# Patient Record
Sex: MALE | Race: BLACK OR AFRICAN AMERICAN | NOT HISPANIC OR LATINO | URBAN - METROPOLITAN AREA
[De-identification: names, ages, dates, MRNs, and addresses within clinical notes are randomized per-mention and may not be internally consistent; named-entity substitution may affect disease eponyms.]

---

## 2017-03-06 ENCOUNTER — OUTPATIENT (OUTPATIENT)
Dept: OUTPATIENT SERVICES | Facility: HOSPITAL | Age: 72
LOS: 1 days | End: 2017-03-06
Payer: MEDICARE

## 2017-03-06 ENCOUNTER — APPOINTMENT (OUTPATIENT)
Dept: PLASTIC SURGERY | Facility: CLINIC | Age: 72
End: 2017-03-06

## 2017-03-06 DIAGNOSIS — Z98.89 OTHER SPECIFIED POSTPROCEDURAL STATES: Chronic | ICD-10-CM

## 2017-03-06 PROCEDURE — 71275 CT ANGIOGRAPHY CHEST: CPT

## 2017-03-06 PROCEDURE — 71275 CT ANGIOGRAPHY CHEST: CPT | Mod: 26

## 2017-03-08 ENCOUNTER — APPOINTMENT (OUTPATIENT)
Dept: CARDIOTHORACIC SURGERY | Facility: CLINIC | Age: 72
End: 2017-03-08

## 2017-03-08 VITALS
BODY MASS INDEX: 25.68 KG/M2 | RESPIRATION RATE: 17 BRPM | WEIGHT: 179 LBS | SYSTOLIC BLOOD PRESSURE: 128 MMHG | OXYGEN SATURATION: 97 % | TEMPERATURE: 97.6 F | HEART RATE: 68 BPM | DIASTOLIC BLOOD PRESSURE: 92 MMHG

## 2017-04-19 ENCOUNTER — APPOINTMENT (OUTPATIENT)
Dept: CARDIOTHORACIC SURGERY | Facility: CLINIC | Age: 72
End: 2017-04-19

## 2017-06-28 ENCOUNTER — APPOINTMENT (OUTPATIENT)
Dept: GASTROENTEROLOGY | Facility: CLINIC | Age: 72
End: 2017-06-28

## 2017-07-24 ENCOUNTER — APPOINTMENT (OUTPATIENT)
Dept: ORTHOPEDIC SURGERY | Facility: CLINIC | Age: 72
End: 2017-07-24

## 2017-07-24 VITALS — BODY MASS INDEX: 26.98 KG/M2 | HEIGHT: 68 IN | WEIGHT: 178 LBS

## 2017-08-07 ENCOUNTER — APPOINTMENT (OUTPATIENT)
Dept: INTERNAL MEDICINE | Facility: CLINIC | Age: 72
End: 2017-08-07
Payer: MEDICARE

## 2017-08-07 VITALS
SYSTOLIC BLOOD PRESSURE: 150 MMHG | OXYGEN SATURATION: 98 % | TEMPERATURE: 97.3 F | WEIGHT: 178 LBS | BODY MASS INDEX: 27.07 KG/M2 | DIASTOLIC BLOOD PRESSURE: 90 MMHG | HEART RATE: 64 BPM

## 2017-08-07 VITALS — SYSTOLIC BLOOD PRESSURE: 136 MMHG | DIASTOLIC BLOOD PRESSURE: 80 MMHG

## 2017-08-07 PROCEDURE — 36415 COLL VENOUS BLD VENIPUNCTURE: CPT

## 2017-08-07 PROCEDURE — 99214 OFFICE O/P EST MOD 30 MIN: CPT | Mod: 25

## 2017-08-08 LAB
ALBUMIN SERPL ELPH-MCNC: 4.7 G/DL
ALP BLD-CCNC: 59 U/L
ALT SERPL-CCNC: 26 U/L
ANION GAP SERPL CALC-SCNC: 19 MMOL/L
AST SERPL-CCNC: 20 U/L
BILIRUB SERPL-MCNC: 0.4 MG/DL
BUN SERPL-MCNC: 25 MG/DL
CALCIUM SERPL-MCNC: 10.5 MG/DL
CHLORIDE SERPL-SCNC: 101 MMOL/L
CHOLEST SERPL-MCNC: 267 MG/DL
CHOLEST/HDLC SERPL: 3.1 RATIO
CK SERPL-CCNC: 126 U/L
CO2 SERPL-SCNC: 24 MMOL/L
CREAT SERPL-MCNC: 1.33 MG/DL
GLUCOSE SERPL-MCNC: 97 MG/DL
HBA1C MFR BLD HPLC: 6.2 %
HDLC SERPL-MCNC: 86 MG/DL
LDLC SERPL CALC-MCNC: 165 MG/DL
POTASSIUM SERPL-SCNC: 5.6 MMOL/L
PROT SERPL-MCNC: 8.2 G/DL
SODIUM SERPL-SCNC: 144 MMOL/L
TRIGL SERPL-MCNC: 79 MG/DL

## 2017-09-05 ENCOUNTER — RX RENEWAL (OUTPATIENT)
Age: 72
End: 2017-09-05

## 2017-09-13 ENCOUNTER — APPOINTMENT (OUTPATIENT)
Dept: HEART AND VASCULAR | Facility: CLINIC | Age: 72
End: 2017-09-13
Payer: MEDICARE

## 2017-09-13 VITALS
TEMPERATURE: 98.2 F | OXYGEN SATURATION: 96 % | BODY MASS INDEX: 26.35 KG/M2 | DIASTOLIC BLOOD PRESSURE: 90 MMHG | SYSTOLIC BLOOD PRESSURE: 130 MMHG | WEIGHT: 182 LBS | HEIGHT: 69.69 IN | HEART RATE: 50 BPM

## 2017-09-13 PROCEDURE — 36415 COLL VENOUS BLD VENIPUNCTURE: CPT

## 2017-09-13 PROCEDURE — 99214 OFFICE O/P EST MOD 30 MIN: CPT

## 2017-09-13 PROCEDURE — 93000 ELECTROCARDIOGRAM COMPLETE: CPT

## 2017-09-13 RX ORDER — TADALAFIL 5 MG/1
5 TABLET, FILM COATED ORAL
Qty: 5 | Refills: 0 | Status: DISCONTINUED | COMMUNITY
Start: 2017-05-01

## 2017-09-14 LAB
ANION GAP SERPL CALC-SCNC: 13 MMOL/L
BUN SERPL-MCNC: 22 MG/DL
CALCIUM SERPL-MCNC: 9.9 MG/DL
CHLORIDE SERPL-SCNC: 104 MMOL/L
CO2 SERPL-SCNC: 25 MMOL/L
CREAT SERPL-MCNC: 1.05 MG/DL
GLUCOSE SERPL-MCNC: 97 MG/DL
POTASSIUM SERPL-SCNC: 4.7 MMOL/L
SODIUM SERPL-SCNC: 142 MMOL/L

## 2017-10-17 ENCOUNTER — APPOINTMENT (OUTPATIENT)
Dept: GASTROENTEROLOGY | Facility: HOSPITAL | Age: 72
End: 2017-10-17

## 2017-10-17 ENCOUNTER — OUTPATIENT (OUTPATIENT)
Dept: OUTPATIENT SERVICES | Facility: HOSPITAL | Age: 72
LOS: 1 days | Discharge: HOME | End: 2017-10-17
Payer: MEDICARE

## 2017-10-17 DIAGNOSIS — Z98.89 OTHER SPECIFIED POSTPROCEDURAL STATES: Chronic | ICD-10-CM

## 2017-10-17 PROCEDURE — G0121: CPT

## 2017-10-17 PROCEDURE — 45378 DIAGNOSTIC COLONOSCOPY: CPT

## 2017-10-20 DIAGNOSIS — K64.8 OTHER HEMORRHOIDS: ICD-10-CM

## 2017-10-20 DIAGNOSIS — Z79.82 LONG TERM (CURRENT) USE OF ASPIRIN: ICD-10-CM

## 2017-10-20 DIAGNOSIS — Z12.11 ENCOUNTER FOR SCREENING FOR MALIGNANT NEOPLASM OF COLON: ICD-10-CM

## 2017-10-20 DIAGNOSIS — I10 ESSENTIAL (PRIMARY) HYPERTENSION: ICD-10-CM

## 2017-10-20 DIAGNOSIS — K57.30 DIVERTICULOSIS OF LARGE INTESTINE WITHOUT PERFORATION OR ABSCESS WITHOUT BLEEDING: ICD-10-CM

## 2017-10-20 DIAGNOSIS — Z95.810 PRESENCE OF AUTOMATIC (IMPLANTABLE) CARDIAC DEFIBRILLATOR: ICD-10-CM

## 2017-11-06 ENCOUNTER — APPOINTMENT (OUTPATIENT)
Dept: HEART AND VASCULAR | Facility: CLINIC | Age: 72
End: 2017-11-06
Payer: MEDICARE

## 2017-11-06 VITALS
SYSTOLIC BLOOD PRESSURE: 124 MMHG | DIASTOLIC BLOOD PRESSURE: 82 MMHG | HEART RATE: 66 BPM | WEIGHT: 174 LBS | BODY MASS INDEX: 24.91 KG/M2 | OXYGEN SATURATION: 97 % | TEMPERATURE: 98.3 F | HEIGHT: 70 IN

## 2017-11-06 PROCEDURE — 99214 OFFICE O/P EST MOD 30 MIN: CPT | Mod: 25

## 2017-12-01 ENCOUNTER — RX RENEWAL (OUTPATIENT)
Age: 72
End: 2017-12-01

## 2017-12-06 ENCOUNTER — OTHER (OUTPATIENT)
Age: 72
End: 2017-12-06

## 2017-12-18 ENCOUNTER — APPOINTMENT (OUTPATIENT)
Dept: HEART AND VASCULAR | Facility: CLINIC | Age: 72
End: 2017-12-18
Payer: MEDICARE

## 2017-12-18 VITALS
HEART RATE: 63 BPM | WEIGHT: 173.99 LBS | TEMPERATURE: 97.8 F | SYSTOLIC BLOOD PRESSURE: 120 MMHG | BODY MASS INDEX: 24.91 KG/M2 | OXYGEN SATURATION: 100 % | HEIGHT: 70.08 IN | DIASTOLIC BLOOD PRESSURE: 80 MMHG

## 2017-12-18 PROCEDURE — 36415 COLL VENOUS BLD VENIPUNCTURE: CPT

## 2017-12-18 PROCEDURE — 99214 OFFICE O/P EST MOD 30 MIN: CPT | Mod: 25

## 2017-12-18 PROCEDURE — 93000 ELECTROCARDIOGRAM COMPLETE: CPT

## 2017-12-19 LAB
ALBUMIN SERPL ELPH-MCNC: 4.2 G/DL
ALP BLD-CCNC: 56 U/L
ALT SERPL-CCNC: 21 U/L
ANION GAP SERPL CALC-SCNC: 16 MMOL/L
AST SERPL-CCNC: 25 U/L
BILIRUB SERPL-MCNC: 0.4 MG/DL
BUN SERPL-MCNC: 28 MG/DL
CALCIUM SERPL-MCNC: 9.8 MG/DL
CHLORIDE SERPL-SCNC: 104 MMOL/L
CHOLEST SERPL-MCNC: 202 MG/DL
CHOLEST/HDLC SERPL: 2.3 RATIO
CO2 SERPL-SCNC: 25 MMOL/L
CREAT SERPL-MCNC: 1.39 MG/DL
GLUCOSE SERPL-MCNC: 93 MG/DL
HDLC SERPL-MCNC: 86 MG/DL
LDLC SERPL CALC-MCNC: 105 MG/DL
MAGNESIUM SERPL-MCNC: 2 MG/DL
POTASSIUM SERPL-SCNC: 4.8 MMOL/L
PROT SERPL-MCNC: 7.6 G/DL
SODIUM SERPL-SCNC: 145 MMOL/L
TRIGL SERPL-MCNC: 55 MG/DL

## 2018-01-23 ENCOUNTER — APPOINTMENT (OUTPATIENT)
Dept: ORTHOPEDIC SURGERY | Facility: CLINIC | Age: 73
End: 2018-01-23
Payer: MEDICARE

## 2018-01-23 VITALS — HEIGHT: 70 IN | BODY MASS INDEX: 24.77 KG/M2 | WEIGHT: 173 LBS | RESPIRATION RATE: 17 BRPM

## 2018-01-23 PROCEDURE — 73140 X-RAY EXAM OF FINGER(S): CPT | Mod: F6

## 2018-01-23 PROCEDURE — 99214 OFFICE O/P EST MOD 30 MIN: CPT

## 2018-01-25 ENCOUNTER — RX RENEWAL (OUTPATIENT)
Age: 73
End: 2018-01-25

## 2018-02-05 ENCOUNTER — APPOINTMENT (OUTPATIENT)
Dept: INTERNAL MEDICINE | Facility: CLINIC | Age: 73
End: 2018-02-05
Payer: MEDICARE

## 2018-02-05 ENCOUNTER — LABORATORY RESULT (OUTPATIENT)
Age: 73
End: 2018-02-05

## 2018-02-05 VITALS
BODY MASS INDEX: 24.77 KG/M2 | TEMPERATURE: 98.2 F | HEIGHT: 70 IN | DIASTOLIC BLOOD PRESSURE: 100 MMHG | OXYGEN SATURATION: 99 % | SYSTOLIC BLOOD PRESSURE: 150 MMHG | WEIGHT: 173 LBS

## 2018-02-05 VITALS — DIASTOLIC BLOOD PRESSURE: 80 MMHG | SYSTOLIC BLOOD PRESSURE: 120 MMHG

## 2018-02-05 DIAGNOSIS — Z12.11 ENCOUNTER FOR SCREENING FOR MALIGNANT NEOPLASM OF COLON: ICD-10-CM

## 2018-02-05 PROCEDURE — 36415 COLL VENOUS BLD VENIPUNCTURE: CPT

## 2018-02-05 PROCEDURE — 99213 OFFICE O/P EST LOW 20 MIN: CPT | Mod: 25

## 2018-02-05 PROCEDURE — 90732 PPSV23 VACC 2 YRS+ SUBQ/IM: CPT

## 2018-02-05 PROCEDURE — G0009: CPT

## 2018-02-05 PROCEDURE — G0439: CPT

## 2018-02-05 RX ORDER — TADALAFIL 10 MG/1
10 TABLET, FILM COATED ORAL
Qty: 5 | Refills: 3 | Status: COMPLETED | COMMUNITY
Start: 2017-09-13 | End: 2018-02-05

## 2018-02-06 LAB
25(OH)D3 SERPL-MCNC: 24.9 NG/ML
CHOLEST SERPL-MCNC: 222 MG/DL
CHOLEST/HDLC SERPL: 2.3 RATIO
CK SERPL-CCNC: 170 U/L
HDLC SERPL-MCNC: 96 MG/DL
LDLC SERPL CALC-MCNC: 117 MG/DL
PSA SERPL-MCNC: 0.81 NG/ML
TRIGL SERPL-MCNC: 43 MG/DL
TSH SERPL-ACNC: 1.58 UIU/ML

## 2018-02-07 LAB
BASOPHILS # BLD AUTO: 0.01 K/UL
BASOPHILS NFR BLD AUTO: 0.2 %
EOSINOPHIL # BLD AUTO: 0.03 K/UL
EOSINOPHIL NFR BLD AUTO: 0.6 %
HBA1C MFR BLD HPLC: 6 %
HCT VFR BLD CALC: 46.1 %
HGB BLD-MCNC: 13.7 G/DL
IMM GRANULOCYTES NFR BLD AUTO: 0 %
LYMPHOCYTES # BLD AUTO: 3.07 K/UL
LYMPHOCYTES NFR BLD AUTO: 56.7 %
MAN DIFF?: NORMAL
MCHC RBC-ENTMCNC: 29.7 GM/DL
MCHC RBC-ENTMCNC: 31.8 PG
MCV RBC AUTO: 107 FL
MONOCYTES # BLD AUTO: 0.38 K/UL
MONOCYTES NFR BLD AUTO: 7 %
NEUTROPHILS # BLD AUTO: 1.92 K/UL
NEUTROPHILS NFR BLD AUTO: 35.5 %
PLATELET # BLD AUTO: 226 K/UL
RBC # BLD: 4.31 M/UL
RBC # FLD: 14.7 %
WBC # FLD AUTO: 5.41 K/UL

## 2018-02-23 ENCOUNTER — APPOINTMENT (OUTPATIENT)
Dept: HEART AND VASCULAR | Facility: CLINIC | Age: 73
End: 2018-02-23
Payer: MEDICARE

## 2018-02-23 VITALS
DIASTOLIC BLOOD PRESSURE: 78 MMHG | OXYGEN SATURATION: 100 % | SYSTOLIC BLOOD PRESSURE: 116 MMHG | WEIGHT: 175 LBS | HEIGHT: 70 IN | TEMPERATURE: 97.3 F | HEART RATE: 56 BPM | BODY MASS INDEX: 25.05 KG/M2

## 2018-02-23 PROCEDURE — 99214 OFFICE O/P EST MOD 30 MIN: CPT | Mod: 25

## 2018-02-23 PROCEDURE — 36415 COLL VENOUS BLD VENIPUNCTURE: CPT

## 2018-02-23 PROCEDURE — 93000 ELECTROCARDIOGRAM COMPLETE: CPT

## 2018-02-26 LAB
ANION GAP SERPL CALC-SCNC: 10 MMOL/L
BASOPHILS # BLD AUTO: 0.02 K/UL
BASOPHILS NFR BLD AUTO: 0.4 %
BUN SERPL-MCNC: 26 MG/DL
CALCIUM SERPL-MCNC: 9.6 MG/DL
CHLORIDE SERPL-SCNC: 104 MMOL/L
CO2 SERPL-SCNC: 29 MMOL/L
CREAT SERPL-MCNC: 1.12 MG/DL
EOSINOPHIL # BLD AUTO: 0.04 K/UL
EOSINOPHIL NFR BLD AUTO: 0.9 %
GLUCOSE SERPL-MCNC: 84 MG/DL
HCT VFR BLD CALC: 45.3 %
HGB BLD-MCNC: 14.6 G/DL
IMM GRANULOCYTES NFR BLD AUTO: 0.2 %
LYMPHOCYTES # BLD AUTO: 2.53 K/UL
LYMPHOCYTES NFR BLD AUTO: 53.8 %
MAN DIFF?: NORMAL
MCHC RBC-ENTMCNC: 32.2 GM/DL
MCHC RBC-ENTMCNC: 32.2 PG
MCV RBC AUTO: 100 FL
MONOCYTES # BLD AUTO: 0.4 K/UL
MONOCYTES NFR BLD AUTO: 8.5 %
NEUTROPHILS # BLD AUTO: 1.7 K/UL
NEUTROPHILS NFR BLD AUTO: 36.2 %
PLATELET # BLD AUTO: 235 K/UL
POTASSIUM SERPL-SCNC: 5.5 MMOL/L
RBC # BLD: 4.53 M/UL
RBC # FLD: 14 %
SODIUM SERPL-SCNC: 143 MMOL/L
WBC # FLD AUTO: 4.7 K/UL

## 2018-03-01 ENCOUNTER — APPOINTMENT (OUTPATIENT)
Dept: HEART AND VASCULAR | Facility: CLINIC | Age: 73
End: 2018-03-01

## 2018-03-02 ENCOUNTER — APPOINTMENT (OUTPATIENT)
Dept: HEART AND VASCULAR | Facility: CLINIC | Age: 73
End: 2018-03-02
Payer: MEDICARE

## 2018-03-02 VITALS
WEIGHT: 171.98 LBS | BODY MASS INDEX: 24.62 KG/M2 | DIASTOLIC BLOOD PRESSURE: 76 MMHG | SYSTOLIC BLOOD PRESSURE: 126 MMHG | OXYGEN SATURATION: 100 % | HEART RATE: 53 BPM | HEIGHT: 70 IN | TEMPERATURE: 97.4 F

## 2018-03-02 LAB
ANION GAP SERPL CALC-SCNC: 10 MMOL/L
BUN SERPL-MCNC: 26 MG/DL
CALCIUM SERPL-MCNC: 9.2 MG/DL
CHLORIDE SERPL-SCNC: 101 MMOL/L
CO2 SERPL-SCNC: 28 MMOL/L
CREAT SERPL-MCNC: 1.08 MG/DL
GLUCOSE SERPL-MCNC: 96 MG/DL
POTASSIUM SERPL-SCNC: 5 MMOL/L
SODIUM SERPL-SCNC: 139 MMOL/L

## 2018-03-02 PROCEDURE — 99214 OFFICE O/P EST MOD 30 MIN: CPT | Mod: 25

## 2018-03-02 PROCEDURE — 36415 COLL VENOUS BLD VENIPUNCTURE: CPT

## 2018-03-13 ENCOUNTER — APPOINTMENT (OUTPATIENT)
Dept: ORTHOPEDIC SURGERY | Facility: AMBULATORY SURGERY CENTER | Age: 73
End: 2018-03-13

## 2018-03-13 ENCOUNTER — OUTPATIENT (OUTPATIENT)
Dept: OUTPATIENT SERVICES | Facility: HOSPITAL | Age: 73
LOS: 1 days | Discharge: HOME | End: 2018-03-13
Payer: MEDICARE

## 2018-03-13 ENCOUNTER — RESULT REVIEW (OUTPATIENT)
Age: 73
End: 2018-03-13

## 2018-03-13 DIAGNOSIS — Z98.89 OTHER SPECIFIED POSTPROCEDURAL STATES: Chronic | ICD-10-CM

## 2018-03-13 PROCEDURE — 10120 INC&RMVL FB SUBQ TISS SMPL: CPT | Mod: F7

## 2018-03-13 PROCEDURE — 26115 EXC HAND LES SC < 1.5 CM: CPT | Mod: F8

## 2018-03-13 PROCEDURE — 26055 INCISE FINGER TENDON SHEATH: CPT | Mod: 59,F8

## 2018-03-21 LAB — SURGICAL PATHOLOGY STUDY: SIGNIFICANT CHANGE UP

## 2018-03-23 ENCOUNTER — APPOINTMENT (OUTPATIENT)
Dept: OTOLARYNGOLOGY | Facility: CLINIC | Age: 73
End: 2018-03-23
Payer: MEDICARE

## 2018-03-23 ENCOUNTER — APPOINTMENT (OUTPATIENT)
Dept: ORTHOPEDIC SURGERY | Facility: CLINIC | Age: 73
End: 2018-03-23
Payer: MEDICARE

## 2018-03-23 VITALS
DIASTOLIC BLOOD PRESSURE: 83 MMHG | HEART RATE: 57 BPM | OXYGEN SATURATION: 100 % | SYSTOLIC BLOOD PRESSURE: 142 MMHG | TEMPERATURE: 97.7 F

## 2018-03-23 DIAGNOSIS — M19.041 PRIMARY OSTEOARTHRITIS, RIGHT HAND: ICD-10-CM

## 2018-03-23 PROCEDURE — 99203 OFFICE O/P NEW LOW 30 MIN: CPT

## 2018-03-23 PROCEDURE — 99024 POSTOP FOLLOW-UP VISIT: CPT

## 2018-03-23 RX ORDER — CEPHALEXIN 500 MG/1
500 CAPSULE ORAL 3 TIMES DAILY
Qty: 21 | Refills: 0 | Status: DISCONTINUED | COMMUNITY
Start: 2018-03-12 | End: 2018-03-23

## 2018-04-10 ENCOUNTER — APPOINTMENT (OUTPATIENT)
Dept: HEART AND VASCULAR | Facility: CLINIC | Age: 73
End: 2018-04-10
Payer: MEDICARE

## 2018-04-10 VITALS
OXYGEN SATURATION: 95 % | SYSTOLIC BLOOD PRESSURE: 150 MMHG | DIASTOLIC BLOOD PRESSURE: 90 MMHG | BODY MASS INDEX: 24.77 KG/M2 | WEIGHT: 173 LBS | HEART RATE: 52 BPM | HEIGHT: 70 IN | TEMPERATURE: 98.1 F

## 2018-04-10 PROCEDURE — 99214 OFFICE O/P EST MOD 30 MIN: CPT | Mod: 25

## 2018-04-10 PROCEDURE — 93000 ELECTROCARDIOGRAM COMPLETE: CPT

## 2018-04-12 ENCOUNTER — APPOINTMENT (OUTPATIENT)
Dept: HEART AND VASCULAR | Facility: CLINIC | Age: 73
End: 2018-04-12

## 2018-04-15 ENCOUNTER — FORM ENCOUNTER (OUTPATIENT)
Age: 73
End: 2018-04-15

## 2018-04-16 ENCOUNTER — APPOINTMENT (OUTPATIENT)
Dept: CT IMAGING | Facility: HOSPITAL | Age: 73
End: 2018-04-16
Payer: MEDICARE

## 2018-04-16 ENCOUNTER — OUTPATIENT (OUTPATIENT)
Dept: OUTPATIENT SERVICES | Facility: HOSPITAL | Age: 73
LOS: 1 days | End: 2018-04-16
Payer: MEDICARE

## 2018-04-16 DIAGNOSIS — Z98.89 OTHER SPECIFIED POSTPROCEDURAL STATES: Chronic | ICD-10-CM

## 2018-04-16 PROCEDURE — 71275 CT ANGIOGRAPHY CHEST: CPT | Mod: 26

## 2018-04-16 PROCEDURE — 71275 CT ANGIOGRAPHY CHEST: CPT

## 2018-05-07 ENCOUNTER — APPOINTMENT (OUTPATIENT)
Dept: HEART AND VASCULAR | Facility: CLINIC | Age: 73
End: 2018-05-07

## 2018-06-11 ENCOUNTER — APPOINTMENT (OUTPATIENT)
Dept: INTERNAL MEDICINE | Facility: CLINIC | Age: 73
End: 2018-06-11
Payer: MEDICARE

## 2018-06-11 VITALS
DIASTOLIC BLOOD PRESSURE: 80 MMHG | WEIGHT: 173 LBS | HEART RATE: 34 BPM | HEIGHT: 70 IN | SYSTOLIC BLOOD PRESSURE: 119 MMHG | BODY MASS INDEX: 24.77 KG/M2 | TEMPERATURE: 97.7 F | OXYGEN SATURATION: 93 %

## 2018-06-11 DIAGNOSIS — S60.450A SUPERFICIAL FOREIGN BODY OF RIGHT INDEX FINGER, INITIAL ENCOUNTER: ICD-10-CM

## 2018-06-11 PROCEDURE — 99214 OFFICE O/P EST MOD 30 MIN: CPT

## 2018-06-12 NOTE — ASSESSMENT
[FreeTextEntry1] : 73 y/o man is here for several issues.\par 1. Right shoulder injury-I am concerned about his exam-?brachial plexus injury? He is clearly having muscle spasms and may need Neurology consult as well\par 2. LIpoma-NTD\par 3, Insomnia-given age and comorbidities, I am hesitant to prescribe Ambien. Pt to try Melatonin.

## 2018-06-12 NOTE — PHYSICAL EXAM
[No Acute Distress] : no acute distress [Well Nourished] : well nourished [Well Developed] : well developed [Well-Appearing] : well-appearing [Normal Sclera/Conjunctiva] : normal sclera/conjunctiva [PERRL] : pupils equal round and reactive to light [Normal Outer Ear/Nose] : the outer ears and nose were normal in appearance [No JVD] : no jugular venous distention [No Respiratory Distress] : no respiratory distress  [Clear to Auscultation] : lungs were clear to auscultation bilaterally [Grossly Normal Strength/Tone] : grossly normal strength/tone [Normal Gait] : normal gait [Normal Affect] : the affect was normal [Alert and Oriented x3] : oriented to person, place, and time [de-identified] : right shoulder girdle contracts spontaneously without patient being aware; needs help to raise arm more than 120 degrees [de-identified] : small lipoma on left UE

## 2018-06-12 NOTE — REVIEW OF SYSTEMS
[Joint Pain] : joint pain [Joint Stiffness] : joint stiffness [Muscle Weakness] : muscle weakness [Insomnia] : insomnia [Anxiety] : no anxiety [Depression] : no depression [Negative] : Heme/Lymph [de-identified] : "lump" on arm

## 2018-06-12 NOTE — HISTORY OF PRESENT ILLNESS
[de-identified] : 71 y/o man is here for several issues.\par Since February (his flu shot), he c/o right shoulder pain. He feels discomfort at rest in the medial joint area and can't raise arm >90 degrees. He thinks his  is weaker. \par He has a "lump" on the inside of his left upper arm.\par He can't sleep. He doesn't fall asleep until 2-3 am and then wakes up several hours later. It has "always been like this." He denies anxiety. He practices good sleep hygiene.

## 2018-07-10 ENCOUNTER — APPOINTMENT (OUTPATIENT)
Dept: HEART AND VASCULAR | Facility: CLINIC | Age: 73
End: 2018-07-10
Payer: MEDICARE

## 2018-07-10 VITALS
OXYGEN SATURATION: 98 % | WEIGHT: 173 LBS | TEMPERATURE: 97.6 F | BODY MASS INDEX: 24.77 KG/M2 | SYSTOLIC BLOOD PRESSURE: 123 MMHG | HEIGHT: 70 IN | DIASTOLIC BLOOD PRESSURE: 85 MMHG | HEART RATE: 57 BPM

## 2018-07-10 VITALS — SYSTOLIC BLOOD PRESSURE: 114 MMHG | DIASTOLIC BLOOD PRESSURE: 78 MMHG

## 2018-07-10 LAB
BASOPHILS # BLD AUTO: 0.02 K/UL
BASOPHILS NFR BLD AUTO: 0.4 %
EOSINOPHIL # BLD AUTO: 0.05 K/UL
EOSINOPHIL NFR BLD AUTO: 1 %
HCT VFR BLD CALC: 42.3 %
HGB BLD-MCNC: 14.1 G/DL
IMM GRANULOCYTES NFR BLD AUTO: 0 %
LYMPHOCYTES # BLD AUTO: 3.19 K/UL
LYMPHOCYTES NFR BLD AUTO: 65.6 %
MAN DIFF?: NORMAL
MCHC RBC-ENTMCNC: 33.3 GM/DL
MCHC RBC-ENTMCNC: 33.3 PG
MCV RBC AUTO: 99.8 FL
MONOCYTES # BLD AUTO: 0.37 K/UL
MONOCYTES NFR BLD AUTO: 7.6 %
NEUTROPHILS # BLD AUTO: 1.23 K/UL
NEUTROPHILS NFR BLD AUTO: 25.4 %
PLATELET # BLD AUTO: 214 K/UL
RBC # BLD: 4.24 M/UL
RBC # FLD: 14.3 %
WBC # FLD AUTO: 4.86 K/UL

## 2018-07-10 PROCEDURE — 36415 COLL VENOUS BLD VENIPUNCTURE: CPT

## 2018-07-10 PROCEDURE — 99214 OFFICE O/P EST MOD 30 MIN: CPT | Mod: 25

## 2018-07-10 RX ORDER — FUROSEMIDE 20 MG/1
20 TABLET ORAL
Qty: 90 | Refills: 0 | Status: DISCONTINUED | COMMUNITY
Start: 2018-03-02 | End: 2018-07-10

## 2018-07-11 LAB
ALBUMIN SERPL ELPH-MCNC: 4.3 G/DL
ALP BLD-CCNC: 64 U/L
ALT SERPL-CCNC: 20 U/L
ANION GAP SERPL CALC-SCNC: 12 MMOL/L
AST SERPL-CCNC: 19 U/L
BILIRUB SERPL-MCNC: 0.4 MG/DL
BUN SERPL-MCNC: 30 MG/DL
CALCIUM SERPL-MCNC: 9.9 MG/DL
CHLORIDE SERPL-SCNC: 102 MMOL/L
CHOLEST SERPL-MCNC: 232 MG/DL
CHOLEST/HDLC SERPL: 2.7 RATIO
CO2 SERPL-SCNC: 27 MMOL/L
CREAT SERPL-MCNC: 1.32 MG/DL
GLUCOSE SERPL-MCNC: 99 MG/DL
HBA1C MFR BLD HPLC: 6.1 %
HDLC SERPL-MCNC: 86 MG/DL
LDLC SERPL CALC-MCNC: 134 MG/DL
POTASSIUM SERPL-SCNC: 5 MMOL/L
PROT SERPL-MCNC: 7.6 G/DL
SODIUM SERPL-SCNC: 141 MMOL/L
TRIGL SERPL-MCNC: 58 MG/DL

## 2018-07-19 ENCOUNTER — FORM ENCOUNTER (OUTPATIENT)
Age: 73
End: 2018-07-19

## 2018-07-20 ENCOUNTER — APPOINTMENT (OUTPATIENT)
Dept: CT IMAGING | Facility: HOSPITAL | Age: 73
End: 2018-07-20
Payer: MEDICARE

## 2018-07-20 ENCOUNTER — OUTPATIENT (OUTPATIENT)
Dept: OUTPATIENT SERVICES | Facility: HOSPITAL | Age: 73
LOS: 1 days | End: 2018-07-20
Payer: MEDICARE

## 2018-07-20 DIAGNOSIS — Z98.89 OTHER SPECIFIED POSTPROCEDURAL STATES: Chronic | ICD-10-CM

## 2018-07-20 PROCEDURE — 71275 CT ANGIOGRAPHY CHEST: CPT

## 2018-07-20 PROCEDURE — 71275 CT ANGIOGRAPHY CHEST: CPT | Mod: 26

## 2018-07-23 ENCOUNTER — FORM ENCOUNTER (OUTPATIENT)
Age: 73
End: 2018-07-23

## 2018-07-24 ENCOUNTER — OUTPATIENT (OUTPATIENT)
Dept: OUTPATIENT SERVICES | Facility: HOSPITAL | Age: 73
LOS: 1 days | End: 2018-07-24
Payer: MEDICARE

## 2018-07-24 DIAGNOSIS — I71.2 THORACIC AORTIC ANEURYSM, WITHOUT RUPTURE: ICD-10-CM

## 2018-07-24 DIAGNOSIS — Z98.89 OTHER SPECIFIED POSTPROCEDURAL STATES: Chronic | ICD-10-CM

## 2018-07-24 PROCEDURE — 93306 TTE W/DOPPLER COMPLETE: CPT | Mod: 26

## 2018-07-24 PROCEDURE — 93306 TTE W/DOPPLER COMPLETE: CPT

## 2018-09-07 ENCOUNTER — APPOINTMENT (OUTPATIENT)
Dept: ORTHOPEDIC SURGERY | Facility: CLINIC | Age: 73
End: 2018-09-07
Payer: MEDICARE

## 2018-09-07 ENCOUNTER — RX CHANGE (OUTPATIENT)
Age: 73
End: 2018-09-07

## 2018-09-07 VITALS — RESPIRATION RATE: 17 BRPM | WEIGHT: 172 LBS | BODY MASS INDEX: 24.62 KG/M2 | HEIGHT: 70 IN

## 2018-09-07 PROCEDURE — 99213 OFFICE O/P EST LOW 20 MIN: CPT

## 2018-09-11 ENCOUNTER — OUTPATIENT (OUTPATIENT)
Dept: OUTPATIENT SERVICES | Facility: HOSPITAL | Age: 73
LOS: 1 days | End: 2018-09-11
Payer: MEDICARE

## 2018-09-11 DIAGNOSIS — Z98.89 OTHER SPECIFIED POSTPROCEDURAL STATES: Chronic | ICD-10-CM

## 2018-09-11 PROCEDURE — 73560 X-RAY EXAM OF KNEE 1 OR 2: CPT | Mod: 26,RT

## 2018-09-11 PROCEDURE — 73560 X-RAY EXAM OF KNEE 1 OR 2: CPT

## 2018-10-18 ENCOUNTER — OUTPATIENT (OUTPATIENT)
Dept: OUTPATIENT SERVICES | Facility: HOSPITAL | Age: 73
LOS: 1 days | End: 2018-10-18
Payer: MEDICARE

## 2018-10-18 ENCOUNTER — APPOINTMENT (OUTPATIENT)
Dept: CT IMAGING | Facility: CLINIC | Age: 73
End: 2018-10-18
Payer: MEDICARE

## 2018-10-18 DIAGNOSIS — Z98.89 OTHER SPECIFIED POSTPROCEDURAL STATES: Chronic | ICD-10-CM

## 2018-10-18 PROCEDURE — 82565 ASSAY OF CREATININE: CPT

## 2018-10-18 PROCEDURE — 73701 CT LOWER EXTREMITY W/DYE: CPT

## 2018-10-18 PROCEDURE — 73701 CT LOWER EXTREMITY W/DYE: CPT | Mod: 26,RT

## 2018-10-23 ENCOUNTER — APPOINTMENT (OUTPATIENT)
Dept: HEART AND VASCULAR | Facility: CLINIC | Age: 73
End: 2018-10-23
Payer: MEDICARE

## 2018-10-23 VITALS
HEART RATE: 66 BPM | OXYGEN SATURATION: 97 % | HEIGHT: 70 IN | DIASTOLIC BLOOD PRESSURE: 80 MMHG | SYSTOLIC BLOOD PRESSURE: 110 MMHG | TEMPERATURE: 97.6 F | BODY MASS INDEX: 24.91 KG/M2 | WEIGHT: 173.99 LBS

## 2018-10-23 PROCEDURE — 36415 COLL VENOUS BLD VENIPUNCTURE: CPT

## 2018-10-23 PROCEDURE — 99214 OFFICE O/P EST MOD 30 MIN: CPT

## 2018-10-23 RX ORDER — TELMISARTAN 80 MG/1
80 TABLET ORAL DAILY
Qty: 90 | Refills: 2 | Status: DISCONTINUED | COMMUNITY
Start: 2018-09-10 | End: 2018-10-23

## 2018-10-24 LAB
ALBUMIN SERPL ELPH-MCNC: 4.2 G/DL
ALP BLD-CCNC: 56 U/L
ALT SERPL-CCNC: 17 U/L
ANION GAP SERPL CALC-SCNC: 11 MMOL/L
AST SERPL-CCNC: 20 U/L
BASOPHILS # BLD AUTO: 0.02 K/UL
BASOPHILS NFR BLD AUTO: 0.4 %
BILIRUB SERPL-MCNC: 0.6 MG/DL
BUN SERPL-MCNC: 23 MG/DL
CALCIUM SERPL-MCNC: 10 MG/DL
CHLORIDE SERPL-SCNC: 103 MMOL/L
CHOLEST SERPL-MCNC: 218 MG/DL
CHOLEST/HDLC SERPL: 2.6 RATIO
CO2 SERPL-SCNC: 29 MMOL/L
CREAT SERPL-MCNC: 1.14 MG/DL
EOSINOPHIL # BLD AUTO: 0.07 K/UL
EOSINOPHIL NFR BLD AUTO: 1.4 %
GLUCOSE SERPL-MCNC: 91 MG/DL
HBA1C MFR BLD HPLC: 6.2 %
HCT VFR BLD CALC: 45.3 %
HDLC SERPL-MCNC: 84 MG/DL
HGB BLD-MCNC: 14.7 G/DL
IMM GRANULOCYTES NFR BLD AUTO: 0 %
LDLC SERPL CALC-MCNC: 122 MG/DL
LYMPHOCYTES # BLD AUTO: 2.66 K/UL
LYMPHOCYTES NFR BLD AUTO: 54.4 %
MAN DIFF?: NORMAL
MCHC RBC-ENTMCNC: 32.5 GM/DL
MCHC RBC-ENTMCNC: 32.5 PG
MCV RBC AUTO: 100 FL
MONOCYTES # BLD AUTO: 0.51 K/UL
MONOCYTES NFR BLD AUTO: 10.4 %
NEUTROPHILS # BLD AUTO: 1.63 K/UL
NEUTROPHILS NFR BLD AUTO: 33.4 %
PLATELET # BLD AUTO: 233 K/UL
POTASSIUM SERPL-SCNC: 5.6 MMOL/L
PROT SERPL-MCNC: 7.8 G/DL
RBC # BLD: 4.53 M/UL
RBC # FLD: 13.7 %
SODIUM SERPL-SCNC: 143 MMOL/L
TRIGL SERPL-MCNC: 58 MG/DL
WBC # FLD AUTO: 4.89 K/UL

## 2018-12-06 ENCOUNTER — RX RENEWAL (OUTPATIENT)
Age: 73
End: 2018-12-06

## 2018-12-07 ENCOUNTER — RX RENEWAL (OUTPATIENT)
Age: 73
End: 2018-12-07

## 2018-12-20 ENCOUNTER — OUTPATIENT (OUTPATIENT)
Dept: OUTPATIENT SERVICES | Facility: HOSPITAL | Age: 73
LOS: 1 days | End: 2018-12-20
Payer: MEDICARE

## 2018-12-20 DIAGNOSIS — Z98.89 OTHER SPECIFIED POSTPROCEDURAL STATES: Chronic | ICD-10-CM

## 2018-12-20 PROCEDURE — 73552 X-RAY EXAM OF FEMUR 2/>: CPT

## 2018-12-20 PROCEDURE — 73030 X-RAY EXAM OF SHOULDER: CPT

## 2018-12-20 PROCEDURE — 73552 X-RAY EXAM OF FEMUR 2/>: CPT | Mod: 26,50

## 2018-12-20 PROCEDURE — 73030 X-RAY EXAM OF SHOULDER: CPT | Mod: 26,50

## 2019-01-08 ENCOUNTER — RX RENEWAL (OUTPATIENT)
Age: 74
End: 2019-01-08

## 2019-01-22 ENCOUNTER — APPOINTMENT (OUTPATIENT)
Dept: HEART AND VASCULAR | Facility: CLINIC | Age: 74
End: 2019-01-22
Payer: MEDICARE

## 2019-01-22 VITALS
OXYGEN SATURATION: 98 % | DIASTOLIC BLOOD PRESSURE: 90 MMHG | WEIGHT: 176 LBS | HEIGHT: 70 IN | BODY MASS INDEX: 25.2 KG/M2 | TEMPERATURE: 97.8 F | HEART RATE: 52 BPM | SYSTOLIC BLOOD PRESSURE: 130 MMHG

## 2019-01-22 VITALS — SYSTOLIC BLOOD PRESSURE: 130 MMHG | DIASTOLIC BLOOD PRESSURE: 98 MMHG

## 2019-01-22 PROCEDURE — 36415 COLL VENOUS BLD VENIPUNCTURE: CPT

## 2019-01-22 PROCEDURE — 93000 ELECTROCARDIOGRAM COMPLETE: CPT

## 2019-01-22 PROCEDURE — 99214 OFFICE O/P EST MOD 30 MIN: CPT

## 2019-01-22 NOTE — HISTORY OF PRESENT ILLNESS
[FreeTextEntry1] : \par PCP- Dr Sandhya Bloom\par EP- Dr Palencia\par CTS- Dr Garcia\par Plastics- Dr Ko\par Hand- Dr Xiong\par GI- Dr Cortez, colonoscopy Sept 2017\par Dentist: Dr. Jorge Oh

## 2019-01-22 NOTE — ASSESSMENT
[FreeTextEntry1] : Valve disease: clean coronaries on cardiac cath in 2014. status post AVR, MV repair Dr. Caio Louise 9/9/2014) SBE prophylaxis. okay to stop ASA week before dental extraction and implant. EKG with 1 PVC.\par \par VT- has non-sustained, i spoke to Dr Palencia who does not want him to run the Count includes the [facility]. There is  a VT and syncope/sudden death risk, also has a thoracic aneurysm.  I discussed this with him and his wife, walking the Marathon is OK but no jogging.  We walked it and did well.\par \par Aortic aneurysm: Enlarged. 42 (ascending) 47 (descending) in 2014\par Followup in 2015 reveals a max Ao of 46 mm\par CT angiogram 3/6/2017 aneurysm unchanged\par annual CT, due 3/2018.  Aorta 46mm April 2018, referred back to Dr Garcia, pt did not go as of 1/2019\par \par HLD: on pravastatin 40mg, had muscle aches on 80mg,  8/2017, pt reported he was only taking it once in a while, now taking it daily will repeat lipid panel. Diet and exercise discussed in detail.   Feb 2018\par \par HTN: Have DC Lasix and changed to HCTZ weekly.  BP very good, Reduce Micardis  40 for persistent elevated K.  Increase HCTZ 25 to M & F. \par \par CKD: 1.33 8/8/2017, repeat BMP Sept 2017, Cr 1.05, previously normal, on ARB .  K better

## 2019-01-22 NOTE — REASON FOR VISIT
[FreeTextEntry1] : 72-year-old male with history of high cholesterol, aortic aneurysm thoracic, hypertension, lumbago, pre-diabetes, Valve disease (status post AVR, MVrepair Dr. Caio Louise 9/9/2014). S/P  colonoscopy with Dr. Maximilian Cortez 9/14/17. Doing well overall, Denies CP, SOB, palpitations, orthopnea, LE swelling, dizziness, syncope. Walking and running daily, sometimes >10 miles, training [place] in 2018 after skipping 2017. \par \par s/p dental extraction and implant, no official procedure date. Reports during dental cleaning he was told he bled a lot and dentist would prefer he stop aspirin for future dental extraction and implant. \par Training for [facility], race walked it without complications Nov 2018.\par \par EKG: NSR @ 46 with 1st degree AVB. LAHB, ST-Tw abnormalities. Blocked APC, V paced x 2 beats Pacer set at 40 1/22/19

## 2019-01-22 NOTE — PHYSICAL EXAM
[General Appearance - Well Developed] : well developed [Normal Appearance] : normal appearance [Well Groomed] : well groomed [General Appearance - Well Nourished] : well nourished [No Deformities] : no deformities [General Appearance - In No Acute Distress] : no acute distress [Normal Conjunctiva] : the conjunctiva exhibited no abnormalities [Eyelids - No Xanthelasma] : the eyelids demonstrated no xanthelasmas [Normal Oral Mucosa] : normal oral mucosa [No Oral Pallor] : no oral pallor [No Oral Cyanosis] : no oral cyanosis [Normal Jugular Venous A Waves Present] : normal jugular venous A waves present [Normal Jugular Venous V Waves Present] : normal jugular venous V waves present [No Jugular Venous Loco A Waves] : no jugular venous loco A waves [Respiration, Rhythm And Depth] : normal respiratory rhythm and effort [Exaggerated Use Of Accessory Muscles For Inspiration] : no accessory muscle use [Auscultation Breath Sounds / Voice Sounds] : lungs were clear to auscultation bilaterally [Heart Rate And Rhythm] : heart rate and rhythm were normal [Heart Sounds] : normal S1 and S2 [Arterial Pulses Normal] : the arterial pulses were normal [Edema] : no peripheral edema present [Abdomen Soft] : soft [Abdomen Tenderness] : non-tender [Abdomen Mass (___ Cm)] : no abdominal mass palpated [Abnormal Walk] : normal gait [Gait - Sufficient For Exercise Testing] : the gait was sufficient for exercise testing [Nail Clubbing] : no clubbing of the fingernails [Cyanosis, Localized] : no localized cyanosis [Petechial Hemorrhages (___cm)] : no petechial hemorrhages [Skin Color & Pigmentation] : normal skin color and pigmentation [] : no rash [No Venous Stasis] : no venous stasis [Skin Lesions] : no skin lesions [No Skin Ulcers] : no skin ulcer [No Xanthoma] : no  xanthoma was observed [Oriented To Time, Place, And Person] : oriented to person, place, and time [Affect] : the affect was normal [Mood] : the mood was normal [No Anxiety] : not feeling anxious [FreeTextEntry1] : +3/6 systolic murmur

## 2019-01-23 LAB
ANION GAP SERPL CALC-SCNC: 10 MMOL/L
BUN SERPL-MCNC: 19 MG/DL
CALCIUM SERPL-MCNC: 9.8 MG/DL
CHLORIDE SERPL-SCNC: 105 MMOL/L
CO2 SERPL-SCNC: 28 MMOL/L
CREAT SERPL-MCNC: 1.1 MG/DL
GLUCOSE SERPL-MCNC: 89 MG/DL
POTASSIUM SERPL-SCNC: 5.4 MMOL/L
SODIUM SERPL-SCNC: 143 MMOL/L

## 2019-02-13 ENCOUNTER — NON-APPOINTMENT (OUTPATIENT)
Age: 74
End: 2019-02-13

## 2019-02-13 ENCOUNTER — APPOINTMENT (OUTPATIENT)
Dept: HEART AND VASCULAR | Facility: CLINIC | Age: 74
End: 2019-02-13
Payer: MEDICARE

## 2019-02-13 VITALS
DIASTOLIC BLOOD PRESSURE: 99 MMHG | HEIGHT: 70 IN | BODY MASS INDEX: 25.05 KG/M2 | WEIGHT: 175 LBS | SYSTOLIC BLOOD PRESSURE: 130 MMHG | HEART RATE: 67 BPM

## 2019-02-13 PROCEDURE — 93283 PRGRMG EVAL IMPLANTABLE DFB: CPT

## 2019-02-13 PROCEDURE — 99202 OFFICE O/P NEW SF 15 MIN: CPT | Mod: 25

## 2019-02-13 PROCEDURE — 93000 ELECTROCARDIOGRAM COMPLETE: CPT | Mod: 59

## 2019-02-19 NOTE — PHYSICAL EXAM
[General Appearance - Well Developed] : well developed [Normal Appearance] : normal appearance [Well Groomed] : well groomed [General Appearance - Well Nourished] : well nourished [No Deformities] : no deformities [General Appearance - In No Acute Distress] : no acute distress [Normal Conjunctiva] : the conjunctiva exhibited no abnormalities [Normal Oral Mucosa] : normal oral mucosa [Normal Jugular Venous V Waves Present] : normal jugular venous V waves present [Heart Rate And Rhythm] : heart rate and rhythm were normal [Heart Sounds] : normal S1 and S2 [Edema] : no peripheral edema present [Respiration, Rhythm And Depth] : normal respiratory rhythm and effort [Exaggerated Use Of Accessory Muscles For Inspiration] : no accessory muscle use [Abnormal Walk] : normal gait [Gait - Sufficient For Exercise Testing] : the gait was sufficient for exercise testing [] : no ischemic changes [Skin Color & Pigmentation] : normal skin color and pigmentation [Oriented To Time, Place, And Person] : oriented to person, place, and time [Impaired Insight] : insight and judgment were intact [Affect] : the affect was normal [Mood] : the mood was normal [No Anxiety] : not feeling anxious

## 2019-02-27 ENCOUNTER — APPOINTMENT (OUTPATIENT)
Dept: INTERNAL MEDICINE | Facility: CLINIC | Age: 74
End: 2019-02-27
Payer: MEDICARE

## 2019-02-27 VITALS
WEIGHT: 174 LBS | BODY MASS INDEX: 24.91 KG/M2 | HEIGHT: 70 IN | DIASTOLIC BLOOD PRESSURE: 96 MMHG | OXYGEN SATURATION: 96 % | TEMPERATURE: 97.6 F | HEART RATE: 59 BPM | SYSTOLIC BLOOD PRESSURE: 164 MMHG

## 2019-02-27 VITALS — DIASTOLIC BLOOD PRESSURE: 100 MMHG | SYSTOLIC BLOOD PRESSURE: 140 MMHG

## 2019-02-27 DIAGNOSIS — Z01.810 ENCOUNTER FOR PREPROCEDURAL CARDIOVASCULAR EXAMINATION: ICD-10-CM

## 2019-02-27 DIAGNOSIS — Z09 ENCOUNTER FOR FOLLOW-UP EXAMINATION AFTER COMPLETED TREATMENT FOR CONDITIONS OTHER THAN MALIGNANT NEOPLASM: ICD-10-CM

## 2019-02-27 DIAGNOSIS — H92.21 OTORRHAGIA, RIGHT EAR: ICD-10-CM

## 2019-02-27 DIAGNOSIS — Z01.818 ENCOUNTER FOR OTHER PREPROCEDURAL EXAMINATION: ICD-10-CM

## 2019-02-27 PROCEDURE — 36415 COLL VENOUS BLD VENIPUNCTURE: CPT

## 2019-02-27 PROCEDURE — G0439: CPT

## 2019-02-27 RX ORDER — AMOXICILLIN 500 MG/1
500 CAPSULE ORAL
Qty: 16 | Refills: 5 | Status: COMPLETED | COMMUNITY
Start: 2019-01-08 | End: 2019-02-27

## 2019-02-27 RX ORDER — AMLODIPINE BESYLATE 5 MG/1
5 TABLET ORAL
Refills: 0 | Status: COMPLETED | COMMUNITY
Start: 2019-02-13 | End: 2019-02-27

## 2019-02-27 NOTE — HISTORY OF PRESENT ILLNESS
[de-identified] : 74 y/o man is here for Annual Wellness Visit. He recently saw cardiology. EKG done. Dr Chandler stopped Lasix and made HCTZ dose twice weekly. He also decreased Micardis due to elevated K. 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 He has no complaints.\par He recently returned from his first vacation in 50 years.\par He wants to get a job as a . He needs vision and hearing testing.\par

## 2019-02-27 NOTE — ASSESSMENT
[FreeTextEntry1] : 73 year is here for Medicare annual wellness exam. his cognitive function was normal. Please refer to an appropriate section above for a list of providers that are regularly involved in the patient's care. See above for full details of history and physical. The patient's care team was reviewed and documented above. Listed above are the preventative services for which the patient may be due. I informed the patient with a list and offered assistance in scheduling the appropriate exams.\par His daistolic BP is higher than I would like. I am not sure why the HCTZ is only twice weekly (preserve preload perhaps?). We can increase that dosing schedule or increase Norvasc to 7.5 mg. Task sent to Dr Chandler for consult.\par Labs sent. \par Referred to ophtho and audiology.\par

## 2019-02-27 NOTE — HEALTH RISK ASSESSMENT
[Very Good] : ~his/her~  mood as very good [0] : 2) Feeling down, depressed, or hopeless: Not at all (0) [[id number]] : 0 [HIV test declined] : HIV test declined [Hepatitis C test declined] : Hepatitis C test declined [Change in mental status noted] : No change in mental status noted [Language] : denies difficulty with language [Behavior] : denies difficulty with behavior [Learning/Retaining New Information] : denies difficulty learning/retaining new information [Handling Complex Tasks] : denies difficulty handling complex tasks [Reasoning] : denies difficulty with reasoning [Spatial Ability and Orientation] : denies difficulty with spatial ability and orientation [None] : None [Fully functional (bathing, dressing, toileting, transferring, walking, feeding)] : Fully functional (bathing, dressing, toileting, transferring, walking, feeding) [Fully functional (using the telephone, shopping, preparing meals, housekeeping, doing laundry, using] : Fully functional and needs no help or supervision to perform IADLs (using the telephone, shopping, preparing meals, housekeeping, doing laundry, using transportation, managing medications and managing finances) [Reports changes in hearing] : Reports no changes in hearing [Reports changes in vision] : Reports no changes in vision [Reports changes in dental health] : Reports no changes in dental health [Smoke Detector] : smoke detector [Safety elements used in home] : safety elements used in home [Seat Belt] :  uses seat belt [Sunscreen] : uses sunscreen

## 2019-02-28 ENCOUNTER — APPOINTMENT (OUTPATIENT)
Dept: OTOLARYNGOLOGY | Facility: CLINIC | Age: 74
End: 2019-02-28
Payer: MEDICARE

## 2019-02-28 VITALS
SYSTOLIC BLOOD PRESSURE: 159 MMHG | HEART RATE: 59 BPM | OXYGEN SATURATION: 99 % | RESPIRATION RATE: 14 BRPM | TEMPERATURE: 97.7 F | DIASTOLIC BLOOD PRESSURE: 87 MMHG

## 2019-02-28 LAB
25(OH)D3 SERPL-MCNC: 32.7 NG/ML
ALBUMIN SERPL ELPH-MCNC: 4.3 G/DL
ALP BLD-CCNC: 43 U/L
ALT SERPL-CCNC: 16 U/L
ANION GAP SERPL CALC-SCNC: 12 MMOL/L
AST SERPL-CCNC: 18 U/L
BASOPHILS # BLD AUTO: 0.03 K/UL
BASOPHILS NFR BLD AUTO: 0.5 %
BILIRUB SERPL-MCNC: 0.5 MG/DL
BUN SERPL-MCNC: 23 MG/DL
CALCIUM SERPL-MCNC: 10.1 MG/DL
CHLORIDE SERPL-SCNC: 101 MMOL/L
CHOLEST SERPL-MCNC: 237 MG/DL
CHOLEST/HDLC SERPL: 2.8 RATIO
CK SERPL-CCNC: 148 U/L
CO2 SERPL-SCNC: 28 MMOL/L
CREAT SERPL-MCNC: 1.2 MG/DL
EOSINOPHIL # BLD AUTO: 0.05 K/UL
EOSINOPHIL NFR BLD AUTO: 0.9 %
GLUCOSE SERPL-MCNC: 89 MG/DL
HBA1C MFR BLD HPLC: 6.4 %
HCT VFR BLD CALC: 46.4 %
HDLC SERPL-MCNC: 86 MG/DL
HGB BLD-MCNC: 14.2 G/DL
IMM GRANULOCYTES NFR BLD AUTO: 0 %
LDLC SERPL CALC-MCNC: 140 MG/DL
LYMPHOCYTES # BLD AUTO: 2.66 K/UL
LYMPHOCYTES NFR BLD AUTO: 48.4 %
MAN DIFF?: NORMAL
MCHC RBC-ENTMCNC: 30.6 GM/DL
MCHC RBC-ENTMCNC: 31.4 PG
MCV RBC AUTO: 102.7 FL
MONOCYTES # BLD AUTO: 0.5 K/UL
MONOCYTES NFR BLD AUTO: 9.1 %
NEUTROPHILS # BLD AUTO: 2.26 K/UL
NEUTROPHILS NFR BLD AUTO: 41.1 %
PLATELET # BLD AUTO: 191 K/UL
POTASSIUM SERPL-SCNC: 5.9 MMOL/L
PROT SERPL-MCNC: 7.6 G/DL
RBC # BLD: 4.52 M/UL
RBC # FLD: 13.7 %
SODIUM SERPL-SCNC: 141 MMOL/L
TRIGL SERPL-MCNC: 57 MG/DL
TSH SERPL-ACNC: 1.19 UIU/ML
VIT B12 SERPL-MCNC: 331 PG/ML
WBC # FLD AUTO: 5.5 K/UL

## 2019-02-28 PROCEDURE — 99213 OFFICE O/P EST LOW 20 MIN: CPT

## 2019-02-28 PROCEDURE — 92557 COMPREHENSIVE HEARING TEST: CPT

## 2019-02-28 PROCEDURE — 92550 TYMPANOMETRY & REFLEX THRESH: CPT

## 2019-02-28 NOTE — HISTORY OF PRESENT ILLNESS
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 he is unsure whether he has hearing loss. deneis tinnitus or vertigo. denies fh. he used to be a , so he has h/o noise exposure. no fh relevant to cc

## 2019-03-04 ENCOUNTER — RX RENEWAL (OUTPATIENT)
Age: 74
End: 2019-03-04

## 2019-03-04 ENCOUNTER — MEDICATION RENEWAL (OUTPATIENT)
Age: 74
End: 2019-03-04

## 2019-03-04 RX ORDER — HYDROCHLOROTHIAZIDE 25 MG/1
25 TABLET ORAL DAILY
Qty: 26 | Refills: 3 | Status: DISCONTINUED | COMMUNITY
Start: 2019-03-04 | End: 2019-03-04

## 2019-03-07 NOTE — ASSESSMENT
[FreeTextEntry1] : [facility] [address] DR ICD [id number]\par presenting rhythm NSR\par Battery 3/4-4/3 years \par VVI 60\par RA sense 5mV, impedance 290, threshold 1.5V@0.5ms\par RV sense 11.8mV, impedance 640, threshold 0.75V@0.5ms\par shock impedance 47\par  4.7%\par events NSVT lasting seconds\par changed to DDD 60.\par

## 2019-03-07 NOTE — REVIEW OF SYSTEMS
[see HPI] : see HPI [Negative] : Heme/Lymph [Fever] : no fever [Recent Weight Gain (___ Lbs)] : no recent weight gain [Chills] : no chills [Feeling Fatigued] : not feeling fatigued [Recent Weight Loss (___ Lbs)] : no recent weight loss

## 2019-03-07 NOTE — HISTORY OF PRESENT ILLNESS
[FreeTextEntry1] : 73 year old male with HTN, HLD, aortic aneurysm thoracic, AVR and mitral valve repair in 2014, PVCs and pacemaker, who presents for follow up.\par \par He staets that he had a pacemaker placed after his AVR.  He normally follows up with Dr. Palencia; whose office recently moved and he would like to transfer care here.   He is on Sotalol for history of PVCs and  NSVT.  Overall he feels well.  No chest pain, syncope, SOB, palpitations, near syncope.

## 2019-03-07 NOTE — DISCUSSION/SUMMARY
[FreeTextEntry1] : 73 year old male with HTN, HLD, aortic aneurysm thoracic, AVR and mitral valve repair in 2014, PVCs and pacemaker, who presents for follow up.  Device interrogation reveals normal function.  All measured data is within normal limits.  No significant events for review.  He is maintained on sotalol and continues to have PVCs which he is asymptomatic from.  QTc within normal limits.  He would like to transfer care to our office and I have asked him to contact Dr. Palencia's office to release him from remote monitoring..  He will follow up for routine devicve care in 6 months and knows to call with any questions or concerns.  \par \par

## 2019-03-13 ENCOUNTER — APPOINTMENT (OUTPATIENT)
Dept: HEART AND VASCULAR | Facility: CLINIC | Age: 74
End: 2019-03-13
Payer: MEDICARE

## 2019-03-13 ENCOUNTER — NON-APPOINTMENT (OUTPATIENT)
Age: 74
End: 2019-03-13

## 2019-03-13 VITALS
HEART RATE: 59 BPM | HEIGHT: 70 IN | WEIGHT: 173 LBS | BODY MASS INDEX: 24.77 KG/M2 | SYSTOLIC BLOOD PRESSURE: 149 MMHG | DIASTOLIC BLOOD PRESSURE: 92 MMHG

## 2019-03-13 PROCEDURE — 99214 OFFICE O/P EST MOD 30 MIN: CPT | Mod: 25

## 2019-03-13 PROCEDURE — 93283 PRGRMG EVAL IMPLANTABLE DFB: CPT

## 2019-03-18 ENCOUNTER — APPOINTMENT (OUTPATIENT)
Dept: HEART AND VASCULAR | Facility: CLINIC | Age: 74
End: 2019-03-18
Payer: MEDICARE

## 2019-03-18 VITALS
DIASTOLIC BLOOD PRESSURE: 90 MMHG | TEMPERATURE: 97.6 F | SYSTOLIC BLOOD PRESSURE: 120 MMHG | HEIGHT: 70 IN | WEIGHT: 171.98 LBS | HEART RATE: 56 BPM | OXYGEN SATURATION: 98 % | BODY MASS INDEX: 24.62 KG/M2

## 2019-03-18 PROCEDURE — 99214 OFFICE O/P EST MOD 30 MIN: CPT

## 2019-03-18 PROCEDURE — 93000 ELECTROCARDIOGRAM COMPLETE: CPT

## 2019-03-18 NOTE — PHYSICAL EXAM
[Normal Appearance] : normal appearance [Well Groomed] : well groomed [General Appearance - Well Developed] : well developed [General Appearance - Well Nourished] : well nourished [No Deformities] : no deformities [General Appearance - In No Acute Distress] : no acute distress [Normal Conjunctiva] : the conjunctiva exhibited no abnormalities [Eyelids - No Xanthelasma] : the eyelids demonstrated no xanthelasmas [Normal Oral Mucosa] : normal oral mucosa [No Oral Pallor] : no oral pallor [No Oral Cyanosis] : no oral cyanosis [Normal Jugular Venous A Waves Present] : normal jugular venous A waves present [No Jugular Venous Loco A Waves] : no jugular venous loco A waves [Normal Jugular Venous V Waves Present] : normal jugular venous V waves present [Respiration, Rhythm And Depth] : normal respiratory rhythm and effort [Auscultation Breath Sounds / Voice Sounds] : lungs were clear to auscultation bilaterally [Exaggerated Use Of Accessory Muscles For Inspiration] : no accessory muscle use [Heart Rate And Rhythm] : heart rate and rhythm were normal [Heart Sounds] : normal S1 and S2 [Arterial Pulses Normal] : the arterial pulses were normal [Edema] : no peripheral edema present [Abdomen Soft] : soft [Abdomen Tenderness] : non-tender [Abnormal Walk] : normal gait [Abdomen Mass (___ Cm)] : no abdominal mass palpated [Gait - Sufficient For Exercise Testing] : the gait was sufficient for exercise testing [Nail Clubbing] : no clubbing of the fingernails [Petechial Hemorrhages (___cm)] : no petechial hemorrhages [Cyanosis, Localized] : no localized cyanosis [Skin Color & Pigmentation] : normal skin color and pigmentation [] : no ischemic changes [No Venous Stasis] : no venous stasis [Skin Lesions] : no skin lesions [No Xanthoma] : no  xanthoma was observed [No Skin Ulcers] : no skin ulcer [Mood] : the mood was normal [Oriented To Time, Place, And Person] : oriented to person, place, and time [Affect] : the affect was normal [No Anxiety] : not feeling anxious [FreeTextEntry1] : +3/6 systolic murmur

## 2019-03-18 NOTE — HISTORY OF PRESENT ILLNESS
[FreeTextEntry1] : \par PCP- Dr Sandhya Bloom\par EP- Dr Palencia/ [facility]\par CTS- Dr Garcia\par Plastics- Dr Ko\par Hand- Dr Xiong\par GI- Dr Cortez, colonoscopy Sept 2017\par Dentist: Dr. Jorge Oh

## 2019-03-18 NOTE — REASON FOR VISIT
[FreeTextEntry1] : 72-year-old male with history of high cholesterol, aortic aneurysm thoracic, hypertension, lumbago, pre-diabetes, Valve disease (status post AVR, MVrepair Dr. Caio Louise 9/9/2014). S/P  colonoscopy with Dr. Maximilian Cortez 9/14/17. Doing well overall, Denies CP, SOB, palpitations, orthopnea, LE swelling, dizziness, syncope. Walking and running daily, sometimes >10 miles, training for [place] marathon in 2018 after skipping 2017. \par \par s/p dental extraction and implant, no official procedure date. Reports during dental cleaning he was told he bled a lot and dentist would prefer he stop aspirin for future dental extraction and implant. \par Training for [facility], race walked it without complications Nov 2018.\par \par EKG: NSR @ 46 with 1st degree AVB. LAHB, ST-Tw abnormalities. Blocked APC, V paced x 2 beats Pacer set at 40 1/22/19\par EKG: A Pacing, 1st degree AVB, with a LAHB, possible IWMI, QTc 468 ST-Twave abnormalities.  3/18/19\par \par 3/18/19 A Fib discovered by Dr Jeff on 3/13/19, ASA changed to Eliquis

## 2019-03-20 NOTE — HISTORY OF PRESENT ILLNESS
[Palpitations] : no palpitations [SOB] : no dyspnea [Syncope] : no syncope [Dizziness] : no dizziness [Chest Pain] : no chest pain or discomfort [Shoulder Pain] : no shoulder pain [Pain at Site] : no pain at device site [Erythema at Site] : no erythema at device site [Swelling at Site] : no swelling at device site [FreeTextEntry1] : 73 year old male with HTN, HLD, aortic aneurysm thoracic, AVR and mitral valve repair in 2014, PVCs and pacemaker, who presents for follow up.\par \par He states that he had a pacemaker placed after his AVR.  He normally follows up with Dr. Palencia; whose office recently moved and he would like to transfer care here.   He is on Sotalol for history of PVCs and  NSVT.  Overall he feels well.  No chest pain, syncope, SOB, palpitations, near syncope.  \par \par

## 2019-03-20 NOTE — REVIEW OF SYSTEMS
[see HPI] : see HPI [Negative] : Heme/Lymph [Fever] : no fever [Recent Weight Gain (___ Lbs)] : no recent weight gain [Chills] : no chills [Feeling Fatigued] : not feeling fatigued [Recent Weight Loss (___ Lbs)] : no recent weight loss [Palpitations] : no palpitations

## 2019-03-20 NOTE — PROCEDURE
[No] : not [NSR] : normal sinus rhythm [ICD] : Implantable cardioverter-defibrillator [DDD] : DDD [Threshold Testing Performed] : Threshold testing was performed [Lead Imp:  ___ohms] : lead impedance was [unfilled] ohms [Sensing Amplitude ___mv] : sensing amplitude was [unfilled] mv [___V @] : [unfilled] V [___ ms] : [unfilled] ms [None] : none [de-identified] : [facility] [de-identified] : Ellipse [de-identified] : [id number] [de-identified] : 4/2014 [de-identified] : 60/110 [de-identified] : 3.5-3.5 years [de-identified] : shock impdeance 46 [de-identified] : AP 39%\par  28%\par 6% afib all one one day

## 2019-03-20 NOTE — ASSESSMENT
[FreeTextEntry1] : [facility] Ell[address] DR ICD [id number]\par presenting rhythm NSR\par Battery 3/4-4/3 years \par VVI 60\par RA sense 5mV, impedance 290, threshold 1.5V@0.5ms\par RV sense 11.8mV, impedance 640, threshold 0.75V@0.5ms\par shock impedance 47\par  4.7%\par events NSVT lasting seconds\par changed to DDD 60.\par

## 2019-03-20 NOTE — DISCUSSION/SUMMARY
[Pacemaker Function Normal] : normal pacemaker function [FreeTextEntry1] : 73 year old male with HTN, HLD, aortic aneurysm thoracic, AVR and mitral valve repair in 2014, PVCs and pacemaker, who presents for follow up.  Device interrogation reveals normal function.  All measured data is within normal limits. He had atrial fibrillation on one day (unclear how long) which he was asymptomatic from.  We discussed what atrial fibrillation is, the natural progression of this arrhythmia and the association with stroke. His CHADS score is at least 3 and he is agreeable to starting ELiquis.  He is on Sotalol to suppress PVCs which will also hopefully suppress his atrial fibrillation.     He will follow up   in 2 months and knows to call with any questions or concerns.  \par \par

## 2019-03-20 NOTE — PHYSICAL EXAM
[General Appearance - Well Developed] : well developed [Normal Appearance] : normal appearance [Well Groomed] : well groomed [General Appearance - Well Nourished] : well nourished [No Deformities] : no deformities [General Appearance - In No Acute Distress] : no acute distress [Heart Rate And Rhythm] : heart rate and rhythm were normal [Heart Sounds] : normal S1 and S2 [Edema] : no peripheral edema present [Respiration, Rhythm And Depth] : normal respiratory rhythm and effort [Exaggerated Use Of Accessory Muscles For Inspiration] : no accessory muscle use [] : no ischemic changes [Normal Conjunctiva] : the conjunctiva exhibited no abnormalities [Normal Oral Mucosa] : normal oral mucosa [Normal Jugular Venous V Waves Present] : normal jugular venous V waves present [Abnormal Walk] : normal gait [Gait - Sufficient For Exercise Testing] : the gait was sufficient for exercise testing [Skin Color & Pigmentation] : normal skin color and pigmentation [Oriented To Time, Place, And Person] : oriented to person, place, and time [Impaired Insight] : insight and judgment were intact [Affect] : the affect was normal [Mood] : the mood was normal [No Anxiety] : not feeling anxious [Left Infraclavicular] : left infraclavicular area [Clean] : clean [Dry] : dry [Well-Healed] : well-healed [Palpable Crepitus] : no palpable crepitus [Bleeding] : no active bleeding [Foul Odor] : no foul smell [Purulent Drainage] : no purulent drainage [Serosanguineous Drainage] : no serosanquineous drainage [Serous Drainage] : no serous drainage [Erythema] : not erythematous [Warm] : not warm [Tender] : not tender [Indurated] : not indurated [Fluctuant] : not fluctuant

## 2019-05-06 ENCOUNTER — APPOINTMENT (OUTPATIENT)
Dept: HEART AND VASCULAR | Facility: CLINIC | Age: 74
End: 2019-05-06
Payer: MEDICARE

## 2019-05-06 VITALS
HEIGHT: 70 IN | OXYGEN SATURATION: 95 % | TEMPERATURE: 97.5 F | BODY MASS INDEX: 25.2 KG/M2 | WEIGHT: 176 LBS | HEART RATE: 70 BPM | SYSTOLIC BLOOD PRESSURE: 140 MMHG | DIASTOLIC BLOOD PRESSURE: 90 MMHG

## 2019-05-06 DIAGNOSIS — Z86.79 PERSONAL HISTORY OF OTHER DISEASES OF THE CIRCULATORY SYSTEM: ICD-10-CM

## 2019-05-06 PROCEDURE — 93000 ELECTROCARDIOGRAM COMPLETE: CPT

## 2019-05-06 PROCEDURE — 99214 OFFICE O/P EST MOD 30 MIN: CPT

## 2019-05-06 NOTE — ASSESSMENT
[FreeTextEntry1] : Valve disease: clean coronaries on cardiac cath in 2014. status post AVR, MV repair Dr. Caio Louise 9/9/2014) SBE prophylaxis. okay to stop ASA week before dental extraction and implant. EKG with 1 PVC.\par \par PAF- changed from ASA to Eliquis by Dr ARIZMENDI.  No signs of trauma or bleeding on exam. Pt reassured that the Eliquis is not causing the pain\par \par VT- has non-sustained, i spoke to Dr Palencia who does not want him to run the [facility]. There is  a VT and syncope/sudden death risk, also has a thoracic aneurysm.  I discussed this with him and his wife, walking the Marathon is OK but no jogging.  He walked it and did well. Now seeing Dr Jeff\par \par Aortic aneurysm: Enlarged. 42 (ascending) 47 (descending) in 2014\par Followup in 2015 reveals a max Ao of 46 mm\par CT angiogram 3/6/2017 aneurysm unchanged\par annual CT, due 3/2018.  Aorta 46mm April 2018, referred back to Dr Garcia, pt did not go as of  5/6/19\par \par HLD: on pravastatin 40mg, had muscle aches on 80mg,  8/2017, pt reported he was only taking it once in a while, now taking it daily will repeat lipid panel. Diet and exercise discussed in detail.   Feb 2018\par \par HTN: Have DC Lasix and changed to HCTZ weekly.  BP very good, Reduce Micardis  40 for persistent elevated K.  Increase HCTZ 25 to M & F. BP borderline up now.\par \par CKD: 1.33 8/8/2017, repeat BMP Sept 2017, Cr 1.05, previously normal, on ARB .  K better

## 2019-05-06 NOTE — PHYSICAL EXAM
[General Appearance - Well Developed] : well developed [Normal Appearance] : normal appearance [Well Groomed] : well groomed [General Appearance - Well Nourished] : well nourished [General Appearance - In No Acute Distress] : no acute distress [No Deformities] : no deformities [Normal Conjunctiva] : the conjunctiva exhibited no abnormalities [Eyelids - No Xanthelasma] : the eyelids demonstrated no xanthelasmas [Normal Oral Mucosa] : normal oral mucosa [No Oral Pallor] : no oral pallor [No Oral Cyanosis] : no oral cyanosis [Normal Jugular Venous A Waves Present] : normal jugular venous A waves present [Normal Jugular Venous V Waves Present] : normal jugular venous V waves present [No Jugular Venous Loco A Waves] : no jugular venous loco A waves [Respiration, Rhythm And Depth] : normal respiratory rhythm and effort [Exaggerated Use Of Accessory Muscles For Inspiration] : no accessory muscle use [Auscultation Breath Sounds / Voice Sounds] : lungs were clear to auscultation bilaterally [Heart Rate And Rhythm] : heart rate and rhythm were normal [Arterial Pulses Normal] : the arterial pulses were normal [Heart Sounds] : normal S1 and S2 [Edema] : no peripheral edema present [Abdomen Soft] : soft [Abdomen Tenderness] : non-tender [Abdomen Mass (___ Cm)] : no abdominal mass palpated [Gait - Sufficient For Exercise Testing] : the gait was sufficient for exercise testing [Abnormal Walk] : normal gait [Nail Clubbing] : no clubbing of the fingernails [Cyanosis, Localized] : no localized cyanosis [Petechial Hemorrhages (___cm)] : no petechial hemorrhages [] : no rash [Skin Color & Pigmentation] : normal skin color and pigmentation [No Venous Stasis] : no venous stasis [Skin Lesions] : no skin lesions [No Skin Ulcers] : no skin ulcer [No Xanthoma] : no  xanthoma was observed [Oriented To Time, Place, And Person] : oriented to person, place, and time [Affect] : the affect was normal [Mood] : the mood was normal [No Anxiety] : not feeling anxious [FreeTextEntry1] : +3/6 systolic murmur

## 2019-05-06 NOTE — REASON FOR VISIT
[FreeTextEntry1] : 72-year-old male with history of high cholesterol, aortic aneurysm thoracic, hypertension, lumbago, pre-diabetes, Valve disease (status post AVR, MVrepair Dr. Caio Louise 9/9/2014). S/P  colonoscopy with Dr. Maximilian Cortez 9/14/17. Doing well overall, Denies CP, SOB, palpitations, orthopnea, LE swelling, dizziness, syncope. Walking and running daily, sometimes >10 miles, training for [place] marathon in 2018 after skipping 2017. \par \par s/p dental extraction and implant, no official procedure date. Reports during dental cleaning he was told he bled a lot and dentist would prefer he stop aspirin for future dental extraction and implant. \par [facility], race walked it without complications Nov 2018.\par \par EKG: NSR @ 46 with 1st degree AVB. LAHB, ST-Tw abnormalities. Blocked APC, V paced x 2 beats Pacer set at 40 1/22/19\par EKG: A Pacing, PVCs, 1st degree AVB, with a LAHB, possible IWMI, QTc 477 ST-Twave abnormalities.  5/6/19\par \par 3/18/19 A Fib discovered by Dr Jeff on 3/13/19, ASA changed to Eliquis\par 5/6/19 Pt with atypical pain in the axilla on the left, he thinks its the Eliquis.  No swelling or ecchymosis reported

## 2019-05-07 LAB
ALBUMIN SERPL ELPH-MCNC: 4.1 G/DL
ALP BLD-CCNC: 54 U/L
ALT SERPL-CCNC: 24 U/L
ANION GAP SERPL CALC-SCNC: 14 MMOL/L
AST SERPL-CCNC: 26 U/L
BASOPHILS # BLD AUTO: 0.02 K/UL
BASOPHILS NFR BLD AUTO: 0.3 %
BILIRUB SERPL-MCNC: 0.5 MG/DL
BUN SERPL-MCNC: 22 MG/DL
CALCIUM SERPL-MCNC: 9.8 MG/DL
CHLORIDE SERPL-SCNC: 104 MMOL/L
CHOLEST SERPL-MCNC: 239 MG/DL
CHOLEST/HDLC SERPL: 2.9 RATIO
CO2 SERPL-SCNC: 24 MMOL/L
CREAT SERPL-MCNC: 1.18 MG/DL
EOSINOPHIL # BLD AUTO: 0.04 K/UL
EOSINOPHIL NFR BLD AUTO: 0.7 %
ESTIMATED AVERAGE GLUCOSE: 131 MG/DL
GLUCOSE SERPL-MCNC: 76 MG/DL
HBA1C MFR BLD HPLC: 6.2 %
HCT VFR BLD CALC: 43.5 %
HDLC SERPL-MCNC: 82 MG/DL
HGB BLD-MCNC: 13.6 G/DL
IMM GRANULOCYTES NFR BLD AUTO: 0.2 %
LDLC SERPL CALC-MCNC: 144 MG/DL
LYMPHOCYTES # BLD AUTO: 2.94 K/UL
LYMPHOCYTES NFR BLD AUTO: 50.6 %
MAN DIFF?: NORMAL
MCHC RBC-ENTMCNC: 31.3 GM/DL
MCHC RBC-ENTMCNC: 31.9 PG
MCV RBC AUTO: 102.1 FL
MONOCYTES # BLD AUTO: 0.59 K/UL
MONOCYTES NFR BLD AUTO: 10.2 %
NEUTROPHILS # BLD AUTO: 2.21 K/UL
NEUTROPHILS NFR BLD AUTO: 38 %
PLATELET # BLD AUTO: 186 K/UL
POTASSIUM SERPL-SCNC: 4.9 MMOL/L
PROT SERPL-MCNC: 7.4 G/DL
PSA SERPL-MCNC: 1.02 NG/ML
RBC # BLD: 4.26 M/UL
RBC # FLD: 13.4 %
SODIUM SERPL-SCNC: 142 MMOL/L
TRIGL SERPL-MCNC: 65 MG/DL
WBC # FLD AUTO: 5.81 K/UL

## 2019-05-18 ENCOUNTER — EMERGENCY (EMERGENCY)
Facility: HOSPITAL | Age: 74
LOS: 1 days | Discharge: HOME | End: 2019-05-18
Admitting: EMERGENCY MEDICINE
Payer: MEDICARE

## 2019-05-18 VITALS
SYSTOLIC BLOOD PRESSURE: 131 MMHG | HEIGHT: 66 IN | DIASTOLIC BLOOD PRESSURE: 81 MMHG | OXYGEN SATURATION: 100 % | WEIGHT: 173.06 LBS | RESPIRATION RATE: 18 BRPM | HEART RATE: 57 BPM | TEMPERATURE: 98 F

## 2019-05-18 DIAGNOSIS — Z98.89 OTHER SPECIFIED POSTPROCEDURAL STATES: Chronic | ICD-10-CM

## 2019-05-18 PROCEDURE — 99283 EMERGENCY DEPT VISIT LOW MDM: CPT

## 2019-05-18 PROCEDURE — 73562 X-RAY EXAM OF KNEE 3: CPT | Mod: 26,LT

## 2019-05-18 PROCEDURE — 73562 X-RAY EXAM OF KNEE 3: CPT

## 2019-05-18 NOTE — ED ADULT NURSE NOTE - PSH
History of open heart surgery    Other postprocedural status  Right Shoulder  Other postprocedural status  S/P right knee arthroscopy

## 2019-05-18 NOTE — ED PROVIDER NOTE - CLINICAL SUMMARY MEDICAL DECISION MAKING FREE TEXT BOX
right knee pain . ? overuse injury, vs contusion vs fx. neurovascular intact. no evidence of infection on exam. xray no acute pathology. tylenol prn, RICE and f/u with ortho

## 2019-05-18 NOTE — ED PROVIDER NOTE - PHYSICAL EXAMINATION
mild tenderness to anterior medial aspect of right knee: FROM. no bruising or redness. no deformity. no laxity to ligaments. distal NVI. remainder of RLE non tender.

## 2019-05-18 NOTE — ED ADULT NURSE NOTE - NSIMPLEMENTINTERV_GEN_ALL_ED
Implemented All Universal Safety Interventions:  [place] to call system. Call bell, personal items and telephone within reach. Instruct patient to call for assistance. Room bathroom lighting operational. Non-slip footwear when patient is off stretcher. Physically safe environment: no spills, clutter or unnecessary equipment. Stretcher in lowest position, wheels locked, appropriate side rails in place.

## 2019-05-18 NOTE — ED ADULT NURSE NOTE - OBJECTIVE STATEMENT
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

## 2019-05-18 NOTE — ED PROVIDER NOTE - OBJECTIVE STATEMENT
74 y/o male with hx of HTN, defibrillator present c/o left knee pain x 2 days. pt states sharp pain to medial aspect of left knee. Pt states unsure if he struck knee on gate. 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 no numbness or tingling. pt able to bear wt. no fever or chills. no further complaints.

## 2019-05-18 NOTE — ED PROVIDER NOTE - NSFOLLOWUPINSTRUCTIONS_ED_ALL_ED_FT
Follow up with ortho in one week.       Knee Pain    WHAT YOU NEED TO KNOW:    Knee pain may start suddenly, or it may be a long-term problem. You may have pain on the side, front, or back of your knee. You may have knee stiffness and swelling. You may hear popping sounds or feel like your knee is giving way or locking up as you walk. You may feel pain when you sit, stand, walk, or climb up and down stairs. Knee pain can be caused by conditions such as obesity, inflammation, or strains or tears in ligaments or tendons.     DISCHARGE INSTRUCTIONS:    Return to the emergency department if:     Your pain is worse, even after treatment.       You cannot bend or straighten your leg completely.       The swelling around your knee does not go down even with treatment.      Your knee is painful and hot to the touch.     Contact your healthcare provider if:     You have questions or concerns about your condition or care.         Medicines: You may need any of the following:     NSAIDs help decrease swelling and pain or fever. This medicine is available with or without a doctor's order. NSAIDs can cause stomach bleeding or kidney problems in certain people. If you take blood thinner medicine, always ask your healthcare provider if NSAIDs are safe for you. Always read the medicine label and follow directions.      Acetaminophen decreases pain and fever. It is available without a doctor's order. Ask how much to take and how often to take it. Follow directions. Read the labels of all other medicines you are using to see if they also contain acetaminophen, or ask your doctor or pharmacist. Acetaminophen can cause liver damage if not taken correctly. Do not use more than 4 grams (4,000 milligrams) total of acetaminophen in one day.       Prescription pain medicine may be given. Ask your healthcare provider how to take this medicine safely. Some prescription pain medicines contain acetaminophen. Do not take other medicines that contain acetaminophen without talking to your healthcare provider. Too much acetaminophen may cause liver damage. Prescription pain medicine may cause constipation. Ask your healthcare provider how to prevent or treat constipation.       Take your medicine as directed. Contact your healthcare provider if you think your medicine is not helping or if you have side effects. Tell him or her if you are allergic to any medicine. Keep a list of the medicines, vitamins, and herbs you take. Include the amounts, and when and why you take them. Bring the list or the pill bottles to follow-up visits. Carry your medicine list with you in case of an emergency.    What you can do to manage your symptoms:     Rest your knee so it can heal. Limit activities that increase your pain. Do low-impact exercises, such as walking or swimming.       Apply ice to help reduce swelling and pain. Use an ice pack, or put crushed ice in a plastic bag. Cover it with a towel before you apply it to your knee. Apply ice for 15 to 20 minutes every hour, or as directed.      Apply compression to help reduce swelling. Use a brace or bandage only as directed.      Elevate your knee to help decrease pain and swelling. Elevate your knee while you are sitting or lying down. Prop your leg on pillows to keep your knee above the level of your heart.      Prevent your knee from moving as directed. Your healthcare provider may put on a cast or splint. You may need to wear a leg brace to stabilize your knee. A leg brace can be adjusted to increase your range of motion as your knee heals.Hinged Knee Braces          What you can do to prevent knee pain:     Maintain a healthy weight. Extra weight increases your risk for knee pain. Ask your healthcare provider how much you should weigh. He or she can help you create a safe weight loss plan if you need to lose weight.      Exercise or train properly. Use the correct equipment for sports. Wear shoes that provide good support. Check your posture often as you exercise, play sports, or train for an event. This can help prevent stress and strain on your knees. Rest between sessions so you do not overwork your knees.    Follow up with your healthcare provider within 24 hours or as directed: You may need follow-up treatments, such as steroid injections to decrease pain. Write down your questions so you remember to ask them during your visits.        © Copyright [employer] 2019 All illustrations and images included in CareNotes are the copyrighted property of [employer] or [employer]      back to top                      © Copyright [employer] 2019

## 2019-05-18 NOTE — ED PROVIDER NOTE - CARE PROVIDER_API CALL
Hugo Peterson)  Orthopaedic Surgery  [address] [facility] [address]  [place] [address]  Phone: [phone]  Fax: [phone]  Follow Up Time:     Rip Ponce)  Orthopaedic Surgery  [address] [place] [address]  Phone: [phone]  Fax: [phone]  Follow Up Time:

## 2019-05-18 NOTE — ED ADULT NURSE NOTE - CHPI ED NUR SYMPTOMS NEG
no back pain/no stiffness/no weakness/no deformity/no fever/no abrasion/no tingling/no bruising/no numbness

## 2019-05-22 ENCOUNTER — NON-APPOINTMENT (OUTPATIENT)
Age: 74
End: 2019-05-22

## 2019-05-22 ENCOUNTER — APPOINTMENT (OUTPATIENT)
Dept: HEART AND VASCULAR | Facility: CLINIC | Age: 74
End: 2019-05-22
Payer: MEDICARE

## 2019-05-22 VITALS
BODY MASS INDEX: 25.05 KG/M2 | WEIGHT: 175 LBS | SYSTOLIC BLOOD PRESSURE: 146 MMHG | DIASTOLIC BLOOD PRESSURE: 78 MMHG | HEIGHT: 70 IN | HEART RATE: 60 BPM

## 2019-05-22 DIAGNOSIS — I10 ESSENTIAL (PRIMARY) HYPERTENSION: ICD-10-CM

## 2019-05-22 DIAGNOSIS — M25.561 PAIN IN RIGHT KNEE: ICD-10-CM

## 2019-05-22 DIAGNOSIS — Z79.899 OTHER LONG TERM (CURRENT) DRUG THERAPY: ICD-10-CM

## 2019-05-22 PROCEDURE — 99215 OFFICE O/P EST HI 40 MIN: CPT | Mod: 25

## 2019-05-22 PROCEDURE — 93283 PRGRMG EVAL IMPLANTABLE DFB: CPT

## 2019-05-22 RX ORDER — SOTALOL HYDROCHLORIDE 80 MG/1
80 TABLET ORAL TWICE DAILY
Qty: 90 | Refills: 3 | Status: DISCONTINUED | COMMUNITY
End: 2019-05-22

## 2019-06-05 ENCOUNTER — APPOINTMENT (OUTPATIENT)
Dept: HEART AND VASCULAR | Facility: CLINIC | Age: 74
End: 2019-06-05
Payer: MEDICARE

## 2019-06-05 PROCEDURE — 93224 XTRNL ECG REC UP TO 48 HRS: CPT

## 2019-06-05 NOTE — PHYSICAL EXAM
[General Appearance - Well Developed] : well developed [Normal Appearance] : normal appearance [Well Groomed] : well groomed [General Appearance - Well Nourished] : well nourished [No Deformities] : no deformities [General Appearance - In No Acute Distress] : no acute distress [Heart Rate And Rhythm] : heart rate and rhythm were normal [Heart Sounds] : normal S1 and S2 [Edema] : no peripheral edema present [Respiration, Rhythm And Depth] : normal respiratory rhythm and effort [Exaggerated Use Of Accessory Muscles For Inspiration] : no accessory muscle use [Left Infraclavicular] : left infraclavicular area [Clean] : clean [Dry] : dry [Well-Healed] : well-healed [] : no ischemic changes [Normal Conjunctiva] : the conjunctiva exhibited no abnormalities [Normal Oral Mucosa] : normal oral mucosa [Normal Jugular Venous V Waves Present] : normal jugular venous V waves present [Abnormal Walk] : normal gait [Gait - Sufficient For Exercise Testing] : the gait was sufficient for exercise testing [Skin Color & Pigmentation] : normal skin color and pigmentation [Oriented To Time, Place, And Person] : oriented to person, place, and time [Impaired Insight] : insight and judgment were intact [Affect] : the affect was normal [Mood] : the mood was normal [No Anxiety] : not feeling anxious [Auscultation Breath Sounds / Voice Sounds] : lungs were clear to auscultation bilaterally [Palpable Crepitus] : no palpable crepitus [Bleeding] : no active bleeding [Foul Odor] : no foul smell [Purulent Drainage] : no purulent drainage [Serosanguineous Drainage] : no serosanquineous drainage [Serous Drainage] : no serous drainage [Warm] : not warm [Erythema] : not erythematous [Tender] : not tender [Indurated] : not indurated [Fluctuant] : not fluctuant

## 2019-06-05 NOTE — PROCEDURE
[No] : not [NSR] : normal sinus rhythm [ICD] : Implantable cardioverter-defibrillator [DDD] : DDD [Threshold Testing Performed] : Threshold testing was performed [Lead Imp:  ___ohms] : lead impedance was [unfilled] ohms [Sensing Amplitude ___mv] : sensing amplitude was [unfilled] mv [___V @] : [unfilled] V [___ ms] : [unfilled] ms [None] : none [de-identified] : [id number] [de-identified] : [place] [de-identified] : [place] [de-identified] : 3.5-5 years [de-identified] : 60/110 [de-identified] : 4/2014 [de-identified] : AP 42%\par  26%\par 6% afib all one one day\par ventricular episodes - two short looks like PVC called short bout of vfib (lasting seconds) x2  [de-identified] : shock impdeance 47

## 2019-06-05 NOTE — HISTORY OF PRESENT ILLNESS
[Palpitations] : no palpitations [SOB] : no dyspnea [Dizziness] : no dizziness [Syncope] : no syncope [Chest Pain] : no chest pain or discomfort [Shoulder Pain] : no shoulder pain [Pain at Site] : no pain at device site [FreeTextEntry1] : 73 year old male with HTN, HLD, aortic aneurysm thoracic, AVR and mitral valve repair in 2014, PVCs and ICD, who presents for follow up.\par \par He states that he had a ICD placed after his AVR.  He normally follows up with Dr. Palencia; whose office recently moved and he transferred care here.   He is on Sotalol for history of PVCs and  NSVT.  Overall he feels well.  No chest pain, syncope, SOB, palpitations, near syncope.  \par \par  [Swelling at Site] : no swelling at device site [Erythema at Site] : no erythema at device site

## 2019-06-05 NOTE — REVIEW OF SYSTEMS
[see HPI] : see HPI [Negative] : Genitourinary [Recent Weight Gain (___ Lbs)] : no recent weight gain [Fever] : no fever [Chills] : no chills [Feeling Fatigued] : not feeling fatigued [Palpitations] : no palpitations [Recent Weight Loss (___ Lbs)] : no recent weight loss

## 2019-06-05 NOTE — REASON FOR VISIT
[Follow-Up - Clinic] : a clinic follow-up of [Follow-up Device Check] : follow-up device check visit [FreeTextEntry1] : pacemaker

## 2019-06-05 NOTE — DISCUSSION/SUMMARY
[Pacemaker Function Normal] : normal pacemaker function [FreeTextEntry1] : 73 year old male with HTN, HLD, aortic aneurysm thoracic, AVR and mitral valve repair in 2014, PVCs and ICD, who presents for follow up.  Device interrogation reveals normal function.  All measured data is within normal limits. No recurrent atrial fibrillation since his last check and he is on Eliquis.  He is on Sotalol presumably for PVC suppression as per prior EP; however I have asked him to stop this given events on today's check.  We discussed that sotalol could be proarrhythmic causing ventricular ectopy and he has evidence of this.  I have asked him to start Metoprolol and come back in 2 weeks to see if he is having more ectopy.  If he is we will need to discuss an alternative antiarrhythmic.  This was discussed with the patient and his daughter via phone in detail.  No other changes made today.  He knows to call with any questions or concerns.

## 2019-06-26 ENCOUNTER — NON-APPOINTMENT (OUTPATIENT)
Age: 74
End: 2019-06-26

## 2019-06-26 ENCOUNTER — APPOINTMENT (OUTPATIENT)
Dept: HEART AND VASCULAR | Facility: CLINIC | Age: 74
End: 2019-06-26
Payer: MEDICARE

## 2019-06-26 VITALS — HEART RATE: 63 BPM | SYSTOLIC BLOOD PRESSURE: 140 MMHG | DIASTOLIC BLOOD PRESSURE: 91 MMHG

## 2019-06-26 PROCEDURE — 99215 OFFICE O/P EST HI 40 MIN: CPT | Mod: 25

## 2019-06-26 PROCEDURE — 93283 PRGRMG EVAL IMPLANTABLE DFB: CPT

## 2019-06-27 LAB
ANION GAP SERPL CALC-SCNC: 10 MMOL/L
BUN SERPL-MCNC: 28 MG/DL
CALCIUM SERPL-MCNC: 9.9 MG/DL
CHLORIDE SERPL-SCNC: 111 MMOL/L
CO2 SERPL-SCNC: 26 MMOL/L
CREAT SERPL-MCNC: 1.25 MG/DL
GLUCOSE SERPL-MCNC: 88 MG/DL
MAGNESIUM SERPL-MCNC: 2.1 MG/DL
POTASSIUM SERPL-SCNC: 5.4 MMOL/L
SODIUM SERPL-SCNC: 147 MMOL/L

## 2019-06-28 ENCOUNTER — RESULT CHARGE (OUTPATIENT)
Age: 74
End: 2019-06-28

## 2019-07-06 LAB
ANION GAP SERPL CALC-SCNC: 11 MMOL/L
BUN SERPL-MCNC: 19 MG/DL
CALCIUM SERPL-MCNC: 9.8 MG/DL
CHLORIDE SERPL-SCNC: 103 MMOL/L
CO2 SERPL-SCNC: 28 MMOL/L
CREAT SERPL-MCNC: 1.22 MG/DL
GLUCOSE SERPL-MCNC: 80 MG/DL
POTASSIUM SERPL-SCNC: 5.1 MMOL/L
SODIUM SERPL-SCNC: 142 MMOL/L

## 2019-07-10 ENCOUNTER — RX CHANGE (OUTPATIENT)
Age: 74
End: 2019-07-10

## 2019-07-11 ENCOUNTER — APPOINTMENT (OUTPATIENT)
Age: 74
End: 2019-07-11
Payer: MEDICARE

## 2019-07-11 ENCOUNTER — LABORATORY RESULT (OUTPATIENT)
Age: 74
End: 2019-07-11

## 2019-07-11 VITALS
TEMPERATURE: 97.5 F | SYSTOLIC BLOOD PRESSURE: 121 MMHG | DIASTOLIC BLOOD PRESSURE: 79 MMHG | HEART RATE: 48 BPM | OXYGEN SATURATION: 100 % | HEIGHT: 70 IN

## 2019-07-11 DIAGNOSIS — H90.3 SENSORINEURAL HEARING LOSS, BILATERAL: ICD-10-CM

## 2019-07-11 PROCEDURE — G0439: CPT

## 2019-07-11 PROCEDURE — 36415 COLL VENOUS BLD VENIPUNCTURE: CPT

## 2019-07-11 RX ORDER — ACETAMINOPHEN AND CODEINE 300; 30 MG/1; MG/1
300-30 TABLET ORAL
Qty: 30 | Refills: 0 | Status: COMPLETED | COMMUNITY
Start: 2018-03-12 | End: 2019-07-11

## 2019-07-11 NOTE — PHYSICAL EXAM
[General Appearance - Well Developed] : well developed [Normal Appearance] : normal appearance [Well Groomed] : well groomed [General Appearance - Well Nourished] : well nourished [No Deformities] : no deformities [General Appearance - In No Acute Distress] : no acute distress [Heart Rate And Rhythm] : heart rate and rhythm were normal [Heart Sounds] : normal S1 and S2 [Edema] : no peripheral edema present [Respiration, Rhythm And Depth] : normal respiratory rhythm and effort [Exaggerated Use Of Accessory Muscles For Inspiration] : no accessory muscle use [Auscultation Breath Sounds / Voice Sounds] : lungs were clear to auscultation bilaterally [Left Infraclavicular] : left infraclavicular area [Clean] : clean [Dry] : dry [Well-Healed] : well-healed [] : no ischemic changes [Normal Conjunctiva] : the conjunctiva exhibited no abnormalities [Normal Oral Mucosa] : normal oral mucosa [Normal Jugular Venous V Waves Present] : normal jugular venous V waves present [Abnormal Walk] : normal gait [Gait - Sufficient For Exercise Testing] : the gait was sufficient for exercise testing [Skin Color & Pigmentation] : normal skin color and pigmentation [Oriented To Time, Place, And Person] : oriented to person, place, and time [Impaired Insight] : insight and judgment were intact [Affect] : the affect was normal [Mood] : the mood was normal [No Anxiety] : not feeling anxious [Palpable Crepitus] : no palpable crepitus [Bleeding] : no active bleeding [Foul Odor] : no foul smell [Purulent Drainage] : no purulent drainage [Serosanguineous Drainage] : no serosanquineous drainage [Erythema] : not erythematous [Serous Drainage] : no serous drainage [Tender] : not tender [Warm] : not warm [Indurated] : not indurated [Fluctuant] : not fluctuant

## 2019-07-11 NOTE — PHYSICAL EXAM
[No Acute Distress] : no acute distress [Well Nourished] : well nourished [Well Developed] : well developed [Normal Sclera/Conjunctiva] : normal sclera/conjunctiva [Well-Appearing] : well-appearing [EOMI] : extraocular movements intact [PERRL] : pupils equal round and reactive to light [Normal Outer Ear/Nose] : the outer ears and nose were normal in appearance [Normal Oropharynx] : the oropharynx was normal [No JVD] : no jugular venous distention [Supple] : supple [No Lymphadenopathy] : no lymphadenopathy [Thyroid Normal, No Nodules] : the thyroid was normal and there were no nodules present [No Respiratory Distress] : no respiratory distress  [Clear to Auscultation] : lungs were clear to auscultation bilaterally [No Accessory Muscle Use] : no accessory muscle use [Normal Rate] : normal rate  [Regular Rhythm] : with a regular rhythm [Normal S1, S2] : normal S1 and S2 [No Murmur] : no murmur heard [No Abdominal Bruit] : a ~M bruit was not heard ~T in the abdomen [No Carotid Bruits] : no carotid bruits [Pedal Pulses Present] : the pedal pulses are present [No Varicosities] : no varicosities [No Palpable Aorta] : no palpable aorta [No Edema] : there was no peripheral edema [Soft] : abdomen soft [No Extremity Clubbing/Cyanosis] : no extremity clubbing/cyanosis [Non-distended] : non-distended [Non Tender] : non-tender [No HSM] : no HSM [No Masses] : no abdominal mass palpated [Normal Posterior Cervical Nodes] : no posterior cervical lymphadenopathy [Normal Bowel Sounds] : normal bowel sounds [Normal Anterior Cervical Nodes] : no anterior cervical lymphadenopathy [No CVA Tenderness] : no CVA  tenderness [No Joint Swelling] : no joint swelling [No Spinal Tenderness] : no spinal tenderness [No Rash] : no rash [Grossly Normal Strength/Tone] : grossly normal strength/tone [No Focal Deficits] : no focal deficits [Coordination Grossly Intact] : coordination grossly intact [Deep Tendon Reflexes (DTR)] : deep tendon reflexes were 2+ and symmetric [Normal Gait] : normal gait [Normal Affect] : the affect was normal [Normal Insight/Judgement] : insight and judgment were intact [Alert and Oriented x3] : oriented to person, place, and time [de-identified] : PM site c/d/NI

## 2019-07-11 NOTE — HEALTH RISK ASSESSMENT
[Patient reported colonoscopy was normal] : Patient reported colonoscopy was normal [HIV test declined] : HIV test declined [Hepatitis C test declined] : Hepatitis C test declined [None] : None [Employed] : employed [With Family] : lives with family [Fully functional (bathing, dressing, toileting, transferring, walking, feeding)] : Fully functional (bathing, dressing, toileting, transferring, walking, feeding) [Feels Safe at Home] : Feels safe at home [Sexually Active] : sexually active [Fully functional (using the telephone, shopping, preparing meals, housekeeping, doing laundry, using] : Fully functional and needs no help or supervision to perform IADLs (using the telephone, shopping, preparing meals, housekeeping, doing laundry, using transportation, managing medications and managing finances) [Sunscreen] : uses sunscreen [Smoke Detector] : smoke detector [Very Good] : ~his/her~ current health as very good [Change in mental status noted] : No change in mental status noted [0] : 2) Feeling down, depressed, or hopeless: Not at all (0) [[id number]] : 0 [Behavior] : denies difficulty with behavior [Language] : denies difficulty with language [Handling Complex Tasks] : denies difficulty handling complex tasks [Learning/Retaining New Information] : denies difficulty learning/retaining new information [Reasoning] : denies difficulty with reasoning [Reports changes in hearing] : Reports no changes in hearing [Spatial Ability and Orientation] : denies difficulty with spatial ability and orientation [Reports changes in vision] : Reports no changes in vision [Reports changes in dental health] : Reports no changes in dental health [ColonoscopyDate] : 12/17

## 2019-07-11 NOTE — HISTORY OF PRESENT ILLNESS
[de-identified] : 72 y/o man is here for Medicare Annual Wellness Exam.\par ARB waas held for 1 week-then K rechecked and it was ok so ARB restarted at lower dose.\par He is a runner. \par Had hearing and vision tests this spring-all WNL. \par Has right shoulder pain-seeing ortho next week.\par

## 2019-07-11 NOTE — HISTORY OF PRESENT ILLNESS
[Palpitations] : no palpitations [Dizziness] : no dizziness [SOB] : no dyspnea [Syncope] : no syncope [Chest Pain] : no chest pain or discomfort [Shoulder Pain] : no shoulder pain [Pain at Site] : no pain at device site [Erythema at Site] : no erythema at device site [Swelling at Site] : no swelling at device site [FreeTextEntry1] : 73 year old male with HTN, HLD, aortic aneurysm thoracic, AVR and mitral valve repair in 2014, newly diagnosed paroxysmal atrial fibrillation, PVCs and ICD, who presents for follow up.\par \par He states that he had a ICD placed after his AVR.  He normally follows up with Dr. Palencia; whose office recently moved and he transferred care here.   He is on Sotalol for history of PVCs and  NSVT; however he had short bursts of VT/torsades and it was felt the sotalol was proarrhythmic and stopped.  He was placed on Metoprolol.  He had 7101 PVCs on recent holter monitor with short bursts of NSVT.  Overall he feels well.  No chest pain, syncope, SOB, palpitations, near syncope.  \par  \par

## 2019-07-11 NOTE — REASON FOR VISIT
[Follow-up Device Check] : follow-up device check visit [Follow-Up - Clinic] : a clinic follow-up of [FreeTextEntry1] : pacemaker

## 2019-07-11 NOTE — REVIEW OF SYSTEMS
[see HPI] : see HPI [Negative] : Musculoskeletal [Fever] : no fever [Recent Weight Gain (___ Lbs)] : no recent weight gain [Chills] : no chills [Feeling Fatigued] : not feeling fatigued [Recent Weight Loss (___ Lbs)] : no recent weight loss [Shortness Of Breath] : no shortness of breath [Chest Pain] : no chest pain [Palpitations] : no palpitations

## 2019-07-11 NOTE — ASSESSMENT
[FreeTextEntry1] : 73 year is here for Medicare annual wellness exam. his cognitive function was normal. Please refer to an appropriate section above for a list of providers that are regularly involved in the patient's care. See above for full details of history and physical. The patient's care team was reviewed and documented above. Listed above are the preventative services for which the patient may be due. I informed the patient with a list and offered assistance in scheduling the appropriate exams.\par Labs sent.\par On lower ARB dose. Seeing cardiology next week.\par

## 2019-07-11 NOTE — PROCEDURE
[No] : not [NSR] : normal sinus rhythm [ICD] : Implantable cardioverter-defibrillator [DDD] : DDD [Threshold Testing Performed] : Threshold testing was performed [Lead Imp:  ___ohms] : lead impedance was [unfilled] ohms [Sensing Amplitude ___mv] : sensing amplitude was [unfilled] mv [___V @] : [unfilled] V [___ ms] : [unfilled] ms [de-identified] : [place] [de-identified] : [id number] [de-identified] : Ellipse [de-identified] : 60/110 [de-identified] : 4/2014 [de-identified] : 3 years [de-identified] : shock impdeance 47  DDD 50\par AV delay 250   DDD 50\par changed to DDD 70 AV delay 250 [de-identified] : AP 42%\par  39%\par 4.7% afib all one one day -->none since last visit \par episodes all PVC induced (long short) VT/

## 2019-07-11 NOTE — DISCUSSION/SUMMARY
[Pacemaker Function Normal] : normal pacemaker function [FreeTextEntry1] : 73 year old male with HTN, HLD, aortic aneurysm thoracic, AVR and mitral valve repair in 2014, newly diagnosed paroxysmal atrial fibrillation, PVCs and ICD, who presents for follow up.   Device interrogation reveals normal function.  All measured data is within normal limits. No recurrent atrial fibrillation since his last check and he is on Eliquis.  He is now off Sotalol as this can cause QTc prolongation and be proarrhythmic and given his findings on ICD checks on his first visit here we stopped this.  He continues to have short bursts of VT/torsades in the setting of long short from PVCs.  His overall PVC count is not that high.  QTc on today's EKG of Sotalol is ok.  I have increased his pacing rate and decreased his AV delay.  This will unfortunately increase his RV pacing, but I am hopeful will suppress these episodes.  If this does suppress these episodes we may consider a BiV upgrade.  I do not want to place him on amiodarone as this can also increase his QTc.  I have sent him for a BMP and electrolytes to assure this is not contributing to any abnormalities.  He will follow up in 2-3 weeks or sooner if needed.   He knows to call with any questions or concerns.

## 2019-07-15 ENCOUNTER — APPOINTMENT (OUTPATIENT)
Dept: HEART AND VASCULAR | Facility: CLINIC | Age: 74
End: 2019-07-15
Payer: MEDICARE

## 2019-07-15 VITALS
HEIGHT: 70 IN | SYSTOLIC BLOOD PRESSURE: 118 MMHG | HEART RATE: 87 BPM | OXYGEN SATURATION: 100 % | BODY MASS INDEX: 25.05 KG/M2 | TEMPERATURE: 97.8 F | DIASTOLIC BLOOD PRESSURE: 92 MMHG | WEIGHT: 175 LBS

## 2019-07-15 LAB
25(OH)D3 SERPL-MCNC: 38.3 NG/ML
ALBUMIN SERPL ELPH-MCNC: 4.2 G/DL
ALP BLD-CCNC: 48 U/L
ALT SERPL-CCNC: 14 U/L
ANION GAP SERPL CALC-SCNC: 11 MMOL/L
AST SERPL-CCNC: 20 U/L
BASOPHILS # BLD AUTO: 0.02 K/UL
BASOPHILS NFR BLD AUTO: 0.4 %
BILIRUB SERPL-MCNC: 0.3 MG/DL
BUN SERPL-MCNC: 25 MG/DL
CALCIUM SERPL-MCNC: 10 MG/DL
CHLORIDE SERPL-SCNC: 103 MMOL/L
CHOLEST SERPL-MCNC: 221 MG/DL
CHOLEST/HDLC SERPL: 2.9 RATIO
CK SERPL-CCNC: 205 U/L
CO2 SERPL-SCNC: 27 MMOL/L
CREAT SERPL-MCNC: 1.25 MG/DL
EOSINOPHIL # BLD AUTO: 0.02 K/UL
EOSINOPHIL NFR BLD AUTO: 0.4 %
ESTIMATED AVERAGE GLUCOSE: 126 MG/DL
GLUCOSE SERPL-MCNC: 87 MG/DL
HBA1C MFR BLD HPLC: 6 %
HCT VFR BLD CALC: 44.7 %
HDLC SERPL-MCNC: 77 MG/DL
HGB BLD-MCNC: 13.7 G/DL
IMM GRANULOCYTES NFR BLD AUTO: 0.2 %
LDLC SERPL CALC-MCNC: 127 MG/DL
LYMPHOCYTES # BLD AUTO: 2.8 K/UL
LYMPHOCYTES NFR BLD AUTO: 56.6 %
MAGNESIUM SERPL-MCNC: 2 MG/DL
MAN DIFF?: NORMAL
MCHC RBC-ENTMCNC: 30.6 GM/DL
MCHC RBC-ENTMCNC: 32.5 PG
MCV RBC AUTO: 106.2 FL
MONOCYTES # BLD AUTO: 0.47 K/UL
MONOCYTES NFR BLD AUTO: 9.5 %
NEUTROPHILS # BLD AUTO: 1.63 K/UL
NEUTROPHILS NFR BLD AUTO: 32.9 %
PLATELET # BLD AUTO: 218 K/UL
POTASSIUM SERPL-SCNC: 5.5 MMOL/L
PROT SERPL-MCNC: 7.9 G/DL
PSA SERPL-MCNC: 1.21 NG/ML
RBC # BLD: 4.21 M/UL
RBC # FLD: 13.5 %
SODIUM SERPL-SCNC: 141 MMOL/L
TRIGL SERPL-MCNC: 83 MG/DL
TSH SERPL-ACNC: 1.58 UIU/ML
VIT B12 SERPL-MCNC: 294 PG/ML
WBC # FLD AUTO: 4.95 K/UL

## 2019-07-15 PROCEDURE — 99214 OFFICE O/P EST MOD 30 MIN: CPT

## 2019-07-15 NOTE — REASON FOR VISIT
[FreeTextEntry1] : 72-year-old male with history of high cholesterol, aortic aneurysm thoracic, hypertension, lumbago, pre-diabetes, Valve disease (status post AVR, MVrepair Dr. Caio Louise 9/9/2014). S/P  colonoscopy with Dr. Maximilian Cortez 9/14/17. Doing well overall, Denies CP, SOB, palpitations, orthopnea, LE swelling, dizziness, syncope. Walking and running daily, sometimes >10 miles, training for [place] marathon in 2018 after skipping 2017. \par \par s/p dental extraction and implant, no official procedure date. Reports during dental cleaning he was told he bled a lot and dentist would prefer he stop aspirin for future dental extraction and implant. \par Training for [place], race walked it without complications Nov 2018.\par \par EKG: NSR @ 46 with 1st degree AVB. LAHB, ST-Tw abnormalities. Blocked APC, V paced x 2 beats Pacer set at 40 1/22/19\par EKG: A Pacing, PVCs, 1st degree AVB, with a LAHB, possible IWMI, QTc 477 ST-Twave abnormalities.  5/6/19\par \par 3/18/19 A Fib discovered by Dr Jeff on 3/13/19, ASA changed to Eliquis\par 5/6/19 Pt with atypical pain in the axilla on the left, he thinks its the Eliquis.  No swelling or ecchymosis reported\par 7/15/19 K 5.5, Telmisartin reduced to 20mg. BP excellent.  c/o severe right shoulder pain.  Previously injected with relief.

## 2019-07-15 NOTE — ASSESSMENT
[FreeTextEntry1] : Valve disease: clean coronaries on cardiac cath in 2014. status post AVR, MV repair Dr. Caio Louise 9/9/2014) SBE prophylaxis. okay to stop ASA week before dental extraction and implant. EKG with 1 PVC. Echo done 7/2018.  Valves OK.\par \par PAF- changed from ASA to Eliquis by Dr ARIZMENDI.  No signs of trauma or bleeding on exam. Pt reassured that the Eliquis is not causing the pain\par \par VT- has non-sustained, i spoke to Dr Palencia who does not want him to [facility]. There is  a VT and syncope/sudden death risk, also has a thoracic aneurysm.  I discussed this with him and his wife, walking the Marathon is OK but no jogging.  He walked it and did well. Now seeing Dr Jeff\par \par Aortic aneurysm: Enlarged. 42 (ascending) 47 (descending) in 2014\par Followup in 2015 reveals a max Ao of 46 mm\par CT angiogram 3/6/2017 aneurysm unchanged\par annual CT, due 3/2018.  Aorta 46mm April 2018, referred back to Dr Garcia, pt did not go as of  5/6/19\par \par HLD: on pravastatin 40mg, had muscle aches on 80mg,  8/2017, pt reported he was only taking it once in a while, now taking it daily will repeat lipid panel. Diet and exercise discussed in detail.   Feb 2018\par \par HTN: Have DC Lasix and changed to HCTZ weekly.  BP very good, Reduce Micardis  40 for persistent elevated K.  Increase HCTZ 25 to M & F. BP OK.  Micardis now at 20mg\par \par CKD: 1.33 8/8/2017, repeat BMP Sept 2017, Cr 1.05, previously normal, on ARB .  K better

## 2019-07-24 ENCOUNTER — NON-APPOINTMENT (OUTPATIENT)
Age: 74
End: 2019-07-24

## 2019-07-24 ENCOUNTER — APPOINTMENT (OUTPATIENT)
Dept: HEART AND VASCULAR | Facility: CLINIC | Age: 74
End: 2019-07-24
Payer: MEDICARE

## 2019-07-24 VITALS
HEIGHT: 70 IN | WEIGHT: 175 LBS | BODY MASS INDEX: 25.05 KG/M2 | DIASTOLIC BLOOD PRESSURE: 90 MMHG | HEART RATE: 70 BPM | SYSTOLIC BLOOD PRESSURE: 140 MMHG

## 2019-07-24 PROCEDURE — 93283 PRGRMG EVAL IMPLANTABLE DFB: CPT | Mod: 59

## 2019-07-24 PROCEDURE — 99214 OFFICE O/P EST MOD 30 MIN: CPT | Mod: 25

## 2019-07-24 PROCEDURE — 93000 ELECTROCARDIOGRAM COMPLETE: CPT | Mod: 59

## 2019-07-24 NOTE — PHYSICAL EXAM
[Normal Appearance] : normal appearance [General Appearance - Well Developed] : well developed [General Appearance - Well Nourished] : well nourished [Well Groomed] : well groomed [General Appearance - In No Acute Distress] : no acute distress [No Deformities] : no deformities [Heart Rate And Rhythm] : heart rate and rhythm were normal [Heart Sounds] : normal S1 and S2 [Edema] : no peripheral edema present [Respiration, Rhythm And Depth] : normal respiratory rhythm and effort [Exaggerated Use Of Accessory Muscles For Inspiration] : no accessory muscle use [Auscultation Breath Sounds / Voice Sounds] : lungs were clear to auscultation bilaterally [Left Infraclavicular] : left infraclavicular area [Dry] : dry [Clean] : clean [Bleeding] : no active bleeding [Palpable Crepitus] : no palpable crepitus [Well-Healed] : well-healed [Foul Odor] : no foul smell [Purulent Drainage] : no purulent drainage [Serosanguineous Drainage] : no serosanquineous drainage [Erythema] : not erythematous [Serous Drainage] : no serous drainage [Tender] : not tender [Warm] : not warm [Indurated] : not indurated [Fluctuant] : not fluctuant [] : no ischemic changes [Normal Oral Mucosa] : normal oral mucosa [Normal Jugular Venous V Waves Present] : normal jugular venous V waves present [Normal Conjunctiva] : the conjunctiva exhibited no abnormalities [Gait - Sufficient For Exercise Testing] : the gait was sufficient for exercise testing [Abnormal Walk] : normal gait [Skin Color & Pigmentation] : normal skin color and pigmentation [Oriented To Time, Place, And Person] : oriented to person, place, and time [Affect] : the affect was normal [Impaired Insight] : insight and judgment were intact [No Anxiety] : not feeling anxious [Mood] : the mood was normal

## 2019-07-24 NOTE — HISTORY OF PRESENT ILLNESS
[Palpitations] : no palpitations [Syncope] : no syncope [SOB] : no dyspnea [Dizziness] : no dizziness [Chest Pain] : no chest pain or discomfort [Pain at Site] : no pain at device site [Shoulder Pain] : no shoulder pain [Erythema at Site] : no erythema at device site [Swelling at Site] : no swelling at device site [FreeTextEntry1] : 73 year old male with HTN, HLD, aortic aneurysm thoracic, AVR and mitral valve repair in 2014, newly diagnosed paroxysmal atrial fibrillation, PVCs and ICD, who presents for follow up.\par \par He states that he had a ICD placed after his AVR.  He normally follows up with Dr. Palencia; whose office recently moved and he transferred care here.   He is on Sotalol for history of PVCs and  NSVT; however he had short bursts of VT/torsades and it was felt the sotalol was proarrhythmic and stopped.  He was placed on Metoprolol.  He had 7101 PVCs on recent holter monitor with short bursts of NSVT.  Overall he feels well.  No chest pain, syncope, SOB, palpitations, near syncope.  \par  \par double metoprolol\par VIP\par increase AV delay

## 2019-07-24 NOTE — REVIEW OF SYSTEMS
[Fever] : no fever [Recent Weight Gain (___ Lbs)] : no recent weight gain [Chills] : no chills [Feeling Fatigued] : not feeling fatigued [see HPI] : see HPI [Shortness Of Breath] : no shortness of breath [Recent Weight Loss (___ Lbs)] : no recent weight loss [Palpitations] : no palpitations [Chest Pain] : no chest pain [Negative] : Psychiatric

## 2019-07-24 NOTE — PROCEDURE
[No] : not [NSR] : normal sinus rhythm [ICD] : Implantable cardioverter-defibrillator [DDD] : DDD [Threshold Testing Performed] : Threshold testing was performed [Lead Imp:  ___ohms] : lead impedance was [unfilled] ohms [Sensing Amplitude ___mv] : sensing amplitude was [unfilled] mv [___ ms] : [unfilled] ms [___V @] : [unfilled] V [de-identified] : [place] [de-identified] : Ellipse [de-identified] : 4/2014 [de-identified] : 60/110 [de-identified] : [id number] [de-identified] : 3 years [de-identified] : AP 42%\par  39%\par 4.7% afib all one one day -->none since last visit \par episodes all PVC induced (long short) VT/ [de-identified] : shock impdeance 47  DDD 50\par AV delay 250   DDD 50\par changed to DDD 70 AV delay 250

## 2019-07-24 NOTE — DISCUSSION/SUMMARY
[FreeTextEntry1] : 73 year old male with HTN, HLD, aortic aneurysm thoracic, AVR and mitral valve repair in 2014, newly diagnosed paroxysmal atrial fibrillation, PVCs and ICD, who presents for follow up.   Device interrogation reveals normal function.  All measured data is within normal limits. No recurrent atrial fibrillation since his last check and he is on Eliquis.  He is now off Sotalol as this can cause QTc prolongation and be proarrhythmic and given his findings on ICD checks on his first visit here we stopped this.  He continues to have short bursts of VT/torsades in the setting of long short from PVCs.  His overall PVC count is not that high.  QTc on today's EKG of Sotalol is ok.  I have increased his pacing rate and decreased his AV delay.  This will unfortunately increase his RV pacing, but I am hopeful will suppress these episodes.  If this does suppress these episodes we may consider a BiV upgrade.  I do not want to place him on amiodarone as this can also increase his QTc.  I have sent him for a BMP and electrolytes to assure this is not contributing to any abnormalities.  He will follow up in 2-3 weeks or sooner if needed.   He knows to call with any questions or concerns.    [Pacemaker Function Normal] : normal pacemaker function

## 2019-07-25 LAB
ANION GAP SERPL CALC-SCNC: 11 MMOL/L
BUN SERPL-MCNC: 24 MG/DL
CALCIUM SERPL-MCNC: 10 MG/DL
CHLORIDE SERPL-SCNC: 106 MMOL/L
CO2 SERPL-SCNC: 28 MMOL/L
CREAT SERPL-MCNC: 1.32 MG/DL
GLUCOSE SERPL-MCNC: 96 MG/DL
MAGNESIUM SERPL-MCNC: 2.3 MG/DL
POTASSIUM SERPL-SCNC: 5.3 MMOL/L
SODIUM SERPL-SCNC: 145 MMOL/L

## 2019-07-31 ENCOUNTER — NON-APPOINTMENT (OUTPATIENT)
Age: 74
End: 2019-07-31

## 2019-07-31 ENCOUNTER — APPOINTMENT (OUTPATIENT)
Dept: HEART AND VASCULAR | Facility: CLINIC | Age: 74
End: 2019-07-31
Payer: MEDICARE

## 2019-07-31 VITALS
BODY MASS INDEX: 25.05 KG/M2 | HEART RATE: 63 BPM | DIASTOLIC BLOOD PRESSURE: 85 MMHG | WEIGHT: 175 LBS | HEIGHT: 70 IN | SYSTOLIC BLOOD PRESSURE: 143 MMHG

## 2019-07-31 PROCEDURE — 93283 PRGRMG EVAL IMPLANTABLE DFB: CPT

## 2019-07-31 PROCEDURE — 99214 OFFICE O/P EST MOD 30 MIN: CPT | Mod: 25

## 2019-08-02 NOTE — REVIEW OF SYSTEMS
[see HPI] : see HPI [Negative] : Psychiatric [Fever] : no fever [Recent Weight Gain (___ Lbs)] : no recent weight gain [Chills] : no chills [Feeling Fatigued] : not feeling fatigued [Recent Weight Loss (___ Lbs)] : no recent weight loss [Shortness Of Breath] : no shortness of breath [Chest Pain] : no chest pain [Palpitations] : no palpitations

## 2019-08-02 NOTE — HISTORY OF PRESENT ILLNESS
[Palpitations] : no palpitations [SOB] : no dyspnea [Syncope] : no syncope [Dizziness] : no dizziness [Chest Pain] : no chest pain or discomfort [Shoulder Pain] : no shoulder pain [Pain at Site] : no pain at device site [Erythema at Site] : no erythema at device site [Swelling at Site] : no swelling at device site [FreeTextEntry1] : 73 year old male with HTN, HLD, aortic aneurysm thoracic, AVR and mitral valve repair in 2014, newly diagnosed paroxysmal atrial fibrillation, PVCs and ICD, who presents for follow up.\par \par He states that he had a ICD placed after his AVR.  He normally follows up with Dr. Palencia; whose office recently moved and he transferred care here.   He is on Sotalol for history of PVCs and  NSVT; however he had short bursts of VT/torsades and it was felt the sotalol was proarrhythmic and stopped.  He was placed on Metoprolol.  He had 7101 PVCs on recent holter monitor with short bursts of NSVT.  He has been following up closely secondary to recurrent episodes.  Overall he feels well.  No chest pain, syncope, SOB, palpitations, near syncope.  \par  \par

## 2019-08-02 NOTE — PROCEDURE
[No] : not [NSR] : normal sinus rhythm [ICD] : Implantable cardioverter-defibrillator [Threshold Testing Performed] : Threshold testing was performed [DDD] : DDD [Lead Imp:  ___ohms] : lead impedance was [unfilled] ohms [Sensing Amplitude ___mv] : sensing amplitude was [unfilled] mv [___V @] : [unfilled] V [___ ms] : [unfilled] ms [de-identified] : [place] [de-identified] : Ellipse [de-identified] : [id number] [de-identified] : 4/2014 [de-identified] : 60/110 [de-identified] : 3 years [de-identified] :    DDD 50\par AV delay 250   DDD 50\par changed to DDD 70 AV delay 250 [de-identified] : AP 15%\par  85%\par 11% afib all one one day  \par NSVT\par shock impedance 45

## 2019-08-02 NOTE — DISCUSSION/SUMMARY
[Pacemaker Function Normal] : normal pacemaker function [FreeTextEntry1] : 73 year old male with HTN, HLD, aortic aneurysm thoracic, AVR and mitral valve repair in 2014, newly diagnosed paroxysmal atrial fibrillation, PVCs and ICD, who presents for follow up.   Device interrogation reveals normal function.  All measured data is within normal limits. No recurrent atrial fibrillation since his last check and he is on Eliquis.  He is now off Sotalol as this can cause QTc prolongation and be proarrhythmic and given his findings on ICD checks on his first visit here we stopped this.  He continues to have short bursts of VT the setting of long short from PVCs.  His overall PVC count is low seen on holter and EKG.  QTc on today's EKG off Sotalol is ok.  He is RV pacing more and I have increased his AV delay as this did not effect the episodes.   I do not want to place him on amiodarone as this can also increase his QTc. I have increased his Metoprolol.  He will follow up in 2weeks or sooner if needed.   He knows to call with any questions or concerns.

## 2019-09-14 ENCOUNTER — INPATIENT (INPATIENT)
Facility: HOSPITAL | Age: 74
LOS: 3 days | Discharge: HOME | DRG: 274 | End: 2019-09-18
Attending: INTERNAL MEDICINE | Admitting: INTERNAL MEDICINE
Payer: MEDICARE

## 2019-09-14 VITALS
HEIGHT: 71 IN | OXYGEN SATURATION: 98 % | WEIGHT: 174.17 LBS | RESPIRATION RATE: 22 BRPM | HEART RATE: 64 BPM | DIASTOLIC BLOOD PRESSURE: 81 MMHG | SYSTOLIC BLOOD PRESSURE: 154 MMHG

## 2019-09-14 DIAGNOSIS — Z98.89 OTHER SPECIFIED POSTPROCEDURAL STATES: Chronic | ICD-10-CM

## 2019-09-14 LAB
BLD GP AB SCN SERPL QL: NEGATIVE — SIGNIFICANT CHANGE UP
RH IG SCN BLD-IMP: POSITIVE — SIGNIFICANT CHANGE UP

## 2019-09-14 PROCEDURE — 99291 CRITICAL CARE FIRST HOUR: CPT

## 2019-09-14 PROCEDURE — 93010 ELECTROCARDIOGRAM REPORT: CPT

## 2019-09-14 RX ORDER — AMLODIPINE BESYLATE 2.5 MG/1
10 TABLET ORAL DAILY
Refills: 0 | Status: DISCONTINUED | OUTPATIENT
Start: 2019-09-15 | End: 2019-09-15

## 2019-09-14 RX ORDER — CLOPIDOGREL BISULFATE 75 MG/1
600 TABLET, FILM COATED ORAL ONCE
Refills: 0 | Status: COMPLETED | OUTPATIENT
Start: 2019-09-15 | End: 2019-09-15

## 2019-09-14 RX ORDER — CHLORHEXIDINE GLUCONATE 213 G/1000ML
1 SOLUTION TOPICAL
Refills: 0 | Status: DISCONTINUED | OUTPATIENT
Start: 2019-09-14 | End: 2019-09-16

## 2019-09-14 RX ORDER — ATORVASTATIN CALCIUM 80 MG/1
10 TABLET, FILM COATED ORAL AT BEDTIME
Refills: 0 | Status: DISCONTINUED | OUTPATIENT
Start: 2019-09-14 | End: 2019-09-15

## 2019-09-14 RX ORDER — HEPARIN SODIUM 5000 [USP'U]/ML
1400 INJECTION INTRAVENOUS; SUBCUTANEOUS
Qty: 25000 | Refills: 0 | Status: DISCONTINUED | OUTPATIENT
Start: 2019-09-14 | End: 2019-09-15

## 2019-09-14 RX ORDER — TELMISARTAN 20 MG/1
0 TABLET ORAL
Qty: 0 | Refills: 0 | DISCHARGE

## 2019-09-14 RX ORDER — METOPROLOL TARTRATE 50 MG
50 TABLET ORAL EVERY 12 HOURS
Refills: 0 | Status: DISCONTINUED | OUTPATIENT
Start: 2019-09-14 | End: 2019-09-18

## 2019-09-14 RX ORDER — INFLUENZA VIRUS VACCINE 15; 15; 15; 15 UG/.5ML; UG/.5ML; UG/.5ML; UG/.5ML
0.5 SUSPENSION INTRAMUSCULAR ONCE
Refills: 0 | Status: COMPLETED | OUTPATIENT
Start: 2019-09-14 | End: 2019-09-18

## 2019-09-14 RX ORDER — ASPIRIN/CALCIUM CARB/MAGNESIUM 324 MG
325 TABLET ORAL ONCE
Refills: 0 | Status: COMPLETED | OUTPATIENT
Start: 2019-09-15 | End: 2019-09-15

## 2019-09-14 RX ORDER — METOPROLOL TARTRATE 50 MG
1 TABLET ORAL
Qty: 0 | Refills: 0 | DISCHARGE

## 2019-09-14 RX ADMIN — HEPARIN SODIUM 14 UNIT(S)/HR: 5000 INJECTION INTRAVENOUS; SUBCUTANEOUS at 20:53

## 2019-09-14 RX ADMIN — ATORVASTATIN CALCIUM 10 MILLIGRAM(S): 80 TABLET, FILM COATED ORAL at 22:03

## 2019-09-14 RX ADMIN — Medication 50 MILLIGRAM(S): at 19:24

## 2019-09-14 NOTE — H&P ADULT - HISTORY OF PRESENT ILLNESS
74M with PMH of HTN, HLD, AVR and MV repair, s/p PPM/AICD (2014), pAfib (on Eliquis), thoracic aortic aneurysm, who presented to [facility] after an episode of CP leading to fall/syncope.     ED [place] Course (09/12 12AM):   EKG on admission: ventricular pacing, diffuse T-wave inversions  Echo: EF 30% with prosthetic AV and MV   CT head: Negative for acute pathology/intracranial bleed  Labs: BUN/Cr 25/1.1 and troponin 0.030     ICD Interrogation at 09/13 1:30 AM:   [employer] ICD dual chamber interrogation    Pacing at DDD, determined to be appropriately pacing    During interrogation patient is noted to have experienced   - 10 episodes of V Tach   - 1 episode of V Fib noted on 09/12/19 at 20:08   (Documentation unclear but patient may have received 1 shock from the AICD.)      Repeat EKG showed: sinus sanford, prolonged VT, deep S waves II and II and TWI in V3-V6. Patient was started on 400mg Amiodarone PO BID, Metoprolol 50 mg, and Eliquis 5mg.   Given concern for possible ischemia, patient was transferred to [facility] for diagnostic cath on Monday and started on 400 mg Amiodarone PO BID. Patient was not started on any drips.    [facility] Course:   Patient currently 74M with PMH of HTN, HLD, AVR and MV repair, s/p PPM/AICD (2014), pAfib (on Eliquis), thoracic aortic aneurysm, who presented to [facility] after an episode of CP leading to fall/syncope.     ED [place] Course (09/12 12AM):   EKG on admission: ventricular pacing, diffuse T-wave inversions  Echo: EF 30% with prosthetic AV and MV   CT head: Negative for acute pathology/intracranial bleed  Labs: BUN/Cr 25/1.1 and troponin 0.030     ICD Interrogation at 09/13 1:30 AM:   [employer] ICD dual chamber interrogation    Pacing at DDD, determined to be appropriately pacing    During interrogation patient is noted to have experienced   - 10 episodes of V Tach   - 1 episode of V Fib noted on 09/12/19 at 20:08   (Documentation unclear but patient may have received 1 shock from the AICD.)      Repeat EKG showed: sinus sanford, prolonged W[place], deep S waves II and II and TWI in V3-V6. Patient was started on 400mg Amiodarone PO BID, Metoprolol 50 mg, and Eliquis 5mg.   Given concern for possible ischemia, patient was transferred to [facility] for diagnostic cath on Monday and started on 400 mg Amiodarone PO BID. Patient was not started on any drips.    [facility] Course:   Patient currently 74M with PMH of HTN, HLD, AVR and MV repair, s/p PPM/AICD (2014), pAfib (on Eliquis), thoracic aortic aneurysm, who presented to [facility] after an episode of CP leading to fall/syncope.     ED [facility] Course (09/12 12AM):   EKG on admission: ventricular pacing, diffuse T-wave inversions  Echo: EF 30% with prosthetic AV and MV   CT head: Negative for acute pathology/intracranial bleed  Labs: WBC 6.7 H/H 14.2/44.7 plt 238 Na 143 K 4.9 Cl 206 CO2 26 BUN/Cr 25/1.1 troponin 0.030     ICD Interrogation at (09/13 1:30 AM):   [employer] ICD dual chamber interrogation    Pacing at DDD, determined to be appropriately pacing    During interrogation patient is noted to have experienced   - 10 episodes of V Tach   - 1 episode of V Fib noted on 09/12/19 at 20:08   (Documentation unclear but patient may have received 1 shock from the AICD.)      Repeat EKG showed: sinus sanford, prolonged DC, deep S waves II and II and TWI in V3-V6. Patient was started on 400mg Amiodarone PO BID, Metoprolol 50 mg, and Eliquis 5mg.   Given concern for possible ischemia, patient was transferred to [facility] for diagnostic cath on Monday and started on 400 mg Amiodarone PO BID. Patient was not started on any drips.    [facility] Course:   EKG showed: 74M with PMH of HTN, HLD, AVR and MV repair, s/p PPM/AICD (2014), pAfib (on Eliquis), thoracic aortic aneurysm, who presented to [facility] after an episode of CP leading to fall/syncope. Patient describes that he was walking down the street in the rain when he felt midsternal chest tightness with associated non-radiating pain. He suddenly felt weak and fell but denied LOC or head trauma. In ED, patient denied CP, orthopnea, SOB and stated that he has an active lifestyle, even ran a marathon last year.     ED [facility] Course (09/12 12AM):   EKG on admission: ventricular pacing, diffuse T-wave inversions  Echo: EF 30% with prosthetic AV and MV   CT head: Negative for acute pathology/intracranial bleed  Labs: WBC 6.7 H/H 14.2/44.7 plt 238 Na 143 K 4.9 Cl 206 CO2 26 BUN/Cr 25/1.1 troponin 0.030     ICD Interrogation at (09/13 1:30 AM):   [employer] ICD dual chamber interrogation    Pacing at DDD, determined to be appropriately pacing    During interrogation patient is noted to have experienced   - 10 episodes of V Tach   - 1 episode of V Fib noted on 09/12/19 at 20:08   (Documentation unclear but patient may have received 1 shock from the AICD.)      Repeat EKG showed: sinus sanford, prolonged CT, deep S waves II and II and TWI in V3-V6. Patient was started on 400mg Amiodarone PO BID, Metoprolol 50 mg, and Eliquis 5mg.   Given concern for possible ischemia, patient was transferred to [facility] for diagnostic cath on Monday and started on 400 mg Amiodarone PO BID. Patient was not started on any drips.    [facility] Course:   EKG showed: 74M with PMH of HTN, HLD, AVR and MV repair, s/p PPM/AICD (2014), pAfib (on Eliquis), thoracic aortic aneurysm, who presented to [facility] after an episode of CP leading to fall/syncope. Patient describes that he was walking down the street in the rain when he felt midsternal chest tightness with associated non-radiating pain. He suddenly felt weak and fell but denied LOC or head trauma. In ED, patient denied CP, orthopnea, SOB and stated that he has an active lifestyle, even ran a marathon last year.     ED [facility] Course (09/12 12AM):   EKG on admission: ventricular pacing, diffuse T-wave inversions  Echo: EF 30% with prosthetic AV and MV   CT head: Negative for acute pathology/intracranial bleed  Labs: WBC 6.7 H/H 14.2/44.7 plt 238 Na 143 K 4.9 Cl 206 CO2 26 BUN/Cr 25/1.1 troponin 0.030     ICD Interrogation at (09/13 1:30 AM):   [employer] ICD dual chamber interrogation    Pacing at DDD, determined to be appropriately pacing    During interrogation patient is noted to have experienced   - 10 episodes of V Tach   - 1 episode of V Fib noted on 09/12/19 at 20:08   (Documentation unclear but patient may have received 1 shock from the AICD.)      Repeat EKG showed: sinus sanford, prolonged [place], deep S waves II and II and TWI in V3-V6. Patient was started on 400mg Amiodarone PO BID, Metoprolol 50 mg, and Eliquis 5mg.   Given concern for possible ischemia, patient was transferred to [facility] for diagnostic cath on Monday and started on 400 mg Amiodarone PO BID. Patient was not started on any drips.    [facility] Course:   EKG showed:   Device Interrogation: Only produced PVCs and episodes of afib, no instances of Vfib noted 74M with PMH of HTN, HLD, AVR and MV repair, s/p PPM/AICD (2014), pAfib (on Eliquis), thoracic aortic aneurysm, who presented to [facility] after an episode of CP leading to fall/syncope. Patient describes that he was walking down the street in the rain when he felt midsternal chest tightness with associated non-radiating pain. He suddenly felt weak and fell but denied LOC or head trauma. In ED, patient denied CP, orthopnea, SOB and stated that he has an active lifestyle, even ran a marathon last year.     ED [facility] Course (09/12 12AM):   EKG on admission: ventricular pacing, diffuse T-wave inversions  Echo: EF 30% with prosthetic AV and MV   CT head: Negative for acute pathology/intracranial bleed  Labs: WBC 6.7 H/H 14.2/44.7 plt 238 Na 143 K 4.9 Cl 206 CO2 26 BUN/Cr 25/1.1 troponin 0.030     ICD Interrogation at (09/13 1:30 AM):   [employer] ICD dual chamber interrogation    Pacing at DDD, determined to be appropriately pacing    During interrogation patient is noted to have experienced   - 10 episodes of V Tach   - 1 episode of V Fib noted on 09/12/19 at 20:08   (Documentation unclear but patient did not receive any shocks from the AICD.)      Repeat EKG showed: sinus sanford, prolonged [place], deep S waves II and II and TWI in V3-V6. Patient was started on 400mg Amiodarone PO BID, Metoprolol 50 mg, and Eliquis 5mg.   Given concern for possible ischemia, patient was transferred to [facility] for diagnostic cath on Monday. Patient was not started on any drips.    [facility] Course:   EKG showed:   Device Interrogation: Only produced PVCs and episodes of afib, no instances of Vfib noted 74M with PMH of HTN, HLD, AVR and MV repair, s/p PPM/AICD (2014), pAfib (on Eliquis), thoracic aortic aneurysm, who presented to [facility] after an episode of CP leading to fall/syncope. Patient describes that he was walking down the street in the rain when he felt midsternal chest tightness with associated non-radiating pain. He describes this feeling as an empty stomach/tightness feeling that resolves when he pinches his stomach. He suddenly felt weak and states that he then fell with his head hitting his back-pack on the ground, so did not sustain any head trauma. At the time, patient had no CP with exertion, SOB with exertion, palpitations, orthopnea, or lower extremity edema. He states that he walks/runs 5-6 miles every day and ran the [place] marathon last year. Patient denies any significant contributory past medical history aside from his extensive cardiac history.     ED [facility] Course (09/12 12:00 AM):   EKG on admission: ventricular pacing, diffuse T-wave inversions  Echo: EF 30% with prosthetic AV and MV   CT head: Negative for acute pathology/intracranial bleed  Labs: WBC 6.7 H/H 14.2/44.7 plt 238 Na 143 K 4.9 Cl 206 CO2 26 BUN/Cr 25/1.1 troponin 0.030     ICD Interrogation at (09/13 1:30 AM):   [employer] ICD dual chamber interrogation    Pacing at DDD, determined to be appropriately pacing    During interrogation patient is noted to have experienced   - 10 episodes of V Tach   - 1 episode of V Fib noted on 09/12/19 at 20:08   (Documentation unclear but patient did not receive any shocks from the AICD.)      Repeat EKG showed: sinus sanford, prolonged ME, deep S waves II and II and TWI in V3-V6. Patient was started on 400mg Amiodarone PO BID, Metoprolol 50 mg, and Eliquis 5mg.   Given concern for possible ischemia, patient was transferred to [facility] for diagnostic cath on Monday. Patient was not started on any drips.    [facility] Course:   Device Interrogation: Only produced PVCs and episodes of afib, no instances of Vfib noted

## 2019-09-14 NOTE — H&P ADULT - NSICDXPASTMEDICALHX_GEN_ALL_CORE_FT
PAST MEDICAL HISTORY:  Hypertension PAST MEDICAL HISTORY:  Afib     History of thoracic aortic aneurysm repair     HLD (hyperlipidemia)     Hypertension     S/P AVR (aortic valve replacement)     S/P ICD (internal cardiac defibrillator) procedure

## 2019-09-14 NOTE — H&P ADULT - NSHPLABSRESULTS_GEN_ALL_CORE
LABS:    RADIOLOGY, EKG & ADDITIONAL TESTS: Reviewed. LABS:                    14.9   6.57  )-----------( 234      ( 14 Sep 2019 19:08 )             45.9     09-14    139  |  102  |  18  ----------------------------<  106<H>  5.3   |  27  |  1.26    Ca    10.1      14 Sep 2019 19:06  Phos  3.2     09-14  Mg     2.0     09-14    TPro  8.5<H>  /  Alb  4.0  /  TBili  0.5  /  DBili  x   /  AST  20  /  ALT  13  /  AlkPhos  50  09-14    PT/INR - ( 14 Sep 2019 19:08 )   PT: 13.6 sec;   INR: 1.20          PTT - ( 14 Sep 2019 19:08 )  PTT:37.0 sec    CARDIAC MARKERS ( 14 Sep 2019 19:06 )  x     / <0.01 ng/mL / 144 U/L / x     / 2.5 ng/mL      RADIOLOGY, EKG & ADDITIONAL TESTS:   Reviewed.

## 2019-09-14 NOTE — H&P ADULT - NSHPSOCIALHISTORY_GEN_ALL_CORE
Patient denies drinking, ETOH, or prior drug use.   Has an active lifestyle, runs marathons.   Lives with ____. Patient denies smoking, ETOH, or prior drug use. Has an active lifestyle, runs marathons. Lives with his wife in [place]. Works as a . Denies any significant medical history aside from cardiac issues.

## 2019-09-14 NOTE — H&P ADULT - NSHPPHYSICALEXAM_GEN_ALL_CORE
VITAL SIGNS:  T(C): 36.5 (09-14-19 @ 17:49), Max: 36.5 (09-14-19 @ 17:49)  T(F): 97.7 (09-14-19 @ 17:49), Max: 97.7 (09-14-19 @ 17:49)  HR: 66 (09-14-19 @ 20:00) (62 - 78)  BP: 139/85 (09-14-19 @ 19:10) (131/97 - 182/89)  BP(mean): 105 (09-14-19 @ 19:10) (105 - 116)  RR: 35 (09-14-19 @ 20:00) (20 - 35)  SpO2: 100% (09-14-19 @ 20:00) (87% - 100%)  Wt(kg): --    PHYSICAL EXAM:  Constitutional: WDWN, lying comfortably in bed, NAD  Head: NC/AT  Eyes: PERRL, EOMI, clear conjunctiva  ENT: no nasal discharge; uvula midline, no oropharyngeal erythema or exudates; MMM  Neck: supple; +JVD or thyromegaly  Respiratory: CTA b/l, no wheezes, rales, or rhonchi  Cardiac: +S1/S2, +RRR, +systolic murmur 3/6 best heard in 2nd ICS, +midline sternal scar   Gastrointestinal: soft, non-tender, non-distended, no rebound/guarding, no palpable masses, normoactive bowel sounds x4  Back: spine midline, no bony tenderness or step-offs; no CVAT B/L  Extremities: WWP, no clubbing or cyanosis, no lower extremity edema  Musculoskeletal: NROM x4; no joint swelling, tenderness or erythema  Vascular: 2+ radial and DP pulses b/l  Dermatologic: skin warm, dry and intact, no rashes, wounds, or scars  Lymphatic: no submandibular or cervical LAD  Neurologic: AAOx3, CNII-XII grossly intact, no focal deficits  Psychiatric: affect and characteristics of appearance, verbalizations, behaviors are appropriate

## 2019-09-14 NOTE — H&P ADULT - NSICDXPASTSURGICALHX_GEN_ALL_CORE_FT
PAST SURGICAL HISTORY:  History of open heart surgery     Other postprocedural status Right Shoulder    Other postprocedural status S/P right knee arthroscopy

## 2019-09-14 NOTE — H&P ADULT - ASSESSMENT
PLAN:     #Syncope 2/2 to episode of Vfib:   Patient has a history of HTN, HLD, AVR and MV repair, s/p PPM/AICD (2014), pAfib (on Eliquis) and thoracic aortic aneurysm. Presented to [facility] after an episode of CP leading to fall/syncope. ICD device was interrogated and during interrogation records vary but there is one mention of pt experiencing episode of Vfib.   - EP consulted, recommendations appreciated   - repeat interrogation of ICD   - s/p 400 mg amio  - will avoid giving amiodarone for now           Afib:   Patient has a history of afib (on Eliquis at home).   - will start on heparin drip   - f/u PTT   - full anticoagulation (goal: 58-99 per heparin normogram) 74M with PMH of HTN, HLD, AVR and MV repair, s/p PPM/AICD (2014), pAfib (on Eliquis), thoracic aortic aneurysm, who presented to [facility] after an episode of CP leading to fall/syncope, transferred to [facility] for possible diagnostic cath tomorrow and continuity of care (follows with Dr. Chandler).     PLAN:     CARDIO:   #Syncope 2/2 to episode of Vfib:   Patient has a history of HTN, HLD, AVR and MV repair, s/p PPM/AICD (2014), pAfib (on Eliquis) and thoracic aortic aneurysm. Presented to [facility] after an episode of CP leading to fall/syncope. ICD device was interrogated and during interrogation records vary, but there is one mention of pt experiencing episode of Vfib. Device again interrogated at [facility] and patient was only noted to be in afib with occasional PVCs. No evidence of Vfib upon interrogation here.   - EP consulted, recommendations appreciated   - s/p 400 mg amiodarone   - will avoid giving amiodarone for now   - c/w Metoprolol succinate ER 50 mg BID  - f/u EKG and echocardiogram   - f/u lipids, TSH, HbA1c     #Afib:   Patient has a history of afib (on Eliquis at home).   - will start on heparin drip   - f/u PTT   - full anticoagulation (goal: 58-99 per heparin normogram)   - f/u EKG and echocardiogram     #CAD:   Patient has a history of CAD, HTN, and HLD. Concern for ischemic changes on EKG at [facility]. Troponin elevation at [facility] 0.03.  - patient transferred to [facility] for possible ischemic work-up  - NPO after midnight in case of cath tomorrow   - c/w atorvastatin 10 mg daily   - f/u EKG and echocardiogram     #HTN:   Patient has a history of HTN. BP here at one point noted to be 182/89.   - c/w Metoprolol succinate ER 50 mg BID  - c/w amlodipine 10 mg PO daily   - f/u EKG and echocardiogram     RENAL:   Patient noted to have an EYAL at [facility]. BUN/Cr 25/1.1 in ED. Baseline creatinine unknown.  - f/u with patient's PCP (Dr. Chandler) to obtain more information   - monitor BMP, trend Cr  - if worsening send urine lytes and osmols   - avoid nephrotoxic medications      NUTRITION & METABOLISM:   F: none  E: keep K > 4, Mg > 2  N: NPO for possible cath     DVT Prophylaxis: heparin drip   GI Prophylaxis: none   Code status: full code 74M with PMH of HTN, HLD, AVR and MV repair, s/p PPM/AICD (2014), pAfib (on Eliquis), thoracic aortic aneurysm, who presented to [facility] after an episode of CP leading to fall/syncope, transferred to [facility] for possible diagnostic cath tomorrow and continuity of care (follows with Dr. Chandler).     PLAN:     CARDIO:   #Syncope 2/2 to episode of Vfib:   Patient has a history of HTN, HLD, AVR and MV repair, s/p PPM/AICD (2014), pAfib (on Eliquis) and thoracic aortic aneurysm. Presented to [facility] after an episode of CP leading to fall/syncope. ICD device was interrogated and during interrogation records vary, but there is one mention of pt experiencing episode of Vfib. Device again interrogated at [facility] and patient was only noted to be in afib with occasional PVCs. No evidence of Vfib upon interrogation here.   - EP consulted, recommendations appreciated   - s/p 400 mg amiodarone   - will avoid giving amiodarone for now   - c/w Metoprolol succinate ER 50 mg BID  - f/u EKG and echocardiogram   - f/u lipids, TSH, HbA1c     #Afib:   Patient has a history of afib (on Eliquis at home).   - will start on heparin drip   - f/u PTT   - full anticoagulation (goal: 58-99 per heparin normogram)   - f/u EKG and echocardiogram     #CAD:   Patient has a history of CAD, HTN, and HLD. Concern for ischemic changes on EKG at [facility]. Troponin elevation at [facility] 0.03.  - patient transferred to [facility] for possible ischemic work-up  - NPO after midnight in case of cath tomorrow   - c/w atorvastatin 10 mg daily   - f/u EKG and echocardiogram     #HTN:   Patient has a history of HTN. BP here at one point noted to be 182/89.   - c/w Metoprolol succinate ER 50 mg BID  - c/w amlodipine 10 mg PO daily   - f/u EKG and echocardiogram '    #HFrEF:   Patient has a history of HFrEF. Echo at [facility] showed EF of 30%.   - f/u echocardiogram   - caution with fluids given low EF     RENAL:   Patient noted to have an EYAL at [facility]. BUN/Cr 25/1.1 in ED. Baseline creatinine unknown.  - f/u with patient's PCP (Dr. Chandler) to obtain more information   - monitor BMP, trend Cr  - if worsening send urine lytes and osmols   - avoid nephrotoxic medications      NUTRITION & METABOLISM:   F: caution with fluids (EF 30%)  E: keep K > 4, Mg > 2  N: NPO for possible cath     DVT Prophylaxis: heparin drip   GI Prophylaxis: none   Code status: full code 74M with PMH of HTN, HLD, AVR and MV repair, s/p PPM/AICD (2014), pAfib (on Eliquis), thoracic aortic aneurysm, who presented to [facility] after an episode of CP leading to fall/syncope, transferred to [facility] for possible diagnostic cath tomorrow and continuity of care (follows with Dr. Chandler and Dr. Pinon).     PLAN:     CARDIO:   #Syncope 2/2 to episode of Vfib:   Patient has a history of HTN, HLD, AVR and MV repair, s/p PPM/AICD (2014), pAfib (on Eliquis) and thoracic aortic aneurysm. Presented to [facility] after an episode of CP leading to fall/syncope. ICD device was interrogated and during interrogation records vary, but there is one mention of pt experiencing episode of Vfib. Device again interrogated at [facility] and patient was only noted to be in afib with occasional PVCs. No evidence of Vfib upon interrogation here.   - EP consulted, recommendations appreciated   - s/p 400 mg amiodarone   - will avoid giving amiodarone for now   - c/w Metoprolol succinate ER 50 mg BID  - f/u EKG and echocardiogram   - f/u lipids, TSH, HbA1c     #Afib:   Patient has a history of afib (on Eliquis at home).   - will start on heparin drip   - f/u PTT   - full anticoagulation (goal: 58-99 per heparin normogram)   - f/u EKG and echocardiogram     #CAD:   Patient has a history of CAD, HTN, and HLD. Concern for ischemic changes on EKG at [facility]. In 2017, patient had a cardiac cath that showed severe diffuse hypokinesis, systolic dysfunction with EF 30%, mild CT and TR. Troponin elevation at [facility] 0.03. Patient transferred to [facility] for possible ischemic work-up. Patient routinely follows with Dr. Jeff.   - NPO after midnight in case of cath tomorrow   - c/w atorvastatin 10 mg daily   - f/u EKG and echocardiogram     #HTN:   Patient has a history of HTN. BP here at one point noted to be 182/89.   - c/w Metoprolol succinate ER 50 mg BID  - c/w amlodipine 10 mg PO daily   - f/u EKG and echocardiogram '    #HFrEF:   Patient has a history of HFrEF. Echo at [facility] showed EF of 30%.   - f/u echocardiogram   - caution with fluids given low EF     RENAL:   Patient noted to have an EYAL at [facility]. BUN/Cr 25/1.1 in ED. Baseline creatinine unknown.  - f/u with patient's PCP (Dr. Chandler) to obtain more information   - monitor BMP, trend Cr  - if worsening send urine lytes and osmols   - avoid nephrotoxic medications      NUTRITION & METABOLISM:   F: caution with fluids (EF 30%)  E: keep K > 4, Mg > 2  N: NPO for possible cath     DVT Prophylaxis: heparin drip   GI Prophylaxis: none   Code status: full code 74M with PMH of HTN, HLD, AVR and MV repair, s/p PPM/AICD (2014), pAfib (on Eliquis), thoracic aortic aneurysm, who presented to [facility] Vill[facility] after an episode of CP leading to fall/syncope, transferred to [facility] for possible diagnostic cath tomorrow and continuity of care (follows with Dr. Chandler and Dr. Pinon).     PLAN:     CARDIO:   #Syncope 2/2 to episode of Vfib:   Patient has a history of HTN, HLD, AVR and MV repair, s/p PPM/AICD (2014), pAfib (on Eliquis) and thoracic aortic aneurysm. Presented to [facility] after an episode of CP leading to fall/syncope. ICD device was interrogated and during interrogation records vary, but there is one mention of pt experiencing episode of Vfib. Device again interrogated at [facility] and patient was only noted to be in afib with occasional PVCs. No evidence of Vfib upon interrogation here. Patient routinely follows with Dr. Jeff.   - EP consulted, recommendations appreciated   - s/p 400 mg amiodarone   - will avoid giving amiodarone for now   - c/w Metoprolol succinate ER 50 mg BID  - f/u EKG and echocardiogram   - f/u lipids, TSH, HbA1c     #Afib:   Patient has a history of afib (on Eliquis at home).   - will start on heparin drip   - f/u PTT   - full anticoagulation (goal: 58-99 per heparin normogram)   - f/u EKG and echocardiogram     #CAD:   Patient has a history of CAD, HTN, and HLD. Concern for ischemic changes on EKG at [facility]. In 2017, patient had a cardiac cath that showed severe diffuse hypokinesis, systolic dysfunction with EF 30%, mild FL and TR. Troponin elevation at [facility] 0.03. Patient transferred to [facility] for possible ischemic work-up. Patient routinely follows with Dr. Jeff.   - NPO after midnight in case of cath tomorrow   - c/w atorvastatin 10 mg daily   - f/u EKG and echocardiogram   - f/u cardiac enzymes     #HTN:   Patient has a history of HTN. BP here at one point noted to be 182/89.   - c/w Metoprolol succinate ER 50 mg BID  - c/w amlodipine 10 mg PO daily   - f/u EKG and echocardiogram '    #HFrEF:   Patient has a history of HFrEF. Echo at [facility] showed EF of 30%.   - f/u echocardiogram   - caution with fluids given low EF     RENAL:   Patient noted to have an EYAL at [facility]. BUN/Cr 25/1.1 in ED. Baseline creatinine unknown.  - f/u with patient's PCP (Dr. Chandler) to obtain more information   - monitor BMP, trend Cr  - if worsening send urine lytes and osmols   - avoid nephrotoxic medications      NUTRITION & METABOLISM:   F: caution with fluids (EF 30%)  E: keep K > 4, Mg > 2  N: NPO for possible cath     DVT Prophylaxis: heparin drip   GI Prophylaxis: none   Code status: full code 74M with PMH of HTN, HLD, AVR and MV repair, s/p PPM/AICD (2014), pAfib (on Eliquis), thoracic aortic aneurysm, who presented to [facility] Villator[facility] after an episode of chest tightness leading to fall/syncope, transferred to [facility] for possible diagnostic cath tomorrow and continuity of care (follows with Dr. Chandler and Dr. Pinon).     PLAN:     CARDIO:   #Syncope 2/2 to episode of Vfib:   Patient has a history of HTN, HLD, AVR and MV repair, s/p PPM/AICD (2014), pAfib (on Eliquis) and thoracic aortic aneurysm. Presented to [facility] after an episode of CP leading to fall/syncope. ICD device was interrogated and during interrogation records vary, but there is one mention of pt experiencing episode of Vfib. Device again interrogated at [facility] and patient was only noted to be in afib with occasional PVCs. No evidence of Vfib upon interrogation here. Patient routinely follows with Dr. Jeff.   - EP consulted, recommendations appreciated   - s/p 400 mg amiodarone   - will avoid giving amiodarone for now   - c/w Metoprolol succinate ER 50 mg BID  - f/u EKG and echocardiogram   - f/u lipids, TSH, HbA1c     #Afib:   Patient has a history of afib (on Eliquis at home).   - will start on heparin drip   - f/u PTT   - full anticoagulation (goal: 58-99 per heparin normogram)   - f/u EKG and echocardiogram     #CAD:   Patient has a history of CAD, HTN, and HLD. Concern for ischemic changes on EKG at [facility]. In 2017, patient had a cardiac cath that showed severe diffuse hypokinesis, systolic dysfunction with EF 30%, mild MT and TR. Troponin elevation at [facility] 0.03. Patient transferred to [facility] for possible ischemic work-up. Patient routinely follows with Dr. Jeff.   - NPO after midnight in case of cath tomorrow   - c/w atorvastatin 10 mg daily   - f/u EKG and echocardiogram   - f/u cardiac enzymes     #HTN:   Patient has a history of HTN. BP here at one point noted to be 182/89.   - c/w Metoprolol succinate ER 50 mg BID  - c/w amlodipine 10 mg PO daily   - f/u EKG and echocardiogram '    #HFrEF:   Patient has a history of HFrEF. Echo at [facility] showed EF of 30%.   - f/u echocardiogram   - caution with fluids given low EF     RENAL:   Patient noted to have an EYAL at [facility]. BUN/Cr 25/1.1 in ED. Baseline creatinine unknown.  - f/u with patient's PCP (Dr. Chandler) to obtain more information   - monitor BMP, trend Cr  - if worsening send urine lytes and osmols   - avoid nephrotoxic medications      NUTRITION & METABOLISM:   F: caution with fluids (EF 30%)  E: keep K > 4, Mg > 2  N: NPO for possible cath     DVT Prophylaxis: heparin drip   GI Prophylaxis: none   Code status: full code 74M with PMH of HTN, HLD, AVR and MV repair, s/p PPM/AICD (2014), pAfib (on Eliquis), thoracic aortic aneurysm, who presented to [facility] after an episode of chest tightness leading to fall/syncope, transferred to [facility] for possible diagnostic cath tomorrow and continuity of care (follows with Dr. Chandler and Dr. Pinon).     PLAN:     CARDIO:   #Syncope 2/2 to episode of Vfib:   Patient has a history of HTN, HLD, AVR and MV repair, s/p PPM/AICD (2014), pAfib (on Eliquis) and thoracic aortic aneurysm. Presented to [facility] after an episode of CP leading to fall/syncope. ICD device was interrogated and during interrogation records vary, but there is one mention of pt experiencing episode of Vfib. Device again interrogated at [facility] and patient was only noted to be in afib with occasional PVCs. No evidence of Vfib upon interrogation here. Patient routinely follows with Dr. Jeff.   - EP consulted, recommendations appreciated   - s/p 400 mg amiodarone   - will avoid giving amiodarone for now   - c/w Metoprolol succinate ER 50 mg BID  - f/u EKG and echocardiogram   - f/u lipids, TSH, HbA1c   -planned for EP study and possible ablation     #Afib:   Patient has a history of afib (on Eliquis at home).   - will start on heparin drip   - f/u PTT   - full anticoagulation (goal: 58-99 per heparin normogram)   - f/u EKG and echocardiogram     #CAD:   Patient has a history of CAD, HTN, and HLD. Concern for ischemic changes on EKG at [facility]. In 2017, patient had a cardiac cath that showed severe diffuse hypokinesis, systolic dysfunction with EF 30%, mild AL and TR. Troponin elevation at [facility] 0.03. Patient transferred to [facility] for possible ischemic work-up. Patient routinely follows with Dr. Jeff.   - NPO after midnight in case of cath tomorrow   - c/w atorvastatin 10 mg daily   - f/u EKG and echocardiogram   - f/u cardiac enzymes     #HTN:   Patient has a history of HTN. BP here at one point noted to be 182/89.   - c/w Metoprolol succinate ER 50 mg BID  - c/w amlodipine 10 mg PO daily   - f/u EKG and echocardiogram '    #HFrEF:   Patient has a history of HFrEF. Echo at [facility] showed EF of 30%.   - f/u echocardiogram   - caution with fluids given low EF     RENAL:   Patient noted to have an EYAL at [facility]. BUN/Cr 25/1.1 in ED. Baseline creatinine unknown.  - f/u with patient's PCP (Dr. Chandler) to obtain more information   - monitor BMP, trend Cr  - if worsening send urine lytes and osmols   - avoid nephrotoxic medications      NUTRITION & METABOLISM:   F: caution with fluids (EF 30%)  E: keep K > 4, Mg > 2  N: NPO for possible cath     DVT Prophylaxis: heparin drip   GI Prophylaxis: none   Code status: full code

## 2019-09-15 LAB
ALBUMIN SERPL ELPH-MCNC: 3.7 G/DL — SIGNIFICANT CHANGE UP (ref 3.3–5)
ALP SERPL-CCNC: 41 U/L — SIGNIFICANT CHANGE UP (ref 40–120)
ALT FLD-CCNC: 9 U/L — LOW (ref 10–45)
ANION GAP SERPL CALC-SCNC: 10 MMOL/L — SIGNIFICANT CHANGE UP (ref 5–17)
ANION GAP SERPL CALC-SCNC: 8 MMOL/L — SIGNIFICANT CHANGE UP (ref 5–17)
APTT BLD: 104.9 SEC — HIGH (ref 27.5–36.3)
APTT BLD: 151.3 SEC — CRITICAL HIGH (ref 27.5–36.3)
APTT BLD: 48.8 SEC — HIGH (ref 27.5–36.3)
APTT BLD: 70.3 SEC — HIGH (ref 27.5–36.3)
AST SERPL-CCNC: 15 U/L — SIGNIFICANT CHANGE UP (ref 10–40)
BILIRUB SERPL-MCNC: 0.6 MG/DL — SIGNIFICANT CHANGE UP (ref 0.2–1.2)
BLD GP AB SCN SERPL QL: NEGATIVE — SIGNIFICANT CHANGE UP
BUN SERPL-MCNC: 18 MG/DL — SIGNIFICANT CHANGE UP (ref 7–23)
BUN SERPL-MCNC: 19 MG/DL — SIGNIFICANT CHANGE UP (ref 7–23)
CALCIUM SERPL-MCNC: 8.8 MG/DL — SIGNIFICANT CHANGE UP (ref 8.4–10.5)
CALCIUM SERPL-MCNC: 9.4 MG/DL — SIGNIFICANT CHANGE UP (ref 8.4–10.5)
CHLORIDE SERPL-SCNC: 104 MMOL/L — SIGNIFICANT CHANGE UP (ref 96–108)
CHLORIDE SERPL-SCNC: 105 MMOL/L — SIGNIFICANT CHANGE UP (ref 96–108)
CK MB CFR SERPL CALC: 1.9 NG/ML — SIGNIFICANT CHANGE UP (ref 0–6.7)
CK MB CFR SERPL CALC: 2.1 NG/ML — SIGNIFICANT CHANGE UP (ref 0–6.7)
CK SERPL-CCNC: 112 U/L — SIGNIFICANT CHANGE UP (ref 30–200)
CK SERPL-CCNC: 119 U/L — SIGNIFICANT CHANGE UP (ref 30–200)
CO2 SERPL-SCNC: 24 MMOL/L — SIGNIFICANT CHANGE UP (ref 22–31)
CO2 SERPL-SCNC: 26 MMOL/L — SIGNIFICANT CHANGE UP (ref 22–31)
CREAT SERPL-MCNC: 1.2 MG/DL — SIGNIFICANT CHANGE UP (ref 0.5–1.3)
CREAT SERPL-MCNC: 1.51 MG/DL — HIGH (ref 0.5–1.3)
GLUCOSE SERPL-MCNC: 92 MG/DL — SIGNIFICANT CHANGE UP (ref 70–99)
GLUCOSE SERPL-MCNC: 96 MG/DL — SIGNIFICANT CHANGE UP (ref 70–99)
HCT VFR BLD CALC: 42.5 % — SIGNIFICANT CHANGE UP (ref 39–50)
HCT VFR BLD CALC: 45 % — SIGNIFICANT CHANGE UP (ref 39–50)
HGB BLD-MCNC: 13.9 G/DL — SIGNIFICANT CHANGE UP (ref 13–17)
HGB BLD-MCNC: 14.3 G/DL — SIGNIFICANT CHANGE UP (ref 13–17)
MAGNESIUM SERPL-MCNC: 1.9 MG/DL — SIGNIFICANT CHANGE UP (ref 1.6–2.6)
MAGNESIUM SERPL-MCNC: 2.2 MG/DL — SIGNIFICANT CHANGE UP (ref 1.6–2.6)
MCHC RBC-ENTMCNC: 31.8 GM/DL — LOW (ref 32–36)
MCHC RBC-ENTMCNC: 32 PG — SIGNIFICANT CHANGE UP (ref 27–34)
MCHC RBC-ENTMCNC: 32 PG — SIGNIFICANT CHANGE UP (ref 27–34)
MCHC RBC-ENTMCNC: 32.7 GM/DL — SIGNIFICANT CHANGE UP (ref 32–36)
MCV RBC AUTO: 100.7 FL — HIGH (ref 80–100)
MCV RBC AUTO: 97.9 FL — SIGNIFICANT CHANGE UP (ref 80–100)
NRBC # BLD: 0 /100 WBCS — SIGNIFICANT CHANGE UP (ref 0–0)
NRBC # BLD: 0 /100 WBCS — SIGNIFICANT CHANGE UP (ref 0–0)
PHOSPHATE SERPL-MCNC: 3.6 MG/DL — SIGNIFICANT CHANGE UP (ref 2.5–4.5)
PLATELET # BLD AUTO: 214 K/UL — SIGNIFICANT CHANGE UP (ref 150–400)
PLATELET # BLD AUTO: 226 K/UL — SIGNIFICANT CHANGE UP (ref 150–400)
POTASSIUM SERPL-MCNC: 4.1 MMOL/L — SIGNIFICANT CHANGE UP (ref 3.5–5.3)
POTASSIUM SERPL-MCNC: 4.4 MMOL/L — SIGNIFICANT CHANGE UP (ref 3.5–5.3)
POTASSIUM SERPL-SCNC: 4.1 MMOL/L — SIGNIFICANT CHANGE UP (ref 3.5–5.3)
POTASSIUM SERPL-SCNC: 4.4 MMOL/L — SIGNIFICANT CHANGE UP (ref 3.5–5.3)
PROT SERPL-MCNC: 7.3 G/DL — SIGNIFICANT CHANGE UP (ref 6–8.3)
RBC # BLD: 4.34 M/UL — SIGNIFICANT CHANGE UP (ref 4.2–5.8)
RBC # BLD: 4.47 M/UL — SIGNIFICANT CHANGE UP (ref 4.2–5.8)
RBC # FLD: 12.9 % — SIGNIFICANT CHANGE UP (ref 10.3–14.5)
RBC # FLD: 13 % — SIGNIFICANT CHANGE UP (ref 10.3–14.5)
RH IG SCN BLD-IMP: POSITIVE — SIGNIFICANT CHANGE UP
SODIUM SERPL-SCNC: 138 MMOL/L — SIGNIFICANT CHANGE UP (ref 135–145)
SODIUM SERPL-SCNC: 139 MMOL/L — SIGNIFICANT CHANGE UP (ref 135–145)
TROPONIN T SERPL-MCNC: <0.01 NG/ML — SIGNIFICANT CHANGE UP (ref 0–0.01)
TROPONIN T SERPL-MCNC: <0.01 NG/ML — SIGNIFICANT CHANGE UP (ref 0–0.01)
WBC # BLD: 4.97 K/UL — SIGNIFICANT CHANGE UP (ref 3.8–10.5)
WBC # BLD: 5.4 K/UL — SIGNIFICANT CHANGE UP (ref 3.8–10.5)
WBC # FLD AUTO: 4.97 K/UL — SIGNIFICANT CHANGE UP (ref 3.8–10.5)
WBC # FLD AUTO: 5.4 K/UL — SIGNIFICANT CHANGE UP (ref 3.8–10.5)

## 2019-09-15 PROCEDURE — 99291 CRITICAL CARE FIRST HOUR: CPT

## 2019-09-15 PROCEDURE — 93454 CORONARY ARTERY ANGIO S&I: CPT | Mod: 26

## 2019-09-15 PROCEDURE — 71045 X-RAY EXAM CHEST 1 VIEW: CPT | Mod: 26

## 2019-09-15 RX ORDER — AMLODIPINE BESYLATE 2.5 MG/1
10 TABLET ORAL DAILY
Refills: 0 | Status: DISCONTINUED | OUTPATIENT
Start: 2019-09-16 | End: 2019-09-18

## 2019-09-15 RX ORDER — MAGNESIUM SULFATE 500 MG/ML
1 VIAL (ML) INJECTION ONCE
Refills: 0 | Status: COMPLETED | OUTPATIENT
Start: 2019-09-15 | End: 2019-09-15

## 2019-09-15 RX ORDER — HEPARIN SODIUM 5000 [USP'U]/ML
1200 INJECTION INTRAVENOUS; SUBCUTANEOUS
Qty: 25000 | Refills: 0 | Status: DISCONTINUED | OUTPATIENT
Start: 2019-09-15 | End: 2019-09-15

## 2019-09-15 RX ORDER — ATORVASTATIN CALCIUM 80 MG/1
20 TABLET, FILM COATED ORAL AT BEDTIME
Refills: 0 | Status: DISCONTINUED | OUTPATIENT
Start: 2019-09-15 | End: 2019-09-18

## 2019-09-15 RX ORDER — HEPARIN SODIUM 5000 [USP'U]/ML
900 INJECTION INTRAVENOUS; SUBCUTANEOUS
Qty: 25000 | Refills: 0 | Status: DISCONTINUED | OUTPATIENT
Start: 2019-09-15 | End: 2019-09-17

## 2019-09-15 RX ADMIN — Medication 325 MILLIGRAM(S): at 05:49

## 2019-09-15 RX ADMIN — Medication 50 MILLIGRAM(S): at 05:49

## 2019-09-15 RX ADMIN — HEPARIN SODIUM 9 UNIT(S)/HR: 5000 INJECTION INTRAVENOUS; SUBCUTANEOUS at 07:32

## 2019-09-15 RX ADMIN — AMLODIPINE BESYLATE 10 MILLIGRAM(S): 2.5 TABLET ORAL at 05:52

## 2019-09-15 RX ADMIN — Medication 100 GRAM(S): at 06:24

## 2019-09-15 RX ADMIN — ATORVASTATIN CALCIUM 20 MILLIGRAM(S): 80 TABLET, FILM COATED ORAL at 22:22

## 2019-09-15 RX ADMIN — CLOPIDOGREL BISULFATE 600 MILLIGRAM(S): 75 TABLET, FILM COATED ORAL at 05:50

## 2019-09-15 RX ADMIN — CHLORHEXIDINE GLUCONATE 1 APPLICATION(S): 213 SOLUTION TOPICAL at 05:55

## 2019-09-15 NOTE — PROGRESS NOTE ADULT - SUBJECTIVE AND OBJECTIVE BOX
OVERNIGHT EVENTS: NPO after midnight. ASA and Plavix loaded.    SUBJECTIVE: Patient seen and examined at the bedside. Patient says he is feeling well and has complaints, just asking if cath is today or tomorrow so he can eat.    Vital Signs Last 12 Hrs  T(F): 97.9 (09-15-19 @ 10:00), Max: 98.6 (09-15-19 @ 01:20)  HR: 62 (09-15-19 @ 10:00) (60 - 66)  BP: 117/87 (09-15-19 @ 10:00) (101/77 - 137/92)  BP(mean): 103 (09-15-19 @ 10:00) (86 - 108)  RR: 16 (09-15-19 @ 10:00) (14 - 24)  SpO2: 99% (09-15-19 @ 10:00) (98% - 100%)  I&O's Summary    14 Sep 2019 07:01  -  15 Sep 2019 07:00  --------------------------------------------------------  IN: 418 mL / OUT: 1150 mL / NET: -732 mL        PHYSICAL EXAM:  General: In no acute distress, resting comfortably in bed  HEENT: NCAT, PERRL, EOMI, no conjunctival pallor or scleral icterus, MMM  Neck: Supple, + JVD  Respiratory: Clear to auscultation bilaterally with no wheezes, rales, or rhonchi appreciated  Cardiovascular: RRR, normal S1 and S2, 3/6 systolic murmur in the 2nd intercostal space  Vascular: 2+ radial and DP pulses  Abdomen: Soft, NT/ND. Bowel sounds present in all four quadrants with no guarding, rebound tenderness, or palpable masses  Extremities: Warm and well perfused. No clubbing, cyanosis, or edema noted  Skin: No gross skin abnormalities or rashes noted  Neuro: AAOx3. Strength and sensation intact throughout.    LABS:                        13.9   5.40  )-----------( 214      ( 15 Sep 2019 05:22 )             42.5     09-15    138  |  104  |  18  ----------------------------<  96  4.4   |  24  |  1.20    Ca    9.4      15 Sep 2019 05:22  Phos  3.6     09-15  Mg     1.9     09-15    TPro  8.5<H>  /  Alb  4.0  /  TBili  0.5  /  DBili  x   /  AST  20  /  ALT  13  /  AlkPhos  50  09-14    PT/INR - ( 14 Sep 2019 19:08 )   PT: 13.6 sec;   INR: 1.20          PTT - ( 15 Sep 2019 05:22 )  PTT:151.3 sec      RADIOLOGY & ADDITIONAL TESTS: Reviewed.    MEDICATIONS  (STANDING):  amLODIPine   Tablet 10 milliGRAM(s) Oral daily  atorvastatin 20 milliGRAM(s) Oral at bedtime  chlorhexidine 4% Liquid 1 Application(s) Topical <User Schedule>  heparin  Infusion 900 Unit(s)/Hr (9 mL/Hr) IV Continuous <Continuous>  influenza   Vaccine 0.5 milliLiter(s) IntraMuscular once  metoprolol succinate ER 50 milliGRAM(s) Oral every 12 hours    MEDICATIONS  (PRN):      Allergies    No Known Allergies    Intolerances

## 2019-09-15 NOTE — PROGRESS NOTE ADULT - ASSESSMENT
74M with PMH of HTN, HLD, AVR and MV repair, s/p PPM/AICD (2014), pAfib (on Eliquis), thoracic aortic aneurysm, who presented to [facility] after an episode of chest tightness leading to fall/syncope, transferred to [facility] for possible diagnostic cath tomorrow and continuity of care (follows with Dr. Chandler and Dr. Pinon).     PLAN:     CARDIO:   #Syncope 2/2 to episode of Vfib:   Patient has a history of HTN, HLD, AVR and MV repair, s/p PPM/AICD (2014), pAfib (on Eliquis) and thoracic aortic aneurysm. Presented to [facility] after an episode of CP leading to fall/syncope. ICD device was interrogated and during interrogation records vary, but there is one mention of pt experiencing episode of Vfib. Device again interrogated at [facility] and patient was only noted to be in afib with occasional PVCs. No evidence of Vfib upon interrogation here. Patient routinely follows with Dr. Jeff.   - Cath today  - EP study tomorrow with possible ablation  - Avoid Amiodarone for now   - Continue Toprol XL 50mg BID  - Echo tomorrow  - Chol 273 Tri 77 HDL 88 DLDL 170  - TSH 1.946  - HbA1C 6.3    #Afib:   Patient has a history of afib (on Eliquis at home).   - Continue Heparin drip  - Follow PTT x3    #CAD:   Patient has a history of CAD, HTN, and HLD. Concern for ischemic changes on EKG at [facility]. In 2017, patient had a cardiac cath that showed severe diffuse hypokinesis, systolic dysfunction with EF 30%, mild AR and TR. Troponin elevation at [facility] 0.03. Patient transferred to [facility] for possible ischemic work-up. Patient routinely follows with Dr. Jeff.   - Atorvastatin increased to 20mg QD  - Troponin negative x3    #HTN:   Patient has a history of HTN. BP here at one point noted to be 182/89.   - Continue Toprol XL 50mg BID  - Continue Amlodipine 10mg QD    #HFrEF:   Patient has a history of HFrEF. Echo at [facility] showed EF of 30%.   - Echo tomorrow  - Caution with fluids given low EF     RENAL:   Patient noted to have an EYAL at [facility]. BUN/Cr 25/1.1 in ED. Baseline creatinine unknown.  - Follow up with patient's PCP (Dr. Chandler) to obtain more information  - Monitor BMP, trend Cr  - avoid nephrotoxic medications      NUTRITION & METABOLISM:   F: caution with fluids (EF 30%)  E: keep K > 4, Mg > 2  N: NPO past midnight for EP study tomorrow    DVT Prophylaxis: Heparin drip   GI Prophylaxis: none   Code status: full code

## 2019-09-15 NOTE — PROGRESS NOTE ADULT - SUBJECTIVE AND OBJECTIVE BOX
Procedure: Coronary angiography w/o LHC, Radial band  Indication: UA, CAD  Complication: none    Result:  1) Mild, non-obstructive CAD      Plan: Medical management.

## 2019-09-16 DIAGNOSIS — Z29.9 ENCOUNTER FOR PROPHYLACTIC MEASURES, UNSPECIFIED: ICD-10-CM

## 2019-09-16 DIAGNOSIS — R55 SYNCOPE AND COLLAPSE: ICD-10-CM

## 2019-09-16 DIAGNOSIS — R63.8 OTHER SYMPTOMS AND SIGNS CONCERNING FOOD AND FLUID INTAKE: ICD-10-CM

## 2019-09-16 DIAGNOSIS — I10 ESSENTIAL (PRIMARY) HYPERTENSION: ICD-10-CM

## 2019-09-16 DIAGNOSIS — N17.9 ACUTE KIDNEY FAILURE, UNSPECIFIED: ICD-10-CM

## 2019-09-16 DIAGNOSIS — I50.30 UNSPECIFIED DIASTOLIC (CONGESTIVE) HEART FAILURE: ICD-10-CM

## 2019-09-16 DIAGNOSIS — I25.10 ATHEROSCLEROTIC HEART DISEASE OF NATIVE CORONARY ARTERY WITHOUT ANGINA PECTORIS: ICD-10-CM

## 2019-09-16 DIAGNOSIS — I48.91 UNSPECIFIED ATRIAL FIBRILLATION: ICD-10-CM

## 2019-09-16 DIAGNOSIS — Z91.89 OTHER SPECIFIED PERSONAL RISK FACTORS, NOT ELSEWHERE CLASSIFIED: ICD-10-CM

## 2019-09-16 DIAGNOSIS — I50.20 UNSPECIFIED SYSTOLIC (CONGESTIVE) HEART FAILURE: ICD-10-CM

## 2019-09-16 LAB
ALBUMIN SERPL ELPH-MCNC: 3.5 G/DL — SIGNIFICANT CHANGE UP (ref 3.3–5)
ALP SERPL-CCNC: 41 U/L — SIGNIFICANT CHANGE UP (ref 40–120)
ALT FLD-CCNC: 9 U/L — LOW (ref 10–45)
ANION GAP SERPL CALC-SCNC: 6 MMOL/L — SIGNIFICANT CHANGE UP (ref 5–17)
APTT BLD: 63.8 SEC — HIGH (ref 27.5–36.3)
APTT BLD: 70.1 SEC — HIGH (ref 27.5–36.3)
APTT BLD: 72.7 SEC — HIGH (ref 27.5–36.3)
AST SERPL-CCNC: 15 U/L — SIGNIFICANT CHANGE UP (ref 10–40)
BASOPHILS # BLD AUTO: 0.03 K/UL — SIGNIFICANT CHANGE UP (ref 0–0.2)
BASOPHILS NFR BLD AUTO: 0.6 % — SIGNIFICANT CHANGE UP (ref 0–2)
BILIRUB SERPL-MCNC: 0.6 MG/DL — SIGNIFICANT CHANGE UP (ref 0.2–1.2)
BUN SERPL-MCNC: 20 MG/DL — SIGNIFICANT CHANGE UP (ref 7–23)
CALCIUM SERPL-MCNC: 8.8 MG/DL — SIGNIFICANT CHANGE UP (ref 8.4–10.5)
CHLORIDE SERPL-SCNC: 106 MMOL/L — SIGNIFICANT CHANGE UP (ref 96–108)
CO2 SERPL-SCNC: 26 MMOL/L — SIGNIFICANT CHANGE UP (ref 22–31)
CREAT ?TM UR-MCNC: 32 MG/DL — SIGNIFICANT CHANGE UP
CREAT SERPL-MCNC: 1.22 MG/DL — SIGNIFICANT CHANGE UP (ref 0.5–1.3)
EOSINOPHIL # BLD AUTO: 0.08 K/UL — SIGNIFICANT CHANGE UP (ref 0–0.5)
EOSINOPHIL NFR BLD AUTO: 1.6 % — SIGNIFICANT CHANGE UP (ref 0–6)
GLUCOSE SERPL-MCNC: 104 MG/DL — HIGH (ref 70–99)
HCT VFR BLD CALC: 41.1 % — SIGNIFICANT CHANGE UP (ref 39–50)
HGB BLD-MCNC: 13.5 G/DL — SIGNIFICANT CHANGE UP (ref 13–17)
IMM GRANULOCYTES NFR BLD AUTO: 0.2 % — SIGNIFICANT CHANGE UP (ref 0–1.5)
INR BLD: 1.11 — SIGNIFICANT CHANGE UP (ref 0.88–1.16)
LYMPHOCYTES # BLD AUTO: 2.96 K/UL — SIGNIFICANT CHANGE UP (ref 1–3.3)
LYMPHOCYTES # BLD AUTO: 57.5 % — HIGH (ref 13–44)
MAGNESIUM SERPL-MCNC: 2 MG/DL — SIGNIFICANT CHANGE UP (ref 1.6–2.6)
MCHC RBC-ENTMCNC: 32 PG — SIGNIFICANT CHANGE UP (ref 27–34)
MCHC RBC-ENTMCNC: 32.8 GM/DL — SIGNIFICANT CHANGE UP (ref 32–36)
MCV RBC AUTO: 97.4 FL — SIGNIFICANT CHANGE UP (ref 80–100)
MONOCYTES # BLD AUTO: 0.54 K/UL — SIGNIFICANT CHANGE UP (ref 0–0.9)
MONOCYTES NFR BLD AUTO: 10.5 % — SIGNIFICANT CHANGE UP (ref 2–14)
NEUTROPHILS # BLD AUTO: 1.53 K/UL — LOW (ref 1.8–7.4)
NEUTROPHILS NFR BLD AUTO: 29.6 % — LOW (ref 43–77)
NRBC # BLD: 0 /100 WBCS — SIGNIFICANT CHANGE UP (ref 0–0)
PLATELET # BLD AUTO: 205 K/UL — SIGNIFICANT CHANGE UP (ref 150–400)
POTASSIUM SERPL-MCNC: 4.7 MMOL/L — SIGNIFICANT CHANGE UP (ref 3.5–5.3)
POTASSIUM SERPL-SCNC: 4.7 MMOL/L — SIGNIFICANT CHANGE UP (ref 3.5–5.3)
PROT SERPL-MCNC: 7.1 G/DL — SIGNIFICANT CHANGE UP (ref 6–8.3)
PROTHROM AB SERPL-ACNC: 12.6 SEC — SIGNIFICANT CHANGE UP (ref 10–12.9)
RBC # BLD: 4.22 M/UL — SIGNIFICANT CHANGE UP (ref 4.2–5.8)
RBC # FLD: 12.9 % — SIGNIFICANT CHANGE UP (ref 10.3–14.5)
SODIUM SERPL-SCNC: 138 MMOL/L — SIGNIFICANT CHANGE UP (ref 135–145)
SODIUM UR-SCNC: <20 MMOL/L — SIGNIFICANT CHANGE UP
WBC # BLD: 5.15 K/UL — SIGNIFICANT CHANGE UP (ref 3.8–10.5)
WBC # FLD AUTO: 5.15 K/UL — SIGNIFICANT CHANGE UP (ref 3.8–10.5)

## 2019-09-16 PROCEDURE — 93306 TTE W/DOPPLER COMPLETE: CPT | Mod: 26

## 2019-09-16 PROCEDURE — 99232 SBSQ HOSP IP/OBS MODERATE 35: CPT

## 2019-09-16 PROCEDURE — 93010 ELECTROCARDIOGRAM REPORT: CPT

## 2019-09-16 PROCEDURE — 99291 CRITICAL CARE FIRST HOUR: CPT

## 2019-09-16 PROCEDURE — 71045 X-RAY EXAM CHEST 1 VIEW: CPT | Mod: 26

## 2019-09-16 RX ORDER — METOPROLOL TARTRATE 50 MG
1 TABLET ORAL
Qty: 0 | Refills: 0 | DISCHARGE

## 2019-09-16 RX ORDER — CHLORHEXIDINE GLUCONATE 213 G/1000ML
1 SOLUTION TOPICAL DAILY
Refills: 0 | Status: DISCONTINUED | OUTPATIENT
Start: 2019-09-16 | End: 2019-09-18

## 2019-09-16 RX ADMIN — Medication 50 MILLIGRAM(S): at 06:39

## 2019-09-16 RX ADMIN — ATORVASTATIN CALCIUM 20 MILLIGRAM(S): 80 TABLET, FILM COATED ORAL at 21:20

## 2019-09-16 RX ADMIN — Medication 50 MILLIGRAM(S): at 17:54

## 2019-09-16 RX ADMIN — HEPARIN SODIUM 9 UNIT(S)/HR: 5000 INJECTION INTRAVENOUS; SUBCUTANEOUS at 06:41

## 2019-09-16 RX ADMIN — CHLORHEXIDINE GLUCONATE 1 APPLICATION(S): 213 SOLUTION TOPICAL at 06:45

## 2019-09-16 RX ADMIN — AMLODIPINE BESYLATE 10 MILLIGRAM(S): 2.5 TABLET ORAL at 06:39

## 2019-09-16 NOTE — PROGRESS NOTE ADULT - SUBJECTIVE AND OBJECTIVE BOX
Transfer Acceptance Note: CCU to [address] [place] [address] course:          Subjective: patient was seen and examined at bedside, sitting up comfortably in chair, in NAD. Patient reports feeling better and feels ready to go home. Denies chest pain, palpitations, lightheadedness, dizziness, headache, nausea/vomiting, abdominal pain, diarrhea/constipation    VITAL SIGNS:  Vital Signs Last 24 Hrs  T(C): 36.7 (16 Sep 2019 13:22), Max: 36.8 (16 Sep 2019 00:37)  T(F): 98.1 (16 Sep 2019 13:22), Max: 98.3 (16 Sep 2019 09:00)  HR: 62 (16 Sep 2019 14:00) (62 - 70)  BP: 110/78 (16 Sep 2019 14:00) (89/56 - 151/72)  BP(mean): 88 (16 Sep 2019 14:00) (70 - 107)  RR: 17 (16 Sep 2019 14:00) (16 - 20)  SpO2: 98% (16 Sep 2019 14:00) (91% - 100%)    PHYSICAL EXAM:  General: in NAD, sitting up comfortably in chair   HEENT: normocephalic, atraumatic; PERRL, anicteric sclera; MMM  Neck: supple, no JVD, no thyromegaly, no lymphadenopathy  Cardiovascular: +S1/S2, RRR, no M/G/R  Respiratory: clear to auscultation B/L; no wheezing, no rales, no rhonchi  Gastrointestinal: soft, NT/ND; +BSx4, no organomegaly  Extremities: WWP; no edema, clubbing or cyanosis  Vascular: 2+ radial, DP/PT pulses B/L  Neurological: AAOx3; no focal deficits    MEDICATIONS:  MEDICATIONS  (STANDING):  amLODIPine   Tablet 10 milliGRAM(s) Oral daily  atorvastatin 20 milliGRAM(s) Oral at bedtime  chlorhexidine 2% Cloths 1 Application(s) Topical daily  heparin  Infusion 900 Unit(s)/Hr (9 mL/Hr) IV Continuous <Continuous>  influenza   Vaccine 0.5 milliLiter(s) IntraMuscular once  metoprolol succinate ER 50 milliGRAM(s) Oral every 12 hours    MEDICATIONS  (PRN):      ALLERGIES:  Allergies    No Known Allergies    Intolerances        LABS:                        13.5   5.15  )-----------( 205      ( 16 Sep 2019 05:38 )             41.1     09-16    138  |  106  |  20  ----------------------------<  104<H>  4.7   |  26  |  1.22    Ca    8.8      16 Sep 2019 05:38  Phos  3.6     09-15  Mg     2.0     09-16    TPro  7.1  /  Alb  3.5  /  TBili  0.6  /  DBili  x   /  AST  15  /  ALT  9<L>  /  AlkPhos  41  09-16    PT/INR - ( 16 Sep 2019 05:38 )   PT: 12.6 sec;   INR: 1.11          PTT - ( 16 Sep 2019 10:54 )  PTT:72.7 sec    CAPILLARY BLOOD GLUCOSE      RADIOLOGY & ADDITIONAL TESTS:  Xray Chest 1 View- PORTABLE-Routine (09.16.19 @ 07:20)  Findings/  impression: Stable positioning left ICD. Stable heart size, status post   median sternotomy, mitral and aortic valve surgery. Lungs, mediastinum   and thorax are unremarkable.    Echocardiogram (09.16.19 @ 10:10)  FINDINGS:     Left Ventricle:  Mild global hypokinesis of the left ventricle with an estimated left   ventricular ejection fraction of 40-45%.     Right Ventricle:  The right ventricle is normal in size and systolic function. A device   lead is noted in the right heart.     Left Atrium:  The left atrium is normal in size.     Right Atrium:  The right atrium is normal in size.     Aortic Valve:  The peak transvalvularvelovity is 2.53 m/s, the mean transvalvular   gradient is 15.3 mmHg, and the LVOT/AV velocity ratio is 0.25 m/s. There   is trace valvular aortic regurgitation. A bioprosthetic valve is noted in   the aortic position. The peak gradient across the aortic valve is 26 mm   Hg, and the mean gradient 15 mm Hg.     Pulmonic Valve:  Structurally normal pulmonic valve with normal leaflet excursion. There   is mild-to-moderate pulmonic regurgitation.     Mitral Valve:  Structurally normal mitral valve with normal leaflet excursion. There is   trace mitral regurgitation.     Tricuspid Valve:  Structurally normal tricuspid valve with normal leaflet excursion. There   is mild tricuspid regurgitation. Pulmonary artery systolic pressure   (estimated usingthe tricuspid regurgitant gradient and an estimate of   right atrial pressure) is 24.5 mmHg. No pulmonary hypertension.     Aorta:  The aortic root is mildly dilated. The aortic root is dilated measuring   4.5 cm. The proximal ascending aorta is dilated. The proximal ascending   aorta is dilated measuring 4 cm.     Venous:  The inferior vena cava is normal in size (<2.1cm) with normal inspiratory   collapse (>50%) consistent with normal right atrial pressure (  3, range 0-5mmHg).     Pericardium:  No pericardial effusion is seen. Transfer Acceptance Note: CCU to [address] [place] [address] course:  75 yo M with a PMH of HTN, HLD, AVR and MV repair, s/p PPM/AICD (2014), pAfib (on Eliquis), thoracic aortic aneurysm, who presented to [facility] after an episode of chest tightness leading to fall/syncope. At [facility], patient's ICD was interrogated, and he was noted to have 10 episodes of Vtach, 1 episode of Vfib, with no shocks delivered. Patient was transferred to [facility] for continuity of care (follows with Dr. Chandler and Dr. Pinon), and for a cath to rule-out ischemic cardiomyopathy. Patient's device was interrogated again in the [facility] ED and PVCs/afib were observed no instances of Vfib. Patient was started on Metoprolol 50 mg BID, amlodipine 10 mg daily, atorvastatin 20 mg daily, and heparin drip. Patient was loaded with aspirin and Plavix for cath on 9/15 (radial access) which showed mild non-obstructive CAD, and troponins were negative x2. Patient has occasionally had lower BPs, in which case he has still been given his Metoprolol. 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  He is now medically optimized for transfer to a cardiac telemetry floor pending EP study with possible PVC ablation on 9/17.     Subjective: patient was seen and examined at bedside, sitting up comfortably in chair, in NAD. Patient reports feeling better and feels ready to go home. Denies chest pain, palpitations, lightheadedness, dizziness, headache, nausea/vomiting, abdominal pain, diarrhea/constipation    VITAL SIGNS:  Vital Signs Last 24 Hrs  T(C): 36.7 (16 Sep 2019 13:22), Max: 36.8 (16 Sep 2019 00:37)  T(F): 98.1 (16 Sep 2019 13:22), Max: 98.3 (16 Sep 2019 09:00)  HR: 62 (16 Sep 2019 14:00) (62 - 70)  BP: 110/78 (16 Sep 2019 14:00) (89/56 - 151/72)  BP(mean): 88 (16 Sep 2019 14:00) (70 - 107)  RR: 17 (16 Sep 2019 14:00) (16 - 20)  SpO2: 98% (16 Sep 2019 14:00) (91% - 100%)    PHYSICAL EXAM:  General: in NAD, sitting up comfortably in chair   HEENT: normocephalic, atraumatic; PERRL, anicteric sclera; MMM  Neck: supple, no JVD, no thyromegaly, no lymphadenopathy  Cardiovascular: +S1/S2, RRR, no M/G/R  Respiratory: clear to auscultation B/L; no wheezing, no rales, no rhonchi  Gastrointestinal: soft, NT/ND; +BSx4, no organomegaly  Extremities: WWP; no edema, clubbing or cyanosis  Vascular: 2+ radial, DP/PT pulses B/L  Neurological: AAOx3; no focal deficits    MEDICATIONS:  MEDICATIONS  (STANDING):  amLODIPine   Tablet 10 milliGRAM(s) Oral daily  atorvastatin 20 milliGRAM(s) Oral at bedtime  chlorhexidine 2% Cloths 1 Application(s) Topical daily  heparin  Infusion 900 Unit(s)/Hr (9 mL/Hr) IV Continuous <Continuous>  influenza   Vaccine 0.5 milliLiter(s) IntraMuscular once  metoprolol succinate ER 50 milliGRAM(s) Oral every 12 hours    MEDICATIONS  (PRN):      ALLERGIES:  Allergies    No Known Allergies    Intolerances        LABS:                        13.5   5.15  )-----------( 205      ( 16 Sep 2019 05:38 )             41.1     09-16    138  |  106  |  20  ----------------------------<  104<H>  4.7   |  26  |  1.22    Ca    8.8      16 Sep 2019 05:38  Phos  3.6     09-15  Mg     2.0     09-16    TPro  7.1  /  Alb  3.5  /  TBili  0.6  /  DBili  x   /  AST  15  /  ALT  9<L>  /  AlkPhos  41  09-16    PT/INR - ( 16 Sep 2019 05:38 )   PT: 12.6 sec;   INR: 1.11          PTT - ( 16 Sep 2019 10:54 )  PTT:72.7 sec    CAPILLARY BLOOD GLUCOSE      RADIOLOGY & ADDITIONAL TESTS:  Xray Chest 1 View- PORTABLE-Routine (09.16.19 @ 07:20)  Findings/  impression: Stable positioning left ICD. Stable heart size, status post   median sternotomy, mitral and aortic valve surgery. Lungs, mediastinum   and thorax are unremarkable.    Echocardiogram (09.16.19 @ 10:10)  FINDINGS:     Left Ventricle:  Mild global hypokinesis of the left ventricle with an estimated left   ventricular ejection fraction of 40-45%.     Right Ventricle:  The right ventricle is normal in size and systolic function. A device   lead is noted in the right heart.     Left Atrium:  The left atrium is normal in size.     Right Atrium:  The right atrium is normal in size.     Aortic Valve:  The peak transvalvularvelovity is 2.53 m/s, the mean transvalvular   gradient is 15.3 mmHg, and the LVOT/AV velocity ratio is 0.25 m/s. There   is trace valvular aortic regurgitation. A bioprosthetic valve is noted in   the aortic position. The peak gradient across the aortic valve is 26 mm   Hg, and the mean gradient 15 mm Hg.     Pulmonic Valve:  Structurally normal pulmonic valve with normal leaflet excursion. There   is mild-to-moderate pulmonic regurgitation.     Mitral Valve:  Structurally normal mitral valve with normal leaflet excursion. There is   trace mitral regurgitation.     Tricuspid Valve:  Structurally normal tricuspid valve with normal leaflet excursion. There   is mild tricuspid regurgitation. Pulmonary artery systolic pressure   (estimated usingthe tricuspid regurgitant gradient and an estimate of   right atrial pressure) is 24.5 mmHg. No pulmonary hypertension.     Aorta:  The aortic root is mildly dilated. The aortic root is dilated measuring   4.5 cm. The proximal ascending aorta is dilated. The proximal ascending   aorta is dilated measuring 4 cm.     Venous:  The inferior vena cava is normal in size (<2.1cm) with normal inspiratory   collapse (>50%) consistent with normal right atrial pressure (  3, range 0-5mmHg).     Pericardium:  No pericardial effusion is seen.

## 2019-09-16 NOTE — PROGRESS NOTE ADULT - SUBJECTIVE AND OBJECTIVE BOX
OVERNIGHT EVENTS:  Overnight, PTT therapeutic x2, and patient's EYAL was noted to have resolved.     SUBJECTIVE / INTERVAL HPI:   Patient seen and examined at bedside. States he has no complaints at this time. Was aware of the fact that his cath found no evidence of obstructive CAD. Denies chest pain, palpitations, orthopnea, paroxysmal nocturnal dyspnea, SOB, light-headedness, dizziness, nausea, vomiting, diarrhea, night sweats, cough, wheezing, dysuria, or increased urinary frequency. Is aware of the plan for EP study today and was NPO overnight in preparation for the study.       VITAL SIGNS:  Vital Signs Last 24 Hrs  T(C): 36.3 (16 Sep 2019 05:01), Max: 36.9 (15 Sep 2019 12:00)  T(F): 97.3 (16 Sep 2019 05:01), Max: 98.4 (15 Sep 2019 12:00)  HR: 62 (16 Sep 2019 08:00) (62 - 70)  BP: 127/80 (16 Sep 2019 08:00) (87/65 - 151/72)  BP(mean): 95 (16 Sep 2019 08:00) (70 - 105)  RR: 18 (16 Sep 2019 07:00) (16 - 18)  SpO2: 100% (16 Sep 2019 08:00) (91% - 100%)    09-15-19 @ 07:01  -  09-16-19 @ 07:00  --------------------------------------------------------  IN: 1040 mL / OUT: 3275 mL / NET: -2235 mL    09-16-19 @ 07:01  -  09-16-19 @ 08:19  --------------------------------------------------------  IN: 9 mL / OUT: 0 mL / NET: 9 mL        PHYSICAL EXAM:    General: WDWN, in NAD, resting comfortably in bed   HEENT: NC/AT; PERRL, anicteric sclera; MMM  Neck: supple, +R-sided JVD  Cardiovascular: +S1/S2; RRR, +3/6 systolic murmur best heard at L 2nd ICS   Respiratory: CTA B/L; no W/R/R  Gastrointestinal: soft, NT/ND; +BSx4  Extremities: WWP; no lower edema, clubbing or cyanosis  Vascular: 2+ radial, DP/PT pulses B/L, no signs of hematoma or bleeding at L radial cath access   Neurological: AAOx3; no focal deficits, strength and sensation 5/5 b/l of upper and lower extremities     MEDICATIONS:  MEDICATIONS  (STANDING):  amLODIPine   Tablet 10 milliGRAM(s) Oral daily  atorvastatin 20 milliGRAM(s) Oral at bedtime  chlorhexidine 2% Cloths 1 Application(s) Topical daily  heparin  Infusion 900 Unit(s)/Hr (9 mL/Hr) IV Continuous <Continuous>  influenza   Vaccine 0.5 milliLiter(s) IntraMuscular once  metoprolol succinate ER 50 milliGRAM(s) Oral every 12 hours    MEDICATIONS  (PRN):      ALLERGIES:  Allergies    No Known Allergies    Intolerances        LABS:                     13.5   5.15  )-----------( 205      ( 16 Sep 2019 05:38 )             41.1     09-16    138  |  106  |  20  ----------------------------<  104<H>  4.7   |  26  |  1.22    Ca    8.8      16 Sep 2019 05:38  Phos  3.6     09-15  Mg     2.0     09-16    TPro  7.1  /  Alb  3.5  /  TBili  0.6  /  DBili  x   /  AST  15  /  ALT  9<L>  /  AlkPhos  41  09-16    PT/INR - ( 16 Sep 2019 05:38 )   PT: 12.6 sec;   INR: 1.11          PTT - ( 16 Sep 2019 05:38 )  PTT:70.1 sec    CAPILLARY BLOOD GLUCOSE  Xray Chest 1 View- PORTABLE-Routine (09.15.19 @ 08:29):    INTERPRETATION:  Clinical History: Chest pain    Portable examination of the chest demonstrates mild cardiomegaly. Patient   status post sternotomy and valvular replacement. No interval change this   remaining support devices in comparison to prior examination of the chest   10/15/2015.    Impression: No acute infiltrates              RADIOLOGY & ADDITIONAL TESTS:   Reviewed. Step-down from CCU to Cardiac Telemetry Floor:   HOSPITAL COURSE:    74M with PMH of HTN, HLD, AVR and MV repair, s/p PPM/AICD (2014), pAfib (on Eliquis), thoracic aortic aneurysm, who presented to [facility] after an episode of chest tightness leading to fall/syncope. At [facility], patient's ICD was interrogated, and he was noted to have 10 episodes of Vtach, 1 episode of Vfib, with no shocks delivered. Patient was transferred to [facility] for continuity of care (follows with Dr. Chandler and Dr. Pinon) and for a cath to rule-out ischemic cardiomyopathy. Patient's device was interrogated again in the [facility] ED and PVCs/afib were observed no instances of Vfib. Patient was started on Metoprolol 50 mg BID, amlodipine 10 mg daily, atorvastatin 20 mg daily, and heparin drip. Patient was loaded with aspirin and Plavix. Cath yesterday (radial access) showed only mild non-obstructive CAD, and troponins were negative x2. Patient has occasionally had lower BPs, in which case he has still been given his Metoprolol. He came in with an EYAL and elevated creatinine that has since resolved. Patient is now medically optimized for transfer to a cardiac telemetry floor and is just awaiting EP study with possible ablation scheduled for tomorrow.     OVERNIGHT EVENTS:  Overnight, PTT therapeutic x2, and patient's EYAL was noted to have resolved.     SUBJECTIVE / INTERVAL HPI:   Patient seen and examined at bedside. States he has no complaints at this time. Was aware of the fact that his cath found no evidence of obstructive CAD. Denies chest pain, palpitations, orthopnea, paroxysmal nocturnal dyspnea, SOB, light-headedness, dizziness, nausea, vomiting, diarrhea, night sweats, cough, wheezing, dysuria, or increased urinary frequency. Is aware of the plan for EP study today and was NPO overnight in preparation for the study.       VITAL SIGNS:  Vital Signs Last 24 Hrs  T(C): 36.3 (16 Sep 2019 05:01), Max: 36.9 (15 Sep 2019 12:00)  T(F): 97.3 (16 Sep 2019 05:01), Max: 98.4 (15 Sep 2019 12:00)  HR: 62 (16 Sep 2019 08:00) (62 - 70)  BP: 127/80 (16 Sep 2019 08:00) (87/65 - 151/72)  BP(mean): 95 (16 Sep 2019 08:00) (70 - 105)  RR: 18 (16 Sep 2019 07:00) (16 - 18)  SpO2: 100% (16 Sep 2019 08:00) (91% - 100%)    09-15-19 @ 07:01  -  09-16-19 @ 07:00  --------------------------------------------------------  IN: 1040 mL / OUT: 3275 mL / NET: -2235 mL    09-16-19 @ 07:01  -  09-16-19 @ 08:19  --------------------------------------------------------  IN: 9 mL / OUT: 0 mL / NET: 9 mL        PHYSICAL EXAM:    General: WDWN, in NAD, resting comfortably in bed   HEENT: NC/AT; PERRL, anicteric sclera; MMM  Neck: supple, +R-sided JVD  Cardiovascular: +S1/S2; RRR, +3/6 systolic murmur best heard at L 2nd ICS   Respiratory: CTA B/L; no W/R/R  Gastrointestinal: soft, NT/ND; +BSx4  Extremities: WWP; no lower edema, clubbing or cyanosis  Vascular: 2+ radial, DP/PT pulses B/L, no signs of hematoma or bleeding at L radial cath access   Neurological: AAOx3; no focal deficits, strength and sensation 5/5 b/l of upper and lower extremities     MEDICATIONS:  MEDICATIONS  (STANDING):  amLODIPine   Tablet 10 milliGRAM(s) Oral daily  atorvastatin 20 milliGRAM(s) Oral at bedtime  chlorhexidine 2% Cloths 1 Application(s) Topical daily  heparin  Infusion 900 Unit(s)/Hr (9 mL/Hr) IV Continuous <Continuous>  influenza   Vaccine 0.5 milliLiter(s) IntraMuscular once  metoprolol succinate ER 50 milliGRAM(s) Oral every 12 hours    MEDICATIONS  (PRN):      ALLERGIES:  Allergies    No Known Allergies    Intolerances        LABS:                     13.5   5.15  )-----------( 205      ( 16 Sep 2019 05:38 )             41.1     09-16    138  |  106  |  20  ----------------------------<  104<H>  4.7   |  26  |  1.22    Ca    8.8      16 Sep 2019 05:38  Phos  3.6     09-15  Mg     2.0     09-16    TPro  7.1  /  Alb  3.5  /  TBili  0.6  /  DBili  x   /  AST  15  /  ALT  9<L>  /  AlkPhos  41  09-16    PT/INR - ( 16 Sep 2019 05:38 )   PT: 12.6 sec;   INR: 1.11          PTT - ( 16 Sep 2019 05:38 )  PTT:70.1 sec    CAPILLARY BLOOD GLUCOSE  Xray Chest 1 View- PORTABLE-Routine (09.15.19 @ 08:29):    INTERPRETATION:  Clinical History: Chest pain    Portable examination of the chest demonstrates mild cardiomegaly. Patient   status post sternotomy and valvular replacement. No interval change this   remaining support devices in comparison to prior examination of the chest   10/15/2015.    Impression: No acute infiltrates              RADIOLOGY & ADDITIONAL TESTS:   Reviewed.

## 2019-09-16 NOTE — CONSULT NOTE ADULT - SUBJECTIVE AND OBJECTIVE BOX
HPI:  73 year old male with HTN, HLD, aortic aneurysm thoracic, AVR and mitral valve repair in 2014, paroxysmal atrial fibrillation, PVCs and ICD, who presented to the ER with a ?syncopal episode found to have PVC induced Vfib.  EP called for further recommendations.      He states that he had a ICD placed after his AVR. He was regularly following up with Dr. Palencia; whose office moved and he transferred care here. He was on Sotalol for history of PVCs and NSVT; however he had short bursts of VT/torsades and it was felt the sotalol was proarrhythmic and stopped. He was placed on Metoprolol. He had 7101 PVCs on a holter monitor this past spring, with short bursts of NSVT. He has been following up closely secondary to recurrent episodes.    He was doing well until last Thursday when he had an episode of chest tightness when ambulating.  He then suddenly felt weak and fell to the ground.  He is unclear if he lost consciousness.  No palpitations, syncope, MONTEJO, orthopnea, PND or peripheral edema.  CT head negative.  ICD interrogation showed long-short initiated Vfib that self terminated.   s/p cardiac catheterization with no need for intervention.     PAST MEDICAL & SURGICAL HISTORY:  History of thoracic aortic aneurysm repair  Afib  S/P ICD (internal cardiac defibrillator) procedure  HLD (hyperlipidemia)  S/P AVR (aortic valve replacement)  Hypertension  History of open heart surgery  Other postprocedural status: Right Shoulder  Other postprocedural status: S/P right knee arthroscopy          Social History:no smoking, no drugs, no algohol    pertinent home medications:    Inpatient Medications:   amLODIPine   Tablet 10 milliGRAM(s) Oral daily  atorvastatin 20 milliGRAM(s) Oral at bedtime  chlorhexidine 2% Cloths 1 Application(s) Topical daily  heparin  Infusion 900 Unit(s)/Hr IV Continuous <Continuous>  influenza   Vaccine 0.5 milliLiter(s) IntraMuscular once  metoprolol succinate ER 50 milliGRAM(s) Oral every 12 hours      Allergies: No Known Allergies      ROS:   CONSTITUTIONAL: No fever, weight loss + fatigue  EYES: Pt denies  RESPIRATORY: No cough, wheezing, chills or hemoptysis; No Shortness of Breath  CARDIOVASCULAR: see HPI  GASTROINTESTINAL: Pt denies  NEUROLOGICAL: Pt denies  SKIN: Pt denies   PSYCHIATRIC: Pt denies  HEME/LYMPH: Pt denies    PHYSICAL:  T(C): 36.8 (09-16-19 @ 09:00), Max: 36.9 (09-15-19 @ 12:00)  HR: 66 (09-16-19 @ 11:00) (62 - 70)  BP: 139/90 (09-16-19 @ 11:00) (87/65 - 151/72)  RR: 18 (09-16-19 @ 11:00) (16 - 20)  SpO2: 99% (09-16-19 @ 11:00) (91% - 100%)  Wt(kg): --  Appearance: No acute distress, well developed  Eyes: normal appearing conjunctiva, pupils and eyelids  Cardiovascular: Normal S1 S2, No JVD, No murmurs, No edema  Respiratory: Lungs clear to auscultation	bilaterally.  No wheeze, rhonchi, rales noted  Gastrointestinal:  Soft, NT/ND 	  Neurologic:  No deficit noted  Psych: A&Ox3, normal mood/affect  Musculoskeletal: normal gait  Skin: no rash noted, normal color and pigmentation.        LABS:                        13.5   5.15  )-----------( 205      ( 16 Sep 2019 05:38 )             41.1     09-16    138  |  106  |  20  ----------------------------<  104<H>  4.7   |  26  |  1.22    Ca    8.8      16 Sep 2019 05:38  Phos  3.6     09-15  Mg     2.0     09-16    TPro  7.1  /  Alb  3.5  /  TBili  0.6  /  DBili  x   /  AST  15  /  ALT  9<L>  /  AlkPhos  41  09-16    PT/INR - ( 16 Sep 2019 05:38 )   PT: 12.6 sec;   INR: 1.11          PTT - ( 16 Sep 2019 10:54 )  PTT:72.7 sec  TSH  Troponin    EKG:    Telemetry:    ECHO:    Prior EP procedures:    Cath / stress / Cardiac CTa:    Assessment Plan: HPI:  73 year old male with HTN, HLD, aortic aneurysm thoracic, AVR and mitral valve repair in 2014, paroxysmal atrial fibrillation, PVCs and ICD, who presented to the ER with a ?syncopal episode found to have PVC induced Vfib.  EP called for further recommendations.      He states that he had a ICD placed after his AVR. He was regularly following up with Dr. Palencia; whose office moved and he transferred care here. He was on Sotalol for history of PVCs and NSVT; however he had short bursts of VT/torsades and it was felt the sotalol was proarrhythmic and stopped. He was placed on Metoprolol. He had 7101 PVCs on a holter monitor this past spring, with short bursts of NSVT. He has been following up closely secondary to recurrent episodes.    He was doing well until last Thursday when he had an episode of chest tightness when ambulating.  He then suddenly felt weak and fell to the ground.  He is unclear if he lost consciousness.  No palpitations, syncope, MONTEJO, orthopnea, PND or peripheral edema.  CT head negative.  ICD interrogation showed long-short initiated Vfib that self terminated.   s/p cardiac catheterization with no need for intervention.     PAST MEDICAL & SURGICAL HISTORY:  History of thoracic aortic aneurysm repair  Afib  S/P ICD (internal cardiac defibrillator) procedure  HLD (hyperlipidemia)  S/P AVR (aortic valve replacement)  Hypertension  History of open heart surgery  Other postprocedural status: Right Shoulder  Other postprocedural status: S/P right knee arthroscopy          Social History:no smoking, no drugs    pertinent home medications:  Eliquis 5 MG Oral Tablet; Take 1 tablet twice daily  Metoprolol Succinate ER 50 MG Oral Tablet Extended Release 24 Hour; TAKE 1 TABLET Every twelve hours      Inpatient Medications:   amLODIPine   Tablet 10 milliGRAM(s) Oral daily  atorvastatin 20 milliGRAM(s) Oral at bedtime  chlorhexidine 2% Cloths 1 Application(s) Topical daily  heparin  Infusion 900 Unit(s)/Hr IV Continuous <Continuous>  influenza   Vaccine 0.5 milliLiter(s) IntraMuscular once  metoprolol succinate ER 50 milliGRAM(s) Oral every 12 hours      Allergies: No Known Allergies      ROS:   CONSTITUTIONAL: No fever, weight loss,fatigue  EYES: Pt denies  RESPIRATORY: No cough, wheezing, chills or hemoptysis; No Shortness of Breath  CARDIOVASCULAR: see HPI  GASTROINTESTINAL: Pt denies  NEUROLOGICAL: Pt denies  SKIN: Pt denies   PSYCHIATRIC: Pt denies  HEME/LYMPH: Pt denies    PHYSICAL:  T(C): 36.8 (09-16-19 @ 09:00), Max: 36.9 (09-15-19 @ 12:00)  HR: 66 (09-16-19 @ 11:00) (62 - 70)  BP: 139/90 (09-16-19 @ 11:00) (87/65 - 151/72)  RR: 18 (09-16-19 @ 11:00) (16 - 20)  SpO2: 99% (09-16-19 @ 11:00) (91% - 100%)     Appearance: No acute distress, well developed  Eyes: normal appearing conjunctiva, pupils and eyelids  Cardiovascular: Normal S1 S2   Respiratory: Lungs clear to auscultation	   Gastrointestinal:  Soft, NT/ND 	  Neurologic:  No deficit noted  Psych: A&Ox3, normal mood/affect  Musculoskeletal: no deformities  Skin: no rash noted, normal color and pigmentation.        LABS:                        13.5   5.15  )-----------( 205      ( 16 Sep 2019 05:38 )             41.1     138  |  106  |  20  ----------------------------<  104<H>  4.7   |  26  |  1.22    Ca    8.8        Phos  3.6        Mg     2.0     TPro  7.1  /  Alb  3.5  /  TBili  0.6  /  DBili  x   /  AST  15  /  ALT  9<L>  /  AlkPhos  41     PT: 12.6 sec;   INR: 1.11    PTT:72.7 sec  TSH 1.9     EKG: sinus at 66bpm, PVC    Telemetry: NSR 60-70 with PVC, couplet    ECHO: pending     [facility]   presenting rhythm NSR  Batter 2.9 years  DDD 60/100  RA sense 5, impedance 300, threshold 1.25V@0.5ms  RV sense 11.8, impedance 630, threshold 0.5V@0.5ms  shock impedance 47  noise on RA lead infrequent/known and does not effect pacing  short bursts of afib  event on thursday with afib and then PVC with torsades that self terminated    Assessment Plan:  73 year old male with HTN, HLD, aortic aneurysm thoracic, AVR and mitral valve repair in 2014, paroxysmal atrial fibrillation, PVCs and ICD, who presented to the ER with a ?syncopal episode found to have PVC induced Vfib.  EP called for further recommendations.  Plan for PVC ablation tomorrow.  We discussed also addressing his atrial fibrillation.  Cath with no significant CAD.  Please keep NPO after midnight.  We discussed the procedure in detail including risks, benefits and alternatives.  Antiarrhythmic medications have caused prolonged QT in the past and been proarrhythmic.

## 2019-09-16 NOTE — PROGRESS NOTE ADULT - ASSESSMENT
75 yo M with a PMH of HTN, HLD, AVR and MV repair, s/p PPM/AICD (2014), pAfib (on Eliquis), thoracic aortic aneurysm, who presented to [facility] after an episode of chest tightness leading to fall/syncope, transferred to [facility] for cath (found mild, non-obstructive CAD) and continuity of care (follows with Dr. Chandler and Dr. Pinon). Now awaiting EP study and possible ablation scheduled for tomorrow. 73 yo M with a PMH of HTN, HLD, AVR and MV repair, s/p PPM/AICD (2014), pAfib (on Eliquis), thoracic aortic aneurysm, who presented to [facility] after an episode of chest tightness leading to fall/syncope, transferred to [facility] for cath (found mild, non-obstructive CAD) and continuity of care (follows with Dr. Chandler and Dr. Pinon). Now awaiting EP study and possible ablation scheduled for 9/17

## 2019-09-16 NOTE — PROGRESS NOTE ADULT - ATTENDING COMMENTS
74M w/ h/o HFrEF 2/2 NICM s/p ICD, pAF on Eliquis, h/o Biopros AVR and Mitral annuloplasty p/w syncope 2/2 VF/VT    -VF/VT - Likely 2/2 underlying cardiomyopathy; pt. has had no more episodes since admission; EP consulted and following -> Amiodarone dc'd in preparation for EPS tmrw w/ possible ablation; s/p Cath w/ clean coronaries; c/w Toprol 50 BID; Monitor and replete electrolytes  -HFrEF - currently well compensated and euvolemic - c/w Toprol and would initiate Entresto post EPS; No need for diuresis at this time  -AFib - currently rate controlled/paced; c/w Heparin gtt for A/C -> trnasition to Eliquis post EPS  -DASH diet  -DVT PPx: Hep gtt  -Dispo: SD to cardiac tele    Daphney Xavier MD  CCU Attending 74M w/ h/o HFrEF 2/2 NICM s/p ICD, pAF on Eliquis, h/o Biopros AVR and Mitral annuloplasty p/w syncope 2/2 VF/VT    -VF/VT - Likely 2/2 underlying cardiomyopathy; pt. has had no more episodes since admission; EP consulted and following -> Amiodarone dc'd in preparation for EPS tmrw w/ possible ablation; s/p Cath w/ clean coronaries; c/w Toprol 50 BID; Monitor and replete electrolytes  -HFrEF - currently well compensated and euvolemic - c/w Toprol and would initiate Entresto post EPS; No need for diuresis at this time  -AFib - currently rate controlled/paced; c/w Heparin gtt for A/C -> transition to Eliquis post EPS  -DASH diet  -DVT PPx: Hep gtt  -Dispo: SD to cardiac tele    Daphney Xavier MD  CCU Attending

## 2019-09-16 NOTE — PROGRESS NOTE ADULT - PROBLEM SELECTOR PLAN 2
Pt with a hx of CAD, s/p cardiac cath in 2017 which showed severe diffuse hypokinesis, systolic dysfunction with EF 30%, mild SC and TR. EKG done at [facility] concerning for ischemic changes, with Troponin 0.03. Patient transferred to [facility] for possible ischemic work-up  - troponin negative x3  - c/w atorvastatin 20mg QD  - s/p cardiac cath on 9/15 which showed mild, non-obstructive CAD  - ECHO (9/16/19): Mild global hypokinesis of the left ventricle with an estimated left   ventricular ejection fraction of 40-45%.  - c/w Toprol XL 50mg BID Pt with a hx of CAD, s/p cardiac cath in 2017 which showed severe diffuse hypokinesis, systolic dysfunction with EF 30%, mild MN and TR. EKG done at [facility] concerning for ischemic changes, with Troponin 0.03. Patient transferred to [facility] for possible ischemic work-up  - troponin negative x3  - c/w atorvastatin 20mg QD  - s/p cardiac cath on 9/15 which showed mild, non-obstructive CAD  - ECHO (9/16/19): Mild global hypokinesis of the left ventricle with an estimated left   ventricular ejection fraction of 40-45%.  - c/w Toprol XL 50mg BID  - elevated total cholesterol and LDL  - TSH and HbA1c WNL

## 2019-09-16 NOTE — PROGRESS NOTE ADULT - PROBLEM SELECTOR PLAN 1
likely in the setting of Vfib episode.  Patient presented to [facility] after an episode of CP leading to fall/syncope. ICD device was interrogated and during interrogation records varied, but there is one mention of pt experiencing episode of Vfib. Device again interrogated at [facility] and patient was only noted to be in Afib with occasional PVCs. No evidence of Vfib upon interrogation at [facility]. Patient routinely follows with Dr. Jeff.   - cath on 9/15 showed mild, non-obstructive CAD  - pending EP study on 9/17 with possible ablation   - f/u official report from EP study    - holding all anti-arrhythmics pending study  - c/w Toprol XL 50mg BID  - f/u ECHO    - elevated total cholesterol and LDL  - TSH and HbA1c WNL likely in the setting of Vfib episode.  Patient presented to [facility] after an episode of CP leading to fall/syncope. ICD device was interrogated and during interrogation records varied, but there is one mention of pt experiencing episode of Vfib. Device again interrogated at [facility] and patient noted to have PVC induced Vfib. Patient routinely follows with Dr. Jeff.   - cath on 9/15 showed mild, non-obstructive CAD  - pending EP study on 9/17 with possible PVC ablation   - f/u official report from EP study    - holding all anti-arrhythmics pending study  - c/w Toprol XL 50mg BID  - f/u ECHO    - elevated total cholesterol and LDL  - TSH and HbA1c WNL likely in the setting of Vfib episode.  Patient presented to [facility] after an episode of CP leading to fall/syncope. ICD device was interrogated and during interrogation records varied, but there is one mention of pt experiencing episode of Vfib. Device again interrogated at [facility] and patient noted to have PVC induced Vfib. Patient routinely follows with Dr. Jeff.   - cath on 9/15 showed mild, non-obstructive CAD  - ECHO (9/16/19): EF 40-45% with global hypokinesis of the LV  - pending EP study on 9/17 with possible PVC ablation   - f/u official report from EP study    - holding all anti-arrhythmics pending study  - c/w Toprol XL 50mg BID

## 2019-09-16 NOTE — PROGRESS NOTE ADULT - ASSESSMENT
74M with PMH of HTN, HLD, AVR and MV repair, s/p PPM/AICD (2014), pAfib (on Eliquis), thoracic aortic aneurysm, who presented to [facility] after an episode of chest tightness leading to fall/syncope, transferred to [facility] for cath (found mild, non-obstructive CAD) and continuity of care (follows with Dr. Chandler and Dr. Pinon). Now awaiting EP study and possible ablation scheduled for today.     PLAN:     CARDIO:   #Syncope 2/2 to episode of Vfib:   Patient has a history of HTN, HLD, AVR and MV repair, s/p PPM/AICD (2014), pAfib (on Eliquis) and thoracic aortic aneurysm. Presented to [facility] after an episode of CP leading to fall/syncope. ICD device was interrogated and during interrogation records vary, but there is one mention of pt experiencing episode of Vfib. Device again interrogated at [facility] and patient was only noted to be in afib with occasional PVCs. No evidence of Vfib upon interrogation here. Patient routinely follows with Dr. Jeff.   - cath yesterday showed mild, non-obstructive CAD  - EP study today with possible ablation   - f/u official report from EP study   - holding all anti-arrhythmics in setting of pending EP study   - c/w Toprol XL 50mg BID  - f/u results of echo performed this morning    - elevated total cholesterol and LDL   - TSH and HbA1c WNL     #Afib:   Patient has a history of afib (on Eliquis at home).   - c/w heparin drip 9mL/hour   - 2 therapeutic PTTs     #CAD:   Patient has a history of CAD, HTN, and HLD. Concern for ischemic changes on EKG at [facility]. In 2017, patient had a cardiac cath that showed severe diffuse hypokinesis, systolic dysfunction with EF 30%, mild WI and TR. Troponin elevation at [facility] 0.03. Patient transferred to [facility] for possible ischemic work-up. Patient routinely follows with Dr. Jeff.   - c/w atorvastatin 20mg QD  - troponin negative x3    #HTN:   Patient has a history of HTN. BP here at one point noted to be 182/89.   - c/w Toprol XL 50mg BID  - c/w amlodipine 10mg QD    #HFrEF:   Patient has a history of HFrEF. Echo at [facility] showed EF of 30%.   - f/u results of echo today   - caution with fluids given low EF     RENAL:   Patient noted to have an EYAL at [facility]. BUN/Cr 25/1.1 in ED. Baseline creatinine unknown.   - now resolving, patient's creatinine down-trending from 1.51 to 1.22   - monitor BMP, trend creatinine   - avoid nephrotoxic medications      NUTRITION & METABOLISM:   F: caution with fluids (EF 30%)  E: keep K > 4, Mg > 2  N: re-start DASH / TLC after EP study     DVT Prophylaxis: heparin drip   GI Prophylaxis: none   Code status: full code 74M with PMH of HTN, HLD, AVR and MV repair, s/p PPM/AICD (2014), pAfib (on Eliquis), thoracic aortic aneurysm, who presented to [facility] after an episode of chest tightness leading to fall/syncope, transferred to [facility] for cath (found mild, non-obstructive CAD) and continuity of care (follows with Dr. Chandler and Dr. Pinon). Now awaiting EP study and possible ablation scheduled for tomorrow.    PLAN:     CARDIO:   #Syncope 2/2 to episode of Vfib:   Patient has a history of HTN, HLD, AVR and MV repair, s/p PPM/AICD (2014), pAfib (on Eliquis) and thoracic aortic aneurysm. Presented to [facility] after an episode of CP leading to fall/syncope. ICD device was interrogated and during interrogation records vary, but there is one mention of pt experiencing episode of Vfib. Device again interrogated at [facility] and patient was only noted to be in afib with occasional PVCs. No evidence of Vfib upon interrogation here. Patient routinely follows with Dr. Jeff.   - cath yesterday showed mild, non-obstructive CAD  - EP study tomorrow with possible ablation   - f/u official report from EP study   - holding all anti-arrhythmics in setting of pending EP study   - c/w Toprol XL 50mg BID  - f/u results of echo performed this morning    - elevated total cholesterol and LDL   - TSH and HbA1c WNL     #Afib:   Patient has a history of afib (on Eliquis at home).   - c/w heparin drip 9mL/hour    - 2 therapeutic PTTs     #CAD:   Patient has a history of CAD, HTN, and HLD. Concern for ischemic changes on EKG at [facility]. In 2017, patient had a cardiac cath that showed severe diffuse hypokinesis, systolic dysfunction with EF 30%, mild MT and TR. Troponin elevation at [facility] 0.03. Patient transferred to [facility] for possible ischemic work-up. Patient routinely follows with Dr. Jeff.   - c/w atorvastatin 20mg QD  - troponin negative x3    #HTN:   Patient has a history of HTN. BP here at one point noted to be 182/89.   - c/w Toprol XL 50mg BID  - c/w amlodipine 10mg QD  - consider switching amlodipine to Entresto     #HFrEF:   Patient has a history of HFrEF. Echo at [facility] showed EF of 30%.   - f/u results of echo today   - caution with fluids given low EF     RENAL:   Patient noted to have an EYAL at [facility]. BUN/Cr 25/1.1 in ED. Baseline creatinine unknown.   - now resolving, patient's creatinine down-trending from 1.51 to 1.22   - monitor BMP, trend creatinine   - avoid nephrotoxic medications      NUTRITION & METABOLISM:   F: caution with fluids (EF 30%)  E: keep K > 4, Mg > 2  N: DASH / TLC, then NPO for EP study     DVT Prophylaxis: heparin drip   GI Prophylaxis: none   Code status: full code

## 2019-09-17 LAB
ALBUMIN SERPL ELPH-MCNC: 3.6 G/DL — SIGNIFICANT CHANGE UP (ref 3.3–5)
ALP SERPL-CCNC: 41 U/L — SIGNIFICANT CHANGE UP (ref 40–120)
ALT FLD-CCNC: 9 U/L — LOW (ref 10–45)
ANION GAP SERPL CALC-SCNC: 6 MMOL/L — SIGNIFICANT CHANGE UP (ref 5–17)
APTT BLD: 94.2 SEC — HIGH (ref 27.5–36.3)
AST SERPL-CCNC: 14 U/L — SIGNIFICANT CHANGE UP (ref 10–40)
BASOPHILS # BLD AUTO: 0.03 K/UL — SIGNIFICANT CHANGE UP (ref 0–0.2)
BASOPHILS NFR BLD AUTO: 0.6 % — SIGNIFICANT CHANGE UP (ref 0–2)
BILIRUB SERPL-MCNC: 0.6 MG/DL — SIGNIFICANT CHANGE UP (ref 0.2–1.2)
BUN SERPL-MCNC: 17 MG/DL — SIGNIFICANT CHANGE UP (ref 7–23)
CALCIUM SERPL-MCNC: 9.2 MG/DL — SIGNIFICANT CHANGE UP (ref 8.4–10.5)
CHLORIDE SERPL-SCNC: 104 MMOL/L — SIGNIFICANT CHANGE UP (ref 96–108)
CO2 SERPL-SCNC: 29 MMOL/L — SIGNIFICANT CHANGE UP (ref 22–31)
CREAT SERPL-MCNC: 1.23 MG/DL — SIGNIFICANT CHANGE UP (ref 0.5–1.3)
EOSINOPHIL # BLD AUTO: 0.05 K/UL — SIGNIFICANT CHANGE UP (ref 0–0.5)
EOSINOPHIL NFR BLD AUTO: 1 % — SIGNIFICANT CHANGE UP (ref 0–6)
GLUCOSE SERPL-MCNC: 106 MG/DL — HIGH (ref 70–99)
HCT VFR BLD CALC: 42.8 % — SIGNIFICANT CHANGE UP (ref 39–50)
HGB BLD-MCNC: 14 G/DL — SIGNIFICANT CHANGE UP (ref 13–17)
IMM GRANULOCYTES NFR BLD AUTO: 0.2 % — SIGNIFICANT CHANGE UP (ref 0–1.5)
LYMPHOCYTES # BLD AUTO: 2.9 K/UL — SIGNIFICANT CHANGE UP (ref 1–3.3)
LYMPHOCYTES # BLD AUTO: 58.4 % — HIGH (ref 13–44)
MAGNESIUM SERPL-MCNC: 1.9 MG/DL — SIGNIFICANT CHANGE UP (ref 1.6–2.6)
MCHC RBC-ENTMCNC: 32.1 PG — SIGNIFICANT CHANGE UP (ref 27–34)
MCHC RBC-ENTMCNC: 32.7 GM/DL — SIGNIFICANT CHANGE UP (ref 32–36)
MCV RBC AUTO: 98.2 FL — SIGNIFICANT CHANGE UP (ref 80–100)
MONOCYTES # BLD AUTO: 0.62 K/UL — SIGNIFICANT CHANGE UP (ref 0–0.9)
MONOCYTES NFR BLD AUTO: 12.5 % — SIGNIFICANT CHANGE UP (ref 2–14)
NEUTROPHILS # BLD AUTO: 1.36 K/UL — LOW (ref 1.8–7.4)
NEUTROPHILS NFR BLD AUTO: 27.3 % — LOW (ref 43–77)
NRBC # BLD: 0 /100 WBCS — SIGNIFICANT CHANGE UP (ref 0–0)
PHOSPHATE SERPL-MCNC: 3.3 MG/DL — SIGNIFICANT CHANGE UP (ref 2.5–4.5)
PLATELET # BLD AUTO: 214 K/UL — SIGNIFICANT CHANGE UP (ref 150–400)
POTASSIUM SERPL-MCNC: 5.2 MMOL/L — SIGNIFICANT CHANGE UP (ref 3.5–5.3)
POTASSIUM SERPL-SCNC: 5.2 MMOL/L — SIGNIFICANT CHANGE UP (ref 3.5–5.3)
PROT SERPL-MCNC: 7.4 G/DL — SIGNIFICANT CHANGE UP (ref 6–8.3)
RBC # BLD: 4.36 M/UL — SIGNIFICANT CHANGE UP (ref 4.2–5.8)
RBC # FLD: 12.7 % — SIGNIFICANT CHANGE UP (ref 10.3–14.5)
SODIUM SERPL-SCNC: 139 MMOL/L — SIGNIFICANT CHANGE UP (ref 135–145)
WBC # BLD: 4.97 K/UL — SIGNIFICANT CHANGE UP (ref 3.8–10.5)
WBC # FLD AUTO: 4.97 K/UL — SIGNIFICANT CHANGE UP (ref 3.8–10.5)

## 2019-09-17 PROCEDURE — 71045 X-RAY EXAM CHEST 1 VIEW: CPT | Mod: 26

## 2019-09-17 PROCEDURE — 74150 CT ABDOMEN W/O CONTRAST: CPT | Mod: 26

## 2019-09-17 PROCEDURE — 93621 COMP EP EVL L PAC&REC C SINS: CPT | Mod: 26

## 2019-09-17 PROCEDURE — 93654 COMPRE EP EVAL TX VT: CPT

## 2019-09-17 PROCEDURE — 93010 ELECTROCARDIOGRAM REPORT: CPT

## 2019-09-17 PROCEDURE — 99232 SBSQ HOSP IP/OBS MODERATE 35: CPT

## 2019-09-17 PROCEDURE — 71250 CT THORAX DX C-: CPT | Mod: 26

## 2019-09-17 PROCEDURE — 93623 PRGRMD STIMJ&PACG IV RX NFS: CPT | Mod: 26

## 2019-09-17 RX ORDER — APIXABAN 2.5 MG/1
5 TABLET, FILM COATED ORAL EVERY 12 HOURS
Refills: 0 | Status: DISCONTINUED | OUTPATIENT
Start: 2019-09-17 | End: 2019-09-18

## 2019-09-17 RX ADMIN — AMLODIPINE BESYLATE 10 MILLIGRAM(S): 2.5 TABLET ORAL at 05:24

## 2019-09-17 RX ADMIN — Medication 50 MILLIGRAM(S): at 05:24

## 2019-09-17 RX ADMIN — APIXABAN 5 MILLIGRAM(S): 2.5 TABLET, FILM COATED ORAL at 22:15

## 2019-09-17 RX ADMIN — ATORVASTATIN CALCIUM 20 MILLIGRAM(S): 80 TABLET, FILM COATED ORAL at 21:27

## 2019-09-17 RX ADMIN — Medication 50 MILLIGRAM(S): at 18:10

## 2019-09-17 NOTE — PROGRESS NOTE ADULT - PROBLEM SELECTOR PLAN 6
Patient has a history of HTN. BP here at one point noted to be 182/89.   - c/w Toprol XL 50mg BID  - c/w amlodipine 10mg QD  - consider switching amlodipine to Entresto after EP study
Patient has a history of HTN. BP here at one point noted to be 182/89.   - c/w Toprol XL 50mg BID  - c/w amlodipine 10mg QD  - consider switching amlodipine to Entresto after EP study

## 2019-09-17 NOTE — PROGRESS NOTE ADULT - PROBLEM SELECTOR PLAN 8
DVT ppx: on heparin gtt  GI ppx: none     Dispo: 5uris  Code: FULL
DVT ppx: on heparin gtt  GI ppx: none     Dispo: 5uris  Code: FULL

## 2019-09-17 NOTE — PROGRESS NOTE ADULT - PROBLEM SELECTOR PLAN 2
Pt with a hx of CAD, s/p cardiac cath in 2017 which showed severe diffuse hypokinesis, systolic dysfunction with EF 30%, mild IL and TR. EKG done at [facility] concerning for ischemic changes, with Troponin 0.03. Patient transferred to [facility] for possible ischemic work-up  - troponin negative x3  - c/w atorvastatin 20mg QD  - s/p cardiac cath on 9/15 which showed mild, non-obstructive CAD  - ECHO (9/16/19): Mild global hypokinesis of the left ventricle with an estimated left   ventricular ejection fraction of 40-45%.  - c/w Toprol XL 50mg BID  - elevated total cholesterol and LDL  - TSH and HbA1c WNL

## 2019-09-17 NOTE — PROGRESS NOTE ADULT - PROBLEM SELECTOR PLAN 7
F: none  E: replete PRN  N: DASH/TLC; NPO after mn for EP study
F: none  E: replete PRN  N: DASH/TLC; NPO after mn for EP study

## 2019-09-17 NOTE — PROGRESS NOTE ADULT - PROBLEM SELECTOR PLAN 1
likely in the setting of Vfib episode.  Patient presented to [facility] after an episode of CP leading to fall/syncope. ICD device was interrogated and during interrogation records varied, but there is one mention of pt experiencing episode of Vfib. Device again interrogated at [facility] and patient noted to have PVC induced Vfib. Patient routinely follows with Dr. Jeff.   - cath on 9/15 showed mild, non-obstructive CAD  - ECHO (9/16/19): EF 40-45% with global hypokinesis of the LV  - pending EP study on 9/17 with possible PVC ablation   - f/u official report from EP study    - holding all anti-arrhythmics pending study  - c/w Toprol XL 50mg BID

## 2019-09-17 NOTE — PROGRESS NOTE ADULT - ASSESSMENT
74M PMH of HTN, HLD, AVR and MV repair, s/p PPM/AICD (2014), pAfib (on Eliquis), thoracic aortic aneurysm, who presented to [facility] after an episode of chest tightness leading to fall/syncope, transferred to [facility] for cath (found mild, non-obstructive CAD) and continuity of care (follows with Dr. Chandler and Dr. Pinon). Now awaiting EP study and possible ablation scheduled for 9/17

## 2019-09-17 NOTE — PROGRESS NOTE ADULT - PROBLEM SELECTOR PLAN 9
1) PCP Contacted on Admission: (Y/N) --> Name & Phone #: Dr. Chandler, aware and following patient since admission  2) Date of Contact with PCP:  3) PCP Contacted at Discharge: (Y/N)  4) Summary of Handoff Given to PCP:   5) Post-Discharge Appointment Date and Location:
1) PCP Contacted on Admission: (Y/N) --> Name & Phone #: Dr. Chandler, aware and following patient since admission  2) Date of Contact with PCP:  3) PCP Contacted at Discharge: (Y/N)  4) Summary of Handoff Given to PCP:   5) Post-Discharge Appointment Date and Location:

## 2019-09-17 NOTE — PROGRESS NOTE ADULT - SUBJECTIVE AND OBJECTIVE BOX
INTERVAL HPI/OVERNIGHT EVENTS: MAHAD    SUBJECTIVE: Patient seen and examined at bedside. No complaints overnight, NPO for ablation today. No chest pain, SOB, abdominal pain, changes in mental status    OBJECTIVE:    VITAL SIGNS:  ICU Vital Signs Last 24 Hrs  T(C): 36.6 (17 Sep 2019 09:00), Max: 36.7 (16 Sep 2019 13:22)  T(F): 97.9 (17 Sep 2019 09:00), Max: 98.1 (16 Sep 2019 13:22)  HR: 68 (17 Sep 2019 05:23) (62 - 68)  BP: 119/85 (17 Sep 2019 05:23) (110/71 - 139/90)  BP(mean): 99 (16 Sep 2019 15:00) (88 - 106)  ABP: --  ABP(mean): --  RR: 16 (17 Sep 2019 05:23) (16 - 21)  SpO2: 99% (17 Sep 2019 05:23) (96% - 100%)        09-16 @ 07:01  -  09-17 @ 07:00  --------------------------------------------------------  IN: 746 mL / OUT: 2115 mL / NET: -1369 mL    09-17 @ 07:01  -  09-17 @ 10:24  --------------------------------------------------------  IN: 0 mL / OUT: 0 mL / NET: 0 mL      CAPILLARY BLOOD GLUCOSE          PHYSICAL EXAM:    General: in NAD, sitting up comfortably in chair   HEENT: normocephalic, atraumatic; PERRL, anicteric sclera; MMM  Neck: supple, no JVD, no thyromegaly, no lymphadenopathy  Cardiovascular: +S1/S2, RRR, no M/G/R  Respiratory: clear to auscultation B/L; no wheezing, no rales, no rhonchi  Gastrointestinal: soft, NT/ND; +BSx4, no organomegaly  Extremities: WWP; no edema, clubbing or cyanosis  Vascular: 2+ radial, DP/PT pulses B/L  Neurological: AAOx3; no focal deficits     MEDICATIONS:  MEDICATIONS  (STANDING):  amLODIPine   Tablet 10 milliGRAM(s) Oral daily  atorvastatin 20 milliGRAM(s) Oral at bedtime  chlorhexidine 2% Cloths 1 Application(s) Topical daily  heparin  Infusion 900 Unit(s)/Hr (9 mL/Hr) IV Continuous <Continuous>  influenza   Vaccine 0.5 milliLiter(s) IntraMuscular once  metoprolol succinate ER 50 milliGRAM(s) Oral every 12 hours    MEDICATIONS  (PRN):      ALLERGIES:  Allergies    No Known Allergies    Intolerances        LABS:                        14.0   4.97  )-----------( 214      ( 17 Sep 2019 05:55 )             42.8     09-17    139  |  104  |  17  ----------------------------<  106<H>  5.2   |  29  |  1.23    Ca    9.2      17 Sep 2019 05:55  Phos  3.3     09-17  Mg     1.9     09-17    TPro  7.4  /  Alb  3.6  /  TBili  0.6  /  DBili  x   /  AST  14  /  ALT  9<L>  /  AlkPhos  41  09-17    LIVER FUNCTIONS - ( 17 Sep 2019 05:55 )  Alb: 3.6 g/dL / Pro: 7.4 g/dL / ALK PHOS: 41 U/L / ALT: 9 U/L / AST: 14 U/L / GGT: x           PT/INR - ( 16 Sep 2019 05:38 )   PT: 12.6 sec;   INR: 1.11          PTT - ( 17 Sep 2019 05:55 )  PTT:94.2 sec          RADIOLOGY & ADDITIONAL TESTS: Reviewed.

## 2019-09-18 ENCOUNTER — TRANSCRIPTION ENCOUNTER (OUTPATIENT)
Age: 74
End: 2019-09-18

## 2019-09-18 VITALS — TEMPERATURE: 98 F

## 2019-09-18 DIAGNOSIS — I50.22 CHRONIC SYSTOLIC (CONGESTIVE) HEART FAILURE: ICD-10-CM

## 2019-09-18 DIAGNOSIS — Z95.2 PRESENCE OF PROSTHETIC HEART VALVE: ICD-10-CM

## 2019-09-18 DIAGNOSIS — I49.9 CARDIAC ARRHYTHMIA, UNSPECIFIED: ICD-10-CM

## 2019-09-18 DIAGNOSIS — I10 ESSENTIAL (PRIMARY) HYPERTENSION: ICD-10-CM

## 2019-09-18 DIAGNOSIS — I48.91 UNSPECIFIED ATRIAL FIBRILLATION: ICD-10-CM

## 2019-09-18 PROBLEM — Z95.810 PRESENCE OF AUTOMATIC (IMPLANTABLE) CARDIAC DEFIBRILLATOR: Chronic | Status: ACTIVE | Noted: 2019-09-14

## 2019-09-18 PROBLEM — Z98.890 OTHER SPECIFIED POSTPROCEDURAL STATES: Chronic | Status: ACTIVE | Noted: 2019-09-14

## 2019-09-18 PROBLEM — E78.5 HYPERLIPIDEMIA, UNSPECIFIED: Chronic | Status: ACTIVE | Noted: 2019-09-14

## 2019-09-18 LAB
ANION GAP SERPL CALC-SCNC: 8 MMOL/L — SIGNIFICANT CHANGE UP (ref 5–17)
BASOPHILS # BLD AUTO: 0.03 K/UL — SIGNIFICANT CHANGE UP (ref 0–0.2)
BASOPHILS NFR BLD AUTO: 0.5 % — SIGNIFICANT CHANGE UP (ref 0–2)
BUN SERPL-MCNC: 17 MG/DL — SIGNIFICANT CHANGE UP (ref 7–23)
CALCIUM SERPL-MCNC: 9.1 MG/DL — SIGNIFICANT CHANGE UP (ref 8.4–10.5)
CHLORIDE SERPL-SCNC: 104 MMOL/L — SIGNIFICANT CHANGE UP (ref 96–108)
CO2 SERPL-SCNC: 27 MMOL/L — SIGNIFICANT CHANGE UP (ref 22–31)
CREAT SERPL-MCNC: 1.29 MG/DL — SIGNIFICANT CHANGE UP (ref 0.5–1.3)
EOSINOPHIL # BLD AUTO: 0.06 K/UL — SIGNIFICANT CHANGE UP (ref 0–0.5)
EOSINOPHIL NFR BLD AUTO: 1 % — SIGNIFICANT CHANGE UP (ref 0–6)
GLUCOSE SERPL-MCNC: 110 MG/DL — HIGH (ref 70–99)
HCT VFR BLD CALC: 42.8 % — SIGNIFICANT CHANGE UP (ref 39–50)
HGB BLD-MCNC: 13.7 G/DL — SIGNIFICANT CHANGE UP (ref 13–17)
IMM GRANULOCYTES NFR BLD AUTO: 0.2 % — SIGNIFICANT CHANGE UP (ref 0–1.5)
LYMPHOCYTES # BLD AUTO: 2.35 K/UL — SIGNIFICANT CHANGE UP (ref 1–3.3)
LYMPHOCYTES # BLD AUTO: 40.7 % — SIGNIFICANT CHANGE UP (ref 13–44)
MAGNESIUM SERPL-MCNC: 2 MG/DL — SIGNIFICANT CHANGE UP (ref 1.6–2.6)
MCHC RBC-ENTMCNC: 31.9 PG — SIGNIFICANT CHANGE UP (ref 27–34)
MCHC RBC-ENTMCNC: 32 GM/DL — SIGNIFICANT CHANGE UP (ref 32–36)
MCV RBC AUTO: 99.5 FL — SIGNIFICANT CHANGE UP (ref 80–100)
MONOCYTES # BLD AUTO: 0.73 K/UL — SIGNIFICANT CHANGE UP (ref 0–0.9)
MONOCYTES NFR BLD AUTO: 12.6 % — SIGNIFICANT CHANGE UP (ref 2–14)
NEUTROPHILS # BLD AUTO: 2.6 K/UL — SIGNIFICANT CHANGE UP (ref 1.8–7.4)
NEUTROPHILS NFR BLD AUTO: 45 % — SIGNIFICANT CHANGE UP (ref 43–77)
NRBC # BLD: 0 /100 WBCS — SIGNIFICANT CHANGE UP (ref 0–0)
PHOSPHATE SERPL-MCNC: 3.7 MG/DL — SIGNIFICANT CHANGE UP (ref 2.5–4.5)
PLATELET # BLD AUTO: 197 K/UL — SIGNIFICANT CHANGE UP (ref 150–400)
POTASSIUM SERPL-MCNC: 5.6 MMOL/L — HIGH (ref 3.5–5.3)
POTASSIUM SERPL-SCNC: 5.6 MMOL/L — HIGH (ref 3.5–5.3)
RBC # BLD: 4.3 M/UL — SIGNIFICANT CHANGE UP (ref 4.2–5.8)
RBC # FLD: 12.9 % — SIGNIFICANT CHANGE UP (ref 10.3–14.5)
SODIUM SERPL-SCNC: 139 MMOL/L — SIGNIFICANT CHANGE UP (ref 135–145)
WBC # BLD: 5.78 K/UL — SIGNIFICANT CHANGE UP (ref 3.8–10.5)
WBC # FLD AUTO: 5.78 K/UL — SIGNIFICANT CHANGE UP (ref 3.8–10.5)

## 2019-09-18 PROCEDURE — 99239 HOSP IP/OBS DSCHRG MGMT >30: CPT

## 2019-09-18 RX ORDER — APIXABAN 2.5 MG/1
5 TABLET, FILM COATED ORAL
Qty: 0 | Refills: 0 | DISCHARGE

## 2019-09-18 RX ORDER — MEXILETINE HYDROCHLORIDE 150 MG/1
200 CAPSULE ORAL EVERY 12 HOURS
Refills: 0 | Status: DISCONTINUED | OUTPATIENT
Start: 2019-09-18 | End: 2019-09-18

## 2019-09-18 RX ORDER — METOPROLOL TARTRATE 50 MG
1 TABLET ORAL
Qty: 60 | Refills: 0
Start: 2019-09-18 | End: 2019-10-17

## 2019-09-18 RX ORDER — MEXILETINE HYDROCHLORIDE 150 MG/1
1 CAPSULE ORAL
Qty: 60 | Refills: 1
Start: 2019-09-18 | End: 2019-11-16

## 2019-09-18 RX ORDER — TADALAFIL 10 MG/1
1 TABLET, FILM COATED ORAL
Qty: 0 | Refills: 0 | DISCHARGE

## 2019-09-18 RX ORDER — METOPROLOL TARTRATE 50 MG
1 TABLET ORAL
Qty: 0 | Refills: 0 | DISCHARGE

## 2019-09-18 RX ORDER — APIXABAN 2.5 MG/1
1 TABLET, FILM COATED ORAL
Qty: 0 | Refills: 0 | DISCHARGE
Start: 2019-09-18

## 2019-09-18 RX ADMIN — APIXABAN 5 MILLIGRAM(S): 2.5 TABLET, FILM COATED ORAL at 10:10

## 2019-09-18 RX ADMIN — INFLUENZA VIRUS VACCINE 0.5 MILLILITER(S): 15; 15; 15; 15 SUSPENSION INTRAMUSCULAR at 10:10

## 2019-09-18 RX ADMIN — AMLODIPINE BESYLATE 10 MILLIGRAM(S): 2.5 TABLET ORAL at 05:23

## 2019-09-18 RX ADMIN — Medication 50 MILLIGRAM(S): at 07:55

## 2019-09-18 NOTE — DISCHARGE NOTE PROVIDER - NSDCCPCAREPLAN_GEN_ALL_CORE_FT
PRINCIPAL DISCHARGE DIAGNOSIS  Diagnosis: Syncope  Assessment and Plan of Treatment: You presented with after an episode of CP leading to fall/syncope. ICD device was interrogated with evidence of PVC which may have lead to episode of Vfib/Vtach. Cardiac cath completed PRINCIPAL DISCHARGE DIAGNOSIS  Diagnosis: Syncope  Assessment and Plan of Treatment: You presented with after an episode of CP leading to fall/syncope. ICD device was interrogated with evidence of PVC which may have lead to episode of Vfib/Vtach. Cardiac cath completed showing non obstructive CAD. You were evaluated by EP and completed PVC of ablation on 9/17. Please continue toprol 50mg twice daily and follow up with EP Dr. Maciel.      SECONDARY DISCHARGE DIAGNOSES  Diagnosis: Systolic CHF, chronic  Assessment and Plan of Treatment: PRINCIPAL DISCHARGE DIAGNOSIS  Diagnosis: Syncope  Assessment and Plan of Treatment: You presented with after an episode of CP leading to fall/syncope. ICD device was interrogated with evidence of PVC which may have lead to episode of Vfib/Vtach. Cardiac cath completed showing non obstructive CAD. You were evaluated by EP and completed PVC of ablation on 9/17. Please continue toprol 50mg twice daily and follow up with EP Dr. Maciel.      SECONDARY DISCHARGE DIAGNOSES  Diagnosis: Systolic CHF, chronic  Assessment and Plan of Treatment: You have a history of CHF. Echo completed here shows global hypokinesis and EF 40-45%. Cardiac cath completed showing non obstructive CAD. Please follow up with your Cardiologist Dr. Chandler.  You underwent a coronary/peripheral angiogram and should wait 3 days before returning to ordinary activities. Do not drive for 2 days. Do not lift more than 5 pounds with affected arm for 3 days or more than 10 pounds if groin access for 5 days.  The catheter from your groin/wrist was removed and bleeding was stopped with manual pressure or an arterial closure device (Angioseal/Perclose/Vascade) in the groin. After 24hours you may take off the dressing and shower. Wash the site with soap and water.  There is no need to put on another bandage.  Avoid tub baths, hot tubs or swimming for 5 days to prevent infection.  If you notice Bleeding or hematoma formation (collection of blood under the skin), drainage or redness at the puncture site, acute pain, numbness, decrease in strength, coolness or pale coloration of skin of the leg or hand, please call [phone]. Consult your doctor before returning to vigorous activity. PRINCIPAL DISCHARGE DIAGNOSIS  Diagnosis: Syncope  Assessment and Plan of Treatment: You presented with after an episode of CP leading to fall/syncope. ICD device was interrogated with evidence of PVC which may have lead to episode of Vfib/Vtach. Cardiac cath completed showing non obstructive CAD. You were evaluated by EP and completed PVC of ablation on 9/17. Please continue toprol 50mg twice daily and follow up with EP Dr. Maciel Oct 23rd 1:45pm.      SECONDARY DISCHARGE DIAGNOSES  Diagnosis: Systolic CHF, chronic  Assessment and Plan of Treatment: You have a history of CHF. Echo completed here shows global hypokinesis and EF 40-45%. Cardiac cath completed showing non obstructive CAD. Please follow up with your Cardiologist Dr. Chandler.  You underwent a coronary/peripheral angiogram and should wait 3 days before returning to ordinary activities. Do not drive for 2 days. Do not lift more than 5 pounds with affected arm for 3 days or more than 10 pounds if groin access for 5 days.  The catheter from your groin/wrist was removed and bleeding was stopped with manual pressure or an arterial closure device (Angioseal/Perclose/Vascade) in the groin. After 24hours you may take off the dressing and shower. Wash the site with soap and water.  There is no need to put on another bandage.  Avoid tub baths, hot tubs or swimming for 5 days to prevent infection.  If you notice Bleeding or hematoma formation (collection of blood under the skin), drainage or redness at the puncture site, acute pain, numbness, decrease in strength, coolness or pale coloration of skin of the leg or hand, please call [phone]. Consult your doctor before returning to vigorous activity.

## 2019-09-18 NOTE — PROGRESS NOTE ADULT - PROBLEM SELECTOR PLAN 4
Patient has a history of HFrEF. Echo at [facility] showed EF of 30%.   - ECHO: EF 40-45% with global hypokinesis of the LV  - c/w Toprol XL 50mg BID  - consider switching amlodipine to Entresto 24/26 BID after EP study
stable
Patient has a history of HFrEF. Echo at [facility] showed EF of 30%.   - ECHO: EF 40-45% with global hypokinesis of the LV  - c/w Toprol XL 50mg BID  - consider switching amlodipine to Entresto 24/26 BID after EP study

## 2019-09-18 NOTE — DISCHARGE NOTE PROVIDER - HOSPITAL COURSE
74M with PMH of HTN, HLD, AVR and MV repair, s/p PPM/AICD (2014), pAfib (on Eliquis), thoracic aortic aneurysm, who presented to [facility] after an episode of chest tightness leading to fall/syncope. At [facility], patient's ICD was interrogated, and he was noted to have 10 episodes of Vtach, 1 episode of Vfib, with no shocks delivered. Patient was transferred to [facility] for continuity of care (follows with Dr. Chandler and Dr. Pinon) and for a cath to rule-out ischemic cardiomyopathy. Patient's device was interrogated again in the [facility] ED and PVCs/afib were observed no instances of Vfib. Patient was started on Metoprolol 50 mg BID, amlodipine 10 mg daily, atorvastatin 20 mg daily, and heparin drip. Patient was loaded with aspirin and Plavix. Cath yesterday (radial access) showed only mild non-obstructive CAD, and troponins were negative x2. Patient has occasionally had lower BPs, in which case he has still been given his Metoprolol. He came in with an EYAL and elevated creatinine that has since resolved. Patient is now medically optimized for transfer to a cardiac telemetry floor. EP study completed 9/17 now s/p PVC ablation.         You underwent a coronary/peripheral angiogram and should wait 3 days before returning to ordinary activities. Do not drive for 2 days. Do not lift more than 5 pounds with affected arm for 3 days or more than 10 pounds if groin access for 5 days.  The catheter from your groin/wrist was removed and bleeding was stopped with manual pressure or an arterial closure device (Angioseal/Perclose/Vascade) in the groin. After 24hours you may take off the dressing and shower. Wash the site with soap and water.  There is no need to put on another bandage.  Avoid tub baths, hot tubs or swimming for 5 days to prevent infection.    If you notice Bleeding or hematoma formation (collection of blood under the skin), drainage or redness at the puncture site, acute pain, numbness, decrease in strength, coolness or pale coloration of skin of the leg or hand, please call [phone]. Consult your doctor before returning to vigorous activity. 74M with PMH of HTN, HLD, AVR and MV repair, s/p PPM/AICD (2014), pAfib (on Eliquis), thoracic aortic aneurysm, who presented to [facility] after an episode of chest tightness leading to fall/syncope. At [facility], patient's ICD was interrogated, and he was noted to have 10 episodes of Vtach, 1 episode of Vfib, with no shocks delivered. Patient was transferred to [facility] for continuity of care (follows with Dr. Chandler and Dr. Pinon) and for a cath to rule-out ischemic cardiomyopathy. Patient's device was interrogated again in the [facility] ED and PVCs/afib were observed no instances of Vfib. Patient was started on Metoprolol 50 mg BID, amlodipine 10 mg daily, atorvastatin 20 mg daily, and heparin drip. Patient was loaded with aspirin and Plavix. Cath yesterday (radial access) showed only mild non-obstructive CAD, and troponins were negative x2. Patient has occasionally had lower BPs, in which case he has still been given his Metoprolol. He came in with an EYAL and elevated creatinine that has since resolved. Patient is now medically optimized for transfer to a cardiac telemetry floor. EP study completed 9/17 now s/p PVC ablation. telemetry review showed occasional PVCs, but patient is asymptomatic . Groin check without bleeding, hematoma, swelling. Pt started on  Mexiletine 200 mg BID for PVC suppression.         You underwent a coronary/peripheral angiogram and should wait 3 days before returning to ordinary activities. Do not drive for 2 days. Do not lift more than 5 pounds with affected arm for 3 days or more than 10 pounds if groin access for 5 days.  The catheter from your groin/wrist was removed and bleeding was stopped with manual pressure or an arterial closure device (Angioseal/Perclose/Vascade) in the groin. After 24hours you may take off the dressing and shower. Wash the site with soap and water.  There is no need to put on another bandage.  Avoid tub baths, hot tubs or swimming for 5 days to prevent infection.    If you notice Bleeding or hematoma formation (collection of blood under the skin), drainage or redness at the puncture site, acute pain, numbness, decrease in strength, coolness or pale coloration of skin of the leg or hand, please call [phone]. Consult your doctor before returning to vigorous activity.

## 2019-09-18 NOTE — DISCHARGE NOTE NURSING/CASE MANAGEMENT/SOCIAL WORK - PATIENT PORTAL LINK FT
You can access the FollowMyHealth Patient Portal offered by [facility] by registering at the following website: [email or web address]. By joining [employer]’s FollowMyHealth portal, you will also be able to view your health information using other applications (apps) compatible with our system.

## 2019-09-18 NOTE — PROGRESS NOTE ADULT - SUBJECTIVE AND OBJECTIVE BOX
INTERVAL HISTORY:  s/p EP procedure but no note documenting it  	  MEDICATIONS:  amLODIPine   Tablet 10 milliGRAM(s) Oral daily  metoprolol succinate ER 50 milliGRAM(s) Oral every 12 hours  atorvastatin 20 milliGRAM(s) Oral at bedtime  apixaban 5 milliGRAM(s) Oral every 12 hours  chlorhexidine 2% Cloths 1 Application(s) Topical daily  influenza   Vaccine 0.5 milliLiter(s) IntraMuscular once        PHYSICAL EXAM:  T(C): 36.8 (09-17-19 @ 21:47), Max: 36.8 (09-17-19 @ 21:47)  HR: 65 (09-18-19 @ 07:54) (55 - 69)  BP: 114/78 (09-18-19 @ 07:54) (100/63 - 119/74)  RR: 16 (09-18-19 @ 05:22) (16 - 20)  SpO2: 100% (09-18-19 @ 05:22) (96% - 100%)  Wt(kg): --  I&O's Summary    17 Sep 2019 07:01  -  18 Sep 2019 07:00  --------------------------------------------------------  IN: 500 mL / OUT: 2925 mL / NET: -2425 mL          Appearance: Normal	  HEENT:   Normal oral mucosa, PERRL, EOMI	  Lymphatic: No lymphadenopathy  Cardiovascular: Normal S1 S2, No JVD, BENNY, No edema  Respiratory: Lungs clear to auscultation	  Psychiatry: A & O x 3, Mood & affect appropriate  Gastrointestinal:  Soft, Non-tender, + BS	  Skin: No rashes, No ecchymoses, No cyanosis  Neurologic: Non-focal  Extremities: Normal range of motion, No clubbing, cyanosis or edema  Vascular: Peripheral pulses palpable 2+ bilaterally    TELEMETRY: 	    ECG:  	  RADIOLOGY:   DIAGNOSTIC TESTING:  [ ] Echocardiogram:  [ ]  Catheterization:  [ ] Stress Test:    OTHER: 	    LABS:	 	    CARDIAC MARKERS:                                  13.7   5.78  )-----------( 197      ( 18 Sep 2019 07:10 )             42.8     09-18    139  |  104  |  17  ----------------------------<  110<H>  5.6<H>   |  27  |  1.29    Ca    9.1      18 Sep 2019 07:10  Phos  3.7     09-18  Mg     2.0     09-18    TPro  7.4  /  Alb  3.6  /  TBili  0.6  /  DBili  x   /  AST  14  /  ALT  9<L>  /  AlkPhos  41  09-17    proBNP:   Lipid Profile:   HgA1c:   TSH:     ASSESSMENT/PLAN:

## 2019-09-18 NOTE — DISCHARGE NOTE PROVIDER - CARE PROVIDER_API CALL
Andrey Jeff)  Cardiac Electrophysiology; Cardiology; Cardiovascular Disease  [address], [address] [place] [address]  Phone: [phone]  Fax: [phone]  Follow Up Time:     Luciano Chandler)  Cardiovascular Disease; Internal Medicine  Cardiology [facility], [address] [place] [address]  Phone: [phone]  Fax: [phone]  Follow Up Time:

## 2019-09-18 NOTE — PROGRESS NOTE ADULT - PROBLEM SELECTOR PLAN 3
Patient has a history of Afib (on Eliquis at home), now on Heparin gtt pending EP study w/ possible ablation  - c/w Heparin gtt   - c/w Toprol XL 50mg BID  - f/u PTTs
main meticulous IV care
Patient has a history of Afib (on Eliquis at home), now on Heparin gtt pending EP study w/ possible ablation  - c/w Heparin gtt   - c/w Toprol XL 50mg BID  - f/u PTTs

## 2019-09-18 NOTE — PROGRESS NOTE ADULT - SUBJECTIVE AND OBJECTIVE BOX
EPS Progress Note    S: OOB , NAD, no complains, no chest pain, no groin pain     MEDICATIONS  (STANDING):  amLODIPine   Tablet 10 milliGRAM(s) Oral daily  apixaban 5 milliGRAM(s) Oral every 12 hours  atorvastatin 20 milliGRAM(s) Oral at bedtime  chlorhexidine 2% Cloths 1 Application(s) Topical daily  metoprolol succinate ER 50 milliGRAM(s) Oral every 12 hours      Telemetry: sinus rhyhm, PVCs,            General:  NAD        HEENT:   PERRL, EOMI	  Neck: Supple, - JVD  Cardiovascular: S1 S2, No JVD  Respiratory: CTA B/L        Gastrointestinal:  Soft, Non-tender, + BS	  Skin: No rashes, No ecchymoses, No cyanosis  Extremities: No edema, R groin soft, non tender, no bleeding, no swelling, no hematoma  Vascular: Peripheral pulses palpable 2+ bilaterally  Psychiatry: A & O x 3	         Labs:                                                               13.7   5.78  )-----------( 197      ( 18 Sep 2019 07:10 )             42.8     09-18    139  |  104  |  17  ----------------------------<  110<H>  5.6<H>   |  27  |  1.29    Ca    9.1      18 Sep 2019 07:10  Phos  3.7     09-18  Mg     2.0     09-18    TPro  7.4  /  Alb  3.6  /  TBili  0.6  /  DBili  x   /  AST  14  /  ALT  9<L>  /  AlkPhos  41  09-17    PTT - ( 17 Sep 2019 05:55 )  PTT:94.2 sec    Assessment/Plan:  73 year old male with HTN, HLD, aortic aneurysm thoracic, AVR and mitral valve repair in 2014, paroxysmal atrial fibrillation, PVCs and ICD, who presented to the ER with a ?syncopal episode found to have PVC induced Vfib.    We discussed also addressing his atrial fibrillation.  Cath with no significant CAD.  Patient had EPS and PVC ablation on 9/17/19, telemetry review showed occasional PVCs, but patient is asymptomatic . Groin check without bleeding, hematoma, swelling. Please start Mexiletine 200 mg BID for PVC suppression.   Patient to follow up with Dr. Jeff 10/23/19 @ 1:45 pm

## 2019-09-18 NOTE — DISCHARGE NOTE PROVIDER - NSDCFUSCHEDAPPT_GEN_ALL_CORE_FT
ANNAMARIA LUKE ; 10/23/2019 ; [facility] [address]  ANNAMARIA LUKE ; 11/06/2019 ; [facility] [address] ANNAMARIA LKUE ; 10/23/2019 ; [facility] [address]  ANNAMARIA LUKE ; 11/06/2019 ; [facility] [address]

## 2019-09-18 NOTE — PROGRESS NOTE ADULT - PROBLEM SELECTOR PLAN 5
Resolving. Patient noted to have an BUN/Cr 25/1.1 at [facility]. . Baseline creatinine unknown   - Cr at [facility]: 1.26 -->1.20 -->1.51-->1.22  - monitor BMP  - avoid nephrotoxic medications
stable
Resolving. Patient noted to have an BUN/Cr 25/1.1 at [facility]. . Baseline creatinine unknown   - Cr at [facility]: 1.26 -->1.20 -->1.51-->1.22  - monitor BMP  - avoid nephrotoxic medications

## 2019-09-23 ENCOUNTER — APPOINTMENT (OUTPATIENT)
Dept: HEART AND VASCULAR | Facility: CLINIC | Age: 74
End: 2019-09-23
Payer: MEDICARE

## 2019-09-23 VITALS
TEMPERATURE: 97.7 F | HEIGHT: 70 IN | OXYGEN SATURATION: 98 % | HEART RATE: 53 BPM | WEIGHT: 173.99 LBS | SYSTOLIC BLOOD PRESSURE: 130 MMHG | BODY MASS INDEX: 24.91 KG/M2 | DIASTOLIC BLOOD PRESSURE: 90 MMHG

## 2019-09-23 PROCEDURE — 99214 OFFICE O/P EST MOD 30 MIN: CPT

## 2019-09-23 PROCEDURE — 93000 ELECTROCARDIOGRAM COMPLETE: CPT

## 2019-09-23 RX ORDER — CYCLOSPORINE 0.5 MG/ML
0.05 EMULSION OPHTHALMIC
Qty: 60 | Refills: 0 | Status: DISCONTINUED | COMMUNITY
Start: 2017-08-03 | End: 2019-09-23

## 2019-09-23 RX ORDER — GABAPENTIN 100 MG/1
100 CAPSULE ORAL
Qty: 30 | Refills: 5 | Status: DISCONTINUED | COMMUNITY
End: 2019-09-23

## 2019-09-23 NOTE — ASSESSMENT
[FreeTextEntry1] : Valve disease: clean coronaries on cardiac cath in 2014. status post AVR, MV repair Dr. Caio Louise 9/9/2014) SBE prophylaxis. okay to stop ASA week before dental extraction and implant. EKG with 1 PVC. Echo done 7/2018.  Valves OK.\par \par PAF- changed from ASA to Eliquis by Dr ARIZMENDI.  No signs of trauma or bleeding on exam. Pt reassured that the Eliquis is not causing the pain\par \par VT- has non-sustained, i spoke to Dr Palencia who does not want him to run the [facility] [place]. There is  a VT and syncope/sudden death risk, also has a thoracic aneurysm.  I discussed this with him and his wife, walking the Marathon is OK but no jogging.  He walked it and did well. Now seeing Dr Jeff.  Had syncope and adm to [facility], trans to [facility].  VT RFA, Sotolol changed to Mexitil Sept 2019.  CAN RETURN TO WORK NEXT MONDAY\par \par Aortic aneurysm: Enlarged. 42 (ascending) 47 (descending) in 2014\par Followup in 2015 reveals a max Ao of 46 mm\par CT angiogram 3/6/2017 aneurysm unchanged\par annual CT, due 3/2018.  Aorta 46mm April 2018, referred back to Dr Garcia, pt did not go as of  5/6/19\par \par HLD: on pravastatin 40mg, had muscle aches on 80mg,  8/2017, pt reported he was only taking it once in a while, now taking it daily will repeat lipid panel. Diet and exercise discussed in detail.   Feb 2018\par \par HTN: Have DC Lasix and changed to HCTZ weekly.  BP very good, Reduce Micardis  40 for persistent elevated K.  Increase HCTZ 25 to M & F. BP OK.  Micardis now at 20mg\par \par CKD: 1.33 8/8/2017, repeat BMP Sept 2017, Cr 1.05, previously normal, on ARB .  K better

## 2019-09-23 NOTE — HISTORY OF PRESENT ILLNESS
[FreeTextEntry1] : \par PCP- Dr Sandhya Bloom\par EP- Dr Jeff\par CTS- Dr Garcia\par Plastics- Dr Ko\par Hand- Dr Xiong\par GI- Dr Cortez, colonoscopy Sept 2017\par Dentist: Dr. Jorge Oh

## 2019-09-23 NOTE — REASON FOR VISIT
[FreeTextEntry1] : 72-year-old male with history of high cholesterol, aortic aneurysm thoracic, hypertension, lumbago, pre-diabetes, Valve disease (status post AVR, MVrepair Dr. Caio Louise 9/9/2014). S/P  colonoscopy with Dr. Maximilian Cortez 9/14/17. Doing well overall, Denies CP, SOB, palpitations, orthopnea, LE swelling, dizziness, syncope. Walking and running daily, sometimes >10 miles, training for [place] marathon in 2018 after skipping 2017. \par \par s/p dental extraction and implant, no official procedure date. Reports during dental cleaning he was told he bled a lot and dentist would prefer he stop aspirin for future dental extraction and implant. \par [place] for [place], race walked it without complications Nov 2018.\par \par EKG: NSR @ 46 with 1st degree AVB. LAHB, ST-Tw abnormalities. Blocked APC, V paced x 2 beats Pacer set at 40 1/22/19\par EKG: A Pacing, PVCs, 1st degree AVB, with a LAHB, possible IWMI, QTc 477 ST-Twave abnormalities.  5/6/19\par \par 3/18/19 A Fib discovered by Dr Jeff on 3/13/19, ASA changed to Eliquis\par 5/6/19 Pt with atypical pain in the axilla on the left, he thinks its the Eliquis.  No swelling or ecchymosis reported\par 7/15/19 K 5.5, Telmisartin reduced to 20mg. BP excellent.  c/o severe right shoulder pain.  Previously injected with relief.\par 9/23/19 Adm to  [facility]) with syncope and NSVT.  Trans to [facility].  VT ablation and Mexitil started.

## 2019-09-23 NOTE — PHYSICAL EXAM
[General Appearance - Well Developed] : well developed [Normal Appearance] : normal appearance [Well Groomed] : well groomed [General Appearance - Well Nourished] : well nourished [No Deformities] : no deformities [General Appearance - In No Acute Distress] : no acute distress [Normal Conjunctiva] : the conjunctiva exhibited no abnormalities [Eyelids - No Xanthelasma] : the eyelids demonstrated no xanthelasmas [Normal Oral Mucosa] : normal oral mucosa [No Oral Pallor] : no oral pallor [No Oral Cyanosis] : no oral cyanosis [Normal Jugular Venous A Waves Present] : normal jugular venous A waves present [No Jugular Venous Loco A Waves] : no jugular venous loco A waves [Normal Jugular Venous V Waves Present] : normal jugular venous V waves present [Respiration, Rhythm And Depth] : normal respiratory rhythm and effort [Auscultation Breath Sounds / Voice Sounds] : lungs were clear to auscultation bilaterally [Exaggerated Use Of Accessory Muscles For Inspiration] : no accessory muscle use [Heart Rate And Rhythm] : heart rate and rhythm were normal [Heart Sounds] : normal S1 and S2 [Arterial Pulses Normal] : the arterial pulses were normal [Edema] : no peripheral edema present [Abdomen Tenderness] : non-tender [Abdomen Soft] : soft [Abdomen Mass (___ Cm)] : no abdominal mass palpated [Abnormal Walk] : normal gait [Gait - Sufficient For Exercise Testing] : the gait was sufficient for exercise testing [Nail Clubbing] : no clubbing of the fingernails [Cyanosis, Localized] : no localized cyanosis [Petechial Hemorrhages (___cm)] : no petechial hemorrhages [] : no rash [Skin Color & Pigmentation] : normal skin color and pigmentation [Skin Lesions] : no skin lesions [No Venous Stasis] : no venous stasis [No Skin Ulcers] : no skin ulcer [No Xanthoma] : no  xanthoma was observed [Oriented To Time, Place, And Person] : oriented to person, place, and time [Affect] : the affect was normal [Mood] : the mood was normal [No Anxiety] : not feeling anxious [FreeTextEntry1] : +3/6 systolic murmur

## 2019-09-24 DIAGNOSIS — Z95.2 PRESENCE OF PROSTHETIC HEART VALVE: ICD-10-CM

## 2019-09-24 DIAGNOSIS — Z86.79 PERSONAL HISTORY OF OTHER DISEASES OF THE CIRCULATORY SYSTEM: ICD-10-CM

## 2019-09-24 DIAGNOSIS — N17.9 ACUTE KIDNEY FAILURE, UNSPECIFIED: ICD-10-CM

## 2019-09-24 DIAGNOSIS — Z95.810 PRESENCE OF AUTOMATIC (IMPLANTABLE) CARDIAC DEFIBRILLATOR: ICD-10-CM

## 2019-09-24 DIAGNOSIS — I50.22 CHRONIC SYSTOLIC (CONGESTIVE) HEART FAILURE: ICD-10-CM

## 2019-09-24 DIAGNOSIS — I11.0 HYPERTENSIVE HEART DISEASE WITH HEART FAILURE: ICD-10-CM

## 2019-09-24 DIAGNOSIS — I49.01 VENTRICULAR FIBRILLATION: ICD-10-CM

## 2019-09-24 DIAGNOSIS — I42.9 CARDIOMYOPATHY, UNSPECIFIED: ICD-10-CM

## 2019-09-24 DIAGNOSIS — I47.2 VENTRICULAR TACHYCARDIA: ICD-10-CM

## 2019-09-24 DIAGNOSIS — E78.5 HYPERLIPIDEMIA, UNSPECIFIED: ICD-10-CM

## 2019-09-24 DIAGNOSIS — I25.110 ATHEROSCLEROTIC HEART DISEASE OF NATIVE CORONARY ARTERY WITH UNSTABLE ANGINA PECTORIS: ICD-10-CM

## 2019-09-24 DIAGNOSIS — I48.0 PAROXYSMAL ATRIAL FIBRILLATION: ICD-10-CM

## 2019-10-01 ENCOUNTER — APPOINTMENT (OUTPATIENT)
Dept: INTERNAL MEDICINE | Facility: CLINIC | Age: 74
End: 2019-10-01
Payer: MEDICARE

## 2019-10-01 VITALS
OXYGEN SATURATION: 98 % | TEMPERATURE: 97.3 F | HEART RATE: 51 BPM | HEIGHT: 70 IN | WEIGHT: 174 LBS | BODY MASS INDEX: 24.91 KG/M2 | SYSTOLIC BLOOD PRESSURE: 125 MMHG | DIASTOLIC BLOOD PRESSURE: 82 MMHG

## 2019-10-01 PROCEDURE — 99495 TRANSJ CARE MGMT MOD F2F 14D: CPT

## 2019-10-01 NOTE — ASSESSMENT
[FreeTextEntry1] : 75 y/o man is here for post-discahrge f/u.\par Cardiac status stable.\par Has mild new tremor. Refer to Neurologist. Likely medication induced.\par

## 2019-10-01 NOTE — HISTORY OF PRESENT ILLNESS
[Post-hospitalization from ___ Hospital] : Post-hospitalization from [unfilled] Hospital [Admitted on: ___] : The patient was admitted on [unfilled] [Discharged on ___] : discharged on [unfilled] [Discharge Summary] : discharge summary [Pertinent Labs] : pertinent labs [Radiology Findings] : radiology findings [Discharge Med List] : discharge medication list [Med Reconciliation] : medication reconciliation has been completed [Patient Contacted By: ____] : and contacted by [unfilled] [FreeTextEntry2] : 73 y/o man went to ER at [facility] and PPM showed VT/Vfib. Transfered to [facility] because of his cardilogist (Dr Chandler). Had cath which showed non-obstructive disease and EP study with PVC ablation. Discharge 13 days ago.ago. Called this AM for a sick visit.\par Yesterday, patient felt "Cold and shaky". Only wore a tshirt to work. Came home and felt ok. Then, this AM felt shaky. He was trying to make tea and cup was shaking. Otherwise he feels fine. Denies any other deficits. \par Had EYAL while at [facility].\par

## 2019-10-01 NOTE — REVIEW OF SYSTEMS
[Fatigue] : fatigue [Negative] : Heme/Lymph [Headache] : no headache [Dizziness] : no dizziness [Fainting] : no fainting [Confusion] : no confusion [Unsteady Walk] : no ataxia [Memory Loss] : no memory loss [de-identified] : "shaky"

## 2019-10-01 NOTE — PHYSICAL EXAM
[No Respiratory Distress] : no respiratory distress  [Normal] : no jugular venous distention, supple, no lymphadenopathy and the thyroid was normal and there were no nodules present [Regular Rhythm] : with a regular rhythm [No Edema] : there was no peripheral edema [Normal Gait] : normal gait [Deep Tendon Reflexes (DTR)] : deep tendon reflexes were 2+ and symmetric [Normal Affect] : the affect was normal [Alert and Oriented x3] : oriented to person, place, and time [Normal Mood] : the mood was normal [de-identified] : bradycardic [de-identified] : very fine intentional tremor, left > right, no rigidity, FNF intact

## 2019-10-10 ENCOUNTER — APPOINTMENT (OUTPATIENT)
Dept: HEART AND VASCULAR | Facility: CLINIC | Age: 74
End: 2019-10-10
Payer: MEDICARE

## 2019-10-10 VITALS
BODY MASS INDEX: 25.34 KG/M2 | WEIGHT: 176.99 LBS | OXYGEN SATURATION: 98 % | HEART RATE: 59 BPM | HEIGHT: 70 IN | TEMPERATURE: 98.5 F | SYSTOLIC BLOOD PRESSURE: 130 MMHG | DIASTOLIC BLOOD PRESSURE: 80 MMHG

## 2019-10-10 PROCEDURE — 99214 OFFICE O/P EST MOD 30 MIN: CPT

## 2019-10-10 PROCEDURE — 93000 ELECTROCARDIOGRAM COMPLETE: CPT

## 2019-10-10 NOTE — PHYSICAL EXAM
[General Appearance - Well Developed] : well developed [Normal Appearance] : normal appearance [Well Groomed] : well groomed [General Appearance - Well Nourished] : well nourished [No Deformities] : no deformities [General Appearance - In No Acute Distress] : no acute distress [Normal Conjunctiva] : the conjunctiva exhibited no abnormalities [Eyelids - No Xanthelasma] : the eyelids demonstrated no xanthelasmas [Normal Oral Mucosa] : normal oral mucosa [Normal Jugular Venous A Waves Present] : normal jugular venous A waves present [No Oral Pallor] : no oral pallor [No Oral Cyanosis] : no oral cyanosis [No Jugular Venous Loco A Waves] : no jugular venous loco A waves [Normal Jugular Venous V Waves Present] : normal jugular venous V waves present [Respiration, Rhythm And Depth] : normal respiratory rhythm and effort [Exaggerated Use Of Accessory Muscles For Inspiration] : no accessory muscle use [Auscultation Breath Sounds / Voice Sounds] : lungs were clear to auscultation bilaterally [Heart Rate And Rhythm] : heart rate and rhythm were normal [Heart Sounds] : normal S1 and S2 [Arterial Pulses Normal] : the arterial pulses were normal [Edema] : no peripheral edema present [Abdomen Soft] : soft [Abdomen Tenderness] : non-tender [Abdomen Mass (___ Cm)] : no abdominal mass palpated [Abnormal Walk] : normal gait [Gait - Sufficient For Exercise Testing] : the gait was sufficient for exercise testing [Nail Clubbing] : no clubbing of the fingernails [Cyanosis, Localized] : no localized cyanosis [Petechial Hemorrhages (___cm)] : no petechial hemorrhages [Skin Color & Pigmentation] : normal skin color and pigmentation [No Venous Stasis] : no venous stasis [] : no rash [Skin Lesions] : no skin lesions [No Skin Ulcers] : no skin ulcer [No Xanthoma] : no  xanthoma was observed [Oriented To Time, Place, And Person] : oriented to person, place, and time [Affect] : the affect was normal [Mood] : the mood was normal [No Anxiety] : not feeling anxious [FreeTextEntry1] : +3/6 systolic murmur

## 2019-10-10 NOTE — ASSESSMENT
[FreeTextEntry1] : Valve disease: clean coronaries on cardiac cath in 2014. status post AVR, MV repair Dr. Caio Louise 9/9/2014) SBE prophylaxis. okay to stop ASA week before dental extraction and implant. EKG with 1 PVC. Echo done 7/2018.  Valves OK.\par \par Chest/abd pain- will treat with Protonix x 30 days\par \par PAF- changed from ASA to Eliquis by Dr ARIZMENDI.  No signs of trauma or bleeding on exam. Pt reassured that the Eliquis is not causing the pain\par \par VT- has non-sustained, i spoke to Dr Palencia who does not want him to run the [facility]. There is  a VT and syncope/sudden death risk, also has a thoracic aneurysm.  I discussed this with him and his wife, walking the Marathon is OK but no jogging.  He walked it and did well. Now seeing Dr Jeff.  Had syncope and adm to [facility], trans to [facility].  VT RFA, Sotolol changed to Mexitil Sept 2019.  CAN RETURN TO WORK NEXT MONDAY\par \par Aortic aneurysm: Enlarged. 42 (ascending) 47 (descending) in 2014\par Followup in 2015 reveals a max Ao of 46 mm\par CT angiogram 3/6/2017 aneurysm unchanged\par annual CT, due 3/2018.  Aorta 46mm April 2018, referred back to Dr Garcia, pt did not go as of  5/6/19\par \par HLD: on pravastatin 40mg, had muscle aches on 80mg,  8/2017, pt reported he was only taking it once in a while, now taking it daily will repeat lipid panel. Diet and exercise discussed in detail.   Feb 2018\par \par HTN: Have DC Lasix and changed to HCTZ weekly.  BP very good, Reduce Micardis  40 for persistent elevated K.  Increase HCTZ 25 to M & F. BP OK.  Micardis now at 20mg\par \par CKD: 1.33 8/8/2017, repeat BMP Sept 2017, Cr 1.05, previously normal, on ARB .  K better

## 2019-10-10 NOTE — REASON FOR VISIT
[FreeTextEntry1] : 72-year-old male with history of high cholesterol, aortic aneurysm thoracic, hypertension, lumbago, pre-diabetes, Valve disease (status post AVR, MVrepair Dr. Caio Louise 9/9/2014). S/P  colonoscopy with Dr. Maximilian Cortez 9/14/17. Doing well overall, Denies CP, SOB, palpitations, orthopnea, LE swelling, dizziness, syncope. Walking and running daily, sometimes >10 miles, training for [place] in 2018 after skipping 2017. \par \par s/p dental extraction and implant, no official procedure date. Reports during dental cleaning he was told he bled a lot and dentist would prefer he stop aspirin for future dental extraction and implant. \par Training for [place], race walked it without complications Nov 2018.\par \par EKG: NSR @ 46 with 1st degree AVB. LAHB, ST-Tw abnormalities. Blocked APC, V paced x 2 beats Pacer set at 40 1/22/19\par EKG: A Pacing, PVCs, 1st degree AVB, with a LAHB, possible IWMI, QTc 429 ST-Twave abnormalities.  10/10/19\par \par 3/18/19 A Fib discovered by Dr Jeff on 3/13/19, ASA changed to Eliquis\par 5/6/19 Pt with atypical pain in the axilla on the left, he thinks its the Eliquis.  No swelling or ecchymosis reported\par 7/15/19 K 5.5, Telmisartin reduced to 20mg. BP excellent.  c/o severe right shoulder pain.  Previously injected with relief.\par 9/23/19 Adm to  [facility]) with syncope and NSVT.  Trans to [facility].  VT ablation and Mexitil started.\par 10/10/19  Pt c/o chest  pressure, when questioned its sub Xiphoid.  Eating makes it better

## 2019-10-23 ENCOUNTER — APPOINTMENT (OUTPATIENT)
Dept: HEART AND VASCULAR | Facility: CLINIC | Age: 74
End: 2019-10-23
Payer: MEDICARE

## 2019-10-23 ENCOUNTER — NON-APPOINTMENT (OUTPATIENT)
Age: 74
End: 2019-10-23

## 2019-10-23 VITALS
HEART RATE: 62 BPM | HEIGHT: 70 IN | WEIGHT: 176 LBS | BODY MASS INDEX: 25.2 KG/M2 | DIASTOLIC BLOOD PRESSURE: 78 MMHG | SYSTOLIC BLOOD PRESSURE: 124 MMHG

## 2019-10-23 PROCEDURE — 93283 PRGRMG EVAL IMPLANTABLE DFB: CPT

## 2019-10-23 PROCEDURE — 99215 OFFICE O/P EST HI 40 MIN: CPT | Mod: 25

## 2019-10-24 NOTE — DISCUSSION/SUMMARY
[Pacemaker Function Normal] : normal pacemaker function [FreeTextEntry1] : 74 year old male with HTN, HLD, aortic aneurysm thoracic, AVR and mitral valve repair in 2014, newly diagnosed paroxysmal atrial fibrillation, PVCs s/p ablation of great cardiac vein 9/2019 and ICD, who presents for follow up.   Device interrogation reveals normal function.  All measured data is within normal limits. No recurrent atrial fibrillation since his last check and he is on Eliquis.  One very short episode of NSVT that is much less then prior to his ablation.  RV pacing down.  He has SOB which sounds like possible pericardial pain, but this also occurs with exertion.  I would like to get a CTa cardiac to assure that there are no issues post ablation.  We will review this and then decide if any changes need to be made.  He will follow up in 1-2 weeks or sooner if needed.    He knows to call with any questions or concerns.

## 2019-10-24 NOTE — HISTORY OF PRESENT ILLNESS
[SOB] : no dyspnea [Palpitations] : no palpitations [Dizziness] : no dizziness [Chest Pain] : no chest pain or discomfort [Syncope] : no syncope [Pain at Site] : no pain at device site [Shoulder Pain] : no shoulder pain [Swelling at Site] : no swelling at device site [Erythema at Site] : no erythema at device site [FreeTextEntry1] : 74 year old male with HTN, HLD, aortic aneurysm thoracic, AVR and mitral valve repair in 2014, newly diagnosed paroxysmal atrial fibrillation, PVCs and ICD s/p recent PVC ablation,, who presents for follow up.\par \par He states that he had a ICD placed after his AVR.  He normally follows up with Dr. Palencia; whose office recently moved and he transferred care here.   He is on Sotalol for history of PVCs and  NSVT; however he had short bursts of VT/torsades and it was felt the sotalol was proarrhythmic and stopped.  He was placed on Metoprolol.  He had 7101 PVCs on recent holter monitor with short bursts of NSVT.  \par \par He was admitted to [facility] 9/2019 with syncope correlated with VT and Vfib.  No therapy needed as he spontaneously converted.  One episode of afib, but overall burden extremely low.  Cath with mildly obstructive CAD.  He also underwent an EP study with PVC ablation from great cardiac vein.  HE was discharged on Mexiletine.  \par  \par He presents for follow up today and overall feels well.  He notes that when he tries to take a deep breath or walk fast he gets SOB which is new.  No ICD shocks.  NO SOB at rest.  No chest pain, palpitations, syncope, near syncope.  No device related complaints.  Tolerating ELiquis.  \par

## 2019-10-24 NOTE — PHYSICAL EXAM
[General Appearance - Well Developed] : well developed [Normal Appearance] : normal appearance [Well Groomed] : well groomed [General Appearance - Well Nourished] : well nourished [No Deformities] : no deformities [General Appearance - In No Acute Distress] : no acute distress [Heart Rate And Rhythm] : heart rate and rhythm were normal [Heart Sounds] : normal S1 and S2 [Edema] : no peripheral edema present [Respiration, Rhythm And Depth] : normal respiratory rhythm and effort [Exaggerated Use Of Accessory Muscles For Inspiration] : no accessory muscle use [Auscultation Breath Sounds / Voice Sounds] : lungs were clear to auscultation bilaterally [Left Infraclavicular] : left infraclavicular area [Clean] : clean [Dry] : dry [Well-Healed] : well-healed [] : no ischemic changes [Normal Conjunctiva] : the conjunctiva exhibited no abnormalities [Normal Oral Mucosa] : normal oral mucosa [Normal Oropharynx] : normal oropharynx [Normal Jugular Venous V Waves Present] : normal jugular venous V waves present [Abnormal Walk] : normal gait [Gait - Sufficient For Exercise Testing] : the gait was sufficient for exercise testing [Skin Color & Pigmentation] : normal skin color and pigmentation [Oriented To Time, Place, And Person] : oriented to person, place, and time [Affect] : the affect was normal [Mood] : the mood was normal [No Anxiety] : not feeling anxious [Palpable Crepitus] : no palpable crepitus [Foul Odor] : no foul smell [Bleeding] : no active bleeding [Purulent Drainage] : no purulent drainage [Serosanguineous Drainage] : no serosanquineous drainage [Serous Drainage] : no serous drainage [Erythema] : not erythematous [Warm] : not warm [Tender] : not tender [Indurated] : not indurated [Fluctuant] : not fluctuant

## 2019-10-24 NOTE — REVIEW OF SYSTEMS
[Negative] : Heme/Lymph [Fever] : no fever [Recent Weight Gain (___ Lbs)] : no recent weight gain [Chills] : no chills [Feeling Fatigued] : not feeling fatigued [Chest Pain] : no chest pain [Shortness Of Breath] : no shortness of breath [Recent Weight Loss (___ Lbs)] : no recent weight loss [Palpitations] : no palpitations [see HPI] : see HPI

## 2019-10-24 NOTE — PROCEDURE
[No] : not [NSR] : normal sinus rhythm [ICD] : Implantable cardioverter-defibrillator [DDD] : DDD [Threshold Testing Performed] : Threshold testing was performed [Lead Imp:  ___ohms] : lead impedance was [unfilled] ohms [Sensing Amplitude ___mv] : sensing amplitude was [unfilled] mv [___V @] : [unfilled] V [___ ms] : [unfilled] ms [None] : none [de-identified] : [place] [de-identified] : Ellipse [de-identified] : [id number] [de-identified] : 4/2014 [de-identified] : 60/110 [de-identified] : 2.8-3.2 years [de-identified] :  \par AV delay 250   DDD 50\par changed to DDD 70 AV delay 250 [de-identified] : AP 36%\par  14%\par No recurrent afib\par 1 5 second episode of NSVT\par shock impedance 47

## 2019-10-25 PROCEDURE — 84443 ASSAY THYROID STIM HORMONE: CPT

## 2019-10-25 PROCEDURE — 74150 CT ABDOMEN W/O CONTRAST: CPT

## 2019-10-25 PROCEDURE — 83735 ASSAY OF MAGNESIUM: CPT

## 2019-10-25 PROCEDURE — 80053 COMPREHEN METABOLIC PANEL: CPT

## 2019-10-25 PROCEDURE — C1887: CPT

## 2019-10-25 PROCEDURE — 84484 ASSAY OF TROPONIN QUANT: CPT

## 2019-10-25 PROCEDURE — 80048 BASIC METABOLIC PNL TOTAL CA: CPT

## 2019-10-25 PROCEDURE — C2630: CPT

## 2019-10-25 PROCEDURE — C1894: CPT

## 2019-10-25 PROCEDURE — 80061 LIPID PANEL: CPT

## 2019-10-25 PROCEDURE — 71045 X-RAY EXAM CHEST 1 VIEW: CPT

## 2019-10-25 PROCEDURE — C1766: CPT

## 2019-10-25 PROCEDURE — C1731: CPT

## 2019-10-25 PROCEDURE — 85610 PROTHROMBIN TIME: CPT

## 2019-10-25 PROCEDURE — 84300 ASSAY OF URINE SODIUM: CPT

## 2019-10-25 PROCEDURE — 36415 COLL VENOUS BLD VENIPUNCTURE: CPT

## 2019-10-25 PROCEDURE — 84100 ASSAY OF PHOSPHORUS: CPT

## 2019-10-25 PROCEDURE — 85730 THROMBOPLASTIN TIME PARTIAL: CPT

## 2019-10-25 PROCEDURE — 86901 BLOOD TYPING SEROLOGIC RH(D): CPT

## 2019-10-25 PROCEDURE — 85027 COMPLETE CBC AUTOMATED: CPT

## 2019-10-25 PROCEDURE — 93005 ELECTROCARDIOGRAM TRACING: CPT

## 2019-10-25 PROCEDURE — 83036 HEMOGLOBIN GLYCOSYLATED A1C: CPT

## 2019-10-25 PROCEDURE — 93306 TTE W/DOPPLER COMPLETE: CPT

## 2019-10-25 PROCEDURE — 86850 RBC ANTIBODY SCREEN: CPT

## 2019-10-25 PROCEDURE — 83935 ASSAY OF URINE OSMOLALITY: CPT

## 2019-10-25 PROCEDURE — 90686 IIV4 VACC NO PRSV 0.5 ML IM: CPT

## 2019-10-25 PROCEDURE — 82553 CREATINE MB FRACTION: CPT

## 2019-10-25 PROCEDURE — 85025 COMPLETE CBC W/AUTO DIFF WBC: CPT

## 2019-10-25 PROCEDURE — 82550 ASSAY OF CK (CPK): CPT

## 2019-10-25 PROCEDURE — 71250 CT THORAX DX C-: CPT

## 2019-10-25 PROCEDURE — C1769: CPT

## 2019-10-25 PROCEDURE — 82570 ASSAY OF URINE CREATININE: CPT

## 2019-10-25 PROCEDURE — 86900 BLOOD TYPING SEROLOGIC ABO: CPT

## 2019-10-25 PROCEDURE — C1730: CPT

## 2019-10-31 ENCOUNTER — FORM ENCOUNTER (OUTPATIENT)
Age: 74
End: 2019-10-31

## 2019-11-01 ENCOUNTER — OUTPATIENT (OUTPATIENT)
Dept: OUTPATIENT SERVICES | Facility: HOSPITAL | Age: 74
LOS: 1 days | End: 2019-11-01
Payer: MEDICARE

## 2019-11-01 ENCOUNTER — APPOINTMENT (OUTPATIENT)
Dept: CT IMAGING | Facility: HOSPITAL | Age: 74
End: 2019-11-01
Payer: MEDICARE

## 2019-11-01 DIAGNOSIS — Z98.89 OTHER SPECIFIED POSTPROCEDURAL STATES: Chronic | ICD-10-CM

## 2019-11-01 PROCEDURE — 75574 CT ANGIO HRT W/3D IMAGE: CPT

## 2019-11-01 PROCEDURE — 75574 CT ANGIO HRT W/3D IMAGE: CPT | Mod: 26

## 2019-11-06 ENCOUNTER — APPOINTMENT (OUTPATIENT)
Dept: HEART AND VASCULAR | Facility: CLINIC | Age: 74
End: 2019-11-06

## 2019-11-14 ENCOUNTER — APPOINTMENT (OUTPATIENT)
Dept: HEART AND VASCULAR | Facility: CLINIC | Age: 74
End: 2019-11-14
Payer: MEDICARE

## 2019-11-14 VITALS
BODY MASS INDEX: 25.21 KG/M2 | OXYGEN SATURATION: 98 % | HEIGHT: 70 IN | SYSTOLIC BLOOD PRESSURE: 120 MMHG | HEART RATE: 59 BPM | TEMPERATURE: 97.5 F | DIASTOLIC BLOOD PRESSURE: 70 MMHG | WEIGHT: 176.13 LBS

## 2019-11-14 PROCEDURE — 99214 OFFICE O/P EST MOD 30 MIN: CPT

## 2019-11-14 PROCEDURE — 93000 ELECTROCARDIOGRAM COMPLETE: CPT

## 2019-11-14 PROCEDURE — 36415 COLL VENOUS BLD VENIPUNCTURE: CPT

## 2019-11-14 RX ORDER — APIXABAN 5 MG/1
5 TABLET, FILM COATED ORAL
Qty: 180 | Refills: 3 | Status: DISCONTINUED | COMMUNITY
Start: 2019-03-13 | End: 2019-11-14

## 2019-11-14 RX ORDER — TELMISARTAN 20 MG/1
20 TABLET ORAL
Refills: 0 | Status: DISCONTINUED | COMMUNITY
End: 2019-11-14

## 2019-11-14 RX ORDER — MEXILETINE HYDROCHLORIDE 200 MG/1
200 CAPSULE ORAL
Qty: 180 | Refills: 0 | Status: DISCONTINUED | COMMUNITY
End: 2019-11-14

## 2019-11-15 LAB — TROPONIN I SERPL-MCNC: 0.01 NG/ML

## 2019-11-20 ENCOUNTER — APPOINTMENT (OUTPATIENT)
Dept: INTERNAL MEDICINE | Facility: CLINIC | Age: 74
End: 2019-11-20
Payer: MEDICARE

## 2019-11-20 VITALS
HEIGHT: 70 IN | TEMPERATURE: 97.9 F | DIASTOLIC BLOOD PRESSURE: 78 MMHG | BODY MASS INDEX: 25.62 KG/M2 | HEART RATE: 75 BPM | SYSTOLIC BLOOD PRESSURE: 132 MMHG | OXYGEN SATURATION: 99 % | WEIGHT: 179 LBS

## 2019-11-20 DIAGNOSIS — Z87.898 PERSONAL HISTORY OF OTHER SPECIFIED CONDITIONS: ICD-10-CM

## 2019-11-20 DIAGNOSIS — M25.511 PAIN IN RIGHT SHOULDER: ICD-10-CM

## 2019-11-20 DIAGNOSIS — M65.341 TRIGGER FINGER, RIGHT RING FINGER: ICD-10-CM

## 2019-11-20 PROCEDURE — 36415 COLL VENOUS BLD VENIPUNCTURE: CPT

## 2019-11-20 PROCEDURE — 93000 ELECTROCARDIOGRAM COMPLETE: CPT

## 2019-11-20 PROCEDURE — 99214 OFFICE O/P EST MOD 30 MIN: CPT | Mod: 25

## 2019-11-20 NOTE — PHYSICAL EXAM
[Normal Sclera/Conjunctiva] : normal sclera/conjunctiva [Normal Rate] : normal rate  [No Edema] : there was no peripheral edema [Normal] : soft, non-tender, non-distended, no masses palpated, no HSM and normal bowel sounds [No CVA Tenderness] : no CVA  tenderness [No Joint Swelling] : no joint swelling [Normal Gait] : normal gait [Normal Affect] : the affect was normal [Alert and Oriented x3] : oriented to person, place, and time [de-identified] : wears corrective lenses [de-identified] : PVCs [de-identified] : sternotomy scar healed

## 2019-11-20 NOTE — HISTORY OF PRESENT ILLNESS
[Atrial Fibrillation] : atrial fibrillation [Implantable Device/Pacemaker] : implantable device/pacemaker [Chronic Kidney Disease] : chronic kidney disease [Good (7-10 METs)] : Good (7-10 METs) [Anti-Platelet Agents: _____] : Anti-Platelet Agents: [unfilled] [Asthma] : no asthma [Sleep Apnea] : no sleep apnea [COPD] : no COPD [Smoker] : not a smoker [Family Member] : no family member with adverse anesthesia reaction/sudden death [Self] : no previous adverse anesthesia reaction [FreeTextEntry1] : RT cataract [Diabetes] : no diabetes [Chronic Anticoagulation] : no chronic anticoagulation [FreeTextEntry6] : MONTEJO after 6-8 blocks of significant exertion [FreeTextEntry4] : 73 y/o man with AF/VT now s/p PPM (9/19), CKD, s/p AV and MV repairs, HTN, compensated diastolic chronic HF, prediabetes is here for preoperative clearance prior to cataract surgery. \par He has been on and off Eliquis-now off and on ASA. \par Because of the VT, EP doesn't want him running any longer so he walks for exercise instead. Gets tired after 6-8 blocks of fast walking. \par Has chronic knee pain due to OA.\sharon Has appointment for ECHO Friday.

## 2019-11-20 NOTE — ASSESSMENT
[Patient Requires Further Testing] : Patient requires further testing [Echocardiogram] : echocardiogram [Cardiology consultation] : Cardiology consultation [Continue medications as is] : Continue current medications [As per surgery] : as per surgery [Continue anti-platelet treatment as is] : Continue current anti-platelet treatment [FreeTextEntry4] : 75 y/o man is here for preop clearnace prior to cataract surgery.\par He will see cardiology in 2 days for ECHO and clearance. I would like him to continue the ASA as this procedure is low risk of bleeding. Cardiologist concurs.

## 2019-11-21 LAB
ALBUMIN SERPL ELPH-MCNC: 4.2 G/DL
ALP BLD-CCNC: 44 U/L
ALT SERPL-CCNC: 11 U/L
ANION GAP SERPL CALC-SCNC: 13 MMOL/L
APTT BLD: 34.7 SEC
AST SERPL-CCNC: 21 U/L
BASOPHILS # BLD AUTO: 0.02 K/UL
BASOPHILS NFR BLD AUTO: 0.3 %
BILIRUB SERPL-MCNC: 0.3 MG/DL
BUN SERPL-MCNC: 24 MG/DL
CALCIUM SERPL-MCNC: 9.4 MG/DL
CHLORIDE SERPL-SCNC: 104 MMOL/L
CO2 SERPL-SCNC: 22 MMOL/L
CREAT SERPL-MCNC: 1.27 MG/DL
EOSINOPHIL # BLD AUTO: 0.03 K/UL
EOSINOPHIL NFR BLD AUTO: 0.5 %
GLUCOSE SERPL-MCNC: 91 MG/DL
HCT VFR BLD CALC: 43.3 %
HGB BLD-MCNC: 13.4 G/DL
IMM GRANULOCYTES NFR BLD AUTO: 0.2 %
INR PPP: 1.06 RATIO
LYMPHOCYTES # BLD AUTO: 3.43 K/UL
LYMPHOCYTES NFR BLD AUTO: 54.6 %
MAN DIFF?: NORMAL
MCHC RBC-ENTMCNC: 30.9 GM/DL
MCHC RBC-ENTMCNC: 31.8 PG
MCV RBC AUTO: 102.6 FL
MONOCYTES # BLD AUTO: 0.69 K/UL
MONOCYTES NFR BLD AUTO: 11 %
NEUTROPHILS # BLD AUTO: 2.1 K/UL
NEUTROPHILS NFR BLD AUTO: 33.4 %
PLATELET # BLD AUTO: 233 K/UL
POTASSIUM SERPL-SCNC: 4.1 MMOL/L
PROT SERPL-MCNC: 7.5 G/DL
PT BLD: 12.1 SEC
RBC # BLD: 4.22 M/UL
RBC # FLD: 13.6 %
SODIUM SERPL-SCNC: 139 MMOL/L
WBC # FLD AUTO: 6.28 K/UL

## 2019-11-22 ENCOUNTER — APPOINTMENT (OUTPATIENT)
Dept: HEART AND VASCULAR | Facility: CLINIC | Age: 74
End: 2019-11-22
Payer: MEDICARE

## 2019-11-22 PROCEDURE — 93306 TTE W/DOPPLER COMPLETE: CPT

## 2019-11-27 NOTE — ASSESSMENT
[FreeTextEntry1] : Valve disease: clean coronaries on cardiac cath in 2014. status post AVR, MV repair Dr. Caio Louise 9/9/2014) SBE prophylaxis. okay to stop ASA 1 week before dental extraction and implant. EKG with 1 PVC. Echo done 7/2018. EF 40%  Valves OK.  Echo update Nov 2019 and unchanged\par \par Chest/abd pain- will treat with Protonix x 30 days.  New EKG changes 11/14/19 not seen before, deep Tw inversions, QTc 474.  I suspect post pacing artifact, remotely  PUD, Trop sent, echo ordered.  Trop NL, EF and wall motion normal on echo. CCTA done Nov 1st is clean. No coronary disease\par \par PreOP- complex pt but cleared for dental work.  Has h/o VT to no epinephrine.  Has AICD so no cautery unless AICD is shut off to avoid inappropriate shocks.  Needs SBE prophylaxis.  I will be off Dec 22 to Dec 30th.\par \par PAF- changed from ASA to Eliquis by Dr ARIZMENDI.  No signs of trauma or bleeding on exam. Pt reassured that the Eliquis is not causing the pain.  Off Eliquis by Dr ARIZMENDI due to not feeling well  \par \par VT- has non-sustained, i spoke to Dr Palencia who does not want him to run the [facility]. There is  a VT and syncope/sudden death risk, also has a thoracic aneurysm.  I discussed this with him and his wife, walking [place] is OK but no jogging.  He walked it and did well. Now seeing Dr Jeff.  Had syncope and adm to [facility], trans to [facility].  VT RFA, Sotolol changed to Mexitil Sept 2019.  CAN RETURN TO WORK NEXT MONDAY\par \par Aortic aneurysm: Enlarged. 42 (ascending) 47 (descending) in 2014\par Followup in 2015 reveals a max Ao of 46 mm\par CT angiogram 3/6/2017 aneurysm unchanged\par annual CT, due 3/2018.  Aorta 46mm April 2018, referred back to Dr Garcia, pt did not go  \par \par HLD: on pravastatin 40mg, had muscle aches on 80mg,  8/2017, pt reported he was only taking it once in a while, now taking it daily will repeat lipid panel. Diet and exercise discussed in detail.   Feb 2018\par \par HTN: Have DC Lasix and changed to HCTZ weekly.  BP very good, Reduce Micardis  40 for persistent elevated K.  Increase HCTZ 25 to M & F. BP OK.  Micardis now at 20mg\par \par CKD: 1.33 8/8/2017, repeat BMP Sept 2017, Cr 1.05, previously normal, on ARB .  K better

## 2019-11-27 NOTE — REASON FOR VISIT
[FreeTextEntry1] : 72-year-old male with history of high cholesterol, aortic aneurysm thoracic, hypertension, lumbago, pre-diabetes, Valve disease (status post AVR, MVrepair Dr. Caio Louise 9/9/2014). S/P  colonoscopy with Dr. Maximilian Cortez 9/14/17. Doing well overall, Denies CP, SOB, palpitations, orthopnea, LE swelling, dizziness, syncope. Walking and running daily, sometimes >10 miles, training for [place] in 2018 after skipping 2017. \par \par s/p dental extraction and implant. Reports during dental cleaning he was told he bled a lot and dentist would prefer he stop aspirin for future dental extraction and implant. \par Training for [place], race walked it without complications Nov 2018.\par \par EKG: NSR @ 46 with 1st degree AVB. LAHB, ST-Tw abnormalities. Blocked APC, V paced x 2 beats Pacer set at 40 1/22/19\par EKG: A Pacing, PVCs, 1st degree AVB, with a LAHB, possible IWMI, QTc 429 ST-Twave abnormalities.  10/10/19\par \par 3/18/19 A Fib discovered by Dr Jeff on 3/13/19, ASA changed to Eliquis\par 5/6/19 Pt with atypical pain in the axilla on the left, he thinks its the Eliquis.  No swelling or ecchymosis reported\par 7/15/19 K 5.5, Telmisartin reduced to 20mg. BP excellent.  c/o severe right shoulder pain.  Previously injected with relief.\par 9/23/19 Adm to  [facility]) with syncope and NSVT.  Trans to [facility].  VT ablation and Mexitil started.\par 10/10/19  Pt c/o chest  pressure, when questioned its sub Xiphoid.  Eating makes it better\par \par 11/14/19  New Deep Tw inversions, ? post pacing.  CCTA Nov 1 clean. Recently had a lot of "stomach" pains.  I suspect he has PUD, Trop sent, echo ordered

## 2019-12-05 ENCOUNTER — APPOINTMENT (OUTPATIENT)
Dept: ORTHOPEDIC SURGERY | Facility: CLINIC | Age: 74
End: 2019-12-05

## 2020-01-16 ENCOUNTER — APPOINTMENT (OUTPATIENT)
Dept: INTERNAL MEDICINE | Facility: CLINIC | Age: 75
End: 2020-01-16
Payer: MEDICARE

## 2020-01-16 VITALS
BODY MASS INDEX: 25.77 KG/M2 | OXYGEN SATURATION: 98 % | TEMPERATURE: 99.1 F | HEART RATE: 59 BPM | HEIGHT: 70 IN | WEIGHT: 180 LBS | SYSTOLIC BLOOD PRESSURE: 122 MMHG | DIASTOLIC BLOOD PRESSURE: 80 MMHG

## 2020-01-16 PROCEDURE — 99214 OFFICE O/P EST MOD 30 MIN: CPT

## 2020-01-16 NOTE — REVIEW OF SYSTEMS
[Fever] : no fever [Chills] : chills [Earache] : no earache [Postnasal Drip] : postnasal drip [Fatigue] : fatigue [Sore Throat] : sore throat [Nasal Discharge] : nasal discharge [Shortness Of Breath] : shortness of breath [Cough] : cough [Wheezing] : wheezing [Negative] : Heme/Lymph

## 2020-01-16 NOTE — HISTORY OF PRESENT ILLNESS
[FreeTextEntry8] : 75 y/o man with AF, CHF, AI is here with 1 week of URI symptoms.\par No fever. +chills.\par +nasal congestion, +wet cough/ Feels like chest is "rattling". Can't sleep due to cough.\par +pharyngitis, no ear pain, no HA.\par Experiencing SOB with exertion. Seeing cardiologist next week. \par \par

## 2020-01-16 NOTE — HEALTH RISK ASSESSMENT
[No falls in past year] : Patient reported no falls in the past year [0] : 1) Little interest or pleasure doing things: Not at all (0) [[id number]] : 0

## 2020-01-16 NOTE — ASSESSMENT
[FreeTextEntry1] : 75 y/o man is here with acute bronchitis-exam is benign.\par Start Zpack and Cheratussin. [place].\par Unsure if MONTEJO is related to underlying cardiac disease or this infection. As vitals/exam all ok, will treat him first and then have him see cardigooist.\par

## 2020-01-16 NOTE — PHYSICAL EXAM
[Normal Outer Ear/Nose] : the outer ears and nose were normal in appearance [Normal] : normal sclera/conjunctiva, pupils are equal, round and reactive to light and extraocular movements are intact [Normal Oropharynx] : the oropharynx was normal [Normal TMs] : both tympanic membranes were normal [Normal Nasal Mucosa] : the nasal mucosa was normal [No Lymphadenopathy] : no lymphadenopathy [Supple] : supple [Thyroid Normal, No Nodules] : the thyroid was normal and there were no nodules present [No Respiratory Distress] : no respiratory distress  [No Accessory Muscle Use] : no accessory muscle use [Clear to Auscultation] : lungs were clear to auscultation bilaterally [Normal Rate] : normal rate  [Normal Supraclavicular Nodes] : no supraclavicular lymphadenopathy [Normal Affect] : the affect was normal [Normal Posterior Cervical Nodes] : no posterior cervical lymphadenopathy [Normal Anterior Cervical Nodes] : no anterior cervical lymphadenopathy [Alert and Oriented x3] : oriented to person, place, and time

## 2020-01-21 ENCOUNTER — APPOINTMENT (OUTPATIENT)
Dept: INTERNAL MEDICINE | Facility: CLINIC | Age: 75
End: 2020-01-21
Payer: COMMERCIAL

## 2020-01-21 VITALS
WEIGHT: 180 LBS | HEART RATE: 61 BPM | OXYGEN SATURATION: 97 % | DIASTOLIC BLOOD PRESSURE: 80 MMHG | HEIGHT: 70 IN | BODY MASS INDEX: 25.77 KG/M2 | TEMPERATURE: 97.1 F | SYSTOLIC BLOOD PRESSURE: 120 MMHG

## 2020-01-21 DIAGNOSIS — M25.511 PAIN IN RIGHT SHOULDER: ICD-10-CM

## 2020-01-21 DIAGNOSIS — G89.29 PAIN IN RIGHT SHOULDER: ICD-10-CM

## 2020-01-21 DIAGNOSIS — Z87.09 PERSONAL HISTORY OF OTHER DISEASES OF THE RESPIRATORY SYSTEM: ICD-10-CM

## 2020-01-21 DIAGNOSIS — I47.2 VENTRICULAR TACHYCARDIA: ICD-10-CM

## 2020-01-21 PROCEDURE — 36415 COLL VENOUS BLD VENIPUNCTURE: CPT

## 2020-01-21 PROCEDURE — 99214 OFFICE O/P EST MOD 30 MIN: CPT | Mod: 25

## 2020-01-21 RX ORDER — AZITHROMYCIN 250 MG/1
250 TABLET, FILM COATED ORAL
Qty: 1 | Refills: 0 | Status: COMPLETED | COMMUNITY
Start: 2020-01-16 | End: 2020-01-21

## 2020-01-21 NOTE — PHYSICAL EXAM
[No Acute Distress] : no acute distress [Well Nourished] : well nourished [Well Developed] : well developed [Well-Appearing] : well-appearing [Normal Sclera/Conjunctiva] : normal sclera/conjunctiva [PERRL] : pupils equal round and reactive to light [EOMI] : extraocular movements intact [Normal Outer Ear/Nose] : the outer ears and nose were normal in appearance [No JVD] : no jugular venous distention [Normal Oropharynx] : the oropharynx was normal [No Lymphadenopathy] : no lymphadenopathy [Supple] : supple [No Respiratory Distress] : no respiratory distress  [Thyroid Normal, No Nodules] : the thyroid was normal and there were no nodules present [Normal Rate] : normal rate  [Clear to Auscultation] : lungs were clear to auscultation bilaterally [No Accessory Muscle Use] : no accessory muscle use [Regular Rhythm] : with a regular rhythm [Normal S1, S2] : normal S1 and S2 [No Abdominal Bruit] : a ~M bruit was not heard ~T in the abdomen [No Carotid Bruits] : no carotid bruits [No Varicosities] : no varicosities [Pedal Pulses Present] : the pedal pulses are present [No Edema] : there was no peripheral edema [No Palpable Aorta] : no palpable aorta [No Extremity Clubbing/Cyanosis] : no extremity clubbing/cyanosis [Soft] : abdomen soft [Non Tender] : non-tender [No Masses] : no abdominal mass palpated [Non-distended] : non-distended [No HSM] : no HSM [Normal Bowel Sounds] : normal bowel sounds [Normal Posterior Cervical Nodes] : no posterior cervical lymphadenopathy [No CVA Tenderness] : no CVA  tenderness [Normal Anterior Cervical Nodes] : no anterior cervical lymphadenopathy [No Spinal Tenderness] : no spinal tenderness [No Joint Swelling] : no joint swelling [Grossly Normal Strength/Tone] : grossly normal strength/tone [No Rash] : no rash [No Focal Deficits] : no focal deficits [Coordination Grossly Intact] : coordination grossly intact [Normal Gait] : normal gait [Deep Tendon Reflexes (DTR)] : deep tendon reflexes were 2+ and symmetric [Normal Affect] : the affect was normal [Normal Insight/Judgement] : insight and judgment were intact [Alert and Oriented x3] : oriented to person, place, and time [de-identified] : 3/6 diastolic murmur

## 2020-01-21 NOTE — ASSESSMENT
[Patient Requires Further Testing] : Patient requires further testing [As per surgery] : as per surgery [FreeTextEntry4] : 75 y/o man is here for preop clearnace prior to cataract surgery.\par He will see cardiology this week for clearance. I would like him to continue the ASA as this procedure is low risk of bleeding. \par Lingering Cough will resolve. He only completed antibitiocs last night. He should use a humidifier at night. No further meds needed.\par

## 2020-01-21 NOTE — REVIEW OF SYSTEMS
[Negative] : Heme/Lymph [Cough] : cough [Dyspnea on Exertion] : dyspnea on exertion [Wheezing] : no wheezing

## 2020-01-22 LAB
ALBUMIN SERPL ELPH-MCNC: 4.2 G/DL
ALP BLD-CCNC: 46 U/L
ALT SERPL-CCNC: 10 U/L
ANION GAP SERPL CALC-SCNC: 10 MMOL/L
APTT BLD: 33.9 SEC
AST SERPL-CCNC: 17 U/L
BASOPHILS # BLD AUTO: 0.02 K/UL
BASOPHILS NFR BLD AUTO: 0.4 %
BILIRUB SERPL-MCNC: 0.3 MG/DL
BUN SERPL-MCNC: 29 MG/DL
CALCIUM SERPL-MCNC: 10.2 MG/DL
CHLORIDE SERPL-SCNC: 103 MMOL/L
CO2 SERPL-SCNC: 29 MMOL/L
CREAT SERPL-MCNC: 1.54 MG/DL
EOSINOPHIL # BLD AUTO: 0.04 K/UL
EOSINOPHIL NFR BLD AUTO: 0.8 %
GLUCOSE SERPL-MCNC: 97 MG/DL
HCT VFR BLD CALC: 46.1 %
HGB BLD-MCNC: 14.3 G/DL
IMM GRANULOCYTES NFR BLD AUTO: 0.2 %
INR PPP: 0.98 RATIO
LYMPHOCYTES # BLD AUTO: 3.37 K/UL
LYMPHOCYTES NFR BLD AUTO: 64.1 %
MAN DIFF?: NORMAL
MCHC RBC-ENTMCNC: 31 GM/DL
MCHC RBC-ENTMCNC: 31.6 PG
MCV RBC AUTO: 101.8 FL
MONOCYTES # BLD AUTO: 0.44 K/UL
MONOCYTES NFR BLD AUTO: 8.4 %
NEUTROPHILS # BLD AUTO: 1.38 K/UL
NEUTROPHILS NFR BLD AUTO: 26.1 %
PLATELET # BLD AUTO: 254 K/UL
POTASSIUM SERPL-SCNC: 5.9 MMOL/L
PROT SERPL-MCNC: 7.8 G/DL
PT BLD: 11.1 SEC
RBC # BLD: 4.53 M/UL
RBC # FLD: 13.1 %
SODIUM SERPL-SCNC: 142 MMOL/L
WBC # FLD AUTO: 5.26 K/UL

## 2020-01-24 ENCOUNTER — APPOINTMENT (OUTPATIENT)
Dept: HEART AND VASCULAR | Facility: CLINIC | Age: 75
End: 2020-01-24
Payer: COMMERCIAL

## 2020-01-24 ENCOUNTER — NON-APPOINTMENT (OUTPATIENT)
Age: 75
End: 2020-01-24

## 2020-01-24 VITALS
HEART RATE: 65 BPM | OXYGEN SATURATION: 98 % | SYSTOLIC BLOOD PRESSURE: 124 MMHG | HEIGHT: 70 IN | BODY MASS INDEX: 26.2 KG/M2 | DIASTOLIC BLOOD PRESSURE: 82 MMHG | WEIGHT: 183 LBS

## 2020-01-24 PROCEDURE — 99214 OFFICE O/P EST MOD 30 MIN: CPT

## 2020-01-24 NOTE — REASON FOR VISIT
[FreeTextEntry1] : 72-year-old male with history of high cholesterol, aortic aneurysm thoracic, hypertension, lumbago, pre-diabetes, Valve disease (status post AVR, MVrepair Dr. Caio Louise 9/9/2014). S/P  colonoscopy with Dr. Maximilian Cortez 9/14/17. Doing well overall, Denies CP, SOB, palpitations, orthopnea, LE swelling, dizziness, syncope. Walking and running daily, sometimes >10 miles, training [place] in 2018 after skipping 2017. \par \par s/p dental extraction and implant. Reports during dental cleaning he was told he bled a lot and dentist would prefer he stop aspirin for future dental extraction and implant. \par Training for [employer], race walked it without complications Nov 2018.\par \par EKG: NSR @ 46 with 1st degree AVB. LAHB, ST-Tw abnormalities. Blocked APC, V paced x 2 beats Pacer set at 40 1/22/19\par EKG: A Pacing, PVCs, 1st degree AVB, with a LAHB, possible IWMI, QTc 429 ST-Twave abnormalities.  10/10/19\par \par 3/18/19 A Fib discovered by Dr Jeff on 3/13/19, ASA changed to Eliquis\par 5/6/19 Pt with atypical pain in the axilla on the left, he thinks its the Eliquis.  No swelling or ecchymosis reported\par 7/15/19 K 5.5, Telmisartin reduced to 20mg. BP excellent.  c/o severe right shoulder pain.  Previously injected with relief.\par 9/23/19 Adm to  [facility]) with syncope and NSVT.  Trans to [facility].  VT ablation and Mexitil started.\par 10/10/19  Pt c/o chest  pressure, when questioned its sub Xiphoid.  Eating makes it better\par \par 11/14/19  New Deep Tw inversions, ? post pacing.  CCTA Nov 1 clean. Recently had a lot of "stomach" pains.  I suspect he has PUD, Trop sent, echo ordered\par 1/24/20 Here with several weeks of a cold/URI, saw an MD and given Z westley.  Here with wheezing, reduced exercise tolerance. + Wheezing, SOB, mild cough, non-productive, no fever or chills.\par Also here for clearance for cataract

## 2020-01-24 NOTE — PHYSICAL EXAM
[General Appearance - Well Developed] : well developed [Normal Appearance] : normal appearance [Well Groomed] : well groomed [General Appearance - Well Nourished] : well nourished [General Appearance - In No Acute Distress] : no acute distress [No Deformities] : no deformities [Normal Conjunctiva] : the conjunctiva exhibited no abnormalities [Eyelids - No Xanthelasma] : the eyelids demonstrated no xanthelasmas [Normal Oral Mucosa] : normal oral mucosa [No Oral Pallor] : no oral pallor [No Oral Cyanosis] : no oral cyanosis [Normal Jugular Venous A Waves Present] : normal jugular venous A waves present [Normal Jugular Venous V Waves Present] : normal jugular venous V waves present [No Jugular Venous Loco A Waves] : no jugular venous loco A waves [Exaggerated Use Of Accessory Muscles For Inspiration] : no accessory muscle use [Respiration, Rhythm And Depth] : normal respiratory rhythm and effort [Auscultation Breath Sounds / Voice Sounds] : lungs were clear to auscultation bilaterally [Heart Rate And Rhythm] : heart rate and rhythm were normal [Heart Sounds] : normal S1 and S2 [Arterial Pulses Normal] : the arterial pulses were normal [Edema] : no peripheral edema present [Abdomen Soft] : soft [Abdomen Tenderness] : non-tender [Abnormal Walk] : normal gait [Abdomen Mass (___ Cm)] : no abdominal mass palpated [Gait - Sufficient For Exercise Testing] : the gait was sufficient for exercise testing [Cyanosis, Localized] : no localized cyanosis [Nail Clubbing] : no clubbing of the fingernails [Petechial Hemorrhages (___cm)] : no petechial hemorrhages [Skin Color & Pigmentation] : normal skin color and pigmentation [No Venous Stasis] : no venous stasis [] : no rash [Skin Lesions] : no skin lesions [No Skin Ulcers] : no skin ulcer [No Xanthoma] : no  xanthoma was observed [Oriented To Time, Place, And Person] : oriented to person, place, and time [Affect] : the affect was normal [Mood] : the mood was normal [No Anxiety] : not feeling anxious [FreeTextEntry1] : +3/6 systolic murmur

## 2020-01-24 NOTE — HISTORY OF PRESENT ILLNESS
[FreeTextEntry1] : PCP- Dr Sandhya Bloom\par EP- Dr Jeff\par CTS- Dr Garcia\par Plastics- Dr Ko\par Hand- Dr Xiong\par GI- Dr Cortez, colonoscopy Sept 2017\par Dentist: Dr. Jorge Oh  \par Ophtho- Dr Jesus Lantigua [phone]

## 2020-01-24 NOTE — ASSESSMENT
[FreeTextEntry1] : Valve disease: clean coronaries on cardiac cath in 2014. status post AVR, MV repair Dr. Caio Louise 9/9/2014) SBE prophylaxis. okay to stop ASA 1 week before dental extraction and implant. EKG with 1 PVC. Echo done 7/2018. EF 40%  Valves OK.  Echo update Nov 2019 and unchanged.\par \par URI(viral) with wheezing-   will manage with a SA-BD\par \par Chest/abd pain- will treat with Protonix x 30 days.  New EKG changes 11/14/19 not seen before, deep Tw inversions, QTc 474.  I suspect post pacing artifact, remotely  PUD, Trop sent, echo ordered.  Trop NL, EF and wall motion normal on echo. CCTA done Nov 1st is clean. No coronary disease\par \par PreOP- complex pt but cleared for dental work.  Has h/o VT to no epinephrine.  Has AICD so no cautery unless AICD is shut off to avoid inappropriate shocks.  Needs SBE prophylaxis for dental work only. Pt s cleared for cataract surgery\par \par PAF- changed from ASA to Eliquis by Dr ARIZMENDI.  No signs of trauma or bleeding on exam. Pt reassured that the Eliquis is not causing the pain.  Off Eliquis by Dr ARIZMENDI due to not feeling well  \par \par VT- has non-sustained, i spoke to Dr Palencia who does not want him to run the [facility]. There is  a VT and syncope/sudden death risk, also has a thoracic aneurysm.  I discussed this with him and his wife, walking the Marathon is OK but no jogging.  He walked it and did well. Now seeing Dr Jeff.  Had syncope and adm [facility], trans to [facility].  VT RFA, Sotolol changed to Mexitil Sept 2019.  CAN RETURN TO WORK NEXT MONDAY\par \par Aortic aneurysm: Enlarged. 42 (ascending) 47 (descending) in 2014\par Followup in 2015 reveals a max Ao of 46 mm\par CT angiogram 3/6/2017 aneurysm unchanged\par annual CT, due 3/2018.  Aorta 46mm April 2018, referred back to Dr Garcia, pt did not go  \par \par HLD: on pravastatin 40mg, had muscle aches on 80mg,  8/2017, pt reported he was only taking it once in a while, now taking it daily will repeat lipid panel. Diet and exercise discussed in detail.   Feb 2018\par \par HTN: Have DC Lasix and changed to HCTZ weekly.  BP very good, Reduce Micardis  40 for persistent elevated K.  Increase HCTZ 25 to M & F. BP OK.  Micardis now at 20mg\par \par CKD: 1.33 8/8/2017, repeat BMP Sept 2017, Cr 1.05, previously normal, on ARB .  K better

## 2020-01-27 ENCOUNTER — FORM ENCOUNTER (OUTPATIENT)
Age: 75
End: 2020-01-27

## 2020-01-28 ENCOUNTER — APPOINTMENT (OUTPATIENT)
Dept: INTERNAL MEDICINE | Facility: CLINIC | Age: 75
End: 2020-01-28
Payer: COMMERCIAL

## 2020-01-28 ENCOUNTER — OTHER (OUTPATIENT)
Age: 75
End: 2020-01-28

## 2020-01-28 ENCOUNTER — OUTPATIENT (OUTPATIENT)
Dept: OUTPATIENT SERVICES | Facility: HOSPITAL | Age: 75
LOS: 1 days | End: 2020-01-28
Payer: MEDICARE

## 2020-01-28 VITALS
HEART RATE: 88 BPM | SYSTOLIC BLOOD PRESSURE: 120 MMHG | OXYGEN SATURATION: 96 % | DIASTOLIC BLOOD PRESSURE: 80 MMHG | TEMPERATURE: 101.8 F | RESPIRATION RATE: 14 BRPM

## 2020-01-28 DIAGNOSIS — R05 COUGH: ICD-10-CM

## 2020-01-28 DIAGNOSIS — Z98.89 OTHER SPECIFIED POSTPROCEDURAL STATES: Chronic | ICD-10-CM

## 2020-01-28 LAB
FLUAV SPEC QL CULT: NORMAL
FLUBV AG SPEC QL IA: NORMAL

## 2020-01-28 PROCEDURE — 71046 X-RAY EXAM CHEST 2 VIEWS: CPT

## 2020-01-28 PROCEDURE — 87804 INFLUENZA ASSAY W/OPTIC: CPT | Mod: QW

## 2020-01-28 PROCEDURE — 71046 X-RAY EXAM CHEST 2 VIEWS: CPT | Mod: 26

## 2020-01-28 PROCEDURE — 99214 OFFICE O/P EST MOD 30 MIN: CPT | Mod: 25

## 2020-01-28 NOTE — PHYSICAL EXAM
[Normal Outer Ear/Nose] : the outer ears and nose were normal in appearance [Normal Oropharynx] : the oropharynx was normal [No Lymphadenopathy] : no lymphadenopathy [No Respiratory Distress] : no respiratory distress  [No Accessory Muscle Use] : no accessory muscle use [Clear to Auscultation] : lungs were clear to auscultation bilaterally [Normal Rate] : normal rate  [Regular Rhythm] : with a regular rhythm [Normal Supraclavicular Nodes] : no supraclavicular lymphadenopathy [Normal Posterior Cervical Nodes] : no posterior cervical lymphadenopathy [Normal Anterior Cervical Nodes] : no anterior cervical lymphadenopathy [Normal Gait] : normal gait [Normal Affect] : the affect was normal [Alert and Oriented x3] : oriented to person, place, and time [No Edema] : there was no peripheral edema [de-identified] : coughing [de-identified] : eyes red [de-identified] : 3/6 systolic murmur (no change)

## 2020-01-28 NOTE — HISTORY OF PRESENT ILLNESS
[FreeTextEntry8] : 75 y/o man with 75 y/o man with AF/VT now s/p PPM (9/19), CKD, s/p AV and MV repairs, HTN, compensated diastolic chronic HF, prediabetes is here with cough and severe malaise.\par He saw me On January 16th with similar symtpoms. He was given Zpack and felt better after 24 hours. He was left with a dry nighttime cough that wasn't too bothersome. Then on the 25th, his malaise, shortness of breath, productive cough returned. Chest and back hurt from all the coughing. +HA and dizziness after coughing spells. Could hardly get out of bde to get here today. Wife is here to accompany him. He denies subjective fever/chills but feels "terrible" overall.\par No sinus congestion, ear pain or sore throat.

## 2020-01-28 NOTE — REVIEW OF SYSTEMS
[Chills] : chills [Fatigue] : fatigue [Shortness Of Breath] : shortness of breath [Wheezing] : wheezing [Cough] : cough [Muscle Pain] : muscle pain [Headache] : headache [Negative] : Heme/Lymph [Fever] : no fever [Earache] : no earache [Hearing Loss] : no hearing loss [Nosebleeds] : no nosebleeds [Postnasal Drip] : no postnasal drip [Hoarseness] : no hoarseness [Nasal Discharge] : no nasal discharge

## 2020-01-29 ENCOUNTER — EMERGENCY (EMERGENCY)
Facility: HOSPITAL | Age: 75
LOS: 1 days | Discharge: HOME | End: 2020-01-29
Attending: EMERGENCY MEDICINE | Admitting: EMERGENCY MEDICINE
Payer: MEDICARE

## 2020-01-29 VITALS
SYSTOLIC BLOOD PRESSURE: 131 MMHG | DIASTOLIC BLOOD PRESSURE: 69 MMHG | OXYGEN SATURATION: 99 % | RESPIRATION RATE: 18 BRPM | WEIGHT: 179.9 LBS | TEMPERATURE: 100 F | HEIGHT: 71 IN | HEART RATE: 70 BPM

## 2020-01-29 DIAGNOSIS — R50.9 FEVER, UNSPECIFIED: ICD-10-CM

## 2020-01-29 DIAGNOSIS — R07.89 OTHER CHEST PAIN: ICD-10-CM

## 2020-01-29 DIAGNOSIS — Z98.89 OTHER SPECIFIED POSTPROCEDURAL STATES: Chronic | ICD-10-CM

## 2020-01-29 LAB
ALBUMIN SERPL ELPH-MCNC: 3.8 G/DL — SIGNIFICANT CHANGE UP (ref 3.3–5)
ALP SERPL-CCNC: 43 U/L — SIGNIFICANT CHANGE UP (ref 40–120)
ALT FLD-CCNC: 13 U/L — SIGNIFICANT CHANGE UP (ref 10–45)
ANION GAP SERPL CALC-SCNC: 11 MMOL/L — SIGNIFICANT CHANGE UP (ref 5–17)
AST SERPL-CCNC: 28 U/L — SIGNIFICANT CHANGE UP (ref 10–40)
BASOPHILS # BLD AUTO: 0.06 K/UL — SIGNIFICANT CHANGE UP (ref 0–0.2)
BASOPHILS NFR BLD AUTO: 1.7 % — SIGNIFICANT CHANGE UP (ref 0–2)
BILIRUB SERPL-MCNC: 0.2 MG/DL — SIGNIFICANT CHANGE UP (ref 0.2–1.2)
BUN SERPL-MCNC: 20 MG/DL — SIGNIFICANT CHANGE UP (ref 7–23)
CALCIUM SERPL-MCNC: 8.7 MG/DL — SIGNIFICANT CHANGE UP (ref 8.4–10.5)
CHLORIDE SERPL-SCNC: 102 MMOL/L — SIGNIFICANT CHANGE UP (ref 96–108)
CO2 SERPL-SCNC: 24 MMOL/L — SIGNIFICANT CHANGE UP (ref 22–31)
CREAT SERPL-MCNC: 1.25 MG/DL — SIGNIFICANT CHANGE UP (ref 0.5–1.3)
EOSINOPHIL # BLD AUTO: 0 K/UL — SIGNIFICANT CHANGE UP (ref 0–0.5)
EOSINOPHIL NFR BLD AUTO: 0 % — SIGNIFICANT CHANGE UP (ref 0–6)
FLU A RESULT: DETECTED
FLU A RESULT: DETECTED
FLUAV AG NPH QL: DETECTED
FLUBV AG NPH QL: SIGNIFICANT CHANGE UP
GLUCOSE SERPL-MCNC: 99 MG/DL — SIGNIFICANT CHANGE UP (ref 70–99)
HCT VFR BLD CALC: 44.9 % — SIGNIFICANT CHANGE UP (ref 39–50)
HGB BLD-MCNC: 13.7 G/DL — SIGNIFICANT CHANGE UP (ref 13–17)
LACTATE SERPL-SCNC: 0.8 MMOL/L — SIGNIFICANT CHANGE UP (ref 0.5–2)
LYMPHOCYTES # BLD AUTO: 0.67 K/UL — LOW (ref 1–3.3)
LYMPHOCYTES # BLD AUTO: 18.8 % — SIGNIFICANT CHANGE UP (ref 13–44)
MCHC RBC-ENTMCNC: 30.5 GM/DL — LOW (ref 32–36)
MCHC RBC-ENTMCNC: 30.8 PG — SIGNIFICANT CHANGE UP (ref 27–34)
MCV RBC AUTO: 100.9 FL — HIGH (ref 80–100)
MONOCYTES # BLD AUTO: 0.55 K/UL — SIGNIFICANT CHANGE UP (ref 0–0.9)
MONOCYTES NFR BLD AUTO: 15.4 % — HIGH (ref 2–14)
NEUTROPHILS # BLD AUTO: 2.13 K/UL — SIGNIFICANT CHANGE UP (ref 1.8–7.4)
NEUTROPHILS NFR BLD AUTO: 59 % — SIGNIFICANT CHANGE UP (ref 43–77)
PLATELET # BLD AUTO: 195 K/UL — SIGNIFICANT CHANGE UP (ref 150–400)
POTASSIUM SERPL-MCNC: 4.9 MMOL/L — SIGNIFICANT CHANGE UP (ref 3.5–5.3)
POTASSIUM SERPL-SCNC: 4.9 MMOL/L — SIGNIFICANT CHANGE UP (ref 3.5–5.3)
PROT SERPL-MCNC: 7.5 G/DL — SIGNIFICANT CHANGE UP (ref 6–8.3)
RBC # BLD: 4.45 M/UL — SIGNIFICANT CHANGE UP (ref 4.2–5.8)
RBC # FLD: 13.2 % — SIGNIFICANT CHANGE UP (ref 10.3–14.5)
RSV RESULT: SIGNIFICANT CHANGE UP
RSV RNA RESP QL NAA+PROBE: SIGNIFICANT CHANGE UP
SODIUM SERPL-SCNC: 137 MMOL/L — SIGNIFICANT CHANGE UP (ref 135–145)
WBC # BLD: 3.57 K/UL — LOW (ref 3.8–10.5)
WBC # FLD AUTO: 3.57 K/UL — LOW (ref 3.8–10.5)

## 2020-01-29 PROCEDURE — 93010 ELECTROCARDIOGRAM REPORT: CPT

## 2020-01-29 PROCEDURE — 99284 EMERGENCY DEPT VISIT MOD MDM: CPT

## 2020-01-29 RX ORDER — ACETAMINOPHEN 500 MG
1000 TABLET ORAL ONCE
Refills: 0 | Status: COMPLETED | OUTPATIENT
Start: 2020-01-29 | End: 2020-01-30

## 2020-01-29 NOTE — ED ADULT NURSE NOTE - PMH
Afib    History of thoracic aortic aneurysm repair    HLD (hyperlipidemia)    Hypertension    S/P AVR (aortic valve replacement)    S/P ICD (internal cardiac defibrillator) procedure

## 2020-01-29 NOTE — ED ADULT NURSE NOTE - OBJECTIVE STATEMENT
Presents to ED for evaluation of fever and dry cough.  Reports prior to arrival has a temperature of 102.3 which he took tylenol.  ambulated to room without difficulty, accompanied by spouse  recently seen at urgent care and told he didn't have the flu

## 2020-01-29 NOTE — ED ADULT NURSE NOTE - CHPI ED NUR SYMPTOMS NEG
no shortness of breath/no rash/no vomiting/no diarrhea/no decreased eating/drinking/no abdominal pain

## 2020-01-29 NOTE — ED ADULT TRIAGE NOTE - CHIEF COMPLAINT QUOTE
Presents to ED for evaluation of fever and dry cough.  Reports prior to arrival has a temperature of 102.3 which he took tylenol.

## 2020-01-30 VITALS
TEMPERATURE: 99 F | OXYGEN SATURATION: 100 % | HEART RATE: 73 BPM | RESPIRATION RATE: 17 BRPM | DIASTOLIC BLOOD PRESSURE: 70 MMHG | SYSTOLIC BLOOD PRESSURE: 128 MMHG

## 2020-01-30 LAB
APPEARANCE UR: CLEAR — SIGNIFICANT CHANGE UP
BILIRUB UR-MCNC: NEGATIVE — SIGNIFICANT CHANGE UP
COLOR SPEC: YELLOW — SIGNIFICANT CHANGE UP
DIFF PNL FLD: NEGATIVE — SIGNIFICANT CHANGE UP
GLUCOSE UR QL: NEGATIVE — SIGNIFICANT CHANGE UP
KETONES UR-MCNC: NEGATIVE — SIGNIFICANT CHANGE UP
LEUKOCYTE ESTERASE UR-ACNC: NEGATIVE — SIGNIFICANT CHANGE UP
NITRITE UR-MCNC: NEGATIVE — SIGNIFICANT CHANGE UP
PH UR: 5.5 — SIGNIFICANT CHANGE UP (ref 5–8)
PROT UR-MCNC: NEGATIVE MG/DL — SIGNIFICANT CHANGE UP
SP GR SPEC: >=1.03 — SIGNIFICANT CHANGE UP (ref 1–1.03)
UROBILINOGEN FLD QL: 0.2 E.U./DL — SIGNIFICANT CHANGE UP

## 2020-01-30 PROCEDURE — 71046 X-RAY EXAM CHEST 2 VIEWS: CPT

## 2020-01-30 PROCEDURE — 85025 COMPLETE CBC W/AUTO DIFF WBC: CPT

## 2020-01-30 PROCEDURE — 71046 X-RAY EXAM CHEST 2 VIEWS: CPT | Mod: 26

## 2020-01-30 PROCEDURE — 87086 URINE CULTURE/COLONY COUNT: CPT

## 2020-01-30 PROCEDURE — 83605 ASSAY OF LACTIC ACID: CPT

## 2020-01-30 PROCEDURE — 83880 ASSAY OF NATRIURETIC PEPTIDE: CPT

## 2020-01-30 PROCEDURE — 84484 ASSAY OF TROPONIN QUANT: CPT

## 2020-01-30 PROCEDURE — 96365 THER/PROPH/DIAG IV INF INIT: CPT

## 2020-01-30 PROCEDURE — 81003 URINALYSIS AUTO W/O SCOPE: CPT

## 2020-01-30 PROCEDURE — 93005 ELECTROCARDIOGRAM TRACING: CPT

## 2020-01-30 PROCEDURE — 80053 COMPREHEN METABOLIC PANEL: CPT

## 2020-01-30 PROCEDURE — 36415 COLL VENOUS BLD VENIPUNCTURE: CPT

## 2020-01-30 PROCEDURE — 87631 RESP VIRUS 3-5 TARGETS: CPT

## 2020-01-30 PROCEDURE — 87040 BLOOD CULTURE FOR BACTERIA: CPT

## 2020-01-30 PROCEDURE — 99284 EMERGENCY DEPT VISIT MOD MDM: CPT | Mod: 25

## 2020-01-30 RX ORDER — SODIUM CHLORIDE 9 MG/ML
1000 INJECTION INTRAMUSCULAR; INTRAVENOUS; SUBCUTANEOUS ONCE
Refills: 0 | Status: COMPLETED | OUTPATIENT
Start: 2020-01-30 | End: 2020-01-30

## 2020-01-30 RX ADMIN — Medication 75 MILLIGRAM(S): at 00:30

## 2020-01-30 RX ADMIN — Medication 400 MILLIGRAM(S): at 00:12

## 2020-01-30 RX ADMIN — Medication 1000 MILLIGRAM(S): at 01:30

## 2020-01-30 RX ADMIN — SODIUM CHLORIDE 1000 MILLILITER(S): 9 INJECTION INTRAMUSCULAR; INTRAVENOUS; SUBCUTANEOUS at 00:18

## 2020-01-30 RX ADMIN — SODIUM CHLORIDE 1000 MILLILITER(S): 9 INJECTION INTRAMUSCULAR; INTRAVENOUS; SUBCUTANEOUS at 01:18

## 2020-01-30 RX ADMIN — Medication 1000 MILLIGRAM(S): at 01:12

## 2020-01-30 NOTE — ED PROVIDER NOTE - CONSTITUTIONAL, MLM
normal... Weak appearing, awake, alert, oriented to person, place, time/situation and in no apparent distress.

## 2020-01-30 NOTE — ED PROVIDER NOTE - NSFOLLOWUPINSTRUCTIONS_ED_ALL_ED_FT
Influenza    WHAT YOU NEED TO KNOW:    Influenza (the flu) is an infection caused by the influenza virus. The flu is easily spread when an infected person coughs, sneezes, or has close contact with others. You may be able to spread the flu to others for 1 week or longer after signs or symptoms appear.     DISCHARGE INSTRUCTIONS:    Call your local emergency number (911 in the ) if:     You have trouble breathing, and your lips look purple or blue.      You have a seizure.    Call your doctor if:     You are dizzy, or you are urinating less or not at all.       You have a headache with a stiff neck, and you feel tired or confused.      You have new pain or pressure in your chest.      Your symptoms, such as shortness of breath, vomiting, or diarrhea, get worse.       Your symptoms, such as fever and coughing, seem to get better, but then get worse.       You have new muscle pain or weakness.      You have questions or concerns about your condition or care.    Medicines: You may need any of the following:     Acetaminophen decreases pain and fever. It is available without a doctor's order. Ask how much to take and how often to take it. Follow directions. Read the labels of all other medicines you are using to see if they also contain acetaminophen, or ask your doctor or pharmacist. Acetaminophen can cause liver damage if not taken correctly. Do not use more than 4 grams (4,000 milligrams) total of acetaminophen in one day.       NSAIDs, such as ibuprofen, help decrease swelling, pain, and fever. This medicine is available with or without a doctor's order. NSAIDs can cause stomach bleeding or kidney problems in certain people. If you take blood thinner medicine, always ask your healthcare provider if NSAIDs are safe for you. Always read the medicine label and follow directions.      Antivirals help fight a viral infection.      Take your medicine as directed. Contact your healthcare provider if you think your medicine is not helping or if you have side effects. Tell him or her if you are allergic to any medicine. Keep a list of the medicines, vitamins, and herbs you take. Include the amounts, and when and why you take them. Bring the list or the pill bottles to follow-up visits. Carry your medicine list with you in case of an emergency.    Rest as much as you can to help you recover.    Drink liquids as directed to help prevent dehydration. Ask how much liquid to drink each day and which liquids are best for you.    Prevent the spread of influenza:     Wash your hands often. Use soap and water. Wash your hands after you use the bathroom, change a child's diapers, or sneeze. Wash your hands before you prepare or eat food. Use gel hand cleanser that has 60% alcohol, when soap and water are not available. Do not touch your eyes, nose, or mouth unless you have washed your hands first.Handwashing           Cover your mouth when you sneeze or cough. Cough into a tissue or the bend of your arm. If you use a tissue, throw it away immediately and wash your hands.       Clean shared items with a germ-killing . Clean table surfaces, doorknobs, and light switches. Do not share towels, silverware, and dishes with people who are sick. Wash bed sheets, towels, silverware, and dishes with soap and water.       Wear a mask over your mouth and nose if you are sick. The face mask may help protect others from becoming infected with the flu. Wear the mask when in common areas of your home or if you seek care with a healthcare provider.       Stay away from others if you are sick. Stay at home until 24 hours after your fever and symptoms are gone.      Influenza vaccine helps prevent influenza (flu). Everyone older than 6 months should get a yearly influenza vaccine. Get the vaccine as soon as it is available, usually in September or October each year.    Follow up with your healthcare provider as directed: Write down your questions so you remember to ask them during your visits.        © Copyright [employer] 2018 All illustrations and images included in CareNotes are the copyrighted property of [employer].A[employer]M., Inc[employer] or [employer]

## 2020-01-30 NOTE — ED PROVIDER NOTE - PATIENT PORTAL LINK FT
You can access the FollowMyHealth Patient Portal offered by [facility] by registering at the following website: [email or web address]. By joining [employer]’s FollowMyHealth portal, you will also be able to view your health information using other applications (apps) compatible with our system.

## 2020-01-30 NOTE — ED PROVIDER NOTE - OBJECTIVE STATEMENT
73 y/o M with history of atrial fibrillation, HLD, and HTN presents with fever, body aches, and cough x 2 days. Pt has intermittent fevers prompting ED visit. Pt saw PMD yesterday where pt was flu and had a CXR. Both tests came back negative; however he was rx Augmentin for possible pneumonia. Pt had fever again today which prompted wife to bring pt to ED.

## 2020-01-30 NOTE — ED PROVIDER NOTE - CLINICAL SUMMARY MEDICAL DECISION MAKING FREE TEXT BOX
73 y/o M presents with flu; flu swab in ED is +. Will check labs, EKG, CXR and reassess. 75 y/o M presents with flu; flu swab in ED is +. Will check labs, EKG, CXR and reassess.  Pt given tamiflu in ED. Tylenol IV and normal saline IV.  PT well appearing in ED on re-evaluation. AAOx3 and in NAD. vitals stable. Pt given instructions to follow up with primary physician in 1-2 days, return to ED for worsening condition. pt expresses understanding.

## 2020-01-31 LAB
CULTURE RESULTS: NO GROWTH — SIGNIFICANT CHANGE UP
SPECIMEN SOURCE: SIGNIFICANT CHANGE UP

## 2020-02-04 LAB
CULTURE RESULTS: SIGNIFICANT CHANGE UP
CULTURE RESULTS: SIGNIFICANT CHANGE UP
SPECIMEN SOURCE: SIGNIFICANT CHANGE UP
SPECIMEN SOURCE: SIGNIFICANT CHANGE UP

## 2020-02-26 NOTE — HISTORY OF PRESENT ILLNESS
69-year-old female who is now 1 week status post excision of a basal cell cancer from her right breast.  Patient did well with the surgery no complaints.  Her incisions clean is healing nicely.  Pathology was a basal cell cancer was completely excised.  I went over the pathology with the patient answered all of her questions.  Patient will follow-up with dermatology for routine skin check she will follow-up with us on a as needed basis   [Atrial Fibrillation] : atrial fibrillation [Coronary Artery Disease] : coronary artery disease [Implantable Device/Pacemaker] : implantable device/pacemaker [No Adverse Anesthesia Reaction] : no adverse anesthesia reaction in self or family member [Chronic Kidney Disease] : chronic kidney disease [Recent Myocardial Infarction] : no recent myocardial infarction [Asthma] : no asthma [COPD] : no COPD [Sleep Apnea] : no sleep apnea [Smoker] : not a smoker [Chronic Anticoagulation] : no chronic anticoagulation [Moderate (4-6 METs)] : Moderate (4-6 METs) [Diabetes] : no diabetes [Anti-Platelet Agents: _____] : Anti-Platelet Agents: [unfilled] [FreeTextEntry4] : 73 y/o man with AF/VT now s/p PPM (9/19), CKD, s/p AV and MV repairs, HTN, compensated diastolic chronic HF, prediabetes is here for preoperative clearance prior to left sided cataract surgery. Right sided one done this fall and went well.\par He has been on and off Eliquis-now off and on ASA. \par Because of the VT, EP doesn't want him running any longer so he walks for exercise instead. Gets tired after 6-8 blocks of fast walking. \par Seen by me last week for acute bronchitis. Now s/p course of antibiotics. Feeling better. Still with non-productive nighttime cough. \par Has appointment for cardiac clearance on Friday. \par  [FreeTextEntry6] : dyspnea on exertion - chronic

## 2020-04-13 ENCOUNTER — APPOINTMENT (OUTPATIENT)
Dept: HEART AND VASCULAR | Facility: CLINIC | Age: 75
End: 2020-04-13

## 2020-04-27 ENCOUNTER — APPOINTMENT (OUTPATIENT)
Dept: HEART AND VASCULAR | Facility: CLINIC | Age: 75
End: 2020-04-27

## 2020-04-27 ENCOUNTER — APPOINTMENT (OUTPATIENT)
Dept: HEART AND VASCULAR | Facility: CLINIC | Age: 75
End: 2020-04-27
Payer: MEDICARE

## 2020-04-27 PROCEDURE — 99442: CPT | Mod: CR

## 2020-04-29 ENCOUNTER — APPOINTMENT (OUTPATIENT)
Dept: CARDIOTHORACIC SURGERY | Facility: CLINIC | Age: 75
End: 2020-04-29

## 2020-04-29 ENCOUNTER — APPOINTMENT (OUTPATIENT)
Dept: CARDIOTHORACIC SURGERY | Facility: CLINIC | Age: 75
End: 2020-04-29
Payer: COMMERCIAL

## 2020-04-29 PROCEDURE — 99442: CPT | Mod: CR

## 2020-05-06 ENCOUNTER — APPOINTMENT (OUTPATIENT)
Dept: HEART AND VASCULAR | Facility: CLINIC | Age: 75
End: 2020-05-06
Payer: COMMERCIAL

## 2020-05-06 PROCEDURE — 99213 OFFICE O/P EST LOW 20 MIN: CPT | Mod: 95

## 2020-05-06 NOTE — HISTORY OF PRESENT ILLNESS
[Verbal consent obtained from patient] : the patient, [unfilled] [FreeTextEntry1] : Feels well, not working. No palpitation, dizziness or lightheadedness

## 2020-07-13 ENCOUNTER — APPOINTMENT (OUTPATIENT)
Dept: INTERNAL MEDICINE | Facility: CLINIC | Age: 75
End: 2020-07-13
Payer: COMMERCIAL

## 2020-07-13 VITALS
HEIGHT: 70 IN | TEMPERATURE: 97.8 F | BODY MASS INDEX: 26.2 KG/M2 | SYSTOLIC BLOOD PRESSURE: 124 MMHG | OXYGEN SATURATION: 96 % | DIASTOLIC BLOOD PRESSURE: 80 MMHG | HEART RATE: 57 BPM | WEIGHT: 183 LBS

## 2020-07-13 DIAGNOSIS — M79.2 NEURALGIA AND NEURITIS, UNSPECIFIED: ICD-10-CM

## 2020-07-13 DIAGNOSIS — Z87.898 PERSONAL HISTORY OF OTHER SPECIFIED CONDITIONS: ICD-10-CM

## 2020-07-13 DIAGNOSIS — R25.1 TREMOR, UNSPECIFIED: ICD-10-CM

## 2020-07-13 DIAGNOSIS — R50.9 FEVER, UNSPECIFIED: ICD-10-CM

## 2020-07-13 DIAGNOSIS — Z92.29 PERSONAL HISTORY OF OTHER DRUG THERAPY: ICD-10-CM

## 2020-07-13 DIAGNOSIS — H26.9 UNSPECIFIED CATARACT: ICD-10-CM

## 2020-07-13 PROCEDURE — G0439: CPT

## 2020-07-13 PROCEDURE — 36415 COLL VENOUS BLD VENIPUNCTURE: CPT

## 2020-07-13 RX ORDER — AZITHROMYCIN 250 MG/1
250 TABLET, FILM COATED ORAL
Qty: 6 | Refills: 2 | Status: COMPLETED | COMMUNITY
Start: 2020-04-13 | End: 2020-07-13

## 2020-07-13 RX ORDER — GUAIFENESIN AND CODEINE PHOSPHATE 10; 100 MG/5ML; MG/5ML
100-10 SOLUTION ORAL
Qty: 150 | Refills: 0 | Status: COMPLETED | COMMUNITY
Start: 2020-01-16 | End: 2020-07-13

## 2020-07-13 RX ORDER — AMOXICILLIN AND CLAVULANATE POTASSIUM 875; 125 MG/1; MG/1
875-125 TABLET, COATED ORAL
Qty: 14 | Refills: 0 | Status: COMPLETED | COMMUNITY
Start: 2020-01-28 | End: 2020-07-13

## 2020-07-13 NOTE — ASSESSMENT
[FreeTextEntry1] : 74 year is here for Medicare annual wellness exam. his cognitive function was normal. Please refer to an appropriate section above for a list of providers that are regularly involved in the patient's care. See above for full details of history and physical. The patient's care team was reviewed and documented above. Listed above are the preventative services for which the patient may be due. I informed the patient with a list and offered assistance in scheduling the appropriate exams.\par Labs sent including COVID ab. He may have had it in late January when he was so sick.

## 2020-07-13 NOTE — HEALTH RISK ASSESSMENT
[Good] : ~his/her~  mood as  good [No falls in past year] : Patient reported no falls in the past year [0] : 2) Feeling down, depressed, or hopeless: Not at all (0) [[id number]] : 0

## 2020-07-14 LAB
25(OH)D3 SERPL-MCNC: 51.7 NG/ML
ALBUMIN SERPL ELPH-MCNC: 4.4 G/DL
ALP BLD-CCNC: 47 U/L
ALT SERPL-CCNC: 10 U/L
ANION GAP SERPL CALC-SCNC: 10 MMOL/L
AST SERPL-CCNC: 18 U/L
BASOPHILS # BLD AUTO: 0.03 K/UL
BASOPHILS NFR BLD AUTO: 0.6 %
BILIRUB SERPL-MCNC: 0.5 MG/DL
BUN SERPL-MCNC: 21 MG/DL
CALCIUM SERPL-MCNC: 9.7 MG/DL
CHLORIDE SERPL-SCNC: 102 MMOL/L
CHOLEST SERPL-MCNC: 240 MG/DL
CHOLEST/HDLC SERPL: 3.5 RATIO
CO2 SERPL-SCNC: 27 MMOL/L
CREAT SERPL-MCNC: 1.29 MG/DL
EOSINOPHIL # BLD AUTO: 0.06 K/UL
EOSINOPHIL NFR BLD AUTO: 1.2 %
ESTIMATED AVERAGE GLUCOSE: 131 MG/DL
GLUCOSE SERPL-MCNC: 97 MG/DL
HBA1C MFR BLD HPLC: 6.2 %
HCT VFR BLD CALC: 45.7 %
HDLC SERPL-MCNC: 69 MG/DL
HGB BLD-MCNC: 14.3 G/DL
IMM GRANULOCYTES NFR BLD AUTO: 0.2 %
LDLC SERPL CALC-MCNC: 162 MG/DL
LYMPHOCYTES # BLD AUTO: 2.88 K/UL
LYMPHOCYTES NFR BLD AUTO: 56.7 %
MAN DIFF?: NORMAL
MCHC RBC-ENTMCNC: 31.3 GM/DL
MCHC RBC-ENTMCNC: 31.8 PG
MCV RBC AUTO: 101.6 FL
MONOCYTES # BLD AUTO: 0.62 K/UL
MONOCYTES NFR BLD AUTO: 12.2 %
NEUTROPHILS # BLD AUTO: 1.48 K/UL
NEUTROPHILS NFR BLD AUTO: 29.1 %
PLATELET # BLD AUTO: 202 K/UL
POTASSIUM SERPL-SCNC: 5.4 MMOL/L
PROT SERPL-MCNC: 7.7 G/DL
RBC # BLD: 4.5 M/UL
RBC # FLD: 13.6 %
SARS-COV-2 IGG SERPL IA-ACNC: <3.8 AU/ML
SARS-COV-2 IGG SERPL QL IA: NEGATIVE
SODIUM SERPL-SCNC: 139 MMOL/L
TRIGL SERPL-MCNC: 45 MG/DL
TSH SERPL-ACNC: 1.41 UIU/ML
VIT B12 SERPL-MCNC: 476 PG/ML
WBC # FLD AUTO: 5.08 K/UL

## 2020-09-16 ENCOUNTER — APPOINTMENT (OUTPATIENT)
Dept: HEART AND VASCULAR | Facility: CLINIC | Age: 75
End: 2020-09-16
Payer: MEDICARE

## 2020-09-16 PROCEDURE — G0008: CPT

## 2020-09-16 PROCEDURE — 90662 IIV NO PRSV INCREASED AG IM: CPT

## 2020-09-28 ENCOUNTER — APPOINTMENT (OUTPATIENT)
Age: 75
End: 2020-09-28
Payer: COMMERCIAL

## 2020-09-28 VITALS
RESPIRATION RATE: 14 BRPM | HEIGHT: 70 IN | HEART RATE: 71 BPM | BODY MASS INDEX: 26.34 KG/M2 | WEIGHT: 184 LBS | OXYGEN SATURATION: 97 % | SYSTOLIC BLOOD PRESSURE: 130 MMHG | TEMPERATURE: 97.8 F | DIASTOLIC BLOOD PRESSURE: 90 MMHG

## 2020-09-28 PROCEDURE — 99204 OFFICE O/P NEW MOD 45 MIN: CPT

## 2020-09-30 NOTE — PHYSICAL EXAM
[General Appearance - Alert] : alert [General Appearance - In No Acute Distress] : in no acute distress [General Appearance - Well Nourished] : well nourished [Sclera] : the sclera and conjunctiva were normal [Outer Ear] : the ears and nose were normal in appearance [Oropharynx] : the oropharynx was normal [Neck Appearance] : the appearance of the neck was normal [Neck Cervical Mass (___cm)] : no neck mass was observed [Heart Rate And Rhythm] : heart rate was normal and rhythm regular [Edema] : there was no peripheral edema [Bowel Sounds] : normal bowel sounds [Abdomen Soft] : soft [Abdomen Tenderness] : non-tender [Internal Hemorrhoid] : internal hemorrhoids [Abnormal Walk] : normal gait [Skin Color & Pigmentation] : normal skin color and pigmentation [Skin Turgor] : normal skin turgor [] : no rash [Oriented To Time, Place, And Person] : oriented to person, place, and time [Impaired Insight] : insight and judgment were intact [Affect] : the affect was normal

## 2020-09-30 NOTE — ASSESSMENT
[FreeTextEntry1] : 75M with PMHx afib AF/VT now s/p PPM (9/19), CKD, s/p AV and MV repairs, HTN, compensated diastolic chronic HF, prediabetes referred by Dr. Chandler for evaluation of rectal pain. \par \par Internal hemorrhoid \par - likely cause of rectal pain \par - advised to do daily sitz baths\par - calmol 4 suppositories and Colace 200mg at bedtime \par - due for next cscope in 2027 only if patient requests \par \par f/u PRN \par \par Laureen Minaya NP

## 2020-10-15 ENCOUNTER — NON-APPOINTMENT (OUTPATIENT)
Age: 75
End: 2020-10-15

## 2020-10-15 ENCOUNTER — APPOINTMENT (OUTPATIENT)
Dept: HEART AND VASCULAR | Facility: CLINIC | Age: 75
End: 2020-10-15
Payer: MEDICARE

## 2020-10-15 VITALS
TEMPERATURE: 96.3 F | OXYGEN SATURATION: 98 % | HEART RATE: 61 BPM | BODY MASS INDEX: 26.23 KG/M2 | HEIGHT: 70 IN | DIASTOLIC BLOOD PRESSURE: 90 MMHG | SYSTOLIC BLOOD PRESSURE: 130 MMHG | WEIGHT: 183.25 LBS

## 2020-10-15 PROCEDURE — 99214 OFFICE O/P EST MOD 30 MIN: CPT

## 2020-10-15 PROCEDURE — 93000 ELECTROCARDIOGRAM COMPLETE: CPT

## 2020-10-15 NOTE — REASON FOR VISIT
[FreeTextEntry1] : 72-year-old male with history of high cholesterol, aortic aneurysm thoracic, hypertension, lumbago, pre-diabetes, Valve disease (status post AVR, MVrepair Dr. Caio Louise 9/9/2014). S/P  colonoscopy with Dr. Maximilian Cortez 9/14/17. Doing well overall, Denies CP, SOB, palpitations, orthopnea, LE swelling, dizziness, syncope. Walking and running daily, sometimes >10 miles, training for [place] in 2018 after skipping 2017. \par \par s/p dental extraction and implant. Reports during dental cleaning he was told he bled a lot and dentist would prefer he stop aspirin for future dental extraction and implant. \par Training for [employer], race walked it without complications Nov 2018.\par \par EKG: NSR @ 46 with 1st degree AVB. LAHB, ST-Tw abnormalities. Blocked APC, V paced x 2 beats Pacer set at 40 1/22/19\par EKG: A Pacing, PVCs, 1st degree AVB, with a LAHB, possible IWMI, QTc 429 ST-Twave abnormalities.  10/10/19\par \par 3/18/19 A Fib discovered by Dr Jeff on 3/13/19, ASA changed to Eliquis\par 5/6/19 Pt with atypical pain in the axilla on the left, he thinks its the Eliquis.  No swelling or ecchymosis reported\par 7/15/19 K 5.5, Telmisartin reduced to 20mg. BP excellent.  c/o severe right shoulder pain.  Previously injected with relief.\par 9/23/19 Adm to  [facility]) with syncope and NSVT.  Trans to [facility].  VT ablation and Mexitil started.\par 10/10/19  Pt c/o chest  pressure, when questioned its sub Xiphoid.  Eating makes it better\par \par 11/14/19  New Deep Tw inversions, ? post pacing.  CCTA Nov 1 clean. Recently had a lot of "stomach" pains.  I suspect he has PUD, Trop sent, echo ordered\par 1/24/20 Here with several weeks of a cold/URI, saw an MD and given Z westley.  Here with wheezing, reduced exercise tolerance. + Wheezing, SOB, mild cough, non-productive, no fever or chills.\par Also here for clearance for cataract\par 10/15/20 Fullness in subxiphoid area.  (-) CTA Nov 2020.  Also has  off statin.

## 2020-10-15 NOTE — ASSESSMENT
[FreeTextEntry1] : Valve disease: clean coronaries on cardiac cath in 2014. status post AVR, MV repair Dr. Caio Louise 9/9/2014) SBE prophylaxis. okay to stop ASA 1 week before dental extraction and implant. EKG with 1 PVC. Echo done 7/2018. EF 40%  Valves OK.  Echo update Nov 2019 and unchanged.\par \par URI(viral) with wheezing-   will manage with a SA-BD\par \par Chest/abd pain- will treat with Protonix x 30 days.  New EKG changes 11/14/19 not seen before, deep Tw inversions, QTc 474.  I suspect post pacing artifact, remotely  PUD, Trop sent, echo ordered.  Trop NL, EF and wall motion normal on echo. CCTA done Nov 1st, 2019 is clean. No coronary disease.  More upper abd discomfort, will give a trial of protonix.\par \par PreOP- complex pt but cleared for dental work.  Has h/o VT to no epinephrine.  Has AICD so no cautery unless AICD is shut off to avoid inappropriate shocks.  Needs SBE prophylaxis for dental work only. Pt s cleared for cataract surgery\par \par PAF- changed from ASA to Eliquis by Dr ARIZMENDI.  No signs of trauma or bleeding on exam. Pt reassured that the Eliquis is not causing the pain.  Off Eliquis by Dr ARIZMENDI due to not feeling well  \par \par VT- has non-sustained, i spoke to Dr Palencia who does not want him to run the [facility]. There is  a VT and syncope/sudden death risk, also has a thoracic aneurysm.  I discussed this with him and his wife, walking the Marathon is OK but no jogging.  He walked it and did well. Now seeing Dr Jeff.  Had syncope and adm to [facility], trans to [facility].  VT RFA, Sotolol changed to Mexitil Sept 2019.  CAN RETURN TO WORK NEXT MONDAY\par \par Aortic aneurysm: Enlarged. 42 (ascending) 47 (descending) in 2014\par Followup in 2015 reveals a max Ao of 46 mm\par CT angiogram 3/6/2017 aneurysm unchanged\par annual CT, due 3/2018.  Aorta 46mm April 2018, referred back to Dr Garcia,  48mm on CT 2019\par \par HLD: on pravastatin 40mg, had muscle aches on 80mg,  8/2017, pt reported he was only taking it once in a while, now taking it daily will repeat lipid panel. Diet and exercise discussed in detail.   Feb 2018,  July 2020. sTARTING cRESTOR 10 qod\par \par HTN: Have DC Lasix and changed to HCTZ weekly.  BP very good, Reduce Micardis  40 for persistent elevated K.  Increase HCTZ 25 to M & F. BP OK.  Micardis now at 20mg\par \par CKD: 1.33 8/8/2017, repeat BMP Sept 2017, Cr 1.05, previously normal, on ARB .  K better

## 2020-10-15 NOTE — HISTORY OF PRESENT ILLNESS
[FreeTextEntry1] : PCP- Dr Sandhya Bloom\par EP- Dr Jeff\par CTS- Dr Garcia\par Plastics- Dr Ko\par Hand- Dr Xiong\par GI- Dr Cortez, colonoscopy Sept 2017\par Dentist: Dr. Jorge Oh  \par Ophtho- Dr Jesus Lantigua [phone]

## 2020-10-15 NOTE — PHYSICAL EXAM
[General Appearance - Well Developed] : well developed [Normal Appearance] : normal appearance [Well Groomed] : well groomed [General Appearance - Well Nourished] : well nourished [No Deformities] : no deformities [General Appearance - In No Acute Distress] : no acute distress [Normal Conjunctiva] : the conjunctiva exhibited no abnormalities [Eyelids - No Xanthelasma] : the eyelids demonstrated no xanthelasmas [Normal Oral Mucosa] : normal oral mucosa [No Oral Pallor] : no oral pallor [No Oral Cyanosis] : no oral cyanosis [Respiration, Rhythm And Depth] : normal respiratory rhythm and effort [Exaggerated Use Of Accessory Muscles For Inspiration] : no accessory muscle use [Auscultation Breath Sounds / Voice Sounds] : lungs were clear to auscultation bilaterally [Heart Sounds] : normal S1 and S2 [Heart Rate And Rhythm] : heart rate and rhythm were normal [Edema] : no peripheral edema present [Arterial Pulses Normal] : the arterial pulses were normal [Abdomen Soft] : soft [Abdomen Tenderness] : non-tender [Abdomen Mass (___ Cm)] : no abdominal mass palpated [Abnormal Walk] : normal gait [Gait - Sufficient For Exercise Testing] : the gait was sufficient for exercise testing [Nail Clubbing] : no clubbing of the fingernails [Cyanosis, Localized] : no localized cyanosis [Petechial Hemorrhages (___cm)] : no petechial hemorrhages [Skin Color & Pigmentation] : normal skin color and pigmentation [] : no rash [No Venous Stasis] : no venous stasis [Skin Lesions] : no skin lesions [No Skin Ulcers] : no skin ulcer [No Xanthoma] : no  xanthoma was observed [Oriented To Time, Place, And Person] : oriented to person, place, and time [Affect] : the affect was normal [Mood] : the mood was normal [No Anxiety] : not feeling anxious [FreeTextEntry1] : +3/6 systolic murmur

## 2020-10-28 ENCOUNTER — LABORATORY RESULT (OUTPATIENT)
Age: 75
End: 2020-10-28

## 2020-10-28 ENCOUNTER — APPOINTMENT (OUTPATIENT)
Dept: HEART AND VASCULAR | Facility: CLINIC | Age: 75
End: 2020-10-28
Payer: MEDICARE

## 2020-10-28 VITALS
WEIGHT: 182 LBS | HEART RATE: 65 BPM | DIASTOLIC BLOOD PRESSURE: 89 MMHG | HEIGHT: 70 IN | SYSTOLIC BLOOD PRESSURE: 138 MMHG | TEMPERATURE: 97.9 F | BODY MASS INDEX: 26.05 KG/M2

## 2020-10-28 LAB
ALBUMIN SERPL ELPH-MCNC: 4.2 G/DL
ALP BLD-CCNC: 45 U/L
ALT SERPL-CCNC: 11 U/L
ANION GAP SERPL CALC-SCNC: 11 MMOL/L
AST SERPL-CCNC: 20 U/L
BILIRUB DIRECT SERPL-MCNC: 0.1 MG/DL
BILIRUB INDIRECT SERPL-MCNC: 0.4 MG/DL
BILIRUB SERPL-MCNC: 0.5 MG/DL
BUN SERPL-MCNC: 28 MG/DL
CALCIUM SERPL-MCNC: 9.6 MG/DL
CHLORIDE SERPL-SCNC: 103 MMOL/L
CO2 SERPL-SCNC: 26 MMOL/L
CREAT SERPL-MCNC: 1.36 MG/DL
GLUCOSE SERPL-MCNC: 116 MG/DL
MAGNESIUM SERPL-MCNC: 2.3 MG/DL
POTASSIUM SERPL-SCNC: 5.9 MMOL/L
PROT SERPL-MCNC: 7.8 G/DL
SODIUM SERPL-SCNC: 140 MMOL/L
TSH SERPL-ACNC: 1.69 UIU/ML

## 2020-10-28 PROCEDURE — 99215 OFFICE O/P EST HI 40 MIN: CPT | Mod: 25

## 2020-10-28 PROCEDURE — 93283 PRGRMG EVAL IMPLANTABLE DFB: CPT

## 2020-11-02 NOTE — PHYSICAL EXAM
Referring provider: Debra Blanc PA-C    Augustine Kingston was seen 11/02/2020 for an audiological evaluation.  Patient complains of left ear fullness x 2 weeks. Denies any pain or drainage. He states hearing is muffled and slightly decreased.      Audiogram was tested via headphones due to scant non-occluding cerumen. Results reveal normal hearing with a mild sensorineural 6000 Hz notch for the right ear, and normal hearing with a mild conductive component 125-500 Hz sloping to a mild sensorineural 0706-2393 Hz loss for the left ear.   Speech was not tested. Tympanograms were Type A, normal for the right ear and Type A, normal for the left ear.    Patient was counseled on the above findings.    Recommendations:  1. ENT             [General Appearance - Well Developed] : well developed [Normal Appearance] : normal appearance [No Deformities] : no deformities [Well Groomed] : well groomed [General Appearance - Well Nourished] : well nourished [General Appearance - In No Acute Distress] : no acute distress [Heart Rate And Rhythm] : heart rate and rhythm were normal [Heart Sounds] : normal S1 and S2 [Edema] : no peripheral edema present [Auscultation Breath Sounds / Voice Sounds] : lungs were clear to auscultation bilaterally [Respiration, Rhythm And Depth] : normal respiratory rhythm and effort [Exaggerated Use Of Accessory Muscles For Inspiration] : no accessory muscle use [Clean] : clean [Left Infraclavicular] : left infraclavicular area [Well-Healed] : well-healed [Dry] : dry [] : no ischemic changes [Normal Conjunctiva] : the conjunctiva exhibited no abnormalities [Normal Jugular Venous V Waves Present] : normal jugular venous V waves present [Normal Oral Mucosa] : normal oral mucosa [Abnormal Walk] : normal gait [Skin Color & Pigmentation] : normal skin color and pigmentation [Gait - Sufficient For Exercise Testing] : the gait was sufficient for exercise testing [Oriented To Time, Place, And Person] : oriented to person, place, and time [Affect] : the affect was normal [Mood] : the mood was normal [No Anxiety] : not feeling anxious [Normal Oropharynx] : normal oropharynx [Palpable Crepitus] : no palpable crepitus [Bleeding] : no active bleeding [Foul Odor] : no foul smell [Purulent Drainage] : no purulent drainage [Serosanguineous Drainage] : no serosanquineous drainage [Serous Drainage] : no serous drainage [Erythema] : not erythematous [Warm] : not warm [Tender] : not tender [Indurated] : not indurated [Fluctuant] : not fluctuant

## 2020-11-03 NOTE — PHYSICAL EXAM
[General Appearance - Well Developed] : well developed [Normal Appearance] : normal appearance [Well Groomed] : well groomed [General Appearance - Well Nourished] : well nourished [No Deformities] : no deformities [General Appearance - In No Acute Distress] : no acute distress [Heart Rate And Rhythm] : heart rate and rhythm were normal [Heart Sounds] : normal S1 and S2 [Edema] : no peripheral edema present [Respiration, Rhythm And Depth] : normal respiratory rhythm and effort [Exaggerated Use Of Accessory Muscles For Inspiration] : no accessory muscle use [Left Infraclavicular] : left infraclavicular area [Clean] : clean [Dry] : dry [Well-Healed] : well-healed [] : no ischemic changes [Normal Conjunctiva] : the conjunctiva exhibited no abnormalities [Normal Oral Mucosa] : normal oral mucosa [Normal Oropharynx] : normal oropharynx [Normal Jugular Venous V Waves Present] : normal jugular venous V waves present [Abnormal Walk] : normal gait [Gait - Sufficient For Exercise Testing] : the gait was sufficient for exercise testing [Skin Color & Pigmentation] : normal skin color and pigmentation [Oriented To Time, Place, And Person] : oriented to person, place, and time [Affect] : the affect was normal [Mood] : the mood was normal [No Anxiety] : not feeling anxious [Palpable Crepitus] : no palpable crepitus [Bleeding] : no active bleeding [Foul Odor] : no foul smell [Purulent Drainage] : no purulent drainage [Serosanguineous Drainage] : no serosanquineous drainage [Serous Drainage] : no serous drainage [Erythema] : not erythematous [Warm] : not warm [Tender] : not tender [Indurated] : not indurated [Fluctuant] : not fluctuant

## 2020-11-03 NOTE — REVIEW OF SYSTEMS
[see HPI] : see HPI [Negative] : Heme/Lymph [Fever] : no fever [Recent Weight Gain (___ Lbs)] : no recent weight gain [Chills] : no chills [Feeling Fatigued] : not feeling fatigued [Recent Weight Loss (___ Lbs)] : no recent weight loss [Shortness Of Breath] : no shortness of breath [Dyspnea on exertion] : not dyspnea during exertion [Chest Pain] : no chest pain [Lower Ext Edema] : no extremity edema [Palpitations] : no palpitations

## 2020-11-03 NOTE — DISCUSSION/SUMMARY
[Pacemaker Function Normal] : normal pacemaker function [FreeTextEntry1] : 75 year old male with HTN, HLD, aortic aneurysm thoracic, AVR and mitral valve repair in 2014, newly diagnosed paroxysmal atrial fibrillation, PVCs s/p ablation of great cardiac vein 9/2019 and ICD, who presents for follow up.   Device interrogation reveals normal function.  All measured data is within normal limits. No recurrent atrial fibrillation since his last check.  He has stopped  Eliquis for unclear reasons but is not amenable to restarting oral anticoagulation.  He understands that if he has recurrent atrial fibrillation he is at risk for stroke and would like to continue with observation.  Episodes of close coupled PVCs and NSVT.  I have increased his base rate to 80 to try and decrease this.  I have sent him for blood work to check his electrolytes and started him on magnesium.    He will follow up in 1-2 weeks or sooner if needed.    He knows to call with any questions or concerns.   \par

## 2020-11-03 NOTE — HISTORY OF PRESENT ILLNESS
[Palpitations] : no palpitations [SOB] : no dyspnea [Syncope] : no syncope [Dizziness] : no dizziness [Chest Pain] : no chest pain or discomfort [Shoulder Pain] : no shoulder pain [Pain at Site] : no pain at device site [Erythema at Site] : no erythema at device site [Swelling at Site] : no swelling at device site [FreeTextEntry1] : 75 year old male with HTN, HLD, aortic aneurysm thoracic, AVR and mitral valve repair in 2014,  paroxysmal atrial fibrillation, PVCs and ICD s/p recent PVC ablation,, who presents for follow up.\par \par He states that he had a ICD placed after his AVR.  He normally follows up with Dr. Palencia; whose office recently moved and he transferred care here.   He is on Sotalol for history of PVCs and  NSVT; however he had short bursts of VT/torsades and it was felt the sotalol was proarrhythmic and stopped.  He was placed on Metoprolol.  He had 7101 PVCs on recent holter monitor with short bursts of NSVT.  \par \par He was admitted to [facility] 9/2019 with syncope correlated with VT and Vfib.  No therapy needed as he spontaneously converted.  One episode of afib, but overall burden extremely low.  Cath with mildly obstructive CAD.  He also underwent an EP study with PVC ablation from great cardiac vein.  HE was discharged on Mexiletine.  \par  \par He presents for follow up today and overall feels well.  He states that he stopped Eliquis because he "didn’t like the way it made him feel".  He feels well currently.  No ICD shocks.  NO SOB at rest.  No chest pain, palpitations, syncope, near syncope.  No device related complaints.  Tolerating ELiquis.  \par \par \par

## 2020-11-03 NOTE — ADDENDUM
[FreeTextEntry1] : K elevated, last EF ok - called and asked to hold ARB for now and followup up for blood work

## 2020-11-03 NOTE — PROCEDURE
[No] : not [NSR] : normal sinus rhythm [ICD] : Implantable cardioverter-defibrillator [DDD] : DDD [Threshold Testing Performed] : Threshold testing was performed [Lead Imp:  ___ohms] : lead impedance was [unfilled] ohms [Sensing Amplitude ___mv] : sensing amplitude was [unfilled] mv [___V @] : [unfilled] V [___ ms] : [unfilled] ms [de-identified] : [place] [de-identified] : Ellipse [de-identified] : [id number] [de-identified] : 4/2014 [de-identified] : 60/110 [de-identified] : 2.8-3.2 years [de-identified] :  increased base rate to 80\par AV delay 250   DDD 50\par changed to DDD 70 AV delay 250 [de-identified] : AP 29%\par  21%\par No recurrent afib\par episodes of NSVT\par shock impedance 52

## 2021-01-02 ENCOUNTER — RX RENEWAL (OUTPATIENT)
Age: 76
End: 2021-01-02

## 2021-01-11 ENCOUNTER — RX RENEWAL (OUTPATIENT)
Age: 76
End: 2021-01-11

## 2021-01-15 ENCOUNTER — NON-APPOINTMENT (OUTPATIENT)
Age: 76
End: 2021-01-15

## 2021-01-15 ENCOUNTER — APPOINTMENT (OUTPATIENT)
Dept: HEART AND VASCULAR | Facility: CLINIC | Age: 76
End: 2021-01-15
Payer: COMMERCIAL

## 2021-01-15 VITALS
SYSTOLIC BLOOD PRESSURE: 120 MMHG | HEART RATE: 61 BPM | HEIGHT: 70 IN | TEMPERATURE: 98.6 F | BODY MASS INDEX: 26.34 KG/M2 | OXYGEN SATURATION: 95 % | WEIGHT: 184 LBS | DIASTOLIC BLOOD PRESSURE: 90 MMHG

## 2021-01-15 PROCEDURE — 99214 OFFICE O/P EST MOD 30 MIN: CPT

## 2021-01-15 PROCEDURE — 93000 ELECTROCARDIOGRAM COMPLETE: CPT

## 2021-01-15 PROCEDURE — 36415 COLL VENOUS BLD VENIPUNCTURE: CPT

## 2021-01-15 RX ORDER — TELMISARTAN 20 MG/1
20 TABLET ORAL
Qty: 90 | Refills: 3 | Status: DISCONTINUED | COMMUNITY
Start: 2019-06-11 | End: 2021-01-15

## 2021-01-15 NOTE — REASON FOR VISIT
[FreeTextEntry1] : 72-year-old male with history of high cholesterol, aortic aneurysm thoracic, hypertension, lumbago, pre-diabetes, Valve disease (status post AVR, MVrepair Dr. Caio Louise 9/9/2014). S/P  colonoscopy with Dr. Maximilian Cortez 9/14/17. Doing well overall, Denies CP, SOB, palpitations, orthopnea, LE swelling, dizziness, syncope. Walking and running daily, sometimes >10 miles, training for [place] marathon in 2018 after skipping 2017. \par \par s/p dental extraction and implant. Reports during dental cleaning he was told he bled a lot and dentist would prefer he stop aspirin for future dental extraction and implant. \par Training for "[employer] [place], race walked it without complications Nov 2018.\par \par EKG: NSR @ 46 with 1st degree AVB. LAHB, ST-Tw abnormalities. Blocked APC, V paced x 2 beats Pacer set at 40 1/22/19\par EKG: A Pacing, PVCs, 1st degree AVB, with a LAHB, possible IWMI, QTc 429 ST-Twave abnormalities.  10/10/19\par \par 3/18/19 A Fib discovered by Dr Jeff on 3/13/19, ASA changed to Eliquis\par 5/6/19 Pt with atypical pain in the axilla on the left, he thinks its the Eliquis.  No swelling or ecchymosis reported\par 7/15/19 K 5.5, Telmisartin reduced to 20mg. BP excellent.  c/o severe right shoulder pain.  Previously injected with relief.\par 9/23/19 Adm to  [facility]) with syncope and NSVT.  Trans to [facility].  VT ablation and Mexitil started.\par 10/10/19  Pt c/o chest  pressure, when questioned its sub Xiphoid.  Eating makes it better\par \par 11/14/19  New Deep Tw inversions, ? post pacing.  CCTA Nov 1 clean. Recently had a lot of "stomach" pains.  I suspect he has PUD, Trop sent, echo ordered\par 1/24/20 Here with several weeks of a cold/URI, saw an MD and given Z westley.  Here with wheezing, reduced exercise tolerance. + Wheezing, SOB, mild cough, non-productive, no fever or chills.\par Also here for clearance for cataract\par 10/15/20 Fullness in subxiphoid area.  (-) CTA Nov 2020.  Also has  off statin.\par 1/15/21 ARB DCed due to elevated Cr and K of 5.9, not taking Crestor due to nausea, knee pain, back pain.  More MONTEJO noted, will need to reassess Ao V\par

## 2021-01-15 NOTE — PHYSICAL EXAM
[General Appearance - Well Developed] : well developed [Normal Appearance] : normal appearance [Well Groomed] : well groomed [General Appearance - Well Nourished] : well nourished [No Deformities] : no deformities [General Appearance - In No Acute Distress] : no acute distress [Normal Conjunctiva] : the conjunctiva exhibited no abnormalities [Eyelids - No Xanthelasma] : the eyelids demonstrated no xanthelasmas [Normal Oral Mucosa] : normal oral mucosa [No Oral Pallor] : no oral pallor [No Oral Cyanosis] : no oral cyanosis [Respiration, Rhythm And Depth] : normal respiratory rhythm and effort [Exaggerated Use Of Accessory Muscles For Inspiration] : no accessory muscle use [Auscultation Breath Sounds / Voice Sounds] : lungs were clear to auscultation bilaterally [Heart Rate And Rhythm] : heart rate and rhythm were normal [Heart Sounds] : normal S1 and S2 [Arterial Pulses Normal] : the arterial pulses were normal [Edema] : no peripheral edema present [Abdomen Soft] : soft [Abdomen Tenderness] : non-tender [Abdomen Mass (___ Cm)] : no abdominal mass palpated [Abnormal Walk] : normal gait [Gait - Sufficient For Exercise Testing] : the gait was sufficient for exercise testing [Cyanosis, Localized] : no localized cyanosis [Nail Clubbing] : no clubbing of the fingernails [Petechial Hemorrhages (___cm)] : no petechial hemorrhages [Skin Color & Pigmentation] : normal skin color and pigmentation [] : no rash [No Venous Stasis] : no venous stasis [Skin Lesions] : no skin lesions [No Skin Ulcers] : no skin ulcer [No Xanthoma] : no  xanthoma was observed [Oriented To Time, Place, And Person] : oriented to person, place, and time [Affect] : the affect was normal [Mood] : the mood was normal [No Anxiety] : not feeling anxious [FreeTextEntry1] : No JVD or bruits

## 2021-01-15 NOTE — HISTORY OF PRESENT ILLNESS
[FreeTextEntry1] : PCP- Dr Sandhya Bloom\par EP- Dr Jeff\par CTS- Dr Garcia\par Plastics- Dr Ko\par Hand- Dr Xiong\par GI- Dr Cortez, colonoscopy Sept 2017\par Dentist: Dr. Jorge Oh  \par Ophtho- Dr Jesus Lantigua [phone]

## 2021-01-15 NOTE — ASSESSMENT
[FreeTextEntry1] : Valve disease: clean coronaries on cardiac cath in 2014. status post AVR, MV repair Dr. Caio Louise 9/9/2014) SBE prophylaxis. okay to stop ASA 1 week before dental extraction and implant. EKG with 1 PVC. Echo done 7/2018. EF 40%  Valves OK.  Echo update Nov 2019 and unchanged.\par \par URI(viral) with wheezing-   will manage with a SA-BD\par \par Chest/abd pain- will treat with Protonix x 30 days.  New EKG changes 11/14/19 not seen before, deep Tw inversions, QTc 474.  I suspect post pacing artifact, remotely  PUD, Trop sent, echo ordered.  Trop NL, EF and wall motion normal on echo. CCTA done Nov 1st, 2019 is clean. No coronary disease.  More upper abd discomfort, will give a trial of protonix.\par \par PreOP- complex pt but cleared for dental work.  Has h/o VT to no epinephrine.  Has AICD so no cautery unless AICD is shut off to avoid inappropriate shocks.  Needs SBE prophylaxis for dental work only. Pt s cleared for cataract surgery\par \par PAF- changed from ASA to Eliquis by Dr ARIZMENDI.  No signs of trauma or bleeding on exam. Pt reassured that the Eliquis is not causing the pain.  Off Eliquis by Dr ARIZMENDI due to not feeling well  \par \par VT- has non-sustained, i spoke to Dr Palencia who does not want him to run the [facility]. There is  a VT and syncope/sudden death risk, also has a thoracic aneurysm.  I discussed this with him and his wife, walking the [place] is OK but no jogging.  He walked it and did well. Now seeing Dr Jeff.  Had syncope and adm to [facility], trans to [facility].  VT RFA, Sotolol changed to Mexitil Sept 2019.  CAN RETURN TO WORK NEXT MONDAY\par \par Aortic aneurysm: Enlarged. 42 (ascending) 47 (descending) in 2014\par Followup in 2015 reveals a max Ao of 46 mm\par CT angiogram 3/6/2017 aneurysm unchanged\par annual CT, due 3/2018.  Aorta 46mm April 2018, referred back to Dr Garcia,  48mm on CT 2019\par \par HLD: on pravastatin 40mg, had muscle aches on 80mg,  8/2017, pt reported he was only taking it once in a while, now taking it daily will repeat lipid panel. Diet and exercise discussed in detail.   Feb 2018,  July 2020. sTARTING cRESTOR 10 qod\par \par HTN: Have DC Lasix and changed to HCTZ weekly.  BP very good, Reduce Micardis  40 for persistent elevated K.  Increase HCTZ 25 to M & F. BP OK.  Micardis now at 20mg, DCed due to elevated Cr and K\par \par CKD: 1.33 8/8/2017, repeat BMP Sept 2017, Cr 1.05, previously normal, on ARB  1.31 and K 5.9.

## 2021-01-20 LAB
ALBUMIN SERPL ELPH-MCNC: 4.3 G/DL
ALP BLD-CCNC: 45 U/L
ALT SERPL-CCNC: 13 U/L
ANION GAP SERPL CALC-SCNC: 12 MMOL/L
AST SERPL-CCNC: 19 U/L
BASOPHILS # BLD AUTO: 0.02 K/UL
BASOPHILS NFR BLD AUTO: 0.3 %
BILIRUB SERPL-MCNC: 0.8 MG/DL
BUN SERPL-MCNC: 21 MG/DL
CALCIUM SERPL-MCNC: 9.9 MG/DL
CHLORIDE SERPL-SCNC: 106 MMOL/L
CHOLEST SERPL-MCNC: 227 MG/DL
CO2 SERPL-SCNC: 23 MMOL/L
CREAT SERPL-MCNC: 1.28 MG/DL
EOSINOPHIL # BLD AUTO: 0.03 K/UL
EOSINOPHIL NFR BLD AUTO: 0.5 %
ESTIMATED AVERAGE GLUCOSE: 137 MG/DL
GLUCOSE SERPL-MCNC: 88 MG/DL
HBA1C MFR BLD HPLC: 6.4 %
HCT VFR BLD CALC: 44.1 %
HDLC SERPL-MCNC: 85 MG/DL
HGB BLD-MCNC: 13.8 G/DL
IMM GRANULOCYTES NFR BLD AUTO: 0.2 %
LDLC SERPL CALC-MCNC: 133 MG/DL
LYMPHOCYTES # BLD AUTO: 2.97 K/UL
LYMPHOCYTES NFR BLD AUTO: 49.1 %
MAGNESIUM SERPL-MCNC: 2.2 MG/DL
MAN DIFF?: NORMAL
MCHC RBC-ENTMCNC: 31.3 GM/DL
MCHC RBC-ENTMCNC: 32.5 PG
MCV RBC AUTO: 103.8 FL
MONOCYTES # BLD AUTO: 0.58 K/UL
MONOCYTES NFR BLD AUTO: 9.6 %
NEUTROPHILS # BLD AUTO: 2.44 K/UL
NEUTROPHILS NFR BLD AUTO: 40.3 %
NONHDLC SERPL-MCNC: 143 MG/DL
PLATELET # BLD AUTO: 203 K/UL
POTASSIUM SERPL-SCNC: 5.6 MMOL/L
PROT SERPL-MCNC: 8 G/DL
PSA SERPL-MCNC: 1.2 NG/ML
RBC # BLD: 4.25 M/UL
RBC # FLD: 14.1 %
SODIUM SERPL-SCNC: 141 MMOL/L
TRIGL SERPL-MCNC: 47 MG/DL
WBC # FLD AUTO: 6.05 K/UL

## 2021-01-25 ENCOUNTER — APPOINTMENT (OUTPATIENT)
Dept: HEART AND VASCULAR | Facility: CLINIC | Age: 76
End: 2021-01-25
Payer: COMMERCIAL

## 2021-01-25 PROCEDURE — 99072 ADDL SUPL MATRL&STAF TM PHE: CPT

## 2021-01-25 PROCEDURE — 93306 TTE W/DOPPLER COMPLETE: CPT

## 2021-02-24 ENCOUNTER — NON-APPOINTMENT (OUTPATIENT)
Age: 76
End: 2021-02-24

## 2021-02-24 ENCOUNTER — APPOINTMENT (OUTPATIENT)
Dept: HEART AND VASCULAR | Facility: CLINIC | Age: 76
End: 2021-02-24
Payer: COMMERCIAL

## 2021-02-24 ENCOUNTER — APPOINTMENT (OUTPATIENT)
Dept: HEART AND VASCULAR | Facility: CLINIC | Age: 76
End: 2021-02-24
Payer: MEDICARE

## 2021-02-24 ENCOUNTER — LABORATORY RESULT (OUTPATIENT)
Age: 76
End: 2021-02-24

## 2021-02-24 VITALS
BODY MASS INDEX: 26.08 KG/M2 | WEIGHT: 182.19 LBS | HEART RATE: 57 BPM | OXYGEN SATURATION: 91 % | HEIGHT: 70 IN | DIASTOLIC BLOOD PRESSURE: 60 MMHG | SYSTOLIC BLOOD PRESSURE: 120 MMHG | TEMPERATURE: 97.6 F

## 2021-02-24 PROCEDURE — 93000 ELECTROCARDIOGRAM COMPLETE: CPT

## 2021-02-24 PROCEDURE — 99214 OFFICE O/P EST MOD 30 MIN: CPT

## 2021-02-24 PROCEDURE — 93978 VASCULAR STUDY: CPT

## 2021-02-24 RX ORDER — PRAVASTATIN SODIUM 40 MG/1
40 TABLET ORAL DAILY
Qty: 90 | Refills: 3 | Status: DISCONTINUED | COMMUNITY
Start: 2017-04-10 | End: 2021-02-24

## 2021-02-24 NOTE — HISTORY OF PRESENT ILLNESS
[FreeTextEntry1] : PCP- Dr Sandhya Bloom\par EP- Dr Jeff\par CTS- Dr Garcia\par Plastics- Dr Ko\par Hand- Dr Xiong\par GI- Dr Cortez, colonoscopy Sept 2017\par Dentist: Dr. Jorge Oh  \par Ophtho- Dr Jesus Lantigua [phone]

## 2021-02-24 NOTE — REASON FOR VISIT
[FreeTextEntry1] : 72-year-old male with history of high cholesterol, aortic aneurysm thoracic, hypertension, lumbago, pre-diabetes, Valve disease (status post AVR, MVrepair Dr. Caio Louise 9/9/2014). S/P  colonoscopy with Dr. Maximilian Cortez 9/14/17. Doing well overall, Denies CP, SOB, palpitations, orthopnea, LE swelling, dizziness, syncope. Walking and running daily, sometimes >10 miles, training for [place] marathon in 2018 after skipping 2017. \par \par s/p dental extraction and implant. Reports during dental cleaning he was told he bled a lot and dentist would prefer he stop aspirin for future dental extraction and implant. \par Training for [employer], race walked it without complications Nov 2018.\par \par EKG: NSR @ 46 with 1st degree AVB. LAHB, ST-Tw abnormalities. Blocked APC, V paced x 2 beats Pacer set at 40 1/22/19\par EKG: A Pacing, PVCs, 1st degree AVB, with a LAHB, possible IWMI, QTc 429 ST-Twave abnormalities.  10/10/19\par \par 3/18/19 A Fib discovered by Dr Jeff on 3/13/19, ASA changed to Eliquis\par 5/6/19 Pt with atypical pain in the axilla on the left, he thinks its the Eliquis.  No swelling or ecchymosis reported\par 7/15/19 K 5.5, Telmisartin reduced to 20mg. BP excellent.  c/o severe right shoulder pain.  Previously injected with relief.\par 9/23/19 Adm to  [facility]) with syncope and NSVT.  Trans to [facility].  VT ablation and Mexitil started.\par 10/10/19  Pt c/o chest  pressure, when questioned its sub Xiphoid.  Eating makes it better\par \par 11/14/19  New Deep Tw inversions, ? post pacing.  CCTA Nov 1 clean. Recently had a lot of "stomach" pains.  I suspect he has PUD, Trop sent, echo ordered\par 1/24/20 Here with several weeks of a cold/URI, saw an MD and given Z westley.  Here with wheezing, reduced exercise tolerance. + Wheezing, SOB, mild cough, non-productive, no fever or chills.\par Also here for clearance for cataract\par 10/15/20 Fullness in subxiphoid area.  (-) CTA Nov 2020.  Also has  off statin.\par 1/15/21 ARB DCed due to elevated Cr and K of 5.9, not taking Crestor due to nausea, knee pain, back pain.  More MONTEJO noted, will need to reassess Ao V\par 2/24/21 More MONTEJO again, getting worse, epigastric discomfort, worse if he hits a pot hole., or walking hard.  ?pleuritic component.  Feb 12-16 pt reports wt went up, not sleeping well.\par

## 2021-02-24 NOTE — ASSESSMENT
[FreeTextEntry1] : Valve disease: clean coronaries on cardiac cath in 2014. status post AVR, MV repair Dr. Caio Louise 9/9/2014) SBE prophylaxis. okay to stop ASA 1 week before dental extraction and implant. EKG with 1 PVC. Echo done 7/2018. EF 40%  Valves OK.  Echo update Nov 2019 , EF 55-60 ??.  Echo done 1/21, EF 35-40 % closer to his baseline.  Ao V probably NL prosthetic valve.\par \par Abdominal Pain- worse if he hits a pothole. Will screen for a AAA. If (-) will refer to GI (Dr Cortez).  SONO (-) FOR AAA\par \par Chest/abd pain- will treat with Protonix x 30 days.  New EKG changes 11/14/19 not seen before, deep Tw inversions, QTc 474.  I suspect post pacing artifact, remotely  PUD, Trop sent, echo ordered.  Trop NL, EF and wall motion normal on echo. CCTA done Nov 1st, 2019 is clean. No coronary disease.  More upper abd discomfort, will give a trial of protonix. Still with pain, will screen for AAA, refer to GI.\par \par SOB- BNP sent, echo from 1/2021 with a drop in EF.  Await BNP level\par \par PreOP- complex pt but cleared for dental work.  Has h/o VT to no epinephrine.  Has AICD so no cautery unless AICD is shut off to avoid inappropriate shocks.  Needs SBE prophylaxis for dental work only. Pt s cleared for cataract surgery\par \par PAF- changed from ASA to Eliquis by Dr ARIZMENDI.  No signs of trauma or bleeding on exam. Pt reassured that the Eliquis is not causing the pain.  Off Eliquis by Dr ARIZMENDI due to not feeling well  \par \par VT- has non-sustained, i spoke to Dr Palencia who does not want him to run the [facility]. There is  a VT and syncope/sudden death risk, also has a thoracic aneurysm.  I discussed this with him and his wife, walking the Marathon is OK but no jogging.  He walked it and did well. Now seeing Dr Jeff.  Had syncope and adm to [facility], trans to [facility].  VT RFA, Sotolol changed to Mexitil Sept 2019.  CAN RETURN TO WORK NEXT MONDAY\par \par Aortic aneurysm: Enlarged. 42 (ascending) 47 (descending) in 2014\par Followup in 2015 reveals a max Ao of 46 mm\par CT angiogram 3/6/2017 aneurysm unchanged\par annual CT, due 3/2018.  Aorta 46mm April 2018, referred back to Dr Garcia,  48mm on CT 2019\par \par HLD: on pravastatin 40mg, had muscle aches on 80mg,  8/2017, pt reported he was only taking it once in a while, now taking it daily will repeat lipid panel. Diet and exercise discussed in detail.   Feb 2018,  July 2020. sTARTING cRESTOR 10 qod\par \par HTN: Have DC Lasix and changed to HCTZ weekly.  BP very good, Reduce Micardis  40 for persistent elevated K.  Increase HCTZ 25 to M & F. BP OK.  Micardis now at 20mg, DCed due to elevated Cr and K\par \par CKD: 1.33 8/8/2017, repeat BMP Sept 2017, Cr 1.05, previously normal, on ARB  1.31 and K 5.9.

## 2021-02-24 NOTE — ED ADULT NURSE NOTE - PRO INTERPRETER NEED 2
Bernardo Sapp MD  Suman Scanlon  ESTEFANIA Lopez             English

## 2021-02-26 LAB
ALBUMIN SERPL ELPH-MCNC: 4.4 G/DL
ALP BLD-CCNC: 46 U/L
ALT SERPL-CCNC: 13 U/L
AMYLASE/CREAT SERPL: 93 U/L
ANION GAP SERPL CALC-SCNC: 16 MMOL/L
AST SERPL-CCNC: 17 U/L
BASOPHILS # BLD AUTO: 0.03 K/UL
BASOPHILS NFR BLD AUTO: 0.5 %
BILIRUB SERPL-MCNC: 0.5 MG/DL
BUN SERPL-MCNC: 33 MG/DL
CALCIUM SERPL-MCNC: 10 MG/DL
CHLORIDE SERPL-SCNC: 104 MMOL/L
CO2 SERPL-SCNC: 22 MMOL/L
CREAT SERPL-MCNC: 1.49 MG/DL
EOSINOPHIL # BLD AUTO: 0.04 K/UL
EOSINOPHIL NFR BLD AUTO: 0.6 %
GLUCOSE SERPL-MCNC: 85 MG/DL
HCT VFR BLD CALC: 46.1 %
HGB BLD-MCNC: 14.5 G/DL
IMM GRANULOCYTES NFR BLD AUTO: 0.2 %
LYMPHOCYTES # BLD AUTO: 3.08 K/UL
LYMPHOCYTES NFR BLD AUTO: 46.6 %
MAGNESIUM SERPL-MCNC: 2.3 MG/DL
MAN DIFF?: NORMAL
MCHC RBC-ENTMCNC: 31.5 GM/DL
MCHC RBC-ENTMCNC: 33.1 PG
MCV RBC AUTO: 105.3 FL
MONOCYTES # BLD AUTO: 0.56 K/UL
MONOCYTES NFR BLD AUTO: 8.5 %
NEUTROPHILS # BLD AUTO: 2.89 K/UL
NEUTROPHILS NFR BLD AUTO: 43.6 %
NT-PROBNP SERPL-MCNC: 515 PG/ML
PLATELET # BLD AUTO: 234 K/UL
POTASSIUM SERPL-SCNC: 4.9 MMOL/L
PROT SERPL-MCNC: 8.1 G/DL
PSA SERPL-MCNC: 1.4 NG/ML
RBC # BLD: 4.38 M/UL
RBC # FLD: 13.6 %
SODIUM SERPL-SCNC: 142 MMOL/L
WBC # FLD AUTO: 6.61 K/UL

## 2021-03-16 ENCOUNTER — INPATIENT (INPATIENT)
Facility: HOSPITAL | Age: 76
LOS: 2 days | Discharge: HOME | DRG: 287 | End: 2021-03-19
Attending: INTERNAL MEDICINE | Admitting: INTERNAL MEDICINE
Payer: MEDICARE

## 2021-03-16 VITALS
RESPIRATION RATE: 16 BRPM | HEART RATE: 81 BPM | HEIGHT: 71 IN | DIASTOLIC BLOOD PRESSURE: 93 MMHG | TEMPERATURE: 98 F | OXYGEN SATURATION: 100 % | SYSTOLIC BLOOD PRESSURE: 145 MMHG | WEIGHT: 184.09 LBS

## 2021-03-16 DIAGNOSIS — I49.9 CARDIAC ARRHYTHMIA, UNSPECIFIED: ICD-10-CM

## 2021-03-16 DIAGNOSIS — I25.10 ATHEROSCLEROTIC HEART DISEASE OF NATIVE CORONARY ARTERY WITHOUT ANGINA PECTORIS: ICD-10-CM

## 2021-03-16 DIAGNOSIS — N17.9 ACUTE KIDNEY FAILURE, UNSPECIFIED: ICD-10-CM

## 2021-03-16 DIAGNOSIS — I10 ESSENTIAL (PRIMARY) HYPERTENSION: ICD-10-CM

## 2021-03-16 DIAGNOSIS — Z95.810 PRESENCE OF AUTOMATIC (IMPLANTABLE) CARDIAC DEFIBRILLATOR: ICD-10-CM

## 2021-03-16 DIAGNOSIS — Z95.2 PRESENCE OF PROSTHETIC HEART VALVE: ICD-10-CM

## 2021-03-16 DIAGNOSIS — E78.5 HYPERLIPIDEMIA, UNSPECIFIED: ICD-10-CM

## 2021-03-16 DIAGNOSIS — Z98.89 OTHER SPECIFIED POSTPROCEDURAL STATES: Chronic | ICD-10-CM

## 2021-03-16 DIAGNOSIS — N18.30 CHRONIC KIDNEY DISEASE, STAGE 3 UNSPECIFIED: ICD-10-CM

## 2021-03-16 DIAGNOSIS — I50.22 CHRONIC SYSTOLIC (CONGESTIVE) HEART FAILURE: ICD-10-CM

## 2021-03-16 LAB
ALBUMIN SERPL ELPH-MCNC: 4.1 G/DL — SIGNIFICANT CHANGE UP (ref 3.3–5)
ALP SERPL-CCNC: 53 U/L — SIGNIFICANT CHANGE UP (ref 40–120)
ALT FLD-CCNC: 12 U/L — SIGNIFICANT CHANGE UP (ref 10–45)
ANION GAP SERPL CALC-SCNC: 8 MMOL/L — SIGNIFICANT CHANGE UP (ref 5–17)
APTT BLD: 33.2 SEC — SIGNIFICANT CHANGE UP (ref 27.5–35.5)
AST SERPL-CCNC: 19 U/L — SIGNIFICANT CHANGE UP (ref 10–40)
BASOPHILS # BLD AUTO: 0.01 K/UL — SIGNIFICANT CHANGE UP (ref 0–0.2)
BASOPHILS NFR BLD AUTO: 0.2 % — SIGNIFICANT CHANGE UP (ref 0–2)
BILIRUB SERPL-MCNC: 0.3 MG/DL — SIGNIFICANT CHANGE UP (ref 0.2–1.2)
BUN SERPL-MCNC: 30 MG/DL — HIGH (ref 7–23)
CALCIUM SERPL-MCNC: 9.5 MG/DL — SIGNIFICANT CHANGE UP (ref 8.4–10.5)
CHLORIDE SERPL-SCNC: 104 MMOL/L — SIGNIFICANT CHANGE UP (ref 96–108)
CK MB CFR SERPL CALC: 2.3 NG/ML — SIGNIFICANT CHANGE UP (ref 0–6.7)
CK SERPL-CCNC: 153 U/L — SIGNIFICANT CHANGE UP (ref 30–200)
CO2 SERPL-SCNC: 26 MMOL/L — SIGNIFICANT CHANGE UP (ref 22–31)
CREAT SERPL-MCNC: 1.74 MG/DL — HIGH (ref 0.5–1.3)
EOSINOPHIL # BLD AUTO: 0.05 K/UL — SIGNIFICANT CHANGE UP (ref 0–0.5)
EOSINOPHIL NFR BLD AUTO: 1.1 % — SIGNIFICANT CHANGE UP (ref 0–6)
GLUCOSE SERPL-MCNC: 102 MG/DL — HIGH (ref 70–99)
HCT VFR BLD CALC: 43 % — SIGNIFICANT CHANGE UP (ref 39–50)
HGB BLD-MCNC: 13.6 G/DL — SIGNIFICANT CHANGE UP (ref 13–17)
IMM GRANULOCYTES NFR BLD AUTO: 0.2 % — SIGNIFICANT CHANGE UP (ref 0–1.5)
INR BLD: 1.04 — SIGNIFICANT CHANGE UP (ref 0.88–1.16)
LYMPHOCYTES # BLD AUTO: 2.5 K/UL — SIGNIFICANT CHANGE UP (ref 1–3.3)
LYMPHOCYTES # BLD AUTO: 54 % — HIGH (ref 13–44)
MCHC RBC-ENTMCNC: 31.6 GM/DL — LOW (ref 32–36)
MCHC RBC-ENTMCNC: 32 PG — SIGNIFICANT CHANGE UP (ref 27–34)
MCV RBC AUTO: 101.2 FL — HIGH (ref 80–100)
MONOCYTES # BLD AUTO: 0.51 K/UL — SIGNIFICANT CHANGE UP (ref 0–0.9)
MONOCYTES NFR BLD AUTO: 11 % — SIGNIFICANT CHANGE UP (ref 2–14)
NEUTROPHILS # BLD AUTO: 1.55 K/UL — LOW (ref 1.8–7.4)
NEUTROPHILS NFR BLD AUTO: 33.5 % — LOW (ref 43–77)
NRBC # BLD: 0 /100 WBCS — SIGNIFICANT CHANGE UP (ref 0–0)
NT-PROBNP SERPL-SCNC: 604 PG/ML — HIGH (ref 0–300)
PLATELET # BLD AUTO: 205 K/UL — SIGNIFICANT CHANGE UP (ref 150–400)
POTASSIUM SERPL-MCNC: 5 MMOL/L — SIGNIFICANT CHANGE UP (ref 3.5–5.3)
POTASSIUM SERPL-SCNC: 5 MMOL/L — SIGNIFICANT CHANGE UP (ref 3.5–5.3)
PROT SERPL-MCNC: 7.9 G/DL — SIGNIFICANT CHANGE UP (ref 6–8.3)
PROTHROM AB SERPL-ACNC: 12.4 SEC — SIGNIFICANT CHANGE UP (ref 10.6–13.6)
RBC # BLD: 4.25 M/UL — SIGNIFICANT CHANGE UP (ref 4.2–5.8)
RBC # FLD: 13.2 % — SIGNIFICANT CHANGE UP (ref 10.3–14.5)
SARS-COV-2 RNA SPEC QL NAA+PROBE: SIGNIFICANT CHANGE UP
SODIUM SERPL-SCNC: 138 MMOL/L — SIGNIFICANT CHANGE UP (ref 135–145)
TROPONIN T SERPL-MCNC: 0.01 NG/ML — SIGNIFICANT CHANGE UP (ref 0–0.01)
TROPONIN T SERPL-MCNC: <0.01 NG/ML — SIGNIFICANT CHANGE UP (ref 0–0.01)
WBC # BLD: 4.63 K/UL — SIGNIFICANT CHANGE UP (ref 3.8–10.5)
WBC # FLD AUTO: 4.63 K/UL — SIGNIFICANT CHANGE UP (ref 3.8–10.5)

## 2021-03-16 PROCEDURE — 99285 EMERGENCY DEPT VISIT HI MDM: CPT | Mod: CS

## 2021-03-16 PROCEDURE — 93010 ELECTROCARDIOGRAM REPORT: CPT

## 2021-03-16 PROCEDURE — 71046 X-RAY EXAM CHEST 2 VIEWS: CPT | Mod: 26

## 2021-03-16 RX ORDER — PANTOPRAZOLE SODIUM 20 MG/1
40 TABLET, DELAYED RELEASE ORAL
Refills: 0 | Status: DISCONTINUED | OUTPATIENT
Start: 2021-03-16 | End: 2021-03-19

## 2021-03-16 RX ORDER — ENOXAPARIN SODIUM 100 MG/ML
40 INJECTION SUBCUTANEOUS EVERY 24 HOURS
Refills: 0 | Status: DISCONTINUED | OUTPATIENT
Start: 2021-03-16 | End: 2021-03-19

## 2021-03-16 RX ORDER — CYCLOSPORINE 0.5 MG/ML
1 EMULSION OPHTHALMIC
Qty: 0 | Refills: 0 | DISCHARGE

## 2021-03-16 RX ORDER — TELMISARTAN 20 MG/1
1 TABLET ORAL
Qty: 0 | Refills: 0 | DISCHARGE

## 2021-03-16 RX ORDER — PREGABALIN 225 MG/1
1 CAPSULE ORAL
Qty: 0 | Refills: 0 | DISCHARGE

## 2021-03-16 RX ORDER — GABAPENTIN 400 MG/1
1 CAPSULE ORAL
Qty: 0 | Refills: 0 | DISCHARGE

## 2021-03-16 RX ORDER — METOPROLOL TARTRATE 50 MG
100 TABLET ORAL DAILY
Refills: 0 | Status: DISCONTINUED | OUTPATIENT
Start: 2021-03-16 | End: 2021-03-17

## 2021-03-16 RX ORDER — ASPIRIN/CALCIUM CARB/MAGNESIUM 324 MG
81 TABLET ORAL DAILY
Refills: 0 | Status: DISCONTINUED | OUTPATIENT
Start: 2021-03-16 | End: 2021-03-19

## 2021-03-16 RX ORDER — METOPROLOL TARTRATE 50 MG
100 TABLET ORAL DAILY
Refills: 0 | Status: DISCONTINUED | OUTPATIENT
Start: 2021-03-16 | End: 2021-03-16

## 2021-03-16 RX ORDER — AMLODIPINE BESYLATE 2.5 MG/1
5 TABLET ORAL DAILY
Refills: 0 | Status: DISCONTINUED | OUTPATIENT
Start: 2021-03-16 | End: 2021-03-19

## 2021-03-16 RX ADMIN — ENOXAPARIN SODIUM 40 MILLIGRAM(S): 100 INJECTION SUBCUTANEOUS at 19:36

## 2021-03-16 NOTE — ED ADULT NURSE NOTE - NS ED NURSE DISCH DISPOSITION
----- Message from Tomasz Love MD sent at 5/2/2018 11:18 AM CDT -----  Patient notified by secure messaging. Please call patient and see if they have any questions on results  
[email or web address] if patient has questions on lab results.  Labs also mailed to patient with information on heart healthy diet and diabetic diet.  
Admitted

## 2021-03-16 NOTE — H&P ADULT - ATTENDING COMMENTS
Pt having epigastric discomfort which is exertional and it is also ppt'ed if he hits a pothole while riding in a car.  Recent abd rubeno was (-) for a AAA. He has a known thoracic aneurysm and is followed by Dr Garcia.  I also gave him Protonix x 30 days.  Will get an echo and Pharm Nuc, I am perplexed by the exertional component. He had clean coronaries in 2019.  Favor a vascular consult and possible CT of the chest and abdomen with IV hydration.  Cr 1.7

## 2021-03-16 NOTE — ED PROVIDER NOTE - HEME LYMPH, MLM
No adenopathy or splenomegaly. No cervical or inguinal lymphadenopathy. (3) assistive equipment and person

## 2021-03-16 NOTE — ED ADULT NURSE NOTE - OBJECTIVE STATEMENT
Patient presents AOX4 c/o MONTEJO x 2 weeks, worsening in last few days. Patient speaking in full, complete sentences. Answering questions and following verbal commands appropriately. Patient reports he regularly runs and has recently started cycling, but has developed worsening SOB with midsternal chest discomfort and upper back discomfort. No obvious trauma or injury noted. +FROM to bilat UE and bilat LE. No swelling to LE noted. Denies fevers/chills/nausea/vomiting/sick contacts.

## 2021-03-16 NOTE — H&P ADULT - PROBLEM SELECTOR PLAN 5
-Will continue Norvasc 5 mg daily, Toprol XL 50 mg orally in AM and 100 mg qHS. (Will hold AM dose of BB pending possible NST tomorrow)  -Patient also takes HCTZ 25 mg Monday and Friday (will hold given EYAL on CKD).

## 2021-03-16 NOTE — H&P ADULT - PROBLEM SELECTOR PLAN 3
- Known to Dr. Dr. 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 AICD (implanted in 2014; most recent interrogation 10/2020 at which time his base rate was increased to 80 to try and decrease coupled PVCs and NSVT).  -History of pAF: Has been on Eliquis in past but patient reports he was instructed to discontinue by MD.   -To consult EP team in AM - Known to Dr. Dr. Jeff  -12 Lead EKG AV paced; QTc 526 ms.   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 AICD (implanted in 2014; most recent interrogation 10/2020 at which time his base rate was increased to 80 to try and decrease coupled PVCs and NSVT).  -History of pAF: Has been on Eliquis in past but patient reports he was instructed to discontinue by MD in 2020. ([id number]-NCSy Score 5; Dr. Chandler aware he is no longer taking AC).    -To consult EP team in AM - Known to Dr. Dr. Jeff  -12 Lead EKG AV paced; QTc 526 ms.   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 AICD (implanted in 2014; most recent interrogation 10/2020 at which time his base rate was increased to 80 to try and decrease coupled PVCs and NSVT).  -History of pAF: Has been on Eliquis in past but patient reports he was instructed to discontinue by MD in 2020 (CHADSVASC Score 5) Dr. Chandler aware he is no longer taking AC).    -To consult EP team in AM

## 2021-03-16 NOTE — H&P ADULT - MUSCULOSKELETAL
C3 nurse contacted [facility] [address] and called in Valsartan, Cipro and Flagyl prescriptions as ordered; Pt reporting she did not receive any printed rx and I also verifed that the prescriptions were not e-scribed.    C3 nurse contacted patient to inform of above and ready for pick-up in 1 hour. Verbalized understanding.  
No joint pain, swelling or deformity; no limitation of movement

## 2021-03-16 NOTE — H&P ADULT - PROBLEM SELECTOR PLAN 2
-, core measures, daily weights, strict I/Os.   -Most recent Echo (1/2021 as an outpatient: EF 35-40% (down from 11/2019 Echo with EF of 55-60%).   -Frontal CXR (3/16/2021): clear lungs, cardiomegaly  -Does not appear overloaded on exam.   -F/U Echo in AM

## 2021-03-16 NOTE — H&P ADULT - NSICDXPASTMEDICALHX_GEN_ALL_CORE_FT
PAST MEDICAL HISTORY:  Afib     History of thoracic aortic aneurysm repair     HLD (hyperlipidemia)     Hypertension     S/P AVR (aortic valve replacement)     S/P ICD (internal cardiac defibrillator) procedure

## 2021-03-16 NOTE — H&P ADULT - PROBLEM SELECTOR PLAN 4
History of AVR and MVR (2014)  -Most recent outpatient Echo (1/2021): Prosthetic NITESH 1.5 cm2, mean gradient 10 mm Hg, peak gradient 17 mm Hg, s/p mitral valve repair, moderate TR  -Per Dr. Chandler repeat Echo ordered for tomorrow -History of AVR and MVR (2014)  -Most recent outpatient Echo (1/2021): Prosthetic NITESH 1.5 cm2, mean gradient 10 mm Hg, peak gradient 17 mm Hg, s/p mitral valve repair, moderate TR  -Per Dr. Chandler repeat Echo ordered for tomorrow

## 2021-03-16 NOTE — ED ADULT TRIAGE NOTE - CHIEF COMPLAINT QUOTE
Pt c/o SOB x 2 weeks that is worse w/ movement and improved w/ rest. Pt was evaluated by PMD and told everything is fine but SOB persists. Pt denies fever, chills, NVD, CP, palpitations, LE edema.

## 2021-03-16 NOTE — H&P ADULT - PROBLEM SELECTOR PLAN 7
-Most recent CR: 1.49 2/2021, CR tend 2293-9496-1.3-1.54 in [facility]  -BUN/CR: 30/1.74 on arrival  -Takes HCTZ 25 mg every Monday and Friday; will hold for now  -F/U Urine studies    DVT PPx: Lovenox SC qd    Dispo: F/U troponin @ 8 PM. Echo ordered for AM. Patient made NPO after midnight for possible NST in AM (Dr. Chandler to decide in AM)    Case reviewed with Dr. Chandler this evening

## 2021-03-16 NOTE — H&P ADULT - PROBLEM SELECTOR PLAN 1
-Currently chest pain free. Troponin negative x1. F/U 2nd troponin @ 8 PM.   -s/p cardiac catheterization (9/2019): mild non obstructive CAD  -Outpatient [facility] Echo (1/25/2021): reduced LV systolic function, moderate LVH, Prosthetic NITESH 1.5 cm2, mean gradient 10 mm Hg, peak gradient 17 mm Hg, s/p mitral valve repair, moderate TR, mild pHTN, Aortic root 4.9 cm and ascending aorta 4.8 cm are dilated. No pericardial effusion. Compared to a study from Nov 2019, LVEF is lower now and aortic root slightly larger. LVEF  35-40%. (EF was 55-60% in November 2019).   -HOME regimen: ASA 81 mg every four days, Toprol XL 50 mg qAM and 100 mg qPM. Norvasc 5 mg daily. Reports he has been prescribed Rosuvastatin in past but can NOT tolerate statins 2/2 myalgia  -Will order ASA 81 mg daily, Toprol XL 50 mg qAM and 100 mg qPM, Norvasc 5 mg daily.   -As discussed with Dr. Chandler; will obtain repeat Echo to further assess MR and newly reduced EF by outpatient Echo (1/2021).   -Pending Echo results; Dr. Chandler to consider NST in AM; will make patient NPO after midnight and hold AM dose of Toprol XL 50 mg. -Currently chest pain free. Troponin negative x1. F/U 2nd troponin @ 8 PM.   -s/p cardiac catheterization (9/2019): mild non obstructive CAD  -Outpatient [facility] Echo (1/25/2021): reduced LV systolic function, moderate LVH, Prosthetic NITESH 1.5 cm2, mean gradient 10 mm Hg, peak gradient 17 mm Hg, s/p mitral valve repair, moderate TR, mild pHTN, Aortic root 4.9 cm and ascending aorta 4.8 cm are dilated. No pericardial effusion. Compared to a study from Nov 2019, LVEF is lower now and aortic root slightly larger. LVEF  35-40%. (EF was 55-60% in November 2019).   -HOME regimen: ASA 81 mg every four days, Toprol XL 50 mg qAM and 100 mg qPM. Norvasc 5 mg daily. Reports he has been prescribed Rosuvastatin in past but can NOT tolerate statins 2/2 myalgia  -Will order ASA 81 mg daily, Toprol XL 50 mg qAM and 100 mg qPM, Norvasc 5 mg daily.   -As discussed with Dr. Chandler; will obtain repeat Echo to further assess MR and newly reduced EF by outpatient Echo (1/2021).   -Pending Echo results; Dr. Chandler to consider NST in AM; will make patient NPO after midnight and hold AM dose of Toprol XL 50 mg.  -F/U AM lipid panel and Hemoglobin A1c

## 2021-03-16 NOTE — H&P ADULT - NSHPSOCIALHISTORY_GEN_ALL_CORE
Patient denies smoking, ETOH, or prior drug use. Has an active lifestyle, runs marathons. Lives with his wife in [place].

## 2021-03-16 NOTE — ED PROVIDER NOTE - CLINICAL SUMMARY MEDICAL DECISION MAKING FREE TEXT BOX
pt here for cp. ecg shows av dual paced vr 83 no st elevation. pt trop negative. no activ e cp. pt with Heart score greater than 3. pt to be admitted for r/o acs. high likely ortega of acs due to extensive cardaic history. I phoned the jose who states who agrees withplan to admit pt for r/o acs

## 2021-03-16 NOTE — ED PROVIDER NOTE - OBJECTIVE STATEMENT
pmh of defibb, htn, chf sent in by dr garcia as pt was c/o sob. denies fever, chills, sweats. pt is vaccinated with 2 doses complete. denies any other sxs .c/o chest pressure that started yest associated with sob. pt states nothing makes the pain better or worst. no radiation. pain is left sided.

## 2021-03-16 NOTE — H&P ADULT - HISTORY OF PRESENT ILLNESS
PCP- Dr Sandhya Bloom  EP- Dr Jeff  CTS- Dr Garcia  75 yo male with a PMhx of stage III CKD (most recent CR: 1.49 2/2021, CR tend 0256-2741-1.3-1.54), HTN, hyperlipidemia, AVR and MVR (2014), PVCs s/p ablation of great cardiac vein 9/2019 and [employer] AICD (2014; Dr Jeff; most recent interrogation 10/2020), pAF (on Eliquis), thoracic aortic aneurysm, known non obstructive CAD by cardiac catheterization ([facility] 2019) was referred to [facility] ED (3/16/2021) with the complaint of worsening SOB x two weeks.   Vital Signs on arrival revealed: /93, HR: 80s, RR: 16, Afebrile, O2: 100% RA. 12 Lead EKG AV paced @ 83 BPM. Pertinent lab values include BUN/CR: 30/1.74, Troponin negative x 1, . Frontal CXR revealed Clear lungs. No pleural effusion. Cardiomegaly. Uncoiled and aneurysmal aorta.  Of note: Patient did completed COVID vaccination.   Echo (7/25/2021): preserved right and reduced LV systolic function, moderate LVH, Prostethic NITESH 1.5 cm2, mean gradient 10 mm Hg, peak gradient 17 mm Hg, s/p mitral valve repair, moderate TR, mild pHTN, Aortic root 4.9 cm and ascending aorta 4.8 cm are dilated. No pericardial effusion. Compared to a study from Nov 2019, LVEF is lower now and aortic root slightly larger. LVEF  35-40%. (EF was 55-60% in November 2019).     PCP- Dr. Sandhya Bloom  EP- Dr. Jeff  CTS- Dr. Garcia  Cardiologist: Dr. 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 AICD (2014; Dr. Jeff; most recent interrogation 10/2020 at which time his base rate was increased to 80 to try and decrease coupled PVCs and NSVT), thoracic aortic aneurysm, known non obstructive CAD by cardiac catheterization ([facility] 9/2019) was referred to [facility] ED (3/16/2021) with the complaint of worsening upper abdominal/substernal non radiating chest tightness associated with SOB when ambulating this morning. Patient reports he has suffered from chronic chest tightness/upper abdominal discomfort since his AVR/MVR repair in 2014 but today chest discomfort felt different. Patient also endorses he recently got a Peloton and worked out for the first time yesterday and it was "extremely hard and fatiguing." In addition, patient’s wife notes recent abdominal distension and some weight gain. Patient denies palpitations, dizziness, fever, chills, syncope, leg edema, PND, orthopnea, cough, N/V, diarrhea.  Patient reports he remains. Most recent outpatient cardiology follow up visit with Dr. Chandler revealed LVEF lower than prior Echo in 11/2019; current EF 35-40% (was 55-60% by prior Echo 11/2019).  Vital Signs on arrival revealed: /93, HR: 80s, RR: 16, Afebrile, O2: 100% RA. 12 Lead EKG AV paced @ 83 BPM with PVCs, ST-Tw abnormalities. Pertinent lab values include BUN/CR: 30/1.74, Troponin negative x 1, . Frontal CXR revealed Clear lungs. No pleural effusion. Cardiomegaly. Uncoiled and aneurysmal aorta. Of note: Patient did completed COVID vaccination.  Patient is now admitted to cardiology service for repeat Echo and potential ischemic work up.     PCP- Dr. Sandhya Bloom  EP- Dr. Jeff  CTS- Dr. Garcia  Cardiologist: Dr. 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 AICD (2014; Dr. Jeff; most recent interrogation 10/2020 at which time his base rate was increased to 80 to try and decrease coupled PVCs and NSVT), thoracic aortic aneurysm, known non obstructive CAD by cardiac catheterization ([facility] 9/2019) was referred to [facility] ED (3/16/2021) with the complaint of worsening upper abdominal/substernal non radiating chest tightness associated with SOB when ambulating this morning. Patient reports he has suffered from chronic chest tightness/upper abdominal discomfort since his AVR/MVR repair in 2014 but today chest discomfort felt different. Patient also endorses he recently got a Peloton and worked out for the first time yesterday and it was "extremely hard and fatiguing." In addition, patient’s wife notes recent abdominal distension and some weight gain. Patient denies palpitations, dizziness, fever, chills, syncope, leg edema, PND, orthopnea, cough, N/V, diarrhea.  Patient reports he remains. Most recent outpatient cardiology follow up visit with Dr. Chandler revealed LVEF lower than prior Echo in 11/2019; current EF 35-40% (was 55-60% by prior Echo 11/2019).  Vital Signs on arrival revealed: /93, HR: 80s, RR: 16, Afebrile, O2: 100% RA. 12 Lead EKG AV paced @ 83 BPM with PVCs, ST-Tw abnormalities, QTc 523 m/s. Pertinent lab values include BUN/CR: 30/1.74, Troponin negative x 1, . Frontal CXR revealed Clear lungs. No pleural effusion. Cardiomegaly. Uncoiled and aneurysmal aorta. Of note: Patient did completed COVID vaccination.  Patient is now admitted to cardiology service for repeat Echo and potential ischemic work up.

## 2021-03-17 DIAGNOSIS — R07.9 CHEST PAIN, UNSPECIFIED: ICD-10-CM

## 2021-03-17 LAB
A1C WITH ESTIMATED AVERAGE GLUCOSE RESULT: 6.2 % — HIGH (ref 4–5.6)
ALBUMIN SERPL ELPH-MCNC: 3.8 G/DL — SIGNIFICANT CHANGE UP (ref 3.3–5)
ALP SERPL-CCNC: 40 U/L — SIGNIFICANT CHANGE UP (ref 40–120)
ALT FLD-CCNC: 11 U/L — SIGNIFICANT CHANGE UP (ref 10–45)
ANION GAP SERPL CALC-SCNC: 9 MMOL/L — SIGNIFICANT CHANGE UP (ref 5–17)
AST SERPL-CCNC: 17 U/L — SIGNIFICANT CHANGE UP (ref 10–40)
BASOPHILS # BLD AUTO: 0.03 K/UL — SIGNIFICANT CHANGE UP (ref 0–0.2)
BASOPHILS NFR BLD AUTO: 0.6 % — SIGNIFICANT CHANGE UP (ref 0–2)
BILIRUB SERPL-MCNC: 0.4 MG/DL — SIGNIFICANT CHANGE UP (ref 0.2–1.2)
BUN SERPL-MCNC: 24 MG/DL — HIGH (ref 7–23)
CALCIUM SERPL-MCNC: 9 MG/DL — SIGNIFICANT CHANGE UP (ref 8.4–10.5)
CHLORIDE SERPL-SCNC: 103 MMOL/L — SIGNIFICANT CHANGE UP (ref 96–108)
CHOLEST SERPL-MCNC: 222 MG/DL — HIGH
CK MB CFR SERPL CALC: 2 NG/ML — SIGNIFICANT CHANGE UP (ref 0–6.7)
CK SERPL-CCNC: 124 U/L — SIGNIFICANT CHANGE UP (ref 30–200)
CO2 SERPL-SCNC: 27 MMOL/L — SIGNIFICANT CHANGE UP (ref 22–31)
CREAT SERPL-MCNC: 1.32 MG/DL — HIGH (ref 0.5–1.3)
EOSINOPHIL # BLD AUTO: 0.09 K/UL — SIGNIFICANT CHANGE UP (ref 0–0.5)
EOSINOPHIL NFR BLD AUTO: 1.8 % — SIGNIFICANT CHANGE UP (ref 0–6)
ESTIMATED AVERAGE GLUCOSE: 131 MG/DL — HIGH (ref 68–114)
GLUCOSE SERPL-MCNC: 90 MG/DL — SIGNIFICANT CHANGE UP (ref 70–99)
HCT VFR BLD CALC: 41.4 % — SIGNIFICANT CHANGE UP (ref 39–50)
HDLC SERPL-MCNC: 62 MG/DL — SIGNIFICANT CHANGE UP
HGB BLD-MCNC: 13.2 G/DL — SIGNIFICANT CHANGE UP (ref 13–17)
IMM GRANULOCYTES NFR BLD AUTO: 0.2 % — SIGNIFICANT CHANGE UP (ref 0–1.5)
LIPID PNL WITH DIRECT LDL SERPL: 145 MG/DL — HIGH
LYMPHOCYTES # BLD AUTO: 3.02 K/UL — SIGNIFICANT CHANGE UP (ref 1–3.3)
LYMPHOCYTES # BLD AUTO: 60.6 % — HIGH (ref 13–44)
MAGNESIUM SERPL-MCNC: 1.9 MG/DL — SIGNIFICANT CHANGE UP (ref 1.6–2.6)
MCHC RBC-ENTMCNC: 31.9 GM/DL — LOW (ref 32–36)
MCHC RBC-ENTMCNC: 32 PG — SIGNIFICANT CHANGE UP (ref 27–34)
MCV RBC AUTO: 100.5 FL — HIGH (ref 80–100)
MONOCYTES # BLD AUTO: 0.58 K/UL — SIGNIFICANT CHANGE UP (ref 0–0.9)
MONOCYTES NFR BLD AUTO: 11.6 % — SIGNIFICANT CHANGE UP (ref 2–14)
NEUTROPHILS # BLD AUTO: 1.25 K/UL — LOW (ref 1.8–7.4)
NEUTROPHILS NFR BLD AUTO: 25.2 % — LOW (ref 43–77)
NON HDL CHOLESTEROL: 160 MG/DL — HIGH
NRBC # BLD: 0 /100 WBCS — SIGNIFICANT CHANGE UP (ref 0–0)
PLATELET # BLD AUTO: 200 K/UL — SIGNIFICANT CHANGE UP (ref 150–400)
POTASSIUM SERPL-MCNC: 4.2 MMOL/L — SIGNIFICANT CHANGE UP (ref 3.5–5.3)
POTASSIUM SERPL-SCNC: 4.2 MMOL/L — SIGNIFICANT CHANGE UP (ref 3.5–5.3)
PROT SERPL-MCNC: 7.4 G/DL — SIGNIFICANT CHANGE UP (ref 6–8.3)
RBC # BLD: 4.12 M/UL — LOW (ref 4.2–5.8)
RBC # FLD: 13.1 % — SIGNIFICANT CHANGE UP (ref 10.3–14.5)
SODIUM SERPL-SCNC: 139 MMOL/L — SIGNIFICANT CHANGE UP (ref 135–145)
TRIGL SERPL-MCNC: 75 MG/DL — SIGNIFICANT CHANGE UP
TROPONIN T SERPL-MCNC: <0.01 NG/ML — SIGNIFICANT CHANGE UP (ref 0–0.01)
WBC # BLD: 4.98 K/UL — SIGNIFICANT CHANGE UP (ref 3.8–10.5)
WBC # FLD AUTO: 4.98 K/UL — SIGNIFICANT CHANGE UP (ref 3.8–10.5)

## 2021-03-17 PROCEDURE — 93306 TTE W/DOPPLER COMPLETE: CPT | Mod: 26

## 2021-03-17 PROCEDURE — 78452 HT MUSCLE IMAGE SPECT MULT: CPT | Mod: 26

## 2021-03-17 PROCEDURE — 93016 CV STRESS TEST SUPVJ ONLY: CPT

## 2021-03-17 PROCEDURE — 99222 1ST HOSP IP/OBS MODERATE 55: CPT

## 2021-03-17 PROCEDURE — 93018 CV STRESS TEST I&R ONLY: CPT

## 2021-03-17 PROCEDURE — 93283 PRGRMG EVAL IMPLANTABLE DFB: CPT | Mod: 26

## 2021-03-17 RX ORDER — METOPROLOL TARTRATE 50 MG
100 TABLET ORAL DAILY
Refills: 0 | Status: DISCONTINUED | OUTPATIENT
Start: 2021-03-18 | End: 2021-03-19

## 2021-03-17 RX ORDER — METOPROLOL TARTRATE 50 MG
50 TABLET ORAL DAILY
Refills: 0 | Status: DISCONTINUED | OUTPATIENT
Start: 2021-03-18 | End: 2021-03-19

## 2021-03-17 RX ORDER — CLOPIDOGREL BISULFATE 75 MG/1
75 TABLET, FILM COATED ORAL DAILY
Refills: 0 | Status: DISCONTINUED | OUTPATIENT
Start: 2021-03-18 | End: 2021-03-18

## 2021-03-17 RX ORDER — ASPIRIN/CALCIUM CARB/MAGNESIUM 324 MG
243 TABLET ORAL ONCE
Refills: 0 | Status: COMPLETED | OUTPATIENT
Start: 2021-03-17 | End: 2021-03-17

## 2021-03-17 RX ORDER — MAGNESIUM OXIDE 400 MG ORAL TABLET 241.3 MG
400 TABLET ORAL ONCE
Refills: 0 | Status: COMPLETED | OUTPATIENT
Start: 2021-03-17 | End: 2021-03-17

## 2021-03-17 RX ORDER — CLOPIDOGREL BISULFATE 75 MG/1
600 TABLET, FILM COATED ORAL ONCE
Refills: 0 | Status: COMPLETED | OUTPATIENT
Start: 2021-03-17 | End: 2021-03-17

## 2021-03-17 RX ORDER — SODIUM CHLORIDE 9 MG/ML
500 INJECTION INTRAMUSCULAR; INTRAVENOUS; SUBCUTANEOUS
Refills: 0 | Status: DISCONTINUED | OUTPATIENT
Start: 2021-03-18 | End: 2021-03-18

## 2021-03-17 RX ADMIN — Medication 81 MILLIGRAM(S): at 10:15

## 2021-03-17 RX ADMIN — Medication 243 MILLIGRAM(S): at 19:34

## 2021-03-17 RX ADMIN — MAGNESIUM OXIDE 400 MG ORAL TABLET 400 MILLIGRAM(S): 241.3 TABLET ORAL at 10:15

## 2021-03-17 RX ADMIN — ENOXAPARIN SODIUM 40 MILLIGRAM(S): 100 INJECTION SUBCUTANEOUS at 19:34

## 2021-03-17 RX ADMIN — Medication 100 MILLIGRAM(S): at 07:42

## 2021-03-17 RX ADMIN — AMLODIPINE BESYLATE 5 MILLIGRAM(S): 2.5 TABLET ORAL at 07:14

## 2021-03-17 RX ADMIN — PANTOPRAZOLE SODIUM 40 MILLIGRAM(S): 20 TABLET, DELAYED RELEASE ORAL at 07:14

## 2021-03-17 RX ADMIN — CLOPIDOGREL BISULFATE 600 MILLIGRAM(S): 75 TABLET, FILM COATED ORAL at 19:34

## 2021-03-17 NOTE — PROGRESS NOTE ADULT - PROBLEM SELECTOR PLAN 2
on admission. Not overloaded on exam. CXR 3/16: clear lungs  - ECHO: LVSF 30%, inferior wall and true apex are severely hypokinetic to akinetic. Remaining walls are hypokinetic. Abnormal septal motion seen due to previous cardiac surgery. An annuloplasty ring is noted in the mitral position. Mild-Mod MR. A bioprosthetic valve noted in the aortic position. Trace AR. Mild-Mod TR. Pulm htn PAP 40  - EF newly reduced as it was 35-40% on outpatient ECHO 1/2021 and 55-60% in  2019  -Takes HCTZ 25 mg every Monday and Friday; will hold for now 2/2 anticipated contrast load for cath   - Strict I/Os, daily weights. core measures.

## 2021-03-17 NOTE — PROGRESS NOTE ADULT - PROBLEM SELECTOR PLAN 7
- Chol 222 TG 75 HDL 62   - hgba1c 6.2  - Reports has been prescribed Rosuvastatin in past but can NOT tolerate statins 2/2 myalgia      F: No IVF  N: NPO after midnight pending cardiac cath   E: Replete lytes PRN K<4, Mg<2  P: DVT PPX: on Lovenox  C: FULL CODE  Dispo: Admit to tele  [place]

## 2021-03-17 NOTE — PROGRESS NOTE ADULT - PROBLEM SELECTOR PLAN 3
HX AVR and MVR (2014)  - ECHO findings as above.   - Compared to the previous TTE 9/2019, the degree of mitral regurgitation has progressed

## 2021-03-17 NOTE — PROGRESS NOTE ADULT - PROBLEM SELECTOR PLAN 1
P/W chest tightness a/w SOB. Trop (-) x 2. No acute ischemic changes noted on EKG  - s/p cardiac catheterization (9/2019): mild non obstructive CAD  - s/p abnormal NST 3/17: myocardial perfusion imaging abnormal. There is medium sized area of prior infarction in the apex and inferior wall.  - s/p ASA/Plavix load; c/w ASA 81mg/Plavix 75mg  - c/w Toprol XL 50mg AM and 100mg PM  - Reports has been prescribed Rosuvastatin in past but can NOT tolerate statins 2/2 myalgia  - NPO after midnight pending cardiac cath w/Dr. Orozco; consent obtained and in chart. IVF ordered for pre-cath in AM

## 2021-03-17 NOTE — PROGRESS NOTE ADULT - SUBJECTIVE AND OBJECTIVE BOX
CARDIOLOGY NP PROGRESS NOTE    Subjective: Received pt awake in bed with wife at bedside. Patient states still has intermittent CP on exertion. Denies SOB, dizziness/diaphoresis, n/v, palpitations. Remainder ROS otherwise negative.    Overnight Events:  Patient had abnormal ST- results reviewed with patient and wife at bedside. Discussed recommendation for cardiac cath for which they verbalized understanding and consent was obtained.   **Risks & Benefits of procedure and sedation and risks and benefits of alternative therapy have been explained to the patient in layman's terms, including but not limited to allerigic reaction, bleeding, infection, arrhythmia, respiratory compromise, renal/and vascular compromise, limb damage, MI, CVA, emergent CABG/Vascular surgery and death. Informed consent obtained and in chart**    TELEMETRY: AV paced (HR 80s)          VITAL SIGNS:  T(C): 37 (03-17-21 @ 18:31), Max: 37 (03-17-21 @ 18:31)  HR: 82 (03-17-21 @ 08:21) (80 - 82)  BP: 114/61 (03-17-21 @ 08:21) (107/73 - 152/96)  RR: 22 (03-17-21 @ 08:21) (17 - 22)  SpO2: 99% (03-17-21 @ 08:21) (98% - 100%)  Wt(kg): --    I&O's Summary    16 Mar 2021 07:01  -  17 Mar 2021 07:00  --------------------------------------------------------  IN: 100 mL / OUT: 350 mL / NET: -250 mL    17 Mar 2021 07:01  -  17 Mar 2021 18:41  --------------------------------------------------------  IN: 0 mL / OUT: 500 mL / NET: -500 mL          PHYSICAL EXAM:    General: A/ox 3, No acute Distress  Neck: Supple, NO JVD  Cardiac: S1 S2, (+) murmur   Pulmonary: CTAB, Breathing unlabored, No Rhonchi/Rales/Wheezing  Abdomen: Soft, Non -tender, +BS x 4 quads  Extremities: No Rashes, No edema  Neuro: A/o x 3, No focal deficits          LABS:                          13.2   4.98  )-----------( 200      ( 17 Mar 2021 08:01 )             41.4                              03-17    139  |  103  |  24<H>  ----------------------------<  90  4.2   |  27  |  1.32<H>    Ca    9.0      17 Mar 2021 08:01  Mg     1.9     03-17    TPro  7.4  /  Alb  3.8  /  TBili  0.4  /  DBili  x   /  AST  17  /  ALT  11  /  AlkPhos  40  03-17    LIVER FUNCTIONS - ( 17 Mar 2021 08:01 )  Alb: 3.8 g/dL / Pro: 7.4 g/dL / ALK PHOS: 40 U/L / ALT: 11 U/L / AST: 17 U/L / GGT: x                                 PT/INR - ( 16 Mar 2021 14:34 )   PT: 12.4 sec;   INR: 1.04          PTT - ( 16 Mar 2021 14:34 )  PTT:33.2 sec  CAPILLARY BLOOD GLUCOSE        CARDIAC MARKERS ( 17 Mar 2021 08:01 )  x     / <0.01 ng/mL / 124 U/L / x     / 2.0 ng/mL  CARDIAC MARKERS ( 16 Mar 2021 20:41 )  x     / <0.01 ng/mL / 153 U/L / x     / 2.3 ng/mL  CARDIAC MARKERS ( 16 Mar 2021 14:34 )  x     / 0.01 ng/mL / x     / x     / x              Allergies:  No Known Allergies  statins (Muscle Pain; Joint Pain)    MEDICATIONS  (STANDING):  amLODIPine   Tablet 5 milliGRAM(s) Oral daily  aspirin 243 milliGRAM(s) Oral once  aspirin enteric coated 81 milliGRAM(s) Oral daily  clopidogrel Tablet 600 milliGRAM(s) Oral once  enoxaparin Injectable 40 milliGRAM(s) SubCutaneous every 24 hours  metoprolol succinate  milliGRAM(s) Oral daily  pantoprazole    Tablet 40 milliGRAM(s) Oral before breakfast    MEDICATIONS  (PRN):        DIAGNOSTIC TESTS:

## 2021-03-17 NOTE — PROGRESS NOTE ADULT - PROBLEM SELECTOR PLAN 5
HX CKD III (most recent Cr 1.49 2/2021; trend 1.3-1.54). Creat 1.74 on admission  - Creat 1.32 today   - ordered for pre-cath IVF @ 50cc/hr x 4 H for 3/18   - Takes HCTZ 25 mg every Monday and Friday; will hold for now 2/2 anticipated contrast load for cath  - renally dose medications and avoid nephrotoxic agents

## 2021-03-17 NOTE — PROGRESS NOTE ADULT - SUBJECTIVE AND OBJECTIVE BOX
EPS Device interrogation    Indication: chest pain     Device model: 	SJM Ellip 			Implanting Physician:     Functioning Mode: DDD 80/100 			    Underlying Rhythm: AP/    Pacemaker dependency:  No    Battery status: 12.1 month (18%)   Interrogating parameters:   				RA			RV			LV    Sense:                                        4.0  mV                         11.8  mV    Threshold:                                     0.5 V @ 0.5           0.75 V @0.5 ms    Pacing Impedance:                               290 om                    610  om    Shock Impedance:                                                                    47 om    Events/Alert:  episodes of NSVT, short lasting, self resolving     Parameter change: 	none    For ICD only:  tachy therapy – unchanged   Normal function ICD    [ ]EPS attending: Interrogation reviewed. Agree with above.

## 2021-03-17 NOTE — PROGRESS NOTE ADULT - ASSESSMENT
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 (2014; Dr. Jeff; most recent interrogation 10/2020 at which time his base rate was increased to 80 to try and decrease coupled PVCs and NSVT), thoracic aortic aneurysm, known non obstructive CAD by cardiac catheterization ([facility] 9/2019) presented to ED 3/16/21 w/chest tightness associated w/SOB, admitted to tele [facility] for further mgmt, now s/p abnormal NST pending cardiac cath 3/18 w/Dr. Orozco

## 2021-03-17 NOTE — PROGRESS NOTE ADULT - PROBLEM SELECTOR PLAN 6
- SBP 100s-150s  - c/w Amlodipine 5mg   - c/w Toprol XL 50mg AM and 100mg PM  - Takes HCTZ 25 mg every Monday and Friday; will hold for now 2/2 anticipated contrast load for cath

## 2021-03-18 ENCOUNTER — TRANSCRIPTION ENCOUNTER (OUTPATIENT)
Age: 76
End: 2021-03-18

## 2021-03-18 LAB
ANION GAP SERPL CALC-SCNC: 11 MMOL/L — SIGNIFICANT CHANGE UP (ref 5–17)
APTT BLD: 33.3 SEC — SIGNIFICANT CHANGE UP (ref 27.5–35.5)
BUN SERPL-MCNC: 22 MG/DL — SIGNIFICANT CHANGE UP (ref 7–23)
CALCIUM SERPL-MCNC: 9.5 MG/DL — SIGNIFICANT CHANGE UP (ref 8.4–10.5)
CHLORIDE SERPL-SCNC: 104 MMOL/L — SIGNIFICANT CHANGE UP (ref 96–108)
CO2 SERPL-SCNC: 25 MMOL/L — SIGNIFICANT CHANGE UP (ref 22–31)
CREAT SERPL-MCNC: 1.2 MG/DL — SIGNIFICANT CHANGE UP (ref 0.5–1.3)
GLUCOSE SERPL-MCNC: 114 MG/DL — HIGH (ref 70–99)
HCT VFR BLD CALC: 45.1 % — SIGNIFICANT CHANGE UP (ref 39–50)
HGB BLD-MCNC: 14.4 G/DL — SIGNIFICANT CHANGE UP (ref 13–17)
INR BLD: 0.98 — SIGNIFICANT CHANGE UP (ref 0.88–1.16)
MAGNESIUM SERPL-MCNC: 2.2 MG/DL — SIGNIFICANT CHANGE UP (ref 1.6–2.6)
MCHC RBC-ENTMCNC: 31.9 GM/DL — LOW (ref 32–36)
MCHC RBC-ENTMCNC: 31.9 PG — SIGNIFICANT CHANGE UP (ref 27–34)
MCV RBC AUTO: 100 FL — SIGNIFICANT CHANGE UP (ref 80–100)
NRBC # BLD: 0 /100 WBCS — SIGNIFICANT CHANGE UP (ref 0–0)
PLATELET # BLD AUTO: 202 K/UL — SIGNIFICANT CHANGE UP (ref 150–400)
POTASSIUM SERPL-MCNC: 4.7 MMOL/L — SIGNIFICANT CHANGE UP (ref 3.5–5.3)
POTASSIUM SERPL-SCNC: 4.7 MMOL/L — SIGNIFICANT CHANGE UP (ref 3.5–5.3)
PROTHROM AB SERPL-ACNC: 11.8 SEC — SIGNIFICANT CHANGE UP (ref 10.6–13.6)
RBC # BLD: 4.51 M/UL — SIGNIFICANT CHANGE UP (ref 4.2–5.8)
RBC # FLD: 13.1 % — SIGNIFICANT CHANGE UP (ref 10.3–14.5)
SODIUM SERPL-SCNC: 140 MMOL/L — SIGNIFICANT CHANGE UP (ref 135–145)
WBC # BLD: 4.97 K/UL — SIGNIFICANT CHANGE UP (ref 3.8–10.5)
WBC # FLD AUTO: 4.97 K/UL — SIGNIFICANT CHANGE UP (ref 3.8–10.5)

## 2021-03-18 PROCEDURE — 93454 CORONARY ARTERY ANGIO S&I: CPT | Mod: 26

## 2021-03-18 PROCEDURE — 99152 MOD SED SAME PHYS/QHP 5/>YRS: CPT

## 2021-03-18 PROCEDURE — 99232 SBSQ HOSP IP/OBS MODERATE 35: CPT

## 2021-03-18 RX ORDER — SODIUM CHLORIDE 9 MG/ML
500 INJECTION INTRAMUSCULAR; INTRAVENOUS; SUBCUTANEOUS
Refills: 0 | Status: DISCONTINUED | OUTPATIENT
Start: 2021-03-18 | End: 2021-03-19

## 2021-03-18 RX ADMIN — Medication 50 MILLIGRAM(S): at 06:31

## 2021-03-18 RX ADMIN — SODIUM CHLORIDE 50 MILLILITER(S): 9 INJECTION INTRAMUSCULAR; INTRAVENOUS; SUBCUTANEOUS at 08:01

## 2021-03-18 RX ADMIN — PANTOPRAZOLE SODIUM 40 MILLIGRAM(S): 20 TABLET, DELAYED RELEASE ORAL at 06:31

## 2021-03-18 RX ADMIN — CLOPIDOGREL BISULFATE 75 MILLIGRAM(S): 75 TABLET, FILM COATED ORAL at 11:36

## 2021-03-18 RX ADMIN — Medication 81 MILLIGRAM(S): at 11:36

## 2021-03-18 RX ADMIN — AMLODIPINE BESYLATE 5 MILLIGRAM(S): 2.5 TABLET ORAL at 06:31

## 2021-03-18 NOTE — DISCHARGE NOTE PROVIDER - NSDCFUSCHEDAPPT_GEN_ALL_CORE_FT
ANNAMARIA LUKE ; 05/19/2021 ; NPP CT Surg [address] ANNAMARIA LUKE ; 04/05/2021 ; [facility] [address]  ANNAMARIA LUKE ; 05/19/2021 ; [facility] [address]

## 2021-03-18 NOTE — DISCHARGE NOTE PROVIDER - CARE PROVIDER_API CALL
Luciano Chandler (MD)  Cardiovascular Disease; Internal Medicine  Cardiology [facility], [address] [place] [address]  Phone: [phone]  Fax: [phone]  Established Patient  Follow Up Time: 2 weeks   Luciano Chandler (MD)  Cardiovascular Disease; Internal Medicine  Cardiology [facility], [address] [place] [address]  Phone: [phone]  Fax: [phone]  Established Patient  Scheduled Appointment: 04/05/2021 11:20 AM

## 2021-03-18 NOTE — PROGRESS NOTE ADULT - SUBJECTIVE AND OBJECTIVE BOX
CARDIOLOGY NP PROGRESS NOTE    Subjective: Pt seen and examined at bedside. Reports feeling well. Denies chest pain, sob, lightheadedness, dizziness, palpitations, fever, chills.  Remainder ROS otherwise negative.    Overnight Events: NPO s/p MN for cardiac cath     TELEMETRY: AV paced 80s, occasional PVCs           VITAL SIGNS:  T(C): 36.6 (03-18-21 @ 14:04), Max: 37 (03-17-21 @ 18:31)  HR: 80 (03-18-21 @ 11:36) (80 - 85)  BP: 137/87 (03-18-21 @ 11:36) (112/78 - 139/85)  RR: 16 (03-18-21 @ 11:36) (15 - 28)  SpO2: 97% (03-18-21 @ 11:36) (97% - 99%)  Wt(kg): --    I&O's Summary    17 Mar 2021 07:01  -  18 Mar 2021 07:00  --------------------------------------------------------  IN: 0 mL / OUT: 1100 mL / NET: -1100 mL    18 Mar 2021 07:01  -  18 Mar 2021 14:35  --------------------------------------------------------  IN: 150 mL / OUT: 0 mL / NET: 150 mL          PHYSICAL EXAM:    General: A/ox 3, No acute Distress  Neck: Supple, NO JVD  Cardiac: S1 S2, No M/R/G  Pulmonary: CTAB, Breathing unlabored, No Rhonchi/Rales/Wheezing  Abdomen: Soft, Non -tender, +BS x 4 quads  Extremities: No Rashes, No edema  Neuro: A/o x 3, No focal deficits          LABS:                          14.4   4.97  )-----------( 202      ( 18 Mar 2021 08:55 )             45.1                              03-18    140  |  104  |  22  ----------------------------<  114<H>  4.7   |  25  |  1.20    Ca    9.5      18 Mar 2021 08:55  Mg     2.2     03-18    TPro  7.4  /  Alb  3.8  /  TBili  0.4  /  DBili  x   /  AST  17  /  ALT  11  /  AlkPhos  40  03-17    LIVER FUNCTIONS - ( 17 Mar 2021 08:01 )  Alb: 3.8 g/dL / Pro: 7.4 g/dL / ALK PHOS: 40 U/L / ALT: 11 U/L / AST: 17 U/L / GGT: x                                 PT/INR - ( 18 Mar 2021 08:55 )   PT: 11.8 sec;   INR: 0.98          PTT - ( 18 Mar 2021 08:55 )  PTT:33.3 sec  CAPILLARY BLOOD GLUCOSE        CARDIAC MARKERS ( 17 Mar 2021 08:01 )  x     / <0.01 ng/mL / 124 U/L / x     / 2.0 ng/mL  CARDIAC MARKERS ( 16 Mar 2021 20:41 )  x     / <0.01 ng/mL / 153 U/L / x     / 2.3 ng/mL          Allergies:  No Known Allergies  statins (Muscle Pain; Joint Pain)    MEDICATIONS  (STANDING):  amLODIPine   Tablet 5 milliGRAM(s) Oral daily  aspirin enteric coated 81 milliGRAM(s) Oral daily  clopidogrel Tablet 75 milliGRAM(s) Oral daily  enoxaparin Injectable 40 milliGRAM(s) SubCutaneous every 24 hours  metoprolol succinate ER 50 milliGRAM(s) Oral daily  metoprolol succinate  milliGRAM(s) Oral daily  pantoprazole    Tablet 40 milliGRAM(s) Oral before breakfast  sodium chloride 0.9%. 500 milliLiter(s) (50 mL/Hr) IV Continuous <Continuous>    MEDICATIONS  (PRN):        DIAGNOSTIC TESTS:        CARDIOLOGY NP PROGRESS NOTE    Subjective: Pt seen and examined at bedside. Reports feeling well. Had epigastric pain prior to admission a/w fatigue. Denies chest pain, sob, lightheadedness, dizziness, palpitations, fever, chills.  Remainder ROS otherwise negative.    Overnight Events: NPO s/p MN for cardiac cath     TELEMETRY: AV paced 80s, occasional PVCs           VITAL SIGNS:  T(C): 36.6 (03-18-21 @ 14:04), Max: 37 (03-17-21 @ 18:31)  HR: 80 (03-18-21 @ 11:36) (80 - 85)  BP: 137/87 (03-18-21 @ 11:36) (112/78 - 139/85)  RR: 16 (03-18-21 @ 11:36) (15 - 28)  SpO2: 97% (03-18-21 @ 11:36) (97% - 99%)  Wt(kg): --    I&O's Summary    17 Mar 2021 07:01  -  18 Mar 2021 07:00  --------------------------------------------------------  IN: 0 mL / OUT: 1100 mL / NET: -1100 mL    18 Mar 2021 07:01  -  18 Mar 2021 14:35  --------------------------------------------------------  IN: 150 mL / OUT: 0 mL / NET: 150 mL          PHYSICAL EXAM:    General: A/ox 3, No acute Distress  Neck: Supple, NO JVD  Cardiac: S1 S2, No M/R/G  Pulmonary: CTAB, Breathing unlabored on RA, No Rhonchi/Rales/Wheezing  Abdomen: Soft, Non -tender, +BS x 4 quads  Extremities: No Rashes, No edema. 2+ gonsalo radial/DP/PT pulses  Neuro: A/o x 3, No focal deficits          LABS:                          14.4   4.97  )-----------( 202      ( 18 Mar 2021 08:55 )             45.1                              03-18    140  |  104  |  22  ----------------------------<  114<H>  4.7   |  25  |  1.20    Ca    9.5      18 Mar 2021 08:55  Mg     2.2     03-18    TPro  7.4  /  Alb  3.8  /  TBili  0.4  /  DBili  x   /  AST  17  /  ALT  11  /  AlkPhos  40  03-17    LIVER FUNCTIONS - ( 17 Mar 2021 08:01 )  Alb: 3.8 g/dL / Pro: 7.4 g/dL / ALK PHOS: 40 U/L / ALT: 11 U/L / AST: 17 U/L / GGT: x                                 PT/INR - ( 18 Mar 2021 08:55 )   PT: 11.8 sec;   INR: 0.98          PTT - ( 18 Mar 2021 08:55 )  PTT:33.3 sec  CAPILLARY BLOOD GLUCOSE        CARDIAC MARKERS ( 17 Mar 2021 08:01 )  x     / <0.01 ng/mL / 124 U/L / x     / 2.0 ng/mL  CARDIAC MARKERS ( 16 Mar 2021 20:41 )  x     / <0.01 ng/mL / 153 U/L / x     / 2.3 ng/mL          Allergies:  No Known Allergies  statins (Muscle Pain; Joint Pain)    MEDICATIONS  (STANDING):  amLODIPine   Tablet 5 milliGRAM(s) Oral daily  aspirin enteric coated 81 milliGRAM(s) Oral daily  clopidogrel Tablet 75 milliGRAM(s) Oral daily  enoxaparin Injectable 40 milliGRAM(s) SubCutaneous every 24 hours  metoprolol succinate ER 50 milliGRAM(s) Oral daily  metoprolol succinate  milliGRAM(s) Oral daily  pantoprazole    Tablet 40 milliGRAM(s) Oral before breakfast  sodium chloride 0.9%. 500 milliLiter(s) (50 mL/Hr) IV Continuous <Continuous>    MEDICATIONS  (PRN):        DIAGNOSTIC TESTS:     < from: TTE Echo Complete w/ Contrast w/ Doppler (03.17.21 @ 10:33) >  CONCLUSIONS:   1. Left ventricular systolic function is mildly reduced with an ejection fraction of    30%.   2. The inferior wall and true apex are severly hypokinetic to akinetic. Remaining walls are hypokinetic. Abnormal septal motion seen due to previous cardiac surgery.   3. Normal right ventricular size and systolic function.   4. An annuloplasty ring is noted in the mitral position. The mean transvalvular gradient is 3.00 mmHg at a heart rate of 72 bpm.   5. There is mild-to-moderate mitral regurgitation.   6. A bioprosthetic valve noted in the aortic position. The valve leaflets appear to have normal excursion and there is no evidence of bioprosthetic dysfunction. The peak transvalvular velocity is 2.23 m/s, the mean transvalvular gradient is 12.00 mmHg, and the LVOT/AV velocity ratio is 0.24. The calculated left ventricular stroke volume index is 26.00 ml/m² (normal >35 ml/m2).   7. There is trace aortic regurgitation.   8. Mild-to-moderate tricuspid regurgitation.   9. Pulmonary hypertension present, pulmonary artery systolic pressure is 40 mmHg.  10. No pericardial effusion.  11. The aortic root is dilated measuring 4.60 cm.  12. The proximal ascending aorta is dilated measuring 4.70 cm.  13. Compared to the previous TTE performed on 9/16/2019, the degree of mitral regurgitaiton has progressed.Additionally the endocardium is better visualized in this study due to administration of echo constrast and the left ventricular ejection fraction appears lower.    < end of copied text >

## 2021-03-18 NOTE — PROGRESS NOTE ADULT - PROBLEM SELECTOR PLAN 7
- Chol 222 TG 75 HDL 62   - hgba1c 6.2  - Reports has been prescribed Rosuvastatin in past but can NOT tolerate statins 2/2 myalgia      F: No IVF  N: NPO after midnight pending cardiac cath   E: Replete lytes PRN K<4, Mg<2  P: DVT PPX: on Lovenox  C: FULL CODE  Dispo: Admit to tele  [facility] - Chol 222 TG 75 HDL 62   - HgA1c 6.2  - Reports has been prescribed Rosuvastatin in past but can NOT tolerate statins 2/2 myalgia    F: pre-cath IVF NS 50cc/hr x 4hr  N: DASH/TLC. NPO pending cardiac cath   E: Replete lytes PRN K<4, Mg<2  P: DVT PPX: on Lovenox SQ  C: FULL CODE  Dispo: Admit to tele [facility]

## 2021-03-18 NOTE — PROGRESS NOTE ADULT - PROBLEM SELECTOR PLAN 6
- SBP 100s-150s  - c/w Amlodipine 5mg   - c/w Toprol XL 50mg AM and 100mg PM  - Takes HCTZ 25 mg every Monday and Friday; will hold for now 2/2 anticipated contrast load for cath - SBP 100s-150s  - c/w Amlodipine 5mg qd  - c/w Toprol XL 50mg AM and 100mg PM  - Takes HCTZ 25 mg every Monday and Friday; will hold for now 2/2 anticipated contrast load for cath

## 2021-03-18 NOTE — PROGRESS NOTE ADULT - PROBLEM SELECTOR PLAN 1
P/W chest tightness a/w SOB. Trop (-) x 2. No acute ischemic changes noted on EKG  - s/p cardiac catheterization (9/2019): mild non obstructive CAD  - s/p abnormal NST 3/17: myocardial perfusion imaging abnormal. There is medium sized area of prior infarction in the apex and inferior wall.  - s/p ASA/Plavix load; c/w ASA 81mg/Plavix 75mg  - c/w Toprol XL 50mg AM and 100mg PM  - Reports has been prescribed Rosuvastatin in past but can NOT tolerate statins 2/2 myalgia  - NPO after midnight pending cardiac cath w/Dr. Orozco; consent obtained and in chart. IVF ordered for pre-cath in AM P/W chest tightness a/w SOB. Trop (-) x 2. No acute ischemic changes noted on EKG.  - s/p cardiac catheterization (9/2019): mild non obstructive CAD  - s/p abnormal NST 3/17: myocardial perfusion imaging abnormal. There is medium sized area of prior infarction in the apex and inferior wall. Overall LV dilated and systolic function is reduced. There is akinesis of the apex, apical lateral, mid and basal inferior and inferoseptal walls; all remaining walls are hypokinetic. The EKG stress test is non-diagnostic due to underlying paced rhythm.  - s/p ASA/Plavix load 3/17; c/w ASA 81mg/Plavix 75mg qd  - c/w Toprol XL 50mg AM and 100mg PM  - Reports has been prescribed Rosuvastatin in past but can NOT tolerate statins 2/2 myalgia  - NPO pending cardiac cath w/Dr. Orozco 3/18; consent obtained and in chart.   - Pre-cath IVF NS 50cc/hr x 4hr

## 2021-03-18 NOTE — DISCHARGE NOTE PROVIDER - HOSPITAL COURSE
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 A[facility] (2014; Dr. Jeff; most recent interrogation 10/2020 at which time his base rate was increased to 80 to try and decrease coupled PVCs and NSVT), thoracic aortic aneurysm, known non obstructive CAD by cardiac catheterization ([facility] 9/2019) presented to ED 3/16/21 w/ chest tightness associated w/ SOB. Admitted to tele [facility] for further mgmt, now s/p abnormal NST pending cardiac cath 3/18 w/ Dr. Orozco. 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 (2014; Dr. Jeff; most recent interrogation 10/2020 at which time his base rate was increased to 80 to try and decrease coupled PVCs and NSVT), thoracic aortic aneurysm, known non obstructive CAD by cardiac catheterization ([facility] 9/2019) presented to ED 3/16/21 w/ chest tightness associated w/ SOB. Admitted to tele [facility] for further mgmt, now s/p abnormal NST pending cardiac cath 3/18 w/ Dr. Orozco. Cardiac cath revealing non-obstructive coronary arteries.  Patient stating his chest pain is pleuritic and associated with movement. Lumbosacral xray ordered on 03/19/2021 -     No significant events on telemetry overnight. Repeat EKG without ischemic changes. Patient has been medically cleared for discharge as per Dr. Chandler. Patient has been given appropriate discharge instructions including medication regimen, access site management and follow up. Medications that patient needs refills on have been e-prescribed to preferred pharmacy.     VSS.   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 (2014; Dr. Jeff; most recent interrogation 10/2020 at which time his base rate was increased to 80 to try and decrease coupled PVCs and NSVT), thoracic aortic aneurysm, known non obstructive CAD by cardiac catheterization ([facility] 9/2019) presented to ED 3/16/21 w/ chest tightness associated w/ SOB. Admitted to tele [facility] for further mgmt, now s/p abnormal NST pending cardiac cath 3/18 w/ Dr. Orozco. Cardiac cath revealing non-obstructive coronary arteries.  Patient stating his chest pain is pleuritic and associated with movement. No events on telemetry to indicate patient's cause of chest pain.     No significant events on telemetry overnight. Repeat EKG without ischemic changes. Patient has been medically cleared for discharge as per Dr. Chandler. Patient has been given appropriate discharge instructions including medication regimen, access site management and follow up. Medications that patient needs refills on have been e-prescribed to preferred pharmacy.     VSS.   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 (2014; Dr. Jeff; most recent interrogation 10/2020 at which time his base rate was increased to 80 to try and decrease coupled PVCs and NSVT), thoracic aortic aneurysm, known non obstructive CAD by cardiac catheterization ([facility] 9/2019) presented to ED 3/16/21 w/ chest tightness associated w/ SOB. Admitted to tele [facility] for further mgmt, now s/p abnormal NST pending cardiac cath 3/18 w/ Dr. Orozco. Cardiac cath revealing non-obstructive coronary arteries.  Patient stating his chest pain is pleuritic and associated with movement. No events on telemetry to indicate patient's cause of chest pain. Patient underwent lumbosacral xray - no acute fracture or pathology.     No significant events on telemetry overnight. Repeat EKG without ischemic changes. Patient has been medically cleared for discharge as per Dr. Chandler. Patient has been given appropriate discharge instructions including medication regimen, access site management and follow up. Medications that patient needs refills on have been e-prescribed to preferred pharmacy.     VSS.   Gen: NAD, A&O x3  Cards: RRR, clear S1 and S2 without murmur  Pulm: CTA B/L without w/r/r  Right wrist: No hematoma or ooze, peripheral pulses 2+ B/L  Abd: soft, NT  Ext: no LE edema or ulcerations B/L

## 2021-03-18 NOTE — PROGRESS NOTE ADULT - PROBLEM SELECTOR PLAN 5
HX CKD III (most recent Cr 1.49 2/2021; trend 1.3-1.54). Creat 1.74 on admission  - Creat 1.32 today   - ordered for pre-cath IVF @ 50cc/hr x 4 H for 3/18   - Takes HCTZ 25 mg every Monday and Friday; will hold for now 2/2 anticipated contrast load for cath  - renally dose medications and avoid nephrotoxic agents HX CKD III (most recent Cr 1.49 2/2021; trend 1.3-1.54). Creat 1.74 on admission. IMPROVED.  - Creat 1.2 today  - ordered for pre-cath IVF @ 50cc/hr x 4 H for 3/18   - Takes HCTZ 25 mg every Monday and Friday; will hold for now 2/2 anticipated contrast load for cath  - Renally dose medications and avoid nephrotoxic agents

## 2021-03-18 NOTE — PROGRESS NOTE ADULT - PROBLEM SELECTOR PLAN 4
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 s/p [facility] AICD 2014. EKG AV paced; QTc 526 ms.   - most recent interrogation 10/2020 at which time his base rate was increased to 80 to try and decrease coupled PVCs and NSVT  - EP consulted and device interrogated- normal function   - Known hx pAF for which he was on Eliquis in the past but instructed to d/c by MD in 2020 (CHADSVASC Score 5) Dr. Chandler aware.
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 s/p [facility] A[facility] 2014. EKG AV paced; QTc 526 ms.   - most recent interrogation 10/2020 at which time his base rate was increased to 80 to try and decrease coupled PVCs and NSVT  - EP consulted and device interrogated- normal function   - Known hx pAF for which he was on Eliquis in the past but instructed to d/c by MD in 2020 (CHADSVASC Score 5) Dr. Chandler aware.

## 2021-03-18 NOTE — DISCHARGE NOTE PROVIDER - NSDCMRMEDTOKEN_GEN_ALL_CORE_FT
amLODIPine 5 mg oral tablet: 1 tab(s) orally once a day  Aspirin Enteric Coated 81 mg oral delayed release tablet: 1 tab(s) orally every 4 days  hydroCHLOROthiazide 25 mg oral tablet: 1 tab(s) orally 2 times a week (Monday and Firday)  magnesium oxide 400 mg (241.3 mg elemental magnesium) oral tablet: 1 tab(s) orally 2 times a day  pantoprazole 40 mg oral delayed release tablet: 1 tab(s) orally once a day  Toprol-XL 50 mg oral tablet, extended release: 1 tab(s) orally once a day (in AM)  Toprol-XL 50 mg oral tablet, extended release: 2 tab(s) orally once a day (at bedtime)  Vitamin D3: 1 tab(s) orally once a day   amLODIPine 5 mg oral tablet: 1 tab(s) orally once a day  Aspirin Enteric Coated 81 mg oral delayed release tablet: 1 tab(s) orally every 4 days  Diovan 40 mg oral tablet: 1 tab(s) orally once a day   hydroCHLOROthiazide 25 mg oral tablet: 1 tab(s) orally 2 times a week (Monday and Firday)  magnesium oxide 400 mg (241.3 mg elemental magnesium) oral tablet: 1 tab(s) orally 2 times a day  pantoprazole 40 mg oral delayed release tablet: 1 tab(s) orally once a day  Toprol-XL 50 mg oral tablet, extended release: 1 tab(s) orally once a day (in AM)  Toprol-XL 50 mg oral tablet, extended release: 2 tab(s) orally once a day (at bedtime)  Vitamin D3: 1 tab(s) orally once a day

## 2021-03-18 NOTE — PROGRESS NOTE ADULT - PROBLEM SELECTOR PLAN 2
on admission. Not overloaded on exam. CXR 3/16: clear lungs  - ECHO: LVSF 30%, inferior wall and true apex are severely hypokinetic to akinetic. Remaining walls are hypokinetic. Abnormal septal motion seen due to previous cardiac surgery. An annuloplasty ring is noted in the mitral position. Mild-Mod MR. A bioprosthetic valve noted in the aortic position. Trace AR. Mild-Mod TR. Pulm htn PAP 40  - EF newly reduced as it was 35-40% on outpatient ECHO 1/2021 and 55-60% in  2019  -Takes HCTZ 25 mg every Monday and Friday; will hold for now 2/2 anticipated contrast load for cath   - Strict I/Os, daily weights. core measures.  on admission. Not overloaded on exam. CXR 3/16: clear lungs.  - ECHO: LVEF 30%, inferior wall and true apex are severely hypokinetic to akinetic. Remaining walls are hypokinetic. Abnormal septal motion seen due to previous cardiac surgery. An annuloplasty ring is noted in the mitral position. Mild-Mod MR. A bioprosthetic valve noted in the aortic position. Trace AR. Mild-Mod TR. Pulm htn PASP 40mmHg  - EF newly reduced as it was 35-40% on outpatient ECHO 1/2021 and 55-60% in  2019  -Takes HCTZ 25 mg every Monday and Friday; will hold for now 2/2 anticipated contrast load for cath   - Strict I/Os, daily weights. HF core measures. Yes

## 2021-03-18 NOTE — PROGRESS NOTE ADULT - ASSESSMENT
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 AICD (2014; Dr. Jeff; most recent interrogation 10/2020 at which time his base rate was increased to 80 to try and decrease coupled PVCs and NSVT), thoracic aortic aneurysm, known non obstructive CAD by cardiac catheterization ([facility] 9/2019) presented to ED 3/16/21 w/chest tightness associated w/SOB, admitted to tele [facility] for further mgmt, now s/p abnormal NST pending cardiac cath 3/18 w/Dr. 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 AICD (2014; Dr. Jeff; most recent interrogation 10/2020 at which time his base rate was increased to 80 to try and decrease coupled PVCs and NSVT), thoracic aortic aneurysm, known non obstructive CAD by cardiac catheterization ([facility] 9/2019) presented to ED 3/16/21 w/ chest tightness associated w/ SOB. Admitted to tele [facility] for further mgmt, now s/p abnormal NST pending cardiac cath 3/18 w/ Dr. Orozco.

## 2021-03-18 NOTE — DISCHARGE NOTE PROVIDER - PROVIDER TOKENS
PROVIDER:[[id number]],FOLLOWUP:[2 weeks],ESTABLISHEDPATIENT:[T]] PROVIDER:[[id number]],SCHEDULEDAPPT:[04/05/2021],SCHEDULEDAPPTTIME:[11:20 AM],ESTABLISHEDPATIENT:[T]]

## 2021-03-18 NOTE — DISCHARGE NOTE PROVIDER - NSDCDCMDCOMP_GEN_ALL_CORE
LM for patient to return call to clinic    This document is complete and the patient is ready for discharge.

## 2021-03-18 NOTE — PROGRESS NOTE ADULT - PROBLEM SELECTOR PLAN 3
HX AVR and MVR (2014)  - ECHO findings as above.   - Compared to the previous TTE 9/2019, the degree of mitral regurgitation has progressed HX AVR and MVR (2014).  - ECHO findings as above.   - Compared to the previous TTE 9/2019, the degree of mitral regurgitation has progressed to mild - moderate MR

## 2021-03-18 NOTE — DISCHARGE NOTE PROVIDER - NSDCCPCAREPLAN_GEN_ALL_CORE_FT
PRINCIPAL DISCHARGE DIAGNOSIS  Diagnosis: Chest pain  Assessment and Plan of Treatment: You were admitted for further workup of chest pain - you underwent a cardiac angiogram that showed that you do not have any obstruction in your coronary arteries indicating that you do not have any blockages in your heart that could cause this chest pain.   Your procedure was done through your wrist. You do not need to keep this area covered and you may shower. Please avoid any heavy lifting  (no more than 3 to 5 lbs) or strenuous activity for five days. If you develop any swelling, bleeding, hardening of the skin (hematoma formation), acute pain, numbness/tingling  in your arm please contact your doctor immediately or call our 24/7 line: [phone] Please return to the hospital/seek immediate medical attention if worsening of symptoms- including not limited to chest pain, shortness of breath. Please follow up with Dr. Chandler on 04/05/2021 at 11:30 AM.  Please continue your Aspirin 81 mg once daily and Metoprolol succinate (TOPROL XL) 100 mg at night with Metoprolol Succinate (TOPROL XL) 50 mg once in the morning.  Please call his office and make an appointment to see him. Please continue a heart healthy diet low in sodium, cholesterol, and fat.        SECONDARY DISCHARGE DIAGNOSES  Diagnosis: Hypertension  Assessment and Plan of Treatment: You have a diagnosis of Hypertension or elevated blood pressure. Please continue taking your medications as listed to keep your blood pressure controlled. In addition, there are multiple lifestyle modifications that have been proven to lower blood pressure: maintaining a healthy body weight, engaging in regular physical activity for at least 30 minutes per day on most days, and consuming a diet rich in fruits, vegetables, and low-fat dairy products with a reduced amount of total and saturated fats and sodium. Please continue AMLODIPINE (NORVASC) 5 mg once daily, METOPROLOL SUCCINATE (TOPROL) 50 mg once daily, METOPROLOL SUCCINATE (TOPROL XL) 100 mg once nightly, and now were started on VALSARTAN (DIOVAN) 40 mg once daily.  For blood pressures at home that are too high or low please see your Doctor or go to the Emergency Room as necessary.

## 2021-03-19 ENCOUNTER — TRANSCRIPTION ENCOUNTER (OUTPATIENT)
Age: 76
End: 2021-03-19

## 2021-03-19 VITALS — TEMPERATURE: 98 F

## 2021-03-19 LAB
ANION GAP SERPL CALC-SCNC: 10 MMOL/L — SIGNIFICANT CHANGE UP (ref 5–17)
BUN SERPL-MCNC: 20 MG/DL — SIGNIFICANT CHANGE UP (ref 7–23)
CALCIUM SERPL-MCNC: 9.2 MG/DL — SIGNIFICANT CHANGE UP (ref 8.4–10.5)
CHLORIDE SERPL-SCNC: 102 MMOL/L — SIGNIFICANT CHANGE UP (ref 96–108)
CO2 SERPL-SCNC: 27 MMOL/L — SIGNIFICANT CHANGE UP (ref 22–31)
COVID-19 SPIKE DOMAIN AB INTERP: POSITIVE
COVID-19 SPIKE DOMAIN ANTIBODY RESULT: >250 U/ML — HIGH
CREAT SERPL-MCNC: 1.39 MG/DL — HIGH (ref 0.5–1.3)
GLUCOSE SERPL-MCNC: 95 MG/DL — SIGNIFICANT CHANGE UP (ref 70–99)
HCT VFR BLD CALC: 45.8 % — SIGNIFICANT CHANGE UP (ref 39–50)
HGB BLD-MCNC: 14.3 G/DL — SIGNIFICANT CHANGE UP (ref 13–17)
MAGNESIUM SERPL-MCNC: 2.1 MG/DL — SIGNIFICANT CHANGE UP (ref 1.6–2.6)
MCHC RBC-ENTMCNC: 31.2 GM/DL — LOW (ref 32–36)
MCHC RBC-ENTMCNC: 31.8 PG — SIGNIFICANT CHANGE UP (ref 27–34)
MCV RBC AUTO: 102 FL — HIGH (ref 80–100)
NRBC # BLD: 0 /100 WBCS — SIGNIFICANT CHANGE UP (ref 0–0)
PLATELET # BLD AUTO: 216 K/UL — SIGNIFICANT CHANGE UP (ref 150–400)
POTASSIUM SERPL-MCNC: 3.9 MMOL/L — SIGNIFICANT CHANGE UP (ref 3.5–5.3)
POTASSIUM SERPL-SCNC: 3.9 MMOL/L — SIGNIFICANT CHANGE UP (ref 3.5–5.3)
RBC # BLD: 4.49 M/UL — SIGNIFICANT CHANGE UP (ref 4.2–5.8)
RBC # FLD: 13.1 % — SIGNIFICANT CHANGE UP (ref 10.3–14.5)
SARS-COV-2 IGG+IGM SERPL QL IA: >250 U/ML — HIGH
SARS-COV-2 IGG+IGM SERPL QL IA: POSITIVE
SODIUM SERPL-SCNC: 139 MMOL/L — SIGNIFICANT CHANGE UP (ref 135–145)
WBC # BLD: 5.02 K/UL — SIGNIFICANT CHANGE UP (ref 3.8–10.5)
WBC # FLD AUTO: 5.02 K/UL — SIGNIFICANT CHANGE UP (ref 3.8–10.5)

## 2021-03-19 PROCEDURE — 72100 X-RAY EXAM L-S SPINE 2/3 VWS: CPT | Mod: 26

## 2021-03-19 PROCEDURE — 99238 HOSP IP/OBS DSCHRG MGMT 30/<: CPT

## 2021-03-19 RX ORDER — VALSARTAN 80 MG/1
1 TABLET ORAL
Qty: 30 | Refills: 3
Start: 2021-03-19 | End: 2021-07-16

## 2021-03-19 RX ADMIN — Medication 81 MILLIGRAM(S): at 11:12

## 2021-03-19 RX ADMIN — AMLODIPINE BESYLATE 5 MILLIGRAM(S): 2.5 TABLET ORAL at 05:42

## 2021-03-19 RX ADMIN — Medication 50 MILLIGRAM(S): at 05:42

## 2021-03-19 RX ADMIN — ENOXAPARIN SODIUM 40 MILLIGRAM(S): 100 INJECTION SUBCUTANEOUS at 00:13

## 2021-03-19 RX ADMIN — SODIUM CHLORIDE 50 MILLILITER(S): 9 INJECTION INTRAMUSCULAR; INTRAVENOUS; SUBCUTANEOUS at 00:15

## 2021-03-19 RX ADMIN — PANTOPRAZOLE SODIUM 40 MILLIGRAM(S): 20 TABLET, DELAYED RELEASE ORAL at 06:03

## 2021-03-19 NOTE — DISCHARGE NOTE NURSING/CASE MANAGEMENT/SOCIAL WORK - PATIENT PORTAL LINK FT
You can access the FollowMyHealth Patient Portal offered by [facility] by registering at the following website: [email or web address]. By joining [facility]’s FollowMyHealth portal, you will also be able to view your health information using other applications (apps) compatible with our system.

## 2021-03-26 ENCOUNTER — RX RENEWAL (OUTPATIENT)
Age: 76
End: 2021-03-26

## 2021-03-27 DIAGNOSIS — I71.2 THORACIC AORTIC ANEURYSM, WITHOUT RUPTURE: ICD-10-CM

## 2021-03-27 DIAGNOSIS — I25.10 ATHEROSCLEROTIC HEART DISEASE OF NATIVE CORONARY ARTERY WITHOUT ANGINA PECTORIS: ICD-10-CM

## 2021-03-27 DIAGNOSIS — R07.9 CHEST PAIN, UNSPECIFIED: ICD-10-CM

## 2021-03-27 DIAGNOSIS — I13.0 HYPERTENSIVE HEART AND CHRONIC KIDNEY DISEASE WITH HEART FAILURE AND STAGE 1 THROUGH STAGE 4 CHRONIC KIDNEY DISEASE, OR UNSPECIFIED CHRONIC KIDNEY DISEASE: ICD-10-CM

## 2021-03-27 DIAGNOSIS — Z95.3 PRESENCE OF XENOGENIC HEART VALVE: ICD-10-CM

## 2021-03-27 DIAGNOSIS — Z95.810 PRESENCE OF AUTOMATIC (IMPLANTABLE) CARDIAC DEFIBRILLATOR: ICD-10-CM

## 2021-03-27 DIAGNOSIS — E78.5 HYPERLIPIDEMIA, UNSPECIFIED: ICD-10-CM

## 2021-03-27 DIAGNOSIS — I48.0 PAROXYSMAL ATRIAL FIBRILLATION: ICD-10-CM

## 2021-03-27 DIAGNOSIS — I50.22 CHRONIC SYSTOLIC (CONGESTIVE) HEART FAILURE: ICD-10-CM

## 2021-03-27 DIAGNOSIS — N17.9 ACUTE KIDNEY FAILURE, UNSPECIFIED: ICD-10-CM

## 2021-03-27 DIAGNOSIS — N18.30 CHRONIC KIDNEY DISEASE, STAGE 3 UNSPECIFIED: ICD-10-CM

## 2021-04-05 ENCOUNTER — APPOINTMENT (OUTPATIENT)
Dept: HEART AND VASCULAR | Facility: CLINIC | Age: 76
End: 2021-04-05
Payer: COMMERCIAL

## 2021-04-05 VITALS
SYSTOLIC BLOOD PRESSURE: 120 MMHG | TEMPERATURE: 97.9 F | DIASTOLIC BLOOD PRESSURE: 70 MMHG | OXYGEN SATURATION: 98 % | WEIGHT: 181.99 LBS | HEART RATE: 73 BPM | BODY MASS INDEX: 26.05 KG/M2 | HEIGHT: 70 IN

## 2021-04-05 PROCEDURE — 99214 OFFICE O/P EST MOD 30 MIN: CPT

## 2021-04-05 NOTE — ASSESSMENT
[FreeTextEntry1] : Valve disease: clean coronaries on cardiac cath in 2014. status post AVR, MV repair Dr. Caio Louise 9/9/2014) SBE prophylaxis. okay to stop ASA 1 week before dental extraction and implant. EKG with 1 PVC. Echo done 7/2018. EF 40%  Valves OK.  Echo update Nov 2019 , EF 55-60 ??.  Echo done 1/21, EF 35-40 % closer to his baseline.  Ao V probably NL prosthetic valve.\par \par Abdominal Pain- worse if he hits a pothole. Will screen for a AAA. If (-) will refer to GI (Dr Cortez).  SONO (-) FOR AAA, F/U WITH Dr CORTEZ. \par \par Chest/abd pain- will treat with Protonix x 30 days.  New EKG changes 11/14/19 not seen before, deep Tw inversions, QTc 474.  I suspect post pacing artifact, remotely  PUD, Trop sent, echo ordered.  Trop NL, EF and wall motion normal on echo. CCTA done Nov 1st, 2019 is clean. No coronary disease.  More upper abd discomfort, will give a trial of protonix. Still with pain, will screen for AAA, refer to GI.  Did not see GI, AAA screen is (-). He reports improvement with HCTZ, will try Lasix\par \par SOB- BNP sent, echo from 1/2021 with a drop in EF.  Await BNP level, 550.  EF 23 % on Nuc, 30 % on echo at [facility]. EF 50 % on echo in 2014, 45-50% in 2016, 40% in 2018, all were done at [facility].  Now its 23 % on Nuc and 30 % on echo at [facility] from March 2021.\par \par PreOP- complex pt but cleared for dental work.  Has h/o VT to no epinephrine.  Has AICD so no cautery unless AICD is shut off to avoid inappropriate shocks.  Needs SBE prophylaxis for dental work only. Pt s cleared for cataract surgery\par \par PAF- changed from ASA to Eliquis by Dr ARIZMENDI.  No signs of trauma or bleeding on exam. Pt reassured that the Eliquis is not causing the pain.  Off Eliquis by Dr ARIZMENDI due to not feeling well  \par \par VT- has non-sustained, i spoke to Dr Palencia who does not want him to run the [facility]. There is  a VT and syncope/sudden death risk, also has a thoracic aneurysm.  I discussed this with him and his wife, walking the Marathon is OK but no jogging.  He walked it and did well. Now seeing Dr Jeff.  Had syncope and adm to [facility], trans to [facility].  VT RFA, Sotolol changed to Mexitil Sept 2019.  CAN RETURN TO WORK NEXT MONDAY\par \par Aortic aneurysm: Enlarged. 42 (ascending) 47 (descending) in 2014\par Followup in 2015 reveals a max Ao of 46 mm\par CT angiogram 3/6/2017 aneurysm unchanged\par annual CT, due 3/2018.  Aorta 46mm April 2018, referred back to Dr Garcia,  48mm on CT 2019\par \par HLD: on pravastatin 40mg, had muscle aches on 80mg,  8/2017, pt reported he was only taking it once in a while, now taking it daily will repeat lipid panel. Diet and exercise discussed in detail.   Feb 2018,  July 2020. sTARTING cRESTOR 10 qod\par \par HTN: Have DC Lasix and changed to HCTZ weekly.  BP very good, Reduce Micardis  40 for persistent elevated K.  Increase HCTZ 25 to M & F. BP OK.  Micardis now at 20mg, DCed due to elevated Cr and K\par \par CKD: 1.33 8/8/2017, repeat BMP Sept 2017, Cr 1.05, previously normal, on ARB  1.31 and K 5.9.

## 2021-04-05 NOTE — HISTORY OF PRESENT ILLNESS
[FreeTextEntry1] : PCP- Dr Sandhya Bloom\par EP- Dr Jeff\par CTS- Dr Garcia\par Plastics- Dr Ko\par Hand- Dr Xiong\par GI- Dr Cortez, colonoscopy Sept 2017\par Dentist: Dr. Jorge Oh  \par Ophtho- Dr Jesus Lantigua [phone]

## 2021-04-05 NOTE — REASON FOR VISIT
[FreeTextEntry1] : 72-year-old male with history of high cholesterol, aortic aneurysm thoracic, hypertension, lumbago, pre-diabetes, Valve disease (status post AVR, MVrepair Dr. Caio Louise 9/9/2014). S/P  colonoscopy with Dr. Maximilian Cortez 9/14/17. Doing well overall, Denies CP, SOB, palpitations, orthopnea, LE swelling, dizziness, syncope. Walking and running daily, sometimes >10 miles, training for [place] in 2018 after skipping 2017. \par \par s/p dental extraction and implant. Reports during dental cleaning he was told he bled a lot and dentist would prefer he stop aspirin for future dental extraction and implant. 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 LAHB, ST-Tw abnormalities. Blocked APC, V paced x 2 beats Pacer set at 40 1/22/19\par EKG: A Pacing, PVCs, 1st degree AVB, with a LAHB, possible IWMI, QTc 429 ST-Twave abnormalities.  10/10/19\par \par 3/18/19 A Fib discovered by Dr Jeff on 3/13/19, ASA changed to Eliquis\par 5/6/19 Pt with atypical pain in the axilla on the left, he thinks its the Eliquis.  No swelling or ecchymosis reported\par 7/15/19 K 5.5, Telmisartin reduced to 20mg. BP excellent.  c/o severe right shoulder pain.  Previously injected with relief.\par 9/23/19 Adm to  [facility]) with syncope and NSVT.  Trans to [facility].  VT ablation and Mexitil started.\par 10/10/19  Pt c/o chest  pressure, when questioned its sub Xiphoid.  Eating makes it better\par \par 11/14/19  New Deep Tw inversions, ? post pacing.  CCTA Nov 1 clean. Recently had a lot of "stomach" pains.  I suspect he has PUD, Trop sent, echo ordered\par 1/24/20 Here with several weeks of a cold/URI, saw an MD and given Z westley.  Here with wheezing, reduced exercise tolerance. + Wheezing, SOB, mild cough, non-productive, no fever or chills.\par Also here for clearance for cataract\par 10/15/20 Fullness in subxiphoid area.  (-) CTA Nov 2020.  Also has  off statin.\par 1/15/21 ARB DCed due to elevated Cr and K of 5.9, not taking Crestor due to nausea, knee pain, back pain.  More MONTEJO noted, will need to reassess Ao V\par 2/24/21 More MONTEJO again, getting worse, epigastric discomfort, worse if he hits a pot hole., or walking hard.  ?pleuritic component.  Feb 12-16 pt reports wt went up, not sleeping well.\par 4/5/21 In [facility] 4/16 to 4/19/21 with chest pain, + Nuc(fixed inferior and apical defects).  EF 23 %, Echo EF 30 %, mild to moderate MR. but (-) cath.  Still gets epigastric pain with hitting potholes.  + MONTEJO.  PAP 40 mm.  Feels better after HCTZ.\par

## 2021-04-05 NOTE — PHYSICAL EXAM
[General Appearance - Well Developed] : well developed [Normal Appearance] : normal appearance [Well Groomed] : well groomed [General Appearance - Well Nourished] : well nourished [No Deformities] : no deformities [General Appearance - In No Acute Distress] : no acute distress [Normal Conjunctiva] : the conjunctiva exhibited no abnormalities [Eyelids - No Xanthelasma] : the eyelids demonstrated no xanthelasmas [Normal Oral Mucosa] : normal oral mucosa [No Oral Pallor] : no oral pallor [No Oral Cyanosis] : no oral cyanosis [Respiration, Rhythm And Depth] : normal respiratory rhythm and effort [Exaggerated Use Of Accessory Muscles For Inspiration] : no accessory muscle use [Auscultation Breath Sounds / Voice Sounds] : lungs were clear to auscultation bilaterally [Heart Rate And Rhythm] : heart rate and rhythm were normal [Heart Sounds] : normal S1 and S2 [Arterial Pulses Normal] : the arterial pulses were normal [Edema] : no peripheral edema present [Abdomen Soft] : soft [Abdomen Tenderness] : non-tender [Abdomen Mass (___ Cm)] : no abdominal mass palpated [Abnormal Walk] : normal gait [Gait - Sufficient For Exercise Testing] : the gait was sufficient for exercise testing [Nail Clubbing] : no clubbing of the fingernails [Cyanosis, Localized] : no localized cyanosis [Petechial Hemorrhages (___cm)] : no petechial hemorrhages [Skin Color & Pigmentation] : normal skin color and pigmentation [] : no rash [No Venous Stasis] : no venous stasis [Skin Lesions] : no skin lesions [No Skin Ulcers] : no skin ulcer [No Xanthoma] : no  xanthoma was observed [Oriented To Time, Place, And Person] : oriented to person, place, and time [Affect] : the affect was normal [Mood] : the mood was normal [No Anxiety] : not feeling anxious [FreeTextEntry1] : +3/6 systolic murmur

## 2021-04-08 ENCOUNTER — EMERGENCY (EMERGENCY)
Facility: HOSPITAL | Age: 76
LOS: 1 days | Discharge: HOME | End: 2021-04-08
Attending: EMERGENCY MEDICINE | Admitting: EMERGENCY MEDICINE
Payer: MEDICARE

## 2021-04-08 VITALS
HEIGHT: 71 IN | RESPIRATION RATE: 18 BRPM | DIASTOLIC BLOOD PRESSURE: 85 MMHG | WEIGHT: 182.1 LBS | SYSTOLIC BLOOD PRESSURE: 129 MMHG | TEMPERATURE: 98 F | OXYGEN SATURATION: 99 % | HEART RATE: 82 BPM

## 2021-04-08 DIAGNOSIS — I50.20 UNSPECIFIED SYSTOLIC (CONGESTIVE) HEART FAILURE: ICD-10-CM

## 2021-04-08 DIAGNOSIS — E78.5 HYPERLIPIDEMIA, UNSPECIFIED: ICD-10-CM

## 2021-04-08 DIAGNOSIS — Z98.89 OTHER SPECIFIED POSTPROCEDURAL STATES: Chronic | ICD-10-CM

## 2021-04-08 DIAGNOSIS — Z79.899 OTHER LONG TERM (CURRENT) DRUG THERAPY: ICD-10-CM

## 2021-04-08 DIAGNOSIS — I25.10 ATHEROSCLEROTIC HEART DISEASE OF NATIVE CORONARY ARTERY WITHOUT ANGINA PECTORIS: ICD-10-CM

## 2021-04-08 DIAGNOSIS — B02.9 ZOSTER WITHOUT COMPLICATIONS: ICD-10-CM

## 2021-04-08 DIAGNOSIS — I11.0 HYPERTENSIVE HEART DISEASE WITH HEART FAILURE: ICD-10-CM

## 2021-04-08 DIAGNOSIS — Z95.810 PRESENCE OF AUTOMATIC (IMPLANTABLE) CARDIAC DEFIBRILLATOR: ICD-10-CM

## 2021-04-08 DIAGNOSIS — R21 RASH AND OTHER NONSPECIFIC SKIN ERUPTION: ICD-10-CM

## 2021-04-08 DIAGNOSIS — Z79.82 LONG TERM (CURRENT) USE OF ASPIRIN: ICD-10-CM

## 2021-04-08 DIAGNOSIS — I48.0 PAROXYSMAL ATRIAL FIBRILLATION: ICD-10-CM

## 2021-04-08 DIAGNOSIS — Z88.8 ALLERGY STATUS TO OTHER DRUGS, MEDICAMENTS AND BIOLOGICAL SUBSTANCES STATUS: ICD-10-CM

## 2021-04-08 PROCEDURE — 99283 EMERGENCY DEPT VISIT LOW MDM: CPT

## 2021-04-08 PROCEDURE — 99285 EMERGENCY DEPT VISIT HI MDM: CPT

## 2021-04-08 RX ORDER — VALACYCLOVIR 500 MG/1
1 TABLET, FILM COATED ORAL
Qty: 20 | Refills: 0
Start: 2021-04-08 | End: 2021-04-14

## 2021-04-08 RX ORDER — TRAMADOL HYDROCHLORIDE 50 MG/1
1 TABLET ORAL
Qty: 12 | Refills: 0
Start: 2021-04-08 | End: 2021-04-10

## 2021-04-08 RX ORDER — VALACYCLOVIR 500 MG/1
1000 TABLET, FILM COATED ORAL ONCE
Refills: 0 | Status: COMPLETED | OUTPATIENT
Start: 2021-04-08 | End: 2021-04-08

## 2021-04-08 RX ORDER — TRAMADOL HYDROCHLORIDE 50 MG/1
50 TABLET ORAL ONCE
Refills: 0 | Status: DISCONTINUED | OUTPATIENT
Start: 2021-04-08 | End: 2021-04-08

## 2021-04-08 RX ADMIN — VALACYCLOVIR 1000 MILLIGRAM(S): 500 TABLET, FILM COATED ORAL at 21:18

## 2021-04-08 RX ADMIN — TRAMADOL HYDROCHLORIDE 50 MILLIGRAM(S): 50 TABLET ORAL at 21:18

## 2021-04-08 NOTE — ED PROVIDER NOTE - NSFOLLOWUPINSTRUCTIONS_ED_ALL_ED_FT
You have Shingles. You are being treated with an antiviral medical called Valacylovir (Valtrex) which will help reduce the duration of the illness, with hope to prevent postherpetic neuralgia (persistent pain that can occur in the area of the rash, onset the rash resolves). You are being prescribed Tramadol, for severe pain. You may take Tylenol if the pain is not severe. The rash is contagious while there are blisters and red spots. Once the rash is crusted or scabbed, it is not longer contagious.     Shingles    WHAT YOU NEED TO KNOW:    Shingles is a painful rash. Shingles is caused by the same virus that causes chickenpox (varicella-zoster). After you get chickenpox, the virus stays in your body for several years without causing any symptoms. Shingles occurs when the virus becomes active again. The active virus travels along a nerve to your skin and causes a rash.    Shingles         DISCHARGE INSTRUCTIONS:    Call your local emergency number (911 in the ) if:   •You have trouble moving your arms, legs, or face.      •You become confused, or have difficulty speaking.      •You have a seizure.      Return to the emergency department if:   •You have weakness in an arm or leg.      •You have dizziness, a severe headache, or hearing or vision loss.      •You have painful, red, warm skin around the blisters, or the blisters drain pus.      •Your neck is stiff or you have trouble moving it.      Call your doctor if:   •You feel weak or have a headache.      •You have a cough, chills, or a fever.      •You have abdominal pain or nausea, or you are vomiting.      •Your rash becomes more itchy or painful.      •Your rash spreads to other parts of your body.      •Your pain worsens and does not go away even after you take medicine.      •You have questions or concerns about your condition or care.      Medicines: You may need any of the following:  •Antiviral medicine helps decrease symptoms and healing time. They may also decrease your risk of developing nerve pain. You will need to start taking them within 3 days of the start of symptoms to prevent nerve pain.      •Prescription pain medicine may be given. Ask your healthcare provider how to take this medicine safely. Some prescription pain medicines contain acetaminophen. Do not take other medicines that contain acetaminophen without talking to your healthcare provider. Too much acetaminophen may cause liver damage. Prescription pain medicine may cause constipation. Ask your healthcare provider how to prevent or treat constipation.       •Acetaminophen decreases pain and fever. It is available without a doctor's order. Ask how much to take and how often to take it. Follow directions. Read the labels of all other medicines you are using to see if they also contain acetaminophen, or ask your doctor or pharmacist. Acetaminophen can cause liver damage if not taken correctly. Do not use more than 4 grams (4,000 milligrams) total of acetaminophen in one day.       •NSAIDs, such as ibuprofen, help decrease swelling, pain, and fever. This medicine is available with or without a doctor's order. NSAIDs can cause stomach bleeding or kidney problems in certain people. If you take blood thinner medicine, always ask if NSAIDs are safe for you. Always read the medicine label and follow directions. Do not give these medicines to children under 6 months of age without direction from your child's healthcare provider.      •Topical anesthetics are used to numb the skin and decrease pain. They can be a cream, gel, spray, or patch.      •Anticonvulsants decrease nerve pain and may help you sleep at night.      •Antidepressants may be used to decrease nerve pain.      •Take your medicine as directed. Contact your healthcare provider if you think your medicine is not helping or if you have side effects. Tell him of her if you are allergic to any medicine. Keep a list of the medicines, vitamins, and herbs you take. Include the amounts, and when and why you take them. Bring the list or the pill bottles to follow-up visits. Carry your medicine list with you in case of an emergency.      Self-care: Keep your rash clean and dry. Cover your rash with a bandage or clothing. Do not use bandages that stick to your skin. The sticky part may irritate your skin and make your rash last longer.    Prevent the spread of germs:          •Wash your hands often. Wash your hands several times each day. Wash after you use the bathroom, change a child's diaper, and before you prepare or eat food. Use soap and water every time. Rub your soapy hands together, lacing your fingers. Wash the front and back of your hands, and in between your fingers. Use the fingers of one hand to scrub under the fingernails of the other hand. Wash for at least 20 seconds. Rinse with warm, running water for several seconds. Then dry your hands with a clean towel or paper towel. Use hand  that contains alcohol if soap and water are not available. Do not touch your eyes, nose, or mouth without washing your hands first.  Handwashing           •Cover a sneeze or cough. Use a tissue that covers your mouth and nose. Throw the tissue away in a trash can right away. Use the bend of your arm if a tissue is not available. Wash your hands well with soap and water or use a hand .      •Stay away from others while you are sick. Avoid crowds as much as possible.      •Ask about vaccines you may need. Talk to your healthcare provider about your vaccine history. He or she will tell you which vaccines you need, and when to get them.      Prevent shingles or another shingles outbreak: A vaccine may be given to help prevent shingles. You can get the vaccine even if you already had shingles. The vaccine can help prevent a future outbreak. If you do get shingles again, the vaccine can keep it from becoming severe. The vaccine comes in 2 forms. Your healthcare provider will tell you which form is right for you. The decision is based on your age and any medical conditions you have. A 2-dose vaccine is usually given to adults 50 years or older. A 1-dose vaccine may be given to adults 60 years or older.    Follow up with your doctor as directed: Write down your questions so you remember to ask them during your visits.    For more information:   •Centers for Disease Control and Prevention  [address], [place]  Phone: [phone]  Phone: [phone]  Web Address: http://www.cdc.gov           © Copyright [employer] 2021

## 2021-04-08 NOTE — ED PROVIDER NOTE - OBJECTIVE STATEMENT
PT w/ PMHx HTN, HLD, pAF (no AC), HFrEF, AVR, MVR, nonobs CAD, AICD ablation for PVC induced VF p/w rash, onset yesterday. The rash is isolated to the R flank. The rash was preceded by 2 days of pain.

## 2021-04-08 NOTE — ED PROVIDER NOTE - PATIENT PORTAL LINK FT
You can access the FollowMyHealth Patient Portal offered by [facility] by registering at the following website: [email or web address]. By joining [facility]’s FollowMyHealth portal, you will also be able to view your health information using other applications (apps) compatible with our system.

## 2021-04-08 NOTE — ED ADULT TRIAGE NOTE - CHIEF COMPLAINT QUOTE
Pt reports rash to R ribs, sudden onset last night. Pt reports sever pain rated 10/10, unable to touch rash without pain. Hx of shingles 10 years ago.

## 2021-04-12 ENCOUNTER — RX RENEWAL (OUTPATIENT)
Age: 76
End: 2021-04-12

## 2021-04-29 ENCOUNTER — APPOINTMENT (OUTPATIENT)
Dept: INTERNAL MEDICINE | Facility: CLINIC | Age: 76
End: 2021-04-29
Payer: COMMERCIAL

## 2021-04-29 VITALS
WEIGHT: 186 LBS | DIASTOLIC BLOOD PRESSURE: 86 MMHG | OXYGEN SATURATION: 97 % | BODY MASS INDEX: 26.63 KG/M2 | HEIGHT: 70 IN | TEMPERATURE: 97.3 F | HEART RATE: 88 BPM | SYSTOLIC BLOOD PRESSURE: 112 MMHG

## 2021-04-29 DIAGNOSIS — Z87.19 PERSONAL HISTORY OF OTHER DISEASES OF THE DIGESTIVE SYSTEM: ICD-10-CM

## 2021-04-29 DIAGNOSIS — I35.0 NONRHEUMATIC AORTIC (VALVE) STENOSIS: ICD-10-CM

## 2021-04-29 DIAGNOSIS — Z23 ENCOUNTER FOR IMMUNIZATION: ICD-10-CM

## 2021-04-29 DIAGNOSIS — R07.9 CHEST PAIN, UNSPECIFIED: ICD-10-CM

## 2021-04-29 PROCEDURE — 99072 ADDL SUPL MATRL&STAF TM PHE: CPT

## 2021-04-29 PROCEDURE — 99214 OFFICE O/P EST MOD 30 MIN: CPT

## 2021-04-29 RX ORDER — ZINC OXIDE AND COCOA BUTTER 270; 2052 MG/1; MG/1
76-10 SUPPOSITORY RECTAL
Qty: 60 | Refills: 2 | Status: COMPLETED | COMMUNITY
Start: 2020-09-28 | End: 2021-04-29

## 2021-04-29 RX ORDER — ALBUTEROL SULFATE 90 UG/1
108 (90 BASE) INHALANT RESPIRATORY (INHALATION) EVERY 6 HOURS
Qty: 1 | Refills: 0 | Status: COMPLETED | COMMUNITY
Start: 2020-01-24 | End: 2021-04-29

## 2021-04-29 RX ORDER — DOCUSATE SODIUM 100 MG/1
100 CAPSULE ORAL
Qty: 60 | Refills: 2 | Status: COMPLETED | COMMUNITY
Start: 2020-09-28 | End: 2021-04-29

## 2021-04-29 RX ORDER — PREDNISOLONE ACETATE 10 MG/ML
1 SUSPENSION/ DROPS OPHTHALMIC
Qty: 10 | Refills: 0 | Status: COMPLETED | COMMUNITY
Start: 2020-09-21 | End: 2021-04-29

## 2021-04-29 NOTE — ASSESSMENT
[FreeTextEntry1] : I am primarily concerned that the patient's meds don't reconsile. He and his daughter took a copy of the [facility] med list and will call us later with the meds he has been taking.\par His BP is good on whatever that regimen might be.\par He will return in 3 months for CPE and shingles vaccine.\par His shingles is resolving nicely.\par

## 2021-04-29 NOTE — HISTORY OF PRESENT ILLNESS
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 AICD (2014; Dr. Jeff; most recent interrogation 10/2020 at which time his base rate was increased to 80 to try and decrease coupled PVCs and NSVT), thoracic aortic aneurysm, known non obstructive CAD by cardiac catheterization ([facility] 9/2019) presented to ED 3/16/21 w/ chest tightness associated w/ SOB. Admitted to [address] for further mgmt, and had cath with Dr Orozco after abnormal NST.. Cardiac cath revealed nothing new. Still with CP and SOB. Sent home from hospital on \par amLODIPine 5 mg oral tablet: 1 tab(s) orally once a day\par Aspirin Enteric Coated 81 mg oral delayed release tablet: 1 tab(s) orally every\par 4 days\par Diovan 40 mg oral tablet: 1 tab(s) orally once a day\par hydroCHLOROthiazide 25 mg oral tablet: 1 tab(s) orally 2 times a week (Monday\par and Firday)\par magnesium oxide 400 mg (241.3 mg elemental magnesium) oral tablet: 1 tab(s)\par orally 2 times a day\par pantoprazole 40 mg oral delayed release tablet: 1 tab(s) orally once a day\par Toprol-XL 50 mg oral tablet, extended release: 1 tab(s) orally once a day (in\par AM)\par Toprol-XL 50 mg oral tablet, extended release: 2 tab(s) orally once a day (at\par bedtime)\par Vitamin D3: 1 tab(s) orally once a day\par \par Then ,saw Dr Chandler on 4/5 who added Lasix.\par \par He was then diagnosed with shingles diagnosed three weeks ago and placed on Valtrex by ER x 7 days.  pain, blisters resolved. \par Took Amoxicillin yesterday prior to dental work.\par \par Only takes statin 1-2x/week. Gives him myalgias. Claims Dr Chandler is aware.\par \par He brought his medication list with him:\par FUrosemide 20 mg\par Valsartan 40 mg\par HCTZ 25 mg\par Rosuvastatin 10 mg\par ASA\par \par There are several differences between his list, his discharge from the hospital list and the list in [facility]. He isn't sure that he takes amlodipine and our [employer] list doesn't have Valsartan. \par

## 2021-04-29 NOTE — PHYSICAL EXAM
[Normal Sclera/Conjunctiva] : normal sclera/conjunctiva [No JVD] : no jugular venous distention [Normal] : no respiratory distress, lungs were clear to auscultation bilaterally and no accessory muscle use [Normal Rate] : normal rate  [Regular Rhythm] : with a regular rhythm [No Edema] : there was no peripheral edema [Normal Gait] : normal gait [Deep Tendon Reflexes (DTR)] : deep tendon reflexes were 2+ and symmetric [Normal Affect] : the affect was normal [Alert and Oriented x3] : oriented to person, place, and time [de-identified] : resolving zoster infection on R torso

## 2021-04-29 NOTE — REVIEW OF SYSTEMS
[Dyspnea on Exertion] : dyspnea on exertion [Joint Pain] : joint pain [Negative] : Neurological [Chest Pain] : no chest pain [Palpitations] : no palpitations [Cough] : no cough

## 2021-04-30 ENCOUNTER — RX RENEWAL (OUTPATIENT)
Age: 76
End: 2021-04-30

## 2021-04-30 RX ORDER — ASPIRIN 81 MG/1
81 TABLET ORAL
Refills: 0 | Status: DISCONTINUED | COMMUNITY
Start: 2019-10-23 | End: 2021-04-30

## 2021-05-04 ENCOUNTER — APPOINTMENT (OUTPATIENT)
Age: 76
End: 2021-05-04
Payer: COMMERCIAL

## 2021-05-04 VITALS
HEIGHT: 71 IN | WEIGHT: 185 LBS | BODY MASS INDEX: 25.9 KG/M2 | DIASTOLIC BLOOD PRESSURE: 60 MMHG | TEMPERATURE: 95.4 F | HEART RATE: 51 BPM | SYSTOLIC BLOOD PRESSURE: 110 MMHG | OXYGEN SATURATION: 92 % | RESPIRATION RATE: 12 BRPM

## 2021-05-04 PROCEDURE — 99072 ADDL SUPL MATRL&STAF TM PHE: CPT

## 2021-05-04 PROCEDURE — 99213 OFFICE O/P EST LOW 20 MIN: CPT

## 2021-05-04 NOTE — ASSESSMENT
[FreeTextEntry1] : Atypical chest pain, chronic - will trial PPI (Panto @40mg) and work on EGD bext step after conferring with Dr Chandler\par \par Int Hemorrhoid - calmol 4 suppos re-prescribed, reassurance provided\par

## 2021-05-04 NOTE — HISTORY OF PRESENT ILLNESS
[de-identified] : last seen 9/20 for rectal pain, rx'd suppositories but did not trial\par somewhat poor historian, works a \par now here for worsening atypical chest pain ref by shiva cervantes from open heart surgery (remote) and worse when hits a pothole or other sudden jolt. not tender to palpation and extensive cardiac workup unrevealing. does not seem like GERD, no alarm sxs such as dysphagia or unexplained wt loss.\par no rectal bleeding or straining but hemorrhoid continues to nag him.

## 2021-05-04 NOTE — PHYSICAL EXAM
[General Appearance - Alert] : alert [General Appearance - In No Acute Distress] : in no acute distress [Sclera] : the sclera and conjunctiva were normal [Outer Ear] : the ears and nose were normal in appearance [Neck Appearance] : the appearance of the neck was normal [Exaggerated Use Of Accessory Muscles For Inspiration] : no accessory muscle use [Apical Impulse] : the apical impulse was normal [Bowel Sounds] : normal bowel sounds [Abdomen Soft] : soft [Abdomen Tenderness] : non-tender [] : no hepato-splenomegaly [Normal Sphincter Tone] : normal sphincter tone [Internal Hemorrhoid] : internal hemorrhoids [External Hemorrhoid] : no external hemorrhoids [Cervical Lymph Nodes Enlarged Posterior Bilaterally] : posterior cervical [No CVA Tenderness] : no ~M costovertebral angle tenderness [Abnormal Walk] : normal gait [Musculoskeletal - Swelling] : no joint swelling seen [Skin Color & Pigmentation] : normal skin color and pigmentation [Skin Turgor] : normal skin turgor [Oriented To Time, Place, And Person] : oriented to person, place, and time [Impaired Insight] : insight and judgment were intact

## 2021-05-10 ENCOUNTER — APPOINTMENT (OUTPATIENT)
Dept: HEART AND VASCULAR | Facility: CLINIC | Age: 76
End: 2021-05-10
Payer: MEDICARE

## 2021-05-10 VITALS
HEIGHT: 71 IN | BODY MASS INDEX: 25.62 KG/M2 | TEMPERATURE: 98 F | HEART RATE: 62 BPM | OXYGEN SATURATION: 99 % | DIASTOLIC BLOOD PRESSURE: 90 MMHG | SYSTOLIC BLOOD PRESSURE: 122 MMHG | WEIGHT: 182.98 LBS

## 2021-05-10 PROCEDURE — 99213 OFFICE O/P EST LOW 20 MIN: CPT

## 2021-05-10 NOTE — HISTORY OF PRESENT ILLNESS
[FreeTextEntry1] : PCP- Dr Sandhya Bloom\par EP- Dr Jeff\par CTS- Dr Garcia\par Plastics- Dr Ko\par Hand- Dr Xiong\par GI- Dr Cortez, colonoscopy Sept 2017\par Dentist: Dr. Jorge Oh  \par Ophtho- Dr Jesus Lantigua [phone]

## 2021-05-10 NOTE — REASON FOR VISIT
[FreeTextEntry1] : 75-year-old male with history of high cholesterol, aortic aneurysm thoracic, hypertension, lumbago, pre-diabetes, Valve disease (status post AVR, MVrepair Dr. Caio Louise 9/9/2014). S/P  colonoscopy with Dr. Maximilian oCrtez 9/14/17. Doing well overall, Denies CP, SOB, palpitations, orthopnea, LE swelling, dizziness, syncope. Walking and running daily, sometimes >10 miles, training for [place] marathon in 2018 after skipping 2017. \par \par s/p dental extraction and implant. Reports during dental cleaning he was told he bled a lot and dentist would prefer he stop aspirin for future dental extraction and implant. \par Training for [employer], race walked it without complications Nov 2018.\par \par EKG: NSR @ 46 with 1st degree AVB. LAHB, ST-Tw abnormalities. Blocked APC, V paced x 2 beats Pacer set at 40 1/22/19\par EKG: A Pacing, PVCs, 1st degree AVB, with a LAHB, possible IWMI, QTc 429 ST-Twave abnormalities.  10/10/19\par \par 3/18/19 A Fib discovered by Dr Jeff on 3/13/19, ASA changed to Eliquis\par 5/6/19 Pt with atypical pain in the axilla on the left, he thinks its the Eliquis.  No swelling or ecchymosis reported\par 7/15/19 K 5.5, Telmisartin reduced to 20mg. BP excellent.  c/o severe right shoulder pain.  Previously injected with relief.\par 9/23/19 Adm to  [employer]) with syncope and NSVT.  Trans to [facility].  VT ablation and Mexitil started.\par 10/10/19  Pt c/o chest  pressure, when questioned its sub Xiphoid.  Eating makes it better\par \par 11/14/19  New Deep Tw inversions, ? post pacing.  CCTA Nov 1 clean. Recently had a lot of "stomach" pains.  I suspect he has PUD, Trop sent, echo ordered\par 1/24/20 Here with several weeks of a cold/URI, saw an MD and given Z westley.  Here with wheezing, reduced exercise tolerance. + Wheezing, SOB, mild cough, non-productive, no fever or chills.\par Also here for clearance for cataract\par 10/15/20 Fullness in subxiphoid area.  (-) CTA Nov 2020.  Also has  off statin.\par 1/15/21 ARB DCed due to elevated Cr and K of 5.9, not taking Crestor due to nausea, knee pain, back pain.  More MONTEJO noted, will need to reassess Ao V\par 2/24/21 More MONTEJO again, getting worse, epigastric discomfort, worse if he hits a pot hole., or walking hard.  ?pleuritic component.  Feb 12-16 pt reports wt went up, not sleeping well.\par 4/5/21 In [facility] 4/16 to 4/19/21 with chest pain, + Nuc(fixed inferior and apical defects).  EF 23 %, Echo EF 30 %, mild to moderate MR. but (-) cath.  Still gets epigastric pain with hitting potholes.  + MONTEJO.  PAP 40 mm.  Feels better after HCTZ.\par 5/10/21 Had Shingles.  Had both Covid vaccines.  Having endoscopy 5/20/21

## 2021-05-10 NOTE — ASSESSMENT
[FreeTextEntry1] : Valve disease: clean coronaries on cardiac cath in 2014. status post AVR, MV repair Dr. Caio Louise 9/9/2014) SBE prophylaxis. okay to stop ASA 1 week before dental extraction and implant. EKG with 1 PVC. Echo done 7/2018. EF 40%  Valves OK.  Echo update Nov 2019 , EF 55-60 ??.  Echo done 1/21, EF 35-40 % closer to his baseline.  Ao V probably NL prosthetic valve. EF 30% in [facility] March 2021\par \par Abdominal Pain- worse if he hits a pothole. Will screen for a AAA. If (-) will refer to GI (Dr Cortez).  SONO (-) FOR AAA, F/U WITH Dr CORTEZ. Will discuss with Dr Cortez\par \par Chest/abd pain- will treat with Protonix x 30 days.  New EKG changes 11/14/19 not seen before, deep Tw inversions, QTc 474.  I suspect post pacing artifact, remotely  PUD, Trop sent, echo ordered.  Trop NL, EF and wall motion normal on echo. CCTA done Nov 1st, 2019 is clean. No coronary disease.  More upper abd discomfort, will give a trial of protonix. Still with pain, will screen for AAA, refer to GI.  Did not see GI, AAA screen is (-). He reports improvement with HCTZ, will try Lasix\par \par SOB- BNP sent, echo from 1/2021 with a drop in EF.  Await BNP level, 550.  EF 23 % on Nuc, 30 % on echo at [facility]. EF 50 % on echo in 2014, 45-50% in 2016, 40% in 2018, all were done at [facility].  Now its 23 % on Nuc and 30 % on echo at [facility] from March 2021.\par \par PreOP- complex pt but cleared for dental work.  Has h/o VT to no epinephrine.  Has AICD so no cautery unless AICD is shut off to avoid inappropriate shocks.  Needs SBE prophylaxis for dental work only. Pt is cleared for cataract surgery\par \par PAF- changed from ASA to Eliquis by Dr ARIZMENDI.  No signs of trauma or bleeding on exam. Pt reassured that the Eliquis is not causing the pain.  Off Eliquis by Dr ARIZMENDI due to not feeling well  \par \par VT- has non-sustained, i spoke to Dr Palencia who does not want him to run [facility] [place]. There is  a VT and syncope/sudden death risk, also has a thoracic aneurysm.  I discussed this with him and his wife, walking the Marathon is OK but no jogging.  He walked it and did well. Now seeing Dr Jeff.  Had syncope and adm to [facility], trans to [facility].  VT RFA, Sotolol changed to Mexitil Sept 2019.  CAN RETURN TO WORK NEXT MONDAY\par \par Aortic aneurysm: Enlarged. 42 (ascending) 47 (descending) in 2014\par Followup in 2015 reveals a max Ao of 46 mm\par CT angiogram 3/6/2017 aneurysm unchanged\par annual CT, due 3/2018.  Aorta 46mm April 2018, referred back to Dr Garcia,  48mm on CT 2019\par \par HLD: on pravastatin 40mg, had muscle aches on 80mg,  8/2017, pt reported he was only taking it once in a while, now taking it daily will repeat lipid panel. Diet and exercise discussed in detail.   Feb 2018,  July 2020. sTARTING cRESTOR 10 qod\par \par HTN: Have DC Lasix and changed to HCTZ weekly.  BP very good, Reduce Micardis  40 for persistent elevated K.  Increase HCTZ 25 to M & F. BP OK.  Micardis now at 20mg, DCed due to elevated Cr and K\par \par CKD: 1.33 8/8/2017, repeat BMP Sept 2017, Cr 1.05, previously normal, on ARB  1.31 and K 5.9.

## 2021-05-10 NOTE — PHYSICAL EXAM
[General Appearance - Well Developed] : well developed [Normal Appearance] : normal appearance [Well Groomed] : well groomed [General Appearance - Well Nourished] : well nourished [No Deformities] : no deformities [General Appearance - In No Acute Distress] : no acute distress [Normal Conjunctiva] : the conjunctiva exhibited no abnormalities [Eyelids - No Xanthelasma] : the eyelids demonstrated no xanthelasmas [Normal Oral Mucosa] : normal oral mucosa [No Oral Pallor] : no oral pallor [No Oral Cyanosis] : no oral cyanosis [Respiration, Rhythm And Depth] : normal respiratory rhythm and effort [Exaggerated Use Of Accessory Muscles For Inspiration] : no accessory muscle use [Auscultation Breath Sounds / Voice Sounds] : lungs were clear to auscultation bilaterally [Heart Rate And Rhythm] : heart rate and rhythm were normal [Heart Sounds] : normal S1 and S2 [Arterial Pulses Normal] : the arterial pulses were normal [Edema] : no peripheral edema present [Abdomen Soft] : soft [Abdomen Tenderness] : non-tender [Abdomen Mass (___ Cm)] : no abdominal mass palpated [Abnormal Walk] : normal gait [Gait - Sufficient For Exercise Testing] : the gait was sufficient for exercise testing [Cyanosis, Localized] : no localized cyanosis [Nail Clubbing] : no clubbing of the fingernails [Petechial Hemorrhages (___cm)] : no petechial hemorrhages [Skin Color & Pigmentation] : normal skin color and pigmentation [] : no rash [No Venous Stasis] : no venous stasis [Skin Lesions] : no skin lesions [No Skin Ulcers] : no skin ulcer [No Xanthoma] : no  xanthoma was observed [Affect] : the affect was normal [Oriented To Time, Place, And Person] : oriented to person, place, and time [Mood] : the mood was normal [No Anxiety] : not feeling anxious [FreeTextEntry1] : +3/6 systolic murmur

## 2021-05-20 ENCOUNTER — APPOINTMENT (OUTPATIENT)
Age: 76
End: 2021-05-20

## 2021-05-26 ENCOUNTER — APPOINTMENT (OUTPATIENT)
Dept: CARDIOTHORACIC SURGERY | Facility: CLINIC | Age: 76
End: 2021-05-26
Payer: MEDICARE

## 2021-05-26 PROCEDURE — 99443: CPT | Mod: 95

## 2021-05-26 NOTE — REVIEW OF SYSTEMS
[Feeling Tired] : feeling tired [Chest Pain] : chest pain [SOB on Exertion] : shortness of breath during exertion [Negative] : Heme/Lymph

## 2021-05-26 NOTE — REASON FOR VISIT
[Follow-Up: _____] : a [unfilled] follow-up visit [Home] : at home, [unfilled] , at the time of the visit. [Medical Office: ([facility])___] : at the medical office located in  [Verbal consent obtained from patient] : the patient, [unfilled]

## 2021-05-28 NOTE — ASSESSMENT
[FreeTextEntry1] : 75 year old male with a past medical history of CKD stage III, HTN, HLD, PAF (no longer on AC), HFrEF (EF 35-40% by Echo 1/2021), PVC induced Vfib s/p ablation of great cardiac vein 9/2019 and [facility] AICD (2014; Dr. Jeff), nonobstructive CAD, lumbago, pre-diabetes, status post bioprosthetic aortic valve replacement and mitral valve repair, rigid internal fixation of sternum and KRYSTLE exclusion on  9/9/14 with Dr. Javon Louise, presents for a follow up visit for evaluation and management of thoracic aortic aneurysm.\par \par I have reviewed the patient's medical records, diagnostic images during the time of this office consultation and have made the following recommendation. Review of the imaging shows his aortic pathology has remained stable and does not require surgical intervention. \par  \par Plan\par 1. [facility] up [facility] with CTA chest and TTE. \par 2. Continue medication regimen.\par 3. Follow up with cardiologist and PCP.\par 4. Blood pressure management.\par

## 2021-05-28 NOTE — HISTORY OF PRESENT ILLNESS
[FreeTextEntry1] : 75 year old male with a past medical history of CKD stage III, HTN, HLD, PAF (no longer on AC), HFrEF (EF 35-40% by Echo 1/2021), PVC induced Vfib s/p ablation of great cardiac vein 9/2019 and [facility] AICD (2014; Dr. Jeff), nonobstructive CAD, lumbago, pre-diabetes, status post bioprosthetic aortic valve replacement and mitral valve repair, rigid internal fixation of sternum and KRYSTLE exclusion on  9/9/14 with Dr. Javon Louise, presents for a follow up visit for evaluation and management of thoracic aortic aneurysm.\par \par CTA 5/13/21: ascending aorta 4.8cm. distal arch proximal descending aorta 4.4cm. \par \par The patient reports periodic episodes of chest discomfort and MONTEJO. He was admitted from 3/16 to 3/19 for shortness of breath. He had an abnormal NST. Cardiac cath revealing non-obstructive coronary arteries.  Patient stating his chest pain is pleuritic and associated with movement. He was discharged home and recommended to follow up with Dr. Chandler. \par \par

## 2021-05-28 NOTE — PROCEDURE
[FreeTextEntry1] : Dr. Garcia reviewed the indications for surgery, and used our webpage www.heartprocedures.org <[email or web address].heartprocedures.org> to illustrate the aorta and anatomy of the heart. Those indications are the following: size greater than 5.0 cm, symptomatic aneurysms, family history of aortic dissection or aneurysm death with a size greater than 4.5 cm, other necessary cardiac procedures such as coronary artery bypass grafting or valvular disorders with an aneurysm greater than 4.5 cm, or connective tissue disorders with an aneurysm size greater than 4.5 cm. The patient does not meet size criteria for surgical intervention at the time.\par \par Dr. Garcia discussed activity restrictions with the patient, and would advise exercise at a moderate amount with no heavy lifting over one third of body weight, and avoiding heart rates that exceed 140 beats per minute. In addition, every patient should abstain from tobacco abuse and to avoid all illicit drug use, especially stimulants such as cocaine or methamphetamine. Dr. Garcia also counseled regarding maintaining a healthy heart diet, and losing any excessive weight as this also put undue stress on both the aorta and entire cardiovascular system. First degree family members should be screened for bicuspid valve disease, and ascending aortic aneurysms. \par \par Patient was advised to view the educational video prior to this visit regarding aortic pathology, risk factors, surgical procedures, and lifestyle modifications. Video can be retrieved at <[email or web address]>.\par

## 2021-05-28 NOTE — END OF VISIT
[Time Spent: ___ minutes] : I have spent [unfilled] minutes of time on the encounter. [FreeTextEntry3] : \par I, KIMBER VALDERRAMAU , am scribing for and in the presence of LEEANNA TARANGO the following sections: History of present illness, past Medical/family/surgical/family/social history, review of systems, and disposition.\par \par I personally performed the services described in the documentation, reviewed the documentation recorded by the scribe in my presence and it accurately and completely records my words and actions.\par \par

## 2021-06-09 ENCOUNTER — APPOINTMENT (OUTPATIENT)
Dept: GASTROENTEROLOGY | Facility: HOSPITAL | Age: 76
End: 2021-06-09

## 2021-06-09 ENCOUNTER — RESULT REVIEW (OUTPATIENT)
Age: 76
End: 2021-06-09

## 2021-06-09 ENCOUNTER — OUTPATIENT (OUTPATIENT)
Dept: OUTPATIENT SERVICES | Facility: HOSPITAL | Age: 76
LOS: 1 days | Discharge: HOME | End: 2021-06-09
Payer: MEDICARE

## 2021-06-09 DIAGNOSIS — Z98.89 OTHER SPECIFIED POSTPROCEDURAL STATES: Chronic | ICD-10-CM

## 2021-06-09 PROCEDURE — 88341 IMHCHEM/IMCYTCHM EA ADD ANTB: CPT

## 2021-06-09 PROCEDURE — 43239 EGD BIOPSY SINGLE/MULTIPLE: CPT

## 2021-06-09 PROCEDURE — C1889: CPT

## 2021-06-09 PROCEDURE — 43251 EGD REMOVE LESION SNARE: CPT | Mod: XS

## 2021-06-09 PROCEDURE — 88305 TISSUE EXAM BY PATHOLOGIST: CPT

## 2021-06-09 PROCEDURE — 88305 TISSUE EXAM BY PATHOLOGIST: CPT | Mod: 26

## 2021-06-09 PROCEDURE — 88342 IMHCHEM/IMCYTCHM 1ST ANTB: CPT | Mod: 26

## 2021-06-11 LAB — SURGICAL PATHOLOGY STUDY: SIGNIFICANT CHANGE UP

## 2021-06-21 ENCOUNTER — NON-APPOINTMENT (OUTPATIENT)
Age: 76
End: 2021-06-21

## 2021-07-04 ENCOUNTER — NON-APPOINTMENT (OUTPATIENT)
Age: 76
End: 2021-07-04

## 2021-07-07 ENCOUNTER — APPOINTMENT (OUTPATIENT)
Dept: HEART AND VASCULAR | Facility: CLINIC | Age: 76
End: 2021-07-07
Payer: MEDICARE

## 2021-07-07 VITALS
HEIGHT: 71 IN | WEIGHT: 183 LBS | TEMPERATURE: 98 F | OXYGEN SATURATION: 99 % | SYSTOLIC BLOOD PRESSURE: 118 MMHG | HEART RATE: 68 BPM | BODY MASS INDEX: 25.62 KG/M2 | DIASTOLIC BLOOD PRESSURE: 78 MMHG

## 2021-07-07 PROCEDURE — 99214 OFFICE O/P EST MOD 30 MIN: CPT

## 2021-07-07 NOTE — ASSESSMENT
[FreeTextEntry1] : Valve disease: clean coronaries on cardiac cath in 2014. status post AVR, MV repair Dr. Caio Louise 9/9/2014) SBE prophylaxis. okay to stop ASA 1 week before dental extraction and implant. EKG with 1 PVC. Echo done 7/2018. EF 40%  Valves OK.  Echo update Nov 2019 , EF 55-60 ??.  Echo done 1/21, EF 35-40 % closer to his baseline.  Ao V probably NL prosthetic valve. EF 30% in [facility] March 2021\par \par Abdominal Pain- worse if he hits a pothole. Will screen for a AAA. If (-) will refer to GI (Dr Cortez).  SONO (-) FOR AAA, F/U WITH Dr CORTEZ. Will discuss with Dr Cortez.  Found to have severe gastritis and H pylori.  Pain improving\par \par Chest/abd pain- will treat with Protonix x 30 days.  New EKG changes 11/14/19 not seen before, deep Tw inversions, QTc 474.  I suspect post pacing artifact, remotely  PUD, Trop sent, echo ordered.  Trop NL, EF and wall motion normal on echo. CCTA done Nov 1st, 2019 is clean. No coronary disease.  More upper abd discomfort, will give a trial of protonix. Still with pain, will screen for AAA, refer to GI.  Did not see GI, AAA screen is (-). He reports improvement with HCTZ, will try Lasix\par \par SOB- BNP sent, echo from 1/2021 with a drop in EF.  Await BNP level, 550.  EF 23 % on Nuc, 30 % on echo at [facility]. EF 50 % on echo in 2014, 45-50% in 2016, 40% in 2018, all were done at [facility].  Now its 23 % on Nuc and 30 % on echo at [facility] from March 2021.\par \par PreOP- complex pt but cleared for dental work.  Has h/o VT so no epinephrine.  Has AICD so no cautery unless AICD is shut off to avoid inappropriate shocks.  Needs SBE prophylaxis for dental work only. Pt is cleared for cataract surgery\par \par PAF- changed from ASA to Eliquis by Dr ARIZMENDI.  No signs of trauma or bleeding on exam. Pt reassured that the Eliquis is not causing the pain.  Off Eliquis by Dr ARIZMENDI due to not feeling well  \par \par VT- has non-sustained, i spoke to Dr Palencia who does not want him to run the [facility]. There is  a VT and syncope/sudden death risk, also has a thoracic aneurysm.  I discussed this with him and his wife, walking the Marathon is OK but no jogging.  He walked it and did well. Now seeing Dr Jeff.  Had syncope and adm to [facility], trans to [facility].  VT RFA, Sotolol changed to Mexitil Sept 2019.  CAN RETURN TO WORK NEXT MONDAY.  Had VF 7/6/21 and background VT, AICD DC.  Pt was started on Amio.  Dr ARIZMENDI called.\par \par Aortic aneurysm: Enlarged. 42 (ascending) 47 (descending) in 2014\par Followup in 2015 reveals a max Ao of 46 mm\par CT angiogram 3/6/2017 aneurysm unchanged\par annual CT, due 3/2018.  Aorta 46mm April 2018, referred back to Dr Garcia,  48mm on CT 2019\par \par HLD: on pravastatin 40mg, had muscle aches on 80mg,  8/2017, pt reported he was only taking it once in a while, now taking it daily will repeat lipid panel. Diet and exercise discussed in detail.   Feb 2018,  July 2020. sTARTING cRESTOR 10 qod\par \par HTN: Have DC Lasix and changed to HCTZ weekly.  BP very good, Reduce Micardis  40 for persistent elevated K.  Increase HCTZ 25 to M & F. BP OK.  Micardis now at 20mg, DCed due to elevated Cr and K\par \par CKD: 1.33 8/8/2017, repeat BMP Sept 2017, Cr 1.05, previously normal, on ARB  1.31 and K 5.9.  .  July 2021. Cr 1.13, K 4.5, Mg 2.1

## 2021-07-07 NOTE — HISTORY OF PRESENT ILLNESS
[FreeTextEntry1] : PCP- Dr Sandhya Bloom\par EP- Dr Jeff\par CTS- Dr Garcia\par Plastics- Dr Ko\par Hand- Dr Xiong\par GI- Dr Cortez, colonoscopy Sept 2017\par Dentist: Dr. Jorge Oh  \par Ophtho- Dr Jesus Lantigua [phone]

## 2021-07-07 NOTE — REASON FOR VISIT
[FreeTextEntry1] : 75-year-old male with history of high cholesterol, aortic aneurysm thoracic, hypertension, lumbago, pre-diabetes, Valve disease (status post AVR, MVrepair Dr. Caio Louise 9/9/2014). S/P  colonoscopy with Dr. Maximilian Cortez 9/14/17. Doing well overall, Denies CP, SOB, palpitations, orthopnea, LE swelling, dizziness, syncope. Walking and running daily, sometimes >10 miles, training for [place] marathon in 2018 after skipping 2017. \par \par s/p dental extraction and implant. Reports during dental cleaning he was told he bled a lot and dentist would prefer he stop aspirin for future dental extraction and implant. \par Training for [employer] [place], race walked it without complications Nov 2018.\par \par EKG: NSR @ 46 with 1st degree AVB. LAHB, ST-Tw abnormalities. Blocked APC, V paced x 2 beats Pacer set at 40 1/22/19\par EKG: A Pacing, PVCs, 1st degree AVB, with a LAHB, possible IWMI, QTc 429 ST-Twave abnormalities.  10/10/19\par \par 3/18/19 A Fib discovered by Dr Jeff on 3/13/19, ASA changed to Eliquis\par 5/6/19 Pt with atypical pain in the axilla on the left, he thinks its the Eliquis.  No swelling or ecchymosis reported\par 7/15/19 K 5.5, Telmisartin reduced to 20mg. BP excellent.  c/o severe right shoulder pain.  Previously injected with relief.\par 9/23/19 Adm to  [facility]) with syncope and NSVT.  Trans to [facility].  VT ablation and Mexitil started.\par 10/10/19  Pt c/o chest  pressure, when questioned its sub Xiphoid.  Eating makes it better\par \par 11/14/19  New Deep Tw inversions, ? post pacing.  CCTA Nov 1 clean. Recently had a lot of "stomach" pains.  I suspect he has PUD, Trop sent, echo ordered\par 1/24/20 Here with several weeks of a cold/URI, saw an MD and given Z westley.  Here with wheezing, reduced exercise tolerance. + Wheezing, SOB, mild cough, non-productive, no fever or chills.\par Also here for clearance for cataract\par 10/15/20 Fullness in subxiphoid area.  (-) CTA Nov 2020.  Also has  off statin.\par 1/15/21 ARB DCed due to elevated Cr and K of 5.9, not taking Crestor due to nausea, knee pain, back pain.  More MONTEJO noted, will need to reassess Ao V\par 2/24/21 More MONTEJO again, getting worse, epigastric discomfort, worse if he hits a pot hole., or walking hard.  ?pleuritic component.  Feb 12-16 pt reports wt went up, not sleeping well.\par 4/5/21 In [facility] 4/16 to 4/19/21 with chest pain, + Nuc(fixed inferior and apical defects).  EF 23 %, Echo EF 30 %, mild to moderate MR. but (-) cath.  Still gets epigastric pain with hitting potholes.  + MONTEJO.  PAP 40 mm.  Feels better after HCTZ.\par 5/10/21 Had Shingles.  Had both Covid vaccines.  Having endoscopy 5/20/21\par 7/7/21 Dx with severe gastritis and H pylori.  Admitted to [facility] yesterday with AICD DC, 2/T VF. K 4.5, .   Amiodarone 400 mg BID started.\par \par

## 2021-07-12 ENCOUNTER — RX RENEWAL (OUTPATIENT)
Age: 76
End: 2021-07-12

## 2021-07-14 ENCOUNTER — APPOINTMENT (OUTPATIENT)
Dept: HEART AND VASCULAR | Facility: CLINIC | Age: 76
End: 2021-07-14
Payer: MEDICARE

## 2021-07-14 ENCOUNTER — APPOINTMENT (OUTPATIENT)
Dept: INTERNAL MEDICINE | Facility: CLINIC | Age: 76
End: 2021-07-14
Payer: MEDICARE

## 2021-07-14 ENCOUNTER — NON-APPOINTMENT (OUTPATIENT)
Age: 76
End: 2021-07-14

## 2021-07-14 VITALS
SYSTOLIC BLOOD PRESSURE: 100 MMHG | HEART RATE: 61 BPM | TEMPERATURE: 97.1 F | OXYGEN SATURATION: 99 % | BODY MASS INDEX: 25.62 KG/M2 | HEIGHT: 71 IN | WEIGHT: 183 LBS | DIASTOLIC BLOOD PRESSURE: 64 MMHG

## 2021-07-14 VITALS
HEIGHT: 71 IN | DIASTOLIC BLOOD PRESSURE: 66 MMHG | HEART RATE: 82 BPM | BODY MASS INDEX: 25.62 KG/M2 | OXYGEN SATURATION: 100 % | WEIGHT: 183 LBS | SYSTOLIC BLOOD PRESSURE: 98 MMHG

## 2021-07-14 DIAGNOSIS — Z00.00 ENCOUNTER FOR GENERAL ADULT MEDICAL EXAMINATION W/OUT ABNORMAL FINDINGS: ICD-10-CM

## 2021-07-14 PROCEDURE — G0439: CPT

## 2021-07-14 PROCEDURE — 93283 PRGRMG EVAL IMPLANTABLE DFB: CPT

## 2021-07-14 PROCEDURE — 99214 OFFICE O/P EST MOD 30 MIN: CPT | Mod: 25

## 2021-07-14 PROCEDURE — 36415 COLL VENOUS BLD VENIPUNCTURE: CPT

## 2021-07-14 PROCEDURE — 93000 ELECTROCARDIOGRAM COMPLETE: CPT

## 2021-07-14 PROCEDURE — 99213 OFFICE O/P EST LOW 20 MIN: CPT | Mod: 25

## 2021-07-14 RX ORDER — ZINC OXIDE AND COCOA BUTTER 270; 2052 MG/1; MG/1
76-10 SUPPOSITORY RECTAL
Qty: 20 | Refills: 0 | Status: DISCONTINUED | COMMUNITY
Start: 2021-05-04 | End: 2021-07-14

## 2021-07-14 RX ORDER — PANTOPRAZOLE 40 MG/1
40 TABLET, DELAYED RELEASE ORAL DAILY
Qty: 90 | Refills: 1 | Status: DISCONTINUED | COMMUNITY
Start: 2019-10-10 | End: 2021-07-14

## 2021-07-14 RX ORDER — CLARITHROMYCIN 500 MG/1
500 TABLET, FILM COATED ORAL TWICE DAILY
Qty: 28 | Refills: 0 | Status: DISCONTINUED | COMMUNITY
Start: 2021-06-21 | End: 2021-07-14

## 2021-07-14 RX ORDER — AMOXICILLIN 500 MG/1
500 CAPSULE ORAL TWICE DAILY
Qty: 56 | Refills: 0 | Status: DISCONTINUED | COMMUNITY
Start: 2021-06-21 | End: 2021-07-14

## 2021-07-14 RX ORDER — TADALAFIL 5 MG/1
5 TABLET ORAL
Qty: 30 | Refills: 2 | Status: DISCONTINUED | COMMUNITY
Start: 2019-01-22 | End: 2021-07-14

## 2021-07-14 RX ORDER — OMEPRAZOLE 40 MG/1
40 CAPSULE, DELAYED RELEASE ORAL
Qty: 30 | Refills: 2 | Status: DISCONTINUED | COMMUNITY
Start: 2021-06-09 | End: 2021-07-14

## 2021-07-14 RX ORDER — MAGNESIUM OXIDE 241.3 MG/1000MG
400 TABLET ORAL TWICE DAILY
Qty: 60 | Refills: 4 | Status: DISCONTINUED | COMMUNITY
Start: 2020-10-28 | End: 2021-07-14

## 2021-07-14 NOTE — ASSESSMENT
[FreeTextEntry1] : 75 year is here for Medicare annual wellness exam. his cognitive function was normal. Please refer to an appropriate section above for a list of providers that are regularly involved in the patient's care. See above for full details of history and physical. The patient's care team was reviewed and documented above. Listed above are the preventative services for which the patient may be due. I informed the patient with a list and offered assistance in scheduling the appropriate exams.\par Venipuncture performed/labs sent. Will also send to Dr Etienne.\par AFIB-off OAC, now on AMio. V-paced.\par HF-stable\par thoracic aneurysm-followed closely.\par MICHAEL galicia- seeing GI. In midst of tx. f/u at the end of the month.\par

## 2021-07-14 NOTE — HISTORY OF PRESENT ILLNESS
[Family Member] : family member [de-identified] : 76 y/o man presents for Wellness exam.\par On 7/6, went to [facility] ER with defibrillation. Amiodarone 400 mg BID started. Saw Dr Chandler last week and Amio dose changed.  No OAC or ASA.\par Has H. pyeloi- feeling slightly better on meds but still has pain. Has f/u breath test scheduled.\par Otherwise, feels well.\par Here with daughter and wife is on the phone.

## 2021-07-14 NOTE — HEALTH RISK ASSESSMENT
[Good] : ~his/her~  mood as  good [No falls in past year] : Patient reported no falls in the past year [0] : 2) Feeling down, depressed, or hopeless: Not at all (0) [PHQ-2 Negative - No further assessment needed] : PHQ-2 Negative - No further assessment needed [[id number]] : 0 [HIV test declined] : HIV test declined [Change in mental status noted] : No change in mental status noted [Language] : denies difficulty with language [Behavior] : denies difficulty with behavior [Learning/Retaining New Information] : denies difficulty learning/retaining new information [Handling Complex Tasks] : denies difficulty handling complex tasks [Reasoning] : denies difficulty with reasoning [Spatial Ability and Orientation] : denies difficulty with spatial ability and orientation [None] : None [With Family] : lives with family [Employed] : employed [] :  [Sexually Active] : sexually active [High Risk Behavior] : no high risk behavior [Feels Safe at Home] : Feels safe at home [Fully functional (bathing, dressing, toileting, transferring, walking, feeding)] : Fully functional (bathing, dressing, toileting, transferring, walking, feeding) [Fully functional (using the telephone, shopping, preparing meals, housekeeping, doing laundry, using] : Fully functional and needs no help or supervision to perform IADLs (using the telephone, shopping, preparing meals, housekeeping, doing laundry, using transportation, managing medications and managing finances) [Reports changes in hearing] : Reports no changes in hearing [Reports changes in vision] : Reports no changes in vision [Seat Belt] :  uses seat belt [Sunscreen] : uses sunscreen [Travel to Developing Areas] : does not  travel to developing areas [TB Exposure] : is not being exposed to tuberculosis [Caregiver Concerns] : does not have caregiver concerns

## 2021-07-14 NOTE — REVIEW OF SYSTEMS
[Negative] : Heme/Lymph [Abdominal Pain] : no abdominal pain [Nausea] : no nausea [Constipation] : no constipation [Vomiting] : no vomiting [Heartburn] : heartburn [Melena] : no melena

## 2021-07-14 NOTE — PHYSICAL EXAM
[No Acute Distress] : no acute distress [Well Nourished] : well nourished [Well Developed] : well developed [Well-Appearing] : well-appearing [Normal Sclera/Conjunctiva] : normal sclera/conjunctiva [PERRL] : pupils equal round and reactive to light [EOMI] : extraocular movements intact [Normal Outer Ear/Nose] : the outer ears and nose were normal in appearance [Normal Oropharynx] : the oropharynx was normal [No JVD] : no jugular venous distention [No Lymphadenopathy] : no lymphadenopathy [Supple] : supple [Thyroid Normal, No Nodules] : the thyroid was normal and there were no nodules present [No Respiratory Distress] : no respiratory distress  [No Accessory Muscle Use] : no accessory muscle use [Clear to Auscultation] : lungs were clear to auscultation bilaterally [Normal Rate] : normal rate  [Regular Rhythm] : with a regular rhythm [No Carotid Bruits] : no carotid bruits [No Abdominal Bruit] : a ~M bruit was not heard ~T in the abdomen [No Varicosities] : no varicosities [Pedal Pulses Present] : the pedal pulses are present [No Edema] : there was no peripheral edema [No Palpable Aorta] : no palpable aorta [No Extremity Clubbing/Cyanosis] : no extremity clubbing/cyanosis [Soft] : abdomen soft [Non Tender] : non-tender [Non-distended] : non-distended [No Masses] : no abdominal mass palpated [No HSM] : no HSM [Normal Bowel Sounds] : normal bowel sounds [No Hernias] : no hernias [Declined Rectal Exam] : declined rectal exam [Normal Posterior Cervical Nodes] : no posterior cervical lymphadenopathy [Normal Anterior Cervical Nodes] : no anterior cervical lymphadenopathy [No CVA Tenderness] : no CVA  tenderness [No Spinal Tenderness] : no spinal tenderness [No Joint Swelling] : no joint swelling [Grossly Normal Strength/Tone] : grossly normal strength/tone [No Rash] : no rash [Coordination Grossly Intact] : coordination grossly intact [No Focal Deficits] : no focal deficits [Normal Gait] : normal gait [Deep Tendon Reflexes (DTR)] : deep tendon reflexes were 2+ and symmetric [Normal Affect] : the affect was normal [Alert and Oriented x3] : oriented to person, place, and time [Normal Insight/Judgement] : insight and judgment were intact

## 2021-07-15 LAB
25(OH)D3 SERPL-MCNC: 56.3 NG/ML
ALBUMIN SERPL ELPH-MCNC: 4.3 G/DL
ALP BLD-CCNC: 50 U/L
ALT SERPL-CCNC: 28 U/L
ANION GAP SERPL CALC-SCNC: 10 MMOL/L
AST SERPL-CCNC: 29 U/L
BASOPHILS # BLD AUTO: 0.02 K/UL
BASOPHILS NFR BLD AUTO: 0.4 %
BILIRUB SERPL-MCNC: 0.6 MG/DL
BUN SERPL-MCNC: 32 MG/DL
CALCIUM SERPL-MCNC: 9.6 MG/DL
CHLORIDE SERPL-SCNC: 104 MMOL/L
CHOLEST SERPL-MCNC: 160 MG/DL
CO2 SERPL-SCNC: 25 MMOL/L
CREAT SERPL-MCNC: 1.82 MG/DL
EOSINOPHIL # BLD AUTO: 0.04 K/UL
EOSINOPHIL NFR BLD AUTO: 0.9 %
ESTIMATED AVERAGE GLUCOSE: 137 MG/DL
GLUCOSE SERPL-MCNC: 94 MG/DL
HBA1C MFR BLD HPLC: 6.4 %
HCT VFR BLD CALC: 44 %
HDLC SERPL-MCNC: 59 MG/DL
HGB BLD-MCNC: 14 G/DL
IMM GRANULOCYTES NFR BLD AUTO: 0 %
LDLC SERPL CALC-MCNC: 89 MG/DL
LYMPHOCYTES # BLD AUTO: 2.49 K/UL
LYMPHOCYTES NFR BLD AUTO: 53.9 %
MAN DIFF?: NORMAL
MCHC RBC-ENTMCNC: 31.8 GM/DL
MCHC RBC-ENTMCNC: 32.5 PG
MCV RBC AUTO: 102.1 FL
MONOCYTES # BLD AUTO: 0.48 K/UL
MONOCYTES NFR BLD AUTO: 10.4 %
NEUTROPHILS # BLD AUTO: 1.59 K/UL
NEUTROPHILS NFR BLD AUTO: 34.4 %
NONHDLC SERPL-MCNC: 101 MG/DL
PLATELET # BLD AUTO: 215 K/UL
POTASSIUM SERPL-SCNC: 5.2 MMOL/L
PROT SERPL-MCNC: 7.3 G/DL
RBC # BLD: 4.31 M/UL
RBC # FLD: 14.1 %
SODIUM SERPL-SCNC: 138 MMOL/L
TRIGL SERPL-MCNC: 58 MG/DL
TSH SERPL-ACNC: 3.11 UIU/ML
VIT B12 SERPL-MCNC: 534 PG/ML
WBC # FLD AUTO: 4.62 K/UL

## 2021-07-20 ENCOUNTER — APPOINTMENT (OUTPATIENT)
Age: 76
End: 2021-07-20
Payer: MEDICARE

## 2021-07-20 VITALS
RESPIRATION RATE: 15 BRPM | HEART RATE: 81 BPM | TEMPERATURE: 97.4 F | OXYGEN SATURATION: 98 % | SYSTOLIC BLOOD PRESSURE: 112 MMHG | DIASTOLIC BLOOD PRESSURE: 70 MMHG | WEIGHT: 184 LBS | BODY MASS INDEX: 25.76 KG/M2 | HEIGHT: 71 IN

## 2021-07-20 PROCEDURE — 99214 OFFICE O/P EST MOD 30 MIN: CPT

## 2021-07-21 NOTE — HISTORY OF PRESENT ILLNESS
[FreeTextEntry1] : 75M here for followup after endoscopy.  He was found to have H pylori infection s/p triple therapy. Poor historian, here with niece.\par \par Reports completing triple therapy around 2 weeks ago, now off antacid.  Reports still having chest pain, constant.  Been on strict reflux diet.  No alleviating or exacerbating factor.  Reports no relief with the antibiotic.  \par +SOB and LE swelling.\par Denies fever, chill, cp, nausea, vomiting, wt loss, poor appetite, hematochezia.  \par \par Denies smoking, alcohol, drug use.  \par Denies fam hx of stomach, pancreatic, colon cancer\par \par Endoscopy 6/21: gastritis s/p biopsy (chronic gastritis w/ cryptitis and crypt distortion), sessile polyp in the stomach s/p polypectomy (hyperplastic polyp).\par Colonoscopy 2017: L sided diverticulosis, internal hemorrhoid (repeat in 7-10 year)\par

## 2021-07-21 NOTE — ASSESSMENT
[FreeTextEntry1] : 75M here for followup after endoscopy.  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  Will refer to PMR\par -PMR referral\par \par Swelling and SOB\par On 10 mg of lasix, limited by EYAL\par -Pending evaluation by nephrology 8/3/21\par -Keep LE elevated during sleep\par -Continue dietary restriction including low salt diet\par -Will reach out to Dr. Chandler to inform of recent worsening LE swelling\par \par RTC in 3 months or prn\par \par Sukhwinder Mtz MD\par Gastroenterology Fellow

## 2021-07-21 NOTE — PHYSICAL EXAM
[General Appearance - Alert] : alert [General Appearance - In No Acute Distress] : in no acute distress [Neck Appearance] : the appearance of the neck was normal [] : no respiratory distress [Exaggerated Use Of Accessory Muscles For Inspiration] : no accessory muscle use [Bowel Sounds] : normal bowel sounds [Abdomen Soft] : soft [Musculoskeletal - Swelling] : no joint swelling seen [Skin Color & Pigmentation] : normal skin color and pigmentation [Cranial Nerves] : cranial nerves 2-12 were intact [Oriented To Time, Place, And Person] : oriented to person, place, and time [Impaired Insight] : insight and judgment were intact [FreeTextEntry1] : tremulous on movement

## 2021-07-22 ENCOUNTER — NON-APPOINTMENT (OUTPATIENT)
Age: 76
End: 2021-07-22

## 2021-07-22 NOTE — PROCEDURE
[No] : not [NSR] : normal sinus rhythm [ICD] : Implantable cardioverter-defibrillator [DDD] : DDD [Threshold Testing Performed] : Threshold testing was performed [Lead Imp:  ___ohms] : lead impedance was [unfilled] ohms [Sensing Amplitude ___mv] : sensing amplitude was [unfilled] mv [___V @] : [unfilled] V [___ ms] : [unfilled] ms [de-identified] : [place] [de-identified] : Ellipse [de-identified] : [id number] [de-identified] : 4/2014 [de-identified] :  [de-identified] : 7.5 months [de-identified] :  increased AV delay to decrease RV pacing\par AV delay 250   DDD 50\par changed to DDD 70 AV delay 250 [de-identified] : AP 69%\par  75%\par event PVC and then torsades terminated with ICD shock\par shock impedance 54

## 2021-07-22 NOTE — REVIEW OF SYSTEMS
[Negative] : Heme/Lymph [Fever] : no fever [Chills] : no chills [SOB] : no shortness of breath [Palpitations] : no palpitations [Syncope] : no syncope [Cough] : no cough

## 2021-07-22 NOTE — REASON FOR VISIT
[Follow-Up - Clinic] : a clinic follow-up of [Follow-up Device Check] : is here today for a follow-up device check visit for [Family Member] : family member [FreeTextEntry1] : pacemaker

## 2021-07-22 NOTE — HISTORY OF PRESENT ILLNESS
[Palpitations] : no palpitations [SOB] : no dyspnea [Syncope] : no syncope [Dizziness] : no dizziness [Chest Pain] : no chest pain or discomfort [Shoulder Pain] : no shoulder pain [Pain at Site] : no pain at device site [Erythema at Site] : no erythema at device site [Swelling at Site] : no swelling at device site [FreeTextEntry1] : 75 year old male with HTN, HLD, aortic aneurysm thoracic, AVR and mitral valve repair in 2014,  paroxysmal atrial fibrillation, PVCs and ICD s/p recent PVC ablation,, who had a recent appropriate ICD shock and presents for follow up.\par \par He states that he had a ICD placed after his AVR.  He normally follows up with Dr. Palencia; whose office recently moved and he transferred care here.   He is on Sotalol for history of PVCs and  NSVT; however he had short bursts of VT/torsades and it was felt the sotalol was proarrhythmic and stopped.  He was placed on Metoprolol.  He had 7101 PVCs on holter monitor with short bursts of NSVT.  \par \par He was admitted to [facility] 9/2019 with syncope correlated with VT and Vfib.  No therapy needed as he spontaneously converted.  One episode of afib, but overall burden extremely low.  Cath with mildly obstructive CAD.  He also underwent an EP study with PVC ablation from great cardiac vein.  HE was discharged on Mexiletine.  \par  \par He presents for follow up today after being admitted to an OSH with ICD shock.  He was discharged on amiodarone load.  He states he got lightheaded and then felt a shock.  Besides that event he feels well.  He states that he stopped Eliquis because he "didn’t like the way it made him feel".  No SOB at rest.  No chest pain, palpitations, syncope, near syncope.  No device related complaints.  \par \par

## 2021-07-22 NOTE — PHYSICAL EXAM
[General Appearance - Well Developed] : well developed [Normal Appearance] : normal appearance [Well Groomed] : well groomed [General Appearance - Well Nourished] : well nourished [No Deformities] : no deformities [General Appearance - In No Acute Distress] : no acute distress [Heart Rate And Rhythm] : heart rate and rhythm were normal [Heart Sounds] : normal S1 and S2 [Edema] : no peripheral edema present [Respiration, Rhythm And Depth] : normal respiratory rhythm and effort [Exaggerated Use Of Accessory Muscles For Inspiration] : no accessory muscle use [Left Infraclavicular] : left infraclavicular area [Clean] : clean [Dry] : dry [Well-Healed] : well-healed [Normal Conjunctiva] : the conjunctiva exhibited no abnormalities [Normal Oral Mucosa] : normal oral mucosa [Normal Oropharynx] : normal oropharynx [Normal Jugular Venous V Waves Present] : normal jugular venous V waves present [Abnormal Walk] : normal gait [Gait - Sufficient For Exercise Testing] : the gait was sufficient for exercise testing [Skin Color & Pigmentation] : normal skin color and pigmentation [Oriented To Time, Place, And Person] : oriented to person, place, and time [Affect] : the affect was normal [Mood] : the mood was normal [No Anxiety] : not feeling anxious [] : no rash [Palpable Crepitus] : no palpable crepitus [Bleeding] : no active bleeding [Foul Odor] : no foul smell [Purulent Drainage] : no purulent drainage [Serosanguineous Drainage] : no serosanquineous drainage [Serous Drainage] : no serous drainage [Erythema] : not erythematous [Warm] : not warm [Tender] : not tender [Indurated] : not indurated [Fluctuant] : not fluctuant

## 2021-07-22 NOTE — DISCUSSION/SUMMARY
[Pacemaker Function Normal] : normal pacemaker function [FreeTextEntry1] : 75 year old male with HTN, HLD, aortic aneurysm thoracic, AVR and mitral valve repair in 2014,  paroxysmal atrial fibrillation, PVCs and ICD s/p recent PVC ablation,, who had a recent appropriate ICD shock and presents for follow up.  Device interrogation reveals normal function.  All measured data is within normal limits. He has a known history of paroxysmal afib and refuses Eliquis.  He had a recent appropriate ICD shock and is now on amiodarone - will decrease to 200mg daily tomorrow.   He is on Metoprolol.  We discussed that we will see how he does on amiodarone.  He is aware his device generator has about 7 months left before he will need a battery change.   He will follow up in 1-2 months or sooner if needed.    He knows to call with any questions or concerns.   \par

## 2021-07-23 ENCOUNTER — NON-APPOINTMENT (OUTPATIENT)
Age: 76
End: 2021-07-23

## 2021-07-23 LAB — UREA BREATH TEST QL: POSITIVE

## 2021-07-28 ENCOUNTER — APPOINTMENT (OUTPATIENT)
Dept: HEART AND VASCULAR | Facility: CLINIC | Age: 76
End: 2021-07-28
Payer: MEDICARE

## 2021-07-28 ENCOUNTER — NON-APPOINTMENT (OUTPATIENT)
Age: 76
End: 2021-07-28

## 2021-07-28 VITALS
HEIGHT: 71 IN | DIASTOLIC BLOOD PRESSURE: 79 MMHG | WEIGHT: 186 LBS | SYSTOLIC BLOOD PRESSURE: 102 MMHG | OXYGEN SATURATION: 98 % | TEMPERATURE: 98.4 F | HEART RATE: 89 BPM | BODY MASS INDEX: 26.04 KG/M2

## 2021-07-28 PROCEDURE — 99214 OFFICE O/P EST MOD 30 MIN: CPT

## 2021-07-28 PROCEDURE — 93000 ELECTROCARDIOGRAM COMPLETE: CPT

## 2021-07-28 NOTE — HISTORY OF PRESENT ILLNESS
[FreeTextEntry1] : 75 m PMH HLD, aortic aneurysm thoracic, hypertension, lumbago, pre-diabetes, Valve disease (status post AVR, MVrepair Dr. Caio Louise 9/9/2014), VT s/p recent ICD shock.  \par \par Notes some leg swelling, which he thinks started after starting abx for H. pylori.  Says he sometimes takes his furosemide.  Feels sob after ~ 1 block which is chronic per pt.  Taking amio 200mg daily.  No further shocks. \par \par \par

## 2021-07-28 NOTE — PHYSICAL EXAM

## 2021-07-28 NOTE — ASSESSMENT
[FreeTextEntry1] : 75 M sCHF, bio AVR, mitral valve repair, aortic root aneurysm, VT, AICD\par \par Lower Ext Edema - mild, lungs are clear. endorses chronic sob.  He does not appear to be taking his furosemide\par EKG: v paced\par \par - advised to take the 20mg furosemide daily for one week, then switch to eod dosing as recommended previously\par - check BNP\par - repeat echo\par - cont bb, valsartan, amio, crestor\par - return 2 weeks, check bmp

## 2021-07-29 LAB — NT-PROBNP SERPL-MCNC: 1360 PG/ML

## 2021-08-03 ENCOUNTER — LABORATORY RESULT (OUTPATIENT)
Age: 76
End: 2021-08-03

## 2021-08-03 ENCOUNTER — APPOINTMENT (OUTPATIENT)
Dept: NEPHROLOGY | Facility: CLINIC | Age: 76
End: 2021-08-03
Payer: MEDICARE

## 2021-08-03 VITALS — DIASTOLIC BLOOD PRESSURE: 68 MMHG | SYSTOLIC BLOOD PRESSURE: 102 MMHG

## 2021-08-03 VITALS — DIASTOLIC BLOOD PRESSURE: 73 MMHG | SYSTOLIC BLOOD PRESSURE: 95 MMHG | HEART RATE: 80 BPM

## 2021-08-03 PROCEDURE — 99204 OFFICE O/P NEW MOD 45 MIN: CPT | Mod: 25

## 2021-08-03 PROCEDURE — 36415 COLL VENOUS BLD VENIPUNCTURE: CPT

## 2021-08-03 RX ORDER — FUROSEMIDE 20 MG/1
20 TABLET ORAL
Qty: 45 | Refills: 1 | Status: DISCONTINUED | COMMUNITY
Start: 2021-04-05 | End: 2021-08-03

## 2021-08-03 RX ORDER — HYDROCHLOROTHIAZIDE 25 MG/1
25 TABLET ORAL
Qty: 26 | Refills: 3 | Status: DISCONTINUED | COMMUNITY
Start: 2021-01-02 | End: 2021-08-03

## 2021-08-03 NOTE — PHYSICAL EXAM
[General Appearance - Alert] : alert [General Appearance - In No Acute Distress] : in no acute distress [Neck Appearance] : the appearance of the neck was normal [Neck Cervical Mass (___cm)] : no neck mass was observed [Jugular Venous Distention Increased] : there was no jugular-venous distention [Thyroid Diffuse Enlargement] : the thyroid was not enlarged [Thyroid Nodule] : there were no palpable thyroid nodules [Auscultation Breath Sounds / Voice Sounds] : lungs were clear to auscultation bilaterally [Heart Rate And Rhythm] : heart rate was normal and rhythm regular [Heart Sounds] : normal S1 and S2 [Heart Sounds Gallop] : no gallops [Murmurs] : no murmurs [Heart Sounds Pericardial Friction Rub] : no pericardial rub [Bowel Sounds] : normal bowel sounds [Abdomen Soft] : soft [Abdomen Tenderness] : non-tender [] : no hepato-splenomegaly [Abdomen Mass (___ Cm)] : no abdominal mass palpated [Cervical Lymph Nodes Enlarged Posterior Bilaterally] : posterior cervical [Cervical Lymph Nodes Enlarged Anterior Bilaterally] : anterior cervical [Supraclavicular Lymph Nodes Enlarged Bilaterally] : supraclavicular [FreeTextEntry1] : pitting ankles

## 2021-08-03 NOTE — HISTORY OF PRESENT ILLNESS
[FreeTextEntry1] : Kindly referred by Dr. Sandhya Bloom elevated creatinine. Here with daughter and wife on the phone.\par \par Labs reviewed. Baseline eGFR 43 - 56 since 2019. On [id number], his creatinine increased to 1.82 (eGFR 35 - 41). The patient denies exposure to chronic NSAIDs, chronic PPIs, creatine, or herbal supplements. The patient denies a history of kidney stones or pyelonephritis. 4 - 5 times. No recent renal ultrasound. They are unaware of proteinuria or hematuria.\par \par EF 23% in 4/21. On amiodarone, metoprolol, lasix 20 qod, HCTZ twice weekly valsartan 40. SOB with one block walking. Stable LE edema. ProBNP incresaed from 515 to 1360.\par \par * HTN controlled.  No lightheadedness. Compliant with medications. He believes he is taking amlodipine.

## 2021-08-03 NOTE — ASSESSMENT
[FreeTextEntry1] : # CKD stage 3 likeliest due to type 2 cardiorenal syndrome and aging. \par * Recheck labs, including monoclonal protein evaluation and cystatin C.\par * A point of care renal ultrasound using the Butterfly iQ was performed and the images were personally reviewed. (The patient understands that this was a brief study to evaluate for hydronephrosis and not a complete renal ultrasound.) The ultrasound showed no significant hydronephrosis, normal echogenicity, left renal cyst. A complete renal ultrasound was recommended and information was provided to the patient about scheduling. They were instructed that this imaging study is important for their health and that it is their responsibility to make and keep this appointment. All their questions were answered.\par * Will consider SGLT2i.\par * Therapies for kidney disease: blood pressure control; proteinuria reduction with ARB/ACEi; other evidence-based therapies including exercise, a plant-based lower oxalate diet, and 400 mcg folic acid daily\par * Cardiovascular disease prevention: counseling on healthy diet, physical activity, weight loss, alcohol limitation, blood pressure control; moderate intensity statin therapy; cardiology evaluation/followup advised\par * The patient has been counseled that chronic kidney disease is a significant condition and regular office followup with me (at least every 1 month for now) is important for monitoring and their health, and that it is their responsibility to make a follow up appointment.\par * The patient has been counseled never to stop taking their medications without discussing it with me or another doctor.\par * The patient has been counseled on avoiding NSAIDs.\par * The patient has been counseled on risk of acute renal failure and instructed to immediately call and speak with me or go immediately to ER with any severe symptoms, nausea, vomiting, diarrhea, chest pain, or shortness of breath.\par * A counseling information sheet has been given (today or previously). All their questions were answered.\par \par # HTN.\par * D/w Dr. Chandler. He will stop amlodipine.\par * Continue valsartan.\par * D/w Dr. Chandler. Will stop furosemide and HCTZ. Will start torsemide 10 mg. \par * The patient's blood pressure was checked with the [employer] [id number]-[id number] using the SPRINT trial protocol after sitting quietly in an empty room with arm supported, back supported, and feet on the floor for 5 minutes. The average of 3 readings were taken.\par * A counseling information sheet has been given (currently or previously, in-person or electronically). All their questions were answered.\par * The patient has been counseled to check their BP at home with an automatic arm cuff, write down the readings, and reach me directly on the phone immediately if they are persistently > 180 systolic or if SBP is less than 100 or if lightheadedness develops. They were counseled to bring in all blood pressure readings and medications next visit.\par * The patient has been counseled that regular office followup (at least every 1 month for now)  is important for monitoring and for their health, and that it is their responsibility to make follow up appointments.\par * The patient also has been counseled that they must never stop or change any medications without discussing this with me (or another physician). \par * The patient was advised to weigh themselves daily in the morning, unclothed, after using the bathroom, and notify me if weight increases or decreases by more than 2 pounds.\par \par # HFrEF.\par * Depending on presence of albuminuria, may benefit from SGLT2i or eplerenone. (Would avoid the latter given history of hyperkalemia.) \par \par # Previous hyperkalemia.\par * Low K diet.

## 2021-08-04 ENCOUNTER — TRANSCRIPTION ENCOUNTER (OUTPATIENT)
Age: 76
End: 2021-08-04

## 2021-08-04 ENCOUNTER — APPOINTMENT (OUTPATIENT)
Dept: HEART AND VASCULAR | Facility: CLINIC | Age: 76
End: 2021-08-04
Payer: MEDICARE

## 2021-08-04 VITALS
TEMPERATURE: 97.4 F | SYSTOLIC BLOOD PRESSURE: 95 MMHG | WEIGHT: 181 LBS | HEIGHT: 71 IN | BODY MASS INDEX: 25.34 KG/M2 | DIASTOLIC BLOOD PRESSURE: 66 MMHG | HEART RATE: 79 BPM

## 2021-08-04 LAB
ALBUMIN SERPL ELPH-MCNC: 4 G/DL
ALP BLD-CCNC: 44 U/L
ALT SERPL-CCNC: 18 U/L
ANION GAP SERPL CALC-SCNC: 18 MMOL/L
APPEARANCE: ABNORMAL
AST SERPL-CCNC: 24 U/L
BASOPHILS # BLD AUTO: 0.02 K/UL
BASOPHILS NFR BLD AUTO: 0.4 %
BILIRUB SERPL-MCNC: 0.3 MG/DL
BILIRUBIN URINE: NEGATIVE
BLOOD URINE: NEGATIVE
BUN SERPL-MCNC: 36 MG/DL
CALCIUM SERPL-MCNC: 9.8 MG/DL
CHLORIDE SERPL-SCNC: 107 MMOL/L
CO2 SERPL-SCNC: 21 MMOL/L
COLOR: YELLOW
CREAT SERPL-MCNC: 1.86 MG/DL
CREAT SPEC-SCNC: 198 MG/DL
CYSTATIN C SERPL-MCNC: 1.24 MG/L
EOSINOPHIL # BLD AUTO: 0.01 K/UL
EOSINOPHIL NFR BLD AUTO: 0.2 %
GFR/BSA.PRED SERPLBLD CYS-BASED-ARV: 55 ML/MIN
GLUCOSE QUALITATIVE U: NEGATIVE
GLUCOSE SERPL-MCNC: 137 MG/DL
HCT VFR BLD CALC: 47.4 %
HGB BLD-MCNC: 14.9 G/DL
IMM GRANULOCYTES NFR BLD AUTO: 0.4 %
KETONES URINE: NEGATIVE
LEUKOCYTE ESTERASE URINE: NEGATIVE
LYMPHOCYTES # BLD AUTO: 2.3 K/UL
LYMPHOCYTES NFR BLD AUTO: 48.7 %
MAGNESIUM SERPL-MCNC: 2.3 MG/DL
MAN DIFF?: NORMAL
MCHC RBC-ENTMCNC: 31.4 GM/DL
MCHC RBC-ENTMCNC: 33.2 PG
MCV RBC AUTO: 105.6 FL
MICROALBUMIN 24H UR DL<=1MG/L-MCNC: 58.8 MG/DL
MICROALBUMIN/CREAT 24H UR-RTO: 298 MG/G
MONOCYTES # BLD AUTO: 0.52 K/UL
MONOCYTES NFR BLD AUTO: 11 %
NEUTROPHILS # BLD AUTO: 1.85 K/UL
NEUTROPHILS NFR BLD AUTO: 39.3 %
NITRITE URINE: NEGATIVE
PH URINE: 5.5
PHOSPHATE SERPL-MCNC: 4.7 MG/DL
PLATELET # BLD AUTO: 237 K/UL
POTASSIUM SERPL-SCNC: 6 MMOL/L
PROT SERPL-MCNC: 7.3 G/DL
PROTEIN URINE: ABNORMAL
RBC # BLD: 4.49 M/UL
RBC # FLD: 14.6 %
SODIUM SERPL-SCNC: 146 MMOL/L
SPECIFIC GRAVITY URINE: 1.02
UROBILINOGEN URINE: NORMAL
WBC # FLD AUTO: 4.72 K/UL

## 2021-08-04 PROCEDURE — 93283 PRGRMG EVAL IMPLANTABLE DFB: CPT

## 2021-08-04 PROCEDURE — 99214 OFFICE O/P EST MOD 30 MIN: CPT | Mod: 25

## 2021-08-04 NOTE — PROCEDURE
[No] : not [NSR] : normal sinus rhythm [ICD] : Implantable cardioverter-defibrillator [DDD] : DDD [Threshold Testing Performed] : Threshold testing was performed [Lead Imp:  ___ohms] : lead impedance was [unfilled] ohms [Sensing Amplitude ___mv] : sensing amplitude was [unfilled] mv [___V @] : [unfilled] V [___ ms] : [unfilled] ms [None] : none [de-identified] : very long AV delay [de-identified] : [place] [de-identified] : Ellipse [de-identified] : [id number] [de-identified] : 4/2014 [de-identified] :  [de-identified] : 6 months [de-identified] :  \par AV delay 250   DDD 50\par changed to DDD 70 AV delay 250 [de-identified] : AP 91%\par  100%\par no afib, no Ventricular events\par shock impedance 44

## 2021-08-04 NOTE — DISCUSSION/SUMMARY
[Pacemaker Function Normal] : normal pacemaker function [FreeTextEntry1] : 75 year old male with HTN, HLD, aortic aneurysm thoracic, AVR and mitral valve repair in 2014,  paroxysmal atrial fibrillation, PVCs and ICD s/p recent PVC ablation,, who had a recent appropriate ICD shock and presents for follow up.  Device interrogation reveals normal function.  All measured data is within normal limits. He has a known history of paroxysmal afib and refuses Eliquis- if he has recurrent events he will reconsider trialing this.  No further VT and he is on 200mg daily Amiodarone and on Metoprolol.  He is getting an echo tomorrow.  Now chronic RV pacing given AV delay.  If EF depressed will consider a upgrade to a BIV (battery nearing end of life)  He will follow up in 2 months or sooner if needed.    He knows to call with any questions or concerns.   \par

## 2021-08-04 NOTE — PHYSICAL EXAM
[General Appearance - Well Developed] : well developed [Normal Appearance] : normal appearance [Well Groomed] : well groomed [General Appearance - Well Nourished] : well nourished [No Deformities] : no deformities [General Appearance - In No Acute Distress] : no acute distress [Heart Rate And Rhythm] : heart rate and rhythm were normal [Heart Sounds] : normal S1 and S2 [Edema] : no peripheral edema present [Respiration, Rhythm And Depth] : normal respiratory rhythm and effort [Exaggerated Use Of Accessory Muscles For Inspiration] : no accessory muscle use [Left Infraclavicular] : left infraclavicular area [Clean] : clean [Dry] : dry [Well-Healed] : well-healed [] : no ischemic changes [Normal Conjunctiva] : the conjunctiva exhibited no abnormalities [Normal Oral Mucosa] : normal oral mucosa [Normal Oropharynx] : normal oropharynx [Normal Jugular Venous V Waves Present] : normal jugular venous V waves present [Abnormal Walk] : normal gait [Gait - Sufficient For Exercise Testing] : the gait was sufficient for exercise testing [Skin Color & Pigmentation] : normal skin color and pigmentation [Oriented To Time, Place, And Person] : oriented to person, place, and time [Affect] : the affect was normal [Mood] : the mood was normal [No Anxiety] : not feeling anxious [Auscultation Breath Sounds / Voice Sounds] : lungs were clear to auscultation bilaterally [FreeTextEntry1] : after walking to the bathroom mild labor breathing  [Palpable Crepitus] : no palpable crepitus [Bleeding] : no active bleeding [Foul Odor] : no foul smell [Purulent Drainage] : no purulent drainage [Serosanguineous Drainage] : no serosanquineous drainage [Serous Drainage] : no serous drainage [Erythema] : not erythematous [Warm] : not warm [Tender] : not tender [Indurated] : not indurated [Fluctuant] : not fluctuant

## 2021-08-04 NOTE — HISTORY OF PRESENT ILLNESS
[Palpitations] : no palpitations [SOB] : no dyspnea [Syncope] : no syncope [Dizziness] : no dizziness [Chest Pain] : no chest pain or discomfort [Shoulder Pain] : no shoulder pain [Pain at Site] : no pain at device site [Erythema at Site] : no erythema at device site [Swelling at Site] : no swelling at device site [FreeTextEntry1] : 75 year old male with HTN, HLD, aortic aneurysm thoracic, AVR and mitral valve repair in 2014,  paroxysmal atrial fibrillation, PVCs and ICD s/p recent PVC ablation,, who had an appropriate ICD shock and presents for follow up.\par \par He states that he had a ICD placed after his AVR.  He normally followed up with Dr. Palencia; whose office moved and he transferred care here.   He is on Sotalol for history of PVCs and  NSVT; however he had short bursts of VT/torsades and it was felt the sotalol was proarrhythmic and stopped.  He was placed on Metoprolol.  He had 7101 PVCs on holter monitor with short bursts of NSVT.  \par \par He was admitted to [facility] 9/2019 with syncope correlated with VT and Vfib.  No therapy needed as he spontaneously converted.  One episode of afib, but overall burden extremely low.  Cath with mildly obstructive CAD.  He also underwent an EP study with PVC ablation from great cardiac vein.  HE was discharged on Mexiletine.  \par  \par 7/2021 he was admitted to an OSH with ICD shock.  He was discharged on amiodarone load and is now on maintenance dose.  He states he got lightheaded and then felt a shock.  Since that event he feels well - no further ICD shocks.  He states that he stopped Eliquis because he "didn’t like the way it made him feel".  No SOB at rest.  No chest pain, palpitations, syncope, near syncope.  No device related complaints.  \par \par

## 2021-08-04 NOTE — REVIEW OF SYSTEMS
[Negative] : Heme/Lymph [Fever] : no fever [Chills] : no chills [Feeling Fatigued] : not feeling fatigued [SOB] : no shortness of breath [Dyspnea on exertion] : dyspnea during exertion [Palpitations] : no palpitations [Syncope] : no syncope [Cough] : no cough

## 2021-08-04 NOTE — REASON FOR VISIT
[Follow-up Device Check] : is here today for a follow-up device check visit for [Family Member] : family member [Follow-Up - Clinic] : a clinic follow-up of [FreeTextEntry1] : pacemaker

## 2021-08-05 ENCOUNTER — APPOINTMENT (OUTPATIENT)
Dept: HEART AND VASCULAR | Facility: CLINIC | Age: 76
End: 2021-08-05
Payer: MEDICARE

## 2021-08-05 PROCEDURE — 93306 TTE W/DOPPLER COMPLETE: CPT

## 2021-08-09 ENCOUNTER — APPOINTMENT (OUTPATIENT)
Dept: HEART AND VASCULAR | Facility: CLINIC | Age: 76
End: 2021-08-09

## 2021-08-09 LAB
ALBUMIN MFR SERPL ELPH: 54.3 %
ALBUMIN SERPL-MCNC: 4 G/DL
ALBUMIN/GLOB SERPL: 1.2 RATIO
ALPHA1 GLOB MFR SERPL ELPH: 4.2 %
ALPHA1 GLOB SERPL ELPH-MCNC: 0.3 G/DL
ALPHA2 GLOB MFR SERPL ELPH: 7 %
ALPHA2 GLOB SERPL ELPH-MCNC: 0.5 G/DL
B-GLOBULIN MFR SERPL ELPH: 9.9 %
B-GLOBULIN SERPL ELPH-MCNC: 0.7 G/DL
DEPRECATED KAPPA LC FREE/LAMBDA SER: 0.59 RATIO
GAMMA GLOB FLD ELPH-MCNC: 1.8 G/DL
GAMMA GLOB MFR SERPL ELPH: 24.6 %
IGA SER QL IEP: 127 MG/DL
IGG SER QL IEP: 1850 MG/DL
IGM SER QL IEP: 35 MG/DL
INTERPRETATION SERPL IEP-IMP: NORMAL
KAPPA LC CSF-MCNC: 1.58 MG/DL
KAPPA LC SERPL-MCNC: 0.93 MG/DL
M PROTEIN MFR SERPL ELPH: 18.8 %
M PROTEIN SPEC IFE-MCNC: NORMAL
MONOCLON BAND OBS SERPL: 1.4 G/DL
PROT SERPL-MCNC: 7.3 G/DL
PROT SERPL-MCNC: 7.3 G/DL

## 2021-08-10 LAB
ALBUPE: 62.9 %
ALPHA1UPE: 8.5 %
ALPHA2UPE: 5.6 %
BETAUPE: 7.3 %
GAMMAUPE: 15.7 %
IGA 24H UR QL IFE: NORMAL
KAPPA LC 24H UR QL: NORMAL
PROT PATTERN 24H UR ELPH-IMP: NORMAL
PROT UR-MCNC: 98 MG/DL
PROT UR-MCNC: 98 MG/DL

## 2021-08-12 ENCOUNTER — NON-APPOINTMENT (OUTPATIENT)
Age: 76
End: 2021-08-12

## 2021-08-13 ENCOUNTER — APPOINTMENT (OUTPATIENT)
Dept: ULTRASOUND IMAGING | Facility: CLINIC | Age: 76
End: 2021-08-13

## 2021-08-16 ENCOUNTER — APPOINTMENT (OUTPATIENT)
Dept: HEART AND VASCULAR | Facility: CLINIC | Age: 76
End: 2021-08-16

## 2021-08-23 ENCOUNTER — EMERGENCY (EMERGENCY)
Facility: HOSPITAL | Age: 76
LOS: 1 days | Discharge: HOME | End: 2021-08-23
Attending: EMERGENCY MEDICINE | Admitting: EMERGENCY MEDICINE
Payer: MEDICARE

## 2021-08-23 VITALS
HEIGHT: 71 IN | SYSTOLIC BLOOD PRESSURE: 107 MMHG | RESPIRATION RATE: 18 BRPM | OXYGEN SATURATION: 97 % | HEART RATE: 93 BPM | TEMPERATURE: 99 F | DIASTOLIC BLOOD PRESSURE: 82 MMHG

## 2021-08-23 DIAGNOSIS — I48.91 UNSPECIFIED ATRIAL FIBRILLATION: ICD-10-CM

## 2021-08-23 DIAGNOSIS — R53.83 OTHER FATIGUE: ICD-10-CM

## 2021-08-23 DIAGNOSIS — N18.30 CHRONIC KIDNEY DISEASE, STAGE 3 UNSPECIFIED: ICD-10-CM

## 2021-08-23 DIAGNOSIS — U07.1 COVID-19: ICD-10-CM

## 2021-08-23 DIAGNOSIS — I50.20 UNSPECIFIED SYSTOLIC (CONGESTIVE) HEART FAILURE: ICD-10-CM

## 2021-08-23 DIAGNOSIS — R53.1 WEAKNESS: ICD-10-CM

## 2021-08-23 DIAGNOSIS — R60.0 LOCALIZED EDEMA: ICD-10-CM

## 2021-08-23 DIAGNOSIS — Z88.8 ALLERGY STATUS TO OTHER DRUGS, MEDICAMENTS AND BIOLOGICAL SUBSTANCES STATUS: ICD-10-CM

## 2021-08-23 DIAGNOSIS — E78.5 HYPERLIPIDEMIA, UNSPECIFIED: ICD-10-CM

## 2021-08-23 DIAGNOSIS — I12.9 HYPERTENSIVE CHRONIC KIDNEY DISEASE WITH STAGE 1 THROUGH STAGE 4 CHRONIC KIDNEY DISEASE, OR UNSPECIFIED CHRONIC KIDNEY DISEASE: ICD-10-CM

## 2021-08-23 DIAGNOSIS — Z98.89 OTHER SPECIFIED POSTPROCEDURAL STATES: Chronic | ICD-10-CM

## 2021-08-23 DIAGNOSIS — Z79.82 LONG TERM (CURRENT) USE OF ASPIRIN: ICD-10-CM

## 2021-08-23 PROCEDURE — 99284 EMERGENCY DEPT VISIT MOD MDM: CPT | Mod: CS,25

## 2021-08-23 PROCEDURE — 71045 X-RAY EXAM CHEST 1 VIEW: CPT | Mod: 26

## 2021-08-23 PROCEDURE — 93010 ELECTROCARDIOGRAM REPORT: CPT

## 2021-08-23 NOTE — ED ADULT NURSE NOTE - OBJECTIVE STATEMENT
76y male c/o weakness. pt reports testing positive for covid on aug 7th and testing positive again last week. pt states, "I just came in because I have been having what feels like vertigo when I stand up and also weakness in my legs." fully vaccinated for covid. 98% on RA. pt on ccm. hx of vertigo, not on meclizine. hx of HTN and pacemaker. Respirations even and unlabored. speaking in clear, full sentences. pt reports ambulating without SOB. states that he sometimes has CP from GERD. pt denies SOB, n/v/d, headache, dizziness, loss of taste/smell, fever, chills.

## 2021-08-23 NOTE — ED PROVIDER NOTE - CLINICAL SUMMARY MEDICAL DECISION MAKING FREE TEXT BOX
76M PMH CKD3, HTN, HLD, AVR and MVR (2014), PAF (no longer taking AC), HFrEF (EF 35-40% by Echo 1/2021), PVC induced Vfib s/p ablation of great cardiac vein 9/2019 and [employer] AICD (2014; Dr. Jeff; most recent interrogation 10/2020 at which time his base rate was increased to 80 to try and decrease coupled PVCs and NSVT), thoracic aortic aneurysm, non-obstructive CAD (Cath March 2021 Dr. Orozco) p/w several complaints. Feels flushing/heat to face for 3-4d. Also generalized weakness for ~1w. Had cough and dx w/ COVID 8/7, cough has since improved. Mild decreased PO intake. Unchanged b/l LE edema. No other systemic symptoms. Vitals wnl, exam as above.  ddx: Likely residual from COVID, possible metabolic component.   LAbs, UA, CXR, reassess.

## 2021-08-23 NOTE — ED PROVIDER NOTE - OBJECTIVE STATEMENT
76M PMH CKD3, HTN, HLD, AVR and MVR (2014), PAF (no longer taking AC), HFrEF (EF 35-40% by Echo 1/2021), PVC induced Vfib s/p ablation of great cardiac vein 9/2019 and [employer] AICD (2014; Dr. Jeff; most recent interrogation 10/2020 at which time his base rate was increased to 80 to try and decrease coupled PVCs and NSVT), thoracic aortic aneurysm, non-obstructive CAD (Cath March 2021 Dr. Orozco) p/w several complaints. Feels flushing/heat to face for 3-4d. Also generalized weakness for ~1w. Had cough and dx w/ COVID 8/7, cough has since improved. Mild decreased PO intake. Unchanged b/l LE edema. No other systemic symptoms.   Denies HA, f/c, SOB (contrary to triage), CP, NVD, abd pain, urinary complaints, black/bloody stool, focal weakness/numbness.

## 2021-08-23 NOTE — ED PROVIDER NOTE - PATIENT PORTAL LINK FT
You can access the FollowMyHealth Patient Portal offered by [facility] by registering at the following website: [email or web address]. By joining [facility]’s FollowMyHealth portal, you will also be able to view your health information using other applications (apps) compatible with our system.

## 2021-08-23 NOTE — ED PROVIDER NOTE - NSFOLLOWUPINSTRUCTIONS_ED_ALL_ED_FT
It is unclear what exactly is causing your symptoms! It is likely related to your recent COVID infection. It is important to continue following up with your doctor outside the hospital and to return to ER for: Persistent fever/vomiting, uncontrolled pain, worsening swelling, worsening breathing, worsening lightheaded, spreading redness.     Stay well hydrated and well nourished.     Follow up with primary doctor within 1-2 days.

## 2021-08-23 NOTE — ED PROVIDER NOTE - PROGRESS NOTE DETAILS
Klepfish: Bun/cr 41/1.79, mildly increased from 20/1.39. Will give 500cc IVF. Other labs grossly wnl. CXR grossly unchanged from prior. UA pending, will reassess. Klepfish: UA wnl. Pt's symptoms improving. Observed in ED for ~6hrs w/ only improvement in symptoms. Clinically no indication for further emergent ED workup or hospitalization at this time. Comfortable for dc, outpt f/u.

## 2021-08-23 NOTE — ED PROVIDER NOTE - PHYSICAL EXAMINATION
trace b/l LE pitting edema (pt states this is his baseline)  normal equal distal pulses  No spinal ttp, neck FROM. Strength 5/5. No bony ttp, FROM all extremities.

## 2021-08-24 ENCOUNTER — APPOINTMENT (OUTPATIENT)
Dept: INTERNAL MEDICINE | Facility: CLINIC | Age: 76
End: 2021-08-24

## 2021-08-24 VITALS
HEART RATE: 86 BPM | OXYGEN SATURATION: 98 % | RESPIRATION RATE: 16 BRPM | SYSTOLIC BLOOD PRESSURE: 110 MMHG | TEMPERATURE: 98 F | DIASTOLIC BLOOD PRESSURE: 70 MMHG

## 2021-08-24 LAB
ALBUMIN SERPL ELPH-MCNC: 3.7 G/DL — SIGNIFICANT CHANGE UP (ref 3.3–5)
ALP SERPL-CCNC: 47 U/L — SIGNIFICANT CHANGE UP (ref 40–120)
ALT FLD-CCNC: 22 U/L — SIGNIFICANT CHANGE UP (ref 10–45)
ANION GAP SERPL CALC-SCNC: 8 MMOL/L — SIGNIFICANT CHANGE UP (ref 5–17)
APPEARANCE UR: CLEAR — SIGNIFICANT CHANGE UP
AST SERPL-CCNC: 26 U/L — SIGNIFICANT CHANGE UP (ref 10–40)
BASOPHILS # BLD AUTO: 0.02 K/UL — SIGNIFICANT CHANGE UP (ref 0–0.2)
BASOPHILS NFR BLD AUTO: 0.3 % — SIGNIFICANT CHANGE UP (ref 0–2)
BILIRUB SERPL-MCNC: 0.4 MG/DL — SIGNIFICANT CHANGE UP (ref 0.2–1.2)
BILIRUB UR-MCNC: NEGATIVE — SIGNIFICANT CHANGE UP
BUN SERPL-MCNC: 41 MG/DL — HIGH (ref 7–23)
CALCIUM SERPL-MCNC: 9.4 MG/DL — SIGNIFICANT CHANGE UP (ref 8.4–10.5)
CHLORIDE SERPL-SCNC: 102 MMOL/L — SIGNIFICANT CHANGE UP (ref 96–108)
CK SERPL-CCNC: 158 U/L — SIGNIFICANT CHANGE UP (ref 30–200)
CO2 SERPL-SCNC: 32 MMOL/L — HIGH (ref 22–31)
COLOR SPEC: YELLOW — SIGNIFICANT CHANGE UP
CREAT SERPL-MCNC: 1.79 MG/DL — HIGH (ref 0.5–1.3)
DIFF PNL FLD: NEGATIVE — SIGNIFICANT CHANGE UP
EOSINOPHIL # BLD AUTO: 0.01 K/UL — SIGNIFICANT CHANGE UP (ref 0–0.5)
EOSINOPHIL NFR BLD AUTO: 0.2 % — SIGNIFICANT CHANGE UP (ref 0–6)
GLUCOSE SERPL-MCNC: 126 MG/DL — HIGH (ref 70–99)
GLUCOSE UR QL: NEGATIVE — SIGNIFICANT CHANGE UP
HCT VFR BLD CALC: 41.5 % — SIGNIFICANT CHANGE UP (ref 39–50)
HGB BLD-MCNC: 13.7 G/DL — SIGNIFICANT CHANGE UP (ref 13–17)
IMM GRANULOCYTES NFR BLD AUTO: 0.2 % — SIGNIFICANT CHANGE UP (ref 0–1.5)
KETONES UR-MCNC: NEGATIVE — SIGNIFICANT CHANGE UP
LEUKOCYTE ESTERASE UR-ACNC: NEGATIVE — SIGNIFICANT CHANGE UP
LYMPHOCYTES # BLD AUTO: 2.87 K/UL — SIGNIFICANT CHANGE UP (ref 1–3.3)
LYMPHOCYTES # BLD AUTO: 48.1 % — HIGH (ref 13–44)
MAGNESIUM SERPL-MCNC: 2.2 MG/DL — SIGNIFICANT CHANGE UP (ref 1.6–2.6)
MCHC RBC-ENTMCNC: 33 GM/DL — SIGNIFICANT CHANGE UP (ref 32–36)
MCHC RBC-ENTMCNC: 33.5 PG — SIGNIFICANT CHANGE UP (ref 27–34)
MCV RBC AUTO: 101.5 FL — HIGH (ref 80–100)
MONOCYTES # BLD AUTO: 0.72 K/UL — SIGNIFICANT CHANGE UP (ref 0–0.9)
MONOCYTES NFR BLD AUTO: 12.1 % — SIGNIFICANT CHANGE UP (ref 2–14)
NEUTROPHILS # BLD AUTO: 2.34 K/UL — SIGNIFICANT CHANGE UP (ref 1.8–7.4)
NEUTROPHILS NFR BLD AUTO: 39.1 % — LOW (ref 43–77)
NITRITE UR-MCNC: NEGATIVE — SIGNIFICANT CHANGE UP
NRBC # BLD: 0 /100 WBCS — SIGNIFICANT CHANGE UP (ref 0–0)
PH UR: 5.5 — SIGNIFICANT CHANGE UP (ref 5–8)
PLATELET # BLD AUTO: 270 K/UL — SIGNIFICANT CHANGE UP (ref 150–400)
POTASSIUM SERPL-MCNC: 4.6 MMOL/L — SIGNIFICANT CHANGE UP (ref 3.5–5.3)
POTASSIUM SERPL-SCNC: 4.6 MMOL/L — SIGNIFICANT CHANGE UP (ref 3.5–5.3)
PROT SERPL-MCNC: 6.9 G/DL — SIGNIFICANT CHANGE UP (ref 6–8.3)
PROT UR-MCNC: NEGATIVE MG/DL — SIGNIFICANT CHANGE UP
RBC # BLD: 4.09 M/UL — LOW (ref 4.2–5.8)
RBC # FLD: 14 % — SIGNIFICANT CHANGE UP (ref 10.3–14.5)
SARS-COV-2 RNA SPEC QL NAA+PROBE: DETECTED
SODIUM SERPL-SCNC: 142 MMOL/L — SIGNIFICANT CHANGE UP (ref 135–145)
SP GR SPEC: 1.02 — SIGNIFICANT CHANGE UP (ref 1–1.03)
UROBILINOGEN FLD QL: 0.2 E.U./DL — SIGNIFICANT CHANGE UP
WBC # BLD: 5.97 K/UL — SIGNIFICANT CHANGE UP (ref 3.8–10.5)
WBC # FLD AUTO: 5.97 K/UL — SIGNIFICANT CHANGE UP (ref 3.8–10.5)

## 2021-08-24 PROCEDURE — 96360 HYDRATION IV INFUSION INIT: CPT

## 2021-08-24 PROCEDURE — U0005: CPT

## 2021-08-24 PROCEDURE — 82550 ASSAY OF CK (CPK): CPT

## 2021-08-24 PROCEDURE — 85025 COMPLETE CBC W/AUTO DIFF WBC: CPT

## 2021-08-24 PROCEDURE — U0003: CPT

## 2021-08-24 PROCEDURE — 71045 X-RAY EXAM CHEST 1 VIEW: CPT | Mod: 26

## 2021-08-24 PROCEDURE — 80053 COMPREHEN METABOLIC PANEL: CPT

## 2021-08-24 PROCEDURE — 71045 X-RAY EXAM CHEST 1 VIEW: CPT

## 2021-08-24 PROCEDURE — 96361 HYDRATE IV INFUSION ADD-ON: CPT

## 2021-08-24 PROCEDURE — 83735 ASSAY OF MAGNESIUM: CPT

## 2021-08-24 PROCEDURE — 87086 URINE CULTURE/COLONY COUNT: CPT

## 2021-08-24 PROCEDURE — 81003 URINALYSIS AUTO W/O SCOPE: CPT

## 2021-08-24 PROCEDURE — 99283 EMERGENCY DEPT VISIT LOW MDM: CPT | Mod: 25

## 2021-08-24 PROCEDURE — 36415 COLL VENOUS BLD VENIPUNCTURE: CPT

## 2021-08-24 RX ORDER — SODIUM CHLORIDE 9 MG/ML
500 INJECTION INTRAMUSCULAR; INTRAVENOUS; SUBCUTANEOUS ONCE
Refills: 0 | Status: COMPLETED | OUTPATIENT
Start: 2021-08-24 | End: 2021-08-24

## 2021-08-24 RX ADMIN — SODIUM CHLORIDE 500 MILLILITER(S): 9 INJECTION INTRAMUSCULAR; INTRAVENOUS; SUBCUTANEOUS at 02:30

## 2021-08-24 RX ADMIN — SODIUM CHLORIDE 500 MILLILITER(S): 9 INJECTION INTRAMUSCULAR; INTRAVENOUS; SUBCUTANEOUS at 04:33

## 2021-08-24 RX ADMIN — SODIUM CHLORIDE 500 MILLILITER(S): 9 INJECTION INTRAMUSCULAR; INTRAVENOUS; SUBCUTANEOUS at 03:30

## 2021-08-24 RX ADMIN — SODIUM CHLORIDE 500 MILLILITER(S): 9 INJECTION INTRAMUSCULAR; INTRAVENOUS; SUBCUTANEOUS at 01:31

## 2021-08-26 LAB
CULTURE RESULTS: NO GROWTH — SIGNIFICANT CHANGE UP
SPECIMEN SOURCE: SIGNIFICANT CHANGE UP

## 2021-08-31 ENCOUNTER — APPOINTMENT (OUTPATIENT)
Dept: NEPHROLOGY | Facility: CLINIC | Age: 76
End: 2021-08-31
Payer: MEDICARE

## 2021-08-31 PROCEDURE — 99214 OFFICE O/P EST MOD 30 MIN: CPT | Mod: 95

## 2021-08-31 NOTE — HISTORY OF PRESENT ILLNESS
[Home] : at home, [unfilled] , at the time of the visit. [Other Location: e.g. Home (Enter Location, City,State)___] : at [unfilled] [Verbal consent obtained from patient] : the patient, [unfilled] [FreeTextEntry1] : Kindly referred by Dr. Sandhya Bloom elevated creatinine. Here with daughter and wife on the phone.\par \par * Dx with Covid 3 weeks ago. Now no cough, or other symptoms. Did not require hospitalization. He has SOB with walking walking 1 block. * Lokelma started for hyperkalemia (K of 6). He is now following low potassium diet. . * CKD stable, creatinine 1.86. * He had stopped torsemide. BP 100s. \par \par Previous history (03Aug21): Labs reviewed. Baseline eGFR 43 - 56 since 2019. On 14Jul21, his creatinine increased to 1.82 (eGFR 35 - 41). The patient denies exposure to chronic NSAIDs, chronic PPIs, creatine, or herbal supplements. The patient denies a history of kidney stones or pyelonephritis. 4 - 5 times. No recent renal ultrasound. They are unaware of proteinuria or hematuria.\par \par EF 23% in 4/21. On amiodarone, metoprolol, lasix 20 qod, HCTZ twice weekly valsartan 40. SOB with one block walking. Stable LE edema. ProBNP incresaed from 515 to 1360.\par \par * HTN controlled.  No lightheadedness. Compliant with medications. He believes he is taking amlodipine.

## 2021-08-31 NOTE — ASSESSMENT
[FreeTextEntry1] : # HTN controlled. CHF.\par * Continue valsartan and metoprolol.\par * Start torsemide qod.\par * A counseling information sheet has been given (currently or previously, in-person or electronically). All their questions were answered.\par * The patient has been counseled to check their BP at home with an automatic arm cuff, write down the readings, and reach me directly on the phone immediately if they are persistently > 180 systolic or if SBP is less than 100 or if lightheadedness develops. They were counseled to bring in all blood pressure readings and medications next visit.\par * The patient has been counseled that regular office followup (at least every 1 month for now)  is important for monitoring and for their health, and that it is their responsibility to make follow up appointments.\par * The patient also has been counseled that they must never stop or change any medications without discussing this with me (or another physician). \par \par # Hyperkalemia.\par * low K diet.\par * Continue lokelma.\par \par # CKD stage 3 likeliest due to type 2 cardiorenal syndrome and aging. \par * Recheck labs.\par * Will consider SGLT2i.\par * Therapies for kidney disease: blood pressure control; proteinuria reduction with ARB/ACEi; other evidence-based therapies including exercise, a plant-based lower oxalate diet, and 400 mcg folic acid daily\par * Cardiovascular disease prevention: counseling on healthy diet, physical activity, weight loss, alcohol limitation, blood pressure control; moderate intensity statin therapy; cardiology evaluation/followup advised\par * The patient has been counseled that chronic kidney disease is a significant condition and regular office followup with me (at least every 1 month for now) is important for monitoring and their health, and that it is their responsibility to make a follow up appointment.\par * The patient has been counseled never to stop taking their medications without discussing it with me or another doctor.\par * The patient has been counseled on avoiding NSAIDs.\par * The patient has been counseled on risk of acute renal failure and instructed to immediately call and speak with me or go immediately to ER with any severe symptoms, nausea, vomiting, diarrhea, chest pain, or shortness of breath.\par * A counseling information sheet has been given (today or previously). All their questions were answered.\par \par # IgG lambda. \par * Will consider hematology referral. \par

## 2021-09-01 ENCOUNTER — INPATIENT (INPATIENT)
Facility: HOSPITAL | Age: 76
LOS: 2 days | Discharge: HOME | DRG: 226 | End: 2021-09-04
Attending: INTERNAL MEDICINE | Admitting: INTERNAL MEDICINE
Payer: MEDICARE

## 2021-09-01 ENCOUNTER — APPOINTMENT (OUTPATIENT)
Dept: HEART AND VASCULAR | Facility: CLINIC | Age: 76
End: 2021-09-01
Payer: MEDICARE

## 2021-09-01 VITALS
WEIGHT: 186.07 LBS | HEART RATE: 82 BPM | RESPIRATION RATE: 20 BRPM | DIASTOLIC BLOOD PRESSURE: 84 MMHG | SYSTOLIC BLOOD PRESSURE: 125 MMHG | TEMPERATURE: 98 F | OXYGEN SATURATION: 100 % | HEIGHT: 71 IN

## 2021-09-01 VITALS
TEMPERATURE: 97 F | WEIGHT: 187 LBS | HEIGHT: 71 IN | BODY MASS INDEX: 26.18 KG/M2 | SYSTOLIC BLOOD PRESSURE: 112 MMHG | DIASTOLIC BLOOD PRESSURE: 80 MMHG | HEART RATE: 80 BPM

## 2021-09-01 DIAGNOSIS — I48.0 PAROXYSMAL ATRIAL FIBRILLATION: ICD-10-CM

## 2021-09-01 DIAGNOSIS — E78.5 HYPERLIPIDEMIA, UNSPECIFIED: ICD-10-CM

## 2021-09-01 DIAGNOSIS — K21.9 GASTRO-ESOPHAGEAL REFLUX DISEASE WITHOUT ESOPHAGITIS: ICD-10-CM

## 2021-09-01 DIAGNOSIS — I50.23 ACUTE ON CHRONIC SYSTOLIC (CONGESTIVE) HEART FAILURE: ICD-10-CM

## 2021-09-01 DIAGNOSIS — N18.30 CHRONIC KIDNEY DISEASE, STAGE 3 UNSPECIFIED: ICD-10-CM

## 2021-09-01 DIAGNOSIS — Z98.89 OTHER SPECIFIED POSTPROCEDURAL STATES: Chronic | ICD-10-CM

## 2021-09-01 DIAGNOSIS — I10 ESSENTIAL (PRIMARY) HYPERTENSION: ICD-10-CM

## 2021-09-01 LAB
ALBUMIN SERPL ELPH-MCNC: 3.6 G/DL — SIGNIFICANT CHANGE UP (ref 3.3–5)
ALP SERPL-CCNC: 140 U/L — HIGH (ref 40–120)
ALT FLD-CCNC: SIGNIFICANT CHANGE UP (ref 10–45)
ANION GAP SERPL CALC-SCNC: 10 MMOL/L — SIGNIFICANT CHANGE UP (ref 5–17)
ANION GAP SERPL CALC-SCNC: 10 MMOL/L — SIGNIFICANT CHANGE UP (ref 5–17)
APTT BLD: 28.7 SEC — SIGNIFICANT CHANGE UP (ref 27.5–35.5)
AST SERPL-CCNC: SIGNIFICANT CHANGE UP (ref 10–40)
BASOPHILS # BLD AUTO: 0.02 K/UL — SIGNIFICANT CHANGE UP (ref 0–0.2)
BASOPHILS NFR BLD AUTO: 0.4 % — SIGNIFICANT CHANGE UP (ref 0–2)
BILIRUB SERPL-MCNC: 0.7 MG/DL — SIGNIFICANT CHANGE UP (ref 0.2–1.2)
BUN SERPL-MCNC: 43 MG/DL — HIGH (ref 7–23)
BUN SERPL-MCNC: 45 MG/DL — HIGH (ref 7–23)
CALCIUM SERPL-MCNC: 9.1 MG/DL — SIGNIFICANT CHANGE UP (ref 8.4–10.5)
CALCIUM SERPL-MCNC: 9.2 MG/DL — SIGNIFICANT CHANGE UP (ref 8.4–10.5)
CHLORIDE SERPL-SCNC: 102 MMOL/L — SIGNIFICANT CHANGE UP (ref 96–108)
CHLORIDE SERPL-SCNC: 104 MMOL/L — SIGNIFICANT CHANGE UP (ref 96–108)
CK MB CFR SERPL CALC: 2.6 NG/ML — SIGNIFICANT CHANGE UP (ref 0–6.7)
CK SERPL-CCNC: SIGNIFICANT CHANGE UP (ref 30–200)
CO2 SERPL-SCNC: 30 MMOL/L — SIGNIFICANT CHANGE UP (ref 22–31)
CO2 SERPL-SCNC: 31 MMOL/L — SIGNIFICANT CHANGE UP (ref 22–31)
CREAT SERPL-MCNC: 1.83 MG/DL — HIGH (ref 0.5–1.3)
CREAT SERPL-MCNC: 1.88 MG/DL — HIGH (ref 0.5–1.3)
EOSINOPHIL # BLD AUTO: 0.03 K/UL — SIGNIFICANT CHANGE UP (ref 0–0.5)
EOSINOPHIL NFR BLD AUTO: 0.7 % — SIGNIFICANT CHANGE UP (ref 0–6)
GLUCOSE SERPL-MCNC: 100 MG/DL — HIGH (ref 70–99)
GLUCOSE SERPL-MCNC: 113 MG/DL — HIGH (ref 70–99)
HCT VFR BLD CALC: 42.4 % — SIGNIFICANT CHANGE UP (ref 39–50)
HGB BLD-MCNC: 13.9 G/DL — SIGNIFICANT CHANGE UP (ref 13–17)
IMM GRANULOCYTES NFR BLD AUTO: 0.2 % — SIGNIFICANT CHANGE UP (ref 0–1.5)
INR BLD: 1.3 — HIGH (ref 0.88–1.16)
LYMPHOCYTES # BLD AUTO: 2.03 K/UL — SIGNIFICANT CHANGE UP (ref 1–3.3)
LYMPHOCYTES # BLD AUTO: 44.7 % — HIGH (ref 13–44)
MCHC RBC-ENTMCNC: 32.8 GM/DL — SIGNIFICANT CHANGE UP (ref 32–36)
MCHC RBC-ENTMCNC: 33.5 PG — SIGNIFICANT CHANGE UP (ref 27–34)
MCV RBC AUTO: 102.2 FL — HIGH (ref 80–100)
MONOCYTES # BLD AUTO: 0.47 K/UL — SIGNIFICANT CHANGE UP (ref 0–0.9)
MONOCYTES NFR BLD AUTO: 10.4 % — SIGNIFICANT CHANGE UP (ref 2–14)
NEUTROPHILS # BLD AUTO: 1.98 K/UL — SIGNIFICANT CHANGE UP (ref 1.8–7.4)
NEUTROPHILS NFR BLD AUTO: 43.6 % — SIGNIFICANT CHANGE UP (ref 43–77)
NRBC # BLD: 0 /100 WBCS — SIGNIFICANT CHANGE UP (ref 0–0)
NT-PROBNP SERPL-SCNC: 3830 PG/ML — HIGH (ref 0–300)
PLATELET # BLD AUTO: 162 K/UL — SIGNIFICANT CHANGE UP (ref 150–400)
POTASSIUM SERPL-MCNC: 3.6 MMOL/L — SIGNIFICANT CHANGE UP (ref 3.5–5.3)
POTASSIUM SERPL-MCNC: SIGNIFICANT CHANGE UP (ref 3.5–5.3)
POTASSIUM SERPL-SCNC: 3.6 MMOL/L — SIGNIFICANT CHANGE UP (ref 3.5–5.3)
POTASSIUM SERPL-SCNC: SIGNIFICANT CHANGE UP (ref 3.5–5.3)
PROT SERPL-MCNC: 6.4 G/DL — SIGNIFICANT CHANGE UP (ref 6–8.3)
PROTHROM AB SERPL-ACNC: 15.4 SEC — HIGH (ref 10.6–13.6)
RBC # BLD: 4.15 M/UL — LOW (ref 4.2–5.8)
RBC # FLD: 14.6 % — HIGH (ref 10.3–14.5)
SARS-COV-2 RNA SPEC QL NAA+PROBE: NEGATIVE — SIGNIFICANT CHANGE UP
SODIUM SERPL-SCNC: 143 MMOL/L — SIGNIFICANT CHANGE UP (ref 135–145)
SODIUM SERPL-SCNC: 144 MMOL/L — SIGNIFICANT CHANGE UP (ref 135–145)
TROPONIN T SERPL-MCNC: 0.01 NG/ML — SIGNIFICANT CHANGE UP (ref 0–0.01)
WBC # BLD: 4.54 K/UL — SIGNIFICANT CHANGE UP (ref 3.8–10.5)
WBC # FLD AUTO: 4.54 K/UL — SIGNIFICANT CHANGE UP (ref 3.8–10.5)

## 2021-09-01 PROCEDURE — 71046 X-RAY EXAM CHEST 2 VIEWS: CPT | Mod: 26

## 2021-09-01 PROCEDURE — 93010 ELECTROCARDIOGRAM REPORT: CPT

## 2021-09-01 PROCEDURE — 99215 OFFICE O/P EST HI 40 MIN: CPT | Mod: 25

## 2021-09-01 PROCEDURE — 99285 EMERGENCY DEPT VISIT HI MDM: CPT

## 2021-09-01 PROCEDURE — 93283 PRGRMG EVAL IMPLANTABLE DFB: CPT

## 2021-09-01 RX ORDER — PANTOPRAZOLE SODIUM 20 MG/1
40 TABLET, DELAYED RELEASE ORAL
Refills: 0 | Status: DISCONTINUED | OUTPATIENT
Start: 2021-09-01 | End: 2021-09-04

## 2021-09-01 RX ORDER — HEPARIN SODIUM 5000 [USP'U]/ML
5000 INJECTION INTRAVENOUS; SUBCUTANEOUS EVERY 8 HOURS
Refills: 0 | Status: DISCONTINUED | OUTPATIENT
Start: 2021-09-01 | End: 2021-09-04

## 2021-09-01 RX ORDER — METOPROLOL TARTRATE 50 MG
100 TABLET ORAL EVERY 24 HOURS
Refills: 0 | Status: DISCONTINUED | OUTPATIENT
Start: 2021-09-01 | End: 2021-09-02

## 2021-09-01 RX ORDER — VALSARTAN 80 MG/1
40 TABLET ORAL EVERY 24 HOURS
Refills: 0 | Status: DISCONTINUED | OUTPATIENT
Start: 2021-09-01 | End: 2021-09-02

## 2021-09-01 RX ORDER — AMLODIPINE BESYLATE 2.5 MG/1
1 TABLET ORAL
Qty: 0 | Refills: 0 | DISCHARGE

## 2021-09-01 RX ORDER — ASPIRIN/CALCIUM CARB/MAGNESIUM 324 MG
1 TABLET ORAL
Qty: 0 | Refills: 0 | DISCHARGE

## 2021-09-01 RX ORDER — CHOLECALCIFEROL (VITAMIN D3) 125 MCG
400 CAPSULE ORAL DAILY
Refills: 0 | Status: DISCONTINUED | OUTPATIENT
Start: 2021-09-01 | End: 2021-09-04

## 2021-09-01 RX ORDER — METOPROLOL TARTRATE 50 MG
50 TABLET ORAL EVERY 24 HOURS
Refills: 0 | Status: DISCONTINUED | OUTPATIENT
Start: 2021-09-02 | End: 2021-09-02

## 2021-09-01 RX ORDER — MAGNESIUM OXIDE 400 MG ORAL TABLET 241.3 MG
400 TABLET ORAL DAILY
Refills: 0 | Status: DISCONTINUED | OUTPATIENT
Start: 2021-09-01 | End: 2021-09-04

## 2021-09-01 RX ORDER — FUROSEMIDE 40 MG
80 TABLET ORAL ONCE
Refills: 0 | Status: COMPLETED | OUTPATIENT
Start: 2021-09-01 | End: 2021-09-01

## 2021-09-01 RX ADMIN — Medication 100 MILLIGRAM(S): at 19:13

## 2021-09-01 RX ADMIN — MAGNESIUM OXIDE 400 MG ORAL TABLET 400 MILLIGRAM(S): 241.3 TABLET ORAL at 18:03

## 2021-09-01 RX ADMIN — Medication 80 MILLIGRAM(S): at 15:12

## 2021-09-01 RX ADMIN — HEPARIN SODIUM 5000 UNIT(S): 5000 INJECTION INTRAVENOUS; SUBCUTANEOUS at 21:58

## 2021-09-01 RX ADMIN — VALSARTAN 40 MILLIGRAM(S): 80 TABLET ORAL at 19:13

## 2021-09-01 NOTE — H&P ADULT - NSHPPHYSICALEXAM_GEN_ALL_CORE
PHYSICAL EXAM:  GENERAL: Comfortable, no acute distress   HEAD:  Normocephalic, atraumatic  EYES: EOMI, PERRLA  HEENT: Moist mucous membranes  NECK: Supple  NERVOUS SYSTEM:  Alert & Oriented X3, Motor Strength 5/5 B/L upper and lower extremities  CHEST/LUNG: Mild bibasilar crackles  HEART: Regular rate systolic murmur   ABDOMEN: Soft, non tender, Nondistended, Bowel sounds present  GENITOURINARY: Voiding, no palpable bladder  EXTREMITIES:   3+ pitting edema b/l  MUSCULOSKELETAL- No muscle tenderness, no joint tenderness  SKIN-no rash

## 2021-09-01 NOTE — H&P ADULT - PROBLEM SELECTOR PLAN 2
Pt with stage III CKD. Last known Cr 1.8 in July 2021. Takes valsartan 40 qd.  Pts Cr 1.83 today on arrival.   -f/u Cr changes

## 2021-09-01 NOTE — H&P ADULT - PROBLEM SELECTOR PLAN 3
Pt known to Dr. Dr. Jeff  -s/p [employer] AICD implanted in 2014; most recent interrogation today  Has been on Eliquis in past but patient reports he could not tolerate that or aspirin.   -consult EP team in AM. Pt to get device upgraded once chf exacerbation controlled Pt known to Dr. Dr. Jeff  -s/p [employer] AICD implanted in 2014; most recent interrogation today found to have atrial arrythmia.   Has been on Eliquis in past but patient reports he could not tolerate that or aspirin.   -consult EP team in AM. Pt to get device upgraded once chf exacerbation controlled

## 2021-09-01 NOTE — H&P ADULT - NSHPLABSRESULTS_GEN_ALL_CORE
.  LABS:                         13.9   4.54  )-----------( 162      ( 01 Sep 2021 13:35 )             42.4     09-01    144  |  104  |  45<H>  ----------------------------<  100<H>  3.6   |  30  |  1.83<H>    Ca    9.1      01 Sep 2021 14:14    TPro  6.4  /  Alb  3.6  /  TBili  0.7  /  DBili  x   /  AST  See Note  /  ALT  See Note  /  AlkPhos  140<H>  09-01    PT/INR - ( 01 Sep 2021 13:35 )   PT: 15.4 sec;   INR: 1.30          PTT - ( 01 Sep 2021 13:35 )  PTT:28.7 sec    Serum Pro-Brain Natriuretic Peptide: 3830 pg/mL (09-01 @ 13:35)        RADIOLOGY, EKG & ADDITIONAL TESTS: Reviewed.

## 2021-09-01 NOTE — ED PROVIDER NOTE - NSICDXPASTMEDICALHX_GEN_ALL_CORE_FT
PAST MEDICAL HISTORY:  Afib     History of thoracic aortic aneurysm repair     HLD (hyperlipidemia)     Hypertension     S/P AVR (aortic valve replacement)     S/P ICD (internal cardiac defibrillator) procedure       CAD (coronary artery disease)     Heart failure     PAF (paroxysmal atrial fibrillation)     Stage 3 chronic kidney disease     Thoracic aortic aneurysm (TAA)

## 2021-09-01 NOTE — PROCEDURE
[No] : not [NSR] : normal sinus rhythm [ICD] : Implantable cardioverter-defibrillator [DDD] : DDD [Threshold Testing Performed] : Threshold testing was performed [Lead Imp:  ___ohms] : lead impedance was [unfilled] ohms [Sensing Amplitude ___mv] : sensing amplitude was [unfilled] mv [___V @] : [unfilled] V [___ ms] : [unfilled] ms [None] : none [de-identified] : very long AV delay [de-identified] : [employer] [de-identified] : Ellipse [de-identified] : [id number] [de-identified] : 4/2014 [de-identified] :  [de-identified] : 5.1 months [de-identified] :  \par AV delay 250   DDD 50\par changed to DDD 70 AV delay 250 [de-identified] : AP 84%\par  98%\par   no Ventricular events\par in atach started this am - rate controlled\par shock impedance 37

## 2021-09-01 NOTE — ED PROVIDER NOTE - PHYSICAL EXAMINATION
CONSTITUTIONAL: Awake, alert. speaking full sentences and in no apparent distress. No acute tachypnea  HEENT: Head is atraumatic. Eyes clear bilaterally, normal EOMI. Airway patent.  CARDIAC: Normal rate, regular rhythm.  Heart sounds S1, S2.   RESPIRATORY: Breath sounds clear and equal bilaterally. no tachypnea, respiratory distress.   GASTROINTESTINAL: Abdomen soft, non-tender, no guarding, distension.  MUSCULOSKELETAL: Spine appears normal, no midline spinal tenderness, range of motion is not limited, no muscle or joint tenderness. no bony tenderness. 2+ pitting edema b/l.   NEUROLOGICAL: Alert, no focal deficits, no motor or sensory deficits.  SKIN: Skin normal color for race, warm, dry and intact. No evidence of rash.  PSYCHIATRIC: Normal mood and affect. no apparent risk to self or others.

## 2021-09-01 NOTE — ED PROVIDER NOTE - OBJECTIVE STATEMENT
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 [facility] AICD (2014; Dr. Jeff; most recent interrogation 10/2020 at which time his base rate was increased to 80 to try and decrease coupled PVCs and NSVT), thoracic aortic aneurysm, known non obstructive CAD by cardiac catheterization ([facility] 9/2019) Presents with multiple complaints including MONTEJO x 3 weeks and dizziness and imbalance when walking yesterday. Pt denies cough, fever, chest pain, headache, and bladder dysfunction. Pt has b/l LE edema, unchanged since last ED visit on 08/23/2021. Denies Hx blood clots.   Pt has Hx Covid infection on 08/07/2021. He is vaccinated for Covid. 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 [facility] AICD (2014; Dr. Jeff; most recent interrogation 10/2020 at which time his base rate was increased to 80 to try and decrease coupled PVCs and NSVT), thoracic aortic aneurysm, known non obstructive CAD by cardiac catheterization ([facility] 9/2019) Presents with multiple complaints including MONTEJO x 3 weeks including increasing lower leg edema. Pt denies cough, fever, chest pain, headache, and bladder dysfunction.  Denies Hx blood clots.   Pt has Hx Covid infection on 08/07/2021. He is vaccinated for Covid.

## 2021-09-01 NOTE — PHYSICAL EXAM
[General Appearance - Well Developed] : well developed [Normal Appearance] : normal appearance [Well Groomed] : well groomed [General Appearance - Well Nourished] : well nourished [No Deformities] : no deformities [General Appearance - In No Acute Distress] : no acute distress [Heart Rate And Rhythm] : heart rate and rhythm were normal [Heart Sounds] : normal S1 and S2 [Edema] : no peripheral edema present [Exaggerated Use Of Accessory Muscles For Inspiration] : no accessory muscle use [Auscultation Breath Sounds / Voice Sounds] : lungs were clear to auscultation bilaterally [Left Infraclavicular] : left infraclavicular area [Clean] : clean [Dry] : dry [Well-Healed] : well-healed [] : no ischemic changes [Normal Conjunctiva] : the conjunctiva exhibited no abnormalities [Normal Oral Mucosa] : normal oral mucosa [Normal Oropharynx] : normal oropharynx [Normal Jugular Venous V Waves Present] : normal jugular venous V waves present [Abnormal Walk] : normal gait [Gait - Sufficient For Exercise Testing] : the gait was sufficient for exercise testing [Skin Color & Pigmentation] : normal skin color and pigmentation [Oriented To Time, Place, And Person] : oriented to person, place, and time [Affect] : the affect was normal [Mood] : the mood was normal [No Anxiety] : not feeling anxious [5th Left ICS - MCL] : palpated at the 5th LICS in the midclavicular line [Normal Rate] : normal [Regularly Irregular] : regularly irregular [___ +] : [unfilled]U+ pitting edema to the right ankle [FreeTextEntry1] :  + SOB [Palpable Crepitus] : no palpable crepitus [Bleeding] : no active bleeding [Foul Odor] : no foul smell [Purulent Drainage] : no purulent drainage [Serosanguineous Drainage] : no serosanquineous drainage [Serous Drainage] : no serous drainage [Erythema] : not erythematous [Warm] : not warm [Tender] : not tender [Indurated] : not indurated [Fluctuant] : not fluctuant

## 2021-09-01 NOTE — H&P ADULT - PROBLEM SELECTOR PLAN 4
Pt takes valsartan 40 qd, metoprolol succinate 50 once every morning and twice at night, tosemide 10 every other day.   -c/w home medications Pt takes valsartan 40 qd, metoprolol succinate 50 once every morning and twice at night, torsemide 10 every other day.   -c/w home medications

## 2021-09-01 NOTE — REVIEW OF SYSTEMS
[Dyspnea on exertion] : dyspnea during exertion [Negative] : Heme/Lymph [Fever] : no fever [Chills] : no chills [Feeling Fatigued] : feeling fatigued [SOB] : no shortness of breath [Palpitations] : no palpitations [Syncope] : no syncope [Cough] : no cough

## 2021-09-01 NOTE — ED ADULT TRIAGE NOTE - CHIEF COMPLAINT QUOTE
Patient reports lower extremity swelling, dyspnea on exertion, intermittent chest pain x 3 weeks. EKG in progress. Hx CHF, HTN.

## 2021-09-01 NOTE — DISCUSSION/SUMMARY
[Pacemaker Function Normal] : normal pacemaker function [FreeTextEntry1] : 76 year old male with HTN, HLD, aortic aneurysm thoracic, AVR and mitral valve repair in 2014,  paroxysmal atrial fibrillation, PVCs and ICD s/p recent PVC ablation,, who had a recent appropriate ICD shock and presents for follow up.  Device interrogation reveals normal function.  All measured data is within normal limits. He has a known history of paroxysmal afib and this am when it rate controlled atrial tachycardia.  He has limited medical insight and has refused Eliquis.  He was on amiodaorne for VT / appropriate ICD therapy and also stopped this because he "ran out".  He is chronically RV paced with a decreased EF.  He is in heart failure today.  I have recommended he goes to the ER to get admitted for diuresis and then a BiV upgrade when he is euvolemic.   Will need to readdress the need to be on a NOAC.  Will restart amiodarone.  He is in agreement with this plan and will go through the ER.  D/w patients daughter and Dr. Luna as well as the EP / ER team.

## 2021-09-01 NOTE — ED ADULT NURSE NOTE - OBJECTIVE STATEMENT
pt received into spot 5 A&Ox3 ambulatory appears comfortable arrives via walk in triage for eval of of LE swelling SOB on exertion x 3 weeks with intermittent mid sternal CP pt 12 lead ekg done noted sinus to ccm resp even and unlabored sating at 96% on RA abd soft nondistended pt received into spot 5 A&Ox3 ambulatory appears comfortable arrives via walk in triage for eval of of LE swelling SOB on exertion x 3 weeks with intermittent mid sternal CP pt 12 lead ekg done noted sinus to ccm resp even and unlabored sating at 96% on RA abd soft nondistended noted LE swelling noted CABG scar midsternal with left chest wall aicd. 18G palced to lac labs drawn and sent pt in nad

## 2021-09-01 NOTE — H&P ADULT - HISTORY OF PRESENT ILLNESS
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 AICD (2014; Dr. Jeff; most recent interrogation today where it was functioning well, thoracic aortic aneurysm, known non obstructive CAD by cardiac catheterization ([facility] 9/2019) was sent to the ED by Dr Maharaj for heart failure exacerbation. Patient was seen to be volume overloaded at the office, with increased WOB and LE edema. Patient says the SOB started yesterday and the swelling as been worsening since July. Patient says he cannot lay down and sleep flat but he thinks tahts due to worsening gerd not heart failure. Patient says he has had recent gain in weight in the past 3 days from 182 pds to 187 pds. Patient has increased heart burn and worsening of his gerd. Patient has felt more unsteady on his feet lately. Patient has had a recent change in diet to one with no salts, juices and meats - says he thinks he is having more BMs since then. Pt says he is complaint with medications. Of note patient tested positive for covid according to him on August 7th at [facility]. He is trying to obtain records from them, says he isolated for 15 days.    In ED:  Vitals: BP: 111/85. O2 Sat 08% on RA. Temp 98.2. HR 79  Labs:  Interventions: Given Lasix Iv 80. Started on strict. Is & Os  Imaging:  CXR: Stable position left ICD. Stable cardiomegaly, status post median sternotomy, aortic valve replacement, mitral annuloplasty. Abandoned epicardial leads. Pulmonary arterial hypertension. Stable bony structures.  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 AICD (2014; Dr. Jeff; most recent interrogation today where it was functioning well, thoracic aortic aneurysm, known non obstructive CAD by cardiac catheterization ([facility] 9/2019) was sent to the ED by Dr Maharaj for heart failure exacerbation. Patient was seen to be volume overloaded at the office, with increased WOB and LE edema. Patient says the SOB started yesterday and the swelling as been worsening since July. Patient says he cannot lay down and sleep flat but he thinks tahts due to worsening gerd not heart failure. Patient says he has had recent gain in weight in the past 3 days from 182 pds to 187 pds. Patient has increased heart burn and worsening of his gerd. Patient has felt more unsteady on his feet lately. Patient has had a recent change in diet to one with no salts, juices and meats - says he thinks he is having more BMs since then. Pt says he is complaint with medications. Of note patient tested positive for covid according to him on August 7th at [facility]. He is trying to obtain records from them, says he isolated for 15 days.    In ED:  Vitals: BP: 111/85. O2 Sat 08% on RA. Temp 98.2. HR 79  Labs: BNP 3830 BUN 45 Cr 1.83  Interventions: Given Lasix Iv 80. Started on strict. Is & Os  Imaging:  CXR: Stable position left ICD. Stable cardiomegaly, status post median sternotomy, aortic valve replacement, mitral annuloplasty. Abandoned epicardial leads. Pulmonary arterial hypertension. Stable bony structures.  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 AICD (2014; Dr. Jeff; most recent interrogation today, thoracic aortic aneurysm, known non obstructive CAD by cardiac catheterization ([facility] 9/2019) was sent to the ED by Dr Maharaj for heart failure exacerbation after being seen in atrial arrythmia. Patient was seen to be volume overloaded at the office, with increased WOB and LE edema. Patient says the SOB started yesterday and the swelling as been worsening since July. Patient says he cannot lay down and sleep flat but he thinks thats due to worsening gerd not heart failure. Patient says he has had recent gain in weight in the past 3 days from 182 pds to 187 pds. Patient has increased heart burn and worsening of his gerd. Patient has felt more unsteady on his feet lately. Patient has had a recent change in diet to one with no salts, juices and meats - says he thinks he is having more BMs since then. Pt says he is complaint with medications. Of note patient tested positive for covid according to him on August 7th at [facility]. He is trying to obtain records from them, says he isolated for 15 days.    In ED:  Vitals: BP: 111/85. O2 Sat 08% on RA. Temp 98.2. HR 79  Labs: BNP 3830 BUN 45 Cr 1.83  Interventions: Given Lasix Iv 80. Started on strict. Is & Os  Imaging:  CXR: Stable position left ICD. Stable cardiomegaly, status post median sternotomy, aortic valve replacement, mitral annuloplasty. Abandoned epicardial leads. Pulmonary arterial hypertension. Stable bony structures.  EKG: paced, unchanged from 7/24 ED visit

## 2021-09-01 NOTE — H&P ADULT - ASSESSMENT
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 AICD (2014; Dr. Jeff; most recent interrogation 10/2020 at which time his base rate was increased to 80 to try and decrease coupled PVCs and NSVT), thoracic aortic aneurysm, known non obstructive CAD by cardiac catheterization ([facility] 9/2019) was referred to [facility] ED by Dr. Maharaj due to CHF exacerbation being admitted and worked up when medically ready will get his ICD upgraded by EP.

## 2021-09-01 NOTE — HISTORY OF PRESENT ILLNESS
[Palpitations] : no palpitations [SOB] : no dyspnea [Syncope] : no syncope [Dizziness] : no dizziness [Chest Pain] : no chest pain or discomfort [Shoulder Pain] : no shoulder pain [Pain at Site] : no pain at device site [Erythema at Site] : no erythema at device site [Swelling at Site] : no swelling at device site [FreeTextEntry1] : 76 year old male with HTN, HLD, aortic aneurysm thoracic, AVR and mitral valve repair in 2014,  paroxysmal atrial fibrillation, PVCs and ICD s/p recent PVC ablation, who had an appropriate ICD shock and presents for follow up.\par \par He states that he had a ICD placed after his AVR.  He normally followed up with Dr. Palencia; whose office moved and he transferred care here.   He is on Sotalol for history of PVCs and  NSVT; however he had short bursts of VT/torsades and it was felt the sotalol was proarrhythmic and stopped.  He was placed on Metoprolol.  He had 7101 PVCs on holter monitor with short bursts of NSVT.  \par \par He was admitted to [facility] 9/2019 with syncope correlated with VT and Vfib.  No therapy needed as he spontaneously converted.  One episode of afib, but overall burden extremely low.  Cath with mildly obstructive CAD.  He also underwent an EP study with PVC ablation from great cardiac vein.  HE was discharged on Mexiletine.  \par  \par 7/2021 he was admitted to an OSH with ICD shock.  He was discharged on amiodarone load and on his last visit was on the maintenance dose - but states he ran out and stopped this.  Since his last visit he had COVID and states he had a repeat test that was negative.  He states that he stopped Eliquis because he "didn’t like the way it made him feel".   No device related complaints.  + MONTEJO and LE edema.  No palpitations, syncope, near syncope.  \par Recent echo was an EF 25-30%\par  Echo 1/2021 EF 35-40%\par Echo 11/2019 EF 55-60%

## 2021-09-01 NOTE — CHART NOTE - NSCHARTNOTEFT_GEN_A_CORE
INFECTIOUS DISEASES BRIEF NOTE    Called to comment on COVID-19 isolation clearance.    Patient had positive COVID-19 test on 8/24 here at [facility].  Initial positive test was on 8/7 at OSH.  She had negative COVID-19 test on 8/30 at urgent care (confirmed by primary team) and negative again today 9/1 at [facility].  Patient is afebrile.  Has MONTEJO which is c/w CHF exacerbation (fluid overload on exam).  Based on hospital guideline, no need to put her on isolation.      Hospital guideline can be found at Intranet website --> Public Health emergency: COVID-19 (click here to visit our resource platform) --> Patient Screening "COVID 19 Testing and Retesting Guidelines"    If you have any questions, please feel free to contact me.  Case d/w primary team and hospital epidemiology.   *this is not a consult note*    Margoth Del Rio MD, MS  Infectious Disease attending  work cell [phone]

## 2021-09-01 NOTE — ED ADULT NURSE NOTE - NS ED NURSE LEVEL OF CONSCIOUSNESS MENTAL STATUS
Pt discharged home with parent/guardian. Pt acting age appropriately, respirations regular and unlabored, cap refill less than two seconds. Skin pink, dry and warm. Lungs clear bilaterally. No further complaints at this time. Parent/guardian verbalized understanding of discharge paperwork and has no further questions at this time. Education provided about continuation of care, follow up care and medication administration. Parent/guardian able to provide teach back about discharge instructions.
Pt medicated for pain. Ace wrap applied and ice pack provided.
Awake/Alert

## 2021-09-01 NOTE — H&P ADULT - NSICDXPASTMEDICALHX_GEN_ALL_CORE_FT
PAST MEDICAL HISTORY:  Afib     CAD (coronary artery disease)     Heart failure     History of thoracic aortic aneurysm repair     HLD (hyperlipidemia)     Hypertension     PAF (paroxysmal atrial fibrillation)     S/P AVR (aortic valve replacement)     S/P ICD (internal cardiac defibrillator) procedure     Stage 3 chronic kidney disease     Thoracic aortic aneurysm (TAA)

## 2021-09-01 NOTE — H&P ADULT - PROBLEM SELECTOR PLAN 1
Most recent Echo 30%, last known from 3/21.   CXR Stable cardiomegaly, status post median sternotomy, aortic valve replacement, mitral annuloplasty. Abandoned epicardial leads. Pulmonary arterial hypertension. BNP elevated 3830 (last admission 604).  Patient says he has had recent gain in weight in the past 3 days from 182 pds to 187 pds. Trop negative.   Pt overloaded on exam 3+ pitting edema in b/l Le. Few bibasilar crackles. Diuresed with 80 IV lasix.   -f/u echo  -strict ins and outs  -c/w core measures

## 2021-09-02 LAB
ANION GAP SERPL CALC-SCNC: 9 MMOL/L — SIGNIFICANT CHANGE UP (ref 5–17)
BUN SERPL-MCNC: 41 MG/DL — HIGH (ref 7–23)
CALCIUM SERPL-MCNC: 9 MG/DL — SIGNIFICANT CHANGE UP (ref 8.4–10.5)
CHLORIDE SERPL-SCNC: 104 MMOL/L — SIGNIFICANT CHANGE UP (ref 96–108)
CO2 SERPL-SCNC: 32 MMOL/L — HIGH (ref 22–31)
COVID-19 SPIKE DOMAIN AB INTERP: POSITIVE
COVID-19 SPIKE DOMAIN ANTIBODY RESULT: >250 U/ML — HIGH
CREAT SERPL-MCNC: 1.91 MG/DL — HIGH (ref 0.5–1.3)
GLUCOSE SERPL-MCNC: 85 MG/DL — SIGNIFICANT CHANGE UP (ref 70–99)
HCT VFR BLD CALC: 38.9 % — LOW (ref 39–50)
HGB BLD-MCNC: 12.8 G/DL — LOW (ref 13–17)
MAGNESIUM SERPL-MCNC: 2.1 MG/DL — SIGNIFICANT CHANGE UP (ref 1.6–2.6)
MCHC RBC-ENTMCNC: 32.9 GM/DL — SIGNIFICANT CHANGE UP (ref 32–36)
MCHC RBC-ENTMCNC: 33.2 PG — SIGNIFICANT CHANGE UP (ref 27–34)
MCV RBC AUTO: 100.8 FL — HIGH (ref 80–100)
NRBC # BLD: 0 /100 WBCS — SIGNIFICANT CHANGE UP (ref 0–0)
PLATELET # BLD AUTO: 146 K/UL — LOW (ref 150–400)
POTASSIUM SERPL-MCNC: 3.9 MMOL/L — SIGNIFICANT CHANGE UP (ref 3.5–5.3)
POTASSIUM SERPL-SCNC: 3.9 MMOL/L — SIGNIFICANT CHANGE UP (ref 3.5–5.3)
RBC # BLD: 3.86 M/UL — LOW (ref 4.2–5.8)
RBC # FLD: 14.8 % — HIGH (ref 10.3–14.5)
SARS-COV-2 IGG+IGM SERPL QL IA: >250 U/ML — HIGH
SARS-COV-2 IGG+IGM SERPL QL IA: POSITIVE
SODIUM SERPL-SCNC: 145 MMOL/L — SIGNIFICANT CHANGE UP (ref 135–145)
WBC # BLD: 4.05 K/UL — SIGNIFICANT CHANGE UP (ref 3.8–10.5)
WBC # FLD AUTO: 4.05 K/UL — SIGNIFICANT CHANGE UP (ref 3.8–10.5)

## 2021-09-02 PROCEDURE — 99222 1ST HOSP IP/OBS MODERATE 55: CPT

## 2021-09-02 PROCEDURE — 93306 TTE W/DOPPLER COMPLETE: CPT | Mod: 26

## 2021-09-02 RX ORDER — METOPROLOL TARTRATE 50 MG
25 TABLET ORAL DAILY
Refills: 0 | Status: DISCONTINUED | OUTPATIENT
Start: 2021-09-02 | End: 2021-09-02

## 2021-09-02 RX ORDER — METOPROLOL TARTRATE 50 MG
25 TABLET ORAL DAILY
Refills: 0 | Status: DISCONTINUED | OUTPATIENT
Start: 2021-09-02 | End: 2021-09-03

## 2021-09-02 RX ORDER — FUROSEMIDE 40 MG
40 TABLET ORAL ONCE
Refills: 0 | Status: DISCONTINUED | OUTPATIENT
Start: 2021-09-02 | End: 2021-09-02

## 2021-09-02 RX ORDER — FUROSEMIDE 40 MG
40 TABLET ORAL EVERY 12 HOURS
Refills: 0 | Status: DISCONTINUED | OUTPATIENT
Start: 2021-09-02 | End: 2021-09-03

## 2021-09-02 RX ADMIN — HEPARIN SODIUM 5000 UNIT(S): 5000 INJECTION INTRAVENOUS; SUBCUTANEOUS at 14:33

## 2021-09-02 RX ADMIN — Medication 40 MILLIGRAM(S): at 12:07

## 2021-09-02 RX ADMIN — Medication 40 MILLIGRAM(S): at 22:30

## 2021-09-02 RX ADMIN — Medication 25 MILLIGRAM(S): at 14:33

## 2021-09-02 RX ADMIN — PANTOPRAZOLE SODIUM 40 MILLIGRAM(S): 20 TABLET, DELAYED RELEASE ORAL at 06:46

## 2021-09-02 RX ADMIN — HEPARIN SODIUM 5000 UNIT(S): 5000 INJECTION INTRAVENOUS; SUBCUTANEOUS at 06:46

## 2021-09-02 RX ADMIN — HEPARIN SODIUM 5000 UNIT(S): 5000 INJECTION INTRAVENOUS; SUBCUTANEOUS at 22:30

## 2021-09-02 RX ADMIN — MAGNESIUM OXIDE 400 MG ORAL TABLET 400 MILLIGRAM(S): 241.3 TABLET ORAL at 12:07

## 2021-09-02 RX ADMIN — Medication 400 UNIT(S): at 12:08

## 2021-09-02 NOTE — PHYSICAL THERAPY INITIAL EVALUATION ADULT - ADDITIONAL COMMENTS
Pt lives with spouse in an apt,~1 flight walk up. Prior to admission, pt ambulated independently without AD.

## 2021-09-02 NOTE — PROGRESS NOTE ADULT - PROBLEM SELECTOR PLAN 2
Pt with stage III CKD. Last known Cr 1.8 in July 2021. Takes valsartan 40 qd.  Pts Cr increased to 1.9 today  -f/u Cr changes

## 2021-09-02 NOTE — PHYSICAL THERAPY INITIAL EVALUATION ADULT - GENERAL OBSERVATIONS, REHAB EVAL
Pt received sitting at EOB, +hep-lock, +EKG, NAD, family present. Pt left as found, NAD, call bell in reach, family present.

## 2021-09-02 NOTE — PHYSICAL THERAPY INITIAL EVALUATION ADULT - GAIT DEVIATIONS NOTED, PT EVAL
fairly steady gait, no lose of balance, decreased heel strike and hip flexion bilaterally/decreased azalia/increased time in double stance/decreased step length

## 2021-09-02 NOTE — CONSULT NOTE ADULT - SUBJECTIVE AND OBJECTIVE BOX
HPI:  76 year old male with HTN, HLD, aortic aneurysm thoracic, AVR and mitral valve repair in 2014, paroxysmal atrial fibrillation, PVCs and ICD s/p recent PVC ablation, who had an appropriate ICD shock who was seen in the office yesterday in acute on chronic heart failure and now admitted for diuresis and upgrade to BiV device.     He states that he had an ICD placed after his AVR. He normally followed up with Dr. Palencia; whose office moved and he transferred care here. He was on Sotalol for history of PVCs and NSVT; however he had short bursts of VT/torsades and it was felt the sotalol was proarrhythmic and stopped. He was placed on Metoprolol. He had 7101 PVCs on holter monitor with short bursts of NSVT.     He was admitted to [facility] 9/2019 with syncope correlated with VT and Vfib. No therapy needed as he spontaneously converted. One episode of afib, but overall burden extremely low. Cath with mildly obstructive CAD. He also underwent an EP study with PVC ablation from great cardiac vein. HE was discharged on Mexiletine which he later stopped.      7/2021 he was admitted to an OSH with ICD shock. He was discharged on amiodarone load and on his last visit was on the maintenance dose - but states he ran out and stopped this because he "ran out of it so thought he didn’t need it anymore". Since his last visit he had COVID and states he had a repeat test that was negative. He states that he stopped Eliquis because he "didn’t like the way it made him feel". He was in 1 day of atrial tachycardia (seen on device interrogation).  No device related complaints. + MONTEJO and LE edema. No palpitations, syncope, near syncope. He had labored breathing and LE edema and was recommended to go to the ER for admission for diuresis and possible Biv upgrade.      Recent echo was an EF 25-30%   Echo 1/2021 EF 35-40%  Echo 11/2019 EF 55-60%              Procedure  Device Check The patient is not pacemaker dependent.   Underlying Rhythm: normal sinus rhythm. very long AV delay   Device Type: Implantable cardioverter-defibrillator   Pacemaker/ICD : [employer].   Model: Ellipse. Serial Number: [id number]. Date of Implant: 4/2014.   Mode: DDD. Rate: .   Battery Status: 5.1 months.   Measurement: Threshold testing was performed.   Atrial: lead impedance was 240 ohms. sensing amplitude was 2.3 mv. Pacing Threshold: in atrial tachycardia V @.   Rt Ventricle:. lead impedance was 460 ohms. sensing amplitude was 6.7 mv. Pacing Threshold: 1 V @ 0.5 ms.   Program Changes: none and   AV delay 250 DDD 50  changed to DDD 70 AV delay 250.   Comments:. AP 84%   98%   no Ventricular events  in atach started this am - rate controlled  shock impedance 37.        PAST MEDICAL & SURGICAL HISTORY:  Hypertension  S/P AVR (aortic valve replacement)  HLD (hyperlipidemia)  S/P ICD (internal cardiac defibrillator) procedure  Afib  History of thoracic aortic aneurysm repair  Stage 3 chronic kidney disease  PAF (paroxysmal atrial fibrillation)  Heart failure  Thoracic aortic aneurysm (TAA)  CAD (coronary artery disease)  S/P right knee arthroscopy  History of open heart surgery            Social History: no drugs       Inpatient Medications:   cholecalciferol 400 Unit(s) Oral daily  furosemide   Injectable 40 milliGRAM(s) IV Push every 12 hours  heparin   Injectable 5000 Unit(s) SubCutaneous every 8 hours  magnesium oxide 400 milliGRAM(s) Oral daily  metoprolol succinate ER 25 milliGRAM(s) Oral daily  pantoprazole    Tablet 40 milliGRAM(s) Oral before breakfast      Allergies: No Known Allergies  statins (Muscle Pain; Joint Pain)      ROS:   CONSTITUTIONAL: No fever, weight loss + fatigue  EYES: Pt denies  RESPIRATORY: No cough, wheezing, chills or hemoptysis; No Shortness of Breath  CARDIOVASCULAR: see HPI  GASTROINTESTINAL: Pt denies  NEUROLOGICAL: Pt denies  SKIN: Pt denies   PSYCHIATRIC: Pt denies  HEME/LYMPH: Pt denies    PHYSICAL:  T(C): 36.6 (09-02-21 @ 08:35), Max: 36.9 (09-01-21 @ 13:00)  HR: 81 (09-02-21 @ 08:35) (79 - 112)  BP: 117/87 (09-02-21 @ 08:35) (90/66 - 130/90)  RR: 18 (09-02-21 @ 08:35) (11 - 20)  SpO2: 100% (09-02-21 @ 08:35) (95% - 100%)     Appearance: No acute distress, well developed  Eyes: normal appearing conjunctiva, pupils and eyelids  Cardiovascular: irregularly regular + edema  Respiratory: Lungs clear to auscultation   Gastrointestinal:  Soft,    Neurologic:  No deficit noted  Psych: A&Ox3   Musculoskeletal: no deformities noted   Skin: no rash noted, normal color and pigmentation.        LABS:                        12.8   4.05  )-----------( 146      ( 02 Sep 2021 07:32 )             38.9     145  |  104  |  41<H>  ----------------------------<  85  3.9   |  32<H>  |  1.91<H>    Ca    9.0     Mg     2.1   TPro  6.4  /  Alb  3.6  /  TBili  0.7  /  DBili  x   /  AST  See Note  /  ALT  See Note  /  AlkPhos  140<H>  PT: 15.4 sec;   INR: 1.30     PTT:28.7 sec       EKG: RV paced         Assessment Plan:  76 year old male with HTN, HLD, aortic aneurysm thoracic, AVR and mitral valve repair in 2014, paroxysmal atrial fibrillation, PVCs and ICD s/p recent PVC ablation, who had an appropriate ICD shock who was seen in the office yesterday in acute on chronic heart failure and now admitted for diuresis and upgrade to BiV device.  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  He should be restarted on amiodarone and Eliquis post procedure.  Please keep him NPO after midnight and will examine him in the am to see if he is ready to proceed.

## 2021-09-02 NOTE — PHYSICAL THERAPY INITIAL EVALUATION ADULT - PERTINENT HX OF CURRENT PROBLEM, REHAB EVAL
Pt is a 77 yo male was referred to [facility] ED by Dr. Maharaj due to CHF exacerbation being admitted and worked up when medically ready will get his ICD upgraded by EP.

## 2021-09-03 ENCOUNTER — TRANSCRIPTION ENCOUNTER (OUTPATIENT)
Age: 76
End: 2021-09-03

## 2021-09-03 PROBLEM — I71.2 THORACIC AORTIC ANEURYSM, WITHOUT RUPTURE: Chronic | Status: ACTIVE | Noted: 2021-09-01

## 2021-09-03 PROBLEM — I50.9 HEART FAILURE, UNSPECIFIED: Chronic | Status: ACTIVE | Noted: 2021-09-01

## 2021-09-03 PROBLEM — N18.30 CHRONIC KIDNEY DISEASE, STAGE 3 UNSPECIFIED: Chronic | Status: ACTIVE | Noted: 2021-09-01

## 2021-09-03 PROBLEM — I25.10 ATHEROSCLEROTIC HEART DISEASE OF NATIVE CORONARY ARTERY WITHOUT ANGINA PECTORIS: Chronic | Status: ACTIVE | Noted: 2021-09-01

## 2021-09-03 PROBLEM — I48.0 PAROXYSMAL ATRIAL FIBRILLATION: Chronic | Status: ACTIVE | Noted: 2021-09-01

## 2021-09-03 LAB
ANION GAP SERPL CALC-SCNC: 9 MMOL/L — SIGNIFICANT CHANGE UP (ref 5–17)
BUN SERPL-MCNC: 42 MG/DL — HIGH (ref 7–23)
CALCIUM SERPL-MCNC: 8.8 MG/DL — SIGNIFICANT CHANGE UP (ref 8.4–10.5)
CHLORIDE SERPL-SCNC: 105 MMOL/L — SIGNIFICANT CHANGE UP (ref 96–108)
CO2 SERPL-SCNC: 31 MMOL/L — SIGNIFICANT CHANGE UP (ref 22–31)
CREAT SERPL-MCNC: 1.77 MG/DL — HIGH (ref 0.5–1.3)
GLUCOSE SERPL-MCNC: 101 MG/DL — HIGH (ref 70–99)
HCT VFR BLD CALC: 40.6 % — SIGNIFICANT CHANGE UP (ref 39–50)
HGB BLD-MCNC: 12.9 G/DL — LOW (ref 13–17)
MAGNESIUM SERPL-MCNC: 2 MG/DL — SIGNIFICANT CHANGE UP (ref 1.6–2.6)
MCHC RBC-ENTMCNC: 31.8 GM/DL — LOW (ref 32–36)
MCHC RBC-ENTMCNC: 32.3 PG — SIGNIFICANT CHANGE UP (ref 27–34)
MCV RBC AUTO: 101.8 FL — HIGH (ref 80–100)
NRBC # BLD: 0 /100 WBCS — SIGNIFICANT CHANGE UP (ref 0–0)
PLATELET # BLD AUTO: 143 K/UL — LOW (ref 150–400)
POTASSIUM SERPL-MCNC: 3.5 MMOL/L — SIGNIFICANT CHANGE UP (ref 3.5–5.3)
POTASSIUM SERPL-SCNC: 3.5 MMOL/L — SIGNIFICANT CHANGE UP (ref 3.5–5.3)
RBC # BLD: 3.99 M/UL — LOW (ref 4.2–5.8)
RBC # FLD: 14.9 % — HIGH (ref 10.3–14.5)
SODIUM SERPL-SCNC: 145 MMOL/L — SIGNIFICANT CHANGE UP (ref 135–145)
WBC # BLD: 4.56 K/UL — SIGNIFICANT CHANGE UP (ref 3.8–10.5)
WBC # FLD AUTO: 4.56 K/UL — SIGNIFICANT CHANGE UP (ref 3.8–10.5)

## 2021-09-03 PROCEDURE — 33225 L VENTRIC PACING LEAD ADD-ON: CPT

## 2021-09-03 PROCEDURE — 75820 VEIN X-RAY ARM/LEG: CPT | Mod: 26,LT

## 2021-09-03 PROCEDURE — 99232 SBSQ HOSP IP/OBS MODERATE 35: CPT

## 2021-09-03 PROCEDURE — 33263 RMVL & RPLCMT DFB GEN 2 LEAD: CPT

## 2021-09-03 PROCEDURE — 71045 X-RAY EXAM CHEST 1 VIEW: CPT | Mod: 26

## 2021-09-03 RX ORDER — VALSARTAN 80 MG/1
40 TABLET ORAL DAILY
Refills: 0 | Status: DISCONTINUED | OUTPATIENT
Start: 2021-09-03 | End: 2021-09-04

## 2021-09-03 RX ORDER — CEFAZOLIN SODIUM 1 G
1000 VIAL (EA) INJECTION ONCE
Refills: 0 | Status: COMPLETED | OUTPATIENT
Start: 2021-09-03 | End: 2021-09-03

## 2021-09-03 RX ORDER — FUROSEMIDE 40 MG
40 TABLET ORAL DAILY
Refills: 0 | Status: DISCONTINUED | OUTPATIENT
Start: 2021-09-04 | End: 2021-09-04

## 2021-09-03 RX ORDER — ACETAMINOPHEN 500 MG
650 TABLET ORAL ONCE
Refills: 0 | Status: COMPLETED | OUTPATIENT
Start: 2021-09-03 | End: 2021-09-03

## 2021-09-03 RX ORDER — POTASSIUM CHLORIDE 20 MEQ
40 PACKET (EA) ORAL ONCE
Refills: 0 | Status: DISCONTINUED | OUTPATIENT
Start: 2021-09-03 | End: 2021-09-03

## 2021-09-03 RX ORDER — FUROSEMIDE 40 MG
40 TABLET ORAL DAILY
Refills: 0 | Status: DISCONTINUED | OUTPATIENT
Start: 2021-09-03 | End: 2021-09-03

## 2021-09-03 RX ORDER — METOPROLOL TARTRATE 50 MG
100 TABLET ORAL DAILY
Refills: 0 | Status: DISCONTINUED | OUTPATIENT
Start: 2021-09-04 | End: 2021-09-04

## 2021-09-03 RX ORDER — POTASSIUM CHLORIDE 20 MEQ
20 PACKET (EA) ORAL ONCE
Refills: 0 | Status: COMPLETED | OUTPATIENT
Start: 2021-09-03 | End: 2021-09-03

## 2021-09-03 RX ADMIN — Medication 400 UNIT(S): at 12:38

## 2021-09-03 RX ADMIN — Medication 650 MILLIGRAM(S): at 23:56

## 2021-09-03 RX ADMIN — Medication 25 MILLIGRAM(S): at 05:45

## 2021-09-03 RX ADMIN — MAGNESIUM OXIDE 400 MG ORAL TABLET 400 MILLIGRAM(S): 241.3 TABLET ORAL at 12:39

## 2021-09-03 RX ADMIN — Medication 20 MILLIEQUIVALENT(S): at 07:51

## 2021-09-03 RX ADMIN — Medication 100 MILLIGRAM(S): at 08:53

## 2021-09-03 RX ADMIN — HEPARIN SODIUM 5000 UNIT(S): 5000 INJECTION INTRAVENOUS; SUBCUTANEOUS at 05:46

## 2021-09-03 RX ADMIN — HEPARIN SODIUM 5000 UNIT(S): 5000 INJECTION INTRAVENOUS; SUBCUTANEOUS at 13:37

## 2021-09-03 RX ADMIN — HEPARIN SODIUM 5000 UNIT(S): 5000 INJECTION INTRAVENOUS; SUBCUTANEOUS at 22:27

## 2021-09-03 RX ADMIN — VALSARTAN 40 MILLIGRAM(S): 80 TABLET ORAL at 17:21

## 2021-09-03 RX ADMIN — Medication 40 MILLIGRAM(S): at 12:39

## 2021-09-03 RX ADMIN — PANTOPRAZOLE SODIUM 40 MILLIGRAM(S): 20 TABLET, DELAYED RELEASE ORAL at 05:45

## 2021-09-03 NOTE — PROGRESS NOTE ADULT - PROBLEM SELECTOR PLAN 6
Pt reports symptoms of gerd that are worse lately, cannot sleep flat  -c/w pantoprazole 40 qd
Pt reports symptoms of gerd that are worse lately, cannot sleep flat  -c/w pantoprazole 40 qd

## 2021-09-03 NOTE — DISCHARGE NOTE PROVIDER - CARE PROVIDER_API CALL
Luciano Chandler (MD)  Cardiovascular Disease; Internal Medicine  Cardiology [facility], [address] [place] [address]  Phone: [phone]  Fax: [phone]  Established Patient  Scheduled Appointment: 09/15/2021 03:00 PM

## 2021-09-03 NOTE — DISCHARGE NOTE PROVIDER - NSDCCPCAREPLAN_GEN_ALL_CORE_FT
PRINCIPAL DISCHARGE DIAGNOSIS  Diagnosis: Acute on chronic heart failure, unspecified heart failure type  Assessment and Plan of Treatment: You have a known history of congestive heart failure prior to your admission. To manage this you are on the following medications: Torsemide 10 mg every other day. Congestive heart failure can cause make it harder for your heart to pump blood to the rest of your body. As a result, blood can get backed up into your lungs or into your legs. In order to avoid this, make sure to take all of your medications for heart failure as prescribed. This will keep your heart functioning well. If you notice increased difficulty with breathing, cough with bloody mucous, increased swelling in the legs, or chest pain be sure to contact your PCP or call 911 as you may need more treatment. Additionally be sure to follow up with your PCP and Cardiologist on a regular basis to make sure no additional medications or medication changes need to be mades  -Continue with your home medications for heart failure.         SECONDARY DISCHARGE DIAGNOSES  Diagnosis: Paroxysmal atrial fibrillation  Assessment and Plan of Treatment: During this hospital admission you were found to be in Atrial Fibrillation. This is an irregular, often rapid heart rate that commonly causes poor blood flow. The heart's upper chambers (atria) beat out of coordination with the lower chambers (ventricles). This condition may have no symptoms, but when symptoms do appear they include palpitations, shortness of breath, and fatigue. Treatments include drugs, electrical shock (cardioversion), and minimally invasive surgery (ablation). Continue to take your blood thinner and rate controlling medications as directed. Please continue to take your medications as prescribed and follow up with your primary care doctor and cardiologist in 10-14 days.  While here you had a successful upgrade of a dual chamber ICD to Biventricular ICD       Diagnosis: Stage 3 chronic kidney disease  Assessment and Plan of Treatment: Chronic kidney disease is the gradual and permanent loss of kidney function. Normally, the kidneys remove fluids, chemicals, and waste from your blood and turn these waste chemicals into urine. As your kidney disease worse, you lose your ability to remove waste from your body. Call 911 or seek care immediately if your heart is beating faster than normal for you, are confused or very drowsy, have a seizure, have sudden chest pain or shortness of breath. Please follow regularly with your PCP to be properly managed.

## 2021-09-03 NOTE — PROGRESS NOTE ADULT - PROBLEM SELECTOR PLAN 1
Echo showed EF of 10-15%, decreased from last EF of 30% in March.   Edema decreasing, responding well to Lasix 40 BID   -strict ins and outs  -c/w core measures
Most recent Echo 30%, last known from 3/21. Echo to be done today.   Pt overloaded on exam 2+ pitting edema in b/l Le. Received lasix 40 this AM  -f/u echo  -strict ins and outs  -c/w core measures

## 2021-09-03 NOTE — PROGRESS NOTE ADULT - PROBLEM SELECTOR PLAN 3
Pt known to Dr. Jeff  -s/p [employer] AICD implanted in 2014; most recent interrogation today found to have atrial arrythmia.   Has been on Eliquis in past but patient reports he could not tolerate that or aspirin.   -consult EP team in AM. Pt to get device upgraded once chf exacerbation controlled
Pt known to Dr. Jeff. Went of Device upgrade today. Has been on Eliquis in past but patient reports he could not tolerate that or aspirin.   -f/u EP recs

## 2021-09-03 NOTE — DISCHARGE NOTE PROVIDER - NSDCMRMEDTOKEN_GEN_ALL_CORE_FT
magnesium oxide 400 mg (241.3 mg elemental magnesium) oral tablet: 1 tab(s) orally 2 times a day  pantoprazole 40 mg oral delayed release tablet: 1 tab(s) orally once a day  Toprol-XL 50 mg oral tablet, extended release: 1 tab(s) orally once a day (in AM)  Toprol-XL 50 mg oral tablet, extended release: 2 tab(s) orally once a day (at bedtime)  valsartan 40 mg oral tablet: 1 tab(s) orally once a day  Vitamin D3: 1 tab(s) orally once a day   magnesium oxide 400 mg (241.3 mg elemental magnesium) oral tablet: 1 tab(s) orally 2 times a day  pantoprazole 40 mg oral delayed release tablet: 1 tab(s) orally once a day  Toprol-XL 50 mg oral tablet, extended release: 1 tab(s) orally once a day (in AM)  Toprol-XL 50 mg oral tablet, extended release: 2 tab(s) orally once a day (at bedtime)  torsemide 10 mg oral tablet: 1 tab(s) orally every other day  valsartan 40 mg oral tablet: 1 tab(s) orally once a day  Vitamin D3: 1 tab(s) orally once a day

## 2021-09-03 NOTE — PROGRESS NOTE ADULT - ASSESSMENT
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 AICD (2014; Dr. Jeff; most recent interrogation 10/2020 at which time his base rate was increased to 80 to try and decrease coupled PVCs and NSVT), thoracic aortic aneurysm, known non obstructive CAD by cardiac catheterization ([facility] 9/2019) was referred to [facility] ED by Dr. Maharaj due to CHF exacerbation being admitted and getting ICD upgraded today. 
Never smoker
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 AICD (2014; Dr. Jeff; most recent interrogation 10/2020 at which time his base rate was increased to 80 to try and decrease coupled PVCs and NSVT), thoracic aortic aneurysm, known non obstructive CAD by cardiac catheterization ([facility] 9/2019) was referred to [facility] ED by Dr. Maharaj due to CHF exacerbation being admitted and worked up when medically ready will get his ICD upgraded by EP.

## 2021-09-03 NOTE — DISCHARGE NOTE PROVIDER - HOSPITAL COURSE
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 AICD (2014; Dr. Jeff; most recent interrogation today, thoracic aortic aneurysm, known non obstructive CAD by cardiac catheterization ([facility] 9/2019) was sent to the ED by Dr Maharaj for heart failure exacerbation after being seen in atrial arrythmia. Patient was seen to be volume overloaded at the office, with increased WOB and LE edema. Patient was diuresed in hospital and had successful upgrade of a dual chamber ICD to Biventricular ICD    Problem List/Main Diagnoses (system-based):   #Acute on chronic HFrEF (heart failure with reduced ejection fraction).   Echo showed EF of 10-15%, decreased from last EF of 30% in March.   Edema decreasing, responding well to Lasix 40 BID   -strict ins and outs  -c/w core measures.  #Stage 3 chronic kidney disease.   Pt with stage III CKD. Last known Cr 1.8 in July 2021. Takes valsartan 40 qd.  Pts Cr 1.7 today. Dr Oh notified of pts hospitilzation  -f/u Cr changes.  #Paroxysmal atrial fibrillation.   Pt known to Dr. Jeff. Went of Device upgrade today. Has been on Eliquis in past but patient reports he could not tolerate that or aspirin.   Successful upgrade of a dual chamber ICD to Biventricular ICD   #Hypertension.   Pt takes valsartan 40 qd, metoprolol succinate 50 once every morning and twice at night, torsemide 10 every other day.   Halved metoprolol doses due to lower bp and need for lasix on diuresis. Restarted valsartan 40 qd   Getting lasix 40 IV BID.  #Hyperlipidemia.   Patient reports he is intolerant to statins (myalgias).  #GERD (gastroesophageal reflux disease).   Pt reports symptoms of gerd that are worse lately, cannot sleep flat  -c/w pantoprazole 40 qd.      Inpatient treatment course: as above  New medications: none  Labs to be followed outpatient: none  Exam to be followed outpatient: none

## 2021-09-03 NOTE — CHART NOTE - NSCHARTNOTEFT_GEN_A_CORE
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  ECG post procedure  CXR

## 2021-09-03 NOTE — PROGRESS NOTE ADULT - PROBLEM SELECTOR PLAN 2
Pt with stage III CKD. Last known Cr 1.8 in July 2021. Takes valsartan 40 qd.  Pts Cr 1.7 today. Dr Oh notified of pts hospitilzation  -f/u Cr changes

## 2021-09-03 NOTE — PROGRESS NOTE ADULT - PROBLEM SELECTOR PLAN 4
Pt takes valsartan 40 qd, metoprolol succinate 50 once every morning and twice at night, torsemide 10 every other day.   Halved metoprolol doses due to lower bp and need for lasix on diuresis.
Pt takes valsartan 40 qd, metoprolol succinate 50 once every morning and twice at night, torsemide 10 every other day.   Halved metoprolol doses due to lower bp and need for lasix on diuresis. Restarted valsartan 40 qd   Getting lasix 40 IV BID

## 2021-09-03 NOTE — DISCHARGE NOTE PROVIDER - PROVIDER TOKENS
PROVIDER:[TOKEN:[[id number]],SCHEDULEDAPPT:[09/15/2021],SCHEDULEDAPPTTIME:[03:00 PM],ESTABLISHEDPATIENT:[T]]

## 2021-09-03 NOTE — PROGRESS NOTE ADULT - PROBLEM SELECTOR PROBLEM 1
Acute on chronic HFrEF (heart failure with reduced ejection fraction)
Acute on chronic HFrEF (heart failure with reduced ejection fraction)

## 2021-09-03 NOTE — DISCHARGE NOTE PROVIDER - NSDCFUSCHEDAPPT_GEN_ALL_CORE_FT
ANNAMARIA LUKE ; 09/27/2021 ; [facility] Gastro [address] [facility]  ANNAMARIA LUKE ; 10/06/2021 ; NPP Cardio Vasc [address] ANNAMARIA LUKE ; 09/15/2021 ; NPP HeartVasc [address]   ANNAMARIA LUKE ; 09/27/2021 ; [facility] [place] [address] [facility]  ANNAMARIA LUKE ; 10/06/2021 ; NPP Cardio [facility]asc [address]

## 2021-09-03 NOTE — PROGRESS NOTE ADULT - PROBLEM SELECTOR PLAN 5
Patient reports he is intolerant to statins (myalgias)
Patient reports he is intolerant to statins (myalgias)

## 2021-09-03 NOTE — PROGRESS NOTE ADULT - SUBJECTIVE AND OBJECTIVE BOX
CC: Patient is a 76y old  Male who presents with a chief complaint of CHF exacebation (02 Sep 2021 11:48)      INTERVAL EVENTS: MAHAD    SUBJECTIVE / INTERVAL HPI: Patient seen and examined at bedside. Patient said he feels fine and has to go to the bathroom this morning.     ROS: negative unless otherwise stated above.    VITAL SIGNS:  Vital Signs Last 24 Hrs  T(C): 36.6 (02 Sep 2021 08:35), Max: 36.9 (01 Sep 2021 13:00)  T(F): 97.8 (02 Sep 2021 08:35), Max: 98.4 (01 Sep 2021 13:00)  HR: 79 (02 Sep 2021 11:57) (79 - 112)  BP: 104/72 (02 Sep 2021 11:57) (90/66 - 130/90)  BP(mean): 83 (02 Sep 2021 11:57) (74 - 98)  RR: 17 (02 Sep 2021 11:57) (11 - 20)  SpO2: 100% (02 Sep 2021 11:57) (95% - 100%)      09-01-21 @ 07:01  -  09-02-21 @ 07:00  --------------------------------------------------------  IN: 0 mL / OUT: 1750 mL / NET: -1750 mL    09-02-21 @ 07:01  -  09-02-21 @ 12:46  --------------------------------------------------------  IN: 0 mL / OUT: 500 mL / NET: -500 mL        PHYSICAL EXAM:    General: NAD  HEENT: MMM  Neck: supple  Cardiovascular: +S1/S2; RRR  Respiratory: CTA B/L; no W/R/R  Gastrointestinal: soft, NT/ND  Extremities: WWP; 2+ pitting edema b/l  Neurological: AAOx3; no focal deficits    MEDICATIONS:  MEDICATIONS  (STANDING):  cholecalciferol 400 Unit(s) Oral daily  furosemide   Injectable 40 milliGRAM(s) IV Push every 12 hours  heparin   Injectable 5000 Unit(s) SubCutaneous every 8 hours  magnesium oxide 400 milliGRAM(s) Oral daily  metoprolol succinate ER 25 milliGRAM(s) Oral daily  pantoprazole    Tablet 40 milliGRAM(s) Oral before breakfast    MEDICATIONS  (PRN):      ALLERGIES:  Allergies    No Known Allergies    Intolerances    statins (Muscle Pain; Joint Pain)      LABS:                        12.8   4.05  )-----------( 146      ( 02 Sep 2021 07:32 )             38.9     09-02    145  |  104  |  41<H>  ----------------------------<  85  3.9   |  32<H>  |  1.91<H>    Ca    9.0      02 Sep 2021 07:32  Mg     2.1     09-02    TPro  6.4  /  Alb  3.6  /  TBili  0.7  /  DBili  x   /  AST  See Note  /  ALT  See Note  /  AlkPhos  140<H>  09-01    PT/INR - ( 01 Sep 2021 13:35 )   PT: 15.4 sec;   INR: 1.30          PTT - ( 01 Sep 2021 13:35 )  PTT:28.7 sec    CAPILLARY BLOOD GLUCOSE          RADIOLOGY & ADDITIONAL TESTS: Reviewed.
CC: Patient is a 76y old  Male who presents with a chief complaint of CHF exacebation (02 Sep 2021 12:45)      INTERVAL EVENTS: MAHAD    SUBJECTIVE / INTERVAL HPI: Patient seen and examined at bedside.     ROS: negative unless otherwise stated above.    VITAL SIGNS:  Vital Signs Last 24 Hrs  T(C): 36.3 (03 Sep 2021 07:45), Max: 37.1 (02 Sep 2021 13:35)  T(F): 97.4 (03 Sep 2021 07:45), Max: 98.8 (02 Sep 2021 13:35)  HR: 80 (03 Sep 2021 07:45) (79 - 83)  BP: 111/77 (03 Sep 2021 07:45) (104/72 - 123/89)  BP(mean): 90 (03 Sep 2021 05:09) (83 - 102)  RR: 18 (03 Sep 2021 07:45) (17 - 18)  SpO2: 98% (03 Sep 2021 07:45) (96% - 100%)      09-02-21 @ 07:01  -  09-03-21 @ 07:00  --------------------------------------------------------  IN: 0 mL / OUT: 2475 mL / NET: -2475 mL        PHYSICAL EXAM:    General: NAD  HEENT: MMM  Neck: supple  Cardiovascular: +S1/S2; irregularly irregular   Respiratory: CTA B/L  Gastrointestinal: soft, NT/ND  Extremities: WWP; 1+ edema   Neurological: AAOx3; no focal deficits    MEDICATIONS:  MEDICATIONS  (STANDING):  cholecalciferol 400 Unit(s) Oral daily  furosemide   Injectable 40 milliGRAM(s) IV Push every 12 hours  heparin   Injectable 5000 Unit(s) SubCutaneous every 8 hours  magnesium oxide 400 milliGRAM(s) Oral daily  metoprolol succinate ER 25 milliGRAM(s) Oral daily  pantoprazole    Tablet 40 milliGRAM(s) Oral before breakfast    MEDICATIONS  (PRN):      ALLERGIES:  Allergies    No Known Allergies    Intolerances    statins (Muscle Pain; Joint Pain)      LABS:                        12.9   4.56  )-----------( 143      ( 03 Sep 2021 06:30 )             40.6     09-03    145  |  105  |  42<H>  ----------------------------<  101<H>  3.5   |  31  |  1.77<H>    Ca    8.8      03 Sep 2021 06:30  Mg     2.0     09-03    TPro  6.4  /  Alb  3.6  /  TBili  0.7  /  DBili  x   /  AST  See Note  /  ALT  See Note  /  AlkPhos  140<H>  09-01    PT/INR - ( 01 Sep 2021 13:35 )   PT: 15.4 sec;   INR: 1.30          PTT - ( 01 Sep 2021 13:35 )  PTT:28.7 sec    CAPILLARY BLOOD GLUCOSE          RADIOLOGY & ADDITIONAL TESTS: Reviewed.

## 2021-09-03 NOTE — PROGRESS NOTE ADULT - ATTENDING COMMENTS
Treating for CHF/diureses  For PPM upgrade  No significant arrhythmias in hospital
s/p upgrade of PPM.  Probable DC in AM

## 2021-09-04 ENCOUNTER — TRANSCRIPTION ENCOUNTER (OUTPATIENT)
Age: 76
End: 2021-09-04

## 2021-09-04 VITALS — TEMPERATURE: 98 F

## 2021-09-04 LAB
ANION GAP SERPL CALC-SCNC: 7 MMOL/L — SIGNIFICANT CHANGE UP (ref 5–17)
BUN SERPL-MCNC: 32 MG/DL — HIGH (ref 7–23)
CALCIUM SERPL-MCNC: 8.6 MG/DL — SIGNIFICANT CHANGE UP (ref 8.4–10.5)
CHLORIDE SERPL-SCNC: 110 MMOL/L — HIGH (ref 96–108)
CO2 SERPL-SCNC: 31 MMOL/L — SIGNIFICANT CHANGE UP (ref 22–31)
CREAT SERPL-MCNC: 1.55 MG/DL — HIGH (ref 0.5–1.3)
GLUCOSE SERPL-MCNC: 109 MG/DL — HIGH (ref 70–99)
HCT VFR BLD CALC: 41 % — SIGNIFICANT CHANGE UP (ref 39–50)
HGB BLD-MCNC: 13.1 G/DL — SIGNIFICANT CHANGE UP (ref 13–17)
MAGNESIUM SERPL-MCNC: 1.9 MG/DL — SIGNIFICANT CHANGE UP (ref 1.6–2.6)
MCHC RBC-ENTMCNC: 32 GM/DL — SIGNIFICANT CHANGE UP (ref 32–36)
MCHC RBC-ENTMCNC: 33 PG — SIGNIFICANT CHANGE UP (ref 27–34)
MCV RBC AUTO: 103.3 FL — HIGH (ref 80–100)
NRBC # BLD: 0 /100 WBCS — SIGNIFICANT CHANGE UP (ref 0–0)
PLATELET # BLD AUTO: 137 K/UL — LOW (ref 150–400)
POTASSIUM SERPL-MCNC: 4.3 MMOL/L — SIGNIFICANT CHANGE UP (ref 3.5–5.3)
POTASSIUM SERPL-SCNC: 4.3 MMOL/L — SIGNIFICANT CHANGE UP (ref 3.5–5.3)
RBC # BLD: 3.97 M/UL — LOW (ref 4.2–5.8)
RBC # FLD: 15.1 % — HIGH (ref 10.3–14.5)
SODIUM SERPL-SCNC: 148 MMOL/L — HIGH (ref 135–145)
WBC # BLD: 6.37 K/UL — SIGNIFICANT CHANGE UP (ref 3.8–10.5)
WBC # FLD AUTO: 6.37 K/UL — SIGNIFICANT CHANGE UP (ref 3.8–10.5)

## 2021-09-04 PROCEDURE — C1892: CPT

## 2021-09-04 PROCEDURE — 93306 TTE W/DOPPLER COMPLETE: CPT

## 2021-09-04 PROCEDURE — 86769 SARS-COV-2 COVID-19 ANTIBODY: CPT

## 2021-09-04 PROCEDURE — 84443 ASSAY THYROID STIM HORMONE: CPT

## 2021-09-04 PROCEDURE — 85610 PROTHROMBIN TIME: CPT

## 2021-09-04 PROCEDURE — C1730: CPT

## 2021-09-04 PROCEDURE — 71046 X-RAY EXAM CHEST 2 VIEWS: CPT

## 2021-09-04 PROCEDURE — 87635 SARS-COV-2 COVID-19 AMP PRB: CPT

## 2021-09-04 PROCEDURE — 96374 THER/PROPH/DIAG INJ IV PUSH: CPT

## 2021-09-04 PROCEDURE — 83735 ASSAY OF MAGNESIUM: CPT

## 2021-09-04 PROCEDURE — 80048 BASIC METABOLIC PNL TOTAL CA: CPT

## 2021-09-04 PROCEDURE — C1889: CPT

## 2021-09-04 PROCEDURE — 85730 THROMBOPLASTIN TIME PARTIAL: CPT

## 2021-09-04 PROCEDURE — 84484 ASSAY OF TROPONIN QUANT: CPT

## 2021-09-04 PROCEDURE — 80061 LIPID PANEL: CPT

## 2021-09-04 PROCEDURE — 82550 ASSAY OF CK (CPK): CPT

## 2021-09-04 PROCEDURE — 85025 COMPLETE CBC W/AUTO DIFF WBC: CPT

## 2021-09-04 PROCEDURE — 99285 EMERGENCY DEPT VISIT HI MDM: CPT

## 2021-09-04 PROCEDURE — 99232 SBSQ HOSP IP/OBS MODERATE 35: CPT

## 2021-09-04 PROCEDURE — 80053 COMPREHEN METABOLIC PANEL: CPT

## 2021-09-04 PROCEDURE — 85027 COMPLETE CBC AUTOMATED: CPT

## 2021-09-04 PROCEDURE — 36415 COLL VENOUS BLD VENIPUNCTURE: CPT

## 2021-09-04 PROCEDURE — 82553 CREATINE MB FRACTION: CPT

## 2021-09-04 PROCEDURE — 93005 ELECTROCARDIOGRAM TRACING: CPT

## 2021-09-04 PROCEDURE — C1882: CPT

## 2021-09-04 PROCEDURE — C1769: CPT

## 2021-09-04 PROCEDURE — 83036 HEMOGLOBIN GLYCOSYLATED A1C: CPT

## 2021-09-04 PROCEDURE — 83880 ASSAY OF NATRIURETIC PEPTIDE: CPT

## 2021-09-04 PROCEDURE — C1900: CPT

## 2021-09-04 PROCEDURE — 71046 X-RAY EXAM CHEST 2 VIEWS: CPT | Mod: 26

## 2021-09-04 PROCEDURE — 71045 X-RAY EXAM CHEST 1 VIEW: CPT

## 2021-09-04 PROCEDURE — 97161 PT EVAL LOW COMPLEX 20 MIN: CPT

## 2021-09-04 RX ADMIN — VALSARTAN 40 MILLIGRAM(S): 80 TABLET ORAL at 05:38

## 2021-09-04 RX ADMIN — PANTOPRAZOLE SODIUM 40 MILLIGRAM(S): 20 TABLET, DELAYED RELEASE ORAL at 05:37

## 2021-09-04 RX ADMIN — Medication 650 MILLIGRAM(S): at 00:28

## 2021-09-04 RX ADMIN — HEPARIN SODIUM 5000 UNIT(S): 5000 INJECTION INTRAVENOUS; SUBCUTANEOUS at 05:37

## 2021-09-04 NOTE — DISCHARGE NOTE NURSING/CASE MANAGEMENT/SOCIAL WORK - NSDCVIVACCINE_GEN_ALL_CORE_FT
influenza, injectable, quadrivalent, preservative free; 18-Sep-2019 10:10; Yeny Guevara (RN); [employer]; [id number] (Exp. Date: 30-Jun-2020); IntraMuscular; Deltoid Left.; 0.5 milliLiter(s); VIS (VIS Published: 15-Aug-2019, VIS Presented: 18-Sep-2019);

## 2021-09-04 NOTE — DISCHARGE NOTE NURSING/CASE MANAGEMENT/SOCIAL WORK - NSDCPEPT PROEDHF_GEN_ALL_CORE
Call primary care provider for follow up after discharge/Activities as tolerated/Low salt diet/Monitor weight daily/Report signs and symptoms to primary care provider Admission

## 2021-09-04 NOTE — DISCHARGE NOTE NURSING/CASE MANAGEMENT/SOCIAL WORK - PATIENT PORTAL LINK FT
You can access the FollowMyHealth Patient Portal offered by [facility] by registering at the following website: [email or web address]. By joining [employer]’s FollowMyHealth portal, you will also be able to view your health information using other applications (apps) compatible with our system.

## 2021-09-04 NOTE — CHART NOTE - NSCHARTNOTEFT_GEN_A_CORE
Patient seen and examined. He is s/p BiV upgrade. Doing well this morning. Device interrogation this morning shows normally functioning device. Measurements are listed below. CXR reviewed. No ptx, leads in good position. Chest wall incision is clean, no hematoma or bleeding. Patient is stable for discharge from EP perspective. He is to follow up with Dr. Jeff on 10/6 at 1045am. Patient seen and examined. He is s/p BiV upgrade. Doing well this morning. Device interrogation this morning shows normally functioning device. Measurements are listed below. CXR reviewed. No ptx, leads in good position. Chest wall incision is clean, no hematoma or bleeding. Patient is stable for discharge from EP perspective. He is to follow up with Dr. Jeff on 10/6 at 1045am.              Threshold     Sensitivity   Impedance  A       0.75V @0.5        3.3 mV       240 ohms  RV     0.75V  @0.5        10.8 mV       430 ohms  LV       1.25V @0.5                          540 ohms  HV                                                35 ohms

## 2021-09-07 ENCOUNTER — NON-APPOINTMENT (OUTPATIENT)
Age: 76
End: 2021-09-07

## 2021-09-08 ENCOUNTER — APPOINTMENT (OUTPATIENT)
Dept: HEART AND VASCULAR | Facility: CLINIC | Age: 76
End: 2021-09-08
Payer: MEDICARE

## 2021-09-08 ENCOUNTER — NON-APPOINTMENT (OUTPATIENT)
Age: 76
End: 2021-09-08

## 2021-09-08 VITALS
HEIGHT: 71 IN | WEIGHT: 180.49 LBS | HEART RATE: 86 BPM | BODY MASS INDEX: 25.27 KG/M2 | DIASTOLIC BLOOD PRESSURE: 92 MMHG | OXYGEN SATURATION: 99 % | TEMPERATURE: 97.2 F | SYSTOLIC BLOOD PRESSURE: 117 MMHG

## 2021-09-08 LAB
MAGNESIUM SERPL-MCNC: 2 MG/DL
PHOSPHATE SERPL-MCNC: 3.3 MG/DL

## 2021-09-08 PROCEDURE — 93000 ELECTROCARDIOGRAM COMPLETE: CPT

## 2021-09-08 PROCEDURE — 99214 OFFICE O/P EST MOD 30 MIN: CPT

## 2021-09-08 RX ORDER — METRONIDAZOLE 500 MG/1
500 TABLET ORAL EVERY 6 HOURS
Qty: 56 | Refills: 0 | Status: DISCONTINUED | COMMUNITY
Start: 2021-07-23 | End: 2021-09-08

## 2021-09-08 RX ORDER — BISMUTH SUBSALICYLATE 262 MG/1
262 TABLET, CHEWABLE ORAL 4 TIMES DAILY
Qty: 56 | Refills: 0 | Status: DISCONTINUED | COMMUNITY
Start: 2021-07-23 | End: 2021-09-08

## 2021-09-08 RX ORDER — TETRACYCLINE HYDROCHLORIDE 500 MG/1
500 CAPSULE ORAL EVERY 6 HOURS
Qty: 56 | Refills: 0 | Status: DISCONTINUED | COMMUNITY
Start: 2021-07-23 | End: 2021-09-08

## 2021-09-08 NOTE — PHYSICAL EXAM
[General Appearance - Well Developed] : well developed [Normal Appearance] : normal appearance [Well Groomed] : well groomed [General Appearance - Well Nourished] : well nourished [No Deformities] : no deformities [General Appearance - In No Acute Distress] : no acute distress [Normal Conjunctiva] : the conjunctiva exhibited no abnormalities [Eyelids - No Xanthelasma] : the eyelids demonstrated no xanthelasmas [Normal Oral Mucosa] : normal oral mucosa [No Oral Cyanosis] : no oral cyanosis [No Oral Pallor] : no oral pallor [Respiration, Rhythm And Depth] : normal respiratory rhythm and effort [Exaggerated Use Of Accessory Muscles For Inspiration] : no accessory muscle use [Auscultation Breath Sounds / Voice Sounds] : lungs were clear to auscultation bilaterally [Heart Rate And Rhythm] : heart rate and rhythm were normal [Heart Sounds] : normal S1 and S2 [Arterial Pulses Normal] : the arterial pulses were normal [Edema] : no peripheral edema present [Abdomen Soft] : soft [Abdomen Tenderness] : non-tender [Abdomen Mass (___ Cm)] : no abdominal mass palpated [Abnormal Walk] : normal gait [Gait - Sufficient For Exercise Testing] : the gait was sufficient for exercise testing [Nail Clubbing] : no clubbing of the fingernails [Cyanosis, Localized] : no localized cyanosis [Petechial Hemorrhages (___cm)] : no petechial hemorrhages [Skin Color & Pigmentation] : normal skin color and pigmentation [] : no rash [No Venous Stasis] : no venous stasis [Skin Lesions] : no skin lesions [No Skin Ulcers] : no skin ulcer [No Xanthoma] : no  xanthoma was observed [Oriented To Time, Place, And Person] : oriented to person, place, and time [Affect] : the affect was normal [Mood] : the mood was normal [No Anxiety] : not feeling anxious [FreeTextEntry1] : +3/6 systolic murmur

## 2021-09-08 NOTE — REVIEW OF SYSTEMS
[Abdominal Pain] : abdominal pain [Negative] : Heme/Lymph [Dyspnea on exertion] : dyspnea during exertion

## 2021-09-08 NOTE — HISTORY OF PRESENT ILLNESS
[FreeTextEntry1] : PCP- Dr Sandhya Bloom\par EP- Dr Jeff\par CTS- Dr Garcia\par Plastics- Dr Ko\par Hand- Dr Xiong\par GI- Dr Cortez, colonoscopy Sept 2017\par Dentist: Dr. Jorge Oh  \par Ophtho- Dr Jesus Lantigua [phone]

## 2021-09-08 NOTE — ASSESSMENT
[FreeTextEntry1] : Valve disease: clean coronaries on cardiac cath in 2014. status post AVR, MV repair Dr. Caio Louise 9/9/2014) SBE prophylaxis. okay to stop ASA 1 week before dental extraction and implant. EKG with 1 PVC. Echo done 7/2018. EF 40%  Valves OK.  Echo update Nov 2019 , EF 55-60 ??.  Echo done 1/21, EF 35-40 % closer to his baseline.  Ao V probably NL prosthetic valve. EF 30% in [facility] March 2021 and Sept 2021.\par \par Abdominal Pain- worse if he hits a pothole. Will screen for a AAA. If (-) will refer to GI (Dr Cortez).  SONO (-) FOR AAA, F/U WITH Dr CORTEZ. Will discuss with Dr Cortez.  Found to have severe gastritis and H pylori.  Pain improving\par \par Chest/abd pain- will treat with Protonix x 30 days.  New EKG changes 11/14/19 not seen before, deep Tw inversions, QTc 474.  I suspect post pacing artifact, remotely  PUD, Trop sent, echo ordered.  Trop NL, EF and wall motion normal on echo. CCTA done Nov 1st, 2019 is clean. No coronary disease.  More upper abd discomfort, will give a trial of protonix. Still with pain, will screen for AAA, refer to GI.  Did not see GI, AAA screen is (-). He reports improvement with HCTZ . Now on Torsemide 10 mg daily\par \par SOB- BNP sent, echo from 1/2021 with a drop in EF.  Await BNP level, 550.  EF 23 % on Nuc, 30 % on echo at [facility]. EF 50 % on echo in 2014, 45-50% in 2016, 40% in 2018, all were done at [facility].  Now its 23 % on Nuc and 30 % on echo at [facility] from March 2021.  Has CHF, BNP around 3-5 K [place] BW is 175 lbs.\par \par PreOP- complex pt but cleared for dental work.  Has h/o VT so no epinephrine.  Has AICD so no cautery unless AICD is shut off to avoid inappropriate shocks.  Needs SBE prophylaxis for dental work only. Pt is cleared for cataract surgery\par \par PAF- changed from ASA to Eliquis by Dr ARIZMENDI.  No signs of trauma or bleeding on exam. Pt reassured that the Eliquis is not causing the pain.  Off Eliquis by Dr ARIZMENDI due to not feeling well.  Will rechallenge with 2.5 BID, discussed with Dr ARIZMENDI.  Amio restarted at 200 mg daily(pt stopped the load when the pills finished up)  \par \par VT- has non-sustained, i spoke to Dr Palencia who does not want him to run the [facility]. There is  a VT and syncope/sudden death risk, also has a thoracic aneurysm.  I discussed this with him and his wife, walking the Marathon is OK but no jogging.  He walked it and did well. Now seeing Dr Jeff.  Had syncope and adm to [facility], trans to [facility].  VT RFA, Sotolol changed to Mexitil Sept 2019.  CAN RETURN TO WORK NEXT MONDAY.  Had VF 7/6/21 and background VT, AICD DC.  Pt was started on Amio.  Dr ARIZMENDI called.\par \par Aortic aneurysm: Enlarged. 42 (ascending) 47 (descending) in 2014\par Followup in 2015 reveals a max Ao of 46 mm\par CT angiogram 3/6/2017 aneurysm unchanged\par annual CT, due 3/2018.  Aorta 46mm April 2018, referred back to Dr Garcia,  48mm on CT 2019\par \par HLD: on pravastatin 40mg, had muscle aches on 80mg,  8/2017, pt reported he was only taking it once in a while, now taking it daily will repeat lipid panel. Diet and exercise discussed in detail.   Feb 2018,  July 2020. sTARTING cRESTOR 10 qod, pt off it\par \par HTN: Have DC Lasix and changed to HCTZ weekly.  BP very good, Reduce Micardis  40 for persistent elevated K.  Increase HCTZ 25 to M & F. BP OK.  Micardis now at 20mg, DCed due to elevated Cr and K.  Tolerating Valsartan 40 mg. \par \par CKD: 1.33 8/8/2017, repeat BMP Sept 2017, Cr 1.05, previously normal, on ARB  1.31 and K 5.9.  .  July 2021. Cr 1.13, K 4.5, Mg 2.1

## 2021-09-09 ENCOUNTER — APPOINTMENT (OUTPATIENT)
Dept: ULTRASOUND IMAGING | Facility: CLINIC | Age: 76
End: 2021-09-09
Payer: MEDICARE

## 2021-09-09 ENCOUNTER — RESULT REVIEW (OUTPATIENT)
Age: 76
End: 2021-09-09

## 2021-09-09 ENCOUNTER — LABORATORY RESULT (OUTPATIENT)
Age: 76
End: 2021-09-09

## 2021-09-09 ENCOUNTER — OUTPATIENT (OUTPATIENT)
Dept: OUTPATIENT SERVICES | Facility: HOSPITAL | Age: 76
LOS: 1 days | End: 2021-09-09

## 2021-09-09 ENCOUNTER — APPOINTMENT (OUTPATIENT)
Dept: NEPHROLOGY | Facility: CLINIC | Age: 76
End: 2021-09-09
Payer: MEDICARE

## 2021-09-09 VITALS — DIASTOLIC BLOOD PRESSURE: 91 MMHG | HEART RATE: 72 BPM | SYSTOLIC BLOOD PRESSURE: 113 MMHG

## 2021-09-09 DIAGNOSIS — Z98.89 OTHER SPECIFIED POSTPROCEDURAL STATES: Chronic | ICD-10-CM

## 2021-09-09 PROCEDURE — 76770 US EXAM ABDO BACK WALL COMP: CPT | Mod: 26

## 2021-09-09 PROCEDURE — 99214 OFFICE O/P EST MOD 30 MIN: CPT

## 2021-09-09 RX ORDER — SODIUM ZIRCONIUM CYCLOSILICATE 10 G/10G
10 POWDER, FOR SUSPENSION ORAL
Qty: 30 | Refills: 5 | Status: DISCONTINUED | COMMUNITY
Start: 2021-08-10 | End: 2021-09-09

## 2021-09-09 RX ORDER — PATIROMER 8.4 G/1
8.4 POWDER, FOR SUSPENSION ORAL
Qty: 3 | Refills: 10 | Status: DISCONTINUED | COMMUNITY
Start: 2021-08-04 | End: 2021-09-09

## 2021-09-09 NOTE — ASSESSMENT
[FreeTextEntry1] : # CKD stage 3 likelliest due to type 2 cardiorenal syndrome and aging.\par * Consider SGLT2i. Will defer treatment to heart failure team, but he would likely benefit given CKD/albuminuria/HFrEF.\par * Therapies for kidney disease: blood pressure control; proteinuria reduction with ARB/ACEi; other evidence-based therapies including exercise, a plant-based lower oxalate diet, and 400 mcg folic acid daily\par * Cardiovascular disease prevention: counseling on healthy diet, physical activity, weight loss, alcohol limitation, blood pressure control; cardiology evaluation/followup advised\par * The patient has been counseled that chronic kidney disease is a significant condition and regular office followup with me (at least every 2 months for now) is important for monitoring and their health, and that it is their responsibility to make a follow up appointment.\par * The patient has been counseled never to stop taking their medications without discussing it with me or another doctor.\par * The patient has been counseled on avoiding NSAIDs.\par * The patient has been counseled on risk of acute renal failure and instructed to immediately call and speak with me or go immediately to ER with any severe symptoms, nausea, vomiting, diarrhea, chest pain, or shortness of breath.\par * A counseling information sheet has been given (today or previously). All their questions were answered.\par \par # Hyperkalemia.\par * Low K diet. Recheck K. \par \par # HTN controlled.\par * Cont valsartan, metoprolol, torsemide.\par * The patient's blood pressure was checked with the [employer] [id number] using the SPRINT trial protocol after sitting quietly in an empty room with arm supported, back supported, and feet on the floor for 5 minutes. The average of 3 readings were taken.\par * A counseling information sheet has been given (currently or previously, in-person or electronically). All their questions were answered.\par * The patient has been counseled to check their BP at home with an automatic arm cuff, write down the readings, and reach me directly on the phone immediately if they are persistently > 180 systolic or if SBP is less than 100 or if lightheadedness develops. They were counseled to bring in all blood pressure readings and medications next visit.\par * The patient has been counseled that regular office followup (at least every 2 months for now)  is important for monitoring and for their health, and that it is their responsibility to make follow up appointments.\par * The patient also has been counseled that they must never stop or change any medications without discussing this with me (or another physician). \par \par # Monoclonal gammopathy.\par * Advised to see hematology. Myeloma, amyloid, and MGRS are unlikely. \par

## 2021-09-09 NOTE — PHYSICAL EXAM
[General Appearance - Alert] : alert [General Appearance - In No Acute Distress] : in no acute distress [Neck Appearance] : the appearance of the neck was normal [Neck Cervical Mass (___cm)] : no neck mass was observed [Jugular Venous Distention Increased] : there was no jugular-venous distention [Thyroid Diffuse Enlargement] : the thyroid was not enlarged [Thyroid Nodule] : there were no palpable thyroid nodules [Auscultation Breath Sounds / Voice Sounds] : lungs were clear to auscultation bilaterally [Heart Rate And Rhythm] : heart rate was normal and rhythm regular [Heart Sounds] : normal S1 and S2 [Heart Sounds Gallop] : no gallops [Murmurs] : no murmurs [Heart Sounds Pericardial Friction Rub] : no pericardial rub [Abdomen Soft] : soft [Bowel Sounds] : normal bowel sounds [Abdomen Tenderness] : non-tender [] : no hepato-splenomegaly [Abdomen Mass (___ Cm)] : no abdominal mass palpated [Cervical Lymph Nodes Enlarged Posterior Bilaterally] : posterior cervical [Cervical Lymph Nodes Enlarged Anterior Bilaterally] : anterior cervical [Supraclavicular Lymph Nodes Enlarged Bilaterally] : supraclavicular [FreeTextEntry1] : pitting ankles

## 2021-09-09 NOTE — HISTORY OF PRESENT ILLNESS
[FreeTextEntry1] : Kindly referred by Dr. Sandhya Bloom elevated creatinine. Here with daughter.\par \par * Events reviewed. Admitted to [facility] for CHF exacerbation and ICD upgrade. EF < 23 - 30.* CKD stable, creatinine 1.55.  Labs from 9/7 reviewed. Creatinine 1.45. K 4. 298 mg albuminuria.  * Hyperkalemia controlled. Not on lokelma or veltassa.  * HTN controlled. /80s at home. No lightheadedness. Compliant with medications. 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 \par \par Previous history (31Aug21): * Dx with Covid 3 weeks ago. Now no cough, or other symptoms. Did not require hospitalization. He has SOB with walking walking 1 block. * Lokelma started for hyperkalemia (K of 6). He is now following low potassium diet. . * CKD stable, creatinine 1.86. * He had stopped torsemide. BP 100s. \par \par Previous history (03Aug21): Labs reviewed. Baseline eGFR 43 - 56 since 2019. On 14Jul21, his creatinine increased to 1.82 (eGFR 35 - 41). The patient denies exposure to chronic NSAIDs, chronic PPIs, creatine, or herbal supplements. The patient denies a history of kidney stones or pyelonephritis. 4 - 5 times. No recent renal ultrasound. They are unaware of proteinuria or hematuria.\par \par EF 23% in 4/21. On amiodarone, metoprolol, lasix 20 qod, HCTZ twice weekly valsartan 40. SOB with one block walking. Stable LE edema. ProBNP incresaed from 515 to 1360.\par \par * HTN controlled.  No lightheadedness. Compliant with medications. He believes he is taking amlodipine.

## 2021-09-11 LAB
APPEARANCE: CLEAR
BILIRUBIN URINE: NEGATIVE
BLOOD URINE: NEGATIVE
COLOR: YELLOW
CREAT SPEC-SCNC: 96 MG/DL
GLUCOSE QUALITATIVE U: NEGATIVE
KETONES URINE: NEGATIVE
LEUKOCYTE ESTERASE URINE: NEGATIVE
MICROALBUMIN 24H UR DL<=1MG/L-MCNC: 315.4 MG/DL
MICROALBUMIN/CREAT 24H UR-RTO: 3280 MG/G
NITRITE URINE: NEGATIVE
PH URINE: 7
PROTEIN URINE: ABNORMAL
SPECIFIC GRAVITY URINE: 1.01
UROBILINOGEN URINE: NORMAL

## 2021-09-13 ENCOUNTER — TRANSCRIPTION ENCOUNTER (OUTPATIENT)
Age: 76
End: 2021-09-13

## 2021-09-13 DIAGNOSIS — Z86.16 PERSONAL HISTORY OF COVID-19: ICD-10-CM

## 2021-09-13 DIAGNOSIS — Z95.2 PRESENCE OF PROSTHETIC HEART VALVE: ICD-10-CM

## 2021-09-13 DIAGNOSIS — E78.5 HYPERLIPIDEMIA, UNSPECIFIED: ICD-10-CM

## 2021-09-13 DIAGNOSIS — I27.20 PULMONARY HYPERTENSION, UNSPECIFIED: ICD-10-CM

## 2021-09-13 DIAGNOSIS — I13.0 HYPERTENSIVE HEART AND CHRONIC KIDNEY DISEASE WITH HEART FAILURE AND STAGE 1 THROUGH STAGE 4 CHRONIC KIDNEY DISEASE, OR UNSPECIFIED CHRONIC KIDNEY DISEASE: ICD-10-CM

## 2021-09-13 DIAGNOSIS — I50.23 ACUTE ON CHRONIC SYSTOLIC (CONGESTIVE) HEART FAILURE: ICD-10-CM

## 2021-09-13 DIAGNOSIS — I42.0 DILATED CARDIOMYOPATHY: ICD-10-CM

## 2021-09-13 DIAGNOSIS — Z95.810 PRESENCE OF AUTOMATIC (IMPLANTABLE) CARDIAC DEFIBRILLATOR: ICD-10-CM

## 2021-09-13 DIAGNOSIS — N18.30 CHRONIC KIDNEY DISEASE, STAGE 3 UNSPECIFIED: ICD-10-CM

## 2021-09-13 DIAGNOSIS — I11.0 HYPERTENSIVE HEART DISEASE WITH HEART FAILURE: ICD-10-CM

## 2021-09-13 DIAGNOSIS — K21.9 GASTRO-ESOPHAGEAL REFLUX DISEASE WITHOUT ESOPHAGITIS: ICD-10-CM

## 2021-09-13 DIAGNOSIS — I48.0 PAROXYSMAL ATRIAL FIBRILLATION: ICD-10-CM

## 2021-09-13 DIAGNOSIS — I25.10 ATHEROSCLEROTIC HEART DISEASE OF NATIVE CORONARY ARTERY WITHOUT ANGINA PECTORIS: ICD-10-CM

## 2021-09-14 ENCOUNTER — APPOINTMENT (OUTPATIENT)
Dept: INTERNAL MEDICINE | Facility: CLINIC | Age: 76
End: 2021-09-14
Payer: MEDICARE

## 2021-09-14 VITALS
BODY MASS INDEX: 24.36 KG/M2 | SYSTOLIC BLOOD PRESSURE: 106 MMHG | TEMPERATURE: 97.1 F | HEIGHT: 71 IN | WEIGHT: 174 LBS | OXYGEN SATURATION: 94 % | HEART RATE: 80 BPM | DIASTOLIC BLOOD PRESSURE: 84 MMHG

## 2021-09-14 DIAGNOSIS — Z23 ENCOUNTER FOR IMMUNIZATION: ICD-10-CM

## 2021-09-14 DIAGNOSIS — Z86.19 PERSONAL HISTORY OF OTHER INFECTIOUS AND PARASITIC DISEASES: ICD-10-CM

## 2021-09-14 DIAGNOSIS — R06.00 DYSPNEA, UNSPECIFIED: ICD-10-CM

## 2021-09-14 DIAGNOSIS — U07.1 COVID-19: ICD-10-CM

## 2021-09-14 PROCEDURE — 90662 IIV NO PRSV INCREASED AG IM: CPT

## 2021-09-14 PROCEDURE — G0008: CPT

## 2021-09-14 PROCEDURE — 99214 OFFICE O/P EST MOD 30 MIN: CPT | Mod: 25

## 2021-09-14 PROCEDURE — 36415 COLL VENOUS BLD VENIPUNCTURE: CPT

## 2021-09-14 NOTE — REVIEW OF SYSTEMS
[Chest Pain] : no chest pain [Claudication] : no  leg claudication [Lower Ext Edema] : lower extremity edema [Orthopena] : no orthopnea [Paroxysmal Nocturnal Dyspnea] : paroxysmal nocturnal dyspnea [Abdominal Pain] : no abdominal pain [Vomiting] : no vomiting [Heartburn] : heartburn [Negative] : Heme/Lymph

## 2021-09-14 NOTE — ASSESSMENT
[FreeTextEntry1] : Dyspnea appears multi-factorial.\par He has chronic CHF/low EF. Seeing Dr Chandler tomorrow. I sent BNP today. He is NOT currently symptomatic.\par His gastritis is incompletely treated. I reached out to Dr Cortez regarding PPI dosing vs addition of famotidine and am awaiting a response. \par ?Effects of long-COVID. May need pulm consult.\par Flu shot given.

## 2021-09-14 NOTE — PHYSICAL EXAM
[Normal Sclera/Conjunctiva] : normal sclera/conjunctiva [Normal Outer Ear/Nose] : the outer ears and nose were normal in appearance [Normal Oropharynx] : the oropharynx was normal [Normal] : no respiratory distress, lungs were clear to auscultation bilaterally and no accessory muscle use [No Edema] : there was no peripheral edema [Soft] : abdomen soft [No HSM] : no HSM [No Rash] : no rash [Normal Gait] : normal gait [Normal Affect] : the affect was normal [Alert and Oriented x3] : oriented to person, place, and time [de-identified] : bradycardic

## 2021-09-14 NOTE — HEALTH RISK ASSESSMENT
[0] : 2) Feeling down, depressed, or hopeless: Not at all (0) [PHQ-2 Negative - No further assessment needed] : PHQ-2 Negative - No further assessment needed [[id number]] : 0 [Change in mental status noted] : No change in mental status noted [Language] : denies difficulty with language [Behavior] : denies difficulty with behavior [Learning/Retaining New Information] : denies difficulty learning/retaining new information [Handling Complex Tasks] : denies difficulty handling complex tasks [Reasoning] : denies difficulty with reasoning [Spatial Ability and Orientation] : denies difficulty with spatial ability and orientation [None] : None [With Family] : lives with family [Fully functional (bathing, dressing, toileting, transferring, walking, feeding)] : Fully functional (bathing, dressing, toileting, transferring, walking, feeding) [Fully functional (using the telephone, shopping, preparing meals, housekeeping, doing laundry, using] : Fully functional and needs no help or supervision to perform IADLs (using the telephone, shopping, preparing meals, housekeeping, doing laundry, using transportation, managing medications and managing finances)

## 2021-09-14 NOTE — HISTORY OF PRESENT ILLNESS
[de-identified] : 77 y/o man with multiple issues including chronic CHF (EF 30%), AFIB (on ELiquis), recent COVID infection, thoracic aneurysm, chronic gastritis with H. Pylori, CKD, presents for f/u and for night time dyspnea. Wakes up "gasping". Some mild MONTEJO but worst symptoms at night. ALso with very dry mouth at night. \par Wants flu shot.\par This patient is Enrolled in the [employer] through [employer]-went to ER last week with MONTEJO and at night and they "couldn't find anything wrong." Upon d/c, his PPI was decreased to daily from BID. He did "okay" for a few days but now has the symptoms again. At rest (and most of the day) he is fine. \par He is waiting for his repeat breath test from Dr Cortez.\par \par Occasional edema but none today.\par \par \par

## 2021-09-15 ENCOUNTER — APPOINTMENT (OUTPATIENT)
Dept: HEART AND VASCULAR | Facility: CLINIC | Age: 76
End: 2021-09-15

## 2021-09-15 LAB — NT-PROBNP SERPL-MCNC: 4970 PG/ML

## 2021-09-16 ENCOUNTER — NON-APPOINTMENT (OUTPATIENT)
Age: 76
End: 2021-09-16

## 2021-09-20 ENCOUNTER — TRANSCRIPTION ENCOUNTER (OUTPATIENT)
Age: 76
End: 2021-09-20

## 2021-09-22 ENCOUNTER — APPOINTMENT (OUTPATIENT)
Dept: HEART AND VASCULAR | Facility: CLINIC | Age: 76
End: 2021-09-22
Payer: MEDICARE

## 2021-09-22 VITALS
SYSTOLIC BLOOD PRESSURE: 126 MMHG | BODY MASS INDEX: 24.36 KG/M2 | HEART RATE: 106 BPM | WEIGHT: 174 LBS | DIASTOLIC BLOOD PRESSURE: 73 MMHG | HEIGHT: 71 IN

## 2021-09-22 PROCEDURE — 93284 PRGRMG EVAL IMPLANTABLE DFB: CPT

## 2021-09-27 ENCOUNTER — APPOINTMENT (OUTPATIENT)
Age: 76
End: 2021-09-27
Payer: MEDICARE

## 2021-09-27 VITALS
BODY MASS INDEX: 24.64 KG/M2 | TEMPERATURE: 97 F | DIASTOLIC BLOOD PRESSURE: 79 MMHG | RESPIRATION RATE: 15 BRPM | HEIGHT: 71 IN | OXYGEN SATURATION: 99 % | HEART RATE: 86 BPM | SYSTOLIC BLOOD PRESSURE: 122 MMHG | WEIGHT: 176 LBS

## 2021-09-27 PROCEDURE — 99214 OFFICE O/P EST MOD 30 MIN: CPT

## 2021-09-28 ENCOUNTER — EMERGENCY (EMERGENCY)
Facility: HOSPITAL | Age: 76
LOS: 1 days | Discharge: HOME | End: 2021-09-28
Attending: EMERGENCY MEDICINE | Admitting: EMERGENCY MEDICINE
Payer: MEDICARE

## 2021-09-28 VITALS
WEIGHT: 175.93 LBS | SYSTOLIC BLOOD PRESSURE: 120 MMHG | RESPIRATION RATE: 20 BRPM | HEART RATE: 88 BPM | TEMPERATURE: 98 F | OXYGEN SATURATION: 99 % | HEIGHT: 71 IN | DIASTOLIC BLOOD PRESSURE: 88 MMHG

## 2021-09-28 VITALS
DIASTOLIC BLOOD PRESSURE: 78 MMHG | HEART RATE: 86 BPM | OXYGEN SATURATION: 98 % | SYSTOLIC BLOOD PRESSURE: 133 MMHG | TEMPERATURE: 98 F | RESPIRATION RATE: 16 BRPM

## 2021-09-28 DIAGNOSIS — E78.5 HYPERLIPIDEMIA, UNSPECIFIED: ICD-10-CM

## 2021-09-28 DIAGNOSIS — Z88.8 ALLERGY STATUS TO OTHER DRUGS, MEDICAMENTS AND BIOLOGICAL SUBSTANCES STATUS: ICD-10-CM

## 2021-09-28 DIAGNOSIS — Z98.89 OTHER SPECIFIED POSTPROCEDURAL STATES: Chronic | ICD-10-CM

## 2021-09-28 DIAGNOSIS — I48.0 PAROXYSMAL ATRIAL FIBRILLATION: ICD-10-CM

## 2021-09-28 DIAGNOSIS — R07.9 CHEST PAIN, UNSPECIFIED: ICD-10-CM

## 2021-09-28 DIAGNOSIS — I25.10 ATHEROSCLEROTIC HEART DISEASE OF NATIVE CORONARY ARTERY WITHOUT ANGINA PECTORIS: ICD-10-CM

## 2021-09-28 DIAGNOSIS — N18.30 CHRONIC KIDNEY DISEASE, STAGE 3 UNSPECIFIED: ICD-10-CM

## 2021-09-28 DIAGNOSIS — I50.20 UNSPECIFIED SYSTOLIC (CONGESTIVE) HEART FAILURE: ICD-10-CM

## 2021-09-28 DIAGNOSIS — Z95.5 PRESENCE OF CORONARY ANGIOPLASTY IMPLANT AND GRAFT: ICD-10-CM

## 2021-09-28 DIAGNOSIS — R60.0 LOCALIZED EDEMA: ICD-10-CM

## 2021-09-28 DIAGNOSIS — R06.02 SHORTNESS OF BREATH: ICD-10-CM

## 2021-09-28 DIAGNOSIS — I13.0 HYPERTENSIVE HEART AND CHRONIC KIDNEY DISEASE WITH HEART FAILURE AND STAGE 1 THROUGH STAGE 4 CHRONIC KIDNEY DISEASE, OR UNSPECIFIED CHRONIC KIDNEY DISEASE: ICD-10-CM

## 2021-09-28 DIAGNOSIS — Z79.01 LONG TERM (CURRENT) USE OF ANTICOAGULANTS: ICD-10-CM

## 2021-09-28 LAB
ALBUMIN SERPL ELPH-MCNC: 3.6 G/DL — SIGNIFICANT CHANGE UP (ref 3.3–5)
ALP SERPL-CCNC: SIGNIFICANT CHANGE UP U/L (ref 40–120)
ALT FLD-CCNC: SIGNIFICANT CHANGE UP U/L (ref 10–45)
ANION GAP SERPL CALC-SCNC: 3 MMOL/L — LOW (ref 5–17)
AST SERPL-CCNC: SIGNIFICANT CHANGE UP U/L (ref 10–40)
BASE EXCESS BLDV CALC-SCNC: 1.7 MMOL/L — SIGNIFICANT CHANGE UP (ref -2–3)
BASOPHILS # BLD AUTO: 0.03 K/UL — SIGNIFICANT CHANGE UP (ref 0–0.2)
BASOPHILS NFR BLD AUTO: 0.5 % — SIGNIFICANT CHANGE UP (ref 0–2)
BILIRUB SERPL-MCNC: 0.6 MG/DL — SIGNIFICANT CHANGE UP (ref 0.2–1.2)
BUN SERPL-MCNC: 29 MG/DL — HIGH (ref 7–23)
CA-I SERPL-SCNC: 1.15 MMOL/L — SIGNIFICANT CHANGE UP (ref 1.15–1.33)
CALCIUM SERPL-MCNC: 8.9 MG/DL — SIGNIFICANT CHANGE UP (ref 8.4–10.5)
CHLORIDE SERPL-SCNC: 106 MMOL/L — SIGNIFICANT CHANGE UP (ref 96–108)
CO2 BLDV-SCNC: 28.6 MMOL/L — HIGH (ref 22–26)
CO2 SERPL-SCNC: 27 MMOL/L — SIGNIFICANT CHANGE UP (ref 22–31)
CREAT SERPL-MCNC: 1.32 MG/DL — HIGH (ref 0.5–1.3)
EOSINOPHIL # BLD AUTO: 0.04 K/UL — SIGNIFICANT CHANGE UP (ref 0–0.5)
EOSINOPHIL NFR BLD AUTO: 0.7 % — SIGNIFICANT CHANGE UP (ref 0–6)
GAS PNL BLDV: 138 MMOL/L — SIGNIFICANT CHANGE UP (ref 136–145)
GAS PNL BLDV: SIGNIFICANT CHANGE UP
GAS PNL BLDV: SIGNIFICANT CHANGE UP
GLUCOSE SERPL-MCNC: 116 MG/DL — HIGH (ref 70–99)
HCO3 BLDV-SCNC: 27 MMOL/L — SIGNIFICANT CHANGE UP (ref 22–29)
HCT VFR BLD CALC: 44.3 % — SIGNIFICANT CHANGE UP (ref 39–50)
HGB BLD-MCNC: 14.5 G/DL — SIGNIFICANT CHANGE UP (ref 13–17)
IMM GRANULOCYTES NFR BLD AUTO: 0.2 % — SIGNIFICANT CHANGE UP (ref 0–1.5)
LYMPHOCYTES # BLD AUTO: 2.86 K/UL — SIGNIFICANT CHANGE UP (ref 1–3.3)
LYMPHOCYTES # BLD AUTO: 49.5 % — HIGH (ref 13–44)
MAGNESIUM SERPL-MCNC: 2.5 MG/DL — SIGNIFICANT CHANGE UP (ref 1.6–2.6)
MCHC RBC-ENTMCNC: 32.7 GM/DL — SIGNIFICANT CHANGE UP (ref 32–36)
MCHC RBC-ENTMCNC: 33.7 PG — SIGNIFICANT CHANGE UP (ref 27–34)
MCV RBC AUTO: 103 FL — HIGH (ref 80–100)
MONOCYTES # BLD AUTO: 0.74 K/UL — SIGNIFICANT CHANGE UP (ref 0–0.9)
MONOCYTES NFR BLD AUTO: 12.8 % — SIGNIFICANT CHANGE UP (ref 2–14)
NEUTROPHILS # BLD AUTO: 2.1 K/UL — SIGNIFICANT CHANGE UP (ref 1.8–7.4)
NEUTROPHILS NFR BLD AUTO: 36.3 % — LOW (ref 43–77)
NRBC # BLD: 0 /100 WBCS — SIGNIFICANT CHANGE UP (ref 0–0)
NT-PROBNP SERPL-SCNC: 4237 PG/ML — HIGH (ref 0–300)
PCO2 BLDV: 45 MMHG — SIGNIFICANT CHANGE UP (ref 42–55)
PH BLDV: 7.39 — SIGNIFICANT CHANGE UP (ref 7.32–7.43)
PLATELET # BLD AUTO: 198 K/UL — SIGNIFICANT CHANGE UP (ref 150–400)
PO2 BLDV: 38 MMHG — SIGNIFICANT CHANGE UP (ref 25–45)
POTASSIUM BLDV-SCNC: 4.6 MMOL/L — SIGNIFICANT CHANGE UP (ref 3.5–5.1)
POTASSIUM SERPL-MCNC: SIGNIFICANT CHANGE UP MMOL/L (ref 3.5–5.3)
POTASSIUM SERPL-SCNC: SIGNIFICANT CHANGE UP MMOL/L (ref 3.5–5.3)
PROT SERPL-MCNC: 8.1 G/DL — SIGNIFICANT CHANGE UP (ref 6–8.3)
RBC # BLD: 4.3 M/UL — SIGNIFICANT CHANGE UP (ref 4.2–5.8)
RBC # FLD: 14.7 % — HIGH (ref 10.3–14.5)
SAO2 % BLDV: 65.3 % — LOW (ref 67–88)
SARS-COV-2 RNA SPEC QL NAA+PROBE: NEGATIVE — SIGNIFICANT CHANGE UP
SODIUM SERPL-SCNC: 136 MMOL/L — SIGNIFICANT CHANGE UP (ref 135–145)
TROPONIN T SERPL-MCNC: 0.01 NG/ML — SIGNIFICANT CHANGE UP (ref 0–0.01)
WBC # BLD: 5.78 K/UL — SIGNIFICANT CHANGE UP (ref 3.8–10.5)
WBC # FLD AUTO: 5.78 K/UL — SIGNIFICANT CHANGE UP (ref 3.8–10.5)

## 2021-09-28 PROCEDURE — 93010 ELECTROCARDIOGRAM REPORT: CPT

## 2021-09-28 PROCEDURE — 71045 X-RAY EXAM CHEST 1 VIEW: CPT | Mod: 26

## 2021-09-28 PROCEDURE — 83880 ASSAY OF NATRIURETIC PEPTIDE: CPT

## 2021-09-28 PROCEDURE — 84484 ASSAY OF TROPONIN QUANT: CPT

## 2021-09-28 PROCEDURE — 36415 COLL VENOUS BLD VENIPUNCTURE: CPT

## 2021-09-28 PROCEDURE — 87635 SARS-COV-2 COVID-19 AMP PRB: CPT

## 2021-09-28 PROCEDURE — 82803 BLOOD GASES ANY COMBINATION: CPT

## 2021-09-28 PROCEDURE — 99285 EMERGENCY DEPT VISIT HI MDM: CPT

## 2021-09-28 PROCEDURE — 82330 ASSAY OF CALCIUM: CPT

## 2021-09-28 PROCEDURE — 85025 COMPLETE CBC W/AUTO DIFF WBC: CPT

## 2021-09-28 PROCEDURE — 99284 EMERGENCY DEPT VISIT MOD MDM: CPT | Mod: 25

## 2021-09-28 PROCEDURE — 83735 ASSAY OF MAGNESIUM: CPT

## 2021-09-28 PROCEDURE — 93005 ELECTROCARDIOGRAM TRACING: CPT

## 2021-09-28 PROCEDURE — 80053 COMPREHEN METABOLIC PANEL: CPT

## 2021-09-28 PROCEDURE — 84132 ASSAY OF SERUM POTASSIUM: CPT

## 2021-09-28 PROCEDURE — 96374 THER/PROPH/DIAG INJ IV PUSH: CPT

## 2021-09-28 PROCEDURE — 84295 ASSAY OF SERUM SODIUM: CPT

## 2021-09-28 PROCEDURE — 71045 X-RAY EXAM CHEST 1 VIEW: CPT

## 2021-09-28 RX ORDER — FUROSEMIDE 40 MG
40 TABLET ORAL ONCE
Refills: 0 | Status: COMPLETED | OUTPATIENT
Start: 2021-09-28 | End: 2021-09-28

## 2021-09-28 RX ADMIN — Medication 40 MILLIGRAM(S): at 08:22

## 2021-09-28 RX ADMIN — Medication 30 MILLILITER(S): at 09:07

## 2021-09-28 NOTE — ED PROVIDER NOTE - SHIFT CHANGE DETAILS
Sx of decompensated HF, no sig findings on CXR. Pending creatinine/labs, lasix 40 mg IV x 1. D/w cardio Dr Chandler

## 2021-09-28 NOTE — ED PROVIDER NOTE - PATIENT PORTAL LINK FT
You can access the FollowMyHealth Patient Portal offered by [facility] by registering at the following website: [email or web address]. By joining [employer]’s FollowMyHealth portal, you will also be able to view your health information using other applications (apps) compatible with our system.

## 2021-09-28 NOTE — ED PROVIDER NOTE - CARE PROVIDER_API CALL
Luciano Chandler (MD)  Cardiovascular Disease; Internal Medicine  Cardiology [facility], [address] [place] [address]  Phone: [phone]  Fax: [phone]  Follow Up Time:

## 2021-09-28 NOTE — PHYSICAL EXAM
[General Appearance - Alert] : alert [General Appearance - In No Acute Distress] : in no acute distress [Sclera] : the sclera and conjunctiva were normal [Extraocular Movements] : extraocular movements were intact [Neck Appearance] : the appearance of the neck was normal [] : the neck was supple [Bowel Sounds] : normal bowel sounds [Abdomen Soft] : soft [Abnormal Walk] : normal gait [Musculoskeletal - Swelling] : no joint swelling seen [Skin Color & Pigmentation] : normal skin color and pigmentation [Skin Turgor] : normal skin turgor [Cranial Nerves] : cranial nerves 2-12 were intact [Deep Tendon Reflexes (DTR)] : deep tendon reflexes were 2+ and symmetric [Oriented To Time, Place, And Person] : oriented to person, place, and time [Impaired Insight] : insight and judgment were intact [FreeTextEntry1] : tender to palpation of the epigastrum, negative carnett's sign

## 2021-09-28 NOTE — HISTORY OF PRESENT ILLNESS
[FreeTextEntry1] : 76M here for followup of abdominal pain. Poor historian.\par \par H pylori UBT positive and completed quadruple therapy.  Reports compliance but remains on PPI.  Will hold off PPI and retest in 1 week.. Reports continue substernal chest pain, constant.  Patient unable to elaborate clearly the severity of the sharp pain, ranginf from 5-8 in discussion.  Been on strict reflux diet. No alleviating factor. When laying down, have more discomfort.  Reports no relief with the antibiotic.  \par +SOB and LE swelling.\par Denies fever, chill, nausea, vomiting, wt loss, poor appetite, hematochezia, melena. \par \par Denies smoking, alcohol, drug use. \par Denies fam hx of stomach, pancreatic, colon cancer\par \par Endoscopy 6/21: gastritis s/p biopsy (chronic gastritis w/ cryptitis and crypt distortion), sessile polyp in the stomach s/p polypectomy (hyperplastic polyp).\par Colonoscopy 2017: L sided diverticulosis, internal hemorrhoid (repeat in 7-10 year)

## 2021-09-28 NOTE — ASSESSMENT
[FreeTextEntry1] : 76M here for followup after endoscopy. He was found to have H pylori infection failed triple therapy s/p quadruple therapy with continue abd pain\par \par H pylori infection s/p quadruple therapy\par Patient remains on PPI will hold for 1-2 week before UBT\par -Obtain UBT off PPI\par -H2 blocker prn for symptom control\par \par Abd pain w/ SOB\par Initiate +carnett sign but negative on examination today. \par -Will hold off PMR given change in examination and abd pain with respiratory complaints\par -Will reach out to Dr. Chandler given concern of extraintestinal manifestation\par -Obtain abd ultrasound as limited yield with noncontrast CT (renal insufficiency limiting contrast) and unable to have MRI due to PPM\par \par RTC in 3 months or prn\par \par Sukhwinder Mtz MD\par Gastroenterology Fellow

## 2021-09-28 NOTE — ED ADULT NURSE NOTE - NSIMPLEMENTINTERV_GEN_ALL_ED
Implemented All Universal Safety Interventions:  [place] to call system. Call bell, personal items and telephone within reach. Instruct patient to call for assistance. Room bathroom lighting operational. Non-slip footwear when patient is off stretcher. Physically safe environment: no spills, clutter or unnecessary equipment. Stretcher in lowest position, wheels locked, appropriate side rails in place.

## 2021-09-28 NOTE — ED PROVIDER NOTE - LAB INTERPRETATION
CHEST XRAY INTERPRETATION: lungs clear, cardiomegaly, bony structures intact, BiV ICD, no sig interval change

## 2021-09-28 NOTE — ED PROVIDER NOTE - OBJECTIVE STATEMENT
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 AICD (2014; Dr. Jeff; most recent interrogation today, thoracic aortic aneurysm, known non obstructive CAD by cardiac catheterization ([facility] 9/2019) was sent to the ED by Dr Maharaj for heart failure exacerbation after being seen in atrial arrythmia. Patient was seen to be volume overloaded at the office, with increased WOB and LE edema. 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 AICD (2014; Dr. Jeff; most recent interrogation today, thoracic aortic aneurysm, known non obstructive CAD by cardiac catheterization ([facility] 9/2019), recent admission to [facility] 9/1-9/3 for acute decompensated HF, s/p being seen in atrial arrythmia. Patient was seen to be volume overloaded at the office, with increased WOB and LE edema. Patient was diuresed in hospital and had successful upgrade of a dual chamber ICD to Biventricular ICD, now p/w worsening SOB, w/ minimal exertion. He reports he is compliant w/ his meds. He reports swelling has improved. He reports 2-3 pillow orthopnea, PND. He also reports constant, substernal chest pain, which he states is constant. 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 AICD (2014; Dr. Jeff; most recent interrogation today, thoracic aortic aneurysm, known non obstructive CAD by cardiac catheterization ([facility] 9/2019), recent admission to [facility] 9/1-9/3 for acute decompensated HF, s/p being seen in atrial arrythmia. Patient was seen to be volume overloaded at the office, with increased WOB and LE edema. Patient was diuresed in hospital and had successful upgrade of a dual chamber ICD to Biventricular ICD, now p/w worsening SOB, w/ minimal exertion. He reports he is compliant w/ his meds. He reports swelling has improved. He reports 2-3 pillow orthopnea, PND. He also reports constant, substernal chest pain, which he states is constant, for weeks.

## 2021-09-28 NOTE — ED ADULT NURSE NOTE - OBJECTIVE STATEMENT
pt. presents with c/o chest tightness, midsternal/epigastric chest pain and shortness of breath for "long time" getting worse over 2 weeks.

## 2021-09-28 NOTE — ED PROVIDER NOTE - CLINICAL SUMMARY MEDICAL DECISION MAKING FREE TEXT BOX
Pt p/w SOB at rest and w/ exertion, orthopnea, PND, constant CP. There are no EKG changes to suggest ischemia, infarction, or pericarditis. H&P is not c/w dissection, nor PE. Known poor EF. R/o ACS, decompensated HF. Check labs, monitor, d/w cardio. Dispo pending w/u and clinical status

## 2021-09-28 NOTE — ED PROVIDER NOTE - PROGRESS NOTE DETAILS
Geovani - Received pt in sign out. Pt pending Cr, will give lasix based on renal function. Plan to contact Dr. Chandler. Geovani - Cr 1.3 today which is improved from pt's baseline (1.7-1.9). BNP 4230 which is worse than previous on 9/1. Will contact Dr. Chandler to further coordinate care for pt. Geovani - Discussed with Dr. Chandler, will give lasix 40mg IV and reassess. Geovani - Pt reassessed and feeling significantly improved. He has urinated ~2L since lasix administration. Continues to be hemodynamically stable. His O2 saturation is 97% RA. Discussed plan with pt and his wife. pt has close follow up with Dr. Chandler. Geovani - Pt reassessed and feeling significantly improved. He has urinated ~2L since lasix administration. Continues to be hemodynamically stable. His O2 saturation is 97% RA. Discussed plan with pt and his wife. Will increase torsemide 10mg po daily. pt has close follow up with Dr. Chandler.

## 2021-09-28 NOTE — ED PROVIDER NOTE - PHYSICAL EXAMINATION
Constitutional: Well appearing, awake, alert, oriented to person, place, time/situation and in no apparent distress.  ENMT: Airway patent. Normal MM  Eyes: Clear bilaterally  Cardiac: Normal rate, regular rhythm.  Heart sounds S1, S2.  No murmurs, rubs or gallops. + JVD, trace b/l pitting edema  Respiratory: Breaths sounds equal and clear b/l. No increased WOB, hypoxia, or accessory mm use. Borderline tachypnea. Pt speaks in full sentences.   Gastrointestinal: Abd soft, NT, ND, NABS. No guarding, rebound, or rigidity.   Musculoskeletal: Range of motion is not limited. no calf ttp  Neuro: Alert and oriented x 3, face symmetric and speech fluent. Strength 5/5 x 4 ext and symmetric, nml gross motor movement, nml gait. No focal deficits noted.  Skin: Skin normal color for race, warm, dry and intact. No evidence of rash.  Psych: Alert and oriented to person, place, time/situation. normal mood and affect. no apparent risk to self or others.

## 2021-09-28 NOTE — ED PROVIDER NOTE - NSFOLLOWUPINSTRUCTIONS_ED_ALL_ED_FT
You were given lasix 40mg IV while in the Emergency Department to help with volume overload.     Your care was discussed with Dr. Chandler who recommends increasing the dose of your water pill (torsemide).    Please start taking torsemide 10mg daily.     Call Dr. Chandler today to schedule your next appointment.    Return to the emergency department if you develop fever>100.4F, chest pain, worsening shortness of breath or any other concerns.

## 2021-09-30 ENCOUNTER — NON-APPOINTMENT (OUTPATIENT)
Age: 76
End: 2021-09-30

## 2021-09-30 NOTE — PROCEDURE
[No] : not [NSR] : normal sinus rhythm [ICD] : Implantable cardioverter-defibrillator [DDD] : DDD [Threshold Testing Performed] : Threshold testing was performed [Sensing Amplitude ___mv] : sensing amplitude was [unfilled] mv [Lead Imp:  ___ohms] : lead impedance was [unfilled] ohms [___V @] : [unfilled] V [___ ms] : [unfilled] ms [de-identified] : very long AV delay [de-identified] : [facility] [de-identified] : [place] [de-identified] : [id number] [de-identified] : 2021 [de-identified] : [id number] [de-identified] : 5 years  [de-identified] :  added auto adjust to try and increase battery life\par AV delay 250   DDD 50\par changed to DDD 70 AV delay 250 [de-identified] : AP 71%\par  98%\par   no Ventricular events\par no recent AT/afib\par shock impedance 38

## 2021-09-30 NOTE — PHYSICAL EXAM
[General Appearance - Well Developed] : well developed [Normal Appearance] : normal appearance [Well Groomed] : well groomed [General Appearance - Well Nourished] : well nourished [No Deformities] : no deformities [General Appearance - In No Acute Distress] : no acute distress [Exaggerated Use Of Accessory Muscles For Inspiration] : no accessory muscle use [Auscultation Breath Sounds / Voice Sounds] : lungs were clear to auscultation bilaterally [Left Infraclavicular] : left infraclavicular area [Clean] : clean [Dry] : dry [Well-Healed] : well-healed [5th Left ICS - MCL] : palpated at the 5th LICS in the midclavicular line [Normal Rate] : normal [___ +] : [unfilled]U+ pitting edema to the right ankle [Normal Conjunctiva] : the conjunctiva exhibited no abnormalities [Normal Oral Mucosa] : normal oral mucosa [Normal Oropharynx] : normal oropharynx [Normal Jugular Venous V Waves Present] : normal jugular venous V waves present [Abnormal Walk] : normal gait [Gait - Sufficient For Exercise Testing] : the gait was sufficient for exercise testing [Skin Color & Pigmentation] : normal skin color and pigmentation [Oriented To Time, Place, And Person] : oriented to person, place, and time [Affect] : the affect was normal [Mood] : the mood was normal [No Anxiety] : not feeling anxious [] : no respiratory distress [Respiration, Rhythm And Depth] : normal respiratory rhythm and effort [Rhythm Regular] : regular [Palpable Crepitus] : no palpable crepitus [Bleeding] : no active bleeding [Foul Odor] : no foul smell [Purulent Drainage] : no purulent drainage [Serosanguineous Drainage] : no serosanquineous drainage [Serous Drainage] : no serous drainage [Erythema] : not erythematous [Warm] : not warm [Tender] : not tender [Indurated] : not indurated [Fluctuant] : not fluctuant

## 2021-09-30 NOTE — HISTORY OF PRESENT ILLNESS
[Palpitations] : no palpitations [SOB] : no dyspnea [Syncope] : no syncope [Dizziness] : no dizziness [Chest Pain] : no chest pain or discomfort [Shoulder Pain] : no shoulder pain [Pain at Site] : no pain at device site [Erythema at Site] : no erythema at device site [Swelling at Site] : no swelling at device site [FreeTextEntry1] : 76 year old male with HTN, HLD, aortic aneurysm thoracic, AVR and mitral valve repair in 2014,  paroxysmal atrial fibrillation, PVCs and ICD s/p recent PVC ablation, who had an appropriate ICD shock  now s/p upgrade to BiV ICD (EF from 40% to 15-20% with pacing dependance), who presents for follow up.\par \par He states that he had a ICD placed after his AVR.  He normally followed up with Dr. Palencia; whose office moved and he transferred care here.   He is on Sotalol for history of PVCs and  NSVT; however he had short bursts of VT/torsades and it was felt the sotalol was proarrhythmic and stopped.  He was placed on Metoprolol.  He had 7101 PVCs on holter monitor with short bursts of NSVT.  \par \par He was admitted to [facility] 9/2019 with syncope correlated with VT and Vfib.  No therapy needed as he spontaneously converted.  One episode of afib, but overall burden extremely low.  Cath with mildly obstructive CAD.  He also underwent an EP study with PVC ablation from great cardiac vein.  HE was discharged on Mexiletine.  \par  \par 7/2021 he was admitted to an OSH with ICD shock.  He was discharged on amiodarone load which he self discontinued.  He previously had stopped Eliquis because he "didn’t like the way it made him feel".  EF was down and he underwent a BiV upgrade.  since then he notes improved SOB on exertion.  Still some SOB at night.  Improved LE edema.  No palpitations, syncope, near syncope.  He was discharged on Eliquis.  \par Recent echo was an EF 25-30%\par  Echo 1/2021 EF 35-40%\par Echo 11/2019 EF 55-60%\par \par

## 2021-09-30 NOTE — DISCUSSION/SUMMARY
[Pacemaker Function Normal] : normal pacemaker function [FreeTextEntry1] : 76 year old male with HTN, HLD, aortic aneurysm thoracic, AVR and mitral valve repair in 2014,  paroxysmal atrial fibrillation, PVCs and ICD s/p recent PVC ablation, who had an appropriate ICD shock  now s/p upgrade to BiV ICD (EF from 40% to 15-20% with pacing dependance), who presents for follow up.  Device interrogation reveals normal function.  All measured data is within normal limits. Auto threshold placed to try and increase battery life (LV threshold on higher side).  He has a known history of paroxysmal afib and is now on Eliquis.  He is doing well and will have a repeat echo in 3 months.   He will follow up after his echo or sooner if needed and knows to call with any questions or concerns.

## 2021-10-06 ENCOUNTER — APPOINTMENT (OUTPATIENT)
Dept: INTERNAL MEDICINE | Facility: CLINIC | Age: 76
End: 2021-10-06
Payer: MEDICARE

## 2021-10-06 VITALS
TEMPERATURE: 97.7 F | HEIGHT: 71 IN | OXYGEN SATURATION: 97 % | BODY MASS INDEX: 23.66 KG/M2 | HEART RATE: 74 BPM | SYSTOLIC BLOOD PRESSURE: 102 MMHG | DIASTOLIC BLOOD PRESSURE: 76 MMHG | WEIGHT: 169 LBS

## 2021-10-06 PROCEDURE — 99214 OFFICE O/P EST MOD 30 MIN: CPT

## 2021-10-06 NOTE — ASSESSMENT
[FreeTextEntry1] : Pt's HF symptoms have improved. Unfortunately, this has dried out his skin. Can increase water consumption. Start AM-Lactin BID.\par

## 2021-10-06 NOTE — PHYSICAL EXAM
[No Acute Distress] : no acute distress [Normal Sclera/Conjunctiva] : normal sclera/conjunctiva [No Respiratory Distress] : no respiratory distress  [No Accessory Muscle Use] : no accessory muscle use [Clear to Auscultation] : lungs were clear to auscultation bilaterally [Normal Gait] : normal gait [Normal Affect] : the affect was normal [Alert and Oriented x3] : oriented to person, place, and time [de-identified] : /76, weight 169 (down from 176) [de-identified] : upper and lower extremity xerosis

## 2021-10-06 NOTE — HISTORY OF PRESENT ILLNESS
[FreeTextEntry8] : SInce being on such an aggressive heart failure regimen, his skin has been dry, scaley, itchy.\par He hasn't tried any creams. Cocnerned it was shingles.\par Rash is on b/l upper and lower extremities.\par This reigmen has however, controlled his symptoms. He walked here many blocks. Feels "good".

## 2021-10-08 ENCOUNTER — APPOINTMENT (OUTPATIENT)
Dept: HEART AND VASCULAR | Facility: CLINIC | Age: 76
End: 2021-10-08
Payer: MEDICARE

## 2021-10-08 VITALS
SYSTOLIC BLOOD PRESSURE: 103 MMHG | WEIGHT: 169 LBS | HEIGHT: 71 IN | TEMPERATURE: 97 F | DIASTOLIC BLOOD PRESSURE: 81 MMHG | BODY MASS INDEX: 23.66 KG/M2 | OXYGEN SATURATION: 99 % | HEART RATE: 76 BPM

## 2021-10-08 PROCEDURE — 99214 OFFICE O/P EST MOD 30 MIN: CPT

## 2021-10-08 NOTE — ASSESSMENT
[FreeTextEntry1] : Valve disease: clean coronaries on cardiac cath in 2014. status post AVR, MV repair Dr. Caio Louise 9/9/2014) SBE prophylaxis. okay to stop ASA 1 week before dental extraction and implant. EKG with 1 PVC. Echo done 7/2018. EF 40%  Valves OK.  Echo update Nov 2019 , EF 55-60 ??.  Echo done 1/21, EF 35-40 % closer to his baseline.  Ao V probably NL prosthetic valve. EF 30% in [facility] March 2021 and Sept 2021.\par \par Abdominal Pain- worse if he hits a pothole. Will screen for a AAA. If (-) will refer to GI (Dr Cortez).  SONO (-) FOR AAA, F/U WITH Dr CORTEZ. Will discuss with Dr Cortez.  Found to have severe gastritis and H pylori.  Pain improving.  Also better with diuresis.\par \par Chest/abd pain- will treat with Protonix x 30 days.  New EKG changes 11/14/19 not seen before, deep Tw inversions, QTc 474.  I suspect post pacing artifact, remotely  PUD, Trop sent, echo ordered.  Trop NL, EF and wall motion normal on echo. CCTA done Nov 1st, 2019 is clean. No coronary disease.  More upper abd discomfort, will give a trial of protonix. Still with pain, will screen for AAA, refer to GI.  Did not see GI, AAA screen is (-). He reports improvement with HCTZ . Now on Torsemide 10 mg daily.  Better after diuresis in ER Sept 28.\par \par SOB- BNP sent, echo from 1/2021 with a drop in EF.  Await BNP level, 550.  EF 23 % on Nuc, 30 % on echo at [facility]. EF 50 % on echo in 2014, 45-50% in 2016, 40% in 2018, all were done at [facility].  Now its 23 % on Nuc and 30 % on echo at [facility] from March 2021.  Has CHF, BNP around 3-5 K [place] BW is 175 lbs., May be 170\par \par PreOP- complex pt but cleared for dental work.  Has h/o VT so no epinephrine.  Has AICD so no cautery unless AICD is shut off to avoid inappropriate shocks.  Needs SBE prophylaxis for dental work only. Pt is cleared for cataract surgery\par \par PAF- changed from ASA to Eliquis by Dr ARIZMENDI.  No signs of trauma or bleeding on exam. Pt reassured that the Eliquis is not causing the pain.  Off Eliquis by Dr ARIZMENDI due to not feeling well.  Will rechallenge with 2.5 BID, discussed with Dr ARIZMENDI.  Amio restarted at 200 mg daily(pt stopped the load when the pills finished up)  \par \par VT- has non-sustained, i spoke to Dr Palencia who does not want him to run the [facility]. There is  a VT and syncope/sudden death risk, also has a thoracic aneurysm.  I discussed this with him and his wife, walking the Marathon is OK but no jogging.  He walked it and did well. Now seeing Dr Jeff.  Had syncope and adm to [facility], trans to [facility].  VT RFA, Sotolol changed to Mexitil Sept 2019.  CAN RETURN TO WORK NEXT MONDAY.  Had VF 7/6/21 and background VT, AICD DC.  Pt was started on Amio.  Dr RAIZMENDI called. Pt reports facial hyperpigmentation on Amio\par \par Aortic aneurysm: Enlarged. 42 (ascending) 47 (descending) in 2014\par Followup in 2015 reveals a max Ao of 46 mm\par CT angiogram 3/6/2017 aneurysm unchanged\par annual CT, due 3/2018.  Aorta 46mm April 2018, referred back to Dr Garcia,  48mm on CT 2019\par \par HLD: on pravastatin 40mg, had muscle aches on 80mg,  8/2017, pt reported he was only taking it once in a while, now taking it daily will repeat lipid panel. Diet and exercise discussed in detail.   Feb 2018,  July 2020. sTARTING cRESTOR 10 qod, pt off it\par \par HTN: Have DC Lasix and changed to HCTZ weekly.  BP very good, Reduce Micardis  40 for persistent elevated K.  Increase HCTZ 25 to M & F. BP OK.  Micardis now at 20mg, DCed due to elevated Cr and K.  Tolerating Valsartan 40 mg. \par \par CKD: 1.33 8/8/2017, repeat BMP Sept 2017, Cr 1.05, previously normal, on ARB  1.31 and K 5.9.  .  July 2021. Cr 1.13, K 4.5, Mg 2.1

## 2021-10-08 NOTE — REASON FOR VISIT
[FreeTextEntry1] : 76 -year-old male with history of high cholesterol, aortic aneurysm thoracic, hypertension, lumbago, pre-diabetes, Valve disease (status post AVR, MVrepair Dr. Caio Louise 9/9/2014). S/P  colonoscopy with Dr. Maximilian Cortez 9/14/17. Doing well overall, Denies CP, SOB, palpitations, orthopnea, LE swelling, dizziness, syncope. Walking and running daily, sometimes >10 miles, training for [place] marathon in 2018 after skipping 2017. \par \par s/p dental extraction and implant. Reports during dental cleaning he was told he bled a lot and dentist would prefer he stop aspirin for future dental extraction and implant. 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 LAHB, ST-Tw abnormalities. Blocked APC, V paced x 2 beats Pacer set at 40 1/22/19\par EKG: A Pacing, PVCs, 1st degree AVB, with a LAHB, possible IWMI, QTc 429 ST-Twave abnormalities.  10/10/19\par \par 3/18/19 A Fib discovered by Dr Jeff on 3/13/19, ASA changed to Eliquis\par 5/6/19 Pt with atypical pain in the axilla on the left, he thinks its the Eliquis.  No swelling or ecchymosis reported\par 7/15/19 K 5.5, Telmisartin reduced to 20mg. BP excellent.  c/o severe right shoulder pain.  Previously injected with relief.\par 9/23/19 Adm to  [facility]) with syncope and NSVT.  Trans to [facility].  VT ablation and Mexitil started.\par 10/10/19  Pt c/o chest  pressure, when questioned its sub Xiphoid.  Eating makes it better\par \par 11/14/19  New Deep Tw inversions, ? post pacing.  CCTA Nov 1 clean. Recently had a lot of "stomach" pains.  I suspect he has PUD, Trop sent, echo ordered\par 1/24/20 Here with several weeks of a cold/URI, saw an MD and given Z westley.  Here with wheezing, reduced exercise tolerance. + Wheezing, SOB, mild cough, non-productive, no fever or chills.\par Also here for clearance for cataract\par 10/15/20 Fullness in subxiphoid area.  (-) CTA Nov 2020.  Also has  off statin.\par 1/15/21 ARB DCed due to elevated Cr and K of 5.9, not taking Crestor due to nausea, knee pain, back pain.  More MONTEJO noted, will need to reassess Ao V\par 2/24/21 More MONTEJO again, getting worse, epigastric discomfort, worse if he hits a pot hole., or walking hard.  ?pleuritic component.  Feb 12-16 pt reports wt went up, not sleeping well.\par 4/5/21 In [facility] 4/16 to 4/19/21 with chest pain, + Nuc(fixed inferior and apical defects).  EF 23 %, Echo EF 30 %, mild to moderate MR. but (-) cath.  Still gets epigastric pain with hitting potholes.  + MONTEJO.  PAP 40 mm.  Feels better after HCTZ.\par 5/10/21 Had Shingles.  Had both Covid vaccines.  Having endoscopy 5/20/21\par 7/7/21 Dx with severe gastritis and H pylori.  Admitted to [facility] yesterday with AICD DC, 2/T VF. K 4.5, .   Amiodarone 400 mg BID started.\par 9/8/21 DC from [facility] 9/4/21 AFTER ADMISSION FOR ATRIAL TACHYCARDIA  seen by Dr Jeff. Pt had an upgrade to a BiV pacer.  Echo showed EF 25-30%.  I was called yesterday from the [facility] ER that he was there SOB and in mild HF.  Torsemide was increased to daily.\par \par 10/8/21  In [facility] ER 9/28/21 with SOB and abdominal tightness.  Cr was down to 1.32, BNP was 4237, up slightly.  We increased diuretic and sent pt home.  He feels dramatically better.  He is back on Torsemide 10mg daily because it causes dry mouth and he is reluctant to take more..\par \par \par EKG:  AV PAced 9/8/21\par \par \par

## 2021-10-08 NOTE — REVIEW OF SYSTEMS
[Dyspnea on exertion] : dyspnea during exertion [Abdominal Pain] : abdominal pain [Negative] : Heme/Lymph

## 2021-10-08 NOTE — HISTORY OF PRESENT ILLNESS
[FreeTextEntry1] : PCP- Dr Sandhya Bloom\par EP- Dr Jeff\par CTS- Dr Garcia\par Plastics- Dr Ko\par Hand- Dr Xiong\par GI- Dr Cortez, colonoscopy Sept 2017\par Dentist: Dr. Jorge Oh  \par Ophtho- Dr Jesus Lantigua [phone]

## 2021-10-15 ENCOUNTER — LABORATORY RESULT (OUTPATIENT)
Age: 76
End: 2021-10-15

## 2021-10-15 ENCOUNTER — RX RENEWAL (OUTPATIENT)
Age: 76
End: 2021-10-15

## 2021-10-18 ENCOUNTER — NON-APPOINTMENT (OUTPATIENT)
Age: 76
End: 2021-10-18

## 2021-11-10 ENCOUNTER — LABORATORY RESULT (OUTPATIENT)
Age: 76
End: 2021-11-10

## 2021-11-10 ENCOUNTER — APPOINTMENT (OUTPATIENT)
Dept: NEPHROLOGY | Facility: CLINIC | Age: 76
End: 2021-11-10
Payer: MEDICARE

## 2021-11-10 VITALS — SYSTOLIC BLOOD PRESSURE: 96 MMHG | HEART RATE: 80 BPM | DIASTOLIC BLOOD PRESSURE: 73 MMHG

## 2021-11-10 PROCEDURE — 99214 OFFICE O/P EST MOD 30 MIN: CPT | Mod: 25

## 2021-11-10 PROCEDURE — 36415 COLL VENOUS BLD VENIPUNCTURE: CPT

## 2021-11-10 NOTE — ASSESSMENT
[FreeTextEntry1] : # CKD stage 3 likelliest due to type 2 cardiorenal syndrome and aging, possible DM nephropathy. \par * Therapy with SGLT2 inhibitor discussed. No history of ketoacidosis, recent/severe hypoglycemia, LE amputations, significant LE ulcers, or significant UTIs. I do not believe they are currently volume depleted. Benefits, alternatives, and risks to medication including but not limited to urinary infections, genital fungal infections, low blood sugar, acid in the blood, dehydration, low blood pressure, acute kidney injury, amputations, severe groin infections, life-threatening conditions, or death discussed. * They will go immediately to the ER with any unusual or severe symptoms; redness, swelling, pain in the groin or anywhere else; fever; symptoms of acid in the blood (like nausea, vomiting, malaise, abdominal pain, etc.); lightheadedness; or dehydration. * If they feel sick or can't drink for any reason, they will immediately stop taking this medication. * They will stop this medication three days prior to any procedure. * I counseled that they should care for their feet and evaluate their feet for any signs of infection or ulcers daily and inform a doctor immediately with any evidence of infection. * I discussed that we are not using this medication for diabetic control, but rather to help protect the kidneys and heart. * They understand that this medication may not be formally FDA approved or recommended for this indication and we are using this therapy based on evidence from recent studies. * FDA medication guide and [facility] patient guide was provided and they were instructed to read this guide fully. * All their questions were answered. * They consent. I also spoke with his wife.  I also spoke with Dr. Chandler, who agrees with therapy. \par * They will stop taking torsemide when SGLT2i started.\par * Therapies for kidney disease: blood pressure control; proteinuria reduction with ARB/ACEi; other evidence-based therapies including exercise, a plant-based lower oxalate diet, and 400 mcg folic acid daily\par * Cardiovascular disease prevention: counseling on healthy diet, physical activity, weight loss, alcohol limitation, blood pressure control; cardiology evaluation/followup advised\par * The patient has been counseled that chronic kidney disease is a significant condition and regular office followup with me (at least every 2 - 3 weeks for now) is important for monitoring and their health, and that it is their responsibility to make a follow up appointment.\par * The patient has been counseled never to stop taking their medications without discussing it with me or another doctor.\par * The patient has been counseled on avoiding NSAIDs.\par * The patient has been counseled on risk of acute renal failure and instructed to immediately call and speak with me or go immediately to ER with any severe symptoms, nausea, vomiting, diarrhea, chest pain, or shortness of breath.\par * A counseling information sheet has been given (today or previously). All their questions were answered.\par \par # Hyperkalemia.\par * Low K diet. Recheck K. \par \par # HTN controlled.\par * Cont valsartan, metoprolol, torsemide.\par * The patient's blood pressure was checked with the [employer] [id number]-[id number] using the SPRINT trial protocol after sitting quietly in an empty room with arm supported, back supported, and feet on the floor for 5 minutes. The average of 3 readings were taken.\par * A counseling information sheet has been given (currently or previously, in-person or electronically). All their questions were answered.\par * The patient has been counseled to check their BP at home with an automatic arm cuff, write down the readings, and reach me directly on the phone immediately if they are persistently > 180 systolic or if SBP is less than 100 or if lightheadedness develops. They were counseled to bring in all blood pressure readings and medications next visit.\par * The patient has been counseled that regular office followup (at least every 1 months for now) is important for monitoring and for their health, and that it is their responsibility to make follow up appointments.\par * The patient also has been counseled that they must never stop or change any medications without discussing this with me (or another physician). \par \par # Monoclonal gammopathy.\par * Advised to see hematology. Myeloma, amyloid, and MGRS are unlikely. \par

## 2021-11-10 NOTE — HISTORY OF PRESENT ILLNESS
[FreeTextEntry1] : Kindly referred by Dr. Sandhya Bloom elevated creatinine. [employer]. His wife was on the phone and I spoke with her. \par \par * HTN controlled. BP 110s at home. No lightheadedness. Compliant with medications. * 3280 mg albuminuria. * CKD stable, creatinine 1.45. 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 \par \par Previous history (09Sep21): * Events reviewed. Admitted to [facility] for CHF exacerbation and ICD upgrade. EF < 23 - 30.* CKD stable, creatinine 1.55.  Labs from 9/7 reviewed. Creatinine 1.45. K 4. 298 mg albuminuria.  * Hyperkalemia controlled. Not on lokelma or veltassa.  * HTN controlled. /80s at home. No lightheadedness. Compliant with medications. 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 \par \par Previous history (31Aug21): * Dx with Covid 3 weeks ago. Now no cough, or other symptoms. Did not require hospitalization. He has SOB with walking walking 1 block. * Lokelma started for hyperkalemia (K of 6). He is now following low potassium diet. . * CKD stable, creatinine 1.86. * He had stopped torsemide. BP 100s. \par \par Previous history (03Aug21): Labs reviewed. Baseline eGFR 43 - 56 since 2019. On 14Jul21, his creatinine increased to 1.82 (eGFR 35 - 41). The patient denies exposure to chronic NSAIDs, chronic PPIs, creatine, or herbal supplements. The patient denies a history of kidney stones or pyelonephritis. 4 - 5 times. No recent renal ultrasound. They are unaware of proteinuria or hematuria.\par \par EF 23% in 4/21. On amiodarone, metoprolol, lasix 20 qod, HCTZ twice weekly valsartan 40. SOB with one block walking. Stable LE edema. ProBNP incresaed from 515 to 1360.\par \par * HTN controlled.  No lightheadedness. Compliant with medications. He believes he is taking amlodipine.

## 2021-11-12 LAB
ALBUMIN SERPL ELPH-MCNC: 4.3 G/DL
ALP BLD-CCNC: 74 U/L
ALT SERPL-CCNC: 26 U/L
ANION GAP SERPL CALC-SCNC: 19 MMOL/L
APPEARANCE: CLEAR
AST SERPL-CCNC: 36 U/L
BASOPHILS # BLD AUTO: 0.03 K/UL
BASOPHILS NFR BLD AUTO: 0.5 %
BILIRUB SERPL-MCNC: 0.4 MG/DL
BILIRUBIN URINE: NEGATIVE
BLOOD URINE: NEGATIVE
BUN SERPL-MCNC: 26 MG/DL
CALCIUM SERPL-MCNC: 9.3 MG/DL
CHLORIDE SERPL-SCNC: 104 MMOL/L
CO2 SERPL-SCNC: 20 MMOL/L
COLOR: YELLOW
CREAT SERPL-MCNC: 1.51 MG/DL
CREAT SPEC-SCNC: 152 MG/DL
CYSTATIN C SERPL-MCNC: 1.28 MG/L
EOSINOPHIL # BLD AUTO: 0.03 K/UL
EOSINOPHIL NFR BLD AUTO: 0.5 %
GFR/BSA.PRED SERPLBLD CYS-BASED-ARV: 52 ML/MIN
GLUCOSE QUALITATIVE U: NEGATIVE
GLUCOSE SERPL-MCNC: 86 MG/DL
HCT VFR BLD CALC: 46 %
HGB BLD-MCNC: 14.2 G/DL
IMM GRANULOCYTES NFR BLD AUTO: 0.2 %
KETONES URINE: NEGATIVE
LEUKOCYTE ESTERASE URINE: NEGATIVE
LYMPHOCYTES # BLD AUTO: 2.95 K/UL
LYMPHOCYTES NFR BLD AUTO: 51.3 %
MAGNESIUM SERPL-MCNC: 2.2 MG/DL
MAN DIFF?: NORMAL
MCHC RBC-ENTMCNC: 30.9 GM/DL
MCHC RBC-ENTMCNC: 32.3 PG
MCV RBC AUTO: 104.5 FL
MICROALBUMIN 24H UR DL<=1MG/L-MCNC: 13.8 MG/DL
MICROALBUMIN/CREAT 24H UR-RTO: 91 MG/G
MONOCYTES # BLD AUTO: 0.62 K/UL
MONOCYTES NFR BLD AUTO: 10.8 %
NEUTROPHILS # BLD AUTO: 2.11 K/UL
NEUTROPHILS NFR BLD AUTO: 36.7 %
NITRITE URINE: NEGATIVE
PH URINE: 5.5
PHOSPHATE SERPL-MCNC: 4.1 MG/DL
PLATELET # BLD AUTO: 190 K/UL
POTASSIUM SERPL-SCNC: 4.7 MMOL/L
PROT SERPL-MCNC: 7.5 G/DL
PROTEIN URINE: ABNORMAL
RBC # BLD: 4.4 M/UL
RBC # FLD: 14.6 %
SODIUM SERPL-SCNC: 143 MMOL/L
SPECIFIC GRAVITY URINE: 1.02
UROBILINOGEN URINE: NORMAL
WBC # FLD AUTO: 5.75 K/UL

## 2021-12-06 ENCOUNTER — APPOINTMENT (OUTPATIENT)
Dept: NEPHROLOGY | Facility: CLINIC | Age: 76
End: 2021-12-06
Payer: MEDICARE

## 2021-12-06 ENCOUNTER — LABORATORY RESULT (OUTPATIENT)
Age: 76
End: 2021-12-06

## 2021-12-06 ENCOUNTER — NON-APPOINTMENT (OUTPATIENT)
Age: 76
End: 2021-12-06

## 2021-12-06 VITALS — HEART RATE: 80 BPM | SYSTOLIC BLOOD PRESSURE: 95 MMHG | DIASTOLIC BLOOD PRESSURE: 70 MMHG

## 2021-12-06 VITALS — HEART RATE: 80 BPM | SYSTOLIC BLOOD PRESSURE: 113 MMHG | DIASTOLIC BLOOD PRESSURE: 82 MMHG

## 2021-12-06 PROCEDURE — 36415 COLL VENOUS BLD VENIPUNCTURE: CPT

## 2021-12-06 PROCEDURE — 99214 OFFICE O/P EST MOD 30 MIN: CPT | Mod: 25

## 2021-12-06 NOTE — HISTORY OF PRESENT ILLNESS
[FreeTextEntry1] : Kindly referred by Dr. Sandhya Bloom elevated creatinine. [employer]\par \par * CKD stable, creatinine 1.51.  Albuminuria decreased to 91 mg from 3280 mg (?). Farxiga 10 started. 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 * HTN controlled. BP 120s at home. No lightheadedness. Compliant with medications. \par \par Previous history (10Nov21): * HTN controlled. BP 110s at home. No lightheadedness. Compliant with medications. * 3280 mg albuminuria. * CKD stable, creatinine 1.45. 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 \par \par Previous history (09Sep21): * Events reviewed. Admitted to [facility] for CHF exacerbation and ICD upgrade. EF < 23 - 30.* CKD stable, creatinine 1.55.  Labs from 9/7 reviewed. Creatinine 1.45. K 4. 298 mg albuminuria.  * Hyperkalemia controlled. Not on lokelma or veltassa.  * HTN controlled. /80s at home. No lightheadedness. Compliant with medications. 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 \par \par Previous history (31Aug21): * Dx with Covid 3 weeks ago. Now no cough, or other symptoms. Did not require hospitalization. He has SOB with walking walking 1 block. * Lokelma started for hyperkalemia (K of 6). He is now following low potassium diet. . * CKD stable, creatinine 1.86. * He had stopped torsemide. BP 100s. \par \par Previous history (03Aug21): Labs reviewed. Baseline eGFR 43 - 56 since 2019. On 14Jul21, his creatinine increased to 1.82 (eGFR 35 - 41). The patient denies exposure to chronic NSAIDs, chronic PPIs, creatine, or herbal supplements. The patient denies a history of kidney stones or pyelonephritis. 4 - 5 times. No recent renal ultrasound. They are unaware of proteinuria or hematuria.\par \par EF 23% in 4/21. On amiodarone, metoprolol, lasix 20 qod, HCTZ twice weekly valsartan 40. SOB with one block walking. Stable LE edema. ProBNP incresaed from 515 to 1360.\par \par * HTN controlled.  No lightheadedness. Compliant with medications. He believes he is taking amlodipine.

## 2021-12-06 NOTE — ASSESSMENT
[FreeTextEntry1] : # CKD stage 3 likelliest due to type 2 cardiorenal syndrome and aging, possible DM nephropathy. \par * Recheck labs. \par * Therapies for kidney disease: SGLT2i; blood pressure control; proteinuria reduction with ARB/ACEi; other evidence-based therapies including exercise, a plant-based lower oxalate diet, and 400 mcg folic acid daily\par * Cardiovascular disease prevention: counseling on healthy diet, physical activity, weight loss, alcohol limitation, blood pressure control; cardiology evaluation/followup advised\par * The patient has been counseled that chronic kidney disease is a significant condition and regular office followup with me (at least every 1 months for now) is important for monitoring and their health, and that it is their responsibility to make a follow up appointment.\par * The patient has been counseled never to stop taking their medications without discussing it with me or another doctor.\par * The patient has been counseled on avoiding NSAIDs.\par * The patient has been counseled on risk of acute renal failure and instructed to immediately call and speak with me or go immediately to ER with any severe symptoms, nausea, vomiting, diarrhea, chest pain, or shortness of breath.\par * A counseling information sheet has been given (today or previously). All their questions were answered.\par \par # Hyperkalemia.\par * Low K diet. Recheck K. \par \par # HTN controlled.No lightheadedness. \par * Cont valsartan, metoprolol, torsemide.\par * The patient's blood pressure was checked with the [employer] [id number]-907XL using the SPRINT trial protocol after sitting quietly in an empty room with arm supported, back supported, and feet on the floor for 5 minutes. The average of 3 readings were taken.\par * A counseling information sheet has been given (currently or previously, in-person or electronically). All their questions were answered.\par * The patient has been counseled to check their BP at home with an automatic arm cuff, write down the readings, and reach me directly on the phone immediately if they are persistently > 180 systolic or if SBP is less than 100 or if lightheadedness develops. They were counseled to bring in all blood pressure readings and medications next visit.\par * The patient has been counseled that regular office followup (at least every 1 months for now) is important for monitoring and for their health, and that it is their responsibility to make follow up appointments.\par * The patient also has been counseled that they must never stop or change any medications without discussing this with me (or another physician). \par \par # Monoclonal gammopathy.\par * Advised to see hematology. Myeloma, amyloid, and MGRS are unlikely. \par  \par

## 2021-12-09 ENCOUNTER — APPOINTMENT (OUTPATIENT)
Dept: HEART AND VASCULAR | Facility: CLINIC | Age: 76
End: 2021-12-09
Payer: MEDICARE

## 2021-12-09 VITALS
HEART RATE: 81 BPM | OXYGEN SATURATION: 99 % | SYSTOLIC BLOOD PRESSURE: 121 MMHG | BODY MASS INDEX: 25.76 KG/M2 | WEIGHT: 184 LBS | DIASTOLIC BLOOD PRESSURE: 89 MMHG | TEMPERATURE: 97.3 F | HEIGHT: 71 IN

## 2021-12-09 DIAGNOSIS — R94.31 ABNORMAL ELECTROCARDIOGRAM [ECG] [EKG]: ICD-10-CM

## 2021-12-09 PROCEDURE — 99214 OFFICE O/P EST MOD 30 MIN: CPT

## 2021-12-09 NOTE — HISTORY OF PRESENT ILLNESS
[FreeTextEntry1] : PCP- Dr Sandhya Bloom\par EP- Dr Jeff\par CTS- Dr Garcia\par Plastics- Dr Ko\par Hand- Dr Xiong\par GI- Dr Cortez, colonoscopy Sept 2017\par Dentist: Dr. Jorge Oh  \par Ophtho- Dr Jesus Lantigua [phone]

## 2021-12-09 NOTE — ASSESSMENT
[FreeTextEntry1] : Valve disease: clean coronaries on cardiac cath in 2014. status post AVR, MV repair Dr. Caio Louise 9/9/2014) SBE prophylaxis. okay to stop ASA 1 week before dental extraction and implant. EKG with 1 PVC. Echo done 7/2018. EF 40%  Valves OK.  Echo update Nov 2019 , EF 55-60 ??.  Echo done 1/21, EF 35-40 % closer to his baseline.  Ao V probably NL prosthetic valve. EF 30% in [facility] March 2021 and Sept 2021.\par \par Abdominal Pain- worse if he hits a pothole. Will screen for a AAA. If (-) will refer to GI (Dr Cortez).  SONO (-) FOR AAA, F/U WITH Dr CORTEZ. Will discuss with Dr Cortez.  Found to have severe gastritis and H pylori.  Pain improving.  Also better with diuresis. Trial of Pepcid and Simethicone\par \par Chest/abd pain- will treat with Protonix x 30 days.  New EKG changes 11/14/19 not seen before, deep Tw inversions, QTc 474.  I suspect post pacing artifact, remotely  PUD, Trop sent, echo ordered.  Trop NL, EF and wall motion normal on echo. CCTA done Nov 1st, 2019 is clean. No coronary disease.  More upper abd discomfort, will give a trial of protonix. Still with pain, will screen for AAA, refer to GI.  Did not see GI, AAA screen is (-). He reports improvement with HCTZ . Now on Torsemide 10 mg daily.  Better after diuresis in ER Sept 28.\par \par SOB- BNP sent, echo from 1/2021 with a drop in EF.  Await BNP level, 550.  EF 23 % on Nuc, 30 % on echo at [facility]. EF 50 % on echo in 2014, 45-50% in 2016, 40% in 2018, all were done at [facility].  Now its 23 % on Nuc and 30 % on echo at [facility] from March 2021.  Has CHF, BNP around 3-5 K [place] BW is 175 lbs., May be 170\par \par PreOP- complex pt but cleared for dental work.  Has h/o VT so no epinephrine.  Has AICD so no cautery unless AICD is shut off to avoid inappropriate shocks.  Needs SBE prophylaxis for dental work only. Pt is cleared for cataract surgery\par \par PAF- changed from ASA to Eliquis by Dr ARIZMENDI.  No signs of trauma or bleeding on exam. Pt reassured that the Eliquis is not causing the pain.  Off Eliquis by Dr ARIZMENDI due to not feeling well.  Will rechallenge with 2.5 BID, discussed with Dr ARIZMENDI.  Amio restarted at 200 mg daily(pt stopped the load when the pills finished up)  \par \par VT- has non-sustained, i spoke to Dr Palencia who does not want him to run the [facility]. There is  a VT and syncope/sudden death risk, also has a thoracic aneurysm.  I discussed this with him and his wife, walking the Marathon is OK but no jogging.  He walked it and did well. Now seeing Dr Jeff.  Had syncope and adm to [facility], trans to [facility].  VT RFA, Sotolol changed to Mexitil Sept 2019.  CAN RETURN TO WORK NEXT MONDAY.  Had VF 7/6/21 and background VT, AICD DC.  Pt was started on Amio.  Dr ARIZMENDI called. Pt reports facial hyperpigmentation on Amio\par \par Aortic aneurysm: Enlarged. 42 (ascending) 47 (descending) in 2014\par Followup in 2015 reveals a max Ao of 46 mm\par CT angiogram 3/6/2017 aneurysm unchanged\par annual CT, due 3/2018.  Aorta 46mm April 2018, referred back to Dr Garcia,  48mm on CT 2019\par \par HLD: on pravastatin 40mg, had muscle aches on 80mg,  8/2017, pt reported he was only taking it once in a while, now taking it daily will repeat lipid panel. Diet and exercise discussed in detail.   Feb 2018,  July 2020. sTARTING cRESTOR 10 qod, pt off it\par \par HTN: Have DC Lasix and changed to HCTZ weekly.  BP very good, Reduce Micardis  40 for persistent elevated K.  Increase HCTZ 25 to M & F. BP OK.  Micardis now at 20mg, DCed due to elevated Cr and K.  Tolerating Valsartan 40 mg. \par \par CKD: 1.33 8/8/2017, repeat BMP Sept 2017, Cr 1.05, previously normal, on ARB  1.31 and K 5.9.  .  July 2021. Cr 1.13, K 4.5, Mg 2.1

## 2021-12-09 NOTE — REASON FOR VISIT
[FreeTextEntry1] : 76 -year-old male with history of high cholesterol, aortic aneurysm thoracic, hypertension, lumbago, pre-diabetes, Valve disease (status post AVR, MVrepair Dr. Caio Louise 9/9/2014). S/P  colonoscopy with Dr. Maximilian Cortez 9/14/17. Doing well overall, Denies CP, SOB, palpitations, orthopnea, LE swelling, dizziness, syncope. Walking and running daily, sometimes >10 miles, training for [place] marathon in 2018 after skipping 2017. \par \par s/p dental extraction and implant. Reports during dental cleaning he was told he bled a lot and dentist would prefer he stop aspirin for future dental extraction and implant. \par Training for [employer], race walked it without complications Nov 2018.\par \par EKG: NSR @ 46 with 1st degree AVB. LAHB, ST-Tw abnormalities. Blocked APC, V paced x 2 beats Pacer set at 40 1/22/19\par EKG: A Pacing, PVCs, 1st degree AVB, with a LAHB, possible IWMI, QTc 429 ST-Twave abnormalities.  10/10/19\par \par 3/18/19 A Fib discovered by Dr Jeff on 3/13/19, ASA changed to Eliquis\par 5/6/19 Pt with atypical pain in the axilla on the left, he thinks its the Eliquis.  No swelling or ecchymosis reported\par 7/15/19 K 5.5, Telmisartin reduced to 20mg. BP excellent.  c/o severe right shoulder pain.  Previously injected with relief.\par 9/23/19 Adm to  [facility]) with syncope and NSVT.  Trans to [facility].  VT ablation and Mexitil started.\par 10/10/19  Pt c/o chest  pressure, when questioned its sub Xiphoid.  Eating makes it better\par \par 11/14/19  New Deep Tw inversions, ? post pacing.  CCTA Nov 1 clean. Recently had a lot of "stomach" pains.  I suspect he has PUD, Trop sent, echo ordered\par 1/24/20 Here with several weeks of a cold/URI, saw an MD and given Z westley.  Here with wheezing, reduced exercise tolerance. + Wheezing, SOB, mild cough, non-productive, no fever or chills.\par Also here for clearance for cataract\par 10/15/20 Fullness in subxiphoid area.  (-) CTA Nov 2020.  Also has  off statin.\par 1/15/21 ARB DCed due to elevated Cr and K of 5.9, not taking Crestor due to nausea, knee pain, back pain.  More MONTEJO noted, will need to reassess Ao V\par 2/24/21 More MONTEJO again, getting worse, epigastric discomfort, worse if he hits a pot hole., or walking hard.  ?pleuritic component.  Feb 12-16 pt reports wt went up, not sleeping well.\par 4/5/21 In [facility] 4/16 to 4/19/21 with chest pain, + Nuc(fixed inferior and apical defects).  EF 23 %, Echo EF 30 %, mild to moderate MR. but (-) cath.  Still gets epigastric pain with hitting potholes.  + MONTEJO.  PAP 40 mm.  Feels better after HCTZ.\par 5/10/21 Had Shingles.  Had both Covid vaccines.  Having endoscopy 5/20/21\par 7/7/21 Dx with severe gastritis and H pylori.  Admitted to [facility] yesterday with AICD DC, 2/T VF. K 4.5, .   Amiodarone 400 mg BID started.\par 9/8/21 DC from [facility] 9/4/21 AFTER ADMISSION FOR ATRIAL TACHYCARDIA  seen by Dr Jeff. Pt had an upgrade to a BiV pacer.  Echo showed EF 25-30%.  I was called yesterday from the [facility] ER that he was there SOB and in mild HF.  Torsemide was increased to daily.\par \par 10/8/21  In [facility] ER 9/28/21 with SOB and abdominal tightness.  Cr was down to 1.32, BNP was 4237, up slightly.  We increased diuretic and sent pt home.  He feels dramatically better.  He is back on Torsemide 10mg daily because it causes dry mouth and he is reluctant to take more..\par \par 12/9/21 Abd tightness much better but not fully resolved.\par \par \par EKG:  AV PAced 9/8/21\par \par \par

## 2021-12-09 NOTE — REVIEW OF SYSTEMS
[Dyspnea on exertion] : dyspnea during exertion [Abdominal Pain] : abdominal pain [Negative] : Heme/Lymph [Tremor] : a tremor was seen [de-identified] : amio induced

## 2021-12-10 LAB
ALBUMIN SERPL ELPH-MCNC: 4.2 G/DL
ALP BLD-CCNC: 74 U/L
ALT SERPL-CCNC: 18 U/L
ANION GAP SERPL CALC-SCNC: 13 MMOL/L
APPEARANCE: ABNORMAL
AST SERPL-CCNC: 20 U/L
BASOPHILS # BLD AUTO: 0.02 K/UL
BASOPHILS NFR BLD AUTO: 0.4 %
BILIRUB SERPL-MCNC: 0.3 MG/DL
BILIRUBIN URINE: NEGATIVE
BLOOD URINE: NEGATIVE
BUN SERPL-MCNC: 31 MG/DL
CALCIUM SERPL-MCNC: 9.3 MG/DL
CHLORIDE SERPL-SCNC: 105 MMOL/L
CO2 SERPL-SCNC: 25 MMOL/L
COLOR: NORMAL
CREAT SERPL-MCNC: 1.91 MG/DL
CREAT SPEC-SCNC: 147 MG/DL
CYSTATIN C SERPL-MCNC: 1.3 MG/L
EOSINOPHIL # BLD AUTO: 0.03 K/UL
EOSINOPHIL NFR BLD AUTO: 0.5 %
GFR/BSA.PRED SERPLBLD CYS-BASED-ARV: 51 ML/MIN
GLUCOSE QUALITATIVE U: ABNORMAL
GLUCOSE SERPL-MCNC: 106 MG/DL
HCT VFR BLD CALC: 43 %
HGB BLD-MCNC: 13.6 G/DL
IMM GRANULOCYTES NFR BLD AUTO: 0.2 %
KETONES URINE: NEGATIVE
LEUKOCYTE ESTERASE URINE: NEGATIVE
LYMPHOCYTES # BLD AUTO: 2.96 K/UL
LYMPHOCYTES NFR BLD AUTO: 53.6 %
MAGNESIUM SERPL-MCNC: 2.5 MG/DL
MAN DIFF?: NORMAL
MCHC RBC-ENTMCNC: 31.6 GM/DL
MCHC RBC-ENTMCNC: 33.4 PG
MCV RBC AUTO: 105.7 FL
MICROALBUMIN 24H UR DL<=1MG/L-MCNC: 7 MG/DL
MICROALBUMIN/CREAT 24H UR-RTO: 48 MG/G
MONOCYTES # BLD AUTO: 0.57 K/UL
MONOCYTES NFR BLD AUTO: 10.3 %
NEUTROPHILS # BLD AUTO: 1.93 K/UL
NEUTROPHILS NFR BLD AUTO: 35 %
NITRITE URINE: NEGATIVE
PH URINE: 5.5
PHOSPHATE SERPL-MCNC: 3.1 MG/DL
PLATELET # BLD AUTO: 174 K/UL
POTASSIUM SERPL-SCNC: 4.6 MMOL/L
PROT SERPL-MCNC: 7.5 G/DL
PROTEIN URINE: NORMAL
RBC # BLD: 4.07 M/UL
RBC # FLD: 14.6 %
SODIUM SERPL-SCNC: 143 MMOL/L
SPECIFIC GRAVITY URINE: 1.02
UROBILINOGEN URINE: NORMAL
WBC # FLD AUTO: 5.52 K/UL

## 2022-01-03 ENCOUNTER — EMERGENCY (EMERGENCY)
Facility: HOSPITAL | Age: 77
LOS: 1 days | Discharge: HOME | End: 2022-01-03
Attending: EMERGENCY MEDICINE | Admitting: EMERGENCY MEDICINE
Payer: MEDICARE

## 2022-01-03 VITALS
WEIGHT: 175.93 LBS | RESPIRATION RATE: 18 BRPM | HEIGHT: 71 IN | HEART RATE: 80 BPM | TEMPERATURE: 97 F | OXYGEN SATURATION: 99 % | SYSTOLIC BLOOD PRESSURE: 130 MMHG | DIASTOLIC BLOOD PRESSURE: 93 MMHG

## 2022-01-03 DIAGNOSIS — R10.13 EPIGASTRIC PAIN: ICD-10-CM

## 2022-01-03 DIAGNOSIS — Z88.8 ALLERGY STATUS TO OTHER DRUGS, MEDICAMENTS AND BIOLOGICAL SUBSTANCES STATUS: ICD-10-CM

## 2022-01-03 DIAGNOSIS — R06.00 DYSPNEA, UNSPECIFIED: ICD-10-CM

## 2022-01-03 DIAGNOSIS — I50.9 HEART FAILURE, UNSPECIFIED: ICD-10-CM

## 2022-01-03 DIAGNOSIS — Z98.89 OTHER SPECIFIED POSTPROCEDURAL STATES: Chronic | ICD-10-CM

## 2022-01-03 DIAGNOSIS — E78.5 HYPERLIPIDEMIA, UNSPECIFIED: ICD-10-CM

## 2022-01-03 DIAGNOSIS — Z20.822 CONTACT WITH AND (SUSPECTED) EXPOSURE TO COVID-19: ICD-10-CM

## 2022-01-03 DIAGNOSIS — R07.89 OTHER CHEST PAIN: ICD-10-CM

## 2022-01-03 DIAGNOSIS — I13.0 HYPERTENSIVE HEART AND CHRONIC KIDNEY DISEASE WITH HEART FAILURE AND STAGE 1 THROUGH STAGE 4 CHRONIC KIDNEY DISEASE, OR UNSPECIFIED CHRONIC KIDNEY DISEASE: ICD-10-CM

## 2022-01-03 DIAGNOSIS — N18.30 CHRONIC KIDNEY DISEASE, STAGE 3 UNSPECIFIED: ICD-10-CM

## 2022-01-03 LAB
ALBUMIN SERPL ELPH-MCNC: 4.1 G/DL — SIGNIFICANT CHANGE UP (ref 3.3–5)
ALBUMIN SERPL ELPH-MCNC: 4.3 G/DL — SIGNIFICANT CHANGE UP (ref 3.3–5)
ALP SERPL-CCNC: 51 U/L — SIGNIFICANT CHANGE UP (ref 40–120)
ALP SERPL-CCNC: 56 U/L — SIGNIFICANT CHANGE UP (ref 40–120)
ALT FLD-CCNC: 14 U/L — SIGNIFICANT CHANGE UP (ref 10–45)
ALT FLD-CCNC: 14 U/L — SIGNIFICANT CHANGE UP (ref 10–45)
ANION GAP SERPL CALC-SCNC: 10 MMOL/L — SIGNIFICANT CHANGE UP (ref 5–17)
ANION GAP SERPL CALC-SCNC: 7 MMOL/L — SIGNIFICANT CHANGE UP (ref 5–17)
AST SERPL-CCNC: 21 U/L — SIGNIFICANT CHANGE UP (ref 10–40)
AST SERPL-CCNC: 21 U/L — SIGNIFICANT CHANGE UP (ref 10–40)
BASE EXCESS BLDV CALC-SCNC: 2.8 MMOL/L — SIGNIFICANT CHANGE UP (ref -2–3)
BASOPHILS # BLD AUTO: 0.03 K/UL — SIGNIFICANT CHANGE UP (ref 0–0.2)
BASOPHILS NFR BLD AUTO: 0.5 % — SIGNIFICANT CHANGE UP (ref 0–2)
BILIRUB SERPL-MCNC: 0.5 MG/DL — SIGNIFICANT CHANGE UP (ref 0.2–1.2)
BILIRUB SERPL-MCNC: 0.5 MG/DL — SIGNIFICANT CHANGE UP (ref 0.2–1.2)
BUN SERPL-MCNC: 29 MG/DL — HIGH (ref 7–23)
BUN SERPL-MCNC: 29 MG/DL — HIGH (ref 7–23)
CALCIUM SERPL-MCNC: 9 MG/DL — SIGNIFICANT CHANGE UP (ref 8.4–10.5)
CALCIUM SERPL-MCNC: 9.2 MG/DL — SIGNIFICANT CHANGE UP (ref 8.4–10.5)
CHLORIDE SERPL-SCNC: 107 MMOL/L — SIGNIFICANT CHANGE UP (ref 96–108)
CHLORIDE SERPL-SCNC: 107 MMOL/L — SIGNIFICANT CHANGE UP (ref 96–108)
CO2 BLDV-SCNC: 28.4 MMOL/L — HIGH (ref 22–26)
CO2 SERPL-SCNC: 25 MMOL/L — SIGNIFICANT CHANGE UP (ref 22–31)
CO2 SERPL-SCNC: 29 MMOL/L — SIGNIFICANT CHANGE UP (ref 22–31)
CREAT SERPL-MCNC: 1.58 MG/DL — HIGH (ref 0.5–1.3)
CREAT SERPL-MCNC: 1.75 MG/DL — HIGH (ref 0.5–1.3)
EOSINOPHIL # BLD AUTO: 0.02 K/UL — SIGNIFICANT CHANGE UP (ref 0–0.5)
EOSINOPHIL NFR BLD AUTO: 0.3 % — SIGNIFICANT CHANGE UP (ref 0–6)
GLUCOSE SERPL-MCNC: 105 MG/DL — HIGH (ref 70–99)
GLUCOSE SERPL-MCNC: 95 MG/DL — SIGNIFICANT CHANGE UP (ref 70–99)
HCO3 BLDV-SCNC: 27 MMOL/L — SIGNIFICANT CHANGE UP (ref 22–29)
HCT VFR BLD CALC: 43.9 % — SIGNIFICANT CHANGE UP (ref 39–50)
HGB BLD-MCNC: 14.2 G/DL — SIGNIFICANT CHANGE UP (ref 13–17)
IMM GRANULOCYTES NFR BLD AUTO: 0.3 % — SIGNIFICANT CHANGE UP (ref 0–1.5)
LIDOCAIN IGE QN: 31 U/L — SIGNIFICANT CHANGE UP (ref 7–60)
LYMPHOCYTES # BLD AUTO: 2.88 K/UL — SIGNIFICANT CHANGE UP (ref 1–3.3)
LYMPHOCYTES # BLD AUTO: 45.9 % — HIGH (ref 13–44)
MAGNESIUM SERPL-MCNC: 2.1 MG/DL — SIGNIFICANT CHANGE UP (ref 1.6–2.6)
MCHC RBC-ENTMCNC: 32.3 GM/DL — SIGNIFICANT CHANGE UP (ref 32–36)
MCHC RBC-ENTMCNC: 33.6 PG — SIGNIFICANT CHANGE UP (ref 27–34)
MCV RBC AUTO: 104 FL — HIGH (ref 80–100)
MONOCYTES # BLD AUTO: 0.66 K/UL — SIGNIFICANT CHANGE UP (ref 0–0.9)
MONOCYTES NFR BLD AUTO: 10.5 % — SIGNIFICANT CHANGE UP (ref 2–14)
NEUTROPHILS # BLD AUTO: 2.67 K/UL — SIGNIFICANT CHANGE UP (ref 1.8–7.4)
NEUTROPHILS NFR BLD AUTO: 42.5 % — LOW (ref 43–77)
NRBC # BLD: 0 /100 WBCS — SIGNIFICANT CHANGE UP (ref 0–0)
NT-PROBNP SERPL-SCNC: 2966 PG/ML — HIGH (ref 0–300)
PCO2 BLDV: 40 MMHG — LOW (ref 42–55)
PH BLDV: 7.44 — HIGH (ref 7.32–7.43)
PLATELET # BLD AUTO: 189 K/UL — SIGNIFICANT CHANGE UP (ref 150–400)
PO2 BLDV: 64 MMHG — HIGH (ref 25–45)
POTASSIUM SERPL-MCNC: 4.5 MMOL/L — SIGNIFICANT CHANGE UP (ref 3.5–5.3)
POTASSIUM SERPL-MCNC: 5.4 MMOL/L — HIGH (ref 3.5–5.3)
POTASSIUM SERPL-SCNC: 4.5 MMOL/L — SIGNIFICANT CHANGE UP (ref 3.5–5.3)
POTASSIUM SERPL-SCNC: 5.4 MMOL/L — HIGH (ref 3.5–5.3)
PROT SERPL-MCNC: 7.3 G/DL — SIGNIFICANT CHANGE UP (ref 6–8.3)
PROT SERPL-MCNC: 7.9 G/DL — SIGNIFICANT CHANGE UP (ref 6–8.3)
RBC # BLD: 4.22 M/UL — SIGNIFICANT CHANGE UP (ref 4.2–5.8)
RBC # FLD: 14.2 % — SIGNIFICANT CHANGE UP (ref 10.3–14.5)
SAO2 % BLDV: 92.5 % — HIGH (ref 67–88)
SARS-COV-2 RNA SPEC QL NAA+PROBE: NEGATIVE — SIGNIFICANT CHANGE UP
SODIUM SERPL-SCNC: 142 MMOL/L — SIGNIFICANT CHANGE UP (ref 135–145)
SODIUM SERPL-SCNC: 143 MMOL/L — SIGNIFICANT CHANGE UP (ref 135–145)
TROPONIN T SERPL-MCNC: 0.01 NG/ML — SIGNIFICANT CHANGE UP (ref 0–0.01)
TROPONIN T SERPL-MCNC: 0.01 NG/ML — SIGNIFICANT CHANGE UP (ref 0–0.01)
WBC # BLD: 6.28 K/UL — SIGNIFICANT CHANGE UP (ref 3.8–10.5)
WBC # FLD AUTO: 6.28 K/UL — SIGNIFICANT CHANGE UP (ref 3.8–10.5)

## 2022-01-03 PROCEDURE — 93005 ELECTROCARDIOGRAM TRACING: CPT

## 2022-01-03 PROCEDURE — 83880 ASSAY OF NATRIURETIC PEPTIDE: CPT

## 2022-01-03 PROCEDURE — 99284 EMERGENCY DEPT VISIT MOD MDM: CPT | Mod: 25

## 2022-01-03 PROCEDURE — 71046 X-RAY EXAM CHEST 2 VIEWS: CPT

## 2022-01-03 PROCEDURE — 82803 BLOOD GASES ANY COMBINATION: CPT

## 2022-01-03 PROCEDURE — 36415 COLL VENOUS BLD VENIPUNCTURE: CPT

## 2022-01-03 PROCEDURE — 85025 COMPLETE CBC W/AUTO DIFF WBC: CPT

## 2022-01-03 PROCEDURE — 87635 SARS-COV-2 COVID-19 AMP PRB: CPT

## 2022-01-03 PROCEDURE — 83690 ASSAY OF LIPASE: CPT

## 2022-01-03 PROCEDURE — 71046 X-RAY EXAM CHEST 2 VIEWS: CPT | Mod: 26

## 2022-01-03 PROCEDURE — 99285 EMERGENCY DEPT VISIT HI MDM: CPT | Mod: CS

## 2022-01-03 PROCEDURE — 83735 ASSAY OF MAGNESIUM: CPT

## 2022-01-03 PROCEDURE — 80053 COMPREHEN METABOLIC PANEL: CPT

## 2022-01-03 PROCEDURE — 84484 ASSAY OF TROPONIN QUANT: CPT

## 2022-01-03 PROCEDURE — 93010 ELECTROCARDIOGRAM REPORT: CPT

## 2022-01-03 NOTE — ED PROVIDER NOTE - PROGRESS NOTE DETAILS
Cr 1.75 (similar to values on last admission), trop 0.01, CBC WNL, Blood gas WNL, COVID negative, EKG unchanged from prior ED visit.   Per Dr. Chandler (cardiologist), this has been a chronic issue and unchanged. Patient was recently seen outpatient by GI and treated for H. Pylori. Patient known to CT Surg and made aware that he was in the ED. Cr 1.75 (similar to values on last admission), trop 0.01, CBC WNL, Blood gas WNL, COVID negative, EKG unchanged from prior ED visit.   Per Dr. Chandler (cardiologist), this has been a chronic issue and unchanged. Patient was recently seen outpatient by GI and treated for H. Pylori. Patient known to CT Surg and made aware that he was in the ED.  Dr. Chandler does not recommend admission given chronicity of symptoms.  Will plan for repeat trop at 3 hour elodia and if negative, anticipate discharge with outpatient cardiology, GI and CT surgery follow up.

## 2022-01-03 NOTE — CHART NOTE - NSCHARTNOTEFT_GEN_A_CORE
Patient was seen and examined in the ED and appears comfortable, sleeping in stretcher. Subxiphoid chest tightness/pressure has been present on and off since 2019 and followed by outpatient GI for h.pylori and gastritis. Seen by Dr. Garcia for ascending aortic aneurysm, last imaged on CT 5/2021 and ready as 4.5cm. Per Dr. Regan, no further imaging is needed at this visit but will follow up closely with Dr. Garcia outpatient. Discussed with office and they will call patient to set up appointment for sometime this week. Patient was seen and examined in the ED and appears comfortable, sleeping in stretcher. Subxiphoid chest tightness/pressure has been present on and off since 2019 and followed by outpatient GI for h.pylori and gastritis. Seen by Dr. Garcia for ascending aortic aneurysm, last imaged on CT 5/2021 and ready as 4.5cm. Per Dr. Regan, no further imaging is needed at this visit but will follow up closely with Dr. Garcia outpatient. Discussed with office and they will call patient to set up appointment for sometime this week. No inpatient admission indicated at this time, further care per ED.

## 2022-01-03 NOTE — ED PROVIDER NOTE - NSFOLLOWUPINSTRUCTIONS_ED_ALL_ED_FT
Please follow up with your Cardiothoracic Surgery team (Dr. Garcia), Dr. Chandler and your gastroenterologist in 1-2 days for re evaluation.  Please return to ER immediately should your symptoms worsen or if you have any concern prior to this recommended follow up.          Chest Pain    WHAT YOU NEED TO KNOW:    Chest pain can be caused by a range of conditions, from not serious to life-threatening. Chest pain can be a symptom of a digestive problem, such as acid reflux or a stomach ulcer. An anxiety attack or a strong emotion, such as anger, can also cause chest pain. Infection, inflammation, or a fracture in the bones or cartilage in your chest can cause pain or discomfort. Sometimes chest pain or pressure is caused by poor blood flow to your heart (angina). Chest pain may also be caused by life-threatening conditions such as a heart attack or blood clot in your lungs.    DISCHARGE INSTRUCTIONS:    Call your local emergency number (911 in the ) or have someone call if:   •You have any of the following signs of a heart attack: ?Squeezing, pressure, or pain in your chest      ?You may also have any of the following: ?Discomfort or pain in your back, neck, jaw, stomach, or arm      ?Shortness of breath      ?Nausea or vomiting      ?Lightheadedness or a sudden cold sweat            Return to the emergency department if:   •You have chest discomfort that gets worse, even with medicine.      •You cough or vomit blood.      •Your bowel movements are black or bloody.      •You cannot stop vomiting, or it hurts to swallow.      Call your doctor if:   •You have questions or concerns about your condition or care.          Medicines:   •Medicines may be given to treat the cause of your chest pain. Examples include pain medicine, anxiety medicine, or medicines to increase blood flow to your heart.      •Do not take certain medicines without asking your healthcare provider first. These include NSAIDs, herbal or vitamin supplements, or hormones (estrogen or progestin).      •Take your medicine as directed. Contact your healthcare provider if you think your medicine is not helping or if you have side effects. Tell him or her if you are allergic to any medicine. Keep a list of the medicines, vitamins, and herbs you take. Include the amounts, and when and why you take them. Bring the list or the pill bottles to follow-up visits. Carry your medicine list with you in case of an emergency.      Healthy living tips: The following are general healthy guidelines. If the cause of your chest pain is known, your healthcare provider will give you specific guidelines to follow.  •Do not smoke. Nicotine and other chemicals in cigarettes and cigars can cause lung and heart damage. Ask your healthcare provider for information if you currently smoke and need help to quit. E-cigarettes or smokeless tobacco still contain nicotine. Talk to your healthcare provider before you use these products.      •Choose a variety of healthy foods as often as possible. Include fresh, frozen, or canned fruits and vegetables. Also include low-fat dairy products, fish, chicken (without skin), and lean meats. Your healthcare provider or a dietitian can help you create meal plans. You may need to avoid certain foods or drinks if your pain is caused by a digestion problem.  Healthy Foods           •Lower your sodium (salt) intake. Limit foods that are high in sodium, such as canned foods, salty snacks, and cold cuts. If you add salt when you cook food, do not add more at the table. Choose low-sodium canned foods as much as possible.             •Drink plenty of water every day. Water helps your body to control your temperature and blood pressure. Ask your healthcare provider how much water you should drink every day.      •Ask about activity. Your healthcare provider will tell you which activities to limit or avoid. Ask when you can drive, return to work, and have sex. Ask about the best exercise plan for you.      •Maintain a healthy weight. Ask your healthcare provider what a healthy weight is for you. Ask him or her to help you create a safe weight loss plan if you are overweight.      •Ask about vaccines you may need. Get the influenza (flu) vaccine every year as soon as recommended, usually in September or October. You may also need a pneumococcal vaccine to prevent pneumonia. The vaccine is usually given every 5 years, starting at age 65. Your healthcare provider can tell you if should get other vaccines, and when to get them.      Follow up with your healthcare provider within 72 hours, or as directed: You may need to return for more tests to find the cause of your chest pain. You may be referred to a specialist, such as a cardiologist or gastroenterologist. Write down your questions so you remember to ask them during your visits.       © Copyright [employer] 2022           back to top                          © Copyright [employer] 2022

## 2022-01-03 NOTE — ED PROVIDER NOTE - CARE PROVIDERS DIRECT ADDRESSES
[email or web address]

## 2022-01-03 NOTE — ED ADULT NURSE NOTE - OBJECTIVE STATEMENT
Pt presents to ER c/o epigastric/midsternal CP starting today associated with SOB. Pt hx CABG and pacemaker placement (just replaced in 09/21) on Metoprolol, eliquis, lasix, and magnesium daily. Pt reports L shoulder pain since replacement of pacemaker. Denies URI symptoms. Pt appears SOB on exam, EKG performed. Pt placed on central monitor.

## 2022-01-03 NOTE — ED PROVIDER NOTE - RESPIRATORY [+], MLM
+ dyspnea on exertion, + increased chest tightness with inhalation/EXERTIONAL DYSPNEA/SHORTNESS OF BREATH

## 2022-01-03 NOTE — ED PROVIDER NOTE - PATIENT PORTAL LINK FT
You can access the FollowMyHealth Patient Portal offered by [facility] by registering at the following website: [email or web address]. By joining [employer]’s FollowMyHealth portal, you will also be able to view your health information using other applications (apps) compatible with our system.

## 2022-01-03 NOTE — ED PROVIDER NOTE - OBJECTIVE STATEMENT
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 AICD (2014; Dr. Jeff; thoracic aortic aneurysm, known non obstructive CAD by cardiac catheterization ([facility] 9/2019), presenting today with epigastric pain and dyspnea since valve replacement in 2014 that worsened today. The pain is described a constant tightness that does not radiate. It is worse with exertion and taking a deep breath in. The pain improves with tylenol (last taken 4 weeks ago) and exhaling. 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 AICD (2014; Dr. Jeff; thoracic aortic aneurysm, known non obstructive CAD by cardiac catheterization ([facility] 9/2019), presenting today with epigastric pain and dyspnea since valve replacement in 2014 that worsened today. The pain is described a constant tightness that does not radiate. It is worse with exertion and taking a deep breath in. The pain improves with tylenol (last taken 4 weeks ago) and exhaling. He denies loss of appetite, N/V, fever, chills, loss of consciouss and sharp chest pain. 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 AICD (2014; Dr. Jeff; thoracic aortic aneurysm, known non obstructive CAD by cardiac catheterization ([facility] 9/2019), presenting today with epigastric pain and dyspnea since valve replacement in 2014 that worsened today. The pain is described a constant tightness that does not radiate. It is worse with exertion and taking a deep breath in. The pain improves with tylenol (last taken 4 weeks ago) and exhaling. He denies loss of appetite, N/V, fever, chills, loss of consciousness and sharp chest pain.

## 2022-01-03 NOTE — ED PROVIDER NOTE - CLINICAL SUMMARY MEDICAL DECISION MAKING FREE TEXT BOX
Pt has an extensive cardiac history seen by Dr. Chandler (cardiologist) and known to the CT surg team presenting today with epigastric pain and dyspnea since valve replacement in 2014 that worsened today. Patient trops negative, EKG unchanged from september 2021, CBC WNL, blood gas WNL. Cr 1.75 (Cr prior admission 1.8) with known history of CKD. BNP elevated to 2966, but lower that last ED visit in Sept 2021 (4237) and may be additionally elevated due to CKD. Due to extensive cardiac history, Dr. Chandler (cards) was notified that patient in ED. Per Dr. Chandler, these symptoms have been chronic and patient has been seen outpatient by GI with diagnosis of H. Pyolori. Dr. Chandler does not recommend admission due to chronicity of symptoms. He was also seen by CT surg who agreed with Dr. Chandler that no inpatient admission indicated at this time. Dr. Garcia's office will reach out to schedule appointment this week. Pt has an extensive cardiac history, is known to Dr. Chandler (cardiologist) and known to the CT surg team presenting today with epigastric pain and dyspnea since valve replacement in 2014 that worsened today. Patient trops negative x2, EKG unchanged from september 2021, CBC WNL, blood gas WNL. Cr 1.75 (Cr prior admission 1.8) with known history of CKD. BNP elevated to 2966, but lower than last ED visit in Sept 2021 (4237) and may be additionally elevated due to CKD. Clinically no concern for CHF exacerbation as patient is breathing comfortably, no conversational dyspnea, no / minimal perihperal edema.  Due to extensive cardiac history, Dr. Chandler (cards) was notified that patient in ED. Per Dr. Chandler, these symptoms have been chronic and patient has been seen outpatient in his office and he also recommends continued GI follow up, noting patient previously with diagnosis of H. Pyolori. Dr. Chandler does not recommend admission due to chronicity of symptoms. He was also seen by CT surg who agreed no need for admission / further ED work up at this time.   Dr. Garcia's office will reach out to schedule appointment this week.  All results and plan discussed with patient at bedside.  He is feeling well and in agreement with plan for prompt outpatient follow up.  Patient is advised to follow up with his cardio, GI and CT surgery teams within 1-2 days without fail.  Patient instructed to return to ED immediately should their symptoms worsen or if there is any concern prior to the recommended follow up.  Patient is aware of plan and verbalizes their understanding.  Will discharge home at this time.

## 2022-01-03 NOTE — ED PROVIDER NOTE - ATTENDING CONTRIBUTION TO CARE
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 A[facility] (2014; Dr. Jeff; thoracic aortic aneurysm, known non obstructive CAD by cardiac catheterization ([facility] 9/2019) presents to ED with concern for ongoing chest discomfort and dyspnea.  Patient notes these symptoms have been chronic for years, however felt slightly worse today prompting his ED visit.  On exam, patient is well appearing, resting comfortably.  HRRR, lungs clear.  Abd soft, NT/ND.  No peripheral edema.  Will obtain labs, CXR and consult with Dr. Chandler (patient's cardiologist) and CT surgery team.  Will dispo accordingly.

## 2022-01-03 NOTE — ED PROVIDER NOTE - PROVIDER TOKENS
PROVIDER:[TOKEN:[[id number]]],PROVIDER:[TOKEN:[[id number]]],PROVIDER:[TOKEN:[[id number]]]

## 2022-01-03 NOTE — ED PROVIDER NOTE - CARE PROVIDER_API CALL
Cem Garcia)  Surgery; Thoracic and Cardiac Surgery  [address] [place] [address], [address]th [place] [address]  Phone: [phone]  Fax: [phone]  Follow Up Time:     Abdirahman Bonner)  Medicine  [address], [address]  [place] [address]  Phone: [phone]  Fax: [phone]  Follow Up Time:     Luciano Chandler)  Cardiovascular Disease; Internal Medicine  Cardiology [facility], [address] [place] [address]  Phone: [phone]  Fax: [phone]  Follow Up Time:

## 2022-01-10 ENCOUNTER — LABORATORY RESULT (OUTPATIENT)
Age: 77
End: 2022-01-10

## 2022-01-10 ENCOUNTER — APPOINTMENT (OUTPATIENT)
Dept: NEPHROLOGY | Facility: CLINIC | Age: 77
End: 2022-01-10
Payer: MEDICARE

## 2022-01-10 ENCOUNTER — APPOINTMENT (OUTPATIENT)
Dept: HEMATOLOGY ONCOLOGY | Facility: CLINIC | Age: 77
End: 2022-01-10
Payer: MEDICARE

## 2022-01-10 VITALS — HEART RATE: 80 BPM | DIASTOLIC BLOOD PRESSURE: 86 MMHG | SYSTOLIC BLOOD PRESSURE: 121 MMHG

## 2022-01-10 VITALS
HEART RATE: 80 BPM | TEMPERATURE: 96.7 F | WEIGHT: 175 LBS | OXYGEN SATURATION: 99 % | BODY MASS INDEX: 24.5 KG/M2 | SYSTOLIC BLOOD PRESSURE: 122 MMHG | HEIGHT: 71 IN | DIASTOLIC BLOOD PRESSURE: 78 MMHG

## 2022-01-10 VITALS — TEMPERATURE: 97.5 F

## 2022-01-10 VITALS — SYSTOLIC BLOOD PRESSURE: 97 MMHG | DIASTOLIC BLOOD PRESSURE: 72 MMHG | HEART RATE: 80 BPM

## 2022-01-10 LAB
RBC # BLD: 4.61 M/UL
RETICS # AUTO: 1.6 %
RETICS AGGREG/RBC NFR: 72.8 K/UL

## 2022-01-10 PROCEDURE — 99204 OFFICE O/P NEW MOD 45 MIN: CPT | Mod: 25

## 2022-01-10 PROCEDURE — 36415 COLL VENOUS BLD VENIPUNCTURE: CPT

## 2022-01-10 PROCEDURE — 99214 OFFICE O/P EST MOD 30 MIN: CPT

## 2022-01-10 NOTE — HISTORY OF PRESENT ILLNESS
[de-identified] : Patient is a 76 year old male with a history of hypertension, chronic renal insufficiency, hyperkalemia, cardiac arrhythmia s/p pacemaker/defibrillator insertion in 2014, aortic valve replacement, mitral valve repair, who is referred for evaluation of a monoclonal gammopathy. This was discovered in August 2021 by serum protein electrophoresis, which revealed an M-spike of 1.4 and an IgG lambda band on immunofixation. The level of IgG was 1850 at that time. Patient's last blood results from December 6 revealed a normal white blood count, hemoglobin, and hematocrit. MCV was 105.7 and platelet count was 174,000. He has a history of low B12 level, and supplements B12 daily. Patient's creatinine in December was 1.91. He has also been found to have protein in the urine. Patient complains of fatigue, shortness of breath on exertion, and persistent lower chest pain at the site of his prior surgery. Patient used to be a marathon runner, with his most recent marathon being in 2018, but he had to stop running  due to cardiac issues. Denies recent weight change, hematochezia, hematuria, significant bleeding from the nose or mouth, abdominal pain, recent infections, fever, sweats, and chills.

## 2022-01-10 NOTE — PHYSICAL EXAM
[Normal] : affect appropriate [de-identified] : RRR, S1, S2, systolic and diastolic murmurs, occasional ectopic, Left chest wall pacemaker/defibrillator, midchest scar

## 2022-01-10 NOTE — REASON FOR VISIT
[Follow-Up Visit] : a follow-up visit for [FreeTextEntry2] : IgG lambda MGUS, fatigue, B12 deficiency, macrocytosis Stable

## 2022-01-10 NOTE — PHYSICAL EXAM
[General Appearance - Alert] : alert [General Appearance - In No Acute Distress] : in no acute distress [Neck Appearance] : the appearance of the neck was normal [Neck Cervical Mass (___cm)] : no neck mass was observed [Jugular Venous Distention Increased] : there was no jugular-venous distention [Thyroid Diffuse Enlargement] : the thyroid was not enlarged [Thyroid Nodule] : there were no palpable thyroid nodules [Auscultation Breath Sounds / Voice Sounds] : lungs were clear to auscultation bilaterally [Bowel Sounds] : normal bowel sounds [Abdomen Soft] : soft [Abdomen Tenderness] : non-tender [] : no hepato-splenomegaly [Abdomen Mass (___ Cm)] : no abdominal mass palpated [Cervical Lymph Nodes Enlarged Posterior Bilaterally] : posterior cervical [Cervical Lymph Nodes Enlarged Anterior Bilaterally] : anterior cervical [Supraclavicular Lymph Nodes Enlarged Bilaterally] : supraclavicular [FreeTextEntry1] : pitting ankles

## 2022-01-10 NOTE — REVIEW OF SYSTEMS
[Fatigue] : fatigue [Chest Pain] : chest pain [Shortness Of Breath] : shortness of breath [SOB on Exertion] : shortness of breath during exertion [Joint Pain] : joint pain [Joint Stiffness] : joint stiffness [Negative] : Allergic/Immunologic [Fever] : no fever [Chills] : no chills [Night Sweats] : no night sweats [Recent Change In Weight] : ~T no recent weight change [Nosebleeds] : no nosebleeds [Wheezing] : no wheezing [Cough] : no cough [Abdominal Pain] : no abdominal pain [Vomiting] : no vomiting [Constipation] : no constipation [Diarrhea] : no diarrhea [Skin Rash] : no skin rash [Easy Bleeding] : no tendency for easy bleeding [Easy Bruising] : no tendency for easy bruising [Swollen Glands] : no swollen glands [FreeTextEntry8] : No hematuria [FreeTextEntry9] : Hands, knees, right shoulder

## 2022-01-10 NOTE — ASSESSMENT
[FreeTextEntry1] : Patient is a 76 year old male with a history of hypertension, chronic renal insufficiency, hyperkalemia, cardiac arrhythmia s/p pacemaker/ defibrillator insertion in 2014, aortic valve replacement, mitral valve repair, who is referred for evaluation of a monoclonal gammopathy and b12 deficiency. Gammopathy may be MGUS which will require monitoring. Have ordered B12 and folate, Bence Gill, B2MG, CBC, CMP, CRP, ferritin, free K/L urine, haptoglobin, IPEP, immunofixation, immunoglobulins panel, iron pane, LDH, SPEP, reticulocyte count, sed rate, uric acid, and viscosity. Venipuncture was performed today at the office. Patient was advised to all office to discuss results and further recommendations.

## 2022-01-10 NOTE — CONSULT LETTER
[Dear  ___] : Dear  [unfilled], [Consult Letter:] : I had the pleasure of evaluating your patient, [unfilled]. [Please see my note below.] : Please see my note below. [Consult Closing:] : Thank you very much for allowing me to participate in the care of this patient.  If you have any questions, please do not hesitate to contact me. [Sincerely,] : Sincerely, [FreeTextEntry3] : Mayra Hanson

## 2022-01-10 NOTE — HISTORY OF PRESENT ILLNESS
[FreeTextEntry1] : Kindly referred by Dr. Sandhya Bloom elevated creatinine. [employer]\par \par * Following up with Dr. Hanson for monoclonal gammopathy. * CKD progressive, creatinine 1.91. Albuminuria decreased to 48. * Occasional chronic abd pain being evaluated, none currently. \par \par Previous history (06Dec21): * CKD stable, creatinine 1.51.  Albuminuria decreased to 91 mg from 3280 mg (?). Farxiga 10 started. 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 * HTN controlled. BP 120s at home. No lightheadedness. Compliant with medications. \par \par Previous history (10Nov21): * HTN controlled. BP 110s at home. No lightheadedness. Compliant with medications. * 3280 mg albuminuria. * CKD stable, creatinine 1.45. 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 \par \par Previous history (09Sep21): * Events reviewed. Admitted to [facility] for CHF exacerbation and ICD upgrade. EF < 23 - 30.* CKD stable, creatinine 1.55.  Labs from 9/7 reviewed. Creatinine 1.45. K 4. 298 mg albuminuria.  * Hyperkalemia controlled. Not on lokelma or veltassa.  * HTN controlled. /80s at home. No lightheadedness. Compliant with medications. 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 \par \par Previous history (31Aug21): * Dx with Covid 3 weeks ago. Now no cough, or other symptoms. Did not require hospitalization. He has SOB with walking walking 1 block. * Lokelma started for hyperkalemia (K of 6). He is now following low potassium diet. . * CKD stable, creatinine 1.86. * He had stopped torsemide. BP 100s. \par \par Previous history (03Aug21): Labs reviewed. Baseline eGFR 43 - 56 since 2019. On 82Ari30, his creatinine increased to 1.82 (eGFR 35 - 41). The patient denies exposure to chronic NSAIDs, chronic PPIs, creatine, or herbal supplements. The patient denies a history of kidney stones or pyelonephritis. 4 - 5 times. No recent renal ultrasound. They are unaware of proteinuria or hematuria.\par \par EF 23% in 4/21. On amiodarone, metoprolol, lasix 20 qod, HCTZ twice weekly valsartan 40. SOB with one block walking. Stable LE edema. ProBNP incresaed from 515 to 1360.\par \par * HTN controlled.  No lightheadedness. Compliant with medications. He believes he is taking amlodipine.

## 2022-01-10 NOTE — ASSESSMENT
[FreeTextEntry1] : # CKD stage 3 likelliest due to type 2 cardiorenal syndrome and aging, possible DM nephropathy. \par * Recheck labs next visit.\par * Therapies for kidney disease: SGLT2i; blood pressure control; proteinuria reduction with ARB/ACEi; other evidence-based therapies including exercise, a plant-based lower oxalate diet, and 400 mcg folic acid daily\par * Cardiovascular disease prevention: counseling on healthy diet, physical activity, weight loss, alcohol limitation, blood pressure control; cardiology evaluation/followup advised\par * The patient has been counseled that chronic kidney disease is a significant condition and regular office followup with me (at least every 1 months for now) is important for monitoring and their health, and that it is their responsibility to make a follow up appointment.\par * The patient has been counseled never to stop taking their medications without discussing it with me or another doctor.\par * The patient has been counseled on avoiding NSAIDs.\par * The patient has been counseled on risk of acute renal failure and instructed to immediately call and speak with me or go immediately to ER with any severe symptoms, nausea, vomiting, diarrhea, chest pain, or shortness of breath.\par * A counseling information sheet has been given (today or previously). All their questions were answered.\par \par # Hyperkalemia.\par * Low K diet. Recheck K. \par \par # HTN controlled.No lightheadedness. \par * Cont valsartan, metoprolol, torsemide.\par * The patient's blood pressure was checked with the Omron [id number] using the SPRINT trial protocol after sitting quietly in an empty room with arm supported, back supported, and feet on the floor for 5 minutes. The average of 3 readings were taken.\par * A counseling information sheet has been given (currently or previously, in-person or electronically). All their questions were answered.\par * The patient has been counseled to check their BP at home with an automatic arm cuff, write down the readings, and reach me directly on the phone immediately if they are persistently > 180 systolic or if SBP is less than 100 or if lightheadedness develops. They were counseled to bring in all blood pressure readings and medications next visit.\par * The patient has been counseled that regular office followup (at least every 1 months for now) is important for monitoring and for their health, and that it is their responsibility to make follow up appointments.\par * The patient also has been counseled that they must never stop or change any medications without discussing this with me (or another physician). \par \par # Monoclonal gammopathy.\par * Advised to follow up with hematology. Myeloma, amyloid, and MGRS are unlikely. \par  \par  \par

## 2022-01-10 NOTE — END OF VISIT
[FreeTextEntry3] : All medical record entries made by the Scribe were at my, Dr. Mayra Hanson, direction and personally dictated by me on 01/10/2022. I have reviewed the chart and agree that the record accurately reflects my personal performance of the history, physical exam, assessment and plan. I have also personally directed, reviewed, and agreed with the chart.

## 2022-01-11 LAB
ALBUMIN SERPL ELPH-MCNC: 4.9 G/DL
ALP BLD-CCNC: 63 U/L
ALT SERPL-CCNC: 17 U/L
ANION GAP SERPL CALC-SCNC: 14 MMOL/L
ANISOCYTOSIS BLD QL: SLIGHT
AST SERPL-CCNC: 18 U/L
B2 MICROGLOB SERPL-MCNC: 2.3 MG/L
BASOPHILS # BLD AUTO: 0 K/UL
BASOPHILS # BLD AUTO: 0 K/UL
BASOPHILS NFR BLD AUTO: 0 %
BASOPHILS NFR BLD AUTO: 0 %
BILIRUB SERPL-MCNC: 0.6 MG/DL
BUN SERPL-MCNC: 31 MG/DL
CALCIUM SERPL-MCNC: 10.3 MG/DL
CHLORIDE SERPL-SCNC: 101 MMOL/L
CO2 SERPL-SCNC: 28 MMOL/L
CREAT SERPL-MCNC: 1.75 MG/DL
CRP SERPL-MCNC: <3 MG/L
DEPRECATED KAPPA LC FREE/LAMBDA SER: 1.06 RATIO
EOSINOPHIL # BLD AUTO: 0 K/UL
EOSINOPHIL # BLD AUTO: 0 K/UL
EOSINOPHIL NFR BLD AUTO: 0 %
EOSINOPHIL NFR BLD AUTO: 0 %
ERYTHROCYTE [SEDIMENTATION RATE] IN BLOOD BY WESTERGREN METHOD: 13 MM/HR
FERRITIN SERPL-MCNC: 139 NG/ML
FOLATE SERPL-MCNC: 14.9 NG/ML
GIANT PLATELETS BLD QL SMEAR: PRESENT
GLUCOSE SERPL-MCNC: 93 MG/DL
HAPTOGLOB SERPL-MCNC: 31 MG/DL
HCT VFR BLD CALC: 48.2 %
HGB BLD-MCNC: 14.8 G/DL
IRON SATN MFR SERPL: 31 %
IRON SERPL-MCNC: 115 UG/DL
KAPPA LC CSF-MCNC: 1.26 MG/DL
KAPPA LC SERPL-MCNC: 1.34 MG/DL
LDH SERPL-CCNC: 207 U/L
LYMPHOCYTES # BLD AUTO: 1.65 K/UL
LYMPHOCYTES # BLD AUTO: 1.65 K/UL
LYMPHOCYTES NFR BLD AUTO: 29.8 %
LYMPHOCYTES NFR BLD AUTO: 29.8 %
MACROCYTES BLD QL SMEAR: SLIGHT
MAN DIFF?: NORMAL
MCHC RBC-ENTMCNC: 30.7 GM/DL
MCHC RBC-ENTMCNC: 32.1 PG
MCV RBC AUTO: 104.6 FL
MONOCYTES # BLD AUTO: 0.39 K/UL
MONOCYTES # BLD AUTO: 0.39 K/UL
MONOCYTES NFR BLD AUTO: 7 %
MONOCYTES NFR BLD AUTO: 7 %
MSMEAR: NORMAL
NEUTROPHILS # BLD AUTO: 2.78 K/UL
NEUTROPHILS # BLD AUTO: 2.78 K/UL
NEUTROPHILS NFR BLD AUTO: 50 %
NEUTROPHILS NFR BLD AUTO: 50 %
PLAT MORPH BLD: ABNORMAL
PLATELET # BLD AUTO: 200 K/UL
POTASSIUM SERPL-SCNC: 5 MMOL/L
PROT SERPL-MCNC: 8.6 G/DL
RBC # BLD: 4.61 M/UL
RBC # FLD: 14.5 %
RBC BLD AUTO: ABNORMAL
SODIUM SERPL-SCNC: 143 MMOL/L
TIBC SERPL-MCNC: 366 UG/DL
UIBC SERPL-MCNC: 251 UG/DL
URATE SERPL-MCNC: 7.5 MG/DL
VARIANT LYMPHS # BLD MANUAL: 13.2 %
VIT B12 SERPL-MCNC: 353 PG/ML
WBC # FLD AUTO: 5.55 K/UL

## 2022-01-12 ENCOUNTER — APPOINTMENT (OUTPATIENT)
Dept: HEART AND VASCULAR | Facility: CLINIC | Age: 77
End: 2022-01-12
Payer: MEDICARE

## 2022-01-12 VITALS
HEIGHT: 71 IN | SYSTOLIC BLOOD PRESSURE: 100 MMHG | HEART RATE: 80 BPM | BODY MASS INDEX: 24.22 KG/M2 | TEMPERATURE: 97.8 F | DIASTOLIC BLOOD PRESSURE: 69 MMHG | WEIGHT: 173 LBS

## 2022-01-12 LAB
FREE KAPPA URINE: 18.35 MG/L
FREE KAPPA/LAMDA RATIO: 5.11 RATIO
FREE LAMDA URINE: 3.59 MG/L

## 2022-01-12 PROCEDURE — 93284 PRGRMG EVAL IMPLANTABLE DFB: CPT

## 2022-01-12 PROCEDURE — 99212 OFFICE O/P EST SF 10 MIN: CPT | Mod: 25

## 2022-01-14 ENCOUNTER — RX RENEWAL (OUTPATIENT)
Age: 77
End: 2022-01-14

## 2022-01-14 LAB
ALBUMIN MFR SERPL ELPH: 52.4 %
ALBUMIN SERPL-MCNC: 4.5 G/DL
ALBUMIN/GLOB SERPL: 1.1 RATIO
ALPHA1 GLOB MFR SERPL ELPH: 3.2 %
ALPHA1 GLOB SERPL ELPH-MCNC: 0.3 G/DL
ALPHA2 GLOB MFR SERPL ELPH: 6.8 %
ALPHA2 GLOB SERPL ELPH-MCNC: 0.6 G/DL
B-GLOBULIN MFR SERPL ELPH: 9.5 %
B-GLOBULIN SERPL ELPH-MCNC: 0.8 G/DL
DEPRECATED KAPPA LC FREE/LAMBDA SER: 1.06 RATIO
GAMMA GLOB FLD ELPH-MCNC: 2.4 G/DL
GAMMA GLOB MFR SERPL ELPH: 28.1 %
IGA SER QL IEP: 150 MG/DL
IGG SER QL IEP: 2311 MG/DL
IGM SER QL IEP: 82 MG/DL
INTERPRETATION SERPL IEP-IMP: NORMAL
KAPPA LC CSF-MCNC: 1.26 MG/DL
KAPPA LC SERPL-MCNC: 1.34 MG/DL
M PROTEIN MFR SERPL ELPH: 20.9 %
M PROTEIN SPEC IFE-MCNC: NORMAL
MONOCLON BAND OBS SERPL: 1.8 G/DL
PROT SERPL-MCNC: 8.6 G/DL
PROT SERPL-MCNC: 8.6 G/DL
VISCOSITY, SERUM: 1.7

## 2022-01-18 LAB
ALBUPE: 23.9 %
ALPHA1UPE: 32.1 %
ALPHA2UPE: 20.7 %
BETAUPE: 15.1 %
GAMMAUPE: 8.2 %
IGA 24H UR QL IFE: NORMAL
KAPPA LC 24H UR QL: NORMAL
PROT PATTERN 24H UR ELPH-IMP: NORMAL
PROT UR-MCNC: 11 MG/DL
PROT UR-MCNC: 11 MG/DL

## 2022-01-18 NOTE — PHYSICAL EXAM
[General Appearance - Well Developed] : well developed [Normal Appearance] : normal appearance [Well Groomed] : well groomed [General Appearance - Well Nourished] : well nourished [No Deformities] : no deformities [General Appearance - In No Acute Distress] : no acute distress [Respiration, Rhythm And Depth] : normal respiratory rhythm and effort [Exaggerated Use Of Accessory Muscles For Inspiration] : no accessory muscle use [Auscultation Breath Sounds / Voice Sounds] : lungs were clear to auscultation bilaterally [Left Infraclavicular] : left infraclavicular area [Clean] : clean [Dry] : dry [Well-Healed] : well-healed [] : no ischemic changes [5th Left ICS - MCL] : palpated at the 5th LICS in the midclavicular line [Normal Rate] : normal [Rhythm Regular] : regular [___ +] : [unfilled]U+ pitting edema to the right ankle [Normal Conjunctiva] : the conjunctiva exhibited no abnormalities [Normal Oral Mucosa] : normal oral mucosa [Normal Jugular Venous V Waves Present] : normal jugular venous V waves present [Abnormal Walk] : normal gait [Gait - Sufficient For Exercise Testing] : the gait was sufficient for exercise testing [Skin Color & Pigmentation] : normal skin color and pigmentation [Oriented To Time, Place, And Person] : oriented to person, place, and time [Affect] : the affect was normal [Mood] : the mood was normal [No Anxiety] : not feeling anxious [Normal S1] : normal S1 [Normal S2] : normal S2 [Palpable Crepitus] : no palpable crepitus [Bleeding] : no active bleeding [Foul Odor] : no foul smell [Purulent Drainage] : no purulent drainage [Serosanguineous Drainage] : no serosanquineous drainage [Serous Drainage] : no serous drainage [Erythema] : not erythematous [Warm] : not warm [Tender] : not tender [Indurated] : not indurated [Fluctuant] : not fluctuant

## 2022-01-18 NOTE — DISCUSSION/SUMMARY
[Pacemaker Function Normal] : normal pacemaker function [FreeTextEntry1] : 76 year old male with HTN, HLD, aortic aneurysm thoracic, AVR and mitral valve repair in 2014,  paroxysmal atrial fibrillation, PVCs and ICD s/p recent PVC ablation, who had an appropriate ICD shock  now s/p upgrade to BiV ICD (EF from 40% to 15-20% with pacing dependance), who presents for follow up.  Device interrogation reveals normal function.  All measured data is within normal limits.He has a known history of paroxysmal afib and is now on Eliquis.  He is doing well and should have a repeat echo the next time he sees his cardiologist - which he is aware of (has not followed up and told the importance of this).   He will follow up after his echo or sooner if needed and knows to call with any questions or concerns.

## 2022-01-18 NOTE — PROCEDURE
[No] : not [NSR] : normal sinus rhythm [ICD] : Implantable cardioverter-defibrillator [DDD] : DDD [Threshold Testing Performed] : Threshold testing was performed [Sensing Amplitude ___mv] : sensing amplitude was [unfilled] mv [Lead Imp:  ___ohms] : lead impedance was [unfilled] ohms [___V @] : [unfilled] V [___ ms] : [unfilled] ms [de-identified] : very long AV delay [de-identified] : [facility] [de-identified] : [place] [de-identified] : [id number] [de-identified] : 2021 [de-identified] : [id number] [de-identified] : 5.5 years  [de-identified] :  added auto adjust to try and increase battery life\par AV delay 250   DDD 50\par changed to DDD 70 AV delay 250 [de-identified] : AP 93%\par  98%\par   no Ventricular events\par no recent AT/afib\par shock impedance 48

## 2022-01-18 NOTE — REVIEW OF SYSTEMS
[Negative] : Heme/Lymph [Fever] : no fever [Chills] : no chills [Feeling Fatigued] : not feeling fatigued [SOB] : no shortness of breath [Chest Discomfort] : no chest discomfort [Palpitations] : no palpitations [Syncope] : no syncope [Cough] : no cough

## 2022-01-18 NOTE — HISTORY OF PRESENT ILLNESS
[Palpitations] : no palpitations [SOB] : no dyspnea [Syncope] : no syncope [Dizziness] : no dizziness [Chest Pain] : no chest pain or discomfort [Shoulder Pain] : no shoulder pain [Pain at Site] : no pain at device site [Erythema at Site] : no erythema at device site [Swelling at Site] : no swelling at device site [FreeTextEntry1] : 76 year old male with HTN, HLD, aortic aneurysm thoracic, AVR and mitral valve repair in 2014,  paroxysmal atrial fibrillation, PVCs and ICD s/p recent PVC ablation, who had an appropriate ICD shock  now s/p upgrade to BiV ICD (EF from 40% to 15-20% with pacing dependance), who presents for follow up.\par \par He states that he had a ICD placed after his AVR.  He normally followed up with Dr. Palencia; whose office moved and he transferred care here.   He is on Sotalol for history of PVCs and  NSVT; however he had short bursts of VT/torsades and it was felt the sotalol was proarrhythmic and stopped.  He was placed on Metoprolol.  He had 7101 PVCs on holter monitor with short bursts of NSVT.  \par \par He was admitted to [facility] 9/2019 with syncope correlated with VT and Vfib.  No therapy needed as he spontaneously converted.  One episode of afib, but overall burden extremely low.  Cath with mildly obstructive CAD.  He also underwent an EP study with PVC ablation from great cardiac vein.  HE was discharged on Mexiletine.  \par  \par 7/2021 he was admitted to an OSH with ICD shock.  He was discharged on amiodarone load which he self discontinued.  He previously had stopped Eliquis because he "didn’t like the way it made him feel".  EF was down and he underwent a BiV upgrade.  Since then he notes improved SOB on exertion - but he still does experience this.  Improved LE edema.  No palpitations, syncope, near syncope.  He was discharged on Eliquis which he states he is taking..  \par Recent echo was an EF 25-30%\par  Echo 1/2021 EF 35-40%\par Echo 11/2019 EF 55-60%\par \par

## 2022-01-19 ENCOUNTER — APPOINTMENT (OUTPATIENT)
Dept: CARDIOTHORACIC SURGERY | Facility: CLINIC | Age: 77
End: 2022-01-19
Payer: MEDICARE

## 2022-01-19 ENCOUNTER — APPOINTMENT (OUTPATIENT)
Dept: HEART AND VASCULAR | Facility: CLINIC | Age: 77
End: 2022-01-19
Payer: COMMERCIAL

## 2022-01-19 ENCOUNTER — NON-APPOINTMENT (OUTPATIENT)
Age: 77
End: 2022-01-19

## 2022-01-19 VITALS
RESPIRATION RATE: 18 BRPM | HEIGHT: 71 IN | DIASTOLIC BLOOD PRESSURE: 88 MMHG | SYSTOLIC BLOOD PRESSURE: 115 MMHG | TEMPERATURE: 97.3 F | OXYGEN SATURATION: 98 % | HEART RATE: 76 BPM | WEIGHT: 175 LBS | BODY MASS INDEX: 24.5 KG/M2

## 2022-01-19 PROCEDURE — 93296 REM INTERROG EVL PM/IDS: CPT

## 2022-01-19 PROCEDURE — 99213 OFFICE O/P EST LOW 20 MIN: CPT

## 2022-01-19 PROCEDURE — 93295 DEV INTERROG REMOTE 1/2/MLT: CPT

## 2022-02-10 ENCOUNTER — APPOINTMENT (OUTPATIENT)
Dept: OTOLARYNGOLOGY | Facility: CLINIC | Age: 77
End: 2022-02-10
Payer: MEDICARE

## 2022-02-10 VITALS
HEART RATE: 80 BPM | SYSTOLIC BLOOD PRESSURE: 120 MMHG | BODY MASS INDEX: 24.64 KG/M2 | DIASTOLIC BLOOD PRESSURE: 89 MMHG | WEIGHT: 176 LBS | HEIGHT: 71 IN | OXYGEN SATURATION: 99 % | TEMPERATURE: 97 F

## 2022-02-10 DIAGNOSIS — J32.2 CHRONIC ETHMOIDAL SINUSITIS: ICD-10-CM

## 2022-02-10 PROCEDURE — 99214 OFFICE O/P EST MOD 30 MIN: CPT | Mod: 25

## 2022-02-10 PROCEDURE — 31231 NASAL ENDOSCOPY DX: CPT

## 2022-02-10 NOTE — HISTORY OF PRESENT ILLNESS
[de-identified] : 3 year followup for this 75 y/o M presenting today with L sided otalgia for past 3 weeks. Approximately 3 weeks ago he tried to clean his ears with a Q tip. He is concerned he might have perforated his L TM, no changes in hearing. Denies drainage. 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 He is not using any medications for this. Has many medical problems incl aicd. Pt states when he wakes up in the morning his mouth is sometimes dry. No other ENT complaints at this time. Denies fevers, chills, sweats.

## 2022-02-10 NOTE — PHYSICAL EXAM
[Midline] : trachea located in midline position [Normal] : no rashes [Nasal Endoscopy Performed] : nasal endoscopy was performed, see procedure section for findings [de-identified] : significant b [de-identified] : engorged cw AR [de-identified] : engorged cw AR [de-identified] : gait steady

## 2022-02-10 NOTE — ASSESSMENT
[FreeTextEntry1] : 1. L otalgia likely d/t TMJ - explained and he agreed\par -reassured TMs intact, no sign of damage\par -avoid Q tips\par -Soft diet\par -Avoid bruxism and chewing gum\par -Followup with dentist for mouth guard \par 2. acute sinusitis/allergy\par -Augmentin\par -Flonase confirmed no h/o glaucoma/ cataracts \par -avoidance\par RTC in 2 weeks

## 2022-02-10 NOTE — PROCEDURE
[Anterior rhinoscopy insufficient to account for symptoms] : anterior rhinoscopy insufficient to account for symptoms [None] : none [Flexible Endoscope] : examined with the flexible endoscope [Normal] : the paranasal sinuses had no abnormalities [Serial Number: ___] : Serial Number: [unfilled] [FreeTextEntry6] : done to check omu patency and r/o sinus infxn, found to have turbid white drainage from b omu

## 2022-02-18 ENCOUNTER — APPOINTMENT (OUTPATIENT)
Dept: HEART AND VASCULAR | Facility: CLINIC | Age: 77
End: 2022-02-18

## 2022-02-19 ENCOUNTER — EMERGENCY (EMERGENCY)
Facility: HOSPITAL | Age: 77
LOS: 1 days | Discharge: HOME | End: 2022-02-19
Attending: EMERGENCY MEDICINE | Admitting: EMERGENCY MEDICINE
Payer: MEDICARE

## 2022-02-19 VITALS
DIASTOLIC BLOOD PRESSURE: 86 MMHG | TEMPERATURE: 97 F | WEIGHT: 175.93 LBS | OXYGEN SATURATION: 99 % | HEIGHT: 71 IN | SYSTOLIC BLOOD PRESSURE: 123 MMHG | HEART RATE: 82 BPM | RESPIRATION RATE: 18 BRPM

## 2022-02-19 VITALS
HEART RATE: 81 BPM | DIASTOLIC BLOOD PRESSURE: 74 MMHG | RESPIRATION RATE: 18 BRPM | SYSTOLIC BLOOD PRESSURE: 109 MMHG | OXYGEN SATURATION: 100 % | TEMPERATURE: 97 F

## 2022-02-19 DIAGNOSIS — N18.30 CHRONIC KIDNEY DISEASE, STAGE 3 UNSPECIFIED: ICD-10-CM

## 2022-02-19 DIAGNOSIS — E78.5 HYPERLIPIDEMIA, UNSPECIFIED: ICD-10-CM

## 2022-02-19 DIAGNOSIS — I50.9 HEART FAILURE, UNSPECIFIED: ICD-10-CM

## 2022-02-19 DIAGNOSIS — M25.512 PAIN IN LEFT SHOULDER: ICD-10-CM

## 2022-02-19 DIAGNOSIS — Z88.8 ALLERGY STATUS TO OTHER DRUGS, MEDICAMENTS AND BIOLOGICAL SUBSTANCES STATUS: ICD-10-CM

## 2022-02-19 DIAGNOSIS — Z98.89 OTHER SPECIFIED POSTPROCEDURAL STATES: Chronic | ICD-10-CM

## 2022-02-19 DIAGNOSIS — I13.0 HYPERTENSIVE HEART AND CHRONIC KIDNEY DISEASE WITH HEART FAILURE AND STAGE 1 THROUGH STAGE 4 CHRONIC KIDNEY DISEASE, OR UNSPECIFIED CHRONIC KIDNEY DISEASE: ICD-10-CM

## 2022-02-19 PROCEDURE — 93010 ELECTROCARDIOGRAM REPORT: CPT

## 2022-02-19 PROCEDURE — 99283 EMERGENCY DEPT VISIT LOW MDM: CPT | Mod: 25

## 2022-02-19 PROCEDURE — 99284 EMERGENCY DEPT VISIT MOD MDM: CPT

## 2022-02-19 PROCEDURE — 73030 X-RAY EXAM OF SHOULDER: CPT | Mod: 26

## 2022-02-19 PROCEDURE — 73030 X-RAY EXAM OF SHOULDER: CPT

## 2022-02-19 PROCEDURE — 93005 ELECTROCARDIOGRAM TRACING: CPT

## 2022-02-19 NOTE — ED ADULT TRIAGE NOTE - CHIEF COMPLAINT QUOTE
Pt complains of left should pain since 2 months ago worst with movement. pt denies any trauma/injury  chest pain or new onset of SOB. defibrillator to left upper chest

## 2022-02-19 NOTE — ED PROVIDER NOTE - PATIENT PORTAL LINK FT
You can access the FollowMyHealth Patient Portal offered by [facility] by registering at the following website: [email or web address]. By joining [employer]’s FollowMyHealth portal, you will also be able to view your health information using other applications (apps) compatible with our system.

## 2022-02-19 NOTE — ED PROVIDER NOTE - CLINICAL SUMMARY MEDICAL DECISION MAKING FREE TEXT BOX
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 AICD (2014; Dr. Jeff; thoracic aortic aneurysm, known non obstructive CAD by cardiac catheterization ([facility] 9/2019) who p/w left shoulder pain x 4-5 months, denies recent trauma, states pain comes and goes with certain movements, no new sob, leg swelling, dizziness, syncope, f/c, redness, n/t/w in ext or other complaints.   Pt declines pain meds a this time, VSS, EKG unchanged from previous.   XRay performed with no acute findings, no fracture, suspect msk pain, recommend tylenol, PT, avoid lifting above head or with outstretched arms.   Do not suspect ACS, PE, dissection or other acute life-threatening pathology at this time.   Pt feeling improved and is stable for DC. ED evaluation and management discussed with the patient in detail.  Close PMD follow up encouraged.  Strict ED return instructions discussed in detail and patient given the opportunity to ask any questions about their discharge diagnosis and instructions. Patient verbalized understanding.

## 2022-02-19 NOTE — ED ADULT NURSE NOTE - OBJECTIVE STATEMENT
Patient AOX4 c/o intermittent L shoulder pain x September. Patient reports "the pain started after I got my defibrillator placed." Patient denies CP-- reports pain worsens "when I move my arm in a certain way." No obvious trauma or deformity noted. +full ROM.

## 2022-02-19 NOTE — ED PROVIDER NOTE - PHYSICAL EXAMINATION
GEN: Well appearing, well developed, awake, alert, oriented to person, place, time/situation and in no apparent distress. NTAF  ENT: Airway patent, Nasal mucosa clear. Mouth with normal mucosa.  EYES: Clear bilaterally. PERRL, EOMI  RESPIRATORY: Breathing comfortably with normal RR. No W/C/R, no hypoxia or resp distress.  CARDIAC: Regular rate and rhythm, no M/R/G, +ICD, no redness.   ABDOMEN: Soft, nontender, +bowel sounds, no rebound, rigidity, or guarding.  MSK: Range of motion is not limited, no deformities noted. Distal ext NVI with +2 pulses, compartments soft, no ligamentous instability.   NEURO: Alert and oriented, no focal deficits.  SKIN: Skin normal color for race, warm, dry and intact. No evidence of rash.  PSYCH: Alert and oriented to person, place, time/situation. normal mood and affect. no apparent risk to self or others.

## 2022-02-19 NOTE — ED ADULT NURSE NOTE - NSICDXPASTSURGICALHX_GEN_ALL_CORE_FT
Reported NEGATIVE genetic testing results to Yanet broussard over phone. She opted for 84 gene panel through [facility].) Released these results to her through lab's portal and will mail her a copy as well.  All her questions were answered t PAST SURGICAL HISTORY:  History of open heart surgery     Other postprocedural status Right Shoulder    Other postprocedural status S/P right knee arthroscopy

## 2022-02-19 NOTE — ED PROVIDER NOTE - OBJECTIVE STATEMENT
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 AICD (2014; Dr. 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 AICD (2014; Dr. Jeff; thoracic aortic aneurysm, known non obstructive CAD by cardiac catheterization ([facility] 9/2019) who p/w left shoulder pain x 4-5 months, denies recent trauma, states pain comes and goes with certain movements, no new sob, leg swelling, dizziness, syncope, f/c, redness, n/t/w in ext or other complaints.

## 2022-02-19 NOTE — ED ADULT NURSE NOTE - NSIMPLEMENTINTERV_GEN_ALL_ED
No change Implemented All Universal Safety Interventions:  [place] to call system. Call bell, personal items and telephone within reach. Instruct patient to call for assistance. Room bathroom lighting operational. Non-slip footwear when patient is off stretcher. Physically safe environment: no spills, clutter or unnecessary equipment. Stretcher in lowest position, wheels locked, appropriate side rails in place.

## 2022-02-22 ENCOUNTER — APPOINTMENT (OUTPATIENT)
Dept: NEPHROLOGY | Facility: CLINIC | Age: 77
End: 2022-02-22
Payer: MEDICARE

## 2022-02-22 VITALS — SYSTOLIC BLOOD PRESSURE: 114 MMHG | HEART RATE: 81 BPM | DIASTOLIC BLOOD PRESSURE: 81 MMHG

## 2022-02-22 PROCEDURE — 99214 OFFICE O/P EST MOD 30 MIN: CPT

## 2022-02-22 NOTE — HISTORY OF PRESENT ILLNESS
[FreeTextEntry1] : Kindly referred by Dr. Sandhya Bloom elevated creatinine. [employer]\par \par * HTN controlled.  - 120s / 80s at home. No lightheadedness. Compliant with medications. 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 \par \par 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 * CKD progressive, creatinine 1.91. Albuminuria decreased to 48. * Occasional chronic abd pain being evaluated, none currently. \par \par Previous history (06Dec21): * CKD stable, creatinine 1.51.  Albuminuria decreased to 91 mg from 3280 mg (?). Farxiga 10 started. 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 * HTN controlled. BP 120s at home. No lightheadedness. Compliant with medications. \par \par Previous history (10Nov21): * HTN controlled. BP 110s at home. No lightheadedness. Compliant with medications. * 3280 mg albuminuria. * CKD stable, creatinine 1.45. 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 \par \par Previous history (09Sep21): * Events reviewed. Admitted to [facility] for CHF exacerbation and ICD upgrade. EF < 23 - 30.* CKD stable, creatinine 1.55.  Labs from 9/7 reviewed. Creatinine 1.45. K 4. 298 mg albuminuria.  * Hyperkalemia controlled. Not on lokelma or veltassa.  * HTN controlled. /80s at home. No lightheadedness. Compliant with medications. 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 \par \par Previous history (31Aug21): * Dx with Covid 3 weeks ago. Now no cough, or other symptoms. Did not require hospitalization. He has SOB with walking walking 1 block. * Lokelma started for hyperkalemia (K of 6). He is now following low potassium diet. . * CKD stable, creatinine 1.86. * He had stopped torsemide. BP 100s. \par \par Previous history (03Aug21): Labs reviewed. Baseline eGFR 43 - 56 since 2019. On 39Adb61, his creatinine increased to 1.82 (eGFR 35 - 41). The patient denies exposure to chronic NSAIDs, chronic PPIs, creatine, or herbal supplements. The patient denies a history of kidney stones or pyelonephritis. 4 - 5 times. No recent renal ultrasound. They are unaware of proteinuria or hematuria.\par \par EF 23% in 4/21. On amiodarone, metoprolol, lasix 20 qod, HCTZ twice weekly valsartan 40. SOB with one block walking. Stable LE edema. ProBNP incresaed from 515 to 1360.\par \par * HTN controlled.  No lightheadedness. Compliant with medications. He believes he is taking amlodipine.

## 2022-02-22 NOTE — ASSESSMENT
[FreeTextEntry1] : # CKD stage 3 likelliest due to type 2 cardiorenal syndrome and aging, possible DM nephropathy. \par * Recheck labs next visit.\par * Therapies for kidney disease: SGLT2i; blood pressure control; proteinuria reduction with ARB/ACEi; other evidence-based therapies including exercise, a plant-based lower oxalate diet, and 400 mcg folic acid daily\par * Cardiovascular disease prevention: counseling on healthy diet, physical activity, weight loss, alcohol limitation, blood pressure control; cardiology evaluation/followup advised\par * The patient has been counseled that chronic kidney disease is a significant condition and regular office followup with me (at least every 2-3 months for now) is important for monitoring and their health, and that it is their responsibility to make a follow up appointment.\par * The patient has been counseled never to stop taking their medications without discussing it with me or another doctor.\par * The patient has been counseled on avoiding NSAIDs.\par * The patient has been counseled on risk of acute renal failure and instructed to immediately call and speak with me or go immediately to ER with any severe symptoms, nausea, vomiting, diarrhea, chest pain, or shortness of breath.\par * A counseling information sheet has been given (today or previously). All their questions were answered.\par \par # Hyperkalemia.\par * Low K diet. Recheck K. \par \par # HTN controlled.No lightheadedness. \par * Cont valsartan, metoprolol, torsemide.\par * The patient's blood pressure was checked with the [employer] [id number] using the SPRINT trial protocol after sitting quietly in an empty room with arm supported, back supported, and feet on the floor for 5 minutes. The average of 3 readings were taken.\par * A counseling information sheet has been given (currently or previously, in-person or electronically). All their questions were answered.\par * The patient has been counseled to check their BP at home with an automatic arm cuff, write down the readings, and reach me directly on the phone immediately if they are persistently > 180 systolic or if SBP is less than 100 or if lightheadedness develops. They were counseled to bring in all blood pressure readings and medications next visit.\par * The patient has been counseled that regular office followup (at least every 1 months for now) is important for monitoring and for their health, and that it is their responsibility to make follow up appointments.\par * The patient also has been counseled that they must never stop or change any medications without discussing this with me (or another physician). \par \par # Monoclonal gammopathy.\par * Advised to follow up with hematology. Myeloma, amyloid, and MGRS are unlikely. \par  \par  \par

## 2022-02-25 ENCOUNTER — APPOINTMENT (OUTPATIENT)
Dept: OTOLARYNGOLOGY | Facility: CLINIC | Age: 77
End: 2022-02-25
Payer: MEDICARE

## 2022-02-25 VITALS
TEMPERATURE: 97 F | OXYGEN SATURATION: 99 % | DIASTOLIC BLOOD PRESSURE: 99 MMHG | HEART RATE: 82 BPM | HEIGHT: 71 IN | SYSTOLIC BLOOD PRESSURE: 129 MMHG | BODY MASS INDEX: 25.03 KG/M2 | WEIGHT: 178.8 LBS

## 2022-02-25 PROCEDURE — 99213 OFFICE O/P EST LOW 20 MIN: CPT

## 2022-02-25 NOTE — ASSESSMENT
[FreeTextEntry1] : 1. acute sinusitis/ allergy\par -somewhat improved\par -continue Flonase\par -CT sinuses given persistent sx - ro sinusitis\par -avoidance of allergens\par 2. atrophic rhinitis\par -atrovent- confirmed no h/o glaucoma\par 3. L otalgia likely d/t TMJ\par -followup with dentist for mouth guard\par RTC with CT to review findings

## 2022-02-25 NOTE — PHYSICAL EXAM
[Midline] : trachea located in midline position [Normal] : no rashes [de-identified] : mild  [de-identified] : ar [de-identified] : ar [de-identified] : gait steady

## 2022-02-25 NOTE — HISTORY OF PRESENT ILLNESS
[de-identified] : 2 week followup for this 75 y/o M. At last visit he was found to have acute sinusitis, and he completed course of Augmentin and is using Flonase. He feels nasal congestion has mostly improved, but when he is working outside his nose is still dripping mucoid matl. He says L otalgia has improved. No other ENT complaints at this time.

## 2022-03-04 ENCOUNTER — APPOINTMENT (OUTPATIENT)
Dept: OTOLARYNGOLOGY | Facility: CLINIC | Age: 77
End: 2022-03-04
Payer: MEDICARE

## 2022-03-04 VITALS
HEART RATE: 80 BPM | DIASTOLIC BLOOD PRESSURE: 91 MMHG | WEIGHT: 181 LBS | SYSTOLIC BLOOD PRESSURE: 119 MMHG | BODY MASS INDEX: 25.34 KG/M2 | TEMPERATURE: 97 F | HEIGHT: 71 IN | OXYGEN SATURATION: 100 %

## 2022-03-04 DIAGNOSIS — J31.0 CHRONIC RHINITIS: ICD-10-CM

## 2022-03-04 PROCEDURE — 99213 OFFICE O/P EST LOW 20 MIN: CPT

## 2022-03-05 NOTE — ASSESSMENT
[FreeTextEntry1] : Atrophic Rhinitis\par -try Atrovent, confirmed no h/o glaucoma; he said he just got it \par -CT showed septum deviated to R otherwise unremarkable, no evidence of sinusitis- images and report reviewed with pt\par -had pt bend head and lean forward, no mucoid drainage with this - tested provocatively in office\par RTC as needed

## 2022-03-05 NOTE — HISTORY OF PRESENT ILLNESS
[de-identified] : 1 week follow up for this 77 y/o M. For acute sinusitis he completed course of Augmentin.. At last visit he was prescribed Atrovent for atrophic rhinitis and he just started taking it. He feels nasal congested has improved, but his nose is still dripping mucoid matl, especially when he works outside and it is cold and when he bends down to wash his face. He is here to review CT sinuses.

## 2022-03-05 NOTE — DATA REVIEWED
[de-identified] : CT showed septum deviated to R otherwise unremarkable, no evidence of sinusitis- images and report reviewed with pt

## 2022-03-05 NOTE — PHYSICAL EXAM
[] : septum deviated to the left [de-identified] : atrophic rhinitis [de-identified] : atrophic rhinitis [Normal] : mucosa is normal [de-identified] : gait steady

## 2022-03-10 ENCOUNTER — APPOINTMENT (OUTPATIENT)
Dept: HEART AND VASCULAR | Facility: CLINIC | Age: 77
End: 2022-03-10
Payer: MEDICARE

## 2022-03-10 VITALS
SYSTOLIC BLOOD PRESSURE: 125 MMHG | WEIGHT: 178 LBS | DIASTOLIC BLOOD PRESSURE: 90 MMHG | HEIGHT: 71 IN | BODY MASS INDEX: 24.92 KG/M2 | OXYGEN SATURATION: 99 % | HEART RATE: 75 BPM | TEMPERATURE: 98.3 F

## 2022-03-10 DIAGNOSIS — G89.29 PAIN IN LEFT SHOULDER: ICD-10-CM

## 2022-03-10 DIAGNOSIS — M25.512 PAIN IN LEFT SHOULDER: ICD-10-CM

## 2022-03-10 PROCEDURE — 36415 COLL VENOUS BLD VENIPUNCTURE: CPT

## 2022-03-10 PROCEDURE — 99214 OFFICE O/P EST MOD 30 MIN: CPT

## 2022-03-10 NOTE — ASSESSMENT
[FreeTextEntry1] : Valve disease: clean coronaries on cardiac cath in 2014. status post AVR, MV repair Dr. Caio Louise 9/9/2014) SBE prophylaxis. okay to stop ASA 1 week before dental extraction and implant. EKG with 1 PVC. Echo done 7/2018. EF 40%  Valves OK.  Echo update Nov 2019 , EF 55-60 ??.  Echo done 1/21, EF 35-40 % closer to his baseline.  Ao V probably NL prosthetic valve. EF 30% in H March 2021 and Sept 2021. EF 25-30 % on echo done here Aug 2021.\par \par Abdominal Pain- worse if he hits a pothole. Will screen for a AAA. If (-) will refer to GI (Dr Cortez).  SONO (-) FOR AAA, F/U WITH Dr CORTEZ. Will discuss with Dr Cortez.  Found to have severe gastritis and H pylori.  Pain improving.  Also better with diuresis. Trial of Pepcid and Simethicone\par \par Chest/abd pain- will treat with Protonix x 30 days.  New EKG changes 11/14/19 not seen before, deep Tw inversions, QTc 474.  I suspect post pacing artifact, remotely  PUD, Trop sent, echo ordered.  Trop NL, EF and wall motion normal on echo. CCTA done Nov 1st, 2019 is clean. No coronary disease.  More upper abd discomfort, will give a trial of protonix. Still with pain, will screen for AAA, refer to GI.  Did not see GI, AAA screen is (-). He reports improvement with HCTZ . Now on Torsemide 10 mg daily.  Better after diuresis in ER Sept 28.\par \par SOB/CHF- BNP sent, echo from 1/2021 with a drop in EF.  Await BNP level, 550.  EF 23 % on Nuc, 30 % on echo at [facility]. EF 50 % on echo in 2014, 45-50% in 2016, 40% in 2018, all were done at [facility].  Now its 23 % on Nuc and 30 % on echo at [facility] from March 2021.  Has CHF, BNP around 3-5 K [place] BW is 175 lbs., May be 170.  Remains with MONTEJO, will refer to the HF team.\par \par PreOP- complex pt but cleared for dental work.  Has h/o VT so no epinephrine.  Has AICD so no cautery unless AICD is shut off to avoid inappropriate shocks.  Needs SBE prophylaxis for dental work only. Pt is cleared for cataract surgery\par \par PAF- changed from ASA to Eliquis by Dr ARIZMENDI.  No signs of trauma or bleeding on exam. Pt reassured that the Eliquis is not causing the pain.  Off Eliquis by Dr ARIZMENDI due to not feeling well.  Will rechallenge with 2.5 BID, discussed with Dr ARIZMENDI.  Amio restarted at 200 mg daily(pt stopped the load when the pills finished up)  \par \par VT- has non-sustained, i spoke to Dr Palencia who does not want him to run the [facility]. There is  a VT and syncope/sudden death risk, also has a thoracic aneurysm.  I discussed this with him and his wife, walking the Marathon is OK but no jogging.  He walked it and did well. Now seeing Dr Jeff.  Had syncope and adm to [facility], trans to [facility].  VT RFA, Sotolol changed to Mexitil Sept 2019.  CAN RETURN TO WORK NEXT MONDAY.  Had VF 7/6/21 and background VT, AICD DC.  Pt was started on Amio.  Dr ARIZMENDI called. Pt reports facial hyperpigmentation on Amio\par \par Aortic aneurysm: Enlarged. 42 (ascending) 47 (descending) in 2014\par Followup in 2015 reveals a max Ao of 46 mm\par CT angiogram 3/6/2017 aneurysm unchanged\par annual CT, due 3/2018.  Aorta 46mm April 2018, referred back to Dr Garcia,  48mm on CT 2019\par \par HLD: on pravastatin 40mg, had muscle aches on 80mg,  8/2017, pt reported he was only taking it once in a while, now taking it daily will repeat lipid panel. Diet and exercise discussed in detail.   Feb 2018,  July 2020. starting CRESTOR 10 qod, pt off it\par \par HTN: Have DC Lasix and changed to HCTZ weekly.  BP very good, Reduce Micardis  40 for persistent elevated K.  Increase HCTZ 25 to M & F. BP OK.  Micardis now at 20mg, DCed due to elevated Cr and K.  Tolerating Valsartan 40 mg. Labs were drawn today in Office. \par \par CKD: 1.33 8/8/2017, repeat BMP Sept 2017, Cr 1.05, previously normal, on ARB  1.31 and K 5.9.  .  July 2021. Cr 1.13, K 4.5, Mg 2.1,  Cr 1.75

## 2022-03-10 NOTE — REASON FOR VISIT
[FreeTextEntry1] : 76 -year-old male with history of high cholesterol, aortic aneurysm thoracic, hypertension, lumbago, pre-diabetes, Valve disease (status post AVR, MVr epair Dr. Caio Louise 9/9/2014). S/P  colonoscopy with Dr. Maximilian Cortez 9/14/17. Doing well overall, Denies CP, SOB, palpitations, orthopnea, LE swelling, dizziness, syncope. Walking and running daily, sometimes >10 miles, training for [place] marathon in 2018 after skipping 2017. \par \par s/p dental extraction and implant. Reports during dental cleaning he was told he bled a lot and dentist would prefer he stop aspirin for future dental extraction and implant. 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 LAHB, ST-Tw abnormalities. Blocked APC, V paced x 2 beats Pacer set at 40 1/22/19\par EKG: A Pacing, PVCs, 1st degree AVB, with a LAHB, possible IWMI, QTc 429 ST-Twave abnormalities.  10/10/19\par \par 3/18/19 A Fib discovered by Dr Jeff on 3/13/19, ASA changed to Eliquis\par 5/6/19 Pt with atypical pain in the axilla on the left, he thinks its the Eliquis.  No swelling or ecchymosis reported\par 7/15/19 K 5.5, Telmisartin reduced to 20mg. BP excellent.  c/o severe right shoulder pain.  Previously injected with relief.\par 9/23/19 Adm to  [facility]) with syncope and NSVT.  Trans to [facility].  VT ablation and Mexitil started.\par 10/10/19  Pt c/o chest  pressure, when questioned its sub Xiphoid.  Eating makes it better\par \par 11/14/19  New Deep Tw inversions, ? post pacing.  CCTA Nov 1 clean. Recently had a lot of "stomach" pains.  I suspect he has PUD, Trop sent, echo ordered\par 1/24/20 Here with several weeks of a cold/URI, saw an MD and given Z westley.  Here with wheezing, reduced exercise tolerance. + Wheezing, SOB, mild cough, non-productive, no fever or chills.\par Also here for clearance for cataract\par 10/15/20 Fullness in subxiphoid area.  (-) CTA Nov 2020.  Also has  off statin.\par 1/15/21 ARB DCed due to elevated Cr and K of 5.9, not taking Crestor due to nausea, knee pain, back pain.  More MONTEJO noted, will need to reassess Ao V\par 2/24/21 More MONTEJO again, getting worse, epigastric discomfort, worse if he hits a pot hole., or walking hard.  ?pleuritic component.  Feb 12-16 pt reports wt went up, not sleeping well.\par 4/5/21 In [facility] 4/16 to 4/19/21 with chest pain, + Nuc(fixed inferior and apical defects).  EF 23 %, Echo EF 30 %, mild to moderate MR. but (-) cath.  Still gets epigastric pain with hitting potholes.  + MONTEJO.  PAP 40 mm.  Feels better after HCTZ.\par 5/10/21 Had Shingles.  Had both Covid vaccines.  Having endoscopy 5/20/21\par 7/7/21 Dx with severe gastritis and H pylori.  Admitted to [facility] yesterday with AICD DC, 2/T VF. K 4.5, .   Amiodarone 400 mg BID started.\par 9/8/21 DC from [facility] 9/4/21 AFTER ADMISSION FOR ATRIAL TACHYCARDIA  seen by Dr Jeff. Pt had an upgrade to a BiV pacer.  Echo showed EF 25-30%.  I was called yesterday from the [facility] ER that he was there SOB and in mild HF.  Torsemide was increased to daily.\par \par 10/8/21  In [facility] ER 9/28/21 with SOB and abdominal tightness.  Cr was down to 1.32, BNP was 4237, up slightly.  We increased diuretic and sent pt home.  He feels dramatically better.  He is back on Torsemide 10mg daily because it causes dry mouth and he is reluctant to take more..\par \par 12/9/21 Abd tightness much better but not fully resolved.\par \par 3/10/22 Having different pain syndromes, has been to ER several times,   Still with SOB/MONTEJO and fatigue\par \par \par EKG:  AV PAced 9/8/21\par \par \par

## 2022-03-10 NOTE — REVIEW OF SYSTEMS
[Dyspnea on exertion] : dyspnea during exertion [Abdominal Pain] : abdominal pain [Tremor] : a tremor was seen [Negative] : Heme/Lymph [de-identified] : amio induced

## 2022-03-10 NOTE — PHYSICAL EXAM
[General Appearance - Well Developed] : well developed [Normal Appearance] : normal appearance [Well Groomed] : well groomed [General Appearance - Well Nourished] : well nourished [No Deformities] : no deformities [General Appearance - In No Acute Distress] : no acute distress [Normal Conjunctiva] : the conjunctiva exhibited no abnormalities [Eyelids - No Xanthelasma] : the eyelids demonstrated no xanthelasmas [No Oral Pallor] : no oral pallor [Normal Oral Mucosa] : normal oral mucosa [No Oral Cyanosis] : no oral cyanosis [Respiration, Rhythm And Depth] : normal respiratory rhythm and effort [Exaggerated Use Of Accessory Muscles For Inspiration] : no accessory muscle use [Auscultation Breath Sounds / Voice Sounds] : lungs were clear to auscultation bilaterally [Heart Rate And Rhythm] : heart rate and rhythm were normal [Heart Sounds] : normal S1 and S2 [Arterial Pulses Normal] : the arterial pulses were normal [Edema] : no peripheral edema present [Abdomen Soft] : soft [Abdomen Tenderness] : non-tender [Abdomen Mass (___ Cm)] : no abdominal mass palpated [Abnormal Walk] : normal gait [Gait - Sufficient For Exercise Testing] : the gait was sufficient for exercise testing [Nail Clubbing] : no clubbing of the fingernails [Cyanosis, Localized] : no localized cyanosis [Petechial Hemorrhages (___cm)] : no petechial hemorrhages [Skin Color & Pigmentation] : normal skin color and pigmentation [] : no rash [No Venous Stasis] : no venous stasis [Skin Lesions] : no skin lesions [No Skin Ulcers] : no skin ulcer [No Xanthoma] : no  xanthoma was observed [Oriented To Time, Place, And Person] : oriented to person, place, and time [Affect] : the affect was normal [Mood] : the mood was normal [No Anxiety] : not feeling anxious [FreeTextEntry1] : +3/6 systolic murmur

## 2022-03-10 NOTE — HISTORY OF PRESENT ILLNESS
[FreeTextEntry1] : PCP- Dr Sandhya Bloom\par EP- Dr Jeff\par CTS- Dr Garcia\par Plastics- Dr Ko\par Hand- Dr Xiong\par GI- Dr Cortez, colonoscopy Sept 2017\par Dentist: Dr. Jorge Oh  \par Ophtho- Dr Jesus Lantigua [phone]

## 2022-03-11 LAB
ABO + RH PNL BLD: NORMAL
ALBUMIN SERPL ELPH-MCNC: 4.3 G/DL
ALP BLD-CCNC: 54 U/L
ALT SERPL-CCNC: 14 U/L
ANION GAP SERPL CALC-SCNC: 14 MMOL/L
AST SERPL-CCNC: 21 U/L
BASOPHILS # BLD AUTO: 0.02 K/UL
BASOPHILS NFR BLD AUTO: 0.4 %
BILIRUB SERPL-MCNC: 0.5 MG/DL
BUN SERPL-MCNC: 29 MG/DL
CALCIUM SERPL-MCNC: 9.2 MG/DL
CHLORIDE SERPL-SCNC: 105 MMOL/L
CHOLEST SERPL-MCNC: 211 MG/DL
CO2 SERPL-SCNC: 26 MMOL/L
CREAT SERPL-MCNC: 1.87 MG/DL
EGFR: 36 ML/MIN/1.73M2
EOSINOPHIL # BLD AUTO: 0.01 K/UL
EOSINOPHIL NFR BLD AUTO: 0.2 %
ESTIMATED AVERAGE GLUCOSE: 137 MG/DL
GLUCOSE SERPL-MCNC: 100 MG/DL
HBA1C MFR BLD HPLC: 6.4 %
HCT VFR BLD CALC: 47.5 %
HDLC SERPL-MCNC: 82 MG/DL
HGB BLD-MCNC: 14.8 G/DL
IMM GRANULOCYTES NFR BLD AUTO: 0.2 %
LDLC SERPL CALC-MCNC: 117 MG/DL
LYMPHOCYTES # BLD AUTO: 2.31 K/UL
LYMPHOCYTES NFR BLD AUTO: 44.2 %
MAN DIFF?: NORMAL
MCHC RBC-ENTMCNC: 31.2 GM/DL
MCHC RBC-ENTMCNC: 32.5 PG
MCV RBC AUTO: 104.4 FL
MONOCYTES # BLD AUTO: 0.55 K/UL
MONOCYTES NFR BLD AUTO: 10.5 %
NEUTROPHILS # BLD AUTO: 2.33 K/UL
NEUTROPHILS NFR BLD AUTO: 44.5 %
NONHDLC SERPL-MCNC: 129 MG/DL
NT-PROBNP SERPL-MCNC: 2453 PG/ML
PLATELET # BLD AUTO: 190 K/UL
POTASSIUM SERPL-SCNC: 4.5 MMOL/L
PROT SERPL-MCNC: 8.1 G/DL
RBC # BLD: 4.55 M/UL
RBC # FLD: 14.3 %
SODIUM SERPL-SCNC: 145 MMOL/L
TRIGL SERPL-MCNC: 60 MG/DL
TSH SERPL-ACNC: 3.2 UIU/ML
WBC # FLD AUTO: 5.23 K/UL

## 2022-03-18 ENCOUNTER — RX RENEWAL (OUTPATIENT)
Age: 77
End: 2022-03-18

## 2022-04-20 ENCOUNTER — NON-APPOINTMENT (OUTPATIENT)
Age: 77
End: 2022-04-20

## 2022-04-20 ENCOUNTER — APPOINTMENT (OUTPATIENT)
Dept: HEART AND VASCULAR | Facility: CLINIC | Age: 77
End: 2022-04-20
Payer: COMMERCIAL

## 2022-04-20 PROCEDURE — 93296 REM INTERROG EVL PM/IDS: CPT

## 2022-04-20 PROCEDURE — 93295 DEV INTERROG REMOTE 1/2/MLT: CPT

## 2022-04-27 ENCOUNTER — APPOINTMENT (OUTPATIENT)
Dept: HEART AND VASCULAR | Facility: CLINIC | Age: 77
End: 2022-04-27
Payer: COMMERCIAL

## 2022-04-27 VITALS
TEMPERATURE: 98.1 F | WEIGHT: 179.5 LBS | HEART RATE: 80 BPM | OXYGEN SATURATION: 97 % | HEIGHT: 71 IN | BODY MASS INDEX: 25.13 KG/M2 | SYSTOLIC BLOOD PRESSURE: 119 MMHG | DIASTOLIC BLOOD PRESSURE: 88 MMHG

## 2022-04-27 PROCEDURE — 99215 OFFICE O/P EST HI 40 MIN: CPT

## 2022-04-27 RX ORDER — VALSARTAN 40 MG/1
40 TABLET, COATED ORAL
Qty: 90 | Refills: 3 | Status: DISCONTINUED | COMMUNITY
Start: 2021-04-30 | End: 2022-04-27

## 2022-04-27 NOTE — HISTORY OF PRESENT ILLNESS
[FreeTextEntry1] : Referring: Dr. Chandler\par \par Mr. Kenney is a 75 YO M with a history of ACC/AHA Stage  NICM (LV 6.2 cm, LVEF 15%) s/p ICD upgraded to CRT-D 9/2021 for chronic RV pacing, severe AI/MR s/p bioAVR/MVr 2014, history of VT/VT with ICD shocks and frequent PVC's s/p PVC ablation, pAF on eliquis, CKD IIIb (cr 1.8), and aortic aneurysm presenting to HF clinic for further management. \par \par He was very active at baseline and used to run marathons. He was diagnosed with a cardiomyopathy in 2014 with LVEF ~45% in the setting of severe AI with moderate-severe MR and subsequently underwent bioAVR and MVr in 2014. He underwent a primary prevention ICD shortly after surgery. He did well for many years after surgery and LVEF historically been 40-45%. He developed syncope 9/2019 related to VT/VF which self resolved and he underwent PVC ablation this admission. He was admitted 7/2021 with ICD shock for which he was started on amiodarone. His EF declined from 45% to 30% to 15% in 9/2021 and was being chronically RV paced so underwent CRT-D upgrade. He has not had a TTE since his CRT upgrade. Due to persistent dyspnea he was referred to HF clinic 4/2022. \par \par He presents today for HF consultation. He states he gets short of breath and fatigued after walking a 1/2 flight of stairs or 1-2 blocks. He denies issues during household activities. He notes orthopnea which improves with 1-2 pillows. Denies any lower extremity edema. Denies any dizziness or lightheadedness unless bending his head forward too much. Denies any recent ICD shocks. Reports good appetite and energy levels.

## 2022-04-27 NOTE — ASSESSMENT
[FreeTextEntry1] : 75 YO M with a history of ACC/AHA Stage  NICM (LV 6.2 cm, LVEF 15%) s/p ICD upgraded to CRT-D 9/2021 for chronic RV pacing, severe AI/MR s/p bioAVR/MVr 2014, history of VT/VT with ICD shocks and frequent PVC's s/p PVC ablation, pAF on eliquis, CKD IIIb (Cr 1.8), and aortic aneurysm presenting to HF clinic for further management. \par \par Today he reports NYHA II-III symptoms and appears euvolemic on exam. He has room for escalation of HF medical therapy. I am concerned for valvular disease based on physical exam and will obtain TTE (last 9 months ago) with low threshold for JIGAR.

## 2022-04-27 NOTE — PHYSICAL EXAM
[Well Developed] : well developed [Well Nourished] : well nourished [Clear Lung Fields] : clear lung fields [Good Air Entry] : good air entry [Soft] : abdomen soft [Normal Gait] : normal gait [Moves all extremities] : moves all extremities [No Focal Deficits] : no focal deficits [Alert and Oriented] : alert and oriented [Normal memory] : normal memory [de-identified] : PRAVIN, wearing mask  [de-identified] : JVP 10-12 cm H20 [de-identified] : Normal S1/S2, 2/6 midpeaking systolic murmur at the apex and 2/4 diastolic murmur heard at RLSB [de-identified] : Mildly tender in mid epigastrum  [de-identified] : Warm, no edema

## 2022-04-27 NOTE — DISCUSSION/SUMMARY
[FreeTextEntry1] : # Chronic systolic heart failure\par - He has not had a TTE since his CRT upgrade in 9/2021 to look for improvement, will obtain TTE next visit \par - Etiology: unclear etiology at this time, suspect infiltrative process given arrhythmia history. will defer MRI given device and age\par - GDMT: current regimen is metoprolol succinate 50/100, valsartan 40 mg daily, Farxiga 10 mg daily. will switch valsartan to entresto 24-26 mg BID. defer MRA due to CKD.\par - Diuretic: current regimen is torsemide 10 mg daily, will continue. mildly overloaded but starting Entresto  \par - Device: s/p CRT-D, well functioning with > 95% BiV pacing \par - Advanced therapies: will plan for RHC if still symptomatic on max GDMT and no valvular disease found \par - Labs: 3/10 with K 4.5 and Cr 1.9, will recheck next visit \par \par # Valvular disease\par - well functioning bioAVR and MVr on most recent TTE however notable diastolic murmur on exam\par - repeating TTE, will have low threshold for JIGAR\par \par # History of VT/VF with ICD shocks and frequent PVCs\par - follows with Dr. ARIZMENDI and on amiodarone\par \par # pAF\par - on eliquis, defer dosing to Dr. Chandler and Janell (right now on 2.5 mg BID) \par \par # Aortic aneurysm\par - has been stable over several years, follows with Dr. Garcia and Ramesh \par \par # CKD IIIb with baseline Cr 1.8\par - continue Farxiga \par - follows with Dr. Matthews\par \par Return to clinic in 6-8 weeks with TTE same day

## 2022-04-27 NOTE — CARDIOLOGY SUMMARY
[de-identified] : \par EKG 9/2021: a-paced, BiV paced\par  [de-identified] : \par ICD interrogation 4/2022: 98% BiV pacing, no VT events\par  [de-identified] : \par TTE 9/2021: LV 6.2 cm, LVEF 15%, LVOT VTI 7 cm, mild RV dysfunction, well functioning bioAVR, mean gradient 4 mmHg across MVr with mild-moderate MR, moderate TR, estimated PASP 31 mmHg, 4.6 cm ascending aorta\par  [de-identified] : \par [facility] 3/2021: normal coronary arteries \par

## 2022-05-02 ENCOUNTER — OUTPATIENT (OUTPATIENT)
Dept: OUTPATIENT SERVICES | Facility: HOSPITAL | Age: 77
LOS: 1 days | End: 2022-05-02
Payer: MEDICARE

## 2022-05-02 ENCOUNTER — RESULT REVIEW (OUTPATIENT)
Age: 77
End: 2022-05-02

## 2022-05-02 ENCOUNTER — APPOINTMENT (OUTPATIENT)
Dept: INTERNAL MEDICINE | Facility: CLINIC | Age: 77
End: 2022-05-02
Payer: MEDICARE

## 2022-05-02 VITALS
WEIGHT: 177 LBS | SYSTOLIC BLOOD PRESSURE: 106 MMHG | HEART RATE: 79 BPM | HEIGHT: 71 IN | TEMPERATURE: 94.8 F | DIASTOLIC BLOOD PRESSURE: 80 MMHG | BODY MASS INDEX: 24.78 KG/M2 | OXYGEN SATURATION: 96 %

## 2022-05-02 DIAGNOSIS — Z87.898 PERSONAL HISTORY OF OTHER SPECIFIED CONDITIONS: ICD-10-CM

## 2022-05-02 DIAGNOSIS — Z87.09 PERSONAL HISTORY OF OTHER DISEASES OF THE RESPIRATORY SYSTEM: ICD-10-CM

## 2022-05-02 DIAGNOSIS — I71.2 THORACIC AORTIC ANEURYSM, W/OUT RUPTURE: ICD-10-CM

## 2022-05-02 DIAGNOSIS — M54.32 SCIATICA, LEFT SIDE: ICD-10-CM

## 2022-05-02 DIAGNOSIS — Z98.89 OTHER SPECIFIED POSTPROCEDURAL STATES: Chronic | ICD-10-CM

## 2022-05-02 DIAGNOSIS — H92.02 OTALGIA, LEFT EAR: ICD-10-CM

## 2022-05-02 PROCEDURE — 72110 X-RAY EXAM L-2 SPINE 4/>VWS: CPT | Mod: 26

## 2022-05-02 PROCEDURE — 99214 OFFICE O/P EST MOD 30 MIN: CPT

## 2022-05-02 PROCEDURE — 72110 X-RAY EXAM L-2 SPINE 4/>VWS: CPT

## 2022-05-02 RX ORDER — AMOXICILLIN AND CLAVULANATE POTASSIUM 875; 125 MG/1; MG/1
875-125 TABLET, COATED ORAL
Qty: 20 | Refills: 0 | Status: COMPLETED | COMMUNITY
Start: 2022-02-10 | End: 2022-05-02

## 2022-05-02 RX ORDER — ZINC OXIDE AND COCOA BUTTER 270; 2052 MG/1; MG/1
76-10 SUPPOSITORY RECTAL
Qty: 90 | Refills: 0 | Status: COMPLETED | COMMUNITY
Start: 2021-09-27 | End: 2022-05-02

## 2022-05-02 RX ORDER — IPRATROPIUM BROMIDE 21 UG/1
0.03 SPRAY NASAL
Qty: 3 | Refills: 1 | Status: COMPLETED | COMMUNITY
Start: 2022-02-25 | End: 2022-05-02

## 2022-05-02 NOTE — PHYSICAL EXAM
[Normal] : no respiratory distress, lungs were clear to auscultation bilaterally and no accessory muscle use [Normal Rate] : normal rate  [No Edema] : there was no peripheral edema [Soft] : abdomen soft [Non Tender] : non-tender [Normal Bowel Sounds] : normal bowel sounds [No CVA Tenderness] : no CVA  tenderness [No Spinal Tenderness] : no spinal tenderness [No Joint Swelling] : no joint swelling [Grossly Normal Strength/Tone] : grossly normal strength/tone [Normal Affect] : the affect was normal [Alert and Oriented x3] : oriented to person, place, and time [Normal Insight/Judgement] : insight and judgment were intact [de-identified] : 3/6 murmur [de-identified] : slight discoloration under L breast but no obvious zoster rash

## 2022-05-02 NOTE — REVIEW OF SYSTEMS
[Earache] : no earache [Hearing Loss] : no hearing loss [Nosebleeds] : no nosebleeds [Postnasal Drip] : no postnasal drip [Nasal Discharge] : no nasal discharge [Sore Throat] : no sore throat [Hoarseness] : no hoarseness [Chest Pain] : chest pain [Palpitations] : no palpitations [Orthopena] : no orthopnea [Paroxysmal Nocturnal Dyspnea] : no paroxysmal nocturnal dyspnea [Skin Rash] : skin rash [Negative] : Heme/Lymph [FreeTextEntry4] : dry mouth [de-identified] : sciatic pain

## 2022-05-02 NOTE — ASSESSMENT
[FreeTextEntry1] : -Seeing HF specialist-now on Entresto. Too soon to repeat labs but seeing nephro in 2 weeks and will do then.\par -Adivsed keeping mouth as moist as possible with small sips, smaller more freuqnet meals.\par -He may have had shingles which is now resolving. Will follow.\par -Leg pain sciatic. WIll obtain plain film and likely start PT. Advised heat. \par

## 2022-05-02 NOTE — HISTORY OF PRESENT ILLNESS
[de-identified] : Mr. Kenney is a 77 YO M with a history of ACC/AHA Stage NICM (LV 6.2 cm, LVEF 15%) s/p ICD upgraded to CRT-D 9/2021 for chronic RV pacing, severe AI/MR s/p bioAVR/MVr 2014, history of VT/VT with ICD shocks and frequent PVC's s/p PVC ablation, pAF on eliquis, CKD IIIb (cr 1.8), and aortic aneurysm here for f/u. Last seen by me in October. \par \par Seen in HF clinic last week. \par \par He was very active at baseline and used to run marathons. He was diagnosed with a cardiomyopathy in 2014 with LVEF ~45% in the setting of severe AI with moderate-severe MR and subsequently underwent bioAVR and MVr in 2014. He underwent a primary prevention ICD shortly after surgery. He did well for many years after surgery and LVEF historically been 40-45%. He developed syncope 9/2019 related to VT/VF which self resolved and he underwent PVC ablation this admission. He was admitted 7/2021 with ICD shock for which he was started on amiodarone. His EF declined from 45% to 30% to 15% in 9/2021 and was being chronically RV paced so underwent CRT-D upgrade. He has not had a TTE since his CRT upgrade. He states he gets short of breath and fatigued after walking a 1/2 flight of stairs or 1-2 blocks. He denies issues during household activities. He notes orthopnea which improves with 1-2 pillows. Denies any lower extremity edema. Denies any dizziness or lightheadedness unless bending his head forward too much. Denies any recent ICD shocks. Entresto added to regimen (instead of ARB). \par \par He has several issues to discuss today.\par \par He has a dry mouth all the time. Thinks he is on "too many medications." Has to be careful with fluid intake due to HF. \par \sharon Has L buttock pain that radiates down L leg into calf when he sits for too long. He is a  and sits for hours at at time. Hasn't tried OTC meds or heat. \par \sharon Has had burning pain with slight discoloration on L chest wall for several weeks. No rash.

## 2022-05-09 ENCOUNTER — APPOINTMENT (OUTPATIENT)
Dept: HEART AND VASCULAR | Facility: CLINIC | Age: 77
End: 2022-05-09
Payer: MEDICARE

## 2022-05-09 ENCOUNTER — NON-APPOINTMENT (OUTPATIENT)
Age: 77
End: 2022-05-09

## 2022-05-09 PROCEDURE — 93306 TTE W/DOPPLER COMPLETE: CPT

## 2022-05-10 ENCOUNTER — APPOINTMENT (OUTPATIENT)
Age: 77
End: 2022-05-10
Payer: MEDICARE

## 2022-05-10 VITALS
HEART RATE: 80 BPM | TEMPERATURE: 96 F | HEIGHT: 71 IN | SYSTOLIC BLOOD PRESSURE: 122 MMHG | DIASTOLIC BLOOD PRESSURE: 85 MMHG | OXYGEN SATURATION: 97 % | WEIGHT: 179 LBS | BODY MASS INDEX: 25.06 KG/M2 | RESPIRATION RATE: 15 BRPM

## 2022-05-10 DIAGNOSIS — R07.89 OTHER CHEST PAIN: ICD-10-CM

## 2022-05-10 PROCEDURE — 99214 OFFICE O/P EST MOD 30 MIN: CPT

## 2022-05-10 NOTE — PHYSICAL EXAM
[General Appearance - Alert] : alert [General Appearance - In No Acute Distress] : in no acute distress [Sclera] : the sclera and conjunctiva were normal [Extraocular Movements] : extraocular movements were intact [Neck Appearance] : the appearance of the neck was normal [Abnormal Walk] : normal gait [Musculoskeletal - Swelling] : no joint swelling seen [Skin Color & Pigmentation] : normal skin color and pigmentation [Skin Turgor] : normal skin turgor [Cranial Nerves] : cranial nerves 2-12 were intact [Deep Tendon Reflexes (DTR)] : deep tendon reflexes were 2+ and symmetric [Oriented To Time, Place, And Person] : oriented to person, place, and time [Impaired Insight] : insight and judgment were intact [] : no respiratory distress [Abdomen Hernia] : no hernia was discovered [FreeTextEntry1] : tenderness of the upper epigastrum along the scar line, no signs of hernia

## 2022-05-10 NOTE — HISTORY OF PRESENT ILLNESS
[de-identified] : 76M with PMHx H.Pylori tx with quad therapy, afib AF/VT now s/p PPM (9/19), CKD, s/p AV and MV repairs, HTN, compensated diastolic chronic HF, prediabetes, hx as a marathon runner c/o continued substernal  tight chest pain, constant, non radiating since his AVR in 2014. Pain is along the line of patients scar. Pain isworse with inhalation and lying down. No alleviating factors. Pt saw cardiologist Dr. Cobos on 5/9/22 to undergo echo, report pending. Pt states he gets short of breath and fatigued after walking a 1/2 flight of stairs or 1-2 blocks. Pt is currently taking pantoprazole 40 mg and Pepcid 20 mg daily. Ct scan from 1/2022 of upper abdomen WNL. \par \par Diet- Vegetable, fruit, chicken, fish, limit fried foods\par \par Denies fever, chill, nausea, vomiting, wt loss, poor appetite, hematochezia, melena. \par \par Endoscopy 6/21: gastritis s/p biopsy (chronic gastritis w/ cryptitis and crypt distortion), sessile polyp in the stomach s/p polypectomy (hyperplastic polyp).\par Colonoscopy 2017: L sided diverticulosis, internal hemorrhoid (repeat in 7-10 year)

## 2022-05-10 NOTE — ASSESSMENT
[FreeTextEntry1] : Sternal pain along patients scar line\par - CT scan of chest 2021- Upper abdomen WNL\par - R/O gastric causes- no signs of hernia, r/o gastritis, r/o mass\par - Most likely due to scar tissue vs post-op issues s/p  AVR in 2014\par - Schedule f/u apt with Dr. Garcia to further evaluate nodule under the scar line

## 2022-05-18 ENCOUNTER — APPOINTMENT (OUTPATIENT)
Dept: HEART AND VASCULAR | Facility: CLINIC | Age: 77
End: 2022-05-18
Payer: COMMERCIAL

## 2022-05-18 VITALS — HEART RATE: 84 BPM | DIASTOLIC BLOOD PRESSURE: 77 MMHG | SYSTOLIC BLOOD PRESSURE: 128 MMHG

## 2022-05-18 VITALS — WEIGHT: 179 LBS | HEIGHT: 71 IN | BODY MASS INDEX: 25.06 KG/M2 | TEMPERATURE: 98.1 F

## 2022-05-18 PROCEDURE — 93284 PRGRMG EVAL IMPLANTABLE DFB: CPT

## 2022-05-19 ENCOUNTER — APPOINTMENT (OUTPATIENT)
Dept: NEPHROLOGY | Facility: CLINIC | Age: 77
End: 2022-05-19
Payer: MEDICARE

## 2022-05-19 ENCOUNTER — LABORATORY RESULT (OUTPATIENT)
Age: 77
End: 2022-05-19

## 2022-05-19 VITALS — DIASTOLIC BLOOD PRESSURE: 68 MMHG | HEART RATE: 79 BPM | SYSTOLIC BLOOD PRESSURE: 111 MMHG

## 2022-05-19 DIAGNOSIS — R39.15 URGENCY OF URINATION: ICD-10-CM

## 2022-05-19 DIAGNOSIS — R35.1 NOCTURIA: ICD-10-CM

## 2022-05-19 PROCEDURE — 99214 OFFICE O/P EST MOD 30 MIN: CPT | Mod: 25

## 2022-05-19 NOTE — ASSESSMENT
[FreeTextEntry1] : # CKD stage 3 likelliest due to type 2 cardiorenal syndrome and aging, possible DM nephropathy. \par * Recheck labs.\par * Will avoid MRA given previous hyperkalemia. \par * Therapies for kidney disease: SGLT2i; blood pressure control; proteinuria reduction with ARB/ACEi; other evidence-based therapies including exercise, a plant-based lower oxalate diet, and 400 mcg folic acid daily\par * Cardiovascular disease prevention: counseling on healthy diet, physical activity, weight loss, alcohol limitation, blood pressure control; cardiology evaluation/followup advised\par * The patient has been counseled that chronic kidney disease is a significant condition and regular office followup with me (at least every 1 months for now) is important for monitoring and their health, and that it is their responsibility to make a follow up appointment.\par * The patient has been counseled never to stop taking their medications without discussing it with me or another doctor.\par * The patient has been counseled on avoiding NSAIDs.\par * The patient has been counseled on risk of acute renal failure and instructed to immediately call and speak with me or go immediately to ER with any severe symptoms, nausea, vomiting, diarrhea, chest pain, or shortness of breath.\par * A counseling information sheet has been given (today or previously). All their questions were answered.\par \par # Hyperkalemia.\par * Low K diet. Recheck K. \par \par # HTN controlled.No lightheadedness. \par * Cont entresto, metoprolol, torsemide.\par * The patient's blood pressure was checked with the [employer] [id number] using the SPRINT trial protocol after sitting quietly in an empty room with arm supported, back supported, and feet on the floor for 5 minutes. The average of 3 readings were taken.\par * A counseling information sheet has been given (currently or previously, in-person or electronically). All their questions were answered.\par * The patient has been counseled to check their BP at home with an automatic arm cuff, write down the readings, and reach me directly on the phone immediately if they are persistently > 180 systolic or if SBP is less than 100 or if lightheadedness develops. They were counseled to bring in all blood pressure readings and medications next visit.\par * The patient has been counseled that regular office followup (at least every 1 months for now) is important for monitoring and for their health, and that it is their responsibility to make follow up appointments.\par * The patient also has been counseled that they must never stop or change any medications without discussing this with me (or another physician). \par \par # Monoclonal gammopathy.\par * Advised to follow up with hematology. Myeloma, amyloid, and MGRS are unlikely. \par \par # Urinary urgency, nocturia.\par * Check PSA.\par * A point of care renal ultrasound using the Butterfly iQ was performed and the images were personally reviewed. (The patient understands that this was a brief study to evaluate for hydronephrosis and not a complete renal ultrasound.) The ultrasound showed no significant hydronephrosis. \par * Urology referral. They were instructed that this referral is important for their health and that it is their responsibility to make and keep this appointment. All their questions were answered. \par

## 2022-05-19 NOTE — HISTORY OF PRESENT ILLNESS
[FreeTextEntry1] : Kindly referred by Dr. Sandhya Bloom elevated creatinine. [employer]\par \par * CKD stable, creatinine 1.87. * HTN controlled. No lightheadedness. No CP/SOB. Compliant with medications.  Following up with Dr. Hanson for monoclonal gammopathy. * Hyperkalemia controlled. * He has urinary urgency for several weeks. No dysuria. 5x nocturia. \par \par Previous history (22Feb22): * HTN controlled.  - 120s / 80s at home. No lightheadedness. Compliant with medications. 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 \par \par 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 * CKD progressive, creatinine 1.91. Albuminuria decreased to 48. * Occasional chronic abd pain being evaluated, none currently. \par \par Previous history (06Dec21): * CKD stable, creatinine 1.51.  Albuminuria decreased to 91 mg from 3280 mg (?). Farxiga 10 started. 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 * HTN controlled. BP 120s at home. No lightheadedness. Compliant with medications. \par \par Previous history (10Nov21): * HTN controlled. BP 110s at home. No lightheadedness. Compliant with medications. * 3280 mg albuminuria. * CKD stable, creatinine 1.45. 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 \par \par Previous history (09Sep21): * Events reviewed. Admitted to [facility] for CHF exacerbation and ICD upgrade. EF < 23 - 30.* CKD stable, creatinine 1.55.  Labs from 9/7 reviewed. Creatinine 1.45. K 4. 298 mg albuminuria.  * Hyperkalemia controlled. Not on lokelma or veltassa.  * HTN controlled. /80s at home. No lightheadedness. Compliant with medications. 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 \par \par Previous history (31Aug21): * Dx with Covid 3 weeks ago. Now no cough, or other symptoms. Did not require hospitalization. He has SOB with walking walking 1 block. * Lokelma started for hyperkalemia (K of 6). He is now following low potassium diet. . * CKD stable, creatinine 1.86. * He had stopped torsemide. BP 100s. \par \par Previous history (03Aug21): Labs reviewed. Baseline eGFR 43 - 56 since 2019. On 11Tab58, his creatinine increased to 1.82 (eGFR 35 - 41). The patient denies exposure to chronic NSAIDs, chronic PPIs, creatine, or herbal supplements. The patient denies a history of kidney stones or pyelonephritis. 4 - 5 times. No recent renal ultrasound. They are unaware of proteinuria or hematuria.\par \par EF 23% in 4/21. On amiodarone, metoprolol, lasix 20 qod, HCTZ twice weekly valsartan 40. SOB with one block walking. Stable LE edema. ProBNP incresaed from 515 to 1360.\par \par * HTN controlled.  No lightheadedness. Compliant with medications. He believes he is taking amlodipine.

## 2022-05-20 VITALS
HEIGHT: 71 IN | DIASTOLIC BLOOD PRESSURE: 74 MMHG | HEART RATE: 84 BPM | WEIGHT: 182.1 LBS | RESPIRATION RATE: 17 BRPM | TEMPERATURE: 103 F | OXYGEN SATURATION: 96 % | SYSTOLIC BLOOD PRESSURE: 103 MMHG

## 2022-05-20 LAB
ALBUMIN SERPL ELPH-MCNC: 3.4 G/DL — SIGNIFICANT CHANGE UP (ref 3.3–5)
ALBUMIN SERPL ELPH-MCNC: 4.1 G/DL
ALP BLD-CCNC: 52 U/L
ALP SERPL-CCNC: 50 U/L — SIGNIFICANT CHANGE UP (ref 40–120)
ALT FLD-CCNC: 19 U/L — SIGNIFICANT CHANGE UP (ref 10–45)
ALT SERPL-CCNC: 15 U/L
ANION GAP SERPL CALC-SCNC: 13 MMOL/L — SIGNIFICANT CHANGE UP (ref 5–17)
ANION GAP SERPL CALC-SCNC: 14 MMOL/L
APPEARANCE UR: CLEAR — SIGNIFICANT CHANGE UP
APPEARANCE: CLEAR
APTT BLD: 34 SEC — SIGNIFICANT CHANGE UP (ref 27.5–35.5)
AST SERPL-CCNC: 19 U/L
AST SERPL-CCNC: 37 U/L — SIGNIFICANT CHANGE UP (ref 10–40)
BASOPHILS # BLD AUTO: 0.01 K/UL — SIGNIFICANT CHANGE UP (ref 0–0.2)
BASOPHILS # BLD AUTO: 0.02 K/UL
BASOPHILS NFR BLD AUTO: 0.2 % — SIGNIFICANT CHANGE UP (ref 0–2)
BASOPHILS NFR BLD AUTO: 0.3 %
BILIRUB SERPL-MCNC: 0.5 MG/DL — SIGNIFICANT CHANGE UP (ref 0.2–1.2)
BILIRUB SERPL-MCNC: 0.6 MG/DL
BILIRUB UR-MCNC: NEGATIVE — SIGNIFICANT CHANGE UP
BILIRUBIN URINE: NEGATIVE
BLOOD URINE: NEGATIVE
BUN SERPL-MCNC: 24 MG/DL
BUN SERPL-MCNC: 24 MG/DL — HIGH (ref 7–23)
CALCIUM SERPL-MCNC: 8.9 MG/DL — SIGNIFICANT CHANGE UP (ref 8.4–10.5)
CALCIUM SERPL-MCNC: 9.4 MG/DL
CHLORIDE SERPL-SCNC: 102 MMOL/L
CHLORIDE SERPL-SCNC: 99 MMOL/L — SIGNIFICANT CHANGE UP (ref 96–108)
CO2 SERPL-SCNC: 25 MMOL/L
CO2 SERPL-SCNC: 25 MMOL/L — SIGNIFICANT CHANGE UP (ref 22–31)
COLOR SPEC: YELLOW — SIGNIFICANT CHANGE UP
COLOR: YELLOW
CREAT SERPL-MCNC: 1.65 MG/DL — HIGH (ref 0.5–1.3)
CREAT SERPL-MCNC: 1.86 MG/DL
CYSTATIN C SERPL-MCNC: 1.27 MG/L
DIFF PNL FLD: ABNORMAL
EGFR: 37 ML/MIN/1.73M2
EGFR: 42 ML/MIN/1.73M2 — LOW
EOSINOPHIL # BLD AUTO: 0 K/UL
EOSINOPHIL # BLD AUTO: 0 K/UL — SIGNIFICANT CHANGE UP (ref 0–0.5)
EOSINOPHIL NFR BLD AUTO: 0 %
EOSINOPHIL NFR BLD AUTO: 0 % — SIGNIFICANT CHANGE UP (ref 0–6)
FLUAV AG NPH QL: SIGNIFICANT CHANGE UP
FLUBV AG NPH QL: SIGNIFICANT CHANGE UP
GAS PNL BLDV: SIGNIFICANT CHANGE UP
GFR/BSA.PRED SERPLBLD CYS-BASED-ARV: 53 ML/MIN/1.73M2
GLUCOSE QUALITATIVE U: ABNORMAL
GLUCOSE SERPL-MCNC: 103 MG/DL
GLUCOSE SERPL-MCNC: 113 MG/DL — HIGH (ref 70–99)
GLUCOSE UR QL: >=1000
HCT VFR BLD CALC: 42.4 % — SIGNIFICANT CHANGE UP (ref 39–50)
HCT VFR BLD CALC: 46 %
HGB BLD-MCNC: 13.5 G/DL — SIGNIFICANT CHANGE UP (ref 13–17)
HGB BLD-MCNC: 14.7 G/DL
IMM GRANULOCYTES NFR BLD AUTO: 0.3 % — SIGNIFICANT CHANGE UP (ref 0–1.5)
IMM GRANULOCYTES NFR BLD AUTO: 0.4 %
INR BLD: 1.5 — HIGH (ref 0.88–1.16)
KETONES UR-MCNC: 15 MG/DL
KETONES URINE: ABNORMAL
LACTATE SERPL-SCNC: 1.3 MMOL/L — SIGNIFICANT CHANGE UP (ref 0.5–2)
LEUKOCYTE ESTERASE UR-ACNC: NEGATIVE — SIGNIFICANT CHANGE UP
LEUKOCYTE ESTERASE URINE: NEGATIVE
LYMPHOCYTES # BLD AUTO: 1.18 K/UL — SIGNIFICANT CHANGE UP (ref 1–3.3)
LYMPHOCYTES # BLD AUTO: 1.37 K/UL
LYMPHOCYTES # BLD AUTO: 18.2 % — SIGNIFICANT CHANGE UP (ref 13–44)
LYMPHOCYTES NFR BLD AUTO: 19.1 %
MAGNESIUM SERPL-MCNC: 2.2 MG/DL
MAN DIFF?: NORMAL
MCHC RBC-ENTMCNC: 31.8 GM/DL — LOW (ref 32–36)
MCHC RBC-ENTMCNC: 31.9 PG — SIGNIFICANT CHANGE UP (ref 27–34)
MCHC RBC-ENTMCNC: 32 GM/DL
MCHC RBC-ENTMCNC: 33 PG
MCV RBC AUTO: 100.2 FL — HIGH (ref 80–100)
MCV RBC AUTO: 103.4 FL
MONOCYTES # BLD AUTO: 0.77 K/UL
MONOCYTES # BLD AUTO: 0.79 K/UL — SIGNIFICANT CHANGE UP (ref 0–0.9)
MONOCYTES NFR BLD AUTO: 10.8 %
MONOCYTES NFR BLD AUTO: 12.2 % — SIGNIFICANT CHANGE UP (ref 2–14)
NEUTROPHILS # BLD AUTO: 4.5 K/UL — SIGNIFICANT CHANGE UP (ref 1.8–7.4)
NEUTROPHILS # BLD AUTO: 4.97 K/UL
NEUTROPHILS NFR BLD AUTO: 69.1 % — SIGNIFICANT CHANGE UP (ref 43–77)
NEUTROPHILS NFR BLD AUTO: 69.4 %
NITRITE UR-MCNC: NEGATIVE — SIGNIFICANT CHANGE UP
NITRITE URINE: NEGATIVE
NRBC # BLD: 0 /100 WBCS — SIGNIFICANT CHANGE UP (ref 0–0)
PH UR: 6 — SIGNIFICANT CHANGE UP (ref 5–8)
PH URINE: 5.5
PHOSPHATE SERPL-MCNC: 3.6 MG/DL
PLATELET # BLD AUTO: 172 K/UL — SIGNIFICANT CHANGE UP (ref 150–400)
PLATELET # BLD AUTO: 202 K/UL
POTASSIUM SERPL-MCNC: 4.6 MMOL/L — SIGNIFICANT CHANGE UP (ref 3.5–5.3)
POTASSIUM SERPL-SCNC: 4.6 MMOL/L — SIGNIFICANT CHANGE UP (ref 3.5–5.3)
POTASSIUM SERPL-SCNC: 5.1 MMOL/L
PROT SERPL-MCNC: 7.7 G/DL — SIGNIFICANT CHANGE UP (ref 6–8.3)
PROT SERPL-MCNC: 8.3 G/DL
PROT UR-MCNC: 100 MG/DL
PROTEIN URINE: ABNORMAL
PROTHROM AB SERPL-ACNC: 17.9 SEC — HIGH (ref 10.5–13.4)
PSA SERPL-MCNC: 1.64 NG/ML
RBC # BLD: 4.23 M/UL — SIGNIFICANT CHANGE UP (ref 4.2–5.8)
RBC # BLD: 4.45 M/UL
RBC # FLD: 14.1 % — SIGNIFICANT CHANGE UP (ref 10.3–14.5)
RBC # FLD: 14.4 %
RSV RNA NPH QL NAA+NON-PROBE: SIGNIFICANT CHANGE UP
SARS-COV-2 RNA SPEC QL NAA+PROBE: NEGATIVE — SIGNIFICANT CHANGE UP
SARS-COV-2 RNA SPEC QL NAA+PROBE: SIGNIFICANT CHANGE UP
SODIUM SERPL-SCNC: 137 MMOL/L — SIGNIFICANT CHANGE UP (ref 135–145)
SODIUM SERPL-SCNC: 141 MMOL/L
SP GR SPEC: 1.02 — SIGNIFICANT CHANGE UP (ref 1–1.03)
SPECIFIC GRAVITY URINE: 1.02
UROBILINOGEN FLD QL: 0.2 E.U./DL — SIGNIFICANT CHANGE UP
UROBILINOGEN URINE: NORMAL
WBC # BLD: 6.5 K/UL — SIGNIFICANT CHANGE UP (ref 3.8–10.5)
WBC # FLD AUTO: 6.5 K/UL — SIGNIFICANT CHANGE UP (ref 3.8–10.5)
WBC # FLD AUTO: 7.16 K/UL

## 2022-05-20 PROCEDURE — 71045 X-RAY EXAM CHEST 1 VIEW: CPT | Mod: 26

## 2022-05-20 PROCEDURE — 99285 EMERGENCY DEPT VISIT HI MDM: CPT | Mod: CS,25

## 2022-05-20 RX ORDER — SODIUM CHLORIDE 9 MG/ML
1000 INJECTION INTRAMUSCULAR; INTRAVENOUS; SUBCUTANEOUS ONCE
Refills: 0 | Status: COMPLETED | OUTPATIENT
Start: 2022-05-20 | End: 2022-05-20

## 2022-05-20 RX ORDER — ACETAMINOPHEN 500 MG
650 TABLET ORAL ONCE
Refills: 0 | Status: COMPLETED | OUTPATIENT
Start: 2022-05-20 | End: 2022-05-20

## 2022-05-20 RX ORDER — CEFTRIAXONE 500 MG/1
2000 INJECTION, POWDER, FOR SOLUTION INTRAMUSCULAR; INTRAVENOUS ONCE
Refills: 0 | Status: COMPLETED | OUTPATIENT
Start: 2022-05-20 | End: 2022-05-20

## 2022-05-20 RX ORDER — AZITHROMYCIN 500 MG/1
500 TABLET, FILM COATED ORAL ONCE
Refills: 0 | Status: COMPLETED | OUTPATIENT
Start: 2022-05-20 | End: 2022-05-20

## 2022-05-20 RX ADMIN — Medication 650 MILLIGRAM(S): at 21:23

## 2022-05-20 RX ADMIN — CEFTRIAXONE 100 MILLIGRAM(S): 500 INJECTION, POWDER, FOR SOLUTION INTRAMUSCULAR; INTRAVENOUS at 21:24

## 2022-05-20 RX ADMIN — SODIUM CHLORIDE 1000 MILLILITER(S): 9 INJECTION INTRAMUSCULAR; INTRAVENOUS; SUBCUTANEOUS at 21:24

## 2022-05-20 RX ADMIN — SODIUM CHLORIDE 1000 MILLILITER(S): 9 INJECTION INTRAMUSCULAR; INTRAVENOUS; SUBCUTANEOUS at 23:16

## 2022-05-20 RX ADMIN — AZITHROMYCIN 255 MILLIGRAM(S): 500 TABLET, FILM COATED ORAL at 21:56

## 2022-05-20 NOTE — ED ADULT NURSE NOTE - OBJECTIVE STATEMENT
Pt AOX4. Pt c/o fevers at home (TMAX 103F) and generalized headache x3 days. Pt endorses productive cough and MONTEJO. Pt denies n/v, chest pain, weakness, numbness, tingling, changes in vision, dizziness. Pt speaking in full complete sentences. Respirations even and unlabored. Reports taking Tylenol at home with no pain relief.

## 2022-05-20 NOTE — ED ADULT NURSE NOTE - NSICDXPASTSURGICALHX_GEN_ALL_CORE_FT
with patient
PAST SURGICAL HISTORY:  History of open heart surgery     Other postprocedural status Right Shoulder    Other postprocedural status S/P right knee arthroscopy

## 2022-05-20 NOTE — ED PROVIDER NOTE - CLINICAL SUMMARY MEDICAL DECISION MAKING FREE TEXT BOX
several days cough/ fatigue/ fever. concern for infection/ sepsis.  labs/ blood and urine cultures obtained, ua to r/o uti, cxr to r/o pneumonia. presentation most suggestive of viral etiology so initially not given ivf/ antibiotics. 1L ivf given for bp 103/73. cxr with focal consolidation rll. ceftriaxone/ azithro given. on reassessment bp lower at 98/62. additional 1L ivf given with improvement. admit to medicine for further management of cap

## 2022-05-20 NOTE — ED PROVIDER NOTE - OBJECTIVE STATEMENT
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 (2014; Dr. Jeff; thoracic aortic aneurysm, known non obstructive CAD by cardiac catheterization ([facility] 9/2019), here with fever, body aches, fatigue, headache, mild cough for the past 3-4 days. Vaccinated for covid/ flu. Denies n/v/d, urinary symptoms, abdominal pain, chest pain. Took tylenol this afternoon for symptoms. Denies sick contacts

## 2022-05-21 ENCOUNTER — INPATIENT (INPATIENT)
Facility: HOSPITAL | Age: 77
LOS: 0 days | Discharge: HOME | DRG: 194 | End: 2022-05-22
Attending: STUDENT IN AN ORGANIZED HEALTH CARE EDUCATION/TRAINING PROGRAM
Payer: COMMERCIAL

## 2022-05-21 DIAGNOSIS — Z00.00 ENCOUNTER FOR GENERAL ADULT MEDICAL EXAMINATION WITHOUT ABNORMAL FINDINGS: ICD-10-CM

## 2022-05-21 DIAGNOSIS — Z98.89 OTHER SPECIFIED POSTPROCEDURAL STATES: Chronic | ICD-10-CM

## 2022-05-21 DIAGNOSIS — I50.22 CHRONIC SYSTOLIC (CONGESTIVE) HEART FAILURE: ICD-10-CM

## 2022-05-21 DIAGNOSIS — N18.30 CHRONIC KIDNEY DISEASE, STAGE 3 UNSPECIFIED: ICD-10-CM

## 2022-05-21 DIAGNOSIS — J18.9 PNEUMONIA, UNSPECIFIED ORGANISM: ICD-10-CM

## 2022-05-21 DIAGNOSIS — R50.9 FEVER, UNSPECIFIED: ICD-10-CM

## 2022-05-21 LAB
ALBUMIN SERPL ELPH-MCNC: 2.8 G/DL — LOW (ref 3.3–5)
ALP SERPL-CCNC: 44 U/L — SIGNIFICANT CHANGE UP (ref 40–120)
ALT FLD-CCNC: 18 U/L — SIGNIFICANT CHANGE UP (ref 10–45)
ANION GAP SERPL CALC-SCNC: 8 MMOL/L — SIGNIFICANT CHANGE UP (ref 5–17)
AST SERPL-CCNC: 34 U/L — SIGNIFICANT CHANGE UP (ref 10–40)
BILIRUB SERPL-MCNC: 0.3 MG/DL — SIGNIFICANT CHANGE UP (ref 0.2–1.2)
BUN SERPL-MCNC: 19 MG/DL — SIGNIFICANT CHANGE UP (ref 7–23)
CALCIUM SERPL-MCNC: 8.4 MG/DL — SIGNIFICANT CHANGE UP (ref 8.4–10.5)
CHLORIDE SERPL-SCNC: 106 MMOL/L — SIGNIFICANT CHANGE UP (ref 96–108)
CO2 SERPL-SCNC: 23 MMOL/L — SIGNIFICANT CHANGE UP (ref 22–31)
CREAT SERPL-MCNC: 1.49 MG/DL — HIGH (ref 0.5–1.3)
EGFR: 48 ML/MIN/1.73M2 — LOW
GLUCOSE SERPL-MCNC: 116 MG/DL — HIGH (ref 70–99)
HCT VFR BLD CALC: 38.5 % — LOW (ref 39–50)
HGB BLD-MCNC: 12.6 G/DL — LOW (ref 13–17)
LEGIONELLA AG UR QL: NEGATIVE — SIGNIFICANT CHANGE UP
MAGNESIUM SERPL-MCNC: 2.3 MG/DL — SIGNIFICANT CHANGE UP (ref 1.6–2.6)
MCHC RBC-ENTMCNC: 32.7 GM/DL — SIGNIFICANT CHANGE UP (ref 32–36)
MCHC RBC-ENTMCNC: 33.2 PG — SIGNIFICANT CHANGE UP (ref 27–34)
MCV RBC AUTO: 101.6 FL — HIGH (ref 80–100)
NRBC # BLD: 0 /100 WBCS — SIGNIFICANT CHANGE UP (ref 0–0)
PHOSPHATE SERPL-MCNC: 3.1 MG/DL — SIGNIFICANT CHANGE UP (ref 2.5–4.5)
PLATELET # BLD AUTO: 160 K/UL — SIGNIFICANT CHANGE UP (ref 150–400)
POTASSIUM SERPL-MCNC: 4.9 MMOL/L — SIGNIFICANT CHANGE UP (ref 3.5–5.3)
POTASSIUM SERPL-SCNC: 4.9 MMOL/L — SIGNIFICANT CHANGE UP (ref 3.5–5.3)
PROCALCITONIN SERPL-MCNC: 0.6 NG/ML — HIGH (ref 0.02–0.1)
PROT SERPL-MCNC: 6.7 G/DL — SIGNIFICANT CHANGE UP (ref 6–8.3)
RBC # BLD: 3.79 M/UL — LOW (ref 4.2–5.8)
RBC # FLD: 14.1 % — SIGNIFICANT CHANGE UP (ref 10.3–14.5)
SODIUM SERPL-SCNC: 137 MMOL/L — SIGNIFICANT CHANGE UP (ref 135–145)
WBC # BLD: 5.46 K/UL — SIGNIFICANT CHANGE UP (ref 3.8–10.5)
WBC # FLD AUTO: 5.46 K/UL — SIGNIFICANT CHANGE UP (ref 3.8–10.5)

## 2022-05-21 PROCEDURE — 99223 1ST HOSP IP/OBS HIGH 75: CPT

## 2022-05-21 RX ORDER — MAGNESIUM OXIDE 400 MG ORAL TABLET 241.3 MG
400 TABLET ORAL DAILY
Refills: 0 | Status: DISCONTINUED | OUTPATIENT
Start: 2022-05-21 | End: 2022-05-22

## 2022-05-21 RX ORDER — METOPROLOL TARTRATE 50 MG
50 TABLET ORAL EVERY 24 HOURS
Refills: 0 | Status: DISCONTINUED | OUTPATIENT
Start: 2022-05-21 | End: 2022-05-21

## 2022-05-21 RX ORDER — APIXABAN 2.5 MG/1
2.5 TABLET, FILM COATED ORAL
Refills: 0 | Status: DISCONTINUED | OUTPATIENT
Start: 2022-05-21 | End: 2022-05-22

## 2022-05-21 RX ORDER — DAPAGLIFLOZIN 10 MG/1
10 TABLET, FILM COATED ORAL EVERY 24 HOURS
Refills: 0 | Status: DISCONTINUED | OUTPATIENT
Start: 2022-05-21 | End: 2022-05-22

## 2022-05-21 RX ORDER — IPRATROPIUM/ALBUTEROL SULFATE 18-103MCG
3 AEROSOL WITH ADAPTER (GRAM) INHALATION EVERY 6 HOURS
Refills: 0 | Status: DISCONTINUED | OUTPATIENT
Start: 2022-05-21 | End: 2022-05-22

## 2022-05-21 RX ORDER — ATORVASTATIN CALCIUM 80 MG/1
80 TABLET, FILM COATED ORAL AT BEDTIME
Refills: 0 | Status: DISCONTINUED | OUTPATIENT
Start: 2022-05-21 | End: 2022-05-22

## 2022-05-21 RX ORDER — AZITHROMYCIN 500 MG/1
500 TABLET, FILM COATED ORAL EVERY 24 HOURS
Refills: 0 | Status: DISCONTINUED | OUTPATIENT
Start: 2022-05-21 | End: 2022-05-22

## 2022-05-21 RX ORDER — CEFTRIAXONE 500 MG/1
1000 INJECTION, POWDER, FOR SOLUTION INTRAMUSCULAR; INTRAVENOUS EVERY 24 HOURS
Refills: 0 | Status: DISCONTINUED | OUTPATIENT
Start: 2022-05-21 | End: 2022-05-22

## 2022-05-21 RX ORDER — VALSARTAN 80 MG/1
1 TABLET ORAL
Qty: 0 | Refills: 0 | DISCHARGE

## 2022-05-21 RX ORDER — METOPROLOL TARTRATE 50 MG
100 TABLET ORAL
Refills: 0 | Status: DISCONTINUED | OUTPATIENT
Start: 2022-05-21 | End: 2022-05-21

## 2022-05-21 RX ORDER — ACETAMINOPHEN 500 MG
650 TABLET ORAL EVERY 6 HOURS
Refills: 0 | Status: DISCONTINUED | OUTPATIENT
Start: 2022-05-21 | End: 2022-05-22

## 2022-05-21 RX ORDER — METOPROLOL TARTRATE 50 MG
50 TABLET ORAL DAILY
Refills: 0 | Status: DISCONTINUED | OUTPATIENT
Start: 2022-05-22 | End: 2022-05-22

## 2022-05-21 RX ORDER — SACUBITRIL AND VALSARTAN 24; 26 MG/1; MG/1
1 TABLET, FILM COATED ORAL
Refills: 0 | Status: DISCONTINUED | OUTPATIENT
Start: 2022-05-21 | End: 2022-05-22

## 2022-05-21 RX ORDER — AMIODARONE HYDROCHLORIDE 400 MG/1
200 TABLET ORAL DAILY
Refills: 0 | Status: DISCONTINUED | OUTPATIENT
Start: 2022-05-21 | End: 2022-05-22

## 2022-05-21 RX ORDER — PANTOPRAZOLE SODIUM 20 MG/1
40 TABLET, DELAYED RELEASE ORAL
Refills: 0 | Status: DISCONTINUED | OUTPATIENT
Start: 2022-05-21 | End: 2022-05-22

## 2022-05-21 RX ORDER — GABAPENTIN 400 MG/1
100 CAPSULE ORAL DAILY
Refills: 0 | Status: DISCONTINUED | OUTPATIENT
Start: 2022-05-21 | End: 2022-05-22

## 2022-05-21 RX ORDER — CHOLECALCIFEROL (VITAMIN D3) 125 MCG
400 CAPSULE ORAL DAILY
Refills: 0 | Status: DISCONTINUED | OUTPATIENT
Start: 2022-05-21 | End: 2022-05-22

## 2022-05-21 RX ORDER — ENOXAPARIN SODIUM 100 MG/ML
40 INJECTION SUBCUTANEOUS EVERY 24 HOURS
Refills: 0 | Status: DISCONTINUED | OUTPATIENT
Start: 2022-05-21 | End: 2022-05-21

## 2022-05-21 RX ORDER — ALBUTEROL 90 UG/1
3 AEROSOL, METERED ORAL EVERY 6 HOURS
Refills: 0 | Status: DISCONTINUED | OUTPATIENT
Start: 2022-05-21 | End: 2022-05-22

## 2022-05-21 RX ORDER — TRAMADOL HYDROCHLORIDE 50 MG/1
50 TABLET ORAL EVERY 6 HOURS
Refills: 0 | Status: DISCONTINUED | OUTPATIENT
Start: 2022-05-21 | End: 2022-05-22

## 2022-05-21 RX ORDER — METOPROLOL TARTRATE 50 MG
100 TABLET ORAL AT BEDTIME
Refills: 0 | Status: DISCONTINUED | OUTPATIENT
Start: 2022-05-21 | End: 2022-05-22

## 2022-05-21 RX ADMIN — APIXABAN 2.5 MILLIGRAM(S): 2.5 TABLET, FILM COATED ORAL at 09:37

## 2022-05-21 RX ADMIN — SACUBITRIL AND VALSARTAN 1 TABLET(S): 24; 26 TABLET, FILM COATED ORAL at 17:59

## 2022-05-21 RX ADMIN — Medication 650 MILLIGRAM(S): at 03:29

## 2022-05-21 RX ADMIN — APIXABAN 2.5 MILLIGRAM(S): 2.5 TABLET, FILM COATED ORAL at 21:24

## 2022-05-21 RX ADMIN — AZITHROMYCIN 255 MILLIGRAM(S): 500 TABLET, FILM COATED ORAL at 17:59

## 2022-05-21 RX ADMIN — PANTOPRAZOLE SODIUM 40 MILLIGRAM(S): 20 TABLET, DELAYED RELEASE ORAL at 07:04

## 2022-05-21 RX ADMIN — Medication 400 UNIT(S): at 11:34

## 2022-05-21 RX ADMIN — CEFTRIAXONE 100 MILLIGRAM(S): 500 INJECTION, POWDER, FOR SOLUTION INTRAMUSCULAR; INTRAVENOUS at 16:33

## 2022-05-21 RX ADMIN — AMIODARONE HYDROCHLORIDE 200 MILLIGRAM(S): 400 TABLET ORAL at 09:37

## 2022-05-21 RX ADMIN — MAGNESIUM OXIDE 400 MG ORAL TABLET 400 MILLIGRAM(S): 241.3 TABLET ORAL at 11:33

## 2022-05-21 RX ADMIN — Medication 100 MILLIGRAM(S): at 21:24

## 2022-05-21 RX ADMIN — Medication 650 MILLIGRAM(S): at 17:54

## 2022-05-21 RX ADMIN — Medication 650 MILLIGRAM(S): at 16:33

## 2022-05-21 RX ADMIN — GABAPENTIN 100 MILLIGRAM(S): 400 CAPSULE ORAL at 21:24

## 2022-05-21 RX ADMIN — DAPAGLIFLOZIN 10 MILLIGRAM(S): 10 TABLET, FILM COATED ORAL at 10:54

## 2022-05-21 RX ADMIN — ATORVASTATIN CALCIUM 80 MILLIGRAM(S): 80 TABLET, FILM COATED ORAL at 21:24

## 2022-05-21 NOTE — PROGRESS NOTE ADULT - SUBJECTIVE AND OBJECTIVE BOX
OVERNIGHT EVENTS: Admitted o/n, MAHAD    SUBJECTIVE / INTERVAL HPI: Patient seen and examined at bedside. Says he feels much better. No CP or SOB. No abd pain, n/v/d/c.    VITAL SIGNS:  Vital Signs Last 24 Hrs  T(C): 39.2 (21 May 2022 16:23), Max: 39.3 (20 May 2022 19:23)  T(F): 102.6 (21 May 2022 16:23), Max: 102.7 (20 May 2022 19:23)  HR: 81 (21 May 2022 16:23) (72 - 88)  BP: 119/85 (21 May 2022 16:23) (95/55 - 119/85)  BP(mean): --  RR: 18 (21 May 2022 16:23) (17 - 18)  SpO2: 96% (21 May 2022 16:23) (96% - 98%)  I&O's Summary      PHYSICAL EXAM:    General: NAD  HEENT: NC/AT; PERRL, anicteric sclera; MMM  Neck: supple  Cardiovascular: +S1/S2; RRR  Respiratory: CTA B/L; no W/R/R  Gastrointestinal: soft, NT/ND; +BSx4  Extremities: WWP; no edema, clubbing or cyanosis  Vascular: 2+ radial, DP/PT pulses B/L  Neurological: AAOx3; no focal deficits    MEDICATIONS:  MEDICATIONS  (STANDING):  ALBUTerol    90 MICROgram(s) HFA Inhaler 3 Puff(s) Inhalation every 6 hours  aMIOdarone    Tablet 200 milliGRAM(s) Oral daily  apixaban 2.5 milliGRAM(s) Oral two times a day  atorvastatin 80 milliGRAM(s) Oral at bedtime  azithromycin  IVPB 500 milliGRAM(s) IV Intermittent every 24 hours  cefTRIAXone   IVPB 1000 milliGRAM(s) IV Intermittent every 24 hours  cholecalciferol 400 Unit(s) Oral daily  dapagliflozin 10 milliGRAM(s) Oral every 24 hours  gabapentin 100 milliGRAM(s) Oral daily  magnesium oxide 400 milliGRAM(s) Oral daily  pantoprazole    Tablet 40 milliGRAM(s) Oral before breakfast  sacubitril 24 mG/valsartan 26 mG 1 Tablet(s) Oral two times a day  torsemide 10 milliGRAM(s) Oral daily    MEDICATIONS  (PRN):  acetaminophen     Tablet .. 650 milliGRAM(s) Oral every 6 hours PRN Temp greater or equal to 38C (100.4F)  albuterol/ipratropium for Nebulization 3 milliLiter(s) Nebulizer every 6 hours PRN Bronchospasm  traMADol 50 milliGRAM(s) Oral every 6 hours PRN Severe Pain (7 - 10)      ALLERGIES:  Allergies    No Known Allergies    Intolerances    statins (Muscle Pain; Joint Pain)      LABS:                        12.6   5.46  )-----------( 160      ( 21 May 2022 06:47 )             38.5     -    137  |  106  |  19  ----------------------------<  116<H>  4.9   |  23  |  1.49<H>    Ca    8.4      21 May 2022 06:47  Phos  3.1     -  Mg     2.3         TPro  6.7  /  Alb  2.8<L>  /  TBili  0.3  /  DBili  x   /  AST  34  /  ALT  18  /  AlkPhos  44  05-21    PT/INR - ( 20 May 2022 20:40 )   PT: 17.9 sec;   INR: 1.50          PTT - ( 20 May 2022 20:40 )  PTT:34.0 sec  Urinalysis Basic - ( 20 May 2022 21:24 )    Color: Yellow / Appearance: Clear / S.025 / pH: x  Gluc: x / Ketone: 15 mg/dL  / Bili: Negative / Urobili: 0.2 E.U./dL   Blood: x / Protein: 100 mg/dL / Nitrite: NEGATIVE   Leuk Esterase: NEGATIVE / RBC: < 5 /HPF / WBC < 5 /HPF   Sq Epi: x / Non Sq Epi: 0-5 /HPF / Bacteria: Present /HPF      CAPILLARY BLOOD GLUCOSE          RADIOLOGY & ADDITIONAL TESTS: Reviewed.

## 2022-05-21 NOTE — H&P ADULT - PROBLEM SELECTOR PLAN 3
Follows Jay Mccall , Dr. Gamez and Dr. Love     History of ACC/AHA stage NICM (LV 6.2, LVEF 15%, S/p ICD ) s/p ICD upgraded to CRT-D 9/2021 for chronic RV pacing, severe AI/MR s/p bioAVR/MVr 2014, history of VT/VT with ICD shocks and frequent PVC's s/p PVC ablation, pAF on eliquis,, aortic aneurysm    EKG 9/2021: a-paced, BiV paced  TTE 9/2021: LV 6.2 cm, LVEF 15%, LVOT VTI 7 cm, mild RV dysfunction, well functioning bioAVR,  [facility] 3/2021: normal coronary arteries     -C/w Home dose eliquis 5mg BID   -C/w home dose Entresto 24-26mg  BID   -C/w Fraxiga 10mg every morning   -C/w Amiodaron  200mg daily  - c/w Metoprolol succinate  ER 50mg daily  -c/w Rosuvastatin 10mg  -c/w Toresomide 10mg  daily  -Notify HF and cardiology in AM for pt admitted Follows Jay Mccall , Dr. Gamez and Dr. Love   #CHF HFrEF  # AVR, MVR  # VT/VF with ICD  #PAF  #Aortic aneurysm   History of ACC/AHA stage NICM (LV 6.2, LVEF 15%, S/p ICD ) s/p ICD upgraded to CRT-D 9/2021 for chronic RV pacing, severe AI/MR s/p bioAVR/MVr 2014, history of VT/VT with ICD shocks and frequent PVC's s/p PVC ablation, pAF on eliquis,, aortic aneurysm    EKG 9/2021: a-paced, BiV paced  TTE 9/2021: LV 6.2 cm, LVEF 15%, LVOT VTI 7 cm, mild RV dysfunction, well functioning bioAVR,  [facility] 3/2021: normal coronary arteries   -C/w Home dose eliquis 5mg BID   -C/w home dose Entresto 24-26mg  BID   -C/w Fraxiga 10mg every morning   -C/w Amiodaron  200mg daily  - c/w Metoprolol succinate  ER 50mg daily  -c/w Rosuvastatin 10mg  -c/w Toresomide 10mg  daily  -Notify HF and cardiology in AM for pt admitted Follows Jay Mccall , Dr. Gamez and Dr. Love   #CHF HFrEF  # AVR, MVR  # VT/VF with ICD  #PAF  #Aortic aneurysm   History of ACC/AHA stage NICM (LV 6.2, LVEF 15%, S/p ICD ) s/p ICD upgraded to CRT-D 9/2021 for chronic RV pacing, severe AI/MR s/p bioAVR/MVr 2014, history of VT/VT with ICD shocks and frequent PVC's s/p PVC ablation, pAF on eliquis,, aortic aneurysm    EKG 9/2021: a-paced, BiV paced  TTE 9/2021: LV 6.2 cm, LVEF 15%, LVOT VTI 7 cm, mild RV dysfunction, well functioning bioAVR,  [facility] 3/2021: normal coronary arteries   -C/w Home dose eliquis 5mg BID   -C/w home dose Entresto 24-26mg  BID   -C/w Fraxiga 10mg every morning   -C/w Amiodaron  200mg daily  - c/w Metoprolol succinate  ER 50mg in AM and 2 tabs at bedtime   -c/w Rosuvastatin 10mg  -c/w Toresomide 10mg  daily  -Notify HF and cardiology in AM for pt admitted Follows Jay Mccall , Dr. Gamez and Dr. Love   #CHF HFrEF  # AVR, MVR  # VT/VF with ICD  #PAF  #Aortic aneurysm   History of ACC/AHA stage NICM (LV 6.2, LVEF 15%, S/p ICD ) s/p ICD upgraded to CRT-D 9/2021 for chronic RV pacing, severe AI/MR s/p bioAVR/MVr 2014, history of VT/VT with ICD shocks and frequent PVC's s/p PVC ablation, pAF on eliquis,, aortic aneurysm    EKG 9/2021: a-paced, BiV paced  TTE 9/2021: LV 6.2 cm, LVEF 15%, LVOT VTI 7 cm, mild RV dysfunction, well functioning bioAVR,  [facility] 3/2021: normal coronary arteries   -C/w Home dose eliquis 5mg BID   -C/w home dose Entresto 24-26mg  BID   -C/w Fraxiga 10mg every morning   -C/w Amiodaron  200mg daily  - c/w Metoprolol succinate  ER 50mg in AM and 2 tabs at bedtime , HELD due to confirm with HF and cardiology   -c/w Rosuvastatin 10mg  -c/w Toresomide 10mg  daily  -Notify HF and cardiology in AM for pt admitted

## 2022-05-21 NOTE — PROGRESS NOTE ADULT - ASSESSMENT
76M w/ PMHx AVR and MVR (2014), CHF/HFrEF (EF 10-15% 9/2/21) follows up Dr. 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 A[facility] (2014; Dr. Jeff) , thoracic aortic aneurysm, known non obstructive CAD by cardiac catheterization ([facility] 9/2019) stage III CKD (range cr in past year 1.3-1.9 ), HTN, hyperlipidemia,  presnted to the ED with fever, body aches, fatigue, headache, mild cough for the past 3-4 days admitted to the F for Pneumonia management.

## 2022-05-21 NOTE — PROGRESS NOTE ADULT - PROBLEM SELECTOR PLAN 5
F: S/p 2L NS  E: replete as needed   N: DASH/TLC  DVT ppx: Eliquis 2.5 BID   GI ppx: PPI home med panto 40mg   CODE: FULL  Dispo: CORDELL Huertas MRN: [id number]    MED REC IN AM

## 2022-05-21 NOTE — H&P ADULT - HISTORY OF PRESENT ILLNESS
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 (2014; Dr. Jeff) , thoracic aortic aneurysm, known non obstructive CAD by cardiac catheterization ([facility] 9/2019) presnted to the ED with fever, body aches, fatigue, headache, mild cough for the past 3-4 days.   Denies n/v/d, urinary symptoms, abdominal pain, chest pain. Took tylenol this afternoon for symptoms. Denies sick contacts    He Vaccinated for Covid/ flu.     Denies n/v/d, urinary symptoms, abdominal pain, chest pain. Took tylenol this afternoon for symptoms. Denies sick contacts    ED:     VS: Tmax 102.7, HR 84, /74, RR: 17/96% RA   Labs: WBC 6.5, RBC 13.5, .2, Neut 69.1%, INR 1.5, BUN 24. Cr 1.65, Lactate 1.3, Venous PH 7.47, UA Blood +, bacteria +, protein +, keton+, Glucose+. RVP neg   Imaging: Right side lower lobe focal pneumonia , Left side double chamber ICD.     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 (2014; Dr. Jeff) , thoracic aortic aneurysm, known non obstructive CAD by cardiac catheterization ([facility] 9/2019) presnted to the ED with fever, body aches, fatigue, headache, mild cough for the past 3-4 days. He reports non productive cough started 3 days ago with headache and myalgia. He reports exertion dyspnea and can not walk even one block which its chronic related to the HF.    Denies n/v/d, urinary symptoms, abdominal pain, chest pain. Took tylenol this afternoon for symptoms. Denies sick contacts    He Vaccinated for Covid/ flu.    ED:   VS: Tmax 102.7, HR 84, /74, RR: 17/96% RA   Labs: WBC 6.5, RBC 13.5, .2, Neut 69.1%, INR 1.5, BUN 24. Cr 1.65, Lactate 1.3, Venous PH 7.47, UA Blood +, bacteria +, protein +, keton+, Glucose+. RVP neg   Imaging: Right side lower lobe focal pneumonia , Left side double chamber ICD.     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 (2014; Dr. Jeff) , thoracic aortic aneurysm, known non obstructive CAD by cardiac catheterization ([facility] 9/2019) presnted to the ED with fever, body aches, fatigue, headache, mild cough for the past 3-4 days. He reports non productive cough started 3 days ago with headache and myalgia. He reports exertion dyspnea and can not walk even one block which its chronic related to the HF.    Denies n/v/d, urinary symptoms, abdominal pain, chest pain. Took tylenol this afternoon for symptoms. Denies sick contacts    He Vaccinated for Covid/ flu.    ED:   VS: Tmax 102.7, HR 84, /74, RR: 17/96% RA   Labs: WBC 6.5, RBC 13.5, .2, Neut 69.1%, INR 1.5, BUN 24. Cr 1.65, Lactate 1.3, Venous PH 7.47, UA Blood +, bacteria +, protein +, keton+, Glucose+. RVP neg   Imaging: Right side lower lobe focal pneumonia , Left side double chamber ICD.     Treatment: s/p 2 L NS, CTX2gr, Azithro 500IV, Tylenol         All meds reviewed on allscript, please review meds with pt to make sure he takes all of them. All the meds ordered for after 8AM.

## 2022-05-21 NOTE — PROGRESS NOTE ADULT - ATTENDING COMMENTS
76M with PMH CKD stage III, HTN, HLD, extensive cardiac history including AVR and MVR (2014), HFrEF (last EF10-15% 09/2021), pAF (on Amio/Eliquis), PVC induced Vfib s/p ablation, [facility] A[facility], thoracic aortic aneurysm, non-obstructive CAD, presented to the ED with fevers, generalized myalgias, nonproductive cough x3 days, admitted for RLL pneumonia.     #RLL pneumonia - presented fevers, Tmax 102.7 on arrival, did not meet sepsis criteria. C/w CTX/Azithro, f/u legionella ag and strep pneumo ag, f/u BCX. On room air, hemodynamically otherwise stable   #HFrEF- appears euvolemic, c/w Toprol ER 50mg in AM and 2 tabs at bedtime , atorvastatin, entresto, farxiga, torsemide  #pAF- c/w Amiodarone, Eliquis  #CKD stage III-stable Cr, monitor I/Os     Dispo: Pending BCx, trend in fever curve, likely will dispo back to home.

## 2022-05-21 NOTE — H&P ADULT - NSHPLABSRESULTS_GEN_ALL_CORE
.  LABS:                         13.5   6.50  )-----------( 172      ( 20 May 2022 20:40 )             42.4         137  |  99  |  24<H>  ----------------------------<  113<H>  4.6   |  25  |  1.65<H>    Ca    8.9      20 May 2022 20:40    TPro  7.7  /  Alb  3.4  /  TBili  0.5  /  DBili  x   /  AST  37  /  ALT  19  /  AlkPhos  50      PT/INR - ( 20 May 2022 20:40 )   PT: 17.9 sec;   INR: 1.50          PTT - ( 20 May 2022 20:40 )  PTT:34.0 sec  Urinalysis Basic - ( 20 May 2022 21:24 )    Color: Yellow / Appearance: Clear / S.025 / pH: x  Gluc: x / Ketone: 15 mg/dL  / Bili: Negative / Urobili: 0.2 E.U./dL   Blood: x / Protein: 100 mg/dL / Nitrite: NEGATIVE   Leuk Esterase: NEGATIVE / RBC: < 5 /HPF / WBC < 5 /HPF   Sq Epi: x / Non Sq Epi: 0-5 /HPF / Bacteria: Present /HPF            Lactate, Blood: 1.3 mmol/L ( @ 20:39)      RADIOLOGY, EKG & ADDITIONAL TESTS: Reviewed.

## 2022-05-21 NOTE — PROGRESS NOTE ADULT - PROBLEM SELECTOR PLAN 3
Follows Jay Mccall , Dr. Gamez and Dr. oLve   #CHF HFrEF  # AVR, MVR  # VT/VF with ICD  #PAF  #Aortic aneurysm   History of ACC/AHA stage NICM (LV 6.2, LVEF 15%, S/p ICD ) s/p ICD upgraded to CRT-D 9/2021 for chronic RV pacing, severe AI/MR s/p bioAVR/MVr 2014, history of VT/VT with ICD shocks and frequent PVC's s/p PVC ablation, pAF on eliquis,, aortic aneurysm    EKG 9/2021: a-paced, BiV paced  TTE 9/2021: LV 6.2 cm, LVEF 15%, LVOT VTI 7 cm, mild RV dysfunction, well functioning bioAVR,  [facility] 3/2021: normal coronary arteries   -C/w Home dose eliquis 5mg BID   -C/w home dose Entresto 24-26mg  BID   -C/w Fraxiga 10mg every morning   -C/w Amiodaron  200mg daily  - c/w Metoprolol succinate  ER 50mg in AM and 2 tabs at bedtime , HELD due to confirm with HF and cardiology   -c/w Rosuvastatin 10mg  -c/w Toresomide 10mg  daily  -Notify HF and cardiology in AM for pt admitted

## 2022-05-21 NOTE — H&P ADULT - ASSESSMENT
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 (2014; Dr. 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 (2014; Dr. Jeff) , thoracic aortic aneurysm, known non obstructive CAD by cardiac catheterization ([facility] 9/2019) presnted to the ED with fever, body aches, fatigue, headache, mild cough for the past 3-4 days admitted to the F for Pneumonia management

## 2022-05-21 NOTE — H&P ADULT - PROBLEM SELECTOR PLAN 1
Tmax at home 101~ for past 3 days. Started having Headache and myalgia the same time. Tmax on arrival in .7. Low grade fever on floor. No leukocytosis. UA negative for infection. report non productive cough staretd 3 days ago. CXR + for right lower lobular pneumonia . Due to cardiac and renal comorbidities and age>65, admitted for inpt CAP managemnet.   -S/p Azithromycin 500IV   -S/p CTX 2gr IVP in ED  -C/w CTX 1gr IVP  -C/w Azithromycin x3 days  -F/u BCX

## 2022-05-21 NOTE — PATIENT PROFILE ADULT - FALL HARM RISK - RISK INTERVENTIONS

## 2022-05-21 NOTE — H&P ADULT - NSHPPHYSICALEXAM_GEN_ALL_CORE
Constitutional: female/male, WDWN, NAD  HEENT: PERRL, EOMI, sclera non-icteric, neck supple, trachea midline, no masses, no JVD, MMM, good dentition  Respiratory: CTA b/l, good air entry b/l, no wheezing, no rhonchi, no rales, without accessory muscle use and no intercostal retractions  Cardiovascular: RRR, normal S1S2, no M/R/G  Gastrointestinal: soft, NTND, no masses palpable, BS normal  Extremities: Warm, well perfused, pulses equal bilateral upper and lower extremities, no edema, no clubbing  Neurological: AAOx3, CN Grossly intact  Skin: Normal temperature, warm, dry Constitutional: male, WDWN, NAD  HEENT: PERRL, EOMI, sclera non-icteric, neck supple, trachea midline, no masses, no JVD, MMM, good dentition  Respiratory: Breathing with mild difficulty, b/l generalized crackle   Cardiovascular: RRR, normal S1S2, no M/R/G, JVD +   Gastrointestinal: soft, NTND, no masses palpable, BS normal  Extremities: Warm, well perfused, pulses equal bilateral upper and lower extremities, no edema, no clubbing  Neurological: AAOx3, CN Grossly intact  Skin: Normal temperature, warm, dry

## 2022-05-21 NOTE — H&P ADULT - PROBLEM SELECTOR PLAN 5
F: S/p 2L NS  E: replete as needed   N: DASH/TLC  DVT ppx: Lovenox   GI ppx: None  CODE: FULL  Dispo: CORDELL F: S/p 2L NS  E: replete as needed   N: DASH/TLC  DVT ppx: Lovenox   GI ppx: PPI home med panto 40mg   CODE: FULL  Dispo: SOLANGE F: S/p 2L NS  E: replete as needed   N: DASH/TLC  DVT ppx: Lovenox   GI ppx: PPI home med panto 40mg   CODE: FULL  Dispo: CORDELL Huertas MRN: [id number] F: S/p 2L NS  E: replete as needed   N: DASH/TLC  DVT ppx: Lovenox   GI ppx: PPI home med panto 40mg   CODE: FULL  Dispo: CORDELL Huertas MRN: [id number]    MED REC IN AM F: S/p 2L NS  E: replete as needed   N: DASH/TLC  DVT ppx: Eliquis 2.5 BID   GI ppx: PPI home med panto 40mg   CODE: FULL  Dispo: CORDELL Huertas MRN: [id number]    MED REC IN AM

## 2022-05-22 ENCOUNTER — TRANSCRIPTION ENCOUNTER (OUTPATIENT)
Age: 77
End: 2022-05-22

## 2022-05-22 VITALS
HEART RATE: 77 BPM | OXYGEN SATURATION: 96 % | SYSTOLIC BLOOD PRESSURE: 120 MMHG | DIASTOLIC BLOOD PRESSURE: 85 MMHG | TEMPERATURE: 98 F | RESPIRATION RATE: 18 BRPM

## 2022-05-22 LAB
A1C WITH ESTIMATED AVERAGE GLUCOSE RESULT: 7 % — HIGH (ref 4–5.6)
ANION GAP SERPL CALC-SCNC: 8 MMOL/L — SIGNIFICANT CHANGE UP (ref 5–17)
BACTERIA UR CULT: NORMAL
BASOPHILS # BLD AUTO: 0.01 K/UL — SIGNIFICANT CHANGE UP (ref 0–0.2)
BASOPHILS NFR BLD AUTO: 0.2 % — SIGNIFICANT CHANGE UP (ref 0–2)
BUN SERPL-MCNC: 15 MG/DL — SIGNIFICANT CHANGE UP (ref 7–23)
CALCIUM SERPL-MCNC: 8.6 MG/DL — SIGNIFICANT CHANGE UP (ref 8.4–10.5)
CHLORIDE SERPL-SCNC: 104 MMOL/L — SIGNIFICANT CHANGE UP (ref 96–108)
CO2 SERPL-SCNC: 25 MMOL/L — SIGNIFICANT CHANGE UP (ref 22–31)
CREAT SERPL-MCNC: 1.35 MG/DL — HIGH (ref 0.5–1.3)
CREAT SPEC-SCNC: 171 MG/DL
CULTURE RESULTS: SIGNIFICANT CHANGE UP
EGFR: 54 ML/MIN/1.73M2 — LOW
EOSINOPHIL # BLD AUTO: 0.01 K/UL — SIGNIFICANT CHANGE UP (ref 0–0.5)
EOSINOPHIL NFR BLD AUTO: 0.2 % — SIGNIFICANT CHANGE UP (ref 0–6)
ESTIMATED AVERAGE GLUCOSE: 154 MG/DL — HIGH (ref 68–114)
GLUCOSE SERPL-MCNC: 120 MG/DL — HIGH (ref 70–99)
HCT VFR BLD CALC: 40.6 % — SIGNIFICANT CHANGE UP (ref 39–50)
HGB BLD-MCNC: 13 G/DL — SIGNIFICANT CHANGE UP (ref 13–17)
IMM GRANULOCYTES NFR BLD AUTO: 0.2 % — SIGNIFICANT CHANGE UP (ref 0–1.5)
LYMPHOCYTES # BLD AUTO: 1.39 K/UL — SIGNIFICANT CHANGE UP (ref 1–3.3)
LYMPHOCYTES # BLD AUTO: 31.2 % — SIGNIFICANT CHANGE UP (ref 13–44)
MAGNESIUM SERPL-MCNC: 2.1 MG/DL — SIGNIFICANT CHANGE UP (ref 1.6–2.6)
MCHC RBC-ENTMCNC: 32 GM/DL — SIGNIFICANT CHANGE UP (ref 32–36)
MCHC RBC-ENTMCNC: 32.3 PG — SIGNIFICANT CHANGE UP (ref 27–34)
MCV RBC AUTO: 101 FL — HIGH (ref 80–100)
MICROALBUMIN 24H UR DL<=1MG/L-MCNC: 9.4 MG/DL
MICROALBUMIN/CREAT 24H UR-RTO: 55 MG/G
MONOCYTES # BLD AUTO: 0.72 K/UL — SIGNIFICANT CHANGE UP (ref 0–0.9)
MONOCYTES NFR BLD AUTO: 16.2 % — HIGH (ref 2–14)
NEUTROPHILS # BLD AUTO: 2.31 K/UL — SIGNIFICANT CHANGE UP (ref 1.8–7.4)
NEUTROPHILS NFR BLD AUTO: 52 % — SIGNIFICANT CHANGE UP (ref 43–77)
NRBC # BLD: 0 /100 WBCS — SIGNIFICANT CHANGE UP (ref 0–0)
PHOSPHATE SERPL-MCNC: 2.9 MG/DL — SIGNIFICANT CHANGE UP (ref 2.5–4.5)
PLATELET # BLD AUTO: 154 K/UL — SIGNIFICANT CHANGE UP (ref 150–400)
POTASSIUM SERPL-MCNC: 4.6 MMOL/L — SIGNIFICANT CHANGE UP (ref 3.5–5.3)
POTASSIUM SERPL-SCNC: 4.6 MMOL/L — SIGNIFICANT CHANGE UP (ref 3.5–5.3)
RBC # BLD: 4.02 M/UL — LOW (ref 4.2–5.8)
RBC # FLD: 14.3 % — SIGNIFICANT CHANGE UP (ref 10.3–14.5)
SODIUM SERPL-SCNC: 137 MMOL/L — SIGNIFICANT CHANGE UP (ref 135–145)
SPECIMEN SOURCE: SIGNIFICANT CHANGE UP
WBC # BLD: 4.45 K/UL — SIGNIFICANT CHANGE UP (ref 3.8–10.5)
WBC # FLD AUTO: 4.45 K/UL — SIGNIFICANT CHANGE UP (ref 3.8–10.5)

## 2022-05-22 PROCEDURE — 96374 THER/PROPH/DIAG INJ IV PUSH: CPT

## 2022-05-22 PROCEDURE — 99285 EMERGENCY DEPT VISIT HI MDM: CPT

## 2022-05-22 PROCEDURE — 87637 SARSCOV2&INF A&B&RSV AMP PRB: CPT

## 2022-05-22 PROCEDURE — 83605 ASSAY OF LACTIC ACID: CPT

## 2022-05-22 PROCEDURE — 96375 TX/PRO/DX INJ NEW DRUG ADDON: CPT

## 2022-05-22 PROCEDURE — 84132 ASSAY OF SERUM POTASSIUM: CPT

## 2022-05-22 PROCEDURE — 84295 ASSAY OF SERUM SODIUM: CPT

## 2022-05-22 PROCEDURE — 87635 SARS-COV-2 COVID-19 AMP PRB: CPT

## 2022-05-22 PROCEDURE — 82803 BLOOD GASES ANY COMBINATION: CPT

## 2022-05-22 PROCEDURE — 87086 URINE CULTURE/COLONY COUNT: CPT

## 2022-05-22 PROCEDURE — 83036 HEMOGLOBIN GLYCOSYLATED A1C: CPT

## 2022-05-22 PROCEDURE — 85025 COMPLETE CBC W/AUTO DIFF WBC: CPT

## 2022-05-22 PROCEDURE — 83735 ASSAY OF MAGNESIUM: CPT

## 2022-05-22 PROCEDURE — 84100 ASSAY OF PHOSPHORUS: CPT

## 2022-05-22 PROCEDURE — 36415 COLL VENOUS BLD VENIPUNCTURE: CPT

## 2022-05-22 PROCEDURE — 99239 HOSP IP/OBS DSCHRG MGMT >30: CPT

## 2022-05-22 PROCEDURE — 82330 ASSAY OF CALCIUM: CPT

## 2022-05-22 PROCEDURE — 80048 BASIC METABOLIC PNL TOTAL CA: CPT

## 2022-05-22 PROCEDURE — 81001 URINALYSIS AUTO W/SCOPE: CPT

## 2022-05-22 PROCEDURE — 85027 COMPLETE CBC AUTOMATED: CPT

## 2022-05-22 PROCEDURE — 84145 PROCALCITONIN (PCT): CPT

## 2022-05-22 PROCEDURE — 80053 COMPREHEN METABOLIC PANEL: CPT

## 2022-05-22 PROCEDURE — 87040 BLOOD CULTURE FOR BACTERIA: CPT

## 2022-05-22 PROCEDURE — 85730 THROMBOPLASTIN TIME PARTIAL: CPT

## 2022-05-22 PROCEDURE — 85610 PROTHROMBIN TIME: CPT

## 2022-05-22 PROCEDURE — 71045 X-RAY EXAM CHEST 1 VIEW: CPT

## 2022-05-22 PROCEDURE — 87449 NOS EACH ORGANISM AG IA: CPT

## 2022-05-22 RX ORDER — CEFPODOXIME PROXETIL 100 MG
1 TABLET ORAL
Qty: 8 | Refills: 0
Start: 2022-05-22 | End: 2022-05-25

## 2022-05-22 RX ADMIN — DAPAGLIFLOZIN 10 MILLIGRAM(S): 10 TABLET, FILM COATED ORAL at 10:01

## 2022-05-22 RX ADMIN — Medication 650 MILLIGRAM(S): at 10:12

## 2022-05-22 RX ADMIN — Medication 650 MILLIGRAM(S): at 11:03

## 2022-05-22 RX ADMIN — APIXABAN 2.5 MILLIGRAM(S): 2.5 TABLET, FILM COATED ORAL at 10:00

## 2022-05-22 RX ADMIN — AMIODARONE HYDROCHLORIDE 200 MILLIGRAM(S): 400 TABLET ORAL at 05:55

## 2022-05-22 RX ADMIN — CEFTRIAXONE 100 MILLIGRAM(S): 500 INJECTION, POWDER, FOR SOLUTION INTRAMUSCULAR; INTRAVENOUS at 18:03

## 2022-05-22 RX ADMIN — MAGNESIUM OXIDE 400 MG ORAL TABLET 400 MILLIGRAM(S): 241.3 TABLET ORAL at 11:28

## 2022-05-22 RX ADMIN — Medication 50 MILLIGRAM(S): at 05:55

## 2022-05-22 RX ADMIN — Medication 10 MILLIGRAM(S): at 05:55

## 2022-05-22 RX ADMIN — SACUBITRIL AND VALSARTAN 1 TABLET(S): 24; 26 TABLET, FILM COATED ORAL at 18:28

## 2022-05-22 RX ADMIN — GABAPENTIN 100 MILLIGRAM(S): 400 CAPSULE ORAL at 11:28

## 2022-05-22 RX ADMIN — SACUBITRIL AND VALSARTAN 1 TABLET(S): 24; 26 TABLET, FILM COATED ORAL at 05:54

## 2022-05-22 RX ADMIN — AZITHROMYCIN 255 MILLIGRAM(S): 500 TABLET, FILM COATED ORAL at 18:27

## 2022-05-22 RX ADMIN — PANTOPRAZOLE SODIUM 40 MILLIGRAM(S): 20 TABLET, DELAYED RELEASE ORAL at 06:02

## 2022-05-22 RX ADMIN — Medication 400 UNIT(S): at 11:28

## 2022-05-22 NOTE — DISCHARGE NOTE NURSING/CASE MANAGEMENT/SOCIAL WORK - PATIENT PORTAL LINK FT
You can access the FollowMyHealth Patient Portal offered by [facility] by registering at the following website: [email or web address]. By joining [facility]’s FollowMyHealth portal, you will also be able to view your health information using other applications (apps) compatible with our system.

## 2022-05-22 NOTE — DISCHARGE NOTE PROVIDER - NSDCFUSCHEDAPPT_GEN_ALL_CORE_FT
[facility]  Cardio [address]  Scheduled Appointment: 08/17/2022    Esau Oh  [facility]  NE[address]RO [address]  Scheduled Appointment: 08/18/2022

## 2022-05-22 NOTE — DISCHARGE NOTE PROVIDER - NSDCMRMEDTOKEN_GEN_ALL_CORE_FT
amiodarone 200 mg oral tablet: 1 tab(s) orally once a day  cefpodoxime 200 mg oral tablet: 1 tab(s) orally 2 times a day   Eliquis 2.5 mg oral tablet: 1 tab(s) orally 2 times a day  Entresto 24 mg-26 mg oral tablet: 1 tab(s) orally 2 times a day  Farxiga 10 mg oral tablet: 1 tab(s) orally once a day  gabapentin 100 mg oral capsule: 1 cap(s) orally at bedtime   magnesium oxide 400 mg (241.3 mg elemental magnesium) oral tablet: 1 tab(s) orally 2 times a day  pantoprazole 40 mg oral delayed release tablet: 1 tab(s) orally once a day  rosuvastatin 10 mg oral tablet: 1 tab(s) orally once a day  Toprol-XL 50 mg oral tablet, extended release: 1 tab(s) orally once a day (in AM)  Toprol-XL 50 mg oral tablet, extended release: 2 tab(s) orally once a day (at bedtime)  torsemide 10 mg oral tablet: 1 tab(s) orally once a day  traMADol 50 mg oral tablet: 1 tab(s) orally every 6 hours, As Needed  Vitamin D3: 1 tab(s) orally once a day

## 2022-05-22 NOTE — DISCHARGE NOTE PROVIDER - ATTENDING DISCHARGE PHYSICAL EXAMINATION:
Prior to discharge, patient was seen and examined by myself. VSS, patient saturating well on room air. On exam, patient resting comfortably in bed; NAD. Lungs clear to auscultation bilaterally, able to speak in full sentences without labored breathing.     #CAP - s/p CTX/Azithromycin, Rx sent for Cefpodoxime x 7d and 5d of azithro. Fever curve trending down. Did not meet SIRS criteria with the exception of fever. BCx negative, legionella negative. Never required supplemental oxygen. Patient clinically improving, warning signs and return precautions given.   Pt to follow up with Dr. Bloom upon discharge.

## 2022-05-22 NOTE — DISCHARGE NOTE PROVIDER - HOSPITAL COURSE
#Discharge: do not delete    76M w/ PMHx AVR and MVR (2014), CHF/HFrEF (EF 10-15% 9/2/21) follows up Dr. 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 AICD (2014; Dr. Jeff) , thoracic aortic aneurysm, known non obstructive CAD by cardiac catheterization ([facility] 9/2019) stage III CKD (range cr in past year 1.3-1.9 ), HTN, hyperlipidemia,  presnted to the ED with fever, body aches, fatigue, headache, mild cough for the past 3-4 days admitted to the [facility] for Pneumonia management.     Problem List/Main Diagnoses (system-based):     #Pneumonia  - pt presented with fever of Tmax at home 101~ for 3 days a/w headache, myalgias, and cough  - not meeting sepsis criteria in ED, but CXR positive right lower lobular pneumonia  - due to cardiac and renal comorbidities and age>65, admitted for CAP management  - initially treated with CTX 2g IVP and Azithromycin 500mg qd  - hemodynamically stable and able to be transitioned to PO antibiotics  Plan:  - transition to cefpodoxime 200mg BID for 4 days to complete 7 day course  - s/p 3 doses azithromycin    Inpatient treatment course: As above  New medications: cefpodoxime 200mg BID for 4 days  Labs to be followed outpatient: none  Exam to be followed outpatient: none

## 2022-05-22 NOTE — DISCHARGE NOTE PROVIDER - NSDCCPCAREPLAN_GEN_ALL_CORE_FT
PRINCIPAL DISCHARGE DIAGNOSIS  Diagnosis: CAP (community acquired pneumonia)  Assessment and Plan of Treatment: You came to the hospital with fever and cough. We did an x-ray of your lungs and saw that you had pneumonia. Pneumonia is an infection in your lungs. We gave you IV antibiotics to treat this infection. You are now well enough to go home to complete the course of antibiotics with oral antibiotics. Please take your cefpodoxime (sent to your pharmacy) twice a day for 4 days starting 5/23.

## 2022-05-22 NOTE — DISCHARGE NOTE NURSING/CASE MANAGEMENT/SOCIAL WORK - NSDCPEFALRISK_GEN_ALL_CORE
For information on Fall & Injury Prevention, visit: [email or web address] OR  [email or web address] OR  https://www.cdc.gov/steadi/patient.html

## 2022-05-22 NOTE — DISCHARGE NOTE PROVIDER - DISCHARGE DIET
P Quality Flow/Interdisciplinary Rounds Progress Note        Quality Flow Rounds held on July 12, 2021    Disciplines Attending:  Bedside Nurse, ,  and Nursing Unit Leadership    Isabel Muniz was admitted on 7/10/2021  1:36 PM    Anticipated Discharge Date:  Expected Discharge Date: 07/12/21    Disposition:    Kristian Score:  Kristian Scale Score: 17    Readmission Risk              Risk of Unplanned Readmission:  0           Discussed patient goal for the day, patient clinical progression, and barriers to discharge.   The following Goal(s) of the Day/Commitment(s) have been identified:  Diagnostics - Report Results- consult ID      Sandra Mejia RN  July 12, 2021 Regular Diet - No restrictions

## 2022-05-22 NOTE — DISCHARGE NOTE NURSING/CASE MANAGEMENT/SOCIAL WORK - NSDCVIVACCINE_GEN_ALL_CORE_FT
influenza, injectable, quadrivalent, preservative free; 18-Sep-2019 10:10; Yeny Guevara (RN); [employer]; [id number] (Exp. Date: 30-Jun-2020); IntraMuscular; Deltoid Left.; 0.5 milliLiter(s); VIS (VIS Published: 15-Aug-2019, VIS Presented: 18-Sep-2019);

## 2022-05-23 ENCOUNTER — NON-APPOINTMENT (OUTPATIENT)
Age: 77
End: 2022-05-23

## 2022-05-24 DIAGNOSIS — Z95.810 PRESENCE OF AUTOMATIC (IMPLANTABLE) CARDIAC DEFIBRILLATOR: ICD-10-CM

## 2022-05-24 DIAGNOSIS — I71.2 THORACIC AORTIC ANEURYSM, WITHOUT RUPTURE: ICD-10-CM

## 2022-05-24 DIAGNOSIS — I50.22 CHRONIC SYSTOLIC (CONGESTIVE) HEART FAILURE: ICD-10-CM

## 2022-05-24 DIAGNOSIS — N18.30 CHRONIC KIDNEY DISEASE, STAGE 3 UNSPECIFIED: ICD-10-CM

## 2022-05-24 DIAGNOSIS — J18.9 PNEUMONIA, UNSPECIFIED ORGANISM: ICD-10-CM

## 2022-05-24 DIAGNOSIS — I13.0 HYPERTENSIVE HEART AND CHRONIC KIDNEY DISEASE WITH HEART FAILURE AND STAGE 1 THROUGH STAGE 4 CHRONIC KIDNEY DISEASE, OR UNSPECIFIED CHRONIC KIDNEY DISEASE: ICD-10-CM

## 2022-05-24 DIAGNOSIS — Z95.2 PRESENCE OF PROSTHETIC HEART VALVE: ICD-10-CM

## 2022-05-24 DIAGNOSIS — E78.5 HYPERLIPIDEMIA, UNSPECIFIED: ICD-10-CM

## 2022-05-24 DIAGNOSIS — Z20.822 CONTACT WITH AND (SUSPECTED) EXPOSURE TO COVID-19: ICD-10-CM

## 2022-05-24 DIAGNOSIS — Z79.899 OTHER LONG TERM (CURRENT) DRUG THERAPY: ICD-10-CM

## 2022-05-24 NOTE — PROCEDURE
[No] : not [NSR] : normal sinus rhythm [ICD] : Implantable cardioverter-defibrillator [DDD] : DDD [Threshold Testing Performed] : Threshold testing was performed [Sensing Amplitude ___mv] : sensing amplitude was [unfilled] mv [Lead Imp:  ___ohms] : lead impedance was [unfilled] ohms [___V @] : [unfilled] V [___ ms] : [unfilled] ms [None] : none [de-identified] : very long AV delay [de-identified] : [facility] [de-identified] : al[place]iao [de-identified] : [id number] [de-identified] : 2021 [de-identified] : [id number] [de-identified] : 5.5 years  [de-identified] :  \par AV delay 250   DDD 50\par changed to DDD 70 AV delay 250 [de-identified] : AP 92%\par  99%\par   no Ventricular events\par no recent AT/afib\par shock impedance 48

## 2022-05-24 NOTE — HISTORY OF PRESENT ILLNESS
[Palpitations] : no palpitations [SOB] : no dyspnea [Syncope] : no syncope [Dizziness] : no dizziness [Chest Pain] : no chest pain or discomfort [Shoulder Pain] : no shoulder pain [Pain at Site] : no pain at device site [Erythema at Site] : no erythema at device site [Swelling at Site] : no swelling at device site [FreeTextEntry1] : 76 year old male with HTN, HLD, aortic aneurysm thoracic, AVR and mitral valve repair in 2014,  paroxysmal atrial fibrillation, PVCs and ICD s/p recent PVC ablation, who had an appropriate ICD shock  now s/p upgrade to BiV ICD (EF from 40% to 15-20% with pacing dependance), who presents for follow up.\par \par He states that he had a ICD placed after his AVR.  He normally followed up with Dr. Palencia; whose office moved and he transferred care here.   He is on Sotalol for history of PVCs and  NSVT; however he had short bursts of VT/torsades and it was felt the sotalol was proarrhythmic and stopped.  He was placed on Metoprolol.  He had 7101 PVCs on holter monitor with short bursts of NSVT.  \par \par He was admitted to [facility] 9/2019 with syncope correlated with VT and Vfib.  No therapy needed as he spontaneously converted.  One episode of afib, but overall burden extremely low.  Cath with mildly obstructive CAD.  He also underwent an EP study with PVC ablation from great cardiac vein.  HE was discharged on Mexiletine.  \par  \par 7/2021 he was admitted to an OSH with ICD shock.  He was discharged on amiodarone load which he self discontinued.   EF was down and he underwent a BiV upgrade.  Since then he notes improved SOB on exertion - but he still does experience this.  Improved LE edema.  No palpitations, syncope, near syncope.  He is compliant with all of his medications including eliquis\par \par \par Echo 5/9/2022 EF 25-30%\par Echo 8/2021 EF 25-30%\par  Echo 1/2021 EF 35-40%\par Echo 11/2019 EF 55-60%\par \par  \par

## 2022-05-24 NOTE — REVIEW OF SYSTEMS
[Negative] : Heme/Lymph [Fever] : no fever [Weight Gain (___ Lbs)] : no recent weight gain [Chills] : no chills [Feeling Fatigued] : not feeling fatigued [Weight Loss (___ Lbs)] : no recent weight loss [SOB] : no shortness of breath [Dyspnea on exertion] : not dyspnea during exertion [Chest Discomfort] : no chest discomfort [Palpitations] : no palpitations [Syncope] : no syncope [Cough] : no cough

## 2022-05-24 NOTE — DISCUSSION/SUMMARY
[Pacemaker Function Normal] : normal pacemaker function [FreeTextEntry1] : 76 year old male with HTN, HLD, aortic aneurysm thoracic, AVR and mitral valve repair in 2014,  paroxysmal atrial fibrillation, PVCs and ICD s/p recent PVC ablation, who had an appropriate ICD shock  now s/p upgrade to BiV ICD (EF from 40% to 15-20% with pacing dependance, now 25-30%), who presents for follow up.  Device interrogation reveals normal function.  All measured data is within normal limits .He has a known history of paroxysmal afib and is now on Eliquis.  Stable MONTEJO.  He will follow up in 6 months or sooner if needed and knows to call with any questions or concerns.

## 2022-05-24 NOTE — ADDENDUM
[FreeTextEntry1] :  I, Andrey Jeff, hereby attest that the medical record entry for this patient accurately reflects signatures/notations that I made on the Date of Service in my capacity as an Attending Physician when I treated/diagnosed the above patient. I do hereby attest that this information is true, accurate and complete to the best of my knowledge.  I was present for the entire visit and supervised the entire visit and made/agree with the plan as outlined.\par \par

## 2022-05-24 NOTE — PHYSICAL EXAM
[General Appearance - Well Developed] : well developed [Normal Appearance] : normal appearance [Well Groomed] : well groomed [General Appearance - Well Nourished] : well nourished [No Deformities] : no deformities [General Appearance - In No Acute Distress] : no acute distress [Respiration, Rhythm And Depth] : normal respiratory rhythm and effort [Exaggerated Use Of Accessory Muscles For Inspiration] : no accessory muscle use [Auscultation Breath Sounds / Voice Sounds] : lungs were clear to auscultation bilaterally [Left Infraclavicular] : left infraclavicular area [Clean] : clean [Dry] : dry [Well-Healed] : well-healed [] : no ischemic changes [5th Left ICS - MCL] : palpated at the 5th LICS in the midclavicular line [Normal Rate] : normal [Rhythm Regular] : regular [Normal S1] : normal S1 [Normal S2] : normal S2 [___ +] : [unfilled]U+ pitting edema to the right ankle [Normal Conjunctiva] : the conjunctiva exhibited no abnormalities [Normal Oral Mucosa] : normal oral mucosa [Normal Jugular Venous V Waves Present] : normal jugular venous V waves present [Abnormal Walk] : normal gait [Gait - Sufficient For Exercise Testing] : the gait was sufficient for exercise testing [Skin Color & Pigmentation] : normal skin color and pigmentation [Oriented To Time, Place, And Person] : oriented to person, place, and time [Affect] : the affect was normal [Mood] : the mood was normal [No Anxiety] : not feeling anxious [Palpable Crepitus] : no palpable crepitus [Bleeding] : no active bleeding [Foul Odor] : no foul smell [Purulent Drainage] : no purulent drainage [Serosanguineous Drainage] : no serosanquineous drainage [Serous Drainage] : no serous drainage [Erythema] : not erythematous [Warm] : not warm [Tender] : not tender [Indurated] : not indurated [Fluctuant] : not fluctuant

## 2022-06-06 ENCOUNTER — APPOINTMENT (OUTPATIENT)
Dept: INTERNAL MEDICINE | Facility: CLINIC | Age: 77
End: 2022-06-06
Payer: COMMERCIAL

## 2022-06-06 VITALS
BODY MASS INDEX: 25.06 KG/M2 | OXYGEN SATURATION: 99 % | HEIGHT: 71 IN | TEMPERATURE: 97 F | RESPIRATION RATE: 14 BRPM | HEART RATE: 80 BPM | DIASTOLIC BLOOD PRESSURE: 88 MMHG | WEIGHT: 179 LBS | SYSTOLIC BLOOD PRESSURE: 120 MMHG

## 2022-06-06 DIAGNOSIS — J30.1 ALLERGIC RHINITIS DUE TO POLLEN: ICD-10-CM

## 2022-06-06 PROCEDURE — 99495 TRANSJ CARE MGMT MOD F2F 14D: CPT

## 2022-06-06 NOTE — PHYSICAL EXAM
[Normal] : no acute distress, well nourished, well developed and well-appearing [Normal Sclera/Conjunctiva] : normal sclera/conjunctiva [Normal Outer Ear/Nose] : the outer ears and nose were normal in appearance [No Respiratory Distress] : no respiratory distress  [No Accessory Muscle Use] : no accessory muscle use [Clear to Auscultation] : lungs were clear to auscultation bilaterally [Normal Affect] : the affect was normal [Alert and Oriented x3] : oriented to person, place, and time

## 2022-06-06 NOTE — HEALTH RISK ASSESSMENT
[0] : 2) Feeling down, depressed, or hopeless: Not at all (0) [PHQ-2 Negative - No further assessment needed] : PHQ-2 Negative - No further assessment needed [[id number]] : 0

## 2022-06-06 NOTE — REVIEW OF SYSTEMS
[Negative] : Heme/Lymph [Shortness Of Breath] : no shortness of breath [Wheezing] : no wheezing [Cough] : cough

## 2022-06-13 NOTE — ED PROVIDER NOTE - NSTIMEPROVIDERCAREINITIATE_GEN_ER
08-Apr-2021 20:42

## 2022-06-20 ENCOUNTER — APPOINTMENT (OUTPATIENT)
Dept: INTERNAL MEDICINE | Facility: CLINIC | Age: 77
End: 2022-06-20
Payer: COMMERCIAL

## 2022-06-20 VITALS
SYSTOLIC BLOOD PRESSURE: 116 MMHG | OXYGEN SATURATION: 99 % | DIASTOLIC BLOOD PRESSURE: 94 MMHG | HEIGHT: 71 IN | WEIGHT: 180 LBS | TEMPERATURE: 97 F | HEART RATE: 76 BPM | BODY MASS INDEX: 25.2 KG/M2

## 2022-06-20 DIAGNOSIS — G25.0 ESSENTIAL TREMOR: ICD-10-CM

## 2022-06-20 DIAGNOSIS — M51.36 OTHER INTERVERTEBRAL DISC DEGENERATION, LUMBAR REGION: ICD-10-CM

## 2022-06-20 DIAGNOSIS — B35.4 TINEA CORPORIS: ICD-10-CM

## 2022-06-20 DIAGNOSIS — Z87.01 PERSONAL HISTORY OF PNEUMONIA (RECURRENT): ICD-10-CM

## 2022-06-20 DIAGNOSIS — Z86.19 PERSONAL HISTORY OF OTHER INFECTIOUS AND PARASITIC DISEASES: ICD-10-CM

## 2022-06-20 PROCEDURE — 99214 OFFICE O/P EST MOD 30 MIN: CPT

## 2022-06-20 RX ORDER — METHOCARBAMOL 500 MG/1
500 TABLET, FILM COATED ORAL
Qty: 20 | Refills: 0 | Status: COMPLETED | COMMUNITY
Start: 2022-05-31

## 2022-06-20 RX ORDER — CEFPODOXIME PROXETIL 200 MG/1
200 TABLET, FILM COATED ORAL
Qty: 8 | Refills: 0 | Status: COMPLETED | COMMUNITY
Start: 2022-05-22 | End: 2022-06-20

## 2022-06-20 NOTE — HISTORY OF PRESENT ILLNESS
[FreeTextEntry8] : 75 y/o man presents with tremor he notes when he holds a coffee cup or spoon. Writing is less legible. \par No tremor at rest. Symptoms are B/L but R>L. Can't pinpoint when it started. "a long time."\par On beta-blocker (Metoprolol).\par \par Has scaly rash on B/L thighs in oval pattern. \par \par Seeing ortho for "cortisone shot" for lumbar pain.

## 2022-06-20 NOTE — PHYSICAL EXAM
[Normal Sclera/Conjunctiva] : normal sclera/conjunctiva [Normal Outer Ear/Nose] : the outer ears and nose were normal in appearance [No JVD] : no jugular venous distention [Normal] : no respiratory distress, lungs were clear to auscultation bilaterally and no accessory muscle use [No Edema] : there was no peripheral edema [Alert and Oriented x3] : oriented to person, place, and time [de-identified] : two oval patches of what appears to be fungal dermatitis [de-identified] : B/L mild intention tremor with FNF, no resting tremor, no rigidity or cogwheeling

## 2022-06-20 NOTE — HEALTH RISK ASSESSMENT
[No falls in past year] : Patient reported no falls in the past year [0] : 2) Feeling down, depressed, or hopeless: Not at all (0) [PHQ-2 Negative - No further assessment needed] : PHQ-2 Negative - No further assessment needed [[id number]] : 0

## 2022-06-20 NOTE — ASSESSMENT
[FreeTextEntry1] : 1. Start topical antifugnal cream.\par 2. Will d/w changing BB with Dr Chandler. Message left for him. Pt would like to see Neuro.\par 3. f/u ortho

## 2022-06-27 NOTE — ED PROVIDER NOTE - CARE PROVIDERS DIRECT ADDRESSES
Patient A&Ox4. Pain managed with PRN meds. Purposeful hourly rounding completed. Patient sent down for surgery. Call light within reach . Side railsX2.    Problem: Adult Inpatient Plan of Care  Goal: Plan of Care Review  Outcome: Ongoing, Progressing  Goal: Patient-Specific Goal (Individualized)  Outcome: Ongoing, Progressing  Goal: Optimal Comfort and Wellbeing  Outcome: Ongoing, Progressing     Problem: Infection  Goal: Absence of Infection Signs and Symptoms  Outcome: Ongoing, Progressing      ,DirectAddress_Unknown,DirectAddress_Unknown

## 2022-08-04 ENCOUNTER — EMERGENCY (EMERGENCY)
Facility: HOSPITAL | Age: 77
LOS: 1 days | Discharge: HOME | End: 2022-08-04
Attending: EMERGENCY MEDICINE | Admitting: EMERGENCY MEDICINE
Payer: COMMERCIAL

## 2022-08-04 VITALS
SYSTOLIC BLOOD PRESSURE: 129 MMHG | RESPIRATION RATE: 16 BRPM | DIASTOLIC BLOOD PRESSURE: 80 MMHG | TEMPERATURE: 98 F | HEART RATE: 80 BPM | OXYGEN SATURATION: 99 %

## 2022-08-04 VITALS
WEIGHT: 179.02 LBS | HEIGHT: 71 IN | SYSTOLIC BLOOD PRESSURE: 109 MMHG | RESPIRATION RATE: 18 BRPM | HEART RATE: 78 BPM | TEMPERATURE: 99 F | DIASTOLIC BLOOD PRESSURE: 72 MMHG | OXYGEN SATURATION: 97 %

## 2022-08-04 DIAGNOSIS — E78.5 HYPERLIPIDEMIA, UNSPECIFIED: ICD-10-CM

## 2022-08-04 DIAGNOSIS — I13.0 HYPERTENSIVE HEART AND CHRONIC KIDNEY DISEASE WITH HEART FAILURE AND STAGE 1 THROUGH STAGE 4 CHRONIC KIDNEY DISEASE, OR UNSPECIFIED CHRONIC KIDNEY DISEASE: ICD-10-CM

## 2022-08-04 DIAGNOSIS — Z98.89 OTHER SPECIFIED POSTPROCEDURAL STATES: Chronic | ICD-10-CM

## 2022-08-04 DIAGNOSIS — Z79.01 LONG TERM (CURRENT) USE OF ANTICOAGULANTS: ICD-10-CM

## 2022-08-04 DIAGNOSIS — I49.3 VENTRICULAR PREMATURE DEPOLARIZATION: ICD-10-CM

## 2022-08-04 DIAGNOSIS — R07.89 OTHER CHEST PAIN: ICD-10-CM

## 2022-08-04 DIAGNOSIS — I27.20 PULMONARY HYPERTENSION, UNSPECIFIED: ICD-10-CM

## 2022-08-04 DIAGNOSIS — N18.30 CHRONIC KIDNEY DISEASE, STAGE 3 UNSPECIFIED: ICD-10-CM

## 2022-08-04 DIAGNOSIS — Z95.4 PRESENCE OF OTHER HEART-VALVE REPLACEMENT: ICD-10-CM

## 2022-08-04 DIAGNOSIS — I50.20 UNSPECIFIED SYSTOLIC (CONGESTIVE) HEART FAILURE: ICD-10-CM

## 2022-08-04 DIAGNOSIS — G89.29 OTHER CHRONIC PAIN: ICD-10-CM

## 2022-08-04 DIAGNOSIS — I25.10 ATHEROSCLEROTIC HEART DISEASE OF NATIVE CORONARY ARTERY WITHOUT ANGINA PECTORIS: ICD-10-CM

## 2022-08-04 DIAGNOSIS — I71.2 THORACIC AORTIC ANEURYSM, WITHOUT RUPTURE: ICD-10-CM

## 2022-08-04 DIAGNOSIS — Z95.810 PRESENCE OF AUTOMATIC (IMPLANTABLE) CARDIAC DEFIBRILLATOR: ICD-10-CM

## 2022-08-04 DIAGNOSIS — I48.0 PAROXYSMAL ATRIAL FIBRILLATION: ICD-10-CM

## 2022-08-04 DIAGNOSIS — R06.02 SHORTNESS OF BREATH: ICD-10-CM

## 2022-08-04 DIAGNOSIS — R10.9 UNSPECIFIED ABDOMINAL PAIN: ICD-10-CM

## 2022-08-04 DIAGNOSIS — Z87.09 PERSONAL HISTORY OF OTHER DISEASES OF THE RESPIRATORY SYSTEM: ICD-10-CM

## 2022-08-04 DIAGNOSIS — Z79.84 LONG TERM (CURRENT) USE OF ORAL HYPOGLYCEMIC DRUGS: ICD-10-CM

## 2022-08-04 DIAGNOSIS — Z20.822 CONTACT WITH AND (SUSPECTED) EXPOSURE TO COVID-19: ICD-10-CM

## 2022-08-04 DIAGNOSIS — Z88.8 ALLERGY STATUS TO OTHER DRUGS, MEDICAMENTS AND BIOLOGICAL SUBSTANCES STATUS: ICD-10-CM

## 2022-08-04 LAB
ALBUMIN SERPL ELPH-MCNC: 3.7 G/DL — SIGNIFICANT CHANGE UP (ref 3.3–5)
ALP SERPL-CCNC: 48 U/L — SIGNIFICANT CHANGE UP (ref 40–120)
ALT FLD-CCNC: 17 U/L — SIGNIFICANT CHANGE UP (ref 10–45)
ANION GAP SERPL CALC-SCNC: 9 MMOL/L — SIGNIFICANT CHANGE UP (ref 5–17)
APTT BLD: 34.7 SEC — SIGNIFICANT CHANGE UP (ref 27.5–35.5)
AST SERPL-CCNC: 22 U/L — SIGNIFICANT CHANGE UP (ref 10–40)
BASOPHILS # BLD AUTO: 0.02 K/UL — SIGNIFICANT CHANGE UP (ref 0–0.2)
BASOPHILS NFR BLD AUTO: 0.5 % — SIGNIFICANT CHANGE UP (ref 0–2)
BILIRUB SERPL-MCNC: 0.6 MG/DL — SIGNIFICANT CHANGE UP (ref 0.2–1.2)
BUN SERPL-MCNC: 19 MG/DL — SIGNIFICANT CHANGE UP (ref 7–23)
CALCIUM SERPL-MCNC: 9.5 MG/DL — SIGNIFICANT CHANGE UP (ref 8.4–10.5)
CHLORIDE SERPL-SCNC: 106 MMOL/L — SIGNIFICANT CHANGE UP (ref 96–108)
CO2 SERPL-SCNC: 29 MMOL/L — SIGNIFICANT CHANGE UP (ref 22–31)
CREAT SERPL-MCNC: 1.54 MG/DL — HIGH (ref 0.5–1.3)
EGFR: 46 ML/MIN/1.73M2 — LOW
EOSINOPHIL # BLD AUTO: 0.01 K/UL — SIGNIFICANT CHANGE UP (ref 0–0.5)
EOSINOPHIL NFR BLD AUTO: 0.2 % — SIGNIFICANT CHANGE UP (ref 0–6)
GLUCOSE SERPL-MCNC: 114 MG/DL — HIGH (ref 70–99)
HCT VFR BLD CALC: 42.6 % — SIGNIFICANT CHANGE UP (ref 39–50)
HGB BLD-MCNC: 13.5 G/DL — SIGNIFICANT CHANGE UP (ref 13–17)
IMM GRANULOCYTES NFR BLD AUTO: 0.2 % — SIGNIFICANT CHANGE UP (ref 0–1.5)
INR BLD: 1.22 — HIGH (ref 0.88–1.16)
LIDOCAIN IGE QN: 25 U/L — SIGNIFICANT CHANGE UP (ref 7–60)
LYMPHOCYTES # BLD AUTO: 1.62 K/UL — SIGNIFICANT CHANGE UP (ref 1–3.3)
LYMPHOCYTES # BLD AUTO: 38.3 % — SIGNIFICANT CHANGE UP (ref 13–44)
MCHC RBC-ENTMCNC: 31.7 GM/DL — LOW (ref 32–36)
MCHC RBC-ENTMCNC: 32.8 PG — SIGNIFICANT CHANGE UP (ref 27–34)
MCV RBC AUTO: 103.6 FL — HIGH (ref 80–100)
MONOCYTES # BLD AUTO: 0.42 K/UL — SIGNIFICANT CHANGE UP (ref 0–0.9)
MONOCYTES NFR BLD AUTO: 9.9 % — SIGNIFICANT CHANGE UP (ref 2–14)
NEUTROPHILS # BLD AUTO: 2.15 K/UL — SIGNIFICANT CHANGE UP (ref 1.8–7.4)
NEUTROPHILS NFR BLD AUTO: 50.9 % — SIGNIFICANT CHANGE UP (ref 43–77)
NRBC # BLD: 0 /100 WBCS — SIGNIFICANT CHANGE UP (ref 0–0)
NT-PROBNP SERPL-SCNC: 2588 PG/ML — HIGH (ref 0–300)
PLATELET # BLD AUTO: 208 K/UL — SIGNIFICANT CHANGE UP (ref 150–400)
POTASSIUM SERPL-MCNC: 5.3 MMOL/L — SIGNIFICANT CHANGE UP (ref 3.5–5.3)
POTASSIUM SERPL-SCNC: 5.3 MMOL/L — SIGNIFICANT CHANGE UP (ref 3.5–5.3)
PROT SERPL-MCNC: 7.5 G/DL — SIGNIFICANT CHANGE UP (ref 6–8.3)
PROTHROM AB SERPL-ACNC: 14.6 SEC — HIGH (ref 10.5–13.4)
RBC # BLD: 4.11 M/UL — LOW (ref 4.2–5.8)
RBC # FLD: 15.4 % — HIGH (ref 10.3–14.5)
SARS-COV-2 RNA SPEC QL NAA+PROBE: NEGATIVE — SIGNIFICANT CHANGE UP
SODIUM SERPL-SCNC: 144 MMOL/L — SIGNIFICANT CHANGE UP (ref 135–145)
TROPONIN T SERPL-MCNC: 0.01 NG/ML — SIGNIFICANT CHANGE UP (ref 0–0.01)
WBC # BLD: 4.23 K/UL — SIGNIFICANT CHANGE UP (ref 3.8–10.5)
WBC # FLD AUTO: 4.23 K/UL — SIGNIFICANT CHANGE UP (ref 3.8–10.5)

## 2022-08-04 PROCEDURE — 83690 ASSAY OF LIPASE: CPT

## 2022-08-04 PROCEDURE — 71046 X-RAY EXAM CHEST 2 VIEWS: CPT | Mod: 26

## 2022-08-04 PROCEDURE — 93005 ELECTROCARDIOGRAM TRACING: CPT

## 2022-08-04 PROCEDURE — 71275 CT ANGIOGRAPHY CHEST: CPT | Mod: 26,MG

## 2022-08-04 PROCEDURE — 99285 EMERGENCY DEPT VISIT HI MDM: CPT | Mod: 25

## 2022-08-04 PROCEDURE — 84484 ASSAY OF TROPONIN QUANT: CPT

## 2022-08-04 PROCEDURE — 36415 COLL VENOUS BLD VENIPUNCTURE: CPT

## 2022-08-04 PROCEDURE — 74174 CTA ABD&PLVS W/CONTRAST: CPT | Mod: 26,MG

## 2022-08-04 PROCEDURE — 87635 SARS-COV-2 COVID-19 AMP PRB: CPT

## 2022-08-04 PROCEDURE — 71275 CT ANGIOGRAPHY CHEST: CPT | Mod: MG

## 2022-08-04 PROCEDURE — 85610 PROTHROMBIN TIME: CPT

## 2022-08-04 PROCEDURE — 96374 THER/PROPH/DIAG INJ IV PUSH: CPT | Mod: XU

## 2022-08-04 PROCEDURE — 93010 ELECTROCARDIOGRAM REPORT: CPT

## 2022-08-04 PROCEDURE — G1004: CPT

## 2022-08-04 PROCEDURE — 83880 ASSAY OF NATRIURETIC PEPTIDE: CPT

## 2022-08-04 PROCEDURE — 80053 COMPREHEN METABOLIC PANEL: CPT

## 2022-08-04 PROCEDURE — 71046 X-RAY EXAM CHEST 2 VIEWS: CPT

## 2022-08-04 PROCEDURE — 85730 THROMBOPLASTIN TIME PARTIAL: CPT

## 2022-08-04 PROCEDURE — 74174 CTA ABD&PLVS W/CONTRAST: CPT | Mod: MG

## 2022-08-04 PROCEDURE — 85025 COMPLETE CBC W/AUTO DIFF WBC: CPT

## 2022-08-04 PROCEDURE — 99285 EMERGENCY DEPT VISIT HI MDM: CPT | Mod: CS

## 2022-08-04 RX ORDER — FAMOTIDINE 10 MG/ML
20 INJECTION INTRAVENOUS ONCE
Refills: 0 | Status: COMPLETED | OUTPATIENT
Start: 2022-08-04 | End: 2022-08-04

## 2022-08-04 RX ADMIN — Medication 30 MILLILITER(S): at 14:39

## 2022-08-04 RX ADMIN — FAMOTIDINE 20 MILLIGRAM(S): 10 INJECTION INTRAVENOUS at 14:39

## 2022-08-04 NOTE — ED ADULT NURSE NOTE - OBJECTIVE STATEMENT
76y/male presents to ED for midsternum chest pain and epigastric discomfort for 2days. patient is complaining of SOB while moving. patient states he took pills for bloating stomach today, denies constipation, diarrhea, abdominal pain. Denies nause, vomiting, headache, fever, extremities swelling. A&Ox3. No distress. Respiration equal and unlabored, put cardiac and sat monitor. 76y/male presents to ED for midsternum chest pain and epigastric discomfort for 2days. patient is complaining of SOB while moving. patient states he took pills for bloating stomach today, denies constipation, diarrhea, abdominal pain. Denies nausea, vomiting, headache, fever, extremities swelling. A&Ox3. No distress. Respiration equal and unlabored, put cardiac and sat monitor.

## 2022-08-04 NOTE — ED PROVIDER NOTE - NS ED ROS FT
CONSTITUTIONAL: Denies fever and chills    HEENT: Denies changes in vision and hearing.    RESPIRATORY: +SOB.  Denies cough.    CARDIOVASCULAR: +chest pain.    GASTROINTESTINAL: + abd pain.  Denies nausea, vomiting and diarrhea.    GENITOURINARY: Denies dysuria and hematuria.    MUSCULOSKELETAL: Denies myalgia and joint pain.    SKIN: Denies rash and pruritus.    NEUROLOGICAL: Denies headache and syncope.    PSYCHIATRIC: Denies recent changes in mood. Denies anxiety and depression.

## 2022-08-04 NOTE — ED PROVIDER NOTE - PROGRESS NOTE DETAILS
Labs notable for INR 1.22, Cr 1.54 (stable), BNP 2588  Trop neg x2  CTA: 1.  No aortic dissection or pulmonary embolism.  2.  Since November 1, 2019, unchanged aneurysmal thoracic aorta,   measurements detailed above.  3.  Marked cardiomegaly.  4.  Pulmonary artery hypertension.    Covid neg  VSS  Discussed results with pt.  Advised close f/u with Dr. Chandler.  Given strict return precautions  Will DC

## 2022-08-04 NOTE — ED PROVIDER NOTE - CLINICAL SUMMARY MEDICAL DECISION MAKING FREE TEXT BOX
75 y/o male w/ hx AVR and MVR (2014), CHF/HFrEF (EF 10-15% 9/2/21) follows up Dr. 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 AICD (2014; Dr. Jeff) , thoracic aortic aneurysm, known non obstructive CAD by cardiac catheterization ([facility] 9/2019) stage III CKD (range cr in past year 1.3-1.9 ), HTN, hyperlipidemia p/w constant chronic chest/abd pain since 2014, which he feels has worsened in past 2 wks.  chronic sob/moore unchanged  EKG paced, appears largely the same as prior  Plan for labs including trop, bnp, cxr eval for acs, infx, electrolyte abnormalities  Reviewed chart, last CTA in 2019, as pt with hx aortic aneurysm, will obtain CT to assess size/ r/o dissection.  suspicion lower for PE  Case discussed with pt's cardiologist, Dr. Chandler, who knows pt well and states pt has complained of this for long time and has had extensive w/u, states if w/u largely negative in ED can be d/c'd and will f/u with him outpatient

## 2022-08-04 NOTE — ED ADULT TRIAGE NOTE - CHIEF COMPLAINT QUOTE
Pt c/o constant midsternal chest pain and SOB x2weeks. Denies fevers, chills, n/v, lightheadedness, dizziness, weakness. Hx of Afib-- on Eliquis. Defibrillator placed in September 2021. ekg dn in triage

## 2022-08-04 NOTE — ED PROVIDER NOTE - OBJECTIVE STATEMENT
76M w/ PMHx AVR and MVR (2014), CHF/HFrEF (EF 10-15% 9/2/21) follows up Dr. 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 AICD (2014; Dr. Jeff) , thoracic aortic aneurysm, known non obstructive CAD by cardiac catheterization ([facility] 9/2019) stage III CKD (range cr in past year 1.3-1.9 ), HTN, hyperlipidemia p/w constant pain to mid abdomen radiating to mid abdomen with SOB/MONTEJO since 2014.  States feels pain worsened in past 2 wks, prompting ED visit today.  States SOB/MONTEJO is largely unchanged.  Denies modifying factors to pain/ sob, not pleuritic, unchanged by movement.  Does not radiate to back.  Reports has had extensive w/u for this pain in past.  Follows with Dr. Chandler (cards), has f/u appt on 8/21/22.  Denies f/c, cough, n/v/d, urinary sx, leg swelling.  Pt was admitted here in May for PNA.  States does not feel like that.  Pt somewhat poor historian.

## 2022-08-04 NOTE — ED PROVIDER NOTE - PATIENT PORTAL LINK FT
You can access the FollowMyHealth Patient Portal offered by [facility] by registering at the following website: [email or web address]. By joining [facility]’s FollowMyHealth portal, you will also be able to view your health information using other applications (apps) compatible with our system.

## 2022-08-04 NOTE — ED PROVIDER NOTE - NS ED ATTENDING STATEMENT MOD
This was a shared visit with the RIO. I reviewed and verified the documentation and independently performed the documented:

## 2022-08-04 NOTE — ED PROVIDER NOTE - NSFOLLOWUPINSTRUCTIONS_ED_ALL_ED_FT
Thank you for visiting [facility] Emergency Department.      We saw you today for chest pain.    Please follow up with Dr. Chandler in next 1-2 weeks for re-evaluation.   Please know that no emergency visit is complete without follow-up with your primary care provider in 1 week.  Please bring copies of all discharge papers and results and show to your doctor.      Please continue taking all previous medications as instructed unless we discussed otherwise.     I appreciated your patience and hope you feel better soon.     Return to ER immediately if you develop fevers, chills, worsening chest pain, shortness of breath, worsening and/or any concerning symptoms.

## 2022-08-04 NOTE — ED PROVIDER NOTE - PHYSICAL EXAMINATION
CONSTITUTIONAL: Awake, alert.  Nontoxic, no acute distress.    HEAD: Normocephalic, atraumatic.    EYES: Conjunctivae clear without exudates or hemorrhage. Sclera is non-icteric.    ENT: Normal appearing external ears, nose, mucous membranes moist.    NECK: supple, trachea midline.    HEART:  Normal rate, regular rhythm.  Heart sounds S1, S2.  No murmurs, rubs or gallops.    LUNGS:  No acute respiratory distress.  Non-tachypneic and non-labored.  Lungs are clear bilaterally with good aeration.  No wheezing, rales, rhonchi.    ABDOMEN: Normal appearing skin without lesions, rashes.  Normal bowel sounds x 4.  Soft, non-distended, non-tender in all four quadrants. No rebound or guarding. No hernias or masses palpable.  No pulsatile abdominal mass.   No CVA tenderness b/l.    MUSCULOSKELETAL:  Moving all extremities without issue.  No edema.    SKIN: Skin in warm, dry and intact without rashes or lesions.  Appropriate color for ethnicity.    NEUROLOGICAL:  Patient is alert, oriented x person, place and time.    PSYCH: Appropriate mood and affect. Good judgment and insight.

## 2022-08-04 NOTE — PROGRESS NOTE ADULT - SUBJECTIVE AND OBJECTIVE BOX
EPS Device interrogation    Indication: SOB    Device model: SJM Quadra Assura 	Implanting Physician: Dr. Jeff     Functioning Mode: DDD 80- 105 			    Underlying Rhythm: AS/BiV P     Pacemaker dependency:  No    Battery status: 5.1 years   Interrogating parameters:   				RA			RV			LV    Sense:                                      4.2  mV                           12.0  mV    Threshold:                           0.5 V @ 0.5 ms                 0.5 V @ 0.5 ms         1.0 V V@ 0.5 ms    Pacing Impedance:                      290 om                               540 om                   780  om    Shock Impedance:                                                                 45   om    Events/Alert:  none    Parameter change: 	none    For ICD only:  tachy therapy – unchanged   Normal function BiV  ICD    [ ]EPS attending: Interrogation reviewed. Agree with above.

## 2022-08-04 NOTE — ED PROVIDER NOTE - ATTENDING APP SHARED VISIT CONTRIBUTION OF CARE
77 yo male h/o AVR and MVR (2014), CHF/HFrEF (EF 10-15% 9/2/21) follows up Dr. 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 AICD (2014; Dr. Jeff), thoracic aortic aneurysm, known non obstructive CAD by cardiac catheterization ([facility] 9/2019) stage III CKD (range cr in past year 1.3-1.9 ), HTN, hyperlipidemia c/o acute on chronic pain mid abd since his heart surgery in 2014 but worse last pm (told pa worse x 2 wk).  No n/v/d.  No radiation to back.  Pt reports prior eval for similar pain neg.  No sig change in sob/moore.  No current cp.   Well appearing, nad, nc/at, lung cta, heart reg, abd soft, nt, ext no gross deformity, no gross neuro deficits  Pt c/o abd pain w/o ttp.  ? dissection or aneurysm related pain (h/o TAA), ? pud, ? atypical acs, ? cholecystitis (no ruq ttp).  Plan labs, cta chest/abd/pelvis, reassess.

## 2022-08-17 ENCOUNTER — NON-APPOINTMENT (OUTPATIENT)
Age: 77
End: 2022-08-17

## 2022-08-17 ENCOUNTER — APPOINTMENT (OUTPATIENT)
Dept: HEART AND VASCULAR | Facility: CLINIC | Age: 77
End: 2022-08-17

## 2022-08-17 PROCEDURE — 93296 REM INTERROG EVL PM/IDS: CPT

## 2022-08-17 PROCEDURE — 93295 DEV INTERROG REMOTE 1/2/MLT: CPT

## 2022-08-18 ENCOUNTER — APPOINTMENT (OUTPATIENT)
Dept: NEPHROLOGY | Facility: CLINIC | Age: 77
End: 2022-08-18

## 2022-08-18 ENCOUNTER — LABORATORY RESULT (OUTPATIENT)
Age: 77
End: 2022-08-18

## 2022-08-18 VITALS — DIASTOLIC BLOOD PRESSURE: 101 MMHG | SYSTOLIC BLOOD PRESSURE: 124 MMHG

## 2022-08-18 VITALS — HEART RATE: 81 BPM | SYSTOLIC BLOOD PRESSURE: 94 MMHG | DIASTOLIC BLOOD PRESSURE: 71 MMHG

## 2022-08-18 PROCEDURE — 99214 OFFICE O/P EST MOD 30 MIN: CPT | Mod: 25

## 2022-08-18 PROCEDURE — 36415 COLL VENOUS BLD VENIPUNCTURE: CPT

## 2022-08-18 NOTE — ASSESSMENT
[FreeTextEntry1] : # CKD stage 3 likelliest due to type 2 cardiorenal syndrome and aging, possible DM nephropathy. \par * Recheck labs.\par * Will avoid MRA given previous hyperkalemia. \par * Therapies for kidney disease: SGLT2i; blood pressure control; proteinuria reduction with ARB/ACEi; other evidence-based therapies including exercise, a plant-based lower oxalate diet, and 400 mcg folic acid daily\par * Cardiovascular disease prevention: counseling on healthy diet, physical activity, weight loss, alcohol limitation, blood pressure control; cardiology evaluation/followup advised\par * The patient has been counseled that chronic kidney disease is a significant condition and regular office followup with me (at least every 1 months for now) is important for monitoring and their health, and that it is their responsibility to make a follow up appointment.\par * The patient has been counseled never to stop taking their medications without discussing it with me or another doctor.\par * The patient has been counseled on avoiding NSAIDs.\par * The patient has been counseled on risk of acute renal failure and instructed to immediately call and speak with me or go immediately to ER with any severe symptoms, nausea, vomiting, diarrhea, chest pain, or shortness of breath.\par * A counseling information sheet has been given (today or previously). All their questions were answered.\par \par # Hyperkalemia.\par * Low K diet. Recheck K. \par \par # HTN controlled. No lightheadedness. \par * Cont entresto, metoprolol, torsemide.\par * The patient's blood pressure was checked with the [employer] [id number] using the SPRINT trial protocol after sitting quietly in an empty room with arm supported, back supported, and feet on the floor for 5 minutes. The average of 3 readings were taken.\par * A counseling information sheet has been given (currently or previously, in-person or electronically). All their questions were answered.\par * The patient has been counseled to check their BP at home with an automatic arm cuff, write down the readings, and reach me directly on the phone immediately if they are persistently > 180 systolic or if SBP is less than 100 or if lightheadedness develops. They were counseled to bring in all blood pressure readings and medications next visit.\par * The patient has been counseled that regular office followup (at least every 1 months for now) is important for monitoring and for their health, and that it is their responsibility to make follow up appointments.\par * The patient also has been counseled that they must never stop or change any medications without discussing this with me (or another physician). \par \par # Monoclonal gammopathy.\par * Advised to follow up with hematology. Myeloma, amyloid, and MGRS are unlikely. \par

## 2022-08-18 NOTE — PHYSICAL EXAM
[General Appearance - Alert] : alert [General Appearance - In No Acute Distress] : in no acute distress [Neck Appearance] : the appearance of the neck was normal [Neck Cervical Mass (___cm)] : no neck mass was observed [Jugular Venous Distention Increased] : there was no jugular-venous distention [Thyroid Diffuse Enlargement] : the thyroid was not enlarged [Thyroid Nodule] : there were no palpable thyroid nodules [Auscultation Breath Sounds / Voice Sounds] : lungs were clear to auscultation bilaterally [Heart Rate And Rhythm] : heart rate was normal and rhythm regular [Heart Sounds] : normal S1 and S2 [Heart Sounds Gallop] : no gallops [Murmurs] : no murmurs [Heart Sounds Pericardial Friction Rub] : no pericardial rub [Bowel Sounds] : normal bowel sounds [Abdomen Soft] : soft [Abdomen Tenderness] : non-tender [] : no hepato-splenomegaly [Abdomen Mass (___ Cm)] : no abdominal mass palpated [Cervical Lymph Nodes Enlarged Posterior Bilaterally] : posterior cervical [Cervical Lymph Nodes Enlarged Anterior Bilaterally] : anterior cervical [Supraclavicular Lymph Nodes Enlarged Bilaterally] : supraclavicular [FreeTextEntry1] : faint macules on bilateral thighs

## 2022-08-18 NOTE — HISTORY OF PRESENT ILLNESS
[FreeTextEntry1] : Kindly referred by Dr. Sandhya Bloom elevated creatinine. [employer]\par \par * CKD stable, creatinine 1.87. 55 mg albuminuria. * HTN controlled. No lightheadedness. Compliant with medications.  Following up with Dr. Hanson for monoclonal gammopathy. * Hyperkalemia controlled. * Rash on legs. Seeing allergist. * Pneumonia on 5/21 - 5/22. * Persistent SOB/CP with minimal to moderate exertion. Per patient, this is unchanged and cardiologist is aware. \par \par Previous history (19May22): * CKD stable, creatinine 1.87. * HTN controlled. No lightheadedness. No CP/SOB. Compliant with medications.  Following up with Dr. Hanson for monoclonal gammopathy. * Hyperkalemia controlled. * He has urinary urgency for several weeks. No dysuria. 5x nocturia. \par \par Previous history (22Feb22): * HTN controlled.  - 120s / 80s at home. No lightheadedness. Compliant with medications. 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 \par \par 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 * CKD progressive, creatinine 1.91. Albuminuria decreased to 48. * Occasional chronic abd pain being evaluated, none currently. \par \par Previous history (06Dec21): * CKD stable, creatinine 1.51.  Albuminuria decreased to 91 mg from 3280 mg (?). Farxiga 10 started. 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 * HTN controlled. BP 120s at home. No lightheadedness. Compliant with medications. \par \par Previous history (10Nov21): * HTN controlled. BP 110s at home. No lightheadedness. Compliant with medications. * 3280 mg albuminuria. * CKD stable, creatinine 1.45. 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 \par \par Previous history (09Sep21): * Events reviewed. Admitted to [facility] for CHF exacerbation and ICD upgrade. EF < 23 - 30.* CKD stable, creatinine 1.55.  Labs from 9/7 reviewed. Creatinine 1.45. K 4. 298 mg albuminuria.  * Hyperkalemia controlled. Not on lokelma or veltassa.  * HTN controlled. /80s at home. No lightheadedness. Compliant with medications. 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 \par \par Previous history (31Aug21): * Dx with Covid 3 weeks ago. Now no cough, or other symptoms. Did not require hospitalization. He has SOB with walking walking 1 block. * Lokelma started for hyperkalemia (K of 6). He is now following low potassium diet. . * CKD stable, creatinine 1.86. * He had stopped torsemide. BP 100s. \par \par Previous history (03Aug21): Labs reviewed. Baseline eGFR 43 - 56 since 2019. On 14Jul21, his creatinine increased to 1.82 (eGFR 35 - 41). The patient denies exposure to chronic NSAIDs, chronic PPIs, creatine, or herbal supplements. The patient denies a history of kidney stones or pyelonephritis. 4 - 5 times. No recent renal ultrasound. They are unaware of proteinuria or hematuria.\par \par EF 23% in 4/21. On amiodarone, metoprolol, lasix 20 qod, HCTZ twice weekly valsartan 40. SOB with one block walking. Stable LE edema. ProBNP incresaed from 515 to 1360.\par \par * HTN controlled.  No lightheadedness. Compliant with medications. He believes he is taking amlodipine.

## 2022-08-19 LAB
ALBUMIN SERPL ELPH-MCNC: 4.1 G/DL
ALP BLD-CCNC: 55 U/L
ALT SERPL-CCNC: 15 U/L
ANION GAP SERPL CALC-SCNC: 12 MMOL/L
APPEARANCE: CLEAR
AST SERPL-CCNC: 21 U/L
BASOPHILS # BLD AUTO: 0.02 K/UL
BASOPHILS NFR BLD AUTO: 0.4 %
BILIRUB SERPL-MCNC: 0.4 MG/DL
BILIRUBIN URINE: NEGATIVE
BLOOD URINE: NEGATIVE
BUN SERPL-MCNC: 30 MG/DL
CALCIUM SERPL-MCNC: 9.8 MG/DL
CHLORIDE SERPL-SCNC: 106 MMOL/L
CO2 SERPL-SCNC: 27 MMOL/L
COLOR: NORMAL
CREAT SERPL-MCNC: 1.54 MG/DL
CREAT SPEC-SCNC: 102 MG/DL
CYSTATIN C SERPL-MCNC: 1.18 MG/L
EGFR: 46 ML/MIN/1.73M2
EOSINOPHIL # BLD AUTO: 0.02 K/UL
EOSINOPHIL NFR BLD AUTO: 0.4 %
GFR/BSA.PRED SERPLBLD CYS-BASED-ARV: 58 ML/MIN/1.73M2
GLUCOSE QUALITATIVE U: ABNORMAL
GLUCOSE SERPL-MCNC: 90 MG/DL
HCT VFR BLD CALC: 45 %
HGB BLD-MCNC: 13.9 G/DL
IMM GRANULOCYTES NFR BLD AUTO: 0.2 %
KETONES URINE: NEGATIVE
LEUKOCYTE ESTERASE URINE: NEGATIVE
LYMPHOCYTES # BLD AUTO: 2.53 K/UL
LYMPHOCYTES NFR BLD AUTO: 46 %
MAGNESIUM SERPL-MCNC: 2.3 MG/DL
MAN DIFF?: NORMAL
MCHC RBC-ENTMCNC: 30.9 GM/DL
MCHC RBC-ENTMCNC: 33.6 PG
MCV RBC AUTO: 108.7 FL
MICROALBUMIN 24H UR DL<=1MG/L-MCNC: 1.3 MG/DL
MICROALBUMIN/CREAT 24H UR-RTO: 13 MG/G
MONOCYTES # BLD AUTO: 0.49 K/UL
MONOCYTES NFR BLD AUTO: 8.9 %
NEUTROPHILS # BLD AUTO: 2.43 K/UL
NEUTROPHILS NFR BLD AUTO: 44.1 %
NITRITE URINE: NEGATIVE
PH URINE: 6
PHOSPHATE SERPL-MCNC: 3.6 MG/DL
PLATELET # BLD AUTO: 180 K/UL
POTASSIUM SERPL-SCNC: 5.6 MMOL/L
PROT SERPL-MCNC: 7.7 G/DL
PROTEIN URINE: NORMAL
RBC # BLD: 4.14 M/UL
RBC # FLD: 15.7 %
SODIUM SERPL-SCNC: 145 MMOL/L
SPECIFIC GRAVITY URINE: 1.02
UROBILINOGEN URINE: NORMAL
WBC # FLD AUTO: 5.5 K/UL

## 2022-08-24 NOTE — ED ADULT NURSE NOTE - HIV OFFER
"  Assessment & Plan   (L29.8) Pruritic erythematous rash  (primary encounter diagnosis)  Comment: no rash on exam and parent has no photos of rash. We reviewed photos of hives as I feel this is most likely cause of rash based on hx-pt feels that her rash looks like hives but father unsure. Recommend start of zyrtec and monitor rash/take photos. No concerns for anaphylaxis or angioedema. We discussed that COVID or other viral illnesses can cause hives but no reason to test as this has been ongoing for at least 2-3 weeks so wouldn't change plan of care.      Declines COVID vaccine.    Follow Up  Return in about 2 weeks (around 9/7/2022) for Physical Exam.  Patient Instructions   Cetirizine (Zyrtec) dose:  Children ?6 years and Adolescents: 5 to 10 mg once daily.    Let us know if rash continues with this medication    Take some photos of the rash as well          Karen Buitrago NP        Subjective   Janice is a 4 year old, presenting for the following health issues:  Derm Problem      History of Present Illness       Reason for visit:  Derm. problem  Symptom onset:  3-4 weeks ago  Symptoms include:  Has a rash that appears as in lines that comes and goes. Pt states that it does itch  Symptom intensity:  Moderate  Symptom progression:  Staying the same  Had these symptoms before:  No  What makes it worse:  No  What makes it better:  No      Comes and goes within an hour  Location:Face, Arms, Back, Thighs  No rash exposures  No new products or medications  Takes vitamin, not new for her  No recent illness  No hx of seasonal allergies  Denies any troubles breathing, lip/mouth swelling, wheeze  No hx of rash like this    Review of Systems   Constitutional, eye, ENT, skin, respiratory, cardiac, and GI are normal except as otherwise noted.      Objective    /52 (BP Location: Right arm, Patient Position: Chair, Cuff Size: Child)   Pulse 105   Temp 98.7  F (37.1  C) (Tympanic)   Resp 20   Ht 1.187 m (3' 10.75\")  "  Wt 24.6 kg (54 lb 4.8 oz)   SpO2 100%   BMI 17.47 kg/m    86 %ile (Z= 1.07) based on [facility] (Girls, 2-20 Years) weight-for-age data using vitals from 8/24/2022.  Blood pressure percentiles are 81 % systolic and 36 % diastolic based on the 2017 AAP Clinical Practice Guideline. This reading is in the normal blood pressure range.    Physical Exam   GENERAL: Active, alert, in no acute distress.  SKIN: Clear. No significant rash, abnormal pigmentation or lesions  HEAD: Normocephalic.  EYES:  No discharge or erythema. Normal pupils and EOM.  EARS: Normal canals. Tympanic membranes are normal; gray and translucent.  NOSE: Normal without discharge.  MOUTH/THROAT: Clear. No oral lesions. Teeth intact without obvious abnormalities.  NECK: Supple, no masses.  LYMPH NODES: No adenopathy  LUNGS: Clear. No rales, rhonchi, wheezing or retractions  HEART: Regular rhythm. Normal S1/S2. No murmurs.  ABDOMEN: Soft, non-tender, not distended, no masses or hepatosplenomegaly. Bowel sounds normal.     Diagnostics: None                .  ..   Previously Declined (within the last year)

## 2022-09-07 NOTE — ED ADULT TRIAGE NOTE - MODE OF ARRIVAL
Walk in Erythromycin Counseling:  I discussed with the patient the risks of erythromycin including but not limited to GI upset, allergic reaction, drug rash, diarrhea, increase in liver enzymes, and yeast infections.

## 2022-09-08 NOTE — ED ADULT NURSE NOTE - CAS TRG GENERAL NORM CIRC DET
Would patient want to try Protonix or Prilosec?   Capillary refill less/equal to 2 seconds/Strong peripheral pulses

## 2022-09-10 NOTE — ED PROVIDER NOTE - PSYCHIATRIC, MLM
Anxious Alert and oriented to person, place, time/situation. normal mood and affect. no apparent risk to self or others.

## 2022-09-21 ENCOUNTER — APPOINTMENT (OUTPATIENT)
Dept: HEART AND VASCULAR | Facility: CLINIC | Age: 77
End: 2022-09-21

## 2022-09-21 ENCOUNTER — LABORATORY RESULT (OUTPATIENT)
Age: 77
End: 2022-09-21

## 2022-09-21 VITALS
HEIGHT: 71 IN | HEART RATE: 81 BPM | DIASTOLIC BLOOD PRESSURE: 76 MMHG | WEIGHT: 182.05 LBS | SYSTOLIC BLOOD PRESSURE: 103 MMHG | TEMPERATURE: 97 F | BODY MASS INDEX: 25.49 KG/M2 | OXYGEN SATURATION: 99 %

## 2022-09-21 VITALS
BODY MASS INDEX: 25.2 KG/M2 | WEIGHT: 180 LBS | HEART RATE: 82 BPM | SYSTOLIC BLOOD PRESSURE: 87 MMHG | HEIGHT: 71 IN | TEMPERATURE: 95.3 F | DIASTOLIC BLOOD PRESSURE: 62 MMHG

## 2022-09-21 PROCEDURE — ZZZZZ: CPT

## 2022-09-21 PROCEDURE — 36415 COLL VENOUS BLD VENIPUNCTURE: CPT

## 2022-09-21 PROCEDURE — 99214 OFFICE O/P EST MOD 30 MIN: CPT

## 2022-09-21 PROCEDURE — 93000 ELECTROCARDIOGRAM COMPLETE: CPT

## 2022-09-21 RX ORDER — TRAMADOL HYDROCHLORIDE 50 MG/1
50 TABLET, COATED ORAL
Qty: 12 | Refills: 0 | Status: DISCONTINUED | COMMUNITY
Start: 2021-04-08 | End: 2022-09-21

## 2022-09-22 LAB
ALBUMIN SERPL ELPH-MCNC: 4.6 G/DL
ALP BLD-CCNC: 59 U/L
ALT SERPL-CCNC: 12 U/L
ANION GAP SERPL CALC-SCNC: 14 MMOL/L
AST SERPL-CCNC: 24 U/L
BILIRUB SERPL-MCNC: 0.4 MG/DL
BUN SERPL-MCNC: 31 MG/DL
CALCIUM SERPL-MCNC: 10.1 MG/DL
CHLORIDE SERPL-SCNC: 103 MMOL/L
CHOLEST SERPL-MCNC: 276 MG/DL
CO2 SERPL-SCNC: 25 MMOL/L
CREAT SERPL-MCNC: 1.71 MG/DL
EGFR: 40 ML/MIN/1.73M2
ESTIMATED AVERAGE GLUCOSE: 137 MG/DL
GLUCOSE SERPL-MCNC: 105 MG/DL
HBA1C MFR BLD HPLC: 6.4 %
HDLC SERPL-MCNC: 96 MG/DL
LDLC SERPL CALC-MCNC: 162 MG/DL
NONHDLC SERPL-MCNC: 180 MG/DL
POTASSIUM SERPL-SCNC: 5.4 MMOL/L
PROT SERPL-MCNC: 8.5 G/DL
PSA SERPL-MCNC: 1.67 NG/ML
SODIUM SERPL-SCNC: 142 MMOL/L
TRIGL SERPL-MCNC: 93 MG/DL

## 2022-09-23 LAB
BASOPHILS # BLD AUTO: 0.02 K/UL
BASOPHILS NFR BLD AUTO: 0.3 %
EOSINOPHIL # BLD AUTO: 0.01 K/UL
EOSINOPHIL NFR BLD AUTO: 0.2 %
HCT VFR BLD CALC: 50.9 %
HGB BLD-MCNC: 15.4 G/DL
IMM GRANULOCYTES NFR BLD AUTO: 0 %
LYMPHOCYTES # BLD AUTO: 3.15 K/UL
LYMPHOCYTES NFR BLD AUTO: 50.9 %
MAN DIFF?: NORMAL
MCHC RBC-ENTMCNC: 30.3 GM/DL
MCHC RBC-ENTMCNC: 33 PG
MCV RBC AUTO: 109.2 FL
MONOCYTES # BLD AUTO: 0.59 K/UL
MONOCYTES NFR BLD AUTO: 9.5 %
NEUTROPHILS # BLD AUTO: 2.42 K/UL
NEUTROPHILS NFR BLD AUTO: 39.1 %
PLATELET # BLD AUTO: 203 K/UL
RBC # BLD: 4.66 M/UL
RBC # FLD: 15.6 %
WBC # FLD AUTO: 6.19 K/UL

## 2022-09-28 ENCOUNTER — APPOINTMENT (OUTPATIENT)
Age: 77
End: 2022-09-28

## 2022-09-28 VITALS
OXYGEN SATURATION: 98 % | RESPIRATION RATE: 15 BRPM | DIASTOLIC BLOOD PRESSURE: 70 MMHG | SYSTOLIC BLOOD PRESSURE: 100 MMHG | BODY MASS INDEX: 24.92 KG/M2 | HEART RATE: 80 BPM | TEMPERATURE: 96.5 F | WEIGHT: 178 LBS | HEIGHT: 71 IN

## 2022-09-28 PROCEDURE — 99213 OFFICE O/P EST LOW 20 MIN: CPT

## 2022-09-28 NOTE — HISTORY OF PRESENT ILLNESS
[Palpitations] : no palpitations [SOB] : no dyspnea [Syncope] : no syncope [Dizziness] : no dizziness [Chest Pain] : no chest pain or discomfort [Shoulder Pain] : no shoulder pain [Pain at Site] : no pain at device site [Erythema at Site] : no erythema at device site [Swelling at Site] : no swelling at device site [FreeTextEntry1] : 77 year old male with HTN, HLD, aortic aneurysm thoracic, AVR and mitral valve repair in 2014,  paroxysmal atrial fibrillation, PVCs and ICD s/p recent PVC ablation, who had an appropriate ICD shock  now s/p upgrade to BiV ICD (EF from 40% to 15-20% with pacing dependance), who presents for follow up.\par \par He states that he had a ICD placed after his AVR.  He normally followed up with Dr. Palencia; whose office moved and he transferred care here.   He is on Sotalol for history of PVCs and  NSVT; however he had short bursts of VT/torsades and it was felt the sotalol was proarrhythmic and stopped.  He was placed on Metoprolol.  He had 7101 PVCs on holter monitor with short bursts of NSVT.  \par \par He was admitted to [facility] 9/2019 with syncope correlated with VT and Vfib.  No therapy needed as he spontaneously converted.  One episode of afib, but overall burden extremely low.  Cath with mildly obstructive CAD.  He also underwent an EP study with PVC ablation from great cardiac vein.  HE was discharged on Mexiletine.  \par  \par 7/2021 he was admitted to an OSH with ICD shock.  He was discharged on amiodarone load which he self discontinued.   EF was down and he underwent a BiV upgrade.  \par \par He presents for routine follow up and has no complaints. He denies any SOB, edema, palpitations, syncope, near syncope.  He is compliant with all of his medications including eliquis\par \par \par Echo 5/9/2022 EF 25-30%\par Echo 8/2021 EF 25-30%\par  Echo 1/2021 EF 35-40%\par Echo 11/2019 EF 55-60%\par \par  \par

## 2022-09-28 NOTE — DISCUSSION/SUMMARY
[Pacemaker Function Normal] : normal pacemaker function [FreeTextEntry1] : 77 year old male with HTN, HLD, aortic aneurysm thoracic, AVR and mitral valve repair in 2014,  paroxysmal atrial fibrillation, PVCs and ICD s/p recent PVC ablation, who had an appropriate ICD shock  now s/p upgrade to BiV ICD (EF from 40% to 15-20% with pacing dependance, now 25-30%), who presents for follow up.  Device interrogation reveals normal function.  All measured data is within normal limits .He has a known history of paroxysmal afib and is now compliant with Eliquis.  Given his significant history of heart failure exacerbations I have recommended he touches base with Dr. Sheppard to see if he would benefit from placement of a Barostim device.  HE is amenable to this.   He will follow up in 6 months or sooner if needed and knows to call with any questions or concerns.

## 2022-09-28 NOTE — PROCEDURE
[No] : not [NSR] : normal sinus rhythm [ICD] : Implantable cardioverter-defibrillator [DDD] : DDD [Threshold Testing Performed] : Threshold testing was performed [Sensing Amplitude ___mv] : sensing amplitude was [unfilled] mv [Lead Imp:  ___ohms] : lead impedance was [unfilled] ohms [___V @] : [unfilled] V [___ ms] : [unfilled] ms [None] : none [de-identified] : very long AV delay [de-identified] : [facility] [de-identified] : [place] [de-identified] : [id number] [de-identified] : 2021 [de-identified] : [id number] [de-identified] : 5 years  [de-identified] :  \par AV delay 250   DDD 50\par changed to DDD 70 AV delay 250 [de-identified] : AP 92%\par  99%\par   no Ventricular events\par 1 episode of AT\par shock impedance 51

## 2022-09-28 NOTE — PHYSICAL EXAM
[General Appearance - Well Developed] : well developed [Normal Appearance] : normal appearance [Well Groomed] : well groomed [General Appearance - Well Nourished] : well nourished [No Deformities] : no deformities [General Appearance - In No Acute Distress] : no acute distress [] : no respiratory distress [Respiration, Rhythm And Depth] : normal respiratory rhythm and effort [Exaggerated Use Of Accessory Muscles For Inspiration] : no accessory muscle use [Left Infraclavicular] : left infraclavicular area [Clean] : clean [Dry] : dry [Well-Healed] : well-healed [5th Left ICS - MCL] : palpated at the 5th LICS in the midclavicular line [Normal Rate] : normal [Rhythm Regular] : regular [Normal S1] : normal S1 [Normal S2] : normal S2 [___ +] : [unfilled]U+ pitting edema to the right ankle [Normal Conjunctiva] : the conjunctiva exhibited no abnormalities [Abnormal Walk] : normal gait [Skin Color & Pigmentation] : normal skin color and pigmentation [Oriented To Time, Place, And Person] : oriented to person, place, and time [Affect] : the affect was normal [Mood] : the mood was normal [No Anxiety] : not feeling anxious [Palpable Crepitus] : no palpable crepitus [Bleeding] : no active bleeding [Foul Odor] : no foul smell [Purulent Drainage] : no purulent drainage [Serous Drainage] : no serous drainage [Erythema] : not erythematous [Warm] : not warm [Tender] : not tender [Indurated] : not indurated [Fluctuant] : not fluctuant

## 2022-09-28 NOTE — REVIEW OF SYSTEMS
[Negative] : Heme/Lymph [Fever] : no fever [Weight Gain (___ Lbs)] : no recent weight gain [Chills] : no chills [Weight Loss (___ Lbs)] : no recent weight loss [SOB] : no shortness of breath [Dyspnea on exertion] : not dyspnea during exertion [Chest Discomfort] : no chest discomfort [Lower Ext Edema] : no extremity edema [Palpitations] : no palpitations [Syncope] : no syncope [Cough] : no cough

## 2022-09-29 ENCOUNTER — TRANSCRIPTION ENCOUNTER (OUTPATIENT)
Age: 77
End: 2022-09-29

## 2022-09-29 LAB — UREA BREATH TEST QL: NEGATIVE

## 2022-09-29 NOTE — ASSESSMENT
[FreeTextEntry1] : Valve disease: clean coronaries on cardiac cath in 2014. status post AVR, MV repair Dr. Caio Louise 9/9/2014) SBE prophylaxis. okay to stop ASA 1 week before dental extraction and implant. EKG with 1 PVC. Echo done 7/2018. EF 40%  Valves OK.  Echo update Nov 2019 , EF 55-60 ??.  Echo done 1/21, EF 35-40 % closer to his baseline.  Ao V probably NL prosthetic valve. EF 30% in [facility] March 2021 and Sept 2021. EF 25-30 % on echo done here Aug 2021 Echo May 2022, EF 25 % with mod-severe MR..\par \par SOB/CHF- BNP sent, echo from 1/2021 with a drop in EF.  Await BNP level, 550.  EF 23 % on Nuc, 30 % on echo at [facility]. EF 50 % on echo in 2014, 45-50% in 2016, 40% in 2018, all were done at [facility].  Now its 23 % on Nuc and 30 % on echo at [facility] from March 2021.  Has CHF, BNP around 3-5 K [place] BW is 175 lbs., May be 170.  Remains with MONTEJO, will refer to the HF team.  Now on Entresto.\par \par Abdominal Pain- worse if he hits a pothole. Will screen for a AAA. If (-) will refer to GI (Dr Cortez).  SONO (-) FOR AAA, F/U WITH Dr CORTEZ. Will discuss with Dr Cortez.  Found to have severe gastritis and H pylori.  Pain improving.  Also better with diuresis. Trial of Pepcid and Simethicone.  Also better with diuresis\par \par Chest/abd pain- will treat with Protonix x 30 days.  New EKG changes 11/14/19 not seen before, deep Tw inversions, QTc 474.  I suspect post pacing artifact, remotely  PUD, Trop sent, echo ordered.  Trop NL, EF and wall motion normal on echo. CCTA done Nov 1st, 2019 is clean. No coronary disease.  More upper abd discomfort, will give a trial of protonix. Still with pain, will screen for AAA, refer to GI.  Did not see GI, AAA screen is (-). He reports improvement with HCTZ . Now on Torsemide 10 mg daily.  Better after diuresis in ER Sept 28.\par \par PreOP- complex pt but cleared for dental work.  Has h/o VT so no epinephrine.  Has AICD so no cautery unless AICD is shut off to avoid inappropriate shocks.  Needs SBE prophylaxis for dental work only. Pt is cleared for cataract surgery\par \par PAF- changed from ASA to Eliquis by Dr ARIZMENDI.  No signs of trauma or bleeding on exam. Pt reassured that the Eliquis is not causing the pain.  Off Eliquis by Dr ARIZMENDI due to not feeling well.  Will rechallenge with 2.5 BID, discussed with Dr ARIZMENDI.  Amio restarted at 200 mg daily(pt stopped the load when the pills finished up)  \par \par VT- has non-sustained, i spoke to Dr Palencia who does not want him to run the [facility]. There is  a VT and syncope/sudden death risk, also has a thoracic aneurysm.  I discussed this with him and his wife, walking the [place] is OK but no jogging.  He walked it and did well. Now seeing Dr Jeff.  Had syncope and adm to [facility], trans to [facility].  VT RFA, Sotolol changed to Mexitil Sept 2019.  CAN RETURN TO WORK NEXT MONDAY.  Had VF 7/6/21 and background VT, AICD DC.  Pt was started on Amio.  Dr ARIZMENDI called. Pt reports facial hyperpigmentation on Amio\par \par Aortic aneurysm: Enlarged. 42 (ascending) 47 (descending) in 2014\par Followup in 2015 reveals a max Ao of 46 mm\par CT angiogram 3/6/2017 aneurysm unchanged\par annual CT, due 3/2018.  Aorta 46mm April 2018, referred back to Dr Garcia,  48mm on CT 2019\par \par HLD: on pravastatin 40mg, had muscle aches on 80mg,  8/2017, pt reported he was only taking it once in a while, now taking it daily will repeat lipid panel. Diet and exercise discussed in detail.   Feb 2018,  July 2020. starting CRESTOR 10 qod, pt off it\par \par HTN: Have DC Lasix and changed to HCTZ weekly.  BP very good, Reduce Micardis  40 for persistent elevated K.  Increase HCTZ 25 to M & F. BP OK.  Micardis now at 20mg, DCed due to elevated Cr and K.  Tolerating Valsartan 40 mg. Labs were drawn today in Office. \par \par CKD: 1.33 8/8/2017, repeat BMP Sept 2017, Cr 1.05, previously normal, on ARB  1.31 and K 5.9.  .  July 2021. Cr 1.13, K 4.5, Mg 2.1,  Cr 1.75

## 2022-09-29 NOTE — PHYSICAL EXAM
RT called for breathing tx      Jac Leija RN  06/09/19 8821 [General Appearance - Well Developed] : well developed [Normal Appearance] : normal appearance [Well Groomed] : well groomed [General Appearance - Well Nourished] : well nourished [No Deformities] : no deformities [General Appearance - In No Acute Distress] : no acute distress [Normal Conjunctiva] : the conjunctiva exhibited no abnormalities [Eyelids - No Xanthelasma] : the eyelids demonstrated no xanthelasmas [Normal Oral Mucosa] : normal oral mucosa [No Oral Pallor] : no oral pallor [No Oral Cyanosis] : no oral cyanosis [Respiration, Rhythm And Depth] : normal respiratory rhythm and effort [Exaggerated Use Of Accessory Muscles For Inspiration] : no accessory muscle use [Auscultation Breath Sounds / Voice Sounds] : lungs were clear to auscultation bilaterally [Heart Rate And Rhythm] : heart rate and rhythm were normal [Heart Sounds] : normal S1 and S2 [Arterial Pulses Normal] : the arterial pulses were normal [Edema] : no peripheral edema present [Abdomen Soft] : soft [Abdomen Tenderness] : non-tender [Abdomen Mass (___ Cm)] : no abdominal mass palpated [Abnormal Walk] : normal gait [Gait - Sufficient For Exercise Testing] : the gait was sufficient for exercise testing [Nail Clubbing] : no clubbing of the fingernails [Cyanosis, Localized] : no localized cyanosis [Petechial Hemorrhages (___cm)] : no petechial hemorrhages [Skin Color & Pigmentation] : normal skin color and pigmentation [] : no rash [No Venous Stasis] : no venous stasis [Skin Lesions] : no skin lesions [No Skin Ulcers] : no skin ulcer [No Xanthoma] : no  xanthoma was observed [Oriented To Time, Place, And Person] : oriented to person, place, and time [Affect] : the affect was normal [Mood] : the mood was normal [No Anxiety] : not feeling anxious [FreeTextEntry1] : +3/6 systolic murmur

## 2022-09-29 NOTE — REVIEW OF SYSTEMS
[Dyspnea on exertion] : dyspnea during exertion [Abdominal Pain] : abdominal pain [Tremor] : a tremor was seen [Negative] : Heme/Lymph [de-identified] : amio induced

## 2022-09-29 NOTE — REASON FOR VISIT
PROBLEM DIAGNOSES  Problem: Acute hypernatremia  Assessment and Plan: poor oral intake, no labs from today.repeat cmp, inc iv fluids 1/2 ns 125 cc hrly, daily bmp    Problem: PEDRO (acute kidney injury)  Assessment and Plan: await labs from today, pre-renal, low Urine Na,Inc Osmo..inc IV fluids to 125 cc hrly. daily bmp.  Heme Eval noted. GI leslie noted [FreeTextEntry1] : 76 -year-old male with history of high cholesterol, aortic aneurysm thoracic, hypertension, lumbago, pre-diabetes, Valve disease (status post AVR, MVr epair Dr. Caio Louise 9/9/2014). S/P  colonoscopy with Dr. Maximilian Cortez 9/14/17. Doing well overall, Denies CP, SOB, palpitations, orthopnea, LE swelling, dizziness, syncope. Walking and running daily, sometimes >10 miles, training for [place] marathon in 2018 after skipping 2017. \par \par s/p dental extraction and implant. Reports during dental cleaning he was told he bled a lot and dentist would prefer he stop aspirin for future dental extraction and implant. \par Training for [employer] [place], race walked it without complications Nov 2018.\par \par EKG: NSR @ 46 with 1st degree AVB. LAHB, ST-Tw abnormalities. Blocked APC, V paced x 2 beats Pacer set at 40 1/22/19\par EKG: A Pacing, PVCs, 1st degree AVB, with a LAHB, possible IWMI, QTc 429 ST-Twave abnormalities.  10/10/19\par \par 3/18/19 A Fib discovered by Dr Jeff on 3/13/19, ASA changed to Eliquis\par 5/6/19 Pt with atypical pain in the axilla on the left, he thinks its the Eliquis.  No swelling or ecchymosis reported\par 7/15/19 K 5.5, Telmisartin reduced to 20mg. BP excellent.  c/o severe right shoulder pain.  Previously injected with relief.\par 9/23/19 Adm to  [facility]) with syncope and NSVT.  Trans to [facility].  VT ablation and Mexitil started.\par 10/10/19  Pt c/o chest  pressure, when questioned its sub Xiphoid.  Eating makes it better\par \par 11/14/19  New Deep Tw inversions, ? post pacing.  CCTA Nov 1 clean. Recently had a lot of "stomach" pains.  I suspect he has PUD, Trop sent, echo ordered\par 1/24/20 Here with several weeks of a cold/URI, saw an MD and given Z westley.  Here with wheezing, reduced exercise tolerance. + Wheezing, SOB, mild cough, non-productive, no fever or chills.\par Also here for clearance for cataract\par 10/15/20 Fullness in subxiphoid area.  (-) CTA Nov 2020.  Also has  off statin.\par 1/15/21 ARB DCed due to elevated Cr and K of 5.9, not taking Crestor due to nausea, knee pain, back pain.  More MONTEJO noted, will need to reassess Ao V\par 2/24/21 More MONTEJO again, getting worse, epigastric discomfort, worse if he hits a pot hole., or walking hard.  ?pleuritic component.  Feb 12-16 pt reports wt went up, not sleeping well.\par 4/5/21 In [facility] 4/16 to 4/19/21 with chest pain, + Nuc(fixed inferior and apical defects).  EF 23 %, Echo EF 30 %, mild to moderate MR. but (-) cath.  Still gets epigastric pain with hitting potholes.  + MONTEJO.  PAP 40 mm.  Feels better after HCTZ.\par 5/10/21 Had Shingles.  Had both Covid vaccines.  Having endoscopy 5/20/21\par 7/7/21 Dx with severe gastritis and H pylori.  Admitted to [facility] yesterday with AICD DC, 2/T VF. K 4.5, .   Amiodarone 400 mg BID started.\par 9/8/21 DC from [facility] 9/4/21 AFTER ADMISSION FOR ATRIAL TACHYCARDIA  seen by Dr Jeff. Pt had an upgrade to a BiV pacer.  Echo showed EF 25-30%.  I was called yesterday from the [facility] ER that he was there SOB and in mild HF.  Torsemide was increased to daily.\par \par 10/8/21  In [facility] ER 9/28/21 with SOB and abdominal tightness.  Cr was down to 1.32, BNP was 4237, up slightly.  We increased diuretic and sent pt home.  He feels dramatically better.  He is back on Torsemide 10mg daily because it causes dry mouth and he is reluctant to take more..\par \par 12/9/21 Abd tightness much better but not fully resolved.\par \par 3/10/22 Having different pain syndromes, has been to ER several times,   Still with SOB/MONTEJO and fatigue\par 9/21/22  Echo May 2022, EF 25%, mod to severe MR.  Needs f/u with Dr Melvin and Juan Antonio.  Pt doing well on current meds.\par \par \par EKG:  AV PAced 9/8/21\par \par \par

## 2022-09-29 NOTE — HISTORY OF PRESENT ILLNESS
[FreeTextEntry1] : PCP- Dr Sandhya Bloom\par EP- Dr Jeff\par CTS- Dr Garcia\par Plastics- Dr Ko\par Hand- Dr Xiong\par GI- Dr Cortez, colonoscopy Sept 2017\par Dentist: Dr. Jorge Oh  \par Ophtho- Dr Jesus Lantigua [phone]

## 2022-09-30 NOTE — HISTORY OF PRESENT ILLNESS
[FreeTextEntry1] : 76 -year-old male with history of H. Pylori, hemorrhoids, high cholesterol, aortic aneurysm thoracic, hypertension, lumbago, pre-diabetes, Valve disease (status post AVR, MVR repair Dr. Caio Louise 9/9/2014). S/P  colonoscopy with Dr. Maximilian Cortez 9/14/17 presents to clinic c/o chronic sternal chest pain and rectal discomfort. \par \par He reports he develops epigastric pain and dyspnea since his AVR surgery in 2014. His pain is along the bottom of his scar line. The pain is described as a constant dull pressure exacerbated by exertions and taking deep breaths. He also often feel like he is choking and constantly clearing his throat. He is no longer taking pantoprazole as he ran out of the medication. He reports he takes Pepcid once in a while. Per Dr. Chandler, this has been a chronic issue and unchanged.\par \par Pt further c/o rectal discomfort. Reports he has an uncomfortable sensation in his rectum described as a "bump." He admits to frequent straining with defecation, daily BM with incomplete evacuation. On LUCIANO there was small raised tissue that appeared to be the development of a hemorrhoid. Reports he is in a monogamous relationship with his wife, no hx of SDTs.

## 2022-09-30 NOTE — ASSESSMENT
[FreeTextEntry1] : Epigastric discomfort, midsternal chest pain\par - Sternal pain along patients scar line\par - Patient will start pantoprazole 40 mg daily + pepcid 20 mg qhs\par - Repeat H. Pylori test\par - Schedule f/u apt with Dr. Garcia to further evaluate nodule under the scar line and scar tissue\par - Endoscopy 6/21: gastritis s/p biopsy (chronic gastritis w/ cryptitis and crypt distortion), sessile polyp in the stomach s/p polypectomy (hyperplastic)\par - CT scan 1/2022: Upper abdomen WNL\par \par \par Rectal discomfort\par - Hx of internal hemorrhoids\par - Raised tissue along the rectum most likely due to external hemorrhoid development\par - admits to straining and incomplete evacuation\par - discussed importance of adequate hydration and recommend adding Miralax daily to the diet\par - recommend sitz bath with epsom salt\par - encouraged to wipe gently and take shower after BM instead of wiping with toilet paper if possible\par - referral to Dr. Jany Brown for further treatment and evaluation of hemorrhoid if symptoms do not improve\par \par

## 2022-09-30 NOTE — REASON FOR VISIT
[Follow-up] : a follow-up of an existing diagnosis [FreeTextEntry1] : Chronic epigastric discomfort and rectal discomfort

## 2022-09-30 NOTE — PHYSICAL EXAM
[Normal] : no respiratory distress, no accessory muscle use, normal respiratory rhythm and effort, lungs were clear to auscultation bilaterally [Normal Sphincter Tone] : normal sphincter tone [No Rectal Mass] : no rectal mass [Chaperone Present: ____] : chaperone present: [unfilled] [Abnormal Walk] : normal gait [Normal Color / Pigmentation] : normal skin color and pigmentation [Cranial Nerves Intact] : cranial nerves 2-12 were intact [Oriented To Time, Place, And Person] : oriented to person, place, and time [Occult Blood] : negative occult blood [de-identified] : Slight raised tissue around rectum, no external hemorrhoids, no bleeding

## 2022-10-03 ENCOUNTER — APPOINTMENT (OUTPATIENT)
Dept: HEART AND VASCULAR | Facility: CLINIC | Age: 77
End: 2022-10-03

## 2022-10-03 ENCOUNTER — NON-APPOINTMENT (OUTPATIENT)
Age: 77
End: 2022-10-03

## 2022-10-03 VITALS
BODY MASS INDEX: 25.48 KG/M2 | SYSTOLIC BLOOD PRESSURE: 100 MMHG | WEIGHT: 181.99 LBS | HEIGHT: 71 IN | TEMPERATURE: 97.8 F | OXYGEN SATURATION: 98 % | HEART RATE: 81 BPM | DIASTOLIC BLOOD PRESSURE: 68 MMHG

## 2022-10-03 PROCEDURE — 99214 OFFICE O/P EST MOD 30 MIN: CPT

## 2022-10-03 NOTE — CARDIOLOGY SUMMARY
[de-identified] : \par EKG 9/2021: a-paced, BiV paced\par  [de-identified] : \par ICD interrogation 4/2022: 98% BiV pacing, no VT events\par  [de-identified] : \par TTE 5/2022 (report, unable to view images:LV 6.2 cm, LVEF 30-35%, normal RV size/function, bioAVR with mild AI, mean gradient 3 mmHg across MVr with moderate-severe MR, estimated PASP 52 mmHg, 4.6 cm aortic root\par \par TTE 9/2021: LV 6.2 cm, LVEF 15%, LVOT VTI 7 cm, mild RV dysfunction, well functioning bioAVR, mean gradient 4 mmHg across MVr with mild-moderate MR, moderate TR, estimated PASP 31 mmHg, 4.6 cm ascending aorta\par  [de-identified] : \par [facility] 3/2021: normal coronary arteries \par

## 2022-10-03 NOTE — ASSESSMENT
[FreeTextEntry1] : 78 YO M with a history of ACC/AHA Stage  NICM (LV 6.2 cm, LVEF 30%) s/p ICD upgraded to CRT-D 9/2021 for chronic RV pacing, severe AI/MR s/p bioAVR/MVr 2014, history of VT/VT with ICD shocks and frequent PVC's s/p PVC ablation, pAF on eliquis, CKD IIIb (cr 1.8), and aortic aneurysm presenting to HF clinic for further management. \par \par Today he reports NYHA III symptoms and appears euvolemic on exam. His TTE shows severe mitral regurgitation (I cannot view images to determine etiology of this MR) and he has a significant diastolic murmur on exam. I suspect valvular disease is driving his symptoms and will obtain JIGAR to further evaluate. \par

## 2022-10-03 NOTE — PHYSICAL EXAM
[Well Developed] : well developed [Well Nourished] : well nourished [Clear Lung Fields] : clear lung fields [Good Air Entry] : good air entry [Soft] : abdomen soft [Normal Gait] : normal gait [Moves all extremities] : moves all extremities [No Focal Deficits] : no focal deficits [Alert and Oriented] : alert and oriented [Normal memory] : normal memory [de-identified] : PRAVIN, wearing mask  [de-identified] : JVP 10 cm H20 [de-identified] : Normal S1/S2, 2/6 blowing midpeaking systolic murmur at the apex and 2/4 diastolic murmur heard at RLSB [de-identified] : Mildly tender in mid epigastrum  [de-identified] : Warm, no edema

## 2022-10-03 NOTE — HISTORY OF PRESENT ILLNESS
[FreeTextEntry1] : Referring: Dr. Chandler\par \par Mr. Kenney is a 78 YO M with a history of ACC/AHA Stage  NICM (LV 6.2 cm, LVEF 30%) s/p ICD upgraded to CRT-D 9/2021 for chronic RV pacing, severe AI/MR s/p bioAVR/MVr 2014, history of VT/VT with ICD shocks and frequent PVC's s/p PVC ablation, pAF on eliquis, CKD IIIb (cr 1.8), and aortic aneurysm presenting to HF clinic for further management. \par \par He was very active at baseline and used to run marathons. He was diagnosed with a cardiomyopathy in 2014 with LVEF ~45% in the setting of severe AI with moderate-severe MR and subsequently underwent bioAVR and MVr in 2014. He underwent a primary prevention ICD shortly after surgery. He did well for many years after surgery and LVEF historically been 40-45%. He developed syncope 9/2019 related to VT/VF which self resolved and he underwent PVC ablation this admission. He was admitted 7/2021 with ICD shock for which he was started on amiodarone. His EF declined from 45% to 30% to 15% in 9/2021 and was being chronically RV paced so underwent CRT-D upgrade. Due to persistent dyspnea he was referred to HF clinic 4/2022. \par \par He presents today for HF followup. He feels overall the same since last visit. He continues to note times where he will get short of breath after walking 1/2 flight of stairs or 1-2 blocks. He notes occasional orthopnea improved with pillows. He denies any lower extremity edema. He notes dyspnea at times during household activites or speaking for prolonged periods of time.

## 2022-10-03 NOTE — DISCUSSION/SUMMARY
[FreeTextEntry1] : # Chronic systolic heart failure\par - Will obtain JIGAR to look for underrecognized valvular disese \par - Etiology: unclear etiology at this time, suspect infiltrative process given arrhythmia history. will defer MRI given device and age\par - GDMT: current regimen is metoprolol succinate 50/100, entresto 24-26 mg BID, Farxiga 10 mg daily. defer MRA due to CKD.\par - Diuretic: current regimen is torsemide 10 mg QOD, will continue.\par - Device: s/p CRT-D, well functioning with > 95% BiV pacing \par - Barostim/CCS: will refer for Barostim if no valvular disease on JIGAR\par - Labs: 9/21 with K 5.4 (hemolyzed) and Cr 1.7\par \par # Valvular disease\par - severe MR noted on TTE and exam suggestive of significant AI\par - obtain JIGAR, likely refer to structural cardiology based on results \par \par # History of VT/VF with ICD shocks and frequent PVCs\par - follows with Dr. ARIZMENDI and on amiodarone\par \par # pAF\par - on eliquis, defer dosing to Dr. Chandler and Janell (right now on 2.5 mg BID) \par \par # Aortic aneurysm\par - has been stable over several years, follows with Dr. Garcia and Ramesh \par \par # CKD IIIb with baseline Cr 1.8\par - continue Farxiga \par - follows with Dr. Matthews\par \par Return to clinic in 3 months

## 2022-10-04 ENCOUNTER — OUTPATIENT (OUTPATIENT)
Dept: OUTPATIENT SERVICES | Facility: HOSPITAL | Age: 77
LOS: 1 days | End: 2022-10-04
Payer: MEDICARE

## 2022-10-04 DIAGNOSIS — R06.02 SHORTNESS OF BREATH: ICD-10-CM

## 2022-10-04 DIAGNOSIS — R07.89 OTHER CHEST PAIN: ICD-10-CM

## 2022-10-04 DIAGNOSIS — R06.2 WHEEZING: ICD-10-CM

## 2022-10-04 DIAGNOSIS — Z98.89 OTHER SPECIFIED POSTPROCEDURAL STATES: Chronic | ICD-10-CM

## 2022-10-04 PROCEDURE — 94729 DIFFUSING CAPACITY: CPT | Mod: 26

## 2022-10-04 PROCEDURE — 94726 PLETHYSMOGRAPHY LUNG VOLUMES: CPT

## 2022-10-04 PROCEDURE — 94060 EVALUATION OF WHEEZING: CPT

## 2022-10-04 PROCEDURE — 94760 N-INVAS EAR/PLS OXIMETRY 1: CPT

## 2022-10-04 PROCEDURE — 94010 BREATHING CAPACITY TEST: CPT | Mod: 26

## 2022-10-04 PROCEDURE — 94726 PLETHYSMOGRAPHY LUNG VOLUMES: CPT | Mod: 26

## 2022-10-04 PROCEDURE — 94729 DIFFUSING CAPACITY: CPT

## 2022-10-07 ENCOUNTER — EMERGENCY (EMERGENCY)
Facility: HOSPITAL | Age: 77
LOS: 1 days | Discharge: HOME | End: 2022-10-07
Admitting: EMERGENCY MEDICINE
Payer: MEDICARE

## 2022-10-07 VITALS
SYSTOLIC BLOOD PRESSURE: 100 MMHG | TEMPERATURE: 98 F | DIASTOLIC BLOOD PRESSURE: 76 MMHG | OXYGEN SATURATION: 98 % | HEIGHT: 71 IN | HEART RATE: 80 BPM | RESPIRATION RATE: 18 BRPM | WEIGHT: 177.91 LBS
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
## 2022-10-07 DIAGNOSIS — Z79.01 LONG TERM (CURRENT) USE OF ANTICOAGULANTS: ICD-10-CM

## 2022-10-07 DIAGNOSIS — Z88.8 ALLERGY STATUS TO OTHER DRUGS, MEDICAMENTS AND BIOLOGICAL SUBSTANCES: ICD-10-CM
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
## 2022-10-07 DIAGNOSIS — S39.012A STRAIN OF MUSCLE, FASCIA AND TENDON OF LOWER BACK, INITIAL ENCOUNTER: ICD-10-CM

## 2022-10-07 DIAGNOSIS — M79.604 PAIN IN RIGHT LEG: ICD-10-CM

## 2022-10-07 DIAGNOSIS — S83.92XA SPRAIN OF UNSPECIFIED SITE OF LEFT KNEE, INITIAL ENCOUNTER: ICD-10-CM

## 2022-10-07 DIAGNOSIS — Z98.89 OTHER SPECIFIED POSTPROCEDURAL STATES: Chronic | ICD-10-CM

## 2022-10-07 PROCEDURE — 73590 X-RAY EXAM OF LOWER LEG: CPT | Mod: 26,LT

## 2022-10-07 PROCEDURE — 73590 X-RAY EXAM OF LOWER LEG: CPT

## 2022-10-07 PROCEDURE — 99284 EMERGENCY DEPT VISIT MOD MDM: CPT | Mod: 25

## 2022-10-07 PROCEDURE — 72100 X-RAY EXAM L-S SPINE 2/3 VWS: CPT | Mod: 26

## 2022-10-07 PROCEDURE — 72100 X-RAY EXAM L-S SPINE 2/3 VWS: CPT

## 2022-10-07 NOTE — ED PROVIDER NOTE - PATIENT PORTAL LINK FT
You can access the FollowMyHealth Patient Portal offered by [facility] by registering at the following website: [email or web address]. By joining [employer]’s FollowMyHealth portal, you will also be able to view your health information using other applications (apps) compatible with our system.

## 2022-10-07 NOTE — ED ADULT TRIAGE NOTE - CHIEF COMPLAINT QUOTE
Pt c/o pain to b/l legs/knees/feet after missing a step on 10/4 and landing hard on his legs. Pt denies head injury/LOC. Ambulatory with steady gait.

## 2022-10-07 NOTE — ED PROVIDER NOTE - PHYSICAL EXAMINATION
CONSTITUTIONAL: Well-appearing;  in no apparent distress.   HEAD: Normocephalic; atraumatic.   EYES: PERRL; EOM intact; conjunctiva and sclera clear  ENT: normal nose; no rhinorrhea; normal pharynx with no erythema or lesions.   NECK: Supple; non-tender; no LAD  CARDIOVASCULAR: Normal S1, S2; No audible murmurs. Regular rate and rhythm.   RESPIRATORY: Breathing easily; breath sounds clear and equal bilaterally; no wheezes, rhonchi, or rales.  GI: Soft; non-distended; non-tender; no palpable organomegaly.   MSK: tpp of the distal tib fib of the left lower extremity. no deformity noted. no eccymosis or erythema noted. no swelling noted. no pain to palpation of the R lower extremity.  mild ttp of the lumbar spine w/o midline ttp.   EXT: No cyanosis or edema; N/V intact  SKIN: Normal for age and race; warm; dry; good turgor; no apparent lesions or rash.   NEURO: A & O x 3; face symmetric; grossly unremarkable.   PSYCHOLOGICAL: The patient’s mood and manner are appropriate.

## 2022-10-07 NOTE — ED PROVIDER NOTE - NSFOLLOWUPINSTRUCTIONS_ED_ALL_ED_FT
please follow up with ortho regarding your leg pain and back pain  you should avoid long periods of sitting and standing  you may ice the affected areas  you can take ibuprofen for pain    immediate return with worsening symptoms

## 2022-10-07 NOTE — ED ADULT NURSE NOTE - OBJECTIVE STATEMENT
The patient is a 77y Male complaining of lower leg pain/injury since last tuesday, denies weakness, any sensory changes. stable at chair.

## 2022-10-07 NOTE — ED PROVIDER NOTE - CLINICAL SUMMARY MEDICAL DECISION MAKING FREE TEXT BOX
Pt is a 78 y/o male presenting 2/2 to pain to b/l legs (Left > R) as well as lower back 2/2 to a fall. per patient, states she accidently missed a step on october 4 2022 and he somewhat trip and landed on to his legs as well as lower back. patient states she did not hit his head. patient states he did not catch fall with his arms/wrist. denies cp/sob. states that he can walk without difficulty but notes that he has increased pain to the L mid leg. denies weakness, numbness tingling. patient states he fell somewhat onto his lower back as well. no loss of bowel/bladder control. has tried aleeve with mild relief.      patient is well appearing  NAD  vitals as noted  PE notable for ttp of the L lower extremity and well as paraspinal region of lumbar spine  given findings and fall, XRAYS for the L lower extremity obtained as well as the lumbar spine    imaging negative for acute findings  encouraged RICE and ortho follow up

## 2022-10-07 NOTE — ED PROVIDER NOTE - OBJECTIVE STATEMENT
Pt is a 76 y/o male presenting 2/2 to pain to b/l legs (Left > R) as well as lower back 2/2 to a fall. per patient, states she accidently missed a step on october 4 2022 and he somewhat trip and landed on to his legs as well as lower back. patient states she did not hit his head. patient states he did not catch fall with his arms/wrist. denies cp/sob. states that he can walk without difficulty but notes that he has increased pain to the L mid leg. denies weakness, numbness tingling. patient states he fell somewhat onto his lower back as well. no loss of bowel/bladder control. has tried aleeve with mild relief.

## 2022-10-07 NOTE — ED PROVIDER NOTE - NS ED ROS FT
CONSTITUTIONAL: Well-appearing;  in no apparent distress.   HEAD: Normocephalic; atraumatic.   EYES: PERRL; EOM intact; conjunctiva and sclera clear  ENT: normal nose; no rhinorrhea; normal pharynx with no erythema or lesions.   NECK: Supple; non-tender; no LAD  CARDIOVASCULAR: Normal S1, S2; No audible murmurs. Regular rate and rhythm.   RESPIRATORY: Breathing easily; breath sounds clear and equal bilaterally; no wheezes, rhonchi, or rales.  GI: Soft; non-distended; non-tender; no palpable organomegaly.   MSK: +tive leg pain and lower back pain.  EXT: No cyanosis or edema; N/V intact  SKIN: Normal for age and race; warm; dry; good turgor; no apparent lesions or rash.   NEURO: A & O x 3; face symmetric; grossly unremarkable.   PSYCHOLOGICAL: The patient’s mood and manner are appropriate.

## 2022-10-07 NOTE — ED ADULT NURSE NOTE - NS_SISCREENINGSR_GEN_ALL_ED
[Heartburn] : denies heartburn [Nausea] : denies nausea [Vomiting] : denies vomiting [Diarrhea] : denies diarrhea [Constipation] : denies constipation [Yellow Skin Or Eyes (Jaundice)] : denies jaundice Negative [Abdominal Pain] : denies abdominal pain [Abdominal Swelling] : denies abdominal swelling [Rectal Pain] : denies rectal pain [GERD] : gastroesophageal reflux disease [Cholecystectomy] : cholecystectomy [de-identified] : Pt is feeling much better but has been having pain on her right side for last month.  it occurs any time . she moves her bowels daily and takes Metamucil .  The discomfort is burning on the right side and feels bloating.  she had a renal stone and left hydronephrosis and had renal stone removed.  she has not noticed pain on urination, blood in urine or back pain.  she was taking flomax and was told to stop it by her pcp .  She states she has noticed it occurs when taking metformin .  She is taking amlodipine and valsartan hctz.  and hctz can cause increased risks of hypercalcemia and renal stone formation and would suggest she have it removed form her bp meds.  She is trying to drink more water.   pt states she has pain in her right forearm and cant bend it  and feels a lump .

## 2022-10-07 NOTE — ED ADULT TRIAGE NOTE - RESPIRATORY RATE (BREATHS/MIN)
- Continue with mind stimulating activities (word search, cross word, sodoku, search & find, coloring, jig saw, reading etc )  - Continue current doses of Aricept and Donepezil  - Continue off of Zoloft (sertraline)  - Follow up in 6 months    Things that we know are helpful for thinking and memory   1  Exercise program: gradually increase your physical activity over time  Start small and be patient  Aerobic (cardio) activity is best but incorporate balance, strength and flexibility training as well    a  Try to get at least 30min 3 times per week   2  Diet: Mediterranean diet (colorful fruits and vegetables, olive oil, fish, whole grains, very little red meet is any), MIND diet, anti-inflammatory diet  a  Stay well hydrated: drink 6-8 glasses of water per day   b  What's good for your heart is good for your brain  c  Avoid high-salt foods   3  Sleep: aim for at least 7-8 hours per night   a  Avoid alcohol and medications like Benadryl (Tylenol PM) or other sedating drugs  4  Stress management/mindfulness practice:   a  Talk with our social workers about finding a cognitive behavioral therapists  b  Try a smart phone nica like [employer] or Fan[employer]r for beginners   i  Try curable for pain    c  Take a course in Mindfulness Based Stress Reduction (MBSR)   i  Patti worrell  Read or listen to an audiobook about it:  i  Mindfulness for beginners   ii  10% happier  iii  The happiness advantage   5  Social engagement:   a  Stay in touch with family and friends   b  Plan a few specific activities for your social health every week   lovely Agarwal Ran a local support group   d  Volunteer! www [email or web address] or call [phone]     MIND diet score:  1 point for each component      · Green leafy vegetables: at least 6 per week  · Other vegetable: at least 1 per day   · Berries: at least 2 per week  · Red meat: fewer than 4 per week   · Fish: at least 1 per week   · Poultry: at least 2 per week  · Beans: at least 3 per week  · Nuts: at least 5 per week  · Fast or fried food: less than 1 per week  · Olive oil   · Butter less: less than 1 table-spoon per day   · Cheese: less than 1 serving per week   · Pastries/sweets: less than 5 servings per week  · Alcohol: no more than 1 serving/ day 18

## 2022-10-07 NOTE — ED PROVIDER NOTE - CARE PLAN
1 Principal Discharge DX:	Sprain of lower leg  Secondary Diagnosis:	Strain of tendon of lower back

## 2022-11-07 ENCOUNTER — APPOINTMENT (OUTPATIENT)
Dept: HEART AND VASCULAR | Facility: CLINIC | Age: 77
End: 2022-11-07

## 2022-11-07 PROCEDURE — 93306 TTE W/DOPPLER COMPLETE: CPT

## 2022-11-10 ENCOUNTER — APPOINTMENT (OUTPATIENT)
Dept: PULMONOLOGY | Facility: CLINIC | Age: 77
End: 2022-11-10

## 2022-11-10 ENCOUNTER — NON-APPOINTMENT (OUTPATIENT)
Age: 77
End: 2022-11-10

## 2022-11-10 VITALS
WEIGHT: 181 LBS | OXYGEN SATURATION: 97 % | SYSTOLIC BLOOD PRESSURE: 110 MMHG | TEMPERATURE: 97.6 F | HEIGHT: 71 IN | DIASTOLIC BLOOD PRESSURE: 84 MMHG | BODY MASS INDEX: 25.34 KG/M2 | HEART RATE: 83 BPM

## 2022-11-10 PROCEDURE — 94060 EVALUATION OF WHEEZING: CPT

## 2022-11-10 PROCEDURE — 94727 GAS DIL/WSHOT DETER LNG VOL: CPT

## 2022-11-10 PROCEDURE — 99204 OFFICE O/P NEW MOD 45 MIN: CPT | Mod: 25

## 2022-11-10 PROCEDURE — 94729 DIFFUSING CAPACITY: CPT

## 2022-11-10 NOTE — CONSULT LETTER
[Dear  ___] : Dear  [unfilled], [Courtesy Letter:] : I had the pleasure of seeing your patient, [unfilled], in my office today. [Please see my note below.] : Please see my note below. [Consult Closing:] : Thank you very much for allowing me to participate in the care of this patient.  If you have any questions, please do not hesitate to contact me. [Sincerely,] : Sincerely, [FreeTextEntry2] : Gary Odonnell MD\par [address]\[place] [address] [FreeTextEntry3] : Yolanda Santiago MD, FCCP\par

## 2022-11-10 NOTE — HISTORY OF PRESENT ILLNESS
[TextBox_4] : 11/10/2022: Asked to evaluate patient by Dr Odonnell for pulmonary evaluation. 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 AICD (2014; Dr. Jeff).\par \par He complains of dyspnea and epigastric pain since his operation in 2014. Dr Cortez helped this with a pill. He says he ran the marathon in 2018. Now he cannot even walk one block and has to stop 3 times -  however, he walked here from [address] and did not have to rest. Wheezes, no cough. Never smoker. Was a . Lives in [place]. He did have Covid (2020?) and was not hospitalized and he is vaccinated for Covid. He does take amiodarone 200mg daily since 2021.\par

## 2022-11-10 NOTE — PHYSICAL EXAM
[No Acute Distress] : no acute distress [Normal Rate/Rhythm] : normal rate/rhythm [Normal S1, S2] : normal s1, s2 [No Murmurs] : no murmurs [No Resp Distress] : no resp distress [Clear to Auscultation Bilaterally] : clear to auscultation bilaterally [No Clubbing] : no clubbing [Normal Affect] : normal affect

## 2022-11-10 NOTE — ASSESSMENT
[FreeTextEntry1] : Data reviewed:\par \par CT chest [facility] 8/2022 personally reviewed and the lungs are clear, cardiomegaly\par \par PFT 11/10/2022 : lyssa is at LLN but is normal, TLC just below LLN (81% pred), DLCO normal\par \par Impression:\par Dyspnea with heart failure\par \par Plan:\par His PFT is low normal, but there is no evidence of lung disease.

## 2022-11-18 ENCOUNTER — APPOINTMENT (OUTPATIENT)
Dept: NEPHROLOGY | Facility: CLINIC | Age: 77
End: 2022-11-18

## 2022-11-18 VITALS — DIASTOLIC BLOOD PRESSURE: 72 MMHG | HEART RATE: 72 BPM | SYSTOLIC BLOOD PRESSURE: 110 MMHG

## 2022-11-18 PROCEDURE — 99214 OFFICE O/P EST MOD 30 MIN: CPT

## 2022-11-18 NOTE — PHYSICAL EXAM
[General Appearance - Alert] : alert [General Appearance - In No Acute Distress] : in no acute distress [Neck Appearance] : the appearance of the neck was normal [Neck Cervical Mass (___cm)] : no neck mass was observed [Jugular Venous Distention Increased] : there was no jugular-venous distention [Thyroid Diffuse Enlargement] : the thyroid was not enlarged [Thyroid Nodule] : there were no palpable thyroid nodules [Auscultation Breath Sounds / Voice Sounds] : lungs were clear to auscultation bilaterally [Heart Rate And Rhythm] : heart rate was normal and rhythm regular [Heart Sounds] : normal S1 and S2 [Heart Sounds Gallop] : no gallops [Murmurs] : no murmurs [Heart Sounds Pericardial Friction Rub] : no pericardial rub [Bowel Sounds] : normal bowel sounds [Abdomen Soft] : soft [Abdomen Tenderness] : non-tender [] : no hepato-splenomegaly [Abdomen Mass (___ Cm)] : no abdominal mass palpated [Cervical Lymph Nodes Enlarged Anterior Bilaterally] : anterior cervical [Cervical Lymph Nodes Enlarged Posterior Bilaterally] : posterior cervical [Supraclavicular Lymph Nodes Enlarged Bilaterally] : supraclavicular [FreeTextEntry1] : pitting ankles

## 2022-11-18 NOTE — HISTORY OF PRESENT ILLNESS
[FreeTextEntry1] : Kindly referred by Dr. Sandhya Bloom elevated creatinine. [employer]. Here with family member. \par \par * CKD stable, creatinine 1.7 on Sep 21. . * 13 - 15 mg albuminuria. * * HTN controlled. No lightheadedness.  Compliant with medications. * Following up with Dr. Hanson for monoclonal gammopathy. * CP/SOB now significantly improved, none currnetly. Following up closely with cardiologist. \par \par Previous history (18Aug22): * CKD stable, creatinine 1.87. 55 mg albuminuria. * HTN controlled. No lightheadedness. Compliant with medications.  Following up with Dr. Hanson for monoclonal gammopathy. * Hyperkalemia controlled. * Rash on legs. Seeing allergist. * Pneumonia on 5/21 - 5/22. * Persistent SOB/CP with minimal to moderate exertion. Per patient, this is unchanged and cardiologist is aware. \par \par Previous history (19May22): * CKD stable, creatinine 1.87. * HTN controlled. No lightheadedness. No CP/SOB. Compliant with medications.  Following up with Dr. Hanson for monoclonal gammopathy. * Hyperkalemia controlled. * He has urinary urgency for several weeks. No dysuria. 5x nocturia. \par \par Previous history (22Feb22): * HTN controlled.  - 120s / 80s at home. No lightheadedness. Compliant with medications. 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 \par \par 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 * CKD progressive, creatinine 1.91. Albuminuria decreased to 48. * Occasional chronic abd pain being evaluated, none currently. \par \par Previous history (06Dec21): * CKD stable, creatinine 1.51.  Albuminuria decreased to 91 mg from 3280 mg (?). Farxiga 10 started. 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 * HTN controlled. BP 120s at home. No lightheadedness. Compliant with medications. \par \par Previous history (10Nov21): * HTN controlled. BP 110s at home. No lightheadedness. Compliant with medications. * 3280 mg albuminuria. * CKD stable, creatinine 1.45. 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 \par \par Previous history (09Sep21): * Events reviewed. Admitted to [facility] for CHF exacerbation and ICD upgrade. EF < 23 - 30.* CKD stable, creatinine 1.55.  Labs from 9/7 reviewed. Creatinine 1.45. K 4. 298 mg albuminuria.  * Hyperkalemia controlled. Not on lokelma or veltassa.  * HTN controlled. /80s at home. No lightheadedness. Compliant with medications. 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 \par \par Previous history (31Aug21): * Dx with Covid 3 weeks ago. Now no cough, or other symptoms. Did not require hospitalization. He has SOB with walking walking 1 block. * Lokelma started for hyperkalemia (K of 6). He is now following low potassium diet. . * CKD stable, creatinine 1.86. * He had stopped torsemide. BP 100s. \par \par Previous history (03Aug21): Labs reviewed. Baseline eGFR 43 - 56 since 2019. On 14Jul21, his creatinine increased to 1.82 (eGFR 35 - 41). The patient denies exposure to chronic NSAIDs, chronic PPIs, creatine, or herbal supplements. The patient denies a history of kidney stones or pyelonephritis. 4 - 5 times. No recent renal ultrasound. They are unaware of proteinuria or hematuria.\par \par EF 23% in 4/21. On amiodarone, metoprolol, lasix 20 qod, HCTZ twice weekly valsartan 40. SOB with one block walking. Stable LE edema. ProBNP incresaed from 515 to 1360.\par \par * HTN controlled.  No lightheadedness. Compliant with medications. He believes he is taking amlodipine.

## 2022-11-18 NOTE — ASSESSMENT
[FreeTextEntry1] : # CKD stage 3 likeliest due to type 2 cardiorenal syndrome and aging, possible DM nephropathy. \par * Recheck labs next visit. \par * Will avoid MRA given previous hyperkalemia. \par * Therapies for kidney disease: SGLT2i; blood pressure control; proteinuria reduction with ARB/ACEi; other evidence-based therapies including exercise, a plant-based lower oxalate diet, and 400 mcg folic acid daily\par * Cardiovascular disease prevention: counseling on healthy diet, physical activity, weight loss, alcohol limitation, blood pressure control; cardiology evaluation/followup advised\par * The patient has been counseled that chronic kidney disease is a significant condition and regular office followup with me (at least every 2 months for now) is important for monitoring and their health, and that it is their responsibility to make a follow up appointment.\par * The patient has been counseled never to stop taking their medications without discussing it with me or another doctor.\par * The patient has been counseled on avoiding NSAIDs.\par * The patient has been counseled on risk of acute renal failure and instructed to immediately call and speak with me or go immediately to ER with any severe symptoms, nausea, vomiting, diarrhea, chest pain, or shortness of breath.\par * A counseling information sheet has been given (today or previously). All their questions were answered.\par \par # Hyperkalemia.\par * Low K diet.\par \par # HTN controlled. No lightheadedness. \par * Cont entresto, metoprolol, torsemide.\par * The patient's blood pressure was checked with the [employer] [id number]-[id number] using the SPRINT trial protocol after sitting quietly in an empty room with arm supported, back supported, and feet on the floor for 5 minutes. The average of 3 readings were taken.\par * A counseling information sheet has been given (currently or previously, in-person or electronically). All their questions were answered.\par * The patient has been counseled to check their BP at home with an automatic arm cuff, write down the readings, and reach me directly on the phone immediately if they are persistently > 180 systolic or if SBP is less than 100 or if lightheadedness develops. They were counseled to bring in all blood pressure readings and medications next visit.\par * The patient has been counseled that regular office followup (at least every 2 months for now) is important for monitoring and for their health, and that it is their responsibility to make follow up appointments.\par * The patient also has been counseled that they must never stop or change any medications without discussing this with me (or another physician). \par \par # Monoclonal gammopathy.\par * Advised to follow up with hematology. Myeloma, amyloid, and MGRS are unlikely. \par  \par \par

## 2022-11-23 ENCOUNTER — APPOINTMENT (OUTPATIENT)
Dept: HEART AND VASCULAR | Facility: CLINIC | Age: 77
End: 2022-11-23

## 2022-11-23 VITALS
SYSTOLIC BLOOD PRESSURE: 117 MMHG | DIASTOLIC BLOOD PRESSURE: 77 MMHG | BODY MASS INDEX: 25.06 KG/M2 | OXYGEN SATURATION: 98 % | WEIGHT: 179 LBS | HEIGHT: 71 IN | HEART RATE: 80 BPM | TEMPERATURE: 97 F

## 2022-11-23 PROCEDURE — 93284 PRGRMG EVAL IMPLANTABLE DFB: CPT

## 2022-11-23 NOTE — DISCUSSION/SUMMARY
[Pacemaker Function Normal] : normal pacemaker function [FreeTextEntry1] : 77 year old male with HTN, HLD, aortic aneurysm thoracic, AVR and mitral valve repair in 2014,  paroxysmal atrial fibrillation, PVCs and ICD s/p recent PVC ablation, who had an appropriate ICD shock  now s/p upgrade to BiV ICD (EF from 40% to 15-20% with pacing dependance, now 25-30%), who presents for follow up.  Device interrogation reveals normal function.  All measured data is within normal limits .He has a known history of paroxysmal afib and is now compliant with Eliquis.  He is following with Dr. Sheppard for heart failure and has an upcoming JIGAR to evaluate for his valves.  If this is unrevealing we discussed he is possibly a candidate for a Barostim device.  HE is amenable to this.   He will follow up in 6 months or sooner if needed and knows to call with any questions or concerns.

## 2022-11-23 NOTE — PROCEDURE
[No] : not [NSR] : normal sinus rhythm [ICD] : Implantable cardioverter-defibrillator [DDD] : DDD [Threshold Testing Performed] : Threshold testing was performed [Sensing Amplitude ___mv] : sensing amplitude was [unfilled] mv [Lead Imp:  ___ohms] : lead impedance was [unfilled] ohms [___V @] : [unfilled] V [___ ms] : [unfilled] ms [None] : none [de-identified] : very long AV delay [de-identified] : [facility] [de-identified] : al[place]iao [de-identified] : [id number] [de-identified] : 2021 [de-identified] : [id number] [de-identified] : 4.9 years  [de-identified] :  \par AV delay 250   DDD 50\par changed to DDD 70 AV delay 250 [de-identified] : AP 93%\par  99%\par No events\par shock impedance 47

## 2022-11-23 NOTE — REVIEW OF SYSTEMS
[Negative] : Heme/Lymph [Feeling Fatigued] : feeling fatigued [Fever] : no fever [Weight Gain (___ Lbs)] : no recent weight gain [Chills] : no chills [Weight Loss (___ Lbs)] : no recent weight loss [SOB] : no shortness of breath [Dyspnea on exertion] : not dyspnea during exertion [Chest Discomfort] : no chest discomfort [Lower Ext Edema] : no extremity edema [Palpitations] : no palpitations [Syncope] : no syncope [Cough] : no cough

## 2022-11-23 NOTE — HISTORY OF PRESENT ILLNESS
[Palpitations] : no palpitations [SOB] : no dyspnea [Syncope] : no syncope [Dizziness] : no dizziness [Chest Pain] : no chest pain or discomfort [Shoulder Pain] : no shoulder pain [Pain at Site] : no pain at device site [Erythema at Site] : no erythema at device site [Swelling at Site] : no swelling at device site [FreeTextEntry1] : 77 year old male with HTN, HLD, aortic aneurysm thoracic, AVR and mitral valve repair in 2014,  paroxysmal atrial fibrillation, PVCs and ICD s/p recent PVC ablation, who had an appropriate ICD shock  now s/p upgrade to BiV ICD (EF from 40% to 15-20% with pacing dependance), who presents for follow up.\par \par He states that he had a ICD placed after his AVR.  He normally followed up with Dr. Palencia; whose office moved and he transferred care here.   He is on Sotalol for history of PVCs and  NSVT; however he had short bursts of VT/torsades and it was felt the sotalol was proarrhythmic and stopped.  He was placed on Metoprolol.  He had 7101 PVCs on holter monitor with short bursts of NSVT.  \par \par He was admitted to [facility] 9/2019 with syncope correlated with VT and Vfib.  No therapy needed as he spontaneously converted.  One episode of afib, but overall burden extremely low.  Cath with mildly obstructive CAD.  He also underwent an EP study with PVC ablation from great cardiac vein.  HE was discharged on Mexiletine.  \par  \par 7/2021 he was admitted to an OSH with ICD shock.  He was discharged on amiodarone load which he self discontinued.   EF was down and he underwent a BiV upgrade.  \par \par He presents for routine follow up and has no complaints. He denies any SOB, edema, palpitations, syncope, near syncope.  He is compliant with all of his medications including eliquis and amiodarone.  He is being followed by Dr. Linda and has a JIGAR pending to evaluate the extent of his valve disease. \par \par  \par \par TTE 11/2022: LV 6.7 cm, LVEF 20-25%, mild concentric LVH, bioAVR with minimal AI, mild RV dysfunction, mod-severe valvular MR (posterior leaflet appears frozen) and mean gradient 8 mmHg, moderate TR, at least moderate PI, estimated PASP 53 mmHg, dilated aorta 4.6 cm at sinus of Valsalva\par Echo 5/9/2022 EF 25-30%\par Echo 8/2021 EF 25-30%\par  Echo 1/2021 EF 35-40%\par Echo 11/2019 EF 55-60%\par \par  \par

## 2022-11-23 NOTE — ADDENDUM
[FreeTextEntry1] : I, Soto Nielsen, am scribing for and the presence of Dr. Jeff the following sections: HPI, PMH,Family/social history, ROS, Physical Exam, Assessment / Plan.\par  IAndrey, personally performed the services described in the documentation, reviewed the documentation recorded by the scribe in my presence and it accurately and completely records my words and actions.\par

## 2022-12-14 ENCOUNTER — NON-APPOINTMENT (OUTPATIENT)
Age: 77
End: 2022-12-14

## 2022-12-19 NOTE — REVIEW OF SYSTEMS
previous_biopsy_has_been_previously_biopsied Body Location Override (Optional): (R) temple When Was Your Biopsy?: 11/28/22 Who Is Your Referring Provider?: Dr. Ashraf Additional History: Moderately differentiated. Has The Cancer Been Biopsied Before?: has been previously biopsied Body Location Override (Optional): (L) medial posterior lower leg Additional History: Well differentiated. [Negative] : Heme/Lymph

## 2022-12-20 ENCOUNTER — OUTPATIENT (OUTPATIENT)
Dept: OUTPATIENT SERVICES | Facility: HOSPITAL | Age: 77
LOS: 1 days | End: 2022-12-20
Payer: MEDICARE

## 2022-12-20 DIAGNOSIS — I34.0 NONRHEUMATIC MITRAL (VALVE) INSUFFICIENCY: ICD-10-CM

## 2022-12-20 DIAGNOSIS — Z98.89 OTHER SPECIFIED POSTPROCEDURAL STATES: Chronic | ICD-10-CM

## 2022-12-20 DIAGNOSIS — I35.1 NONRHEUMATIC AORTIC (VALVE) INSUFFICIENCY: ICD-10-CM

## 2022-12-20 PROCEDURE — 93312 ECHO TRANSESOPHAGEAL: CPT | Mod: 26

## 2022-12-20 PROCEDURE — 93312 ECHO TRANSESOPHAGEAL: CPT

## 2022-12-20 PROCEDURE — 76376 3D RENDER W/INTRP POSTPROCES: CPT | Mod: 26

## 2022-12-21 ENCOUNTER — APPOINTMENT (OUTPATIENT)
Dept: HEART AND VASCULAR | Facility: CLINIC | Age: 77
End: 2022-12-21

## 2022-12-21 ENCOUNTER — NON-APPOINTMENT (OUTPATIENT)
Age: 77
End: 2022-12-21

## 2022-12-21 PROCEDURE — 93295 DEV INTERROG REMOTE 1/2/MLT: CPT

## 2022-12-21 PROCEDURE — 93296 REM INTERROG EVL PM/IDS: CPT

## 2022-12-27 ENCOUNTER — EMERGENCY (EMERGENCY)
Facility: HOSPITAL | Age: 77
LOS: 1 days | Discharge: HOME | End: 2022-12-27
Attending: EMERGENCY MEDICINE | Admitting: INTERNAL MEDICINE
Payer: MEDICARE

## 2022-12-27 VITALS
HEART RATE: 80 BPM | RESPIRATION RATE: 18 BRPM | TEMPERATURE: 98 F | WEIGHT: 173.94 LBS | DIASTOLIC BLOOD PRESSURE: 90 MMHG | HEIGHT: 71 IN | SYSTOLIC BLOOD PRESSURE: 139 MMHG | OXYGEN SATURATION: 100 %

## 2022-12-27 VITALS
TEMPERATURE: 98 F | RESPIRATION RATE: 18 BRPM | SYSTOLIC BLOOD PRESSURE: 112 MMHG | HEART RATE: 84 BPM | OXYGEN SATURATION: 97 % | DIASTOLIC BLOOD PRESSURE: 78 MMHG

## 2022-12-27 DIAGNOSIS — Z98.89 OTHER SPECIFIED POSTPROCEDURAL STATES: Chronic | ICD-10-CM

## 2022-12-27 LAB
ALBUMIN SERPL ELPH-MCNC: 4 G/DL — SIGNIFICANT CHANGE UP (ref 3.3–5)
ALP SERPL-CCNC: 68 U/L — SIGNIFICANT CHANGE UP (ref 40–120)
ALT FLD-CCNC: 12 U/L — SIGNIFICANT CHANGE UP (ref 10–45)
ANION GAP SERPL CALC-SCNC: 8 MMOL/L — SIGNIFICANT CHANGE UP (ref 5–17)
APTT BLD: 32.8 SEC — SIGNIFICANT CHANGE UP (ref 27.5–35.5)
AST SERPL-CCNC: 17 U/L — SIGNIFICANT CHANGE UP (ref 10–40)
BASOPHILS # BLD AUTO: 0.01 K/UL — SIGNIFICANT CHANGE UP (ref 0–0.2)
BASOPHILS NFR BLD AUTO: 0.2 % — SIGNIFICANT CHANGE UP (ref 0–2)
BILIRUB SERPL-MCNC: 0.4 MG/DL — SIGNIFICANT CHANGE UP (ref 0.2–1.2)
BUN SERPL-MCNC: 31 MG/DL — HIGH (ref 7–23)
CALCIUM SERPL-MCNC: 9.3 MG/DL — SIGNIFICANT CHANGE UP (ref 8.4–10.5)
CHLORIDE SERPL-SCNC: 107 MMOL/L — SIGNIFICANT CHANGE UP (ref 96–108)
CK MB CFR SERPL CALC: 1.6 NG/ML — SIGNIFICANT CHANGE UP (ref 0–6.7)
CK SERPL-CCNC: 92 U/L — SIGNIFICANT CHANGE UP (ref 30–200)
CO2 SERPL-SCNC: 28 MMOL/L — SIGNIFICANT CHANGE UP (ref 22–31)
CREAT SERPL-MCNC: 1.81 MG/DL — HIGH (ref 0.5–1.3)
EGFR: 38 ML/MIN/1.73M2 — LOW
EOSINOPHIL # BLD AUTO: 0.04 K/UL — SIGNIFICANT CHANGE UP (ref 0–0.5)
EOSINOPHIL NFR BLD AUTO: 0.8 % — SIGNIFICANT CHANGE UP (ref 0–6)
GLUCOSE SERPL-MCNC: 119 MG/DL — HIGH (ref 70–99)
HCT VFR BLD CALC: 44 % — SIGNIFICANT CHANGE UP (ref 39–50)
HGB BLD-MCNC: 13.8 G/DL — SIGNIFICANT CHANGE UP (ref 13–17)
IMM GRANULOCYTES NFR BLD AUTO: 0.2 % — SIGNIFICANT CHANGE UP (ref 0–0.9)
INR BLD: 1.08 — SIGNIFICANT CHANGE UP (ref 0.88–1.16)
LYMPHOCYTES # BLD AUTO: 2.1 K/UL — SIGNIFICANT CHANGE UP (ref 1–3.3)
LYMPHOCYTES # BLD AUTO: 44.5 % — HIGH (ref 13–44)
MCHC RBC-ENTMCNC: 31.4 GM/DL — LOW (ref 32–36)
MCHC RBC-ENTMCNC: 32.5 PG — SIGNIFICANT CHANGE UP (ref 27–34)
MCV RBC AUTO: 103.8 FL — HIGH (ref 80–100)
MONOCYTES # BLD AUTO: 0.55 K/UL — SIGNIFICANT CHANGE UP (ref 0–0.9)
MONOCYTES NFR BLD AUTO: 11.7 % — SIGNIFICANT CHANGE UP (ref 2–14)
NEUTROPHILS # BLD AUTO: 2.01 K/UL — SIGNIFICANT CHANGE UP (ref 1.8–7.4)
NEUTROPHILS NFR BLD AUTO: 42.6 % — LOW (ref 43–77)
NRBC # BLD: 0 /100 WBCS — SIGNIFICANT CHANGE UP (ref 0–0)
NT-PROBNP SERPL-SCNC: 997 PG/ML — HIGH (ref 0–300)
PLATELET # BLD AUTO: 187 K/UL — SIGNIFICANT CHANGE UP (ref 150–400)
POTASSIUM SERPL-MCNC: 4.5 MMOL/L — SIGNIFICANT CHANGE UP (ref 3.5–5.3)
POTASSIUM SERPL-SCNC: 4.5 MMOL/L — SIGNIFICANT CHANGE UP (ref 3.5–5.3)
PROT SERPL-MCNC: 7.9 G/DL — SIGNIFICANT CHANGE UP (ref 6–8.3)
PROTHROM AB SERPL-ACNC: 12.9 SEC — SIGNIFICANT CHANGE UP (ref 10.5–13.4)
RBC # BLD: 4.24 M/UL — SIGNIFICANT CHANGE UP (ref 4.2–5.8)
RBC # FLD: 14.7 % — HIGH (ref 10.3–14.5)
SARS-COV-2 RNA SPEC QL NAA+PROBE: NEGATIVE — SIGNIFICANT CHANGE UP
SODIUM SERPL-SCNC: 143 MMOL/L — SIGNIFICANT CHANGE UP (ref 135–145)
TROPONIN T SERPL-MCNC: 0.01 NG/ML — SIGNIFICANT CHANGE UP (ref 0–0.01)
WBC # BLD: 4.72 K/UL — SIGNIFICANT CHANGE UP (ref 3.8–10.5)
WBC # FLD AUTO: 4.72 K/UL — SIGNIFICANT CHANGE UP (ref 3.8–10.5)

## 2022-12-27 PROCEDURE — 85610 PROTHROMBIN TIME: CPT

## 2022-12-27 PROCEDURE — 36415 COLL VENOUS BLD VENIPUNCTURE: CPT

## 2022-12-27 PROCEDURE — 85025 COMPLETE CBC W/AUTO DIFF WBC: CPT

## 2022-12-27 PROCEDURE — 82550 ASSAY OF CK (CPK): CPT

## 2022-12-27 PROCEDURE — 93005 ELECTROCARDIOGRAM TRACING: CPT

## 2022-12-27 PROCEDURE — 82553 CREATINE MB FRACTION: CPT

## 2022-12-27 PROCEDURE — 84484 ASSAY OF TROPONIN QUANT: CPT

## 2022-12-27 PROCEDURE — 99285 EMERGENCY DEPT VISIT HI MDM: CPT | Mod: 25

## 2022-12-27 PROCEDURE — 71045 X-RAY EXAM CHEST 1 VIEW: CPT | Mod: 26

## 2022-12-27 PROCEDURE — 80053 COMPREHEN METABOLIC PANEL: CPT

## 2022-12-27 PROCEDURE — 71045 X-RAY EXAM CHEST 1 VIEW: CPT

## 2022-12-27 PROCEDURE — 99284 EMERGENCY DEPT VISIT MOD MDM: CPT | Mod: CS

## 2022-12-27 PROCEDURE — 87635 SARS-COV-2 COVID-19 AMP PRB: CPT

## 2022-12-27 PROCEDURE — 85730 THROMBOPLASTIN TIME PARTIAL: CPT

## 2022-12-27 PROCEDURE — 83880 ASSAY OF NATRIURETIC PEPTIDE: CPT

## 2022-12-27 RX ORDER — FUROSEMIDE 40 MG
20 TABLET ORAL ONCE
Refills: 0 | Status: COMPLETED | OUTPATIENT
Start: 2022-12-27 | End: 2022-12-27

## 2022-12-27 NOTE — ED PROVIDER NOTE - OBJECTIVE STATEMENT
76 y/o M with a PMHx of paroxysmal afib on Eliquis, AVR and NVR, CHF with EF 10-15%, CAD, CKD, HTN, HLD presents with persistent chest pain and SOB. Pt states he just a JIGAR procedure with Dr. Sheppard last week which he does not have the results. Pt denies any coughing, fever, tearing pain to chest or abdominal pain.

## 2022-12-27 NOTE — ED PROVIDER NOTE - PROGRESS NOTE DETAILS
Pt labs wnl. Pt comfortable in ED. Dr. Finley covering for Dr. Catherine saw patient in ED, and clears patient for d/c to continue f/u w Dr. Catherine. Recommends lasix, pt prefers to take lasix at home. Return precautions provided, and is ready for d/c.

## 2022-12-27 NOTE — ED ADULT TRIAGE NOTE - CHIEF COMPLAINT QUOTE
Patient w/ extensive cardiac Hx, Pacemaker, states had " tests done last week and saw leaky heart valves and scheduled for procedure."  Comes to ED c/o of increasing mid-sternal chest pain worse with deep inspiration.  EKG in progress.

## 2022-12-27 NOTE — ED PROVIDER NOTE - CLINICAL SUMMARY MEDICAL DECISION MAKING FREE TEXT BOX
Will plan to rule out ACS given complicated cardiac history and no tearing pain to suggest dissection. Pt is comfortable currently. Will obtain labs including CBC, CMP, Troponin, EXK, CXR, cardiology consult and will reassess.

## 2022-12-27 NOTE — ED PROVIDER NOTE - PATIENT PORTAL LINK FT
You can access the FollowMyHealth Patient Portal offered by [facility] by registering at the following website: [email or web address]. By joining [facility]’s FollowMyHealth portal, you will also be able to view your health information using other applications (apps) compatible with our system.

## 2022-12-27 NOTE — ED ADULT NURSE NOTE - OBJECTIVE STATEMENT
.  77years male alert mental state (AOX3) received on foot.  -Allergy: N/A.  -complain of chest pain.  Hx of pacemaker. pt states that pt got cardio test last week and was heard about leaky heart valves. pt presents tightness type of chest pain, SOB and worse chest pain when pt has deep inspiration.   -denied dizziness, headache, n/v/d, abdomen pain, leg swelling.  Pt is in the bed comfortably at this time. Will continue to monitor and document any changes.

## 2022-12-27 NOTE — ED PROVIDER NOTE - NSFOLLOWUPINSTRUCTIONS_ED_ALL_ED_FT
[facility]                                                                                                                         Nonspecific Chest Pain, Adult      Chest pain is an uncomfortable, tight, or painful feeling in the chest. The pain can feel like a crushing, aching, or squeezing pressure. A person can feel a burning or tingling sensation. Chest pain can also be felt in your back, neck, jaw, shoulder, or arm. This pain can be worse when you move, sneeze, or take a deep breath.    Chest pain can be caused by a condition that is life-threatening. This must be treated right away. It can also be caused by something that is not life-threatening. If you have chest pain, it can be hard to know the difference, so it is important to get help right away to make sure that you do not have a serious condition.    Some life-threatening causes of chest pain include:  •Heart attack.      •A tear in the body's main blood vessel (aortic dissection).      •Inflammation around your heart (pericarditis).      •A problem in the lungs, such as a blood clot (pulmonary embolism) or a collapsed lung (pneumothorax).      Some non life-threatening causes of chest pain include:  •Heartburn.      •Anxiety or stress.      •Damage to the bones, muscles, and cartilage that make up your chest wall.      •Pneumonia or bronchitis.      •Shingles infection (varicella-zoster virus).      Your chest pain may come and go. It may also be constant. Your health care provider will do tests and other studies to find the cause of your pain. Treatment will depend on the cause of your chest pain.      Follow these instructions at home:    Medicines     •Take over-the-counter and prescription medicines only as told by your health care provider.      •If you were prescribed an antibiotic medicine, take it as told by your health care provider. Do not stop taking the antibiotic even if you start to feel better.      Activity     •Avoid any activities that cause chest pain.      • Do not lift anything that is heavier than 10 lb (4.5 kg), or the limit that you are told, until your health care provider says that it is safe.      •Rest as directed by your health care provider.       •Return to your normal activities only as told by your health care provider. Ask your health care provider what activities are safe for you.        Lifestyle       A plate along with examples of foods in a healthy diet.       Silhouette of a person sitting on the floor doing yoga.     • Do not use any products that contain nicotine or tobacco, such as cigarettes, e-cigarettes, and chewing tobacco. If you need help quitting, ask your health care provider.      • Do not drink alcohol.    •Make healthy lifestyle changes as recommended. These may include:  •Getting regular exercise. Ask your health care provider to suggest some exercises that are safe for you.      •Eating a heart-healthy diet. This includes plenty of fresh fruits and vegetables, whole grains, low-fat (lean) protein, and low-fat dairy products. A dietitian can help you find healthy eating options.      •Maintaining a healthy weight.      •Managing any other health conditions you may have, such as high blood pressure (hypertension) or diabetes.      •Reducing stress, such as with yoga or relaxation techniques.        General instructions     •Pay attention to any changes in your symptoms.      •It is up to you to get the results of any tests that were done. Ask your health care provider, or the department that is doing the tests, when your results will be ready.      •Keep all follow-up visits as told by your health care provider. This is important.       •You may be asked to go for further testing if your chest pain does not go away.        Contact a health care provider if:    •Your chest pain does not go away.      •You feel depressed.      •You have a fever.      •You notice changes in your symptoms or develop new symptoms.        Get help right away if:    •Your chest pain gets worse.      •You have a cough that gets worse, or you cough up blood.      •You have severe pain in your abdomen.      •You faint.      •You have sudden, unexplained chest discomfort.      •You have sudden, unexplained discomfort in your arms, back, neck, or jaw.      •You have shortness of breath at any time.      •You suddenly start to sweat, or your skin gets clammy.      •You feel nausea or you vomit.      •You suddenly feel lightheaded or dizzy.      •You have severe weakness, or unexplained weakness or fatigue.      •Your heart begins to beat quickly, or it feels like it is skipping beats.      These symptoms may represent a serious problem that is an emergency. Do not wait to see if the symptoms will go away. Get medical help right away. Call your local emergency services (911 in the [place] Do not drive yourself to the hospital.       Summary    •Chest pain can be caused by a condition that is serious and requires urgent treatment. It may also be caused by something that is not life-threatening.      •Your health care provider may do lab tests and other studies to find the cause of your pain.      •Follow your health care provider's instructions on taking medicines, making lifestyle changes, and getting emergency treatment if symptoms become worse.      •Keep all follow-up visits as told by your health care provider. This includes visits for any further testing if your chest pain does not go away.      This information is not intended to replace advice given to you by your health care provider. Make sure you discuss any questions you have with your health care provider.      Document Revised: 03/03/2022 Document Reviewed: 03/03/2022    Elsevier Patient Education © 2022 [employer]

## 2022-12-30 DIAGNOSIS — I25.10 ATHEROSCLEROTIC HEART DISEASE OF NATIVE CORONARY ARTERY WITHOUT ANGINA PECTORIS: ICD-10-CM

## 2022-12-30 DIAGNOSIS — Z79.01 LONG TERM (CURRENT) USE OF ANTICOAGULANTS: ICD-10-CM

## 2022-12-30 DIAGNOSIS — Z20.822 CONTACT WITH AND (SUSPECTED) EXPOSURE TO COVID-19: ICD-10-CM

## 2022-12-30 DIAGNOSIS — I48.0 PAROXYSMAL ATRIAL FIBRILLATION: ICD-10-CM

## 2022-12-30 DIAGNOSIS — R07.89 OTHER CHEST PAIN: ICD-10-CM

## 2022-12-30 DIAGNOSIS — N18.30 CHRONIC KIDNEY DISEASE, STAGE 3 UNSPECIFIED: ICD-10-CM

## 2022-12-30 DIAGNOSIS — I13.0 HYPERTENSIVE HEART AND CHRONIC KIDNEY DISEASE WITH HEART FAILURE AND STAGE 1 THROUGH STAGE 4 CHRONIC KIDNEY DISEASE, OR UNSPECIFIED CHRONIC KIDNEY DISEASE: ICD-10-CM

## 2022-12-30 DIAGNOSIS — Z95.4 PRESENCE OF OTHER HEART-VALVE REPLACEMENT: ICD-10-CM

## 2022-12-30 DIAGNOSIS — I50.9 HEART FAILURE, UNSPECIFIED: ICD-10-CM

## 2022-12-30 DIAGNOSIS — Z79.84 LONG TERM (CURRENT) USE OF ORAL HYPOGLYCEMIC DRUGS: ICD-10-CM

## 2022-12-30 DIAGNOSIS — Z87.39 PERSONAL HISTORY OF OTHER DISEASES OF THE MUSCULOSKELETAL SYSTEM AND CONNECTIVE TISSUE: ICD-10-CM

## 2022-12-30 DIAGNOSIS — E78.5 HYPERLIPIDEMIA, UNSPECIFIED: ICD-10-CM

## 2022-12-30 DIAGNOSIS — Z86.79 PERSONAL HISTORY OF OTHER DISEASES OF THE CIRCULATORY SYSTEM: ICD-10-CM

## 2022-12-30 DIAGNOSIS — Z88.8 ALLERGY STATUS TO OTHER DRUGS, MEDICAMENTS AND BIOLOGICAL SUBSTANCES STATUS: ICD-10-CM

## 2022-12-30 DIAGNOSIS — R06.02 SHORTNESS OF BREATH: ICD-10-CM

## 2022-12-30 DIAGNOSIS — Z95.810 PRESENCE OF AUTOMATIC (IMPLANTABLE) CARDIAC DEFIBRILLATOR: ICD-10-CM

## 2023-01-03 ENCOUNTER — NON-APPOINTMENT (OUTPATIENT)
Age: 78
End: 2023-01-03

## 2023-01-03 ENCOUNTER — APPOINTMENT (OUTPATIENT)
Dept: HEART AND VASCULAR | Facility: CLINIC | Age: 78
End: 2023-01-03
Payer: COMMERCIAL

## 2023-01-03 VITALS
BODY MASS INDEX: 24.92 KG/M2 | HEIGHT: 71 IN | TEMPERATURE: 97.9 F | WEIGHT: 178 LBS | DIASTOLIC BLOOD PRESSURE: 96 MMHG | OXYGEN SATURATION: 98 % | SYSTOLIC BLOOD PRESSURE: 116 MMHG | HEART RATE: 70 BPM

## 2023-01-03 PROCEDURE — 99215 OFFICE O/P EST HI 40 MIN: CPT

## 2023-01-03 RX ORDER — SACUBITRIL AND VALSARTAN 24; 26 MG/1; MG/1
24-26 TABLET, FILM COATED ORAL TWICE DAILY
Qty: 60 | Refills: 5 | Status: DISCONTINUED | COMMUNITY
Start: 2022-04-27 | End: 2023-01-03

## 2023-01-03 NOTE — PHYSICAL EXAM
[Well Developed] : well developed [Well Nourished] : well nourished [Clear Lung Fields] : clear lung fields [Good Air Entry] : good air entry [Soft] : abdomen soft [Normal Gait] : normal gait [Moves all extremities] : moves all extremities [No Focal Deficits] : no focal deficits [Alert and Oriented] : alert and oriented [Normal memory] : normal memory [de-identified] : PRAVIN, wearing mask  [de-identified] : JVP 8-10 cm H20 [de-identified] : Normal S1/S2, 2/6 blowing midpeaking systolic murmur at the apex and 2/4 diastolic murmur heard at RLSB [de-identified] : Mildly tender in mid epigastrum  [de-identified] : Warm, no edema

## 2023-01-03 NOTE — PHYSICAL EXAM
[Well Developed] : well developed [Well Nourished] : well nourished [Clear Lung Fields] : clear lung fields [Good Air Entry] : good air entry [Soft] : abdomen soft [Normal Gait] : normal gait [Moves all extremities] : moves all extremities [No Focal Deficits] : no focal deficits [Alert and Oriented] : alert and oriented [Normal memory] : normal memory [de-identified] : PRAVIN, wearing mask  [de-identified] : JVP 8-10 cm H20 [de-identified] : Normal S1/S2, 2/6 blowing midpeaking systolic murmur at the apex and 2/4 diastolic murmur heard at RLSB [de-identified] : Mildly tender in mid epigastrum  [de-identified] : Warm, no edema

## 2023-01-03 NOTE — CARDIOLOGY SUMMARY
[de-identified] : \par EKG 9/2021: a-paced, BiV paced\par  [de-identified] : \par ICD interrogation 4/2022: 98% BiV pacing, no VT events\par  [de-identified] : \par JIGAR 12/2022: severe LV dysfunction, posterior MVL tethering with moderate MR and mean gradient 2 mmHg, well funcitoning bioAVR, mild-mod TR, moderate PI\par \par TTE 11/2022: LV 6.7 cm, LVEF 20-25%, mild concentric LVH, bioAVR with minimal AI, mild RV dysfunction, mod-severe valvular MR (posterior leaflet appears frozen) and mean gradient 8 mmHg, moderate TR, at least moderate PI, estimated PASP 53 mmHg, dilated aorta 4.6 cm at sinus of Valsalva\par \par TTE 5/2022 (report, unable to view images:LV 6.2 cm, LVEF 30-35%, normal RV size/function, bioAVR with mild AI, mean gradient 3 mmHg across MVr with moderate-severe MR, estimated PASP 52 mmHg, 4.6 cm aortic root\par \par TTE 9/2021: LV 6.2 cm, LVEF 15%, LVOT VTI 7 cm, mild RV dysfunction, well functioning bioAVR, mean gradient 4 mmHg across MVr with mild-moderate MR, moderate TR, estimated PASP 31 mmHg, 4.6 cm ascending aorta\par  [de-identified] : \par [facility] 3/2021: normal coronary arteries \par

## 2023-01-03 NOTE — CARDIOLOGY SUMMARY
[de-identified] : \par EKG 9/2021: a-paced, BiV paced\par  [de-identified] : \par ICD interrogation 4/2022: 98% BiV pacing, no VT events\par  [de-identified] : \par JIGAR 12/2022: severe LV dysfunction, posterior MVL tethering with moderate MR and mean gradient 2 mmHg, well funcitoning bioAVR, mild-mod TR, moderate PI\par \par TTE 11/2022: LV 6.7 cm, LVEF 20-25%, mild concentric LVH, bioAVR with minimal AI, mild RV dysfunction, mod-severe valvular MR (posterior leaflet appears frozen) and mean gradient 8 mmHg, moderate TR, at least moderate PI, estimated PASP 53 mmHg, dilated aorta 4.6 cm at sinus of Valsalva\par \par TTE 5/2022 (report, unable to view images:LV 6.2 cm, LVEF 30-35%, normal RV size/function, bioAVR with mild AI, mean gradient 3 mmHg across MVr with moderate-severe MR, estimated PASP 52 mmHg, 4.6 cm aortic root\par \par TTE 9/2021: LV 6.2 cm, LVEF 15%, LVOT VTI 7 cm, mild RV dysfunction, well functioning bioAVR, mean gradient 4 mmHg across MVr with mild-moderate MR, moderate TR, estimated PASP 31 mmHg, 4.6 cm ascending aorta\par  [de-identified] : \par [facility] 3/2021: normal coronary arteries \par

## 2023-01-03 NOTE — ASSESSMENT
[FreeTextEntry1] : 78 YO M with a history of ACC/AHA Stage NICM (LV 6.2 cm, LVEF 25-30%) s/p ICD upgraded to CRT-D 9/2021 for chronic RV pacing, severe AI/MR s/p bioAVR/MVr 2014 history of VT/VT with ICD shocks and frequent PVC's s/p PVC ablation, pAF on eliquis, CKD IIIb (Cr 1.8), and aortic aneurysm presenting to HF clinic for further management. \par \par Today he reports NYHA III symptoms and appears euvolemic to midlly overloaded on exam. Given persistent symptoms despite GDMT and proBNP < 1600 will refer for Barostim

## 2023-01-03 NOTE — DISCUSSION/SUMMARY
[FreeTextEntry1] : # Chronic systolic heart failure\par - Etiology: unclear etiology at this time, suspect infiltrative process given arrhythmia history. will defer MRI given device and age\par - GDMT: current regimen is metoprolol succinate 50/100, entresto 24-26 mg BID, Farxiga 10 mg daily. will increase entresto to 49-51 mg BID defer MRA due to CKD.\par - Diuretic: current regimen is torsemide 10 mg QD, will continue. mildly overloaded but increasing entresto today\par - Device: s/p CRT-D, well functioning with > 95% BiV pacing \par - Barostim/CCS: will refer for Barostim\par - Labs: 12/27 with K 4.5 and Cr 1.8, NT-proBNP 997 \par \par # Valvular disease\par - severe MR noted on TTE and exam suggestive of significant AI not no valvular disease more than moderate on JIGAR  \par \par # History of VT/VF with ICD shocks and frequent PVCs\par - follows with Dr. ARIZMENDI and on amiodarone\par \par # pAF\par - on eliquis, defer dosing to Dr. Chandler and Janell (right now on 2.5 mg BID) \par \par # Aortic aneurysm\par - has been stable over several years, follows with Dr. Garcia and Ramesh \par \par # CKD IIIb with baseline Cr 1.8\par - continue Farxiga \par - follows with Dr. Matthews\par \par Return to clinic in 3 months

## 2023-01-17 ENCOUNTER — RX RENEWAL (OUTPATIENT)
Age: 78
End: 2023-01-17

## 2023-01-22 ENCOUNTER — RESULT CHARGE (OUTPATIENT)
Age: 78
End: 2023-01-22

## 2023-01-23 ENCOUNTER — NON-APPOINTMENT (OUTPATIENT)
Age: 78
End: 2023-01-23

## 2023-01-23 ENCOUNTER — LABORATORY RESULT (OUTPATIENT)
Age: 78
End: 2023-01-23

## 2023-01-23 ENCOUNTER — APPOINTMENT (OUTPATIENT)
Dept: HEART AND VASCULAR | Facility: CLINIC | Age: 78
End: 2023-01-23
Payer: COMMERCIAL

## 2023-01-23 VITALS
TEMPERATURE: 98.4 F | HEIGHT: 71 IN | BODY MASS INDEX: 25.48 KG/M2 | SYSTOLIC BLOOD PRESSURE: 102 MMHG | HEART RATE: 80 BPM | WEIGHT: 181.99 LBS | DIASTOLIC BLOOD PRESSURE: 82 MMHG | OXYGEN SATURATION: 98 %

## 2023-01-23 PROCEDURE — 93000 ELECTROCARDIOGRAM COMPLETE: CPT

## 2023-01-23 PROCEDURE — 36415 COLL VENOUS BLD VENIPUNCTURE: CPT

## 2023-01-23 PROCEDURE — 99214 OFFICE O/P EST MOD 30 MIN: CPT | Mod: 25

## 2023-01-23 NOTE — ASSESSMENT
[FreeTextEntry1] : Valve disease: clean coronaries on cardiac cath in 2014. status post AVR, MV repair Dr. Caio Louise 9/9/2014) SBE prophylaxis. okay to stop ASA 1 week before dental extraction and implant. EKG with 1 PVC. Echo done 7/2018. EF 40%  Valves OK.  Echo update Nov 2019 , EF 55-60 ??.  Echo done 1/21, EF 35-40 % closer to his baseline.  Ao V probably NL prosthetic valve. EF 30% in [facility] March 2021 and Sept 2021. EF 25-30 % on echo done here Aug 2021 Echo May 2022, EF 25 % with mod-severe MR..\par \par SOB/CHF- BNP sent, echo from 1/2021 with a drop in EF.  Await BNP level, 550.  EF 23 % on Nuc, 30 % on echo at [facility]. EF 50 % on echo in 2014, 45-50% in 2016, 40% in 2018, all were done at [facility].  Now its 23 % on Nuc and 30 % on echo at [facility] from March 2021.  Has CHF, BNP around 3-5 K [place] BW is 175 lbs., May be 170.  Remains with MONTEJO, will refer to the HF team.  Now on Entresto.  Labs were drawn today in Office. \par \par Abdominal Pain- worse if he hits a pothole. Will screen for a AAA. If (-) will refer to GI (Dr Cortez).  SONO (-) FOR AAA, F/U WITH Dr CORTEZ. Will discuss with Dr Cortez.  Found to have severe gastritis and H pylori.  Pain improving.  Also better with diuresis. Trial of Pepcid and Simethicone.  Also better with diuresis.  Pain getting worse again, to f/u with Dr Cortez Feb 6. .  Only taking Pepcid, will have him get back on his Protonix.\par \par Chest/abd pain- will treat with Protonix x 30 days.  New EKG changes 11/14/19 not seen before, deep Tw inversions, QTc 474.  I suspect post pacing artifact, remotely  PUD, Trop sent, echo ordered.  Trop NL, EF and wall motion normal on echo. CCTA done Nov 1st, 2019 is clean. No coronary disease.  More upper abd discomfort, will give a trial of protonix. Still with pain, will screen for AAA, refer to GI.  Did not see GI, AAA screen is (-). He reports improvement with HCTZ . Now on Torsemide 10 mg daily.  Better after diuresis in ER Sept 28.\par \par PreOP- complex pt but cleared for dental work.  Has h/o VT so no epinephrine.  Has AICD so no cautery unless AICD is shut off to avoid inappropriate shocks.  Needs SBE prophylaxis for dental work only. Pt is cleared for cataract surgery\par \par PAF- changed from ASA to Eliquis by Dr ARIZMENDI.  No signs of trauma or bleeding on exam. Pt reassured that the Eliquis is not causing the pain.  Off Eliquis by Dr ARIZMENDI due to not feeling well.  Will rechallenge with 2.5 BID, discussed with Dr ARIZMENDI.  Amio restarted at 200 mg daily(pt stopped the load when the pills finished up)  \par \par VT- has non-sustained, i spoke to Dr Palencia who does not want him to run the [facility]. There is  a VT and syncope/sudden death risk, also has a thoracic aneurysm.  I discussed this with him and his wife, walking the Marathon is OK but no jogging.  He walked it and did well. Now seeing Dr Jeff.  Had syncope and adm to [facility], trans to [facility].  VT RFA, Sotolol changed to Mexitil Sept 2019.  CAN RETURN TO WORK NEXT MONDAY.  Had VF 7/6/21 and background VT, AICD DC.  Pt was started on Amio.  Dr ARIZMENDI called. Pt reports facial hyperpigmentation on Amio\par \par Aortic aneurysm: Enlarged. 42 (ascending) 47 (descending) in 2014\par Followup in 2015 reveals a max Ao of 46 mm\par CT angiogram 3/6/2017 aneurysm unchanged\par annual CT, due 3/2018.  Aorta 46mm April 2018, referred back to Dr Garcia,  48mm on CT 2019\par \par HLD: on pravastatin 40mg, had muscle aches on 80mg,  8/2017, pt reported he was only taking it once in a while, now taking it daily will repeat lipid panel. Diet and exercise discussed in detail.   Feb 2018,  July 2020. starting CRESTOR 10 qod, pt off it\par \par HTN: Have DC Lasix and changed to HCTZ weekly.  BP very good, Reduce Micardis  40 for persistent elevated K.  Increase HCTZ 25 to M & F. BP OK.  Micardis now at 20mg, DCed due to elevated Cr and K.  Tolerating Valsartan 40 mg. Labs were drawn today in Office. \par \par CKD: 1.33 8/8/2017, repeat BMP Sept 2017, Cr 1.05, previously normal, on ARB  1.31 and K 5.9.  .  July 2021. Cr 1.13, K 4.5, Mg 2.1,  Cr 1.75

## 2023-01-23 NOTE — REASON FOR VISIT
[FreeTextEntry1] : 76 -year-old male with history of high cholesterol, aortic aneurysm thoracic, hypertension, lumbago, pre-diabetes, Valve disease (status post AVR, MVr epair Dr. Caio Louise 9/9/2014). S/P  colonoscopy with Dr. Maximilian Cortez 9/14/17. Doing well overall, Denies CP, SOB, palpitations, orthopnea, LE swelling, dizziness, syncope. Walking and running daily, sometimes >10 miles, training for [place] marathon in 2018 after skipping 2017. \par \par s/p dental extraction and implant. Reports during dental cleaning he was told he bled a lot and dentist would prefer he stop aspirin for future dental extraction and implant. 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 LAHB, ST-Tw abnormalities. Blocked APC, V paced x 2 beats Pacer set at 40 1/22/19\par EKG: A Pacing, PVCs, 1st degree AVB, with a LAHB, possible IWMI, QTc 429 ST-Twave abnormalities.  10/10/19\par \par 3/18/19 A Fib discovered by Dr Jeff on 3/13/19, ASA changed to Eliquis\par 5/6/19 Pt with atypical pain in the axilla on the left, he thinks its the Eliquis.  No swelling or ecchymosis reported\par 7/15/19 K 5.5, Telmisartin reduced to 20mg. BP excellent.  c/o severe right shoulder pain.  Previously injected with relief.\par 9/23/19 Adm to  [facility]) with syncope and NSVT.  Trans to [facility].  VT ablation and Mexitil started.\par 10/10/19  Pt c/o chest  pressure, when questioned its sub Xiphoid.  Eating makes it better\par \par 11/14/19  New Deep Tw inversions, ? post pacing.  CCTA Nov 1 clean. Recently had a lot of "stomach" pains.  I suspect he has PUD, Trop sent, echo ordered\par 1/24/20 Here with several weeks of a cold/URI, saw an MD and given Z westley.  Here with wheezing, reduced exercise tolerance. + Wheezing, SOB, mild cough, non-productive, no fever or chills.\par Also here for clearance for cataract\par 10/15/20 Fullness in subxiphoid area.  (-) CTA Nov 2020.  Also has  off statin.\par 1/15/21 ARB DCed due to elevated Cr and K of 5.9, not taking Crestor due to nausea, knee pain, back pain.  More MONTEJO noted, will need to reassess Ao V\par 2/24/21 More MONTEJO again, getting worse, epigastric discomfort, worse if he hits a pot hole., or walking hard.  ?pleuritic component.  Feb 12-16 pt reports wt went up, not sleeping well.\par 4/5/21 In [facility] 4/16 to 4/19/21 with chest pain, + Nuc(fixed inferior and apical defects).  EF 23 %, Echo EF 30 %, mild to moderate MR. but (-) cath.  Still gets epigastric pain with hitting potholes.  + MONTEJO.  PAP 40 mm.  Feels better after HCTZ.\par 5/10/21 Had Shingles.  Had both Covid vaccines.  Having endoscopy 5/20/21\par 7/7/21 Dx with severe gastritis and H pylori.  Admitted to [facility] yesterday with AICD DC, 2/T VF. K 4.5, .   Amiodarone 400 mg BID started.\par 9/8/21 DC from [facility] 9/4/21 AFTER ADMISSION FOR ATRIAL TACHYCARDIA  seen by Dr Jeff. Pt had an upgrade to a BiV pacer.  Echo showed EF 25-30%.  I was called yesterday from the [facility] ER that he was there SOB and in mild HF.  Torsemide was increased to daily.\par \par 10/8/21  In [facility] ER 9/28/21 with SOB and abdominal tightness.  Cr was down to 1.32, BNP was 4237, up slightly.  We increased diuretic and sent pt home.  He feels dramatically better.  He is back on Torsemide 10mg daily because it causes dry mouth and he is reluctant to take more..\par \par 12/9/21 Abd tightness much better but not fully resolved.\par \par 3/10/22 Having different pain syndromes, has been to ER several times,   Still with SOB/MONTEJO and fatigue\par 9/21/22  Echo May 2022, EF 25%, mod to severe MR.  Needs f/u with Dr Melvin and Juan Antonio.  Pt doing well on current meds.\par 1/23/23 Epigastric pain has returned, ongoing MONTEJO.\par \par \par EKG:  AV PAced 9/8/21\par \par \par

## 2023-01-23 NOTE — REVIEW OF SYSTEMS
[Dyspnea on exertion] : dyspnea during exertion [Abdominal Pain] : abdominal pain [Tremor] : a tremor was seen [Negative] : Heme/Lymph [de-identified] : amio induced

## 2023-01-23 NOTE — HISTORY OF PRESENT ILLNESS
[FreeTextEntry1] : PCP- Dr Sandhya Bloom\par EP- Dr Jeff\par CTS- Dr Garcia\par Plastics- Dr Ko\par Hand- Dr Xiong\par GI- Dr Cortez, colonoscopy Sept 2017\par Dentist: Dr. Jorge Oh  \par Ophtho- Dr Jesus Lantigua [phone]

## 2023-01-24 ENCOUNTER — APPOINTMENT (OUTPATIENT)
Dept: NEPHROLOGY | Facility: CLINIC | Age: 78
End: 2023-01-24
Payer: MEDICARE

## 2023-01-24 VITALS — DIASTOLIC BLOOD PRESSURE: 87 MMHG | SYSTOLIC BLOOD PRESSURE: 123 MMHG

## 2023-01-24 VITALS — SYSTOLIC BLOOD PRESSURE: 102 MMHG | DIASTOLIC BLOOD PRESSURE: 68 MMHG | HEART RATE: 80 BPM

## 2023-01-24 PROCEDURE — 99214 OFFICE O/P EST MOD 30 MIN: CPT

## 2023-01-24 NOTE — HISTORY OF PRESENT ILLNESS
[FreeTextEntry1] : Kindly referred by Dr. Sandhya Bloom elevated creatinine. [employer].\par \par * CKD stable, creatinine 1.8. * K 5.4. Counseled on following low K diet. He occsaionally eats bananas.  * HTN controlled. No lightheadedness. No CP. Compliant with medications.  Following up with Dr. Hanson for monoclonal gammopathy. * Chronic slight SOB. Following up closely with cardiologist. \par \par Previous history (18Nov22): * CKD stable, creatinine 1.7 on Sep 21. . * 13 - 15 mg albuminuria. * * HTN controlled. No lightheadedness.  Compliant with medications. * Following up with Dr. Hanson for monoclonal gammopathy. * CP/SOB now significantly improved, none currnetly. Following up closely with cardiologist. \par \par Previous history (18Aug22): * CKD stable, creatinine 1.87. 55 mg albuminuria. * HTN controlled. No lightheadedness. Compliant with medications.  Following up with Dr. Hanson for monoclonal gammopathy. * Hyperkalemia controlled. * Rash on legs. Seeing allergist. * Pneumonia on 5/21 - 5/22. * Persistent SOB/CP with minimal to moderate exertion. Per patient, this is unchanged and cardiologist is aware. \par \par Previous history (19May22): * CKD stable, creatinine 1.87. * HTN controlled. No lightheadedness. No CP/SOB. Compliant with medications.  Following up with Dr. Hanson for monoclonal gammopathy. * Hyperkalemia controlled. * He has urinary urgency for several weeks. No dysuria. 5x nocturia. \par \par Previous history (22Feb22): * HTN controlled.  - 120s / 80s at home. No lightheadedness. Compliant with medications. 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 \par \par 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 * CKD progressive, creatinine 1.91. Albuminuria decreased to 48. * Occasional chronic abd pain being evaluated, none currently. \par \par Previous history (06Dec21): * CKD stable, creatinine 1.51.  Albuminuria decreased to 91 mg from 3280 mg (?). Farxiga 10 started. 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 * HTN controlled. BP 120s at home. No lightheadedness. Compliant with medications. \par \par Previous history (10Nov21): * HTN controlled. BP 110s at home. No lightheadedness. Compliant with medications. * 3280 mg albuminuria. * CKD stable, creatinine 1.45. 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 \par \par Previous history (09Sep21): * Events reviewed. Admitted to [facility] for CHF exacerbation and ICD upgrade. EF < 23 - 30.* CKD stable, creatinine 1.55.  Labs from 9/7 reviewed. Creatinine 1.45. K 4. 298 mg albuminuria.  * Hyperkalemia controlled. Not on lokelma or veltassa.  * HTN controlled. /80s at home. No lightheadedness. Compliant with medications. 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 \par \par Previous history (31Aug21): * Dx with Covid 3 weeks ago. Now no cough, or other symptoms. Did not require hospitalization. He has SOB with walking walking 1 block. * Lokelma started for hyperkalemia (K of 6). He is now following low potassium diet. . * CKD stable, creatinine 1.86. * He had stopped torsemide. BP 100s. \par \par Previous history (03Aug21): Labs reviewed. Baseline eGFR 43 - 56 since 2019. On 14Jul21, his creatinine increased to 1.82 (eGFR 35 - 41). The patient denies exposure to chronic NSAIDs, chronic PPIs, creatine, or herbal supplements. The patient denies a history of kidney stones or pyelonephritis. 4 - 5 times. No recent renal ultrasound. They are unaware of proteinuria or hematuria.\par \par EF 23% in 4/21. On amiodarone, metoprolol, lasix 20 qod, HCTZ twice weekly valsartan 40. SOB with one block walking. Stable LE edema. ProBNP incresaed from 515 to 1360.\par \par * HTN controlled.  No lightheadedness. Compliant with medications. He believes he is taking amlodipine.

## 2023-01-24 NOTE — ASSESSMENT
[FreeTextEntry1] : # CKD stage 3 likeliest due to type 2 cardiorenal syndrome and aging, possible DM nephropathy. \par * Recheck labs next visit. \par * Will avoid MRA given previous hyperkalemia. \par * Therapies for kidney disease: SGLT2i; blood pressure control; proteinuria reduction with ARB/ACEi; other evidence-based therapies including exercise, a plant-based lower oxalate diet, and 400 mcg folic acid daily\par * Cardiovascular disease prevention: counseling on healthy diet, physical activity, weight loss, alcohol limitation, blood pressure control; cardiology evaluation/followup advised\par * The patient has been counseled that chronic kidney disease is a significant condition and regular office followup with me (at least every 2 months for now) is important for monitoring and their health, and that it is their responsibility to make a follow up appointment.\par * The patient has been counseled never to stop taking their medications without discussing it with me or another doctor.\par * The patient has been counseled on avoiding NSAIDs.\par * The patient has been counseled on risk of acute renal failure and instructed to immediately call and speak with me or go immediately to ER with any severe symptoms, nausea, vomiting, diarrhea, chest pain, or shortness of breath.\par * A counseling information sheet has been given (today or previously). All their questions were answered.\par \par # Borderline hyperkalemia.\par * Low K diet.\par * Counseled on strict low K diet. \par \par # HTN controlled. No lightheadedness. \par * Cont entresto, metoprolol, torsemide.\par * The patient's blood pressure was checked with the [employer] [id number] using the SPRINT trial protocol after sitting quietly in an empty room with arm supported, back supported, and feet on the floor for 5 minutes. The average of 3 readings were taken.\par * A counseling information sheet has been given (currently or previously, in-person or electronically). All their questions were answered.\par * The patient has been counseled to check their BP at home with an automatic arm cuff, write down the readings, and reach me directly on the phone immediately if they are persistently > 180 systolic or if SBP is less than 100 or if lightheadedness develops. They were counseled to bring in all blood pressure readings and medications next visit.\par * The patient has been counseled that regular office followup (at least every 2 months for now) is important for monitoring and for their health, and that it is their responsibility to make follow up appointments.\par * The patient also has been counseled that they must never stop or change any medications without discussing this with me (or another physician). \par \par # Monoclonal gammopathy.\par * Advised to follow up with hematology. Myeloma, amyloid, and MGRS are unlikely. \par  \par \par  \par

## 2023-01-25 LAB
ALBUMIN SERPL ELPH-MCNC: 4.2 G/DL
ALP BLD-CCNC: 60 U/L
ALT SERPL-CCNC: 11 U/L
ANION GAP SERPL CALC-SCNC: 15 MMOL/L
AST SERPL-CCNC: 15 U/L
BASOPHILS # BLD AUTO: 0.08 K/UL
BASOPHILS NFR BLD AUTO: 1.7 %
BILIRUB SERPL-MCNC: 0.4 MG/DL
BUN SERPL-MCNC: 27 MG/DL
CALCIUM SERPL-MCNC: 9.6 MG/DL
CHLORIDE SERPL-SCNC: 105 MMOL/L
CHOLEST SERPL-MCNC: 260 MG/DL
CO2 SERPL-SCNC: 22 MMOL/L
CREAT SERPL-MCNC: 1.81 MG/DL
EGFR: 38 ML/MIN/1.73M2
EOSINOPHIL # BLD AUTO: 0 K/UL
EOSINOPHIL NFR BLD AUTO: 0 %
ESTIMATED AVERAGE GLUCOSE: 143 MG/DL
GLUCOSE SERPL-MCNC: 112 MG/DL
HBA1C MFR BLD HPLC: 6.6 %
HCT VFR BLD CALC: 45.4 %
HDLC SERPL-MCNC: 81 MG/DL
HGB BLD-MCNC: 14.1 G/DL
LDLC SERPL CALC-MCNC: 163 MG/DL
LYMPHOCYTES # BLD AUTO: 1.82 K/UL
LYMPHOCYTES NFR BLD AUTO: 40.9 %
MAN DIFF?: NORMAL
MCHC RBC-ENTMCNC: 31.1 GM/DL
MCHC RBC-ENTMCNC: 33.3 PG
MCV RBC AUTO: 107.1 FL
MONOCYTES # BLD AUTO: 0.23 K/UL
MONOCYTES NFR BLD AUTO: 5.2 %
NEUTROPHILS # BLD AUTO: 2.32 K/UL
NEUTROPHILS NFR BLD AUTO: 52.2 %
NONHDLC SERPL-MCNC: 179 MG/DL
NT-PROBNP SERPL-MCNC: 1410 PG/ML
PLATELET # BLD AUTO: 215 K/UL
POTASSIUM SERPL-SCNC: 5.4 MMOL/L
PROT SERPL-MCNC: 8 G/DL
PSA SERPL-MCNC: 1.65 NG/ML
RBC # BLD: 4.24 M/UL
RBC # FLD: 15.6 %
SODIUM SERPL-SCNC: 142 MMOL/L
TRIGL SERPL-MCNC: 80 MG/DL
WBC # FLD AUTO: 4.44 K/UL

## 2023-01-31 ENCOUNTER — APPOINTMENT (OUTPATIENT)
Dept: VASCULAR SURGERY | Facility: CLINIC | Age: 78
End: 2023-01-31
Payer: COMMERCIAL

## 2023-01-31 VITALS
HEART RATE: 80 BPM | HEIGHT: 68 IN | WEIGHT: 180 LBS | DIASTOLIC BLOOD PRESSURE: 68 MMHG | SYSTOLIC BLOOD PRESSURE: 119 MMHG | BODY MASS INDEX: 27.28 KG/M2

## 2023-01-31 PROCEDURE — 99204 OFFICE O/P NEW MOD 45 MIN: CPT

## 2023-01-31 PROCEDURE — 93880 EXTRACRANIAL BILAT STUDY: CPT

## 2023-01-31 RX ORDER — VALACYCLOVIR 1 G/1
1 TABLET, FILM COATED ORAL
Qty: 20 | Refills: 0 | Status: DISCONTINUED | COMMUNITY
Start: 2021-04-08 | End: 2023-01-31

## 2023-01-31 RX ORDER — MAGNESIUM OXIDE 241.3 MG/1000MG
400 TABLET ORAL DAILY
Qty: 90 | Refills: 1 | Status: DISCONTINUED | COMMUNITY
Start: 2021-04-30 | End: 2023-01-31

## 2023-01-31 RX ORDER — FLUTICASONE PROPIONATE 50 UG/1
50 SPRAY, METERED NASAL DAILY
Qty: 1 | Refills: 1 | Status: DISCONTINUED | COMMUNITY
Start: 2022-02-10 | End: 2023-01-31

## 2023-01-31 RX ORDER — ROSUVASTATIN CALCIUM 10 MG/1
10 TABLET, FILM COATED ORAL
Qty: 45 | Refills: 3 | Status: DISCONTINUED | COMMUNITY
Start: 2020-10-15 | End: 2023-01-31

## 2023-01-31 RX ORDER — MELATONIN 3 MG
25 MCG TABLET ORAL
Refills: 0 | Status: DISCONTINUED | COMMUNITY
Start: 2021-04-30 | End: 2023-01-31

## 2023-01-31 RX ORDER — IPRATROPIUM BROMIDE 21 UG/1
0.03 SPRAY NASAL 3 TIMES DAILY
Qty: 9 | Refills: 2 | Status: DISCONTINUED | COMMUNITY
Start: 2022-02-25 | End: 2023-01-31

## 2023-01-31 RX ORDER — FAMOTIDINE 10 MG/1
10 TABLET, FILM COATED ORAL
Qty: 30 | Refills: 0 | Status: DISCONTINUED | COMMUNITY
Start: 2021-09-27 | End: 2023-01-31

## 2023-01-31 RX ORDER — BETAMETHASONE DIPROPIONATE 0.5 MG/G
0.05 CREAM, AUGMENTED TOPICAL
Qty: 50 | Refills: 0 | Status: DISCONTINUED | COMMUNITY
Start: 2022-10-26 | End: 2023-01-31

## 2023-02-01 NOTE — REVIEW OF SYSTEMS
[As noted in HPI] : as noted in HPI [Palpitations] : palpitations [As Noted in HPI] : as noted in HPI [Shortness Of Breath] : shortness of breath [SOB on Exertion] : shortness of breath during exertion [Negative] : Heme/Lymph

## 2023-02-02 ENCOUNTER — NON-APPOINTMENT (OUTPATIENT)
Age: 78
End: 2023-02-02

## 2023-02-07 NOTE — PHYSICAL EXAM
[Normal Thyroid] : the thyroid was normal [Normal Breath Sounds] : Normal breath sounds [Respiratory Effort] : normal respiratory effort [Normal Heart Sounds] : normal heart sounds [Normal Rate and Rhythm] : normal rate and rhythm [2+] : left 2+ [No Rash or Lesion] : No rash or lesion [Alert] : alert [Calm] : calm [JVD] : no jugular venous distention  [Carotid Bruits] : no carotid bruits [Right Carotid Bruit] : no bruit heard over the right carotid [Ankle Swelling (On Exam)] : not present [Left Carotid Bruit] : no bruit heard over the left carotid [Purpura] : no purpura  [Petechiae] : no petechiae [Skin Ulcer] : no ulcer [Skin Induration] : no induration [de-identified] : Healthy appearance, NAD [de-identified] : NC/AT, anicteric [de-identified] : FROM throughout, strength 5/5x4

## 2023-02-07 NOTE — ASSESSMENT
[FreeTextEntry1] : 77yoM w/PMHx of HTN, HLD, pre-diabetes, hypertrophic cardiomyopathy resulting in sHF, s/p TIA years prior, referred by Dr. Sheppard for evluation for Barostim implantation as the heart failure team believes pt would benefit from the procedure.  Pt has been maximized w/medical management for control of his HF, but still reports ~two admissions to the ED per month for episodic SOB.  Pt is s/p PPM/defib placement, s/p CABG in 2014, reports no h/o  carotid disease, denies previous smoking hx.\par \par Normal neuro/vascular exam noted today, and carotid duplex performed to evaluate for ICA plaque/stenosis reveals widely patent ICA b/l w/no evidence of carotid/vertebral disease.  Discussed options for implantation of the CVRx barostim w/pt and family, all risks/benefits of surgery discussed, and pt in agreement w/plan.  Pt will be entered into a Barostim trial w/Dr. Jeff, we will coordinate a date w/his team for implantation in the coming month.

## 2023-02-07 NOTE — REASON FOR VISIT
[Consultation] : a consultation visit [Spouse] : spouse [Family Member] : family member [FreeTextEntry1] : Evaluation for Barostim implantation

## 2023-02-07 NOTE — ADDENDUM
[FreeTextEntry1] : This note was written by Ramon Peter, acting as a scribe for Dr. Chon Foy.  I, Dr. Chon Foy, have read and attest that all the information, medical decision-making, and discharge instructions within are true and accurate.\par \par I, Dr. Chon Foy, personally performed the evaluation and management (E/M) services for this new patient.  That E/M includes conducting the initial examination, assessing all conditions, and establishing the plan of care.  Today, my ACP, Ramon Peter, was here to observe my evaluation and management services for this patient to be followed going forward.

## 2023-02-07 NOTE — PROCEDURE
[FreeTextEntry1] : Carotid duplex performed to evaluate for ICA plaque/stenosis reveals widely patent ICA b/l w/no evidence of carotid/vertebral disease.

## 2023-02-07 NOTE — HISTORY OF PRESENT ILLNESS
[FreeTextEntry1] : 77yoM w/PMHx of HTN, HLD, pre-diabetes, hypertrophic cardiomyopathy resulting in sHF, s/p TIA years prior, referred by Dr. Sheppard for evluation for Barostim implantation as the heart failure team believes pt would benefit from the procedure.  Pt has been maximized w/medical management for control of his HF, but still reports ~two admissions to the ED per month for episodic SOB.  Pt is s/p PPM/defib placement, s/p CABG in 2014, reports no h/o  carotid disease, denies previous smoking hx.\par \par Pt able to walk on flat ground at a slow pace for ~1/2 block before developing SOB, works as a  at [employer].  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

## 2023-02-08 ENCOUNTER — APPOINTMENT (OUTPATIENT)
Age: 78
End: 2023-02-08

## 2023-02-16 NOTE — ED ADULT NURSE NOTE - VISUAL DISTURBANCES
[6] : 6 [1] : 2 [Localized] : localized [Sharp] : sharp [Constant] : constant [de-identified] : 2/12/23: pt is a 13 y/o M presents with right elbow pain; onset of pain on 2/8/23: pt states that he was playing basketball and fell onto the right elbow.  [] : no no

## 2023-02-22 ENCOUNTER — APPOINTMENT (OUTPATIENT)
Dept: INTERNAL MEDICINE | Facility: CLINIC | Age: 78
End: 2023-02-22

## 2023-03-22 ENCOUNTER — NON-APPOINTMENT (OUTPATIENT)
Age: 78
End: 2023-03-22

## 2023-03-22 ENCOUNTER — APPOINTMENT (OUTPATIENT)
Dept: HEART AND VASCULAR | Facility: CLINIC | Age: 78
End: 2023-03-22
Payer: COMMERCIAL

## 2023-03-22 PROCEDURE — 93295 DEV INTERROG REMOTE 1/2/MLT: CPT

## 2023-03-22 PROCEDURE — 93296 REM INTERROG EVL PM/IDS: CPT

## 2023-03-29 NOTE — ED ADULT TRIAGE NOTE - HEIGHT IN INCHES
Physical Therapy Progress Note    Referred by: Madan Burgos,*  Medical Diagnosis (from order):   Diagnosis Information      Diagnosis    789.04 (ICD-9-CM) - R10.32 (ICD-10-CM) - Left groin pain              Current Functional Limitations: pushing/pulling, lifting, getting shoes/socks on, age appropriate activities     OBJECTIVE   remeasurements as noted in attached daily treatment note    Range of Motion (degrees)    Left Right Left  Right  Left  Right    Date 12/9/22 12/9/22 2/3/22 2/3/22 3/29/23 3/29/23   Hip Flexion (110-120)            Hip Extension (10-15)            Hip Abduction (30-50)            Hip Adduction (30)            Hip Internal Rotation (30-40) 24 21 28 23 30 25   Hip External Rotation (40-60) 42 24 42 24 30 28   Reported in degrees, active range of motion recorded unless noted as AA=active assistive or P=passive; standard testing positions unless otherwise noted, norms included in ( ); *=pain   Only those motions that were assessed are noted.     Lumbar Range of Motion (%)  Date  eval 2/3/22 3/29   Flexion        Extension 50% 50% 60%   Lateral Flexion Left        Lateral Flexion Right        Rotation Left         Rotation Right         standard testing positions unless otherwise noted; ranges are reported in active range of motion unless noted AA=active assistive range of motion and P=passive range of motion; * =pain  All motions within functional/normal limits except as noted.     Strength (out of 5)  Date eval eval 2/3/23 2/3/23 3/29/23 3/29/23   Abdominals            Trunk Extensors              Left Right Left  Right  Left  Right    Hip Flexors (L2-3) 5 4+ 4+ 5 5 5   Hip Extensors (L4-5) 5 4+ 4 4 4+ 4+   Hip Abductors (L4-5) 4+ 4+ 5 5 5 5   Hip Adductors (L2-3)            Hip External Rotators (L4-5) 4+ 4+ 4+ 4+ 5 5   Hip Internal Rotators (L2-3) 4+ 4+ 5 5 5 5   Knee Extensors (L3-4) 5 5 5 5     Knee Flexors (L5-S1) 5 5        standard testing positions unless otherwise noted,  nerve root level included in ( ); *=pain  Only muscle strength that was assessed are noted.         Outcome/Assessments  Patient Specific Functional Scale:   Activity: intercourse, Score: 5  Activity: Not have to adjust life to symptoms, Score: 7  Average Score: 6  Each activity is scored: 0=unable to perform activity to 10=able to perform activity at the same level as before injury or problem        ASSESSMENT   Seeing more progress with goals compared to last re-assessment. Having less day to day symptoms. ROM improving as is strength. Has been responding to manual and dry needling interventions as well as strengthening progression. To date the patient has made gains as expected as reported. Patient continues to have impairments and functional deficits as noted. Patient will continue to benefit from skilled care as outlined.      PLAN    Updates to plan of care: continue every other week, extend plan of care 10 weeks    Goals: To be obtained by end of this plan of care:  1. Patient independent with modified and progressed home exercise program. -ongoing  2. Decrease pain/symptoms to 1/10. -not yet met, improving  3. Improve involved range of motion to equal mehnaz. -improved  4. Demonstrate independent use of relaxation and stress management strategies associated with bladder, pelvic function. -ongoing  through improvements listed above patient will:  5. Be able to stand for 90 minutes without pain/difficulty to improve completion of household tasks. -met  6. Patient Specific Functional Scale (PSFS) will improve an average score of 4/10 to 7 (minimal detectable change (90%CI) for average score is 2 points, for single activity score is 3 points). -improved    Physical therapy visit    Visit Count: 10    Precautions:   Referred by: Madan Burgos,*; Next provider visit (if known/scheduled): 3/10/23  Medical Diagnosis (from order):       Diagnosis Information        Diagnosis     789.04 (ICD-9-CM) - R10.32  (ICD-10-CM) - Left groin pain                SUBJECTIVE   Day to day hasn't had too many complaints  Just new furniture so was doing a lot of heavy pushing/pull and some lifting the last 2 days, so has noticed more pulling across abdomen and pain into mehnaz groin region today, which she isn't surprised by  Had her period, cramps were a little more intense again  Hasn't had intercourse/used dilator  Has increased fiber intake and her stool tends to be no bigger than 2 cm diameter  Still feels a pull into right trunk, improved but still there since dry needling.  Hasn't seen chiropractor/massage  Feels she is doing a good job engaging abdominals  Pain at worst 3/10   Current Pain (0-10 scale): 1  Functional Change: improved pain with periods. Less pain in upper stomach. Overall until this past weekend she thought she was doing well and a lot less frequent twinges      OBJECTIVE       Treatment       Manual Therapy:   sidelying  Soft tissue mobilization to right quadratus lumborum and glutes      Therapeutic Exercise:   Standing hip extension vs red band at ankles  Side stepping in partial squat x15 feet mehnaz     Dry Needling Informed Consent Signed: Yes     Treatment   Education about indications, contraindications and potential side effects completed with patient.  Screen Completed   Precautions Contraindications   Local skin lesions  Lyme disease  Local lymphedema  Severe hyperalgesia/allodynia  Metal allergies: nickel and chromium  Abnormal bleeding tendency  Immunodeficiency and/or compromised immune system  Second or third trimester of pregnancy  Vascular Disease  History of spontaneous pneumothorax Local or systemic infections; including the flu  Over implants  Active cancer  Area of lymphatic compromise  Area of lumpectomy/mastectomy  First trimester of pregnancy         Dry Needling:  Patient provided a handout explaining intermuscular mobilization.  Patient has read the handout and has provided verbal agreement  to proceed with the intervention.    • Location: right quadratus lumborum Needle size: 75 Quantity: 1  • Location: right glute med Needle size: 75 Quantity: 1  • Location: right glute min Needle size: 100 Quantity: 1    Total Needles Inserted: 3 Removed: 3    Results: decreased pain; no adverse reaction to treatment    Time Out performed at 1353, with patient verifying procedure and consent prior to performing the procedure.    Sign Out performed at 1359, with patient verifying procedure performed and addressing specimens if applicable upon completion of the procedure.    Skilled input: verbal instruction/cues, tactile instruction/cues, as detailed above    Home Program:   Access Code: [email or web address]  URL: [email or web address]  Date: 01/06/2023  Prepared by: Marielos Garcia    Exercises  • Hip Flexor Stretch at Edge of Bed - 2 x daily - 2 sets - 30 hold  • Supine Figure 4 Piriformis Stretch - 2 x daily - 2 sets - 30 hold  • Supine Piriformis Stretch with Foot on Ground - 2 x daily - 2 sets - 30 hold  Bridge - 2 x daily - 1 sets - 10 reps - 5 hold    1/12/23  Standing Hip Flexor Stretch - 1 x daily - 2 sets - 30 hold  Standing Hip Extension with Counter Support - 1 x daily - 1 sets - 10 reps - 5 hold  Size small dilator    1/26/23  • Bent Knee Fallouts - 1 x daily - 1 sets - 10 reps - 5 hold    Writer verbally educated the patient and received verbal consent from the patient on hand placement, positioning of patient, and techniques to be performed today including clothing adjustments for techniques, therapist position for techniques, hand placement and palpation for techniques as described above and how they are pertinent to the patient's plan of care.      Suggestions for next session as indicated: progress per plan of care, manual for pelvic floor muscle tension, trigger points as needed;   Abdominal retraining and hip strength  Body mechanics education  dry needling- continue as  appropriate      ASSESSMENT   Encouraged by less frequent pain. Suspect still some core weakness affecting heavier push/pull/lift motions. Able to progress glute strength this session. Tension and tender to right quadratus lumborum and glutes, but improved with needling and had better right hip ROM after completion.   Pain after treatment (patient reported, 0-10 scale): 1-2  Result of above outlined education: Verbalizes understanding and Needs reinforcement    Therapy procedure time and total treatment time can be found documented on the Time Entry flowsheet   11

## 2023-03-29 NOTE — OBJECTIVE
Continue present treatment  Low-sodium low-fat low-carb diet  Serial blood pressure readings  Lab work before the next visit  Return to clinic earlier if any problems [History reviewed] : History reviewed. [Medications and Allergies reviewed] : Medications and allergies reviewed.

## 2023-04-04 ENCOUNTER — APPOINTMENT (OUTPATIENT)
Dept: HEART AND VASCULAR | Facility: CLINIC | Age: 78
End: 2023-04-04
Payer: MEDICARE

## 2023-04-04 VITALS
WEIGHT: 182.98 LBS | HEIGHT: 68 IN | DIASTOLIC BLOOD PRESSURE: 80 MMHG | HEART RATE: 79 BPM | BODY MASS INDEX: 27.73 KG/M2 | OXYGEN SATURATION: 98 % | TEMPERATURE: 98 F | SYSTOLIC BLOOD PRESSURE: 112 MMHG

## 2023-04-04 PROCEDURE — 99214 OFFICE O/P EST MOD 30 MIN: CPT

## 2023-04-04 NOTE — PHYSICAL EXAM
[Well Developed] : well developed [Well Nourished] : well nourished [Clear Lung Fields] : clear lung fields [Good Air Entry] : good air entry [Soft] : abdomen soft [Normal Gait] : normal gait [Moves all extremities] : moves all extremities [No Focal Deficits] : no focal deficits [Alert and Oriented] : alert and oriented [Normal memory] : normal memory [de-identified] : JVP 8 cm H20 [de-identified] : Normal S1/S2, 2/6 blowing midpeaking systolic murmur at the apex  [de-identified] : Mildly tender in mid epigastrum  [de-identified] : Warm, no edema

## 2023-04-04 NOTE — DISCUSSION/SUMMARY
[FreeTextEntry1] : # Chronic systolic heart failure\par - Etiology: unclear etiology at this time, suspect infiltrative process given arrhythmia history. will defer MRI given device and age\par - GDMT: current regimen is metoprolol succinate 50/100, entresto 49-51 mg BID, Farxiga 10 mg daily. will increase entresto to  mg BID and metoprolol to 100 mg BID. defer MRA due to CKD and hyperkalemia.\par - Diuretic: current regimen is torsemide 10 mg QD, will continue\par - Device: s/p CRT-D, well functioning with > 95% BiV pacing \par - Corcinch: not a candidate with prior AVR \par - Barostim/CCM: insurance did not approve Barostim, LVEF too low for CCM\par - FROST: will assess candidacy for FROST \par - Labs: 1/23 with K 5.4 and Cr 1.8, repeat today to follow K on higher dose Entresto\par \par # Valvular disease\par - severe MR noted on TTE and exam suggestive of significant AI not no valvular disease more than moderate on JIGAR  \par \par # History of VT/VF with ICD shocks and frequent PVCs\par - follows with Dr. ARIZMENDI and on amiodarone\par \par # pAF\par - on eliquis, defer dosing to Dr. Chandler and Janell (right now on 2.5 mg BID) \par \par # Aortic aneurysm\par - has been stable over several years, follows with Dr. Caceres \par \par # CKD IIIb with baseline Cr 1.8\par - continue Farxiga \par - follows with Dr. Matthews\par \par Return to clinic in 3 months

## 2023-04-04 NOTE — HISTORY OF PRESENT ILLNESS
[FreeTextEntry1] : Referring: Dr. Chandler\par \par Mr. Kenney is a 76 YO M with a history of ACC/AHA Stage C NICM (LV 6.2 cm, LVEF 25-30%) s/p ICD upgraded to CRT-D 9/2021 for chronic RV pacing, severe AI/MR s/p bioAVR/MVr 2014 history of VT/VT with ICD shocks and frequent PVC's s/p PVC ablation, pAF on eliquis, CKD IIIb (Cr 1.8), and aortic aneurysm presenting to HF clinic for further management. \par \par He was very active at baseline and used to run marathons. He was diagnosed with a cardiomyopathy in 2014 with LVEF ~45% in the setting of severe AI with moderate-severe MR and subsequently underwent bioAVR and MVr in 2014. He underwent a primary prevention ICD shortly after surgery. He did well for many years after surgery and LVEF historically been 40-45%. He developed syncope 9/2019 related to VT/VF which self resolved and he underwent PVC ablation this admission. He was admitted 7/2021 with ICD shock for which he was started on amiodarone. His EF declined from 45% to 30% to 15% in 9/2021 and was being chronically RV paced so underwent CRT-D upgrade. \par \par Due to persistent dyspnea he was referred to HF clinic 4/2022. There was concern for symptomatic severe MR and JIGAR pursued which revealed MR was only moderate. He has tolerated escalation of HF medical therapy but has remained with NYHA III symptoms. Barostim was recommended but not covered by insurance.\par \par He presents today for HF followup. He feels a little better overall since last visit. Last visit he could walk 5 blocks before stopping and now can walk 10-15 blocks. He occasionally notes dyspnea during household activities but improved since last visit. Notes stable 1-2 pillow orthopnea. Denies lower extremity edema. Has noted some dizziness when bending down and getting up quickly. He continues to note epigastric pain which has been chronic and had a previously negative workup. \par \par

## 2023-04-04 NOTE — ASSESSMENT
[FreeTextEntry1] : 76 YO M with a history of ACC/AHA Stage C NICM (LV 6.2 cm, LVEF 25-30%) s/p ICD upgraded to CRT-D 9/2021 for chronic RV pacing, severe AI/MR s/p bioAVR/MVr 2014 history of VT/VT with ICD shocks and frequent PVC's s/p PVC ablation, pAF on eliquis, CKD IIIb (Cr 1.8), and aortic aneurysm presenting to HF clinic for further management. \par \par Today he reports NYHA III symptoms and appears euvolemic on exam.

## 2023-04-04 NOTE — CARDIOLOGY SUMMARY
[de-identified] : \par EKG 9/2021: a-paced, BiV paced\par  [de-identified] : \par ICD interrogation 4/2022: 98% BiV pacing, no VT events\par  [de-identified] : \par JIGAR 12/2022: severe LV dysfunction, posterior MVL tethering with moderate MR and mean gradient 2 mmHg, well functioning bioAVR, mild-mod TR, moderate PI\par \par TTE 11/2022: LV 6.7 cm, LVEF 20-25%, mild concentric LVH, bioAVR with minimal AI, mild RV dysfunction, mod-severe valvular MR (posterior leaflet appears frozen) and mean gradient 8 mmHg, moderate TR, at least moderate PI, estimated PASP 53 mmHg, dilated aorta 4.6 cm at sinus of Valsalva\par \par TTE 5/2022 (report, unable to view images:LV 6.2 cm, LVEF 30-35%, normal RV size/function, bioAVR with mild AI, mean gradient 3 mmHg across MVr with moderate-severe MR, estimated PASP 52 mmHg, 4.6 cm aortic root\par \par TTE 9/2021: LV 6.2 cm, LVEF 15%, LVOT VTI 7 cm, mild RV dysfunction, well functioning bioAVR, mean gradient 4 mmHg across MVr with mild-moderate MR, moderate TR, estimated PASP 31 mmHg, 4.6 cm ascending aorta\par  [de-identified] : \par [facility] 3/2021: normal coronary arteries \par

## 2023-04-05 ENCOUNTER — NON-APPOINTMENT (OUTPATIENT)
Age: 78
End: 2023-04-05

## 2023-04-07 LAB
ALBUMIN SERPL ELPH-MCNC: 3.9 G/DL
ALP BLD-CCNC: 57 U/L
ALT SERPL-CCNC: 12 U/L
ANION GAP SERPL CALC-SCNC: 19 MMOL/L
AST SERPL-CCNC: 16 U/L
BILIRUB SERPL-MCNC: 0.3 MG/DL
BUN SERPL-MCNC: 29 MG/DL
CALCIUM SERPL-MCNC: 9.8 MG/DL
CHLORIDE SERPL-SCNC: 104 MMOL/L
CO2 SERPL-SCNC: 20 MMOL/L
CREAT SERPL-MCNC: 1.7 MG/DL
EGFR: 40 ML/MIN/1.73M2
GLUCOSE SERPL-MCNC: 131 MG/DL
NT-PROBNP SERPL-MCNC: 1614 PG/ML
POTASSIUM SERPL-SCNC: 4.6 MMOL/L
PROT SERPL-MCNC: 7.6 G/DL
SODIUM SERPL-SCNC: 144 MMOL/L

## 2023-04-25 ENCOUNTER — LABORATORY RESULT (OUTPATIENT)
Age: 78
End: 2023-04-25

## 2023-04-25 ENCOUNTER — APPOINTMENT (OUTPATIENT)
Dept: NEPHROLOGY | Facility: CLINIC | Age: 78
End: 2023-04-25
Payer: COMMERCIAL

## 2023-04-25 VITALS — BODY MASS INDEX: 29.35 KG/M2 | WEIGHT: 193 LBS

## 2023-04-25 VITALS — SYSTOLIC BLOOD PRESSURE: 112 MMHG | DIASTOLIC BLOOD PRESSURE: 71 MMHG | HEART RATE: 81 BPM

## 2023-04-25 VITALS — HEART RATE: 80 BPM | SYSTOLIC BLOOD PRESSURE: 100 MMHG | DIASTOLIC BLOOD PRESSURE: 71 MMHG

## 2023-04-25 DIAGNOSIS — D47.2 MONOCLONAL GAMMOPATHY: ICD-10-CM

## 2023-04-25 PROCEDURE — 36415 COLL VENOUS BLD VENIPUNCTURE: CPT

## 2023-04-25 PROCEDURE — 99214 OFFICE O/P EST MOD 30 MIN: CPT | Mod: 25

## 2023-04-25 NOTE — HISTORY OF PRESENT ILLNESS
[FreeTextEntry1] : Kindly referred by Dr. Sandhya Bloom elevated creatinine. [employer].\par \par * * HTN controlled. BP 110s at home. No lightheadedness. No CP. No SOB at rest. Compliant with medications.   * Following up with Dr. Hanson for monoclonal gammopathy. * Chronic slight SOB. Following up closely with cardiologist.  * CKD stable, creatinine 1.7 in April 4. * Hyperkalemia controlled. \par \par Previous history (24Jan23): * CKD stable, creatinine 1.8. * K 5.4. Counseled on following low K diet. He occsaionally eats bananas.  * HTN controlled. No lightheadedness. No CP. Compliant with medications.  Following up with Dr. Hanson for monoclonal gammopathy. * Chronic slight SOB. Following up closely with cardiologist. \par \par Previous history (18Nov22): * CKD stable, creatinine 1.7 on Sep 21. . * 13 - 15 mg albuminuria. * * HTN controlled. No lightheadedness.  Compliant with medications. * Following up with Dr. Hanson for monoclonal gammopathy. * CP/SOB now significantly improved, none currnetly. Following up closely with cardiologist. \par \par Previous history (18Aug22): * CKD stable, creatinine 1.87. 55 mg albuminuria. * HTN controlled. No lightheadedness. Compliant with medications.  Following up with Dr. Hanson for monoclonal gammopathy. * Hyperkalemia controlled. * Rash on legs. Seeing allergist. * Pneumonia on 5/21 - 5/22. * Persistent SOB/CP with minimal to moderate exertion. Per patient, this is unchanged and cardiologist is aware. \par \par Previous history (19May22): * CKD stable, creatinine 1.87. * HTN controlled. No lightheadedness. No CP/SOB. Compliant with medications.  Following up with Dr. Hanson for monoclonal gammopathy. * Hyperkalemia controlled. * He has urinary urgency for several weeks. No dysuria. 5x nocturia. \par \par Previous history (22Feb22): * HTN controlled.  - 120s / 80s at home. No lightheadedness. Compliant with medications. 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 \par \par 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 * CKD progressive, creatinine 1.91. Albuminuria decreased to 48. * Occasional chronic abd pain being evaluated, none currently. \par \par Previous history (06Dec21): * CKD stable, creatinine 1.51.  Albuminuria decreased to 91 mg from 3280 mg (?). Farxiga 10 started. 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 * HTN controlled. BP 120s at home. No lightheadedness. Compliant with medications. \par \par Previous history (10Nov21): * HTN controlled. BP 110s at home. No lightheadedness. Compliant with medications. * 3280 mg albuminuria. * CKD stable, creatinine 1.45. 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 \par \par Previous history (09Sep21): * Events reviewed. Ad[facility] to [facility] for CHF exacerbation and ICD upgrade. EF < 23 - 30.* CKD stable, creatinine 1.55.  Labs from 9/7 reviewed. Creatinine 1.45. K 4. 298 mg albuminuria.  * Hyperkalemia controlled. Not on lokelma or veltassa.  * HTN controlled. /80s at home. No lightheadedness. Compliant with medications. 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 \par \par Previous history (31Aug21): * Dx with Covid 3 weeks ago. Now no cough, or other symptoms. Did not require hospitalization. He has SOB with walking walking 1 block. * Lokelma started for hyperkalemia (K of 6). He is now following low potassium diet. . * CKD stable, creatinine 1.86. * He had stopped torsemide. BP 100s. \par \par Previous history (03Aug21): Labs reviewed. Baseline eGFR 43 - 56 since 2019. On 14Jul21, his creatinine increased to 1.82 (eGFR 35 - 41). The patient denies exposure to chronic NSAIDs, chronic PPIs, creatine, or herbal supplements. The patient denies a history of kidney stones or pyelonephritis. 4 - 5 times. No recent renal ultrasound. They are unaware of proteinuria or hematuria.\par \par EF 23% in 4/21. On amiodarone, metoprolol, lasix 20 qod, HCTZ twice weekly valsartan 40. SOB with one block walking. Stable LE edema. ProBNP incresaed from 515 to 1360.\par \par * HTN controlled.  No lightheadedness. Compliant with medications. He believes he is taking amlodipine.

## 2023-04-25 NOTE — ASSESSMENT
[FreeTextEntry1] : # CKD stage 3 likeliest due to type 2 cardiorenal syndrome and aging, possible DM nephropathy. \par * Recheck labs.\par * Will avoid MRA given previous hyperkalemia. \par * Therapies for kidney disease: blood pressure control; proteinuria reduction with ARB/ACEi; other evidence-based therapies recommended including exercise, a plant-based lower oxalate diet, and 400 mcg folic acid daily\par * Cardiovascular disease prevention: counseling on healthy diet, physical activity, weight loss, alcohol limitation, blood pressure control; cardiology evaluation/followup advised\par * A counseling information sheet on CKD has been given (which they have been instructed to read).\par * The patient has been counseled that chronic kidney disease is a significant condition and regular office follow-up with me (at least every 3 months for now) is important for monitoring and their health, and that it is their responsibility to make a follow-up appointment.\par * The patient has been counseled never to stop taking their medications without discussing it with me or another doctor.\par * The patient has been counseled on avoiding NSAIDs.\par * The patient has been counseled on risk of worsening kidney function and instructed to immediately call and speak with me and go immediately to ER with any severe symptoms, nausea, vomiting, diarrhea, chest pain, or shortness of breath.\par \par # Borderline hyperkalemia.\par * Low K diet.\par * Counseled on strict low K diet. \par \par # HTN controlled. No lightheadedness. \par * Cont entresto, metoprolol, torsemide.\par * The patient's blood pressure was checked with the [employer] [id number] using the SPRINT trial protocol after sitting quietly in an empty room with arm supported, back supported, and feet on the floor for 5 minutes. The average of 3 readings were taken.\par * A counseling information sheet on blood pressure and staying healthy has been given (which they have been instructed to read).\par * The patient has been counseled to check their BP at home with an automatic arm cuff, write down the readings, and reach me directly on the phone immediately if they are persistently > 180 systolic or if SBP is less than 100 or if lightheadedness develops. They were counseled to bring in all blood pressure readings and medications next visit.\par * The patient has been counseled that regular office follow-up (at least every 3 months for now)  is important for monitoring and for their health, and that it is their responsibility to make follow up appointments.\par * The patient also has been counseled that they must never stop or change any medications without discussing this with me (or another physician). \par \par # Monoclonal gammopathy.\par * Advised to follow up with hematology. Myeloma, amyloid, and MGRS are unlikely. \par  \par \par  \par  \par \par

## 2023-04-28 ENCOUNTER — EMERGENCY (EMERGENCY)
Facility: HOSPITAL | Age: 78
LOS: 1 days | Discharge: HOME | End: 2023-04-28
Attending: EMERGENCY MEDICINE | Admitting: EMERGENCY MEDICINE
Payer: COMMERCIAL

## 2023-04-28 VITALS
HEART RATE: 81 BPM | TEMPERATURE: 98 F | WEIGHT: 182.1 LBS | OXYGEN SATURATION: 97 % | RESPIRATION RATE: 18 BRPM | DIASTOLIC BLOOD PRESSURE: 78 MMHG | SYSTOLIC BLOOD PRESSURE: 102 MMHG | HEIGHT: 71 IN

## 2023-04-28 VITALS
HEART RATE: 79 BPM | SYSTOLIC BLOOD PRESSURE: 107 MMHG | OXYGEN SATURATION: 98 % | RESPIRATION RATE: 16 BRPM | DIASTOLIC BLOOD PRESSURE: 74 MMHG | TEMPERATURE: 97 F

## 2023-04-28 DIAGNOSIS — E78.5 HYPERLIPIDEMIA, UNSPECIFIED: ICD-10-CM

## 2023-04-28 DIAGNOSIS — Z88.8 ALLERGY STATUS TO OTHER DRUGS, MEDICAMENTS AND BIOLOGICAL SUBSTANCES: ICD-10-CM

## 2023-04-28 DIAGNOSIS — I25.10 ATHEROSCLEROTIC HEART DISEASE OF NATIVE CORONARY ARTERY WITHOUT ANGINA PECTORIS: ICD-10-CM

## 2023-04-28 DIAGNOSIS — I48.0 PAROXYSMAL ATRIAL FIBRILLATION: ICD-10-CM

## 2023-04-28 DIAGNOSIS — I50.9 HEART FAILURE, UNSPECIFIED: ICD-10-CM

## 2023-04-28 DIAGNOSIS — R07.89 OTHER CHEST PAIN: ICD-10-CM

## 2023-04-28 DIAGNOSIS — N18.30 CHRONIC KIDNEY DISEASE, STAGE 3 UNSPECIFIED: ICD-10-CM

## 2023-04-28 DIAGNOSIS — Z95.810 PRESENCE OF AUTOMATIC (IMPLANTABLE) CARDIAC DEFIBRILLATOR: ICD-10-CM

## 2023-04-28 DIAGNOSIS — Z98.89 OTHER SPECIFIED POSTPROCEDURAL STATES: Chronic | ICD-10-CM

## 2023-04-28 DIAGNOSIS — I13.0 HYPERTENSIVE HEART AND CHRONIC KIDNEY DISEASE WITH HEART FAILURE AND STAGE 1 THROUGH STAGE 4 CHRONIC KIDNEY DISEASE, OR UNSPECIFIED CHRONIC KIDNEY DISEASE: ICD-10-CM

## 2023-04-28 DIAGNOSIS — R10.13 EPIGASTRIC PAIN: ICD-10-CM

## 2023-04-28 DIAGNOSIS — Z79.01 LONG TERM (CURRENT) USE OF ANTICOAGULANTS: ICD-10-CM

## 2023-04-28 LAB
ALBUMIN SERPL ELPH-MCNC: 3.7 G/DL — SIGNIFICANT CHANGE UP (ref 3.3–5)
ALP SERPL-CCNC: 50 U/L — SIGNIFICANT CHANGE UP (ref 40–120)
ALT FLD-CCNC: 12 U/L — SIGNIFICANT CHANGE UP (ref 10–45)
ANION GAP SERPL CALC-SCNC: 10 MMOL/L — SIGNIFICANT CHANGE UP (ref 5–17)
APTT BLD: 35.1 SEC — SIGNIFICANT CHANGE UP (ref 27.5–35.5)
AST SERPL-CCNC: 17 U/L — SIGNIFICANT CHANGE UP (ref 10–40)
BASOPHILS # BLD AUTO: 0.02 K/UL — SIGNIFICANT CHANGE UP (ref 0–0.2)
BASOPHILS NFR BLD AUTO: 0.5 % — SIGNIFICANT CHANGE UP (ref 0–2)
BILIRUB SERPL-MCNC: 0.4 MG/DL — SIGNIFICANT CHANGE UP (ref 0.2–1.2)
BUN SERPL-MCNC: 36 MG/DL — HIGH (ref 7–23)
CALCIUM SERPL-MCNC: 8.5 MG/DL — SIGNIFICANT CHANGE UP (ref 8.4–10.5)
CHLORIDE SERPL-SCNC: 106 MMOL/L — SIGNIFICANT CHANGE UP (ref 96–108)
CO2 SERPL-SCNC: 24 MMOL/L — SIGNIFICANT CHANGE UP (ref 22–31)
CREAT SERPL-MCNC: 1.73 MG/DL — HIGH (ref 0.5–1.3)
EGFR: 40 ML/MIN/1.73M2 — LOW
EOSINOPHIL # BLD AUTO: 0.02 K/UL — SIGNIFICANT CHANGE UP (ref 0–0.5)
EOSINOPHIL NFR BLD AUTO: 0.5 % — SIGNIFICANT CHANGE UP (ref 0–6)
GLUCOSE SERPL-MCNC: 106 MG/DL — HIGH (ref 70–99)
HCT VFR BLD CALC: 43.1 % — SIGNIFICANT CHANGE UP (ref 39–50)
HGB BLD-MCNC: 13.6 G/DL — SIGNIFICANT CHANGE UP (ref 13–17)
IMM GRANULOCYTES NFR BLD AUTO: 0.2 % — SIGNIFICANT CHANGE UP (ref 0–0.9)
INR BLD: 1.15 — SIGNIFICANT CHANGE UP (ref 0.88–1.16)
LACTATE SERPL-SCNC: 1.3 MMOL/L — SIGNIFICANT CHANGE UP (ref 0.5–2)
LIDOCAIN IGE QN: 26 U/L — SIGNIFICANT CHANGE UP (ref 7–60)
LYMPHOCYTES # BLD AUTO: 1.58 K/UL — SIGNIFICANT CHANGE UP (ref 1–3.3)
LYMPHOCYTES # BLD AUTO: 36.5 % — SIGNIFICANT CHANGE UP (ref 13–44)
MCHC RBC-ENTMCNC: 31.6 GM/DL — LOW (ref 32–36)
MCHC RBC-ENTMCNC: 32.5 PG — SIGNIFICANT CHANGE UP (ref 27–34)
MCV RBC AUTO: 102.9 FL — HIGH (ref 80–100)
MONOCYTES # BLD AUTO: 0.49 K/UL — SIGNIFICANT CHANGE UP (ref 0–0.9)
MONOCYTES NFR BLD AUTO: 11.3 % — SIGNIFICANT CHANGE UP (ref 2–14)
NEUTROPHILS # BLD AUTO: 2.21 K/UL — SIGNIFICANT CHANGE UP (ref 1.8–7.4)
NEUTROPHILS NFR BLD AUTO: 51 % — SIGNIFICANT CHANGE UP (ref 43–77)
NRBC # BLD: 0 /100 WBCS — SIGNIFICANT CHANGE UP (ref 0–0)
PLATELET # BLD AUTO: 188 K/UL — SIGNIFICANT CHANGE UP (ref 150–400)
POTASSIUM SERPL-MCNC: 4.1 MMOL/L — SIGNIFICANT CHANGE UP (ref 3.5–5.3)
POTASSIUM SERPL-SCNC: 4.1 MMOL/L — SIGNIFICANT CHANGE UP (ref 3.5–5.3)
PROT SERPL-MCNC: 7 G/DL — SIGNIFICANT CHANGE UP (ref 6–8.3)
PROTHROM AB SERPL-ACNC: 13.7 SEC — HIGH (ref 10.5–13.4)
RBC # BLD: 4.19 M/UL — LOW (ref 4.2–5.8)
RBC # FLD: 14.4 % — SIGNIFICANT CHANGE UP (ref 10.3–14.5)
SODIUM SERPL-SCNC: 140 MMOL/L — SIGNIFICANT CHANGE UP (ref 135–145)
TROPONIN T SERPL-MCNC: 0.01 NG/ML — SIGNIFICANT CHANGE UP (ref 0–0.01)
TROPONIN T SERPL-MCNC: 0.01 NG/ML — SIGNIFICANT CHANGE UP (ref 0–0.01)
WBC # BLD: 4.33 K/UL — SIGNIFICANT CHANGE UP (ref 3.8–10.5)
WBC # FLD AUTO: 4.33 K/UL — SIGNIFICANT CHANGE UP (ref 3.8–10.5)

## 2023-04-28 PROCEDURE — 71045 X-RAY EXAM CHEST 1 VIEW: CPT

## 2023-04-28 PROCEDURE — 85025 COMPLETE CBC W/AUTO DIFF WBC: CPT

## 2023-04-28 PROCEDURE — 99285 EMERGENCY DEPT VISIT HI MDM: CPT | Mod: 25

## 2023-04-28 PROCEDURE — 85610 PROTHROMBIN TIME: CPT

## 2023-04-28 PROCEDURE — 80053 COMPREHEN METABOLIC PANEL: CPT

## 2023-04-28 PROCEDURE — 99285 EMERGENCY DEPT VISIT HI MDM: CPT

## 2023-04-28 PROCEDURE — 83605 ASSAY OF LACTIC ACID: CPT

## 2023-04-28 PROCEDURE — 93005 ELECTROCARDIOGRAM TRACING: CPT

## 2023-04-28 PROCEDURE — 71045 X-RAY EXAM CHEST 1 VIEW: CPT | Mod: 26

## 2023-04-28 PROCEDURE — 84484 ASSAY OF TROPONIN QUANT: CPT

## 2023-04-28 PROCEDURE — 83690 ASSAY OF LIPASE: CPT

## 2023-04-28 PROCEDURE — 85730 THROMBOPLASTIN TIME PARTIAL: CPT

## 2023-04-28 PROCEDURE — 36415 COLL VENOUS BLD VENIPUNCTURE: CPT

## 2023-04-28 NOTE — ED PROVIDER NOTE - PATIENT PORTAL LINK FT
You can access the FollowMyHealth Patient Portal offered by Health system by registering at the following website: [email or web address] By joining [facility]’s FollowMyHealth portal, you will also be able to view your health information using other applications (apps) compatible with our system.

## 2023-04-28 NOTE — ED PROVIDER NOTE - OBJECTIVE STATEMENT
78 yo M with ongoing lower chest and epigastric pain.  Has had similar pain for "years" since open heart surgery.  Worse in the past week, more epigastric. No sob, no leg pain or swelling.  No fever, cough, vomiting, diarrhea, melena, brbpr.  Had an echo but not sure when.  Saw his cardiologist a month ago and medications changed to address the pain (added entresto, metoprolol changed) but no relief of pain.  Has upcoming appt with GI.

## 2023-04-28 NOTE — ED ADULT NURSE NOTE - OBJECTIVE STATEMENT
77y M hx valve replacement c/o epigastric pain. Pt states he has worsening epigastric pain and SOB s/p valve replacement in 2014. Endorses pain worsens with eating, laying down, walking. States pain increases SOB.  Denies numbness/tingling, headache, blurry vision, dizziness. Pt AAOx4, speaking in full and clear sentences, in no apparent distress at this moment.

## 2023-04-28 NOTE — ED ADULT NURSE REASSESSMENT NOTE - NSFALLRSKASSESSDT_ED_ALL_ED
Verified Results  US THYROID 18Jan2018 04:13PM JEAN CARLOS DAVIS   Ordering Provider: JEAN CARLOS DAVIS.    Reason For Study: nodule,nodule.   [Jan 19, 2018 3:40PM JEAN CARLOS DAVIS]  benign thyroid nodule, no big or suspicous masses     Test Name Result Flag Reference   US THYROID (Report)     Accession #    [id number]    EXAM: US THYROID    CLINICAL INDICATION: Thyroid nodule    COMPARISON: None.    Findings:  Right lobe 4.2 cm in size. Heterogeneous parenchyma. Benign cyst measuring 6 mm. No   microcalcification or suspicious nodule.    Isthmus within normal limits    Left lobe, 4.3 cm in size. Heterogeneous parenchyma. Benign-appearing spongiform 6 mm nodule   superiorly.    Ultrasound lateral to the left lobe of the thyroid at the site of palpable abnormality reveals no   fluid collection or solid mass    .    IMPRESSION:  1. Normal size thyroid with mildly heterogeneous parenchyma  2. Benign-appearing left nodule. Benign right cyst.  3. Ultrasound in the region of the palpable abnormality lateral to the left lobe of the thyroid   shows no solid mass or fluid collection    **** F I N A L ****    Transcribed By: FREDY   01/18/18 9:05 pm    Dictated By:      GRACIE NAM MD    Electronically Reviewed and Approved By:      GRACIE NAM MD 01/18/18 9:08 pm       Message   benign thyroid nodule, no big or suspicous masses      28-Apr-2023 21:10

## 2023-04-28 NOTE — ED ADULT TRIAGE NOTE - IDEAL BODY WEIGHT(KG)
Detail Level: Detailed Depth Of Biopsy: dermis Was A Bandage Applied: Yes Size Of Lesion In Cm: 0.6 Biopsy Type: H and E Biopsy Method: double edge Personna blade Anesthesia Type: 1% lidocaine with epinephrine Anesthesia Volume In Cc: 1 Additional Anesthesia Volume In Cc (Will Not Render If 0): 0 Hemostasis: Electrocautery Wound Care: Vaseline Dressing: Band-Aid 75 Destruction After The Procedure: No Type Of Destruction Used: Electrodesiccation and Curettage Cryotherapy Text: The wound bed was treated with cryotherapy after the biopsy was performed. Electrodesiccation Text: The wound bed was treated with electrodesiccation after the biopsy was performed. Electrodesiccation And Curettage Text: The wound bed was treated with electrodesiccation and curettage after the biopsy was performed. Silver Nitrate Text: The wound bed was treated with silver nitrate after the biopsy was performed. Lab: [id number] Lab Facility: [id number] Consent: Verbal consent was obtained and risks were reviewed including but not limited to scarring, infection, bleeding, scabbing, incomplete removal, nerve damage and allergy to anesthesia. Post-Care Instructions: I reviewed with the patient in detail post-care instructions. Patient is to keep the biopsy site dry overnight, and then apply vaseline twice daily until healed. Notification Instructions: Patient will be notified of biopsy results. However, patient instructed to call the office if not contacted within 2 weeks. Billing Type: Third-Party Bill Information: Selecting Yes will display possible errors in your note based on the variables you have selected. This validation is only offered as a suggestion for you. PLEASE NOTE THAT THE VALIDATION TEXT WILL BE REMOVED WHEN YOU FINALIZE YOUR NOTE. IF YOU WANT TO FAX A PRELIMINARY NOTE YOU WILL NEED TO TOGGLE THIS TO 'NO' IF YOU DO NOT WANT IT IN YOUR FAXED NOTE.

## 2023-04-28 NOTE — ED PROVIDER NOTE - NSFOLLOWUPINSTRUCTIONS_ED_ALL_ED_FT
YOUR TESTS TODAY DID NOT SHOW ANY SIGNS OF HEART ATTACK OR PROBLEMS IN THE LUNGS    IT WILL BE IMPORTANT FOR YOU TO SEE YOUR CARDIOLOGIST EARLY NEXT WEEK.  ONE OF THE TEAM MEMBERS IS GOING TO LET YOUR DOCTOR KNOW THAT YOU WERE IN THE EMERGENCY ROOM.  CALL YOUR CARDIOLOGIST MONDAY MORNING TO GET AN APPOINTMENT BUT MAKE SURE TO TELL THEM AGAIN THAT YOU WERE IN THE EMERGENCY ROOM    FOR NOW, CONTINUE ALL OF YOUR USUAL MEDICATIONS  REST AND KEEP WELL-HYDRATED    RETURN TO THE EMERGENCY ROOM IMMEDIATELY IF:  YOUR PAIN GETS WORSE  YOU HAVE TROUBLE BREATHING   BLACK BOWEL MOVEMENTS OR BLOOD IN THE STOOL  ANY NEW, WORSENING OR CONCERNING SYMPTOMS

## 2023-04-28 NOTE — ED PROVIDER NOTE - CLINICAL SUMMARY MEDICAL DECISION MAKING FREE TEXT BOX
21:20:  Case d/w Cardiology fellow.  Patient had cath 2021 normal coronaries.  Dec JIGAR unremarkable.  Agrees with plan for repeat trop and dc for outpatient follow up if negative.  He will email patient's PMD cardiologist to help ensure close follow up. Patient with CAD, CHF and other PMH as noted above with ongoing chest/epigastric pain since cardiac surgery.  EKG paced without acute changes.  Will screen further with serial troponins and d/w cardiology.     21:20:  Case d/w Cardiology fellow.  Patient had cath 2021 normal coronaries.  Dec JIGAR unremarkable.  Agrees with plan for repeat trop and dc for outpatient follow up if negative.  He will email patient's PMD cardiologist to help ensure close follow up.    Discussed all results with patient.  Recommended trial of PPI, but patient now reports that he had omeprazole for 3 months for this pain and it didn't help.  It is possible he is having chest wall/nerve pain from the procedure.  However, he is advised to see his cardiologist early next week and to keep his appt with gi and to consider pain management if no clear source of the pain is found after these work ups.  Patient advised to return for any trouble breathing, weakness, leg pain or swelling, worsneing pain or any other new or concerning symptoms. Patient verbalizes understanding of discussion and plan.

## 2023-04-28 NOTE — ED ADULT NURSE NOTE - NSIMPLEMENTINTERV_GEN_ALL_ED
Implemented All Universal Safety Interventions:  [place] to call system. Call bell, personal items and telephone within reach. Instruct patient to call for assistance. Room bathroom lighting operational. Non-slip footwear when patient is off stretcher. Physically safe environment: no spills, clutter or unnecessary equipment. Stretcher in lowest position, wheels locked, appropriate side rails in place.

## 2023-05-01 LAB
ALBUMIN SERPL ELPH-MCNC: 4.1 G/DL
ALP BLD-CCNC: 53 U/L
ALT SERPL-CCNC: 12 U/L
ANION GAP SERPL CALC-SCNC: 17 MMOL/L
APPEARANCE: CLEAR
AST SERPL-CCNC: 18 U/L
BASOPHILS # BLD AUTO: 0.02 K/UL
BASOPHILS NFR BLD AUTO: 0.4 %
BILIRUB SERPL-MCNC: 0.4 MG/DL
BILIRUBIN URINE: NEGATIVE
BLOOD URINE: NEGATIVE
BUN SERPL-MCNC: 34 MG/DL
CALCIUM SERPL-MCNC: 9.9 MG/DL
CHLORIDE SERPL-SCNC: 107 MMOL/L
CO2 SERPL-SCNC: 20 MMOL/L
COLOR: YELLOW
CREAT SERPL-MCNC: 1.71 MG/DL
CREAT SPEC-SCNC: 111 MG/DL
CYSTATIN C SERPL-MCNC: 1.41 MG/L
EGFR: 40 ML/MIN/1.73M2
EOSINOPHIL # BLD AUTO: 0.02 K/UL
EOSINOPHIL NFR BLD AUTO: 0.4 %
GFR/BSA.PRED SERPLBLD CYS-BASED-ARV: 46 ML/MIN/1.73M2
GLUCOSE QUALITATIVE U: >=1000 MG/DL
GLUCOSE SERPL-MCNC: 94 MG/DL
HCT VFR BLD CALC: 47.2 %
HGB BLD-MCNC: 14.5 G/DL
IMM GRANULOCYTES NFR BLD AUTO: 0.2 %
KETONES URINE: NEGATIVE MG/DL
LEUKOCYTE ESTERASE URINE: NEGATIVE
LYMPHOCYTES # BLD AUTO: 2.03 K/UL
LYMPHOCYTES NFR BLD AUTO: 40.3 %
MAGNESIUM SERPL-MCNC: 2.4 MG/DL
MAN DIFF?: NORMAL
MCHC RBC-ENTMCNC: 30.7 GM/DL
MCHC RBC-ENTMCNC: 33 PG
MCV RBC AUTO: 107.5 FL
MICROALBUMIN 24H UR DL<=1MG/L-MCNC: 1.4 MG/DL
MICROALBUMIN/CREAT 24H UR-RTO: 12 MG/G
MONOCYTES # BLD AUTO: 0.51 K/UL
MONOCYTES NFR BLD AUTO: 10.1 %
NEUTROPHILS # BLD AUTO: 2.45 K/UL
NEUTROPHILS NFR BLD AUTO: 48.6 %
NITRITE URINE: NEGATIVE
PH URINE: 5.5
PHOSPHATE SERPL-MCNC: 3.4 MG/DL
PLATELET # BLD AUTO: 209 K/UL
POTASSIUM SERPL-SCNC: 5.1 MMOL/L
PROT SERPL-MCNC: 7.5 G/DL
PROTEIN URINE: NEGATIVE MG/DL
RBC # BLD: 4.39 M/UL
RBC # FLD: 14.6 %
SODIUM SERPL-SCNC: 144 MMOL/L
SPECIFIC GRAVITY URINE: 1.02
UROBILINOGEN URINE: 0.2 MG/DL
VIT B12 SERPL-MCNC: 237 PG/ML
WBC # FLD AUTO: 5.04 K/UL

## 2023-05-09 ENCOUNTER — APPOINTMENT (OUTPATIENT)
Age: 78
End: 2023-05-09
Payer: MEDICARE

## 2023-05-09 VITALS
BODY MASS INDEX: 28.79 KG/M2 | TEMPERATURE: 96.4 F | WEIGHT: 190 LBS | HEIGHT: 68 IN | RESPIRATION RATE: 14 BRPM | HEART RATE: 65 BPM | DIASTOLIC BLOOD PRESSURE: 70 MMHG | SYSTOLIC BLOOD PRESSURE: 125 MMHG | OXYGEN SATURATION: 96 %

## 2023-05-09 PROCEDURE — 99214 OFFICE O/P EST MOD 30 MIN: CPT

## 2023-05-10 ENCOUNTER — APPOINTMENT (OUTPATIENT)
Dept: HEART AND VASCULAR | Facility: CLINIC | Age: 78
End: 2023-05-10
Payer: MEDICARE

## 2023-05-10 VITALS
WEIGHT: 183 LBS | HEIGHT: 68 IN | BODY MASS INDEX: 27.74 KG/M2 | SYSTOLIC BLOOD PRESSURE: 121 MMHG | DIASTOLIC BLOOD PRESSURE: 85 MMHG | HEART RATE: 80 BPM

## 2023-05-10 PROCEDURE — 99213 OFFICE O/P EST LOW 20 MIN: CPT | Mod: 25

## 2023-05-10 PROCEDURE — 93284 PRGRMG EVAL IMPLANTABLE DFB: CPT

## 2023-05-11 NOTE — HISTORY OF PRESENT ILLNESS
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 path: hyperplastic and chronic gastritis \par \par Previous hx: \par  Pain is along the line of patients scar. Pain is worse with inhalation and lying down. No alleviating factors. Pt saw cardiologist Dr. Cobos on 5/9/22 to undergo echo, report pending. Pt states he gets short of breath and fatigued after walking a 1/2 flight of stairs or 1-2 blocks. Pt is currently taking pantoprazole 40 mg and Pepcid 20 mg daily. Ct scan from 1/2022 of upper abdomen WNL. \par \par Diet- Vegetable, fruit, chicken, fish, limit fried foods\par \par Denies fever, chill, nausea, vomiting, wt loss, poor appetite, hematochezia, melena. \par \par Endoscopy 6/21: gastritis s/p biopsy (chronic gastritis w/ cryptitis and crypt distortion), sessile polyp in the stomach s/p polypectomy (hyperplastic polyp).\par Colonoscopy 2017: L sided diverticulosis, internal hemorrhoid (repeat in 7-10 year)

## 2023-05-11 NOTE — ASSESSMENT
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 - R/O gastric causes- no signs of hernia, r/o gastritis, r/o mass\par - referral to Dr. Gonzalez given for pain management \par - CT scan of chest 2021- Upper abdomen WNL\par - Most likely due to scar tissue vs post-op issues s/p  AVR in 2014\par - Schedule f/u apt with Dr. Garcia to further evaluate nodule under the scar line\par - will discuss with Dr. Bloom as well \par \par f/u PRN

## 2023-05-11 NOTE — PHYSICAL EXAM
[General Appearance - Alert] : alert [General Appearance - In No Acute Distress] : in no acute distress [Extraocular Movements] : extraocular movements were intact [Sclera] : the sclera and conjunctiva were normal [Neck Appearance] : the appearance of the neck was normal [] : no respiratory distress [Abdomen Hernia] : no hernia was discovered [Abnormal Walk] : normal gait [Musculoskeletal - Swelling] : no joint swelling seen [Skin Color & Pigmentation] : normal skin color and pigmentation [Skin Turgor] : normal skin turgor [Cranial Nerves] : cranial nerves 2-12 were intact [Deep Tendon Reflexes (DTR)] : deep tendon reflexes were 2+ and symmetric [Oriented To Time, Place, And Person] : oriented to person, place, and time [Impaired Insight] : insight and judgment were intact [FreeTextEntry1] : tenderness of the upper epigastrum along the scar line, no signs of hernia

## 2023-05-12 ENCOUNTER — APPOINTMENT (OUTPATIENT)
Dept: PHYSICAL MEDICINE AND REHAB | Facility: CLINIC | Age: 78
End: 2023-05-12

## 2023-05-15 ENCOUNTER — NON-APPOINTMENT (OUTPATIENT)
Age: 78
End: 2023-05-15

## 2023-05-15 NOTE — HISTORY OF PRESENT ILLNESS
Follow Up: uti     Interval History/ROS:  marked improvement with adjustment of Spencer catheter    Allergies  No Known Allergies    ANTIMICROBIALS:  cefTRIAXone   IVPB 1000 every 24 hours  cefTRIAXone   IVPB        OTHER MEDS:  MEDICATIONS  (STANDING):  carbidopa/levodopa  25/100 1 three times a day  diazepam    Tablet 5 two times a day  enoxaparin Injectable 40 every 24 hours      Vital Signs Last 24 Hrs  T(C): 37.2 (13 May 2023 08:35), Max: 39.8 (12 May 2023 13:00)  T(F): 98.9 (13 May 2023 08:35), Max: 103.6 (12 May 2023 13:00)  HR: 89 (13 May 2023 08:35) (73 - 94)  BP: 111/68 (13 May 2023 08:35) (80/60 - 115/68)  BP(mean): 66 (12 May 2023 16:53) (66 - 75)  RR: 18 (13 May 2023 08:35) (17 - 20)  SpO2: 96% (13 May 2023 08:35) (96% - 99%)    Parameters below as of 13 May 2023 08:35  Patient On (Oxygen Delivery Method): room air        PHYSICAL EXAM:  General: thin  NAD, Non-toxic  Neurology: A&Ox3, RUE tremor, mask like facies,  general stiffness noted  Respiratory: Clear to auscultation bilaterally  CV: RRR, S1S2, no murmurs, rubs or gallops  Abdominal: Soft, Non-tender, non-distended, normal bowel sounds  Extremities: No edema, + peripheral pulses  Line Sites: Clear  Skin: No rash                        10.9   19.15 )-----------( 166      ( 13 May 2023 07:15 )             34.0   WBC Count: 19.15 ( @ 07:15)  WBC Count: 23.38 ( @ 13:28)        139  |  103  |  18  ----------------------------<  100<H>  3.3<L>   |  21<L>  |  0.78    Ca    8.1<L>      13 May 2023 07:11  Phos  2.4     -12  Mg     1.7     -12    TPro  7.4  /  Alb  3.6  /  TBili  1.0  /  DBili  x   /  AST  8<L>  /  ALT  10  /  AlkPhos  129<H>        Urinalysis Basic - ( 12 May 2023 13:29 )    Color: Red / Appearance: Turbid / S.016 / pH: x  Gluc: x / Ketone: Negative  / Bili: Negative / Urobili: Negative   Blood: x / Protein: 300 mg/dL / Nitrite: Negative   Leuk Esterase: Large / RBC: >50 /hpf / WBC >50 /HPF   Sq Epi: x / Non Sq Epi: x / Bacteria: Many        MICROBIOLOGY:  pending    Rapid RVP Result: NotDetec ( @ 13:28)    RADIOLOGY:  < from: Xray Chest 1 View- PORTABLE-Urgent (23 @ 15:25) >  FINDINGS:  The heart size is normal in size.  The lungs are clear.  There is no pleural effusion There is no pneumothorax.  No acute bony abnormality.    IMPRESSION:  Clear lungs.    < end of copied text >      Skyler Teran MD; Division of Infectious Disease; Pager: [phone]; nights and weekends: [phone]
Date of Service  : 05-14-23     INTERVAL HPI/OVERNIGHT EVENTS: I feel fine.   Vital Signs Last 24 Hrs  T(C): 37.1 (14 May 2023 16:17), Max: 37.1 (14 May 2023 16:17)  T(F): 98.7 (14 May 2023 16:17), Max: 98.7 (14 May 2023 16:17)  HR: 78 (14 May 2023 16:17) (72 - 86)  BP: 108/64 (14 May 2023 16:17) (95/60 - 108/64)  BP(mean): --  RR: 18 (14 May 2023 16:17) (18 - 18)  SpO2: 97% (14 May 2023 16:17) (96% - 98%)    Parameters below as of 14 May 2023 16:17  Patient On (Oxygen Delivery Method): room air      I&O's Summary    13 May 2023 07:01  -  14 May 2023 07:00  --------------------------------------------------------  IN: 150 mL / OUT: 920 mL / NET: -770 mL    14 May 2023 07:01  -  14 May 2023 18:02  --------------------------------------------------------  IN: 0 mL / OUT: 600 mL / NET: -600 mL      MEDICATIONS  (STANDING):  carbidopa/levodopa  25/100 1 Tablet(s) Oral three times a day  cefTRIAXone   IVPB      cefTRIAXone   IVPB 1000 milliGRAM(s) IV Intermittent every 24 hours  diazepam    Tablet 5 milliGRAM(s) Oral two times a day  enoxaparin Injectable 40 milliGRAM(s) SubCutaneous every 24 hours  sodium chloride 0.9%. 1000 milliLiter(s) (100 mL/Hr) IV Continuous <Continuous>    MEDICATIONS  (PRN):    LABS:                        10.9   19.15 )-----------( 166      ( 13 May 2023 07:15 )             34.0     05-14    138  |  104  |  15  ----------------------------<  99  3.2<L>   |  22  |  0.65    Ca    8.4      14 May 2023 06:51  Mg     1.8     05-14          CAPILLARY BLOOD GLUCOSE              REVIEW OF SYSTEMS:  CONSTITUTIONAL: No fever, weight loss, or fatigue  EYES: No eye pain, visual disturbances, or discharge  ENMT:  No difficulty hearing, tinnitus, vertigo; No sinus or throat pain  NECK: No pain or stiffness  RESPIRATORY: No cough, wheezing, chills or hemoptysis; No shortness of breath  CARDIOVASCULAR: No chest pain, palpitations, dizziness, or leg swelling  GASTROINTESTINAL: No abdominal or epigastric pain. No nausea, vomiting, or hematemesis; No diarrhea or constipation. No melena or hematochezia.      RADIOLOGY & ADDITIONAL TESTS:    Consultant(s) Notes Reviewed:  [x ] YES  [ ] NO    PHYSICAL EXAM:  GENERAL: NAot in any distress ,  HEAD:  Atraumatic, Normocephalic  EYES: EOMI, PERRLA, conjunctiva and sclera clear  ENMT: No tonsillar erythema, exudates, or enlargement; Moist mucous membranes, Good dentition, No lesions  NECK: Supple, No JVD, Normal thyroid  NERVOUS SYSTEM:  Alert & Oriented X3, No focal deficit   CHEST/LUNG: Good air entry bilateral with no  rales, rhonchi, wheezing, or rubs  HEART: Regular rate and rhythm; No murmurs, rubs, or gallops  ABDOMEN: Soft, Nontender, Nondistended; Bowel sounds present  EXTREMITIES:  2+ Peripheral Pulses, No clubbing, cyanosis, or edema  Spencer +    Care Discussed with Consultants/Other Providers [ x] YES  [ ] NO
  Follow Up:  uti  misplaced urinary catheter    Interval History/ROS:  sustained improvement,  stiff painful neck    Allergies  No Known Allergies    ANTIMICROBIALS:  cefTRIAXone   IVPB    cefTRIAXone   IVPB 1000 every 24 hours    OTHER MEDS:  MEDICATIONS  (STANDING):  carbidopa/levodopa  25/100 1 three times a day  diazepam    Tablet 5 two times a day  enoxaparin Injectable 40 every 24 hours      Vital Signs Last 24 Hrs  T(C): 36.5 (14 May 2023 08:36), Max: 37.2 (13 May 2023 15:45)  T(F): 97.7 (14 May 2023 08:36), Max: 98.9 (13 May 2023 15:45)  HR: 74 (14 May 2023 08:36) (66 - 94)  BP: 100/64 (14 May 2023 08:36) (95/60 - 109/64)  BP(mean): --  RR: 18 (14 May 2023 08:36) (18 - 18)  SpO2: 97% (14 May 2023 08:36) (96% - 98%)    Parameters below as of 14 May 2023 08:36  Patient On (Oxygen Delivery Method): room air        PHYSICAL EXAM:  General: thin  NAD, Non-toxic  Neurology: A&Ox3, still and stiff  Respiratory: Clear to auscultation bilaterally  CV: RRR, S1S2, no murmurs, rubs or gallops  Abdominal: Soft, Non-tender, non-distended, normal bowel sounds  Extremities: No edema,   Line Sites: Clear  Skin: No rash                        10.9   19.15 )-----------( 166      ( 13 May 2023 07:15 )             34.0   WBC Count: 19.15 ( @ 07:15)  WBC Count: 23.38 ( @ 13:28)    -    138  |  104  |  15  ----------------------------<  99  3.2<L>   |  22  |  0.65    Ca    8.4      14 May 2023 06:51  Phos  2.4       Mg     1.8         TPro  7.4  /  Alb  3.6  /  TBili  1.0  /  DBili  x   /  AST  8<L>  /  ALT  10  /  AlkPhos  129<H>      Urinalysis Basic - ( 12 May 2023 13:29 )    Color: Red / Appearance: Turbid / S.016 / pH: x  Gluc: x / Ketone: Negative  / Bili: Negative / Urobili: Negative   Blood: x / Protein: 300 mg/dL / Nitrite: Negative   Leuk Esterase: Large / RBC: >50 /hpf / WBC >50 /HPF   Sq Epi: x / Non Sq Epi: x / Bacteria: Many      MICROBIOLOGY:  Clean Catch Clean Catch (Midstream)  23   >=3 organisms. Probable collection contamination.  --  --    .Blood Blood-Peripheral  23   No growth to date.  --  --      .Blood Blood-Peripheral  23   No growth to date.  --  --      Rapid RVP Result: NotDetec ( @ 13:28)    RADIOLOGY:    Skyler Teran MD; Division of Infectious Disease; Pager: [phone]; nights and weekends: [phone]
  Follow Up:  uti    Interval History/ROS:  major complain is left sided neck pain    Allergies  No Known Allergies        ANTIMICROBIALS:  cefTRIAXone   IVPB    cefTRIAXone   IVPB 1000 every 24 hours      OTHER MEDS:  MEDICATIONS  (STANDING):  carbidopa/levodopa  25/100 1 three times a day  diazepam    Tablet 5 two times a day  enoxaparin Injectable 40 every 24 hours      Vital Signs Last 24 Hrs  T(C): 36.6 (15 May 2023 12:59), Max: 37 (15 May 2023 01:08)  T(F): 97.9 (15 May 2023 12:59), Max: 98.6 (15 May 2023 01:08)  HR: 81 (15 May 2023 12:59) (70 - 85)  BP: 98/63 (15 May 2023 12:59) (97/57 - 117/72)  BP(mean): --  RR: 18 (15 May 2023 12:59) (18 - 18)  SpO2: 98% (15 May 2023 12:59) (95% - 99%)    Parameters below as of 15 May 2023 12:59  Patient On (Oxygen Delivery Method): room air        PHYSICAL EXAM:  General: WN/WD NAD, Non-toxic  Neurology: A&Ox3, nonfocal, soft voice, decreased spontaenous movement  marked tightness noted in left trapezius m  Respiratory: Clear to auscultation bilaterally  CV: RRR, S1S2, no murmurs, rubs or gallops  Abdominal: Soft, Non-tender, non-distended, normal bowel sounds  Extremities: No edema,  Line Sites: Clear  Skin: No rash                        10.5   9.25  )-----------( 167      ( 15 May 2023 07:18 )             33.1       05-15    138  |  100  |  9   ----------------------------<  94  3.3<L>   |  24  |  0.56    Ca    8.4      15 May 2023 07:16  Mg     1.8     05-14        MICROBIOLOGY:  Clean Catch Clean Catch (Midstream)  05-12-23   >=3 organisms. Probable collection contamination.  --  --      .Blood Blood-Peripheral  05-12-23   No growth to date.  --  --      .Blood Blood-Peripheral  05-12-23   No growth to date.  --  --    Rapid RVP Result: NotDetec (05-12 @ 13:28)    RADIOLOGY:  < from: Xray Chest 1 View- PORTABLE-Urgent (05.12.23 @ 15:25) >  IMPRESSION:  Clear lungs.    < end of copied text >      Skyler Teran MD; Division of Infectious Disease; Pager: [phone]; nights and weekends: [phone]
Date of Service  : 23     INTERVAL HPI/OVERNIGHT EVENTS: I feel fine.   Vital Signs Last 24 Hrs  T(C): 37.2 (13 May 2023 15:45), Max: 39.8 (12 May 2023 16:53)  T(F): 98.9 (13 May 2023 15:45), Max: 103.6 (12 May 2023 16:53)  HR: 66 (13 May 2023 15:45) (66 - 94)  BP: 106/61 (13 May 2023 15:45) (80/60 - 115/68)  BP(mean): 66 (12 May 2023 16:53) (66 - 66)  RR: 18 (13 May 2023 15:45) (17 - 18)  SpO2: 97% (13 May 2023 15:45) (96% - 99%)    Parameters below as of 13 May 2023 15:45  Patient On (Oxygen Delivery Method): room air      I&O's Summary    12 May 2023 07:01  -  13 May 2023 07:00  --------------------------------------------------------  IN: 900 mL / OUT: 225 mL / NET: 675 mL    13 May 2023 07:01  -  13 May 2023 16:36  --------------------------------------------------------  IN: 0 mL / OUT: 600 mL / NET: -600 mL      MEDICATIONS  (STANDING):  carbidopa/levodopa  25/100 1 Tablet(s) Oral three times a day  cefTRIAXone   IVPB      cefTRIAXone   IVPB 1000 milliGRAM(s) IV Intermittent every 24 hours  diazepam    Tablet 5 milliGRAM(s) Oral two times a day  enoxaparin Injectable 40 milliGRAM(s) SubCutaneous every 24 hours  sodium chloride 0.9%. 1000 milliLiter(s) (100 mL/Hr) IV Continuous <Continuous>    MEDICATIONS  (PRN):    LABS:                        10.9   19.15 )-----------( 166      ( 13 May 2023 07:15 )             34.0     05-    139  |  103  |  18  ----------------------------<  100<H>  3.3<L>   |  21<L>  |  0.78    Ca    8.1<L>      13 May 2023 07:11  Phos  2.4       Mg     1.7         TPro  7.4  /  Alb  3.6  /  TBili  1.0  /  DBili  x   /  AST  8<L>  /  ALT  10  /  AlkPhos  129<H>  12    PT/INR - ( 12 May 2023 13:28 )   PT: 18.2 sec;   INR: 1.58 ratio         PTT - ( 12 May 2023 13:28 )  PTT:30.0 sec  Urinalysis Basic - ( 12 May 2023 13:29 )    Color: Red / Appearance: Turbid / S.016 / pH: x  Gluc: x / Ketone: Negative  / Bili: Negative / Urobili: Negative   Blood: x / Protein: 300 mg/dL / Nitrite: Negative   Leuk Esterase: Large / RBC: >50 /hpf / WBC >50 /HPF   Sq Epi: x / Non Sq Epi: x / Bacteria: Many      CAPILLARY BLOOD GLUCOSE            Urinalysis Basic - ( 12 May 2023 13:29 )    Color: Red / Appearance: Turbid / S.016 / pH: x  Gluc: x / Ketone: Negative  / Bili: Negative / Urobili: Negative   Blood: x / Protein: 300 mg/dL / Nitrite: Negative   Leuk Esterase: Large / RBC: >50 /hpf / WBC >50 /HPF   Sq Epi: x / Non Sq Epi: x / Bacteria: Many      REVIEW OF SYSTEMS:  CONSTITUTIONAL: No fever, weight loss, or fatigue  EYES: No eye pain, visual disturbances, or discharge  ENMT:  No difficulty hearing, tinnitus, vertigo; No sinus or throat pain  NECK: No pain or stiffness  RESPIRATORY: No cough, wheezing, chills or hemoptysis; No shortness of breath  CARDIOVASCULAR: No chest pain, palpitations, dizziness, or leg swelling  GASTROINTESTINAL: No abdominal or epigastric pain. No nausea, vomiting, or hematemesis; No diarrhea or constipation. No melena or hematochezia.  GENITOURINARY: No dysuria, frequency, hematuria, or incontinence  NEUROLOGICAL: No headaches, memory loss,      Consultant(s) Notes Reviewed:  [x ] YES  [ ] NO    PHYSICAL EXAM:  GENERAL: Not in any distress ,  HEAD:  Atraumatic, Normocephalic  EYES: EOMI, PERRLA, conjunctiva and sclera clear  ENMT: No tonsillar erythema, exudates, or enlargement; Moist mucous membranes, Good dentition, No lesions  NECK: Supple, No JVD, Normal thyroid  NERVOUS SYSTEM:  Alert & Oriented X3,   CHEST/LUNG: Good air entry bilateral with no  rales, rhonchi, wheezing, or rubs  HEART: Regular rate and rhythm; No murmurs, rubs, or gallops  ABDOMEN: Soft, Nontender, Nondistended; Bowel sounds present  EXTREMITIES:  2+ Peripheral Pulses, No clubbing, cyanosis, or edema  SKIN: No rashes or lesions    Care Discussed with Consultants/Other Providers [ x] YES  [ ] NO
Subjective  Pt with chronic fowler changed at home 2 days ago - 18fr coude - was painful initially. There after with fevers and found with urosepsis. Has since had gross hematuria and poor draining with urinary retention  Urologist Dr. Louie  Objective    Vital signs  T(F): , Max: 103.6 (05-12-23 @ 13:00)  HR: 89 (05-13-23 @ 08:35)  BP: 111/68 (05-13-23 @ 08:35)  SpO2: 96% (05-13-23 @ 08:35)  Wt(kg): --    Output     05-12 @ 07:01  -  05-13 @ 07:00  --------------------------------------------------------  IN: 900 mL / OUT: 225 mL / NET: 675 mL    05-13 @ 07:01  -  05-13 @ 09:27  --------------------------------------------------------  IN: 0 mL / OUT: 600 mL / NET: -600 mL        Gen awake alert nad axox3  Abd flat ntnd   Back no cvat bl    + suprapubic tenderness and palp bladder   fowler in place iatrogenic hypospadias urine yellow     Labs                          10.9   19.15 )-----------( 166      ( 13 May 2023 07:15 )             34.0     05-13    139  |  103  |  18  ----------------------------<  100<H>  3.3<L>   |  21<L>  |  0.78    Ca    8.1<L>      13 May 2023 07:11  Phos  2.4     05-12  Mg     1.7     05-12    TPro  7.4  /  Alb  3.6  /  TBili  1.0  /  DBili  x   /  AST  8<L>  /  ALT  10  /  AlkPhos  129<H>  05-12        Urine Cx: sent and rec   Blood Cx: sent and rec     
Date of Service  : 05-15-23     INTERVAL HPI/OVERNIGHT EVENTS: I feel fine.   Vital Signs Last 24 Hrs  T(C): 36.6 (15 May 2023 12:59), Max: 37 (15 May 2023 01:08)  T(F): 97.9 (15 May 2023 12:59), Max: 98.6 (15 May 2023 01:08)  HR: 81 (15 May 2023 12:59) (70 - 85)  BP: 98/63 (15 May 2023 12:59) (97/57 - 117/72)  BP(mean): --  RR: 18 (15 May 2023 12:59) (18 - 18)  SpO2: 98% (15 May 2023 12:59) (95% - 99%)    Parameters below as of 15 May 2023 12:59  Patient On (Oxygen Delivery Method): room air      I&O's Summary    14 May 2023 07:01  -  15 May 2023 07:00  --------------------------------------------------------  IN: 0 mL / OUT: 1300 mL / NET: -1300 mL    15 May 2023 07:01  -  15 May 2023 19:02  --------------------------------------------------------  IN: 530 mL / OUT: 800 mL / NET: -270 mL      MEDICATIONS  (STANDING):  carbidopa/levodopa  25/100 1 Tablet(s) Oral three times a day  cefTRIAXone   IVPB      cefTRIAXone   IVPB 1000 milliGRAM(s) IV Intermittent every 24 hours  diazepam    Tablet 5 milliGRAM(s) Oral two times a day  enoxaparin Injectable 40 milliGRAM(s) SubCutaneous every 24 hours  sodium chloride 0.9%. 1000 milliLiter(s) (100 mL/Hr) IV Continuous <Continuous>    MEDICATIONS  (PRN):    LABS:                        10.5   9.25  )-----------( 167      ( 15 May 2023 07:18 )             33.1     05-15    138  |  100  |  9   ----------------------------<  94  3.3<L>   |  24  |  0.56    Ca    8.4      15 May 2023 07:16  Mg     1.8     05-14          CAPILLARY BLOOD GLUCOSE              REVIEW OF SYSTEMS:  CONSTITUTIONAL: No fever, weight loss, or fatigue  EYES: No eye pain, visual disturbances, or discharge  NECK: No pain or stiffness  RESPIRATORY: No cough, wheezing, chills or hemoptysis; No shortness of breath  CARDIOVASCULAR: No chest pain, palpitations, dizziness, or leg swelling  GASTROINTESTINAL: No abdominal or epigastric pain. No nausea, vomiting, or hematemesis; No diarrhea or constipation. No melena or hematochezia.  GENITOURINARY: No dysuria, frequency, hematuria, or incontinence      RADIOLOGY & ADDITIONAL TESTS:    Consultant(s) Notes Reviewed:  [x ] YES  [ ] NO    PHYSICAL EXAM:  GENERAL: NAD, well-groomed, well-developed,not in any distress ,  HEAD:  Atraumatic, Normocephalic  EYES: EOMI, PERRLA, conjunctiva and sclera clear  ENMT: No tonsillar erythema, exudates, or enlargement; Moist mucous membranes, Good dentition, No lesions  NECK: Supple, No JVD, Normal thyroid  NERVOUS SYSTEM:  Alert & Oriented X3, No focal deficit   CHEST/LUNG: Good air entry bilateral with no  rales, rhonchi, wheezing, or rubs  HEART: Regular rate and rhythm; No murmurs, rubs, or gallops  ABDOMEN: Soft, Nontender, Nondistended; Bowel sounds present , PEG   EXTREMITIES:  2+ Peripheral Pulses, No clubbing, cyanosis, or edema    Care Discussed with Consultants/Other Providers [ x] YES  [ ] NO
[FreeTextEntry1] : Referring: Dr. Chandler\par \par Mr. Kenney is a 76 YO M with a history of ACC/AHA Stage NICM (LV 6.2 cm, LVEF 25-30%) s/p ICD upgraded to CRT-D 9/2021 for chronic RV pacing, severe AI/MR s/p bioAVR/MVr 2014 history of VT/VT with ICD shocks and frequent PVC's s/p PVC ablation, pAF on eliquis, CKD IIIb (Cr 1.8), and aortic aneurysm presenting to HF clinic for further management. \par \par He was very active at baseline and used to run marathons. He was diagnosed with a cardiomyopathy in 2014 with LVEF ~45% in the setting of severe AI with moderate-severe MR and subsequently underwent bioAVR and MVr in 2014. He underwent a primary prevention ICD shortly after surgery. He did well for many years after surgery and LVEF historically been 40-45%. He developed syncope 9/2019 related to VT/VF which self resolved and he underwent PVC ablation this admission. He was admitted 7/2021 with ICD shock for which he was started on amiodarone. His EF declined from 45% to 30% to 15% in 9/2021 and was being chronically RV paced so underwent CRT-D upgrade. Due to persistent dyspnea he was referred to HF clinic 4/2022. There was concern for symptomatic severe MR and JIGAR pursued which revealed MR was only moderate.\par \par He presents today for HF followup. He notes occasional sensations of orthopnea. He can walk 5 blocks before having to stop due to dyspnea and overall feels slightly better. He notes occasional dyspnea during household activities. He denies chest pain at today's visit. Denies any lower extremity edema. Denies dizziness or LH. Weight is stable at home. \par 
[FreeTextEntry1] : Referring: Dr. Chandler\par \par Mr. Kenney is a 76 YO M with a history of ACC/AHA Stage NICM (LV 6.2 cm, LVEF 25-30%) s/p ICD upgraded to CRT-D 9/2021 for chronic RV pacing, severe AI/MR s/p bioAVR/MVr 2014 history of VT/VT with ICD shocks and frequent PVC's s/p PVC ablation, pAF on eliquis, CKD IIIb (Cr 1.8), and aortic aneurysm presenting to HF clinic for further management. \par \par He was very active at baseline and used to run marathons. He was diagnosed with a cardiomyopathy in 2014 with LVEF ~45% in the setting of severe AI with moderate-severe MR and subsequently underwent bioAVR and MVr in 2014. He underwent a primary prevention ICD shortly after surgery. He did well for many years after surgery and LVEF historically been 40-45%. He developed syncope 9/2019 related to VT/VF which self resolved and he underwent PVC ablation this admission. He was admitted 7/2021 with ICD shock for which he was started on amiodarone. His EF declined from 45% to 30% to 15% in 9/2021 and was being chronically RV paced so underwent CRT-D upgrade. Due to persistent dyspnea he was referred to HF clinic 4/2022. There was concern for symptomatic severe MR and JIGAR pursued which revealed MR was only moderate.\par \par He presents today for HF followup. He notes occasional sensations of orthopnea. He can walk 5 blocks before having to stop due to dyspnea and overall feels slightly better. He notes occasional dyspnea during household activities. He denies chest pain at today's visit. Denies any lower extremity edema. Denies dizziness or LH. Weight is stable at home. \par

## 2023-05-15 NOTE — DISCUSSION/SUMMARY
[Pacemaker Function Normal] : normal pacemaker function [FreeTextEntry1] : 77 year old male with HTN, HLD, aortic aneurysm thoracic, AVR and mitral valve repair in 2014,  paroxysmal atrial fibrillation, PVCs and ICD s/p recent PVC ablation, who had an appropriate ICD shock  now s/p upgrade to BiV ICD (EF from 40% to 15-20% with pacing dependance, now 25-30%), who presents for follow up.  \par \par # Chronic systolic heart failure\par Maintained on GDMT, recently uptitrated\par Pending insurance authorization for Barostim device\par  > 95% BiV pacing \par \par #Ventricular tachycardia\par No further episodes since BIV upgrade\par \par # Atrial Fibrillation\par Maintained on Amiodarone 200mg QD\par No recent TSH\par Recommend reduced to Amiodarone 100mg QD\par Repeat TSH when he presents to next PCP or general cardiology visit \par On eliquis 2.5mg BID due to CKD \par \par #Device interrogation \par Device interrogation reveals normal function.\par All measured data is within normal limits \par Lower rate changed to 70bpm and rate response programmed on \par \par He will follow up in 3 months or sooner if needed and knows to call with any questions or concerns. Repeat TSH prior to next visit.

## 2023-05-15 NOTE — ADDENDUM
[FreeTextEntry1] : I, Ida Sweeney, am scribing for and the presence of Dr. Jeff the following sections HISTORY OF PRESENT ILLNESSS, CARDIOLOGY SUMMARY, ACTIVE PROBLEMS, PAST MEDICAL/FAMILY/SOCIAL HISTORY; REVIEW OF SYSTEMS; VITAL SIGNS; PHYSICAL EXAM, DISCUSSION\par  IAndrey, personally performed the services described in the documentation, reviewed the documentation recorded by the scribe in my presence and it accurately and completely records my words and actions.\par

## 2023-05-15 NOTE — PROCEDURE
[No] : not [NSR] : normal sinus rhythm [ICD] : Implantable cardioverter-defibrillator [Threshold Testing Performed] : Threshold testing was performed [Sensing Amplitude ___mv] : sensing amplitude was [unfilled] mv [Lead Imp:  ___ohms] : lead impedance was [unfilled] ohms [___V @] : [unfilled] V [___ ms] : [unfilled] ms [None] : none [DDDR] : DDDR [de-identified] : very long AV delay [de-identified] : [place] [de-identified] : [place] [de-identified] : [id number] [de-identified] : 2021 [de-identified] :  [de-identified] : 4.5 years  [de-identified] :  \par AV delay 250   DDD 50\par changed to DDD 70 AV delay 250 [de-identified] : AP 92%\par  99%\par No events\par shock impedance 47\par See report in [facility]\par base rate 70bpm, turned on rate response

## 2023-05-15 NOTE — PHYSICAL EXAM
[General Appearance - Well Developed] : well developed [Normal Appearance] : normal appearance [Well Groomed] : well groomed [General Appearance - Well Nourished] : well nourished [No Deformities] : no deformities [General Appearance - In No Acute Distress] : no acute distress [] : no respiratory distress [Respiration, Rhythm And Depth] : normal respiratory rhythm and effort [Exaggerated Use Of Accessory Muscles For Inspiration] : no accessory muscle use [Auscultation Breath Sounds / Voice Sounds] : lungs were clear to auscultation bilaterally [Left Infraclavicular] : left infraclavicular area [Clean] : clean [Dry] : dry [Well-Healed] : well-healed [5th Left ICS - MCL] : palpated at the 5th LICS in the midclavicular line [Normal Rate] : normal [Rhythm Regular] : regular [Normal S1] : normal S1 [Normal S2] : normal S2 [___ +] : [unfilled]U+ pitting edema to the right ankle [Normal Conjunctiva] : the conjunctiva exhibited no abnormalities [Abnormal Walk] : normal gait [Skin Color & Pigmentation] : normal skin color and pigmentation [Oriented To Time, Place, And Person] : oriented to person, place, and time [Affect] : the affect was normal [Mood] : the mood was normal [No Anxiety] : not feeling anxious [Palpable Crepitus] : no palpable crepitus [Bleeding] : no active bleeding [Foul Odor] : no foul smell [Purulent Drainage] : no purulent drainage [Serosanguineous Drainage] : no serosanquineous drainage [Serous Drainage] : no serous drainage [Erythema] : not erythematous [Warm] : not warm [Tender] : not tender [Indurated] : not indurated [Fluctuant] : not fluctuant

## 2023-05-15 NOTE — HISTORY OF PRESENT ILLNESS
[Palpitations] : no palpitations [SOB] : no dyspnea [Syncope] : no syncope [Dizziness] : no dizziness [Chest Pain] : no chest pain or discomfort [Shoulder Pain] : no shoulder pain [Pain at Site] : no pain at device site [Erythema at Site] : no erythema at device site [Swelling at Site] : no swelling at device site [FreeTextEntry1] : 77 year old male with HTN, HLD, aortic aneurysm thoracic, AVR and mitral valve repair in 2014,  paroxysmal atrial fibrillation, PVCs and ICD s/p recent PVC ablation, who had an appropriate ICD shock  now s/p upgrade to BiV ICD (EF from 40% to 15-20% with pacing dependance), who presents for follow up.\par \par He states that he had a ICD placed after his AVR.  He normally followed up with Dr. Palencia; whose office moved and he transferred care here.   He is on Sotalol for history of PVCs and  NSVT; however he had short bursts of VT/torsades and it was felt the sotalol was proarrhythmic and stopped.  He was placed on Metoprolol.  He had 7101 PVCs on holter monitor with short bursts of NSVT.  \par \par He was admitted to [facility] 9/2019 with syncope correlated with VT and Vfib.  No therapy needed as he spontaneously converted.  One episode of afib, but overall burden extremely low.  Cath with mildly obstructive CAD.  He also underwent an EP study with PVC ablation from great cardiac vein.  HE was discharged on Mexiletine.  \par  \par 7/2021 he was admitted to an OSH with ICD shock.  He was discharged on amiodarone load which he self discontinued.   EF was down and he underwent a BiV upgrade.  \par \par He presents for routine follow up and has no complaints. He denies any SOB, edema, palpitations, syncope, near syncope.  He is compliant with all of his medications including eliquis and amiodarone. Reports he has to pause after 2-3 blocks before stopping with shortness of breath. \par \par Following up with GI for epigastric pain, being referred to pain management. Dr. Sheppard recently increased entresto and metoprolol. He reports today \par \par He has tolerated escalation of HF medical therapy but has remained with NYHA III symptoms. Barostim was recommended but not covered by insurance.\par \par JIGAR 12/2022: severe LV dysfunction, posterior MVL tethering with moderate MR and mean gradient 2 mmHg, well functioning bioAVR, mild-mod TR, moderate PI\par \par TTE 11/2022: LV 6.7 cm, LVEF 20-25%, mild concentric LVH, bioAVR with minimal AI, mild RV dysfunction, mod-severe valvular MR (posterior leaflet appears frozen) and mean gradient 8 mmHg, moderate TR, at least moderate PI, estimated PASP 53 mmHg, dilated aorta 4.6 cm at sinus of Valsalva\par Echo 5/9/2022 EF 25-30%\par Echo 8/2021 EF 25-30%\par  Echo 1/2021 EF 35-40%\par Echo 11/2019 EF 55-60%

## 2023-05-15 NOTE — REVIEW OF SYSTEMS
[Feeling Fatigued] : feeling fatigued [SOB] : shortness of breath [Dyspnea on exertion] : dyspnea during exertion [Negative] : Musculoskeletal [Fever] : no fever [Weight Gain (___ Lbs)] : no recent weight gain [Chills] : no chills [Weight Loss (___ Lbs)] : no recent weight loss [Chest Discomfort] : no chest discomfort [Lower Ext Edema] : no extremity edema [Palpitations] : no palpitations [Syncope] : no syncope [Cough] : no cough

## 2023-05-17 ENCOUNTER — APPOINTMENT (OUTPATIENT)
Dept: INTERNAL MEDICINE | Facility: CLINIC | Age: 78
End: 2023-05-17
Payer: MEDICARE

## 2023-05-17 ENCOUNTER — NON-APPOINTMENT (OUTPATIENT)
Age: 78
End: 2023-05-17

## 2023-05-17 VITALS
DIASTOLIC BLOOD PRESSURE: 85 MMHG | OXYGEN SATURATION: 97 % | HEIGHT: 68 IN | WEIGHT: 184 LBS | TEMPERATURE: 97.9 F | HEART RATE: 76 BPM | SYSTOLIC BLOOD PRESSURE: 125 MMHG | BODY MASS INDEX: 27.89 KG/M2

## 2023-05-17 DIAGNOSIS — K62.89 OTHER SPECIFIED DISEASES OF ANUS AND RECTUM: ICD-10-CM

## 2023-05-17 DIAGNOSIS — Z87.898 PERSONAL HISTORY OF OTHER SPECIFIED CONDITIONS: ICD-10-CM

## 2023-05-17 DIAGNOSIS — L85.3 XEROSIS CUTIS: ICD-10-CM

## 2023-05-17 PROCEDURE — 99214 OFFICE O/P EST MOD 30 MIN: CPT

## 2023-05-18 ENCOUNTER — APPOINTMENT (OUTPATIENT)
Dept: CT IMAGING | Facility: HOSPITAL | Age: 78
End: 2023-05-18

## 2023-05-18 ENCOUNTER — OUTPATIENT (OUTPATIENT)
Dept: OUTPATIENT SERVICES | Facility: HOSPITAL | Age: 78
LOS: 1 days | End: 2023-05-18
Payer: MEDICARE

## 2023-05-18 DIAGNOSIS — Z98.89 OTHER SPECIFIED POSTPROCEDURAL STATES: Chronic | ICD-10-CM

## 2023-05-18 PROCEDURE — 71250 CT THORAX DX C-: CPT

## 2023-05-18 PROCEDURE — 70450 CT HEAD/BRAIN W/O DYE: CPT

## 2023-05-18 PROCEDURE — 71250 CT THORAX DX C-: CPT | Mod: 26,MH

## 2023-05-18 PROCEDURE — 70450 CT HEAD/BRAIN W/O DYE: CPT | Mod: 26,MH

## 2023-05-18 NOTE — HEALTH RISK ASSESSMENT
[Never] : Never [1] : 2) Feeling down, depressed, or hopeless for several days (1) [PHQ-2 Negative - No further assessment needed] : PHQ-2 Negative - No further assessment needed [1/2 of Days or More (2)] : 3.) Trouble falling asleep, or sleeping too much? Half the days or more [Several Days (1)] : 4.) Feeling tired or having little energy? Several days [Not at All (0)] : 8.) Moving or speaking so slowly that other people could have noticed, or the opposite, moving or speaking faster than usual? Not at all [Mild] : severity of depression is mild [Somewhat Difficult] : How difficult have these problems made it for you to do your work, take care of things at home, or get along with people? Somewhat difficult [PHQ-9 Positive] : PHQ-9 Positive [I have developed a follow-up plan documented below in the note.] : I have developed a follow-up plan documented below in the note. [[id number]] : 2 [[id number]] : 5

## 2023-05-18 NOTE — ASSESSMENT
[FreeTextEntry1] : Will start Cymbalta at a low dose which will hopefully help with mood, sleep and pain. Will have him f/u in 1 month.\par I do not believe he needs a head CT but daughter is insistent so will order given on [facility]AC. No evidence of concussion or bleed.\par

## 2023-05-18 NOTE — REVIEW OF SYSTEMS
[Chest Pain] : chest pain [Shortness Of Breath] : shortness of breath [Wheezing] : no wheezing [Cough] : no cough [Dyspnea on Exertion] : not dyspnea on exertion [Suicidal] : not suicidal [Insomnia] : insomnia [Depression] : depression [Negative] : Heme/Lymph

## 2023-05-18 NOTE — HISTORY OF PRESENT ILLNESS
[de-identified] : 77M with PMHx H.Pylori tx with quad therapy, afib AF/VT now s/p PPM (9/19), CKD, s/p AV and MV repairs, HTN, compensated diastolic chronic HF, prediabetes, hx as a marathon runner c/o continued substernal tight chest pain, constant, non radiating since his AVR in 2014. No medication improves his sternal pain. Has had multiple ER visits as well as visits to GI and cardioligist. Pain is along the line of patients scar. Pain is worse with inhalation and lying down. No alleviating factors. \par Had EGD in 2021:severe gastritis, duodenum normal, multiple sessile polyps. path: hyperplastic and chronic gastritis. \par Ct scan from 1/2022 of upper abdomen WNL. \par \par Denies fever, chill, nausea, vomiting, wt loss, poor appetite, hematochezia, melena. \par \par Dr Cortez reached out to me earlier this month about a pain management referral as no identifiable cause for pain found.\par \par Earlier this week, Patients daughter called office to schedule pt an appointment with PCP after a head injury this weekend. Nurse Caron spoke wt pt who stated he fell out of bed and hit both arms along with head. Pt did not go to UC/ER after injury and declined to go today even after she advised him to go to ER to f/u on head injury. Pt stated he preferred to see PCP. In talking to nurse, he mentioned he had been feeling depressed about his history of pain in chest.  Pt denies thoughts of hurting self and suicidal ideation. Pt declines psychiatry or therapist. Pt was advised to go to ER for any thoughts of self-harm and suicidal ideations. \par  \par Currently without HA although this is the second time he's fallen "out of bed". Hit back of head. No n/v/vision changes/confusion.\par

## 2023-05-31 ENCOUNTER — EMERGENCY (EMERGENCY)
Facility: HOSPITAL | Age: 78
LOS: 1 days | Discharge: HOME | End: 2023-05-31
Attending: EMERGENCY MEDICINE | Admitting: EMERGENCY MEDICINE
Payer: MEDICARE

## 2023-05-31 VITALS
HEART RATE: 72 BPM | TEMPERATURE: 98 F | RESPIRATION RATE: 18 BRPM | DIASTOLIC BLOOD PRESSURE: 83 MMHG | OXYGEN SATURATION: 98 % | SYSTOLIC BLOOD PRESSURE: 122 MMHG

## 2023-05-31 VITALS
TEMPERATURE: 98 F | DIASTOLIC BLOOD PRESSURE: 74 MMHG | HEART RATE: 73 BPM | RESPIRATION RATE: 20 BRPM | WEIGHT: 184.09 LBS | HEIGHT: 71 IN | SYSTOLIC BLOOD PRESSURE: 111 MMHG | OXYGEN SATURATION: 97 %

## 2023-05-31 DIAGNOSIS — I12.9 HYPERTENSIVE CHRONIC KIDNEY DISEASE WITH STAGE 1 THROUGH STAGE 4 CHRONIC KIDNEY DISEASE, OR UNSPECIFIED CHRONIC KIDNEY DISEASE: ICD-10-CM

## 2023-05-31 DIAGNOSIS — Z95.0 PRESENCE OF CARDIAC PACEMAKER: ICD-10-CM

## 2023-05-31 DIAGNOSIS — K60.2 ANAL FISSURE, UNSPECIFIED: ICD-10-CM

## 2023-05-31 DIAGNOSIS — Z95.4 PRESENCE OF OTHER HEART-VALVE REPLACEMENT: ICD-10-CM

## 2023-05-31 DIAGNOSIS — I48.91 UNSPECIFIED ATRIAL FIBRILLATION: ICD-10-CM

## 2023-05-31 DIAGNOSIS — Z86.19 PERSONAL HISTORY OF OTHER INFECTIOUS AND PARASITIC DISEASES: ICD-10-CM

## 2023-05-31 DIAGNOSIS — N18.9 CHRONIC KIDNEY DISEASE, UNSPECIFIED: ICD-10-CM

## 2023-05-31 DIAGNOSIS — Z79.84 LONG TERM (CURRENT) USE OF ORAL HYPOGLYCEMIC DRUGS: ICD-10-CM

## 2023-05-31 DIAGNOSIS — Z79.01 LONG TERM (CURRENT) USE OF ANTICOAGULANTS: ICD-10-CM

## 2023-05-31 DIAGNOSIS — Z98.89 OTHER SPECIFIED POSTPROCEDURAL STATES: Chronic | ICD-10-CM

## 2023-05-31 DIAGNOSIS — K92.1 MELENA: ICD-10-CM

## 2023-05-31 DIAGNOSIS — Z87.19 PERSONAL HISTORY OF OTHER DISEASES OF THE DIGESTIVE SYSTEM: ICD-10-CM

## 2023-05-31 LAB
ALBUMIN SERPL ELPH-MCNC: 4.2 G/DL — SIGNIFICANT CHANGE UP (ref 3.3–5)
ALP SERPL-CCNC: 60 U/L — SIGNIFICANT CHANGE UP (ref 40–120)
ALT FLD-CCNC: 10 U/L — SIGNIFICANT CHANGE UP (ref 10–45)
ANION GAP SERPL CALC-SCNC: 9 MMOL/L — SIGNIFICANT CHANGE UP (ref 5–17)
APTT BLD: 36.6 SEC — HIGH (ref 27.5–35.5)
AST SERPL-CCNC: 18 U/L — SIGNIFICANT CHANGE UP (ref 10–40)
BASOPHILS # BLD AUTO: 0.01 K/UL — SIGNIFICANT CHANGE UP (ref 0–0.2)
BASOPHILS NFR BLD AUTO: 0.2 % — SIGNIFICANT CHANGE UP (ref 0–2)
BILIRUB SERPL-MCNC: 0.5 MG/DL — SIGNIFICANT CHANGE UP (ref 0.2–1.2)
BLD GP AB SCN SERPL QL: NEGATIVE — SIGNIFICANT CHANGE UP
BUN SERPL-MCNC: 29 MG/DL — HIGH (ref 7–23)
CALCIUM SERPL-MCNC: 9 MG/DL — SIGNIFICANT CHANGE UP (ref 8.4–10.5)
CHLORIDE SERPL-SCNC: 106 MMOL/L — SIGNIFICANT CHANGE UP (ref 96–108)
CO2 SERPL-SCNC: 29 MMOL/L — SIGNIFICANT CHANGE UP (ref 22–31)
CREAT SERPL-MCNC: 1.88 MG/DL — HIGH (ref 0.5–1.3)
EGFR: 36 ML/MIN/1.73M2 — LOW
EOSINOPHIL # BLD AUTO: 0.02 K/UL — SIGNIFICANT CHANGE UP (ref 0–0.5)
EOSINOPHIL NFR BLD AUTO: 0.4 % — SIGNIFICANT CHANGE UP (ref 0–6)
GLUCOSE SERPL-MCNC: 113 MG/DL — HIGH (ref 70–99)
HCT VFR BLD CALC: 47.3 % — SIGNIFICANT CHANGE UP (ref 39–50)
HGB BLD-MCNC: 14.9 G/DL — SIGNIFICANT CHANGE UP (ref 13–17)
IMM GRANULOCYTES NFR BLD AUTO: 0.2 % — SIGNIFICANT CHANGE UP (ref 0–0.9)
INR BLD: 1.07 — SIGNIFICANT CHANGE UP (ref 0.88–1.16)
LIDOCAIN IGE QN: 29 U/L — SIGNIFICANT CHANGE UP (ref 7–60)
LYMPHOCYTES # BLD AUTO: 1.81 K/UL — SIGNIFICANT CHANGE UP (ref 1–3.3)
LYMPHOCYTES # BLD AUTO: 40 % — SIGNIFICANT CHANGE UP (ref 13–44)
MCHC RBC-ENTMCNC: 31.5 GM/DL — LOW (ref 32–36)
MCHC RBC-ENTMCNC: 32.7 PG — SIGNIFICANT CHANGE UP (ref 27–34)
MCV RBC AUTO: 103.7 FL — HIGH (ref 80–100)
MONOCYTES # BLD AUTO: 0.48 K/UL — SIGNIFICANT CHANGE UP (ref 0–0.9)
MONOCYTES NFR BLD AUTO: 10.6 % — SIGNIFICANT CHANGE UP (ref 2–14)
NEUTROPHILS # BLD AUTO: 2.2 K/UL — SIGNIFICANT CHANGE UP (ref 1.8–7.4)
NEUTROPHILS NFR BLD AUTO: 48.6 % — SIGNIFICANT CHANGE UP (ref 43–77)
NRBC # BLD: 0 /100 WBCS — SIGNIFICANT CHANGE UP (ref 0–0)
PLATELET # BLD AUTO: 213 K/UL — SIGNIFICANT CHANGE UP (ref 150–400)
POTASSIUM SERPL-MCNC: 4.5 MMOL/L — SIGNIFICANT CHANGE UP (ref 3.5–5.3)
POTASSIUM SERPL-SCNC: 4.5 MMOL/L — SIGNIFICANT CHANGE UP (ref 3.5–5.3)
PROT SERPL-MCNC: 8 G/DL — SIGNIFICANT CHANGE UP (ref 6–8.3)
PROTHROM AB SERPL-ACNC: 12.7 SEC — SIGNIFICANT CHANGE UP (ref 10.5–13.4)
RBC # BLD: 4.56 M/UL — SIGNIFICANT CHANGE UP (ref 4.2–5.8)
RBC # FLD: 14.5 % — SIGNIFICANT CHANGE UP (ref 10.3–14.5)
RH IG SCN BLD-IMP: POSITIVE — SIGNIFICANT CHANGE UP
SODIUM SERPL-SCNC: 144 MMOL/L — SIGNIFICANT CHANGE UP (ref 135–145)
WBC # BLD: 4.53 K/UL — SIGNIFICANT CHANGE UP (ref 3.8–10.5)
WBC # FLD AUTO: 4.53 K/UL — SIGNIFICANT CHANGE UP (ref 3.8–10.5)

## 2023-05-31 PROCEDURE — 85025 COMPLETE CBC W/AUTO DIFF WBC: CPT

## 2023-05-31 PROCEDURE — 86900 BLOOD TYPING SEROLOGIC ABO: CPT

## 2023-05-31 PROCEDURE — 85610 PROTHROMBIN TIME: CPT

## 2023-05-31 PROCEDURE — 99284 EMERGENCY DEPT VISIT MOD MDM: CPT

## 2023-05-31 PROCEDURE — 99283 EMERGENCY DEPT VISIT LOW MDM: CPT | Mod: 25

## 2023-05-31 PROCEDURE — 36415 COLL VENOUS BLD VENIPUNCTURE: CPT

## 2023-05-31 PROCEDURE — 85730 THROMBOPLASTIN TIME PARTIAL: CPT

## 2023-05-31 PROCEDURE — 86850 RBC ANTIBODY SCREEN: CPT

## 2023-05-31 PROCEDURE — 80053 COMPREHEN METABOLIC PANEL: CPT

## 2023-05-31 PROCEDURE — 83690 ASSAY OF LIPASE: CPT

## 2023-05-31 PROCEDURE — 86901 BLOOD TYPING SEROLOGIC RH(D): CPT

## 2023-05-31 NOTE — ED ADULT NURSE NOTE - NSFALLHARMRISKINTERV_ED_ALL_ED

## 2023-05-31 NOTE — ED PROVIDER NOTE - OBJECTIVE STATEMENT
77M with PMHx H.Pylori tx with quad therapy, afib AF/VT now s/p PPM (9/19), CKD, s/p AV and MV repairs, HTN, compensated p/w bloody bm x 2  5 am- had small bm w bright red blood- then around 11 am had second movement of bright red blood  past gi hx of hemorrhoids - on eliquis for afib  no sob/dizziness/lightheadedness/weakness

## 2023-05-31 NOTE — ED ADULT NURSE NOTE - OBJECTIVE STATEMENT
78 yo M PMHx HF, HTN, HLD, CAD, afib on eliquis presents to ED co bloody stools x2. Pt awake and alert, AOx4. Pt reports noticing a lot of blood around 5am this morning while trying to have a bowel movement. Pt reports small bowel movement. PT reports having another episode of bloody stool later in the morning. Pt reports the blood was "red" and there was a lot. Unable to differentiate between bright red or dark red. Pt reports some epigastric pain that he endorses is not new. Pt states, "I have this bump on my rectum." Pt presents with bump and tear on rectum. Pt denies eating or drinking today. Pt denies headache, dizziness, lightheadedness, N/V/D, vision changes, f/c, SOB.

## 2023-05-31 NOTE — ED PROVIDER NOTE - PATIENT PORTAL LINK FT
You can access the FollowMyHealth Patient Portal offered by [facility] by registering at the following website: [email or web address]. By joining [facility]’s FollowMyHealth portal, you will also be able to view your health information using other applications (apps) compatible with our system.

## 2023-05-31 NOTE — ED ADULT TRIAGE NOTE - CHIEF COMPLAINT QUOTE
Pt presents to ED c/o bloody stools today. Pt states " I went to the bathroom twice and there's blood". Pt also reports mild abd pain. Denies cp, sob, fever, chills, n/v/d, headache, tingling or numbness. Pt has hx oh htn, CAD and HF. Pt on eliquis for clots.

## 2023-05-31 NOTE — PHYSICAL EXAM
Cardiovascular Consult Note     TODAY'S DATE: 5/31/2023    Patient name: Kathryn Barbour   YOB: 1934  Date of admission:  5/30/2023       Patient seen, examined. Previous clinical entries reviewed. All available laboratory, imaging and ancillary data reviewed. Reason for Consult: Pacemaker placement  Referring Physician: Dr. Yonatan Bryan MD    History of present Illness:     Kathryn Barbour is a 78 y.o. female with past medical history significant for aortic valve replacement and paroxysmal atrial fibrillation who was found to be bradycardic and was referred to [facility] for further management. She was noted to be in complete heart block with heart rate in the 20s and 30s initially on admission. She was completely asymptomatic at this time. Overnight, she was noted to be in second-degree AV block type 2. Of note she was on carvedilol 25 mg twice daily prior to admission. This was discontinued. There was no significant change noted with heart rates in to the 20s and 30s overnight. Her blood pressure had been stable. She was referred to have a permanent pacemaker placed. She denies any acute chest pain, shortness of breath, palpitations, dizziness or lightheadedness      Past Medical History:    has a past medical history of GERD (gastroesophageal reflux disease), H/O aortic valve replacement, Hyperlipidemia, and Hypertension.     Surgical History:     Past Surgical History:   Procedure Laterality Date    CARDIAC SURGERY      aortic valve replacement 7/26/18    HYSTERECTOMY (CERVIX STATUS UNKNOWN)      JOINT REPLACEMENT      rt hip    TONSILLECTOMY         Medications:   Scheduled Meds:   aspirin  81 mg Oral Daily    atorvastatin  20 mg Oral Daily    cetirizine  5 mg Oral Daily    [Held by provider] losartan  100 mg Oral Daily    [Held by provider] spironolactone  50 mg Oral Daily    sodium chloride flush  5-40 mL IntraVENous 2 times per day    enoxaparin  30 mg Pulmonary Medicine and Critical Care Consult    Patient - Guerline Jones   MRN -  [id number]   Acct # - [de-identified]   - 3/4/1934      Date of Admission -  2023 11:25 AM  Date of evaluation -  2023  Room - -01   [id number] Blood, DO Primary Care Physician - Lindsay Villegas MD     Reason for Consult      ICU management / heart block   Assessment   Third degree heart block/history of AVR  Dyspnea/ weakness due to above  Remote history of smoking  GERD / HTN /HLD     Recommendations   Incentive spirometry every hour awake  Monitor blood pressure and heart rate; dopamine drip if blood pressure decreases  Off Beta-blockers/off Coreg  Cardiology on consult for possible pacemaker if no improvement   Address CODE STATUS  Discussed with RN  DVT prophylaxis     Problem List      Patient Active Problem List   Diagnosis    Hypertension    Hyperlipidemia    Complete heart block ([address]       HPI     Guerline Jones is 78 y.o.,  female, previous medical history hypertension hyperlipidemia gastroesophageal reflux disease history of aortic valve replacement. Patient presented medicine for generalized weakness poor appetite dyspnea relation. She was directed to come to emergency room by PCP after she had EKG that showed complete heart block. She was hemodynamically stable. I was contacted by the emergency room physician and discussed case. She is admitted to ICU. She denies chest pain.   She has no shortness of breath at rest.  She has a remote history of smoking  PMHx   Past Medical History      Diagnosis Date    GERD (gastroesophageal reflux disease)     H/O aortic valve replacement 2018    Dr. Lucie Dukes    Hyperlipidemia     Hypertension       Past Surgical History        Procedure Laterality Date    CARDIAC SURGERY      aortic valve replacement 18    HYSTERECTOMY (CERVIX STATUS UNKNOWN)      JOINT REPLACEMENT      rt hip    TONSILLECTOMY and carvedilol with mild hyperkalemia. Hemodynamically stable. Will hold carvedilol. Will monitor overnight. If continues to be in CHB tomorrow, will proceed with   S/p AVR  not in fluid overload. Will check echo  HTN  well controlled  will monitor. [General Appearance - Well Developed] : well developed [Normal Appearance] : normal appearance [Well Groomed] : well groomed [General Appearance - Well Nourished] : well nourished [No Deformities] : no deformities [General Appearance - In No Acute Distress] : no acute distress [Normal Conjunctiva] : the conjunctiva exhibited no abnormalities [Eyelids - No Xanthelasma] : the eyelids demonstrated no xanthelasmas [Normal Oral Mucosa] : normal oral mucosa [No Oral Pallor] : no oral pallor [No Oral Cyanosis] : no oral cyanosis [Respiration, Rhythm And Depth] : normal respiratory rhythm and effort [Exaggerated Use Of Accessory Muscles For Inspiration] : no accessory muscle use [Auscultation Breath Sounds / Voice Sounds] : lungs were clear to auscultation bilaterally [Heart Rate And Rhythm] : heart rate and rhythm were normal [Heart Sounds] : normal S1 and S2 [Arterial Pulses Normal] : the arterial pulses were normal [Edema] : no peripheral edema present [Abdomen Soft] : soft [Abdomen Tenderness] : non-tender [Abdomen Mass (___ Cm)] : no abdominal mass palpated [Abnormal Walk] : normal gait [Gait - Sufficient For Exercise Testing] : the gait was sufficient for exercise testing [Nail Clubbing] : no clubbing of the fingernails [Cyanosis, Localized] : no localized cyanosis [Petechial Hemorrhages (___cm)] : no petechial hemorrhages [Skin Color & Pigmentation] : normal skin color and pigmentation [] : no rash [No Venous Stasis] : no venous stasis [Skin Lesions] : no skin lesions [No Skin Ulcers] : no skin ulcer [No Xanthoma] : no  xanthoma was observed [Oriented To Time, Place, And Person] : oriented to person, place, and time [Affect] : the affect was normal [Mood] : the mood was normal [No Anxiety] : not feeling anxious [FreeTextEntry1] : +3/6 systolic murmur

## 2023-05-31 NOTE — ED PROVIDER NOTE - CLINICAL SUMMARY MEDICAL DECISION MAKING FREE TEXT BOX
anal fissure- ho straining- check labs, orthostatics negative anal fissure- ho straining- check labs, orthostatics negative  d/w px's cards- will fu with him tomorrow- will start on milk of magnesium

## 2023-05-31 NOTE — ED PROVIDER NOTE - NSFOLLOWUPINSTRUCTIONS_ED_ALL_ED_FT
Anal Fissure, Adult  Body outline showing some organs of the digestive system with a close-up of an anal fissure.  An anal fissure is a small tear or crack in the tissue of the anus. Bleeding from a fissure usually stops on its own within a few minutes. However, bleeding will often occur again with each bowel movement until the fissure heals.    What are the causes?  This condition is usually caused by passing a large or hard stool (feces). Other causes include:  Constipation.  Frequent diarrhea.  Inflammatory bowel disease (Crohn's disease or ulcerative colitis).  Childbirth.  Infections.  Anal sex.  What are the signs or symptoms?  Symptoms of this condition include:  Bleeding from the rectum.  Small amounts of blood seen on your stool, on the toilet paper, or in the toilet after a bowel movement. The blood coats the outside of the stool and is not mixed with the stool.  Painful bowel movements.  Itching or irritation around the anus.  How is this diagnosed?  A health care provider may diagnose this condition by closely examining the anal area. An anal fissure can usually be seen with careful inspection. In some cases, a rectal exam may be performed, or a short tube (anoscope) may be used to examine the anal canal.    How is this treated?  Initial treatment for this condition may include:  Taking steps to avoid constipation. This may include making changes to your diet, such as increasing your intake of fiber or fluid.  Taking fiber supplements. These supplements can soften your stool to help make bowel movements easier. Your health care provider may also prescribe a stool softener if your stool is hard.  Taking sitz baths. This may help to heal the tear.  Using medicated creams or ointments. These may be prescribed to lessen discomfort.  Treatments that are sometimes used if initial treatments do not work well or if the condition is more severe may include:  Botulinum injection.  Surgery to repair the fissure.  Follow these instructions at home:  Eating and drinking    Fruits, vegetables, greens, seeds, and grain.  Avoid foods that may cause constipation, such as bananas, milk, and other dairy products.  Eat all fruits, except bananas.  Drink enough fluid to keep your urine pale yellow.  Eat foods that are high in fiber, such as beans, whole grains, and fresh fruits and vegetables.  General instructions    A bathtub partially filled with water.  Take over-the-counter and prescription medicines only as told by your health care provider.  Use creams or ointments only as told by your health care provider.  Keep the anal area clean and dry.  Take sitz baths as told by your health care provider. Do not use soap in the sitz baths.  Keep all follow-up visits as told by your health care provider. This is important.  Contact a health care provider if you have:  More bleeding.  A fever.  Diarrhea that is mixed with blood.  Take Milk of Magnesia daily and DO NOT Strain!!    Pain that continues.  Ongoing problems that are getting worse rather than better.  Summary  An anal fissure is a small tear or crack in the tissue of the anus. This condition is usually caused by passing a large or hard stool (feces). Other causes include constipation and frequent diarrhea.  Initial treatment for this condition may include taking steps to avoid constipation, such as increasing your intake of fiber or fluid.  Follow instructions for care as told by your health care provider.  Contact your health care provider if you have more bleeding or your problem is getting worse rather than better.  Keep all follow-up visits as told by your health care provider. This is important.  This information is not intended to replace advice given to you by your health care provider. Make sure you discuss any questions you have with your health care provider.    Document Revised: 07/14/2022 Document Reviewed: 07/14/2022

## 2023-06-01 ENCOUNTER — APPOINTMENT (OUTPATIENT)
Dept: HEART AND VASCULAR | Facility: CLINIC | Age: 78
End: 2023-06-01
Payer: COMMERCIAL

## 2023-06-01 VITALS
HEART RATE: 68 BPM | OXYGEN SATURATION: 99 % | BODY MASS INDEX: 27.9 KG/M2 | TEMPERATURE: 98.1 F | DIASTOLIC BLOOD PRESSURE: 80 MMHG | HEIGHT: 68 IN | SYSTOLIC BLOOD PRESSURE: 121 MMHG | WEIGHT: 184.06 LBS

## 2023-06-01 PROCEDURE — 99214 OFFICE O/P EST MOD 30 MIN: CPT

## 2023-06-01 NOTE — REASON FOR VISIT
[FreeTextEntry1] : 77 -year-old male with history of high cholesterol, aortic aneurysm thoracic, hypertension, lumbago, pre-diabetes, Valve disease (status post AVR, MVr epair Dr. Caio Louise 9/9/2014). S/P  colonoscopy with Dr. Maximilian Cortez 9/14/17. Doing well overall, Denies CP, SOB, palpitations, orthopnea, LE swelling, dizziness, syncope. Walking and running daily, sometimes >10 miles, training for [place] marathon in 2018 after skipping 2017. \par \par s/p dental extraction and implant. Reports during dental cleaning he was told he bled a lot and dentist would prefer he stop aspirin for future dental extraction and implant. 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 LAHB, ST-Tw abnormalities. Blocked APC, V paced x 2 beats Pacer set at 40 1/22/19\par EKG: A Pacing, PVCs, 1st degree AVB, with a LAHB, possible IWMI, QTc 429 ST-Twave abnormalities.  10/10/19\par \par 3/18/19 A Fib discovered by Dr Jeff on 3/13/19, ASA changed to Eliquis\par 5/6/19 Pt with atypical pain in the axilla on the left, he thinks its the Eliquis.  No swelling or ecchymosis reported\par 7/15/19 K 5.5, Telmisartin reduced to 20mg. BP excellent.  c/o severe right shoulder pain.  Previously injected with relief.\par 9/23/19 Adm to  [facility]) with syncope and NSVT.  Trans to [facility].  VT ablation and Mexitil started.\par 10/10/19  Pt c/o chest  pressure, when questioned its sub Xiphoid.  Eating makes it better\par \par 11/14/19  New Deep Tw inversions, ? post pacing.  CCTA Nov 1 clean. Recently had a lot of "stomach" pains.  I suspect he has PUD, Trop sent, echo ordered\par 1/24/20 Here with several weeks of a cold/URI, saw an MD and given Z westley.  Here with wheezing, reduced exercise tolerance. + Wheezing, SOB, mild cough, non-productive, no fever or chills.\par Also here for clearance for cataract\par 10/15/20 Fullness in subxiphoid area.  (-) CTA Nov 2020.  Also has  off statin.\par 1/15/21 ARB DCed due to elevated Cr and K of 5.9, not taking Crestor due to nausea, knee pain, back pain.  More MONTEJO noted, will need to reassess Ao V\par 2/24/21 More MONTEJO again, getting worse, epigastric discomfort, worse if he hits a pot hole., or walking hard.  ?pleuritic component.  Feb 12-16 pt reports wt went up, not sleeping well.\par 4/5/21 In [facility] 4/16 to 4/19/21 with chest pain, + Nuc(fixed inferior and apical defects).  EF 23 %, Echo EF 30 %, mild to moderate MR. but (-) cath.  Still gets epigastric pain with hitting potholes.  + MONTEJO.  PAP 40 mm.  Feels better after HCTZ.\par 5/10/21 Had Shingles.  Had both Covid vaccines.  Having endoscopy 5/20/21\par 7/7/21 Dx with severe gastritis and H pylori.  Admitted to [facility] yesterday with AICD DC, 2/T VF. K 4.5, .   Amiodarone 400 mg BID started.\par 9/8/21 DC from [facility] 9/4/21 AFTER ADMISSION FOR ATRIAL TACHYCARDIA  seen by Dr Jeff. Pt had an upgrade to a BiV pacer.  Echo showed EF 25-30%.  I was called yesterday from the [facility] ER that he was there SOB and in mild HF.  Torsemide was increased to daily.\par \par 10/8/21  In [facility] ER 9/28/21 with SOB and abdominal tightness.  Cr was down to 1.32, BNP was 4237, up slightly.  We increased diuretic and sent pt home.  He feels dramatically better.  He is back on Torsemide 10mg daily because it causes dry mouth and he is reluctant to take more..\par \par 12/9/21 Abd tightness much better but not fully resolved.\par \par 3/10/22 Having different pain syndromes, has been to ER several times,   Still with SOB/MONTEJO and fatigue\par 9/21/22  Echo May 2022, EF 25%, mod to severe MR.  Needs f/u with Dr Melvin and Juan Antonio.  Pt doing well on current meds.\par 1/23/23 Epigastric pain has returned, ongoing MONTEJO.\par 6/1/23 In ER yesterday with rectal bleeding, had a rectal fissure.  Has right sciatica and tingling in left arm with arm extension.\par \par \par EKG:  AV PAced 9/8/21\par \par \par

## 2023-06-01 NOTE — PHYSICAL EXAM
[General Appearance - Well Developed] : well developed [Normal Appearance] : normal appearance [Well Groomed] : well groomed [General Appearance - Well Nourished] : well nourished [No Deformities] : no deformities [General Appearance - In No Acute Distress] : no acute distress [Normal Conjunctiva] : the conjunctiva exhibited no abnormalities [Eyelids - No Xanthelasma] : the eyelids demonstrated no xanthelasmas [Normal Oral Mucosa] : normal oral mucosa [No Oral Pallor] : no oral pallor [No Oral Cyanosis] : no oral cyanosis [Respiration, Rhythm And Depth] : normal respiratory rhythm and effort [Exaggerated Use Of Accessory Muscles For Inspiration] : no accessory muscle use [Auscultation Breath Sounds / Voice Sounds] : lungs were clear to auscultation bilaterally [Heart Rate And Rhythm] : heart rate and rhythm were normal [Heart Sounds] : normal S1 and S2 [Arterial Pulses Normal] : the arterial pulses were normal [Edema] : no peripheral edema present [Abdomen Soft] : soft [Abdomen Tenderness] : non-tender [Abdomen Mass (___ Cm)] : no abdominal mass palpated [Abnormal Walk] : normal gait [Gait - Sufficient For Exercise Testing] : the gait was sufficient for exercise testing [Nail Clubbing] : no clubbing of the fingernails [Cyanosis, Localized] : no localized cyanosis [Petechial Hemorrhages (___cm)] : no petechial hemorrhages [Skin Color & Pigmentation] : normal skin color and pigmentation [] : no rash [No Venous Stasis] : no venous stasis [No Skin Ulcers] : no skin ulcer [Skin Lesions] : no skin lesions [No Xanthoma] : no  xanthoma was observed [Oriented To Time, Place, And Person] : oriented to person, place, and time [Affect] : the affect was normal [Mood] : the mood was normal [No Anxiety] : not feeling anxious [FreeTextEntry1] : +3/6 systolic murmur

## 2023-06-01 NOTE — REVIEW OF SYSTEMS
[Dyspnea on exertion] : dyspnea during exertion [Abdominal Pain] : abdominal pain [Tremor] : a tremor was seen [Negative] : Heme/Lymph [de-identified] : amio induced

## 2023-06-01 NOTE — HISTORY OF PRESENT ILLNESS
[FreeTextEntry1] : PCP- Dr Sandhya Bloom\par EP- Dr Jeff\par CTS- Dr Garcia\par Plastics- Dr Ko\par Hand- Dr Xiong\par GI- Dr Cortez, colonoscopy Sept 2017\par Dentist: Dr. Jorge Oh  \par Ophtho- Dr Jesus Lantigua [phone]

## 2023-06-12 ENCOUNTER — APPOINTMENT (OUTPATIENT)
Dept: HEART AND VASCULAR | Facility: CLINIC | Age: 78
End: 2023-06-12
Payer: COMMERCIAL

## 2023-06-12 VITALS
HEART RATE: 69 BPM | BODY MASS INDEX: 28.49 KG/M2 | OXYGEN SATURATION: 97 % | TEMPERATURE: 98.1 F | WEIGHT: 187.99 LBS | SYSTOLIC BLOOD PRESSURE: 120 MMHG | DIASTOLIC BLOOD PRESSURE: 78 MMHG | HEIGHT: 68 IN

## 2023-06-12 PROCEDURE — 99214 OFFICE O/P EST MOD 30 MIN: CPT

## 2023-06-12 NOTE — CARDIOLOGY SUMMARY
[de-identified] : \par EKG 9/2021: a-paced, BiV paced\par  [de-identified] : \par ICD interrogation 4/2022: 98% BiV pacing, no VT events\par  [de-identified] : \par JIGAR 12/2022: severe LV dysfunction, posterior MVL tethering with moderate MR and mean gradient 2 mmHg, well functioning bioAVR, mild-mod TR, moderate PI\par \par TTE 11/2022: LV 6.7 cm, LVEF 20-25%, mild concentric LVH, bioAVR with minimal AI, mild RV dysfunction, mod-severe valvular MR (posterior leaflet appears frozen) and mean gradient 8 mmHg, moderate TR, at least moderate PI, estimated PASP 53 mmHg, dilated aorta 4.6 cm at sinus of Valsalva\par \par TTE 5/2022 (report, unable to view images:LV 6.2 cm, LVEF 30-35%, normal RV size/function, bioAVR with mild AI, mean gradient 3 mmHg across MVr with moderate-severe MR, estimated PASP 52 mmHg, 4.6 cm aortic root\par \par TTE 9/2021: LV 6.2 cm, LVEF 15%, LVOT VTI 7 cm, mild RV dysfunction, well functioning bioAVR, mean gradient 4 mmHg across MVr with mild-moderate MR, moderate TR, estimated PASP 31 mmHg, 4.6 cm ascending aorta\par  [de-identified] : \par [facility] 3/2021: normal coronary arteries \par

## 2023-06-12 NOTE — DISCUSSION/SUMMARY
[FreeTextEntry1] : # Chronic systolic heart failure\par - Etiology: unclear etiology at this time, possibly infiltrative process given arrhythmia history. will defer MRI given device and age\par - GDMT: current regimen is metoprolol succinate 100 BID, entresto  mg BID, Farxiga 10 mg daily. defer MRA due to CKD and hyperkalemia. will add hydralizine 25 mg TID and isordil 10 mg TID \par - Diuretic: current regimen is torsemide 10 mg QD, will continue\par - Device: s/p CRT-D, well functioning with > 95% BiV pacing \par - Corcinch: not a candidate with prior AVR \par - Barostim/CCM: insurance did not approve Barostim, LVEF too low for CCM\par - FROST: will assess candidacy for FROST, pending availability of research coordinators \par - Labs: 4/2023 with K 5.1 and Cr 1.7\par \par # Valvular disease\par - severe MR noted on TTE and exam suggestive of significant AI not no valvular disease more than moderate on JIGAR  \par \par # History of VT/VF with ICD shocks and frequent PVCs\par - follows with Dr. ARIZMENDI and on amiodarone\par \par # pAF\par - on eliquis, defer dosing to Dr. Chandler and Janell (right now on 2.5 mg BID) \par \par # Aortic aneurysm\par - has been stable over several years, follows with Dr. Garcia and Ramesh \par \par # CKD IIIb with baseline Cr 1.8\par - continue Farxiga \par - follows with Dr. Matthews\par \par Return to clinic in 4 months

## 2023-06-12 NOTE — PHYSICAL EXAM
[Well Developed] : well developed [Well Nourished] : well nourished [Clear Lung Fields] : clear lung fields [Good Air Entry] : good air entry [Soft] : abdomen soft [Normal Gait] : normal gait [Moves all extremities] : moves all extremities [No Focal Deficits] : no focal deficits [Alert and Oriented] : alert and oriented [Normal memory] : normal memory [de-identified] : JVP 8 cm H20 [de-identified] : Normal S1/S2, 2/6 blowing midpeaking systolic murmur at the apex  [de-identified] : Mildly tender in mid epigastrum  [de-identified] : Warm, no edema

## 2023-06-12 NOTE — HISTORY OF PRESENT ILLNESS
[FreeTextEntry1] : Referring: Dr. Chandler\par \par Mr. Kenney is a 78 YO M with a history of ACC/AHA Stage C NICM (LV 6.2 cm, LVEF 25-30%) s/p ICD upgraded to CRT-D 9/2021 for chronic RV pacing, severe AI/MR s/p bioAVR/MVr 2014 history of VT/VT with ICD shocks and frequent PVC's s/p PVC ablation, pAF on eliquis, CKD IIIb (Cr 1.8), and aortic aneurysm presenting to HF clinic for further management. \par \par He was very active at baseline and used to run marathons. He was diagnosed with a cardiomyopathy in 2014 with LVEF ~45% in the setting of severe AI with moderate-severe MR and subsequently underwent bioAVR and MVr in 2014. He underwent a primary prevention ICD shortly after surgery. He did well for many years after surgery and LVEF historically been 40-45%. He developed syncope 9/2019 related to VT/VF which self resolved and he underwent PVC ablation this admission. He was admitted 7/2021 with ICD shock for which he was started on amiodarone. His EF declined from 45% to 30% to 15% in 9/2021 and was being chronically RV paced so underwent CRT-D upgrade. \par \par Due to persistent dyspnea he was referred to HF clinic 4/2022. There was concern for symptomatic severe MR and JIGAR pursued which revealed MR was only moderate. He has tolerated escalation of HF medical therapy but has remained with NYHA III symptoms. Barostim was recommended but not covered by insurance.\par \par He presents today for HF followup. He feels overall the same since last visit. He continues to note chronic epigastric pain. He continues to note dyspnea on exertion that is on and off. He notes some intermittent wheezing as well. He believes he can 3-5 blocks before stopping due to dyspnea. He occasionally notes dyspnea during household activities but improved since last visit. Notes stable 1-2 pillow orthopnea. Denies lower extremity edema. Denies dizziness or lightheadedness. \par \par

## 2023-06-12 NOTE — ASSESSMENT
[FreeTextEntry1] : 78 YO M with a history of ACC/AHA Stage C NICM (LV 6.2 cm, LVEF 25-30%) s/p ICD upgraded to CRT-D 9/2021 for chronic RV pacing, severe AI/MR s/p bioAVR/MVr 2014 history of VT/VT with ICD shocks and frequent PVC's s/p PVC ablation, pAF on eliquis, CKD IIIb (Cr 1.8), and aortic aneurysm presenting to HF clinic for further management. \par \par Today he reports NYHA III symptoms and appears euvolemic on exam.

## 2023-07-17 ENCOUNTER — APPOINTMENT (OUTPATIENT)
Age: 78
End: 2023-07-17
Payer: COMMERCIAL

## 2023-07-17 PROCEDURE — 99448 NTRPROF PH1/NTRNET/EHR 21-30: CPT

## 2023-07-18 NOTE — HISTORY OF PRESENT ILLNESS
[de-identified] : 77M with PMHx H.Pylori tx with quad therapy, afib AF/VT now s/p PPM (9/19), CKD, s/p AV and MV repairs, HTN, compensated diastolic chronic HF, prediabetes, hx as a marathon runner c/o continued substernal  tight chest pain, constant, non radiating since his AVR in 2014.  This pain he describes as being right at his incision site.  He is also complaining of rectal pain without rectal bleeding.  He is asking for another endoscopy.  He is a poor historian.\par \par 5/9/23\par - pt complaining of continued 24/7 sternal pain, no medication improves it \par - egd 2021 severe gastritis, duodenum normal, multiple sessile polyps. path: hyperplastic and chronic gastritis \par \par Previous hx: \par  Pain is along the line of patients scar. Pain is worse with inhalation and lying down. No alleviating factors. Pt saw cardiologist Dr. Cobos on 5/9/22 to undergo echo, report pending. Pt states he gets short of breath and fatigued after walking a 1/2 flight of stairs or 1-2 blocks. Pt is currently taking pantoprazole 40 mg and Pepcid 20 mg daily. Ct scan from 1/2022 of upper abdomen WNL. \par \par Diet- Vegetable, fruit, chicken, fish, limit fried foods\par \par Denies fever, chill, nausea, vomiting, wt loss, poor appetite, hematochezia, melena. \par \par Endoscopy 6/21: gastritis s/p biopsy (chronic gastritis w/ cryptitis and crypt distortion), sessile polyp in the stomach s/p polypectomy (hyperplastic polyp).\par Colonoscopy 2017: L sided diverticulosis, internal hemorrhoid (repeat in 7-10 year)

## 2023-07-18 NOTE — REASON FOR VISIT
[Follow-up] : a follow-up of an existing diagnosis [FreeTextEntry1] : Atypical chest pain, rectal pain

## 2023-07-18 NOTE — ASSESSMENT
[FreeTextEntry1] : Poorly defined but chronic pain\par - rectal, referral to proctology, in office anoscopy advised\par \par Chest pain\par -Extensive work-up unrevealing, encouraged to try Cymbalta at 20 mg as prescribed by Dr. Bloom\par -He has not tried this yet\par \par H pylori\par - resolved and confirmed\par \par f/u call in one month\par Best contact is his daughter at [phone] (Rogelio)

## 2023-07-26 ENCOUNTER — APPOINTMENT (OUTPATIENT)
Dept: HEART AND VASCULAR | Facility: CLINIC | Age: 78
End: 2023-07-26
Payer: COMMERCIAL

## 2023-07-26 ENCOUNTER — NON-APPOINTMENT (OUTPATIENT)
Age: 78
End: 2023-07-26

## 2023-07-26 VITALS
HEIGHT: 68 IN | BODY MASS INDEX: 28.34 KG/M2 | DIASTOLIC BLOOD PRESSURE: 72 MMHG | SYSTOLIC BLOOD PRESSURE: 108 MMHG | HEART RATE: 70 BPM | WEIGHT: 187 LBS

## 2023-07-26 PROCEDURE — 93284 PRGRMG EVAL IMPLANTABLE DFB: CPT

## 2023-07-26 PROCEDURE — 99212 OFFICE O/P EST SF 10 MIN: CPT | Mod: 25

## 2023-07-27 LAB
ANION GAP SERPL CALC-SCNC: 12 MMOL/L
BUN SERPL-MCNC: 30 MG/DL
CALCIUM SERPL-MCNC: 9.4 MG/DL
CHLORIDE SERPL-SCNC: 108 MMOL/L
CO2 SERPL-SCNC: 23 MMOL/L
CREAT SERPL-MCNC: 2.03 MG/DL
EGFR: 33 ML/MIN/1.73M2
GLUCOSE SERPL-MCNC: 86 MG/DL
NT-PROBNP SERPL-MCNC: 983 PG/ML
POTASSIUM SERPL-SCNC: 4.9 MMOL/L
SODIUM SERPL-SCNC: 143 MMOL/L
TSH SERPL-ACNC: 1.81 UIU/ML

## 2023-08-02 ENCOUNTER — APPOINTMENT (OUTPATIENT)
Dept: HEART AND VASCULAR | Facility: CLINIC | Age: 78
End: 2023-08-02

## 2023-08-03 NOTE — PHYSICAL EXAM
[General Appearance - Well Developed] : well developed [Normal Appearance] : normal appearance [Well Groomed] : well groomed [General Appearance - Well Nourished] : well nourished [No Deformities] : no deformities [General Appearance - In No Acute Distress] : no acute distress [] : no respiratory distress [Respiration, Rhythm And Depth] : normal respiratory rhythm and effort [Exaggerated Use Of Accessory Muscles For Inspiration] : no accessory muscle use [Auscultation Breath Sounds / Voice Sounds] : lungs were clear to auscultation bilaterally [Left Infraclavicular] : left infraclavicular area [Clean] : clean [Dry] : dry [Well-Healed] : well-healed [5th Left ICS - MCL] : palpated at the 5th LICS in the midclavicular line [Normal Rate] : normal [Rhythm Regular] : regular [Normal S1] : normal S1 [Normal S2] : normal S2 [___ +] : [unfilled]U+ pitting edema to the right ankle [Normal Conjunctiva] : the conjunctiva exhibited no abnormalities [Abnormal Walk] : normal gait [Skin Color & Pigmentation] : normal skin color and pigmentation [Oriented To Time, Place, And Person] : oriented to person, place, and time [Affect] : the affect was normal [Mood] : the mood was normal [No Anxiety] : not feeling anxious [Palpable Crepitus] : no palpable crepitus [Bleeding] : no active bleeding [Foul Odor] : no foul smell [Purulent Drainage] : no purulent drainage [Serous Drainage] : no serous drainage [Erythema] : not erythematous [Warm] : not warm [Tender] : not tender [Indurated] : not indurated [Fluctuant] : not fluctuant

## 2023-08-03 NOTE — REVIEW OF SYSTEMS
[Feeling Fatigued] : feeling fatigued [Negative] : Heme/Lymph [Fever] : no fever [Weight Gain (___ Lbs)] : no recent weight gain [Chills] : no chills [Weight Loss (___ Lbs)] : no recent weight loss [SOB] : no shortness of breath [Chest Discomfort] : no chest discomfort [Lower Ext Edema] : no extremity edema [Palpitations] : no palpitations [Syncope] : no syncope [Cough] : no cough [Wheezing] : no wheezing

## 2023-08-03 NOTE — ADDENDUM
[FreeTextEntry1] : I, Soto Nielsen, am scribing for and the presence of Dr. Jeff the following sections: HPI, PMH,Family/social history, ROS, Physical Exam, Assessment / Plan.\par \par  IAndrey, personally performed the services described in the documentation, reviewed the documentation recorded by the scribe in my presence and it accurately and completely records my words and actions.\par

## 2023-08-03 NOTE — HISTORY OF PRESENT ILLNESS
[Palpitations] : no palpitations [SOB] : no dyspnea [Syncope] : no syncope [Dizziness] : no dizziness [Chest Pain] : no chest pain or discomfort [Shoulder Pain] : no shoulder pain [Pain at Site] : no pain at device site [Erythema at Site] : no erythema at device site [Swelling at Site] : no swelling at device site [FreeTextEntry1] : 77 year old male with HTN, HLD, aortic aneurysm thoracic, AVR and mitral valve repair in 2014,  paroxysmal atrial fibrillation, PVCs and ICD s/p recent PVC ablation, who had an appropriate ICD shock  now s/p upgrade to BiV ICD (EF from 40% to 15-20% with pacing dependance), who presents for follow up.\par \par He states that he had a ICD placed after his AVR.  He followed up with Dr. Palencia; whose office moved and he transferred care here.   He was on Sotalol for history of PVCs and  NSVT; however he had short bursts of VT/torsades and it was felt the sotalol was proarrhythmic and stopped.  He was placed on Metoprolol.   \par \par He was admitted to [facility] 9/2019 with syncope correlated with VT and Vfib.  No therapy needed as he spontaneously converted.  One episode of afib, but overall burden extremely low.  Cath with mildly obstructive CAD.  He also underwent an EP study with PVC ablation from great cardiac vein.  HE was discharged on Mexiletine (now off).  \par  \par 7/2021 he was admitted to an OSH with ICD shock.  He was discharged on amiodarone load which he self discontinued.   EF was down and he underwent a BiV upgrade.  \par \par He was seen in the office and is now on AMiodarone, Metoprolol, Eliquis and optimal heart failure medication. \par \par He presents for routine follow up and has no complaints. He denies any SOB, edema, palpitations, syncope, near syncope.  He is compliant with all of his medications including eliquis and amiodarone. Reports he has to pause after 2-3 blocks before stopping with shortness of breath- which is stable \par \par He has tolerated escalation of HF medical therapy but has remained with NYHA III symptoms. Barostim was recommended but not covered by insurance.\par \par JIGAR 12/2022: severe LV dysfunction, posterior MVL tethering with moderate MR and mean gradient 2 mmHg, well functioning bioAVR, mild-mod TR, moderate PI\par \par TTE 11/2022: LV 6.7 cm, LVEF 20-25%, mild concentric LVH, bioAVR with minimal AI, mild RV dysfunction, mod-severe valvular MR (posterior leaflet appears frozen) and mean gradient 8 mmHg, moderate TR, at least moderate PI, estimated PASP 53 mmHg, dilated aorta 4.6 cm at sinus of Valsalva\par Echo 5/9/2022 EF 25-30%\par Echo 8/2021 EF 25-30%\par  Echo 1/2021 EF 35-40%\par Echo 11/2019 EF 55-60%

## 2023-08-03 NOTE — PROCEDURE
[No] : not [NSR] : normal sinus rhythm [ICD] : Implantable cardioverter-defibrillator [DDDR] : DDDR [Threshold Testing Performed] : Threshold testing was performed [None] : none [de-identified] : very long AV delay [de-identified] : [place] [de-identified] : [place] [de-identified] : [id number] [de-identified] : 2021 [de-identified] :  [de-identified] : 4.6 years  [de-identified] :  \par AV delay 250   DDD 50\par changed to DDD 70 AV delay 250 [de-identified] : AP 92%\par  99%\par short bursts of afib and one day short burst noise on RA lead\par shock impedance 47\par See report in [facility] 7/28/23\par base rate 70bpm, turned on rate response

## 2023-08-03 NOTE — DISCUSSION/SUMMARY
[Pacemaker Function Normal] : normal pacemaker function [FreeTextEntry1] : 77 year old male with HTN, HLD, aortic aneurysm thoracic, AVR and mitral valve repair in 2014,  paroxysmal atrial fibrillation, PVCs and ICD s/p recent PVC ablation, who had an appropriate ICD shock  now s/p upgrade to BiV ICD (EF from 40% to 15-20% with pacing dependance, now 25-30%), who presents for follow up.  \par \par # Chronic systolic heart failure\par Maintained on maximally tolerated GDMT \par Pending insurance authorization for Barostim device\par  > 95% BiV pacing \par \par #Ventricular tachycardia\par No further episodes since BIV upgrade\par Remains on low dose amiodarone - sent for blood work today - TSH, LFT\par \par # Atrial Fibrillation\par Maintained on Amiodarone 200mg QD\par On eliquis 2.5mg BID due to CKD \par \par #Device interrogation \par Device interrogation reveals normal function.\par All measured data is within normal limits \par \par He will follow up in 6 months or sooner if needed and knows to call with any questions or concerns.

## 2023-08-17 ENCOUNTER — APPOINTMENT (OUTPATIENT)
Dept: NEPHROLOGY | Facility: CLINIC | Age: 78
End: 2023-08-17
Payer: MEDICARE

## 2023-08-17 ENCOUNTER — APPOINTMENT (OUTPATIENT)
Dept: OTOLARYNGOLOGY | Facility: CLINIC | Age: 78
End: 2023-08-17
Payer: COMMERCIAL

## 2023-08-17 ENCOUNTER — LABORATORY RESULT (OUTPATIENT)
Age: 78
End: 2023-08-17

## 2023-08-17 VITALS
OXYGEN SATURATION: 98 % | SYSTOLIC BLOOD PRESSURE: 110 MMHG | TEMPERATURE: 98.1 F | WEIGHT: 186 LBS | DIASTOLIC BLOOD PRESSURE: 83 MMHG | HEART RATE: 86 BPM | HEIGHT: 68 IN | BODY MASS INDEX: 28.19 KG/M2

## 2023-08-17 VITALS — DIASTOLIC BLOOD PRESSURE: 74 MMHG | HEART RATE: 69 BPM | SYSTOLIC BLOOD PRESSURE: 109 MMHG

## 2023-08-17 VITALS — WEIGHT: 186 LBS | BODY MASS INDEX: 28.28 KG/M2

## 2023-08-17 DIAGNOSIS — J31.0 CHRONIC RHINITIS: ICD-10-CM

## 2023-08-17 DIAGNOSIS — J30.1 ALLERGIC RHINITIS DUE TO POLLEN: ICD-10-CM

## 2023-08-17 PROCEDURE — 99213 OFFICE O/P EST LOW 20 MIN: CPT

## 2023-08-17 PROCEDURE — 99214 OFFICE O/P EST MOD 30 MIN: CPT

## 2023-08-17 RX ORDER — FLUTICASONE PROPIONATE 50 UG/1
50 SPRAY, METERED NASAL DAILY
Qty: 1 | Refills: 3 | Status: ACTIVE | COMMUNITY
Start: 2023-08-17 | End: 1900-01-01

## 2023-08-17 NOTE — HISTORY OF PRESENT ILLNESS
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 Nose runs most n ams and when walks outside. Worse if wind is blowing. He feels it is allergic. occ burning. -fever no purulence.

## 2023-08-17 NOTE — HISTORY OF PRESENT ILLNESS
[FreeTextEntry1] : Kindly referred by Dr. Sandhya Bloom elevated creatinine. [employer].  * Creatinine 2.03 in 26Jul (eGFR 33 by CKD-EPI). No [place]. * BP controlled. BP 110s at home. Not < 100. No lightheadedness. No CP/SOB at rest. Compliant with medications. * Following up with Dr. Hanson for monoclonal gammopathy. * Following up with cardiologist. * Working as . * K 4.9 on low K diet.   Previous history (25Apr23): * HTN controlled. BP 110s at home. No lightheadedness. No CP. No SOB at rest. Compliant with medications.   * Following up with Dr. Hanson for monoclonal gammopathy. * Chronic slight SOB. Following up closely with cardiologist.  * CKD stable, creatinine 1.7 in April 4. * Hyperkalemia controlled.   Previous history (24Jan23): * CKD stable, creatinine 1.8. * K 5.4. Counseled on following low K diet. He occsaionally eats bananas.  * HTN controlled. No lightheadedness. No CP. Compliant with medications.  Following up with Dr. Hanson for monoclonal gammopathy. * Chronic slight SOB. Following up closely with cardiologist.   Previous history (18Nov22): * CKD stable, creatinine 1.7 on Sep 21. . * 13 - 15 mg albuminuria. * * HTN controlled. No lightheadedness.  Compliant with medications. * Following up with Dr. Hanson for monoclonal gammopathy. * CP/SOB now significantly improved, none currnetly. Following up closely with cardiologist.   Previous history (18Aug22): * CKD stable, creatinine 1.87. 55 mg albuminuria. * HTN controlled. No lightheadedness. Compliant with medications.  Following up with Dr. Hanson for monoclonal gammopathy. * Hyperkalemia controlled. * Rash on legs. Seeing allergist. * Pneumonia on 5/21 - 5/22. * Persistent SOB/CP with minimal to moderate exertion. Per patient, this is unchanged and cardiologist is aware.   Previous history (19May22): * CKD stable, creatinine 1.87. * HTN controlled. No lightheadedness. No CP/SOB. Compliant with medications.  Following up with Dr. Hanson for monoclonal gammopathy. * Hyperkalemia controlled. * He has urinary urgency for several weeks. No dysuria. 5x nocturia.   Previous history (22Feb22): * HTN controlled.  - 120s / 80s at home. No lightheadedness. Compliant with medications. 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 * CKD progressive, creatinine 1.91. Albuminuria decreased to 48. * Occasional chronic abd pain being evaluated, none currently.   Previous history (06Dec21): * CKD stable, creatinine 1.51.  Albuminuria decreased to 91 mg from 3280 mg (?). Farxiga 10 started. 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 * HTN controlled. BP 120s at home. No lightheadedness. Compliant with medications.   Previous history (10Nov21): * HTN controlled. BP 110s at home. No lightheadedness. Compliant with medications. * 3280 mg albuminuria. * CKD stable, creatinine 1.45. 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   Previous history (09Sep21): * Events reviewed. Admitted to [facility] for CHF exacerbation and ICD upgrade. EF < 23 - 30.* CKD stable, creatinine 1.55.  Labs from 9/7 reviewed. Creatinine 1.45. K 4. 298 mg albuminuria.  * Hyperkalemia controlled. Not on lokelma or veltassa.  * HTN controlled. /80s at home. No lightheadedness. Compliant with medications. 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   Previous history (31Aug21): * Dx with Covid 3 weeks ago. Now no cough, or other symptoms. Did not require hospitalization. He has SOB with walking walking 1 block. * Lokelma started for hyperkalemia (K of 6). He is now following low potassium diet. . * CKD stable, creatinine 1.86. * He had stopped torsemide. BP 100s.   Previous history (03Aug21): Labs reviewed. Baseline eGFR 43 - 56 since 2019. On 14Jul21, his creatinine increased to 1.82 (eGFR 35 - 41). The patient denies exposure to chronic NSAIDs, chronic PPIs, creatine, or herbal supplements. The patient denies a history of kidney stones or pyelonephritis. 4 - 5 times. No recent renal ultrasound. They are unaware of proteinuria or hematuria.  EF 23% in 4/21. On amiodarone, metoprolol, lasix 20 qod, HCTZ twice weekly valsartan 40. SOB with one block walking. Stable LE edema. ProBNP incresaed from 515 to 1360.  * HTN controlled.  No lightheadedness. Compliant with medications. He believes he is taking amlodipine.

## 2023-08-17 NOTE — ASSESSMENT
[FreeTextEntry1] : this looks like allergic rhinitis explained  avoidance as much as possible of allergens fluticasone allegra rtc as needed

## 2023-08-17 NOTE — ASSESSMENT
[FreeTextEntry1] : # CKD stage 3 likeliest due to type 2 cardiorenal syndrome and aging, possible DM nephropathy. * Recheck labs next visit  * Will avoid MRA given previous hyperkalemia. * Therapies for kidney disease: blood pressure control; proteinuria reduction with ARB/ACEi; SGLT2i; other evidence-based therapies recommended including exercise, a plant-based lower oxalate diet, and 400 mcg folic acid daily * Cardiovascular disease prevention: counseling on healthy diet, physical activity, weight loss, alcohol limitation, blood pressure control; cardiology evaluation/followup advised * A counseling information sheet on CKD has been given (which they have been instructed to read). * The patient has been counseled that chronic kidney disease is a significant condition and regular office follow-up with me (at least every 3 months for now) is important for monitoring and their health, and that it is their responsibility to make a follow-up appointment. * The patient has been counseled never to stop taking their medications without discussing it with me or another doctor. * The patient has been counseled on avoiding NSAIDs. * The patient has been counseled on risk of worsening kidney function and instructed to immediately call and speak with me and go immediately to ER with any severe symptoms, nausea, vomiting, diarrhea, chest pain, or shortness of breath.  # Borderline hyperkalemia. * Counseled on strict low K diet.    # HTN controlled. No lightheadedness. * Cont entresto, metoprolol, torsemide. * The patient's blood pressure was checked with the [employer] [id number] using the SPRINT trial protocol after sitting quietly in an empty room with arm supported, back supported, and feet on the floor for 5 minutes. The average of 3 readings were taken. * A counseling information sheet on blood pressure and staying healthy has been given (which they have been instructed to read). * The patient has been counseled to check their BP at home with an automatic arm cuff, write down the readings, and reach me directly on the phone immediately if they are persistently > 180 systolic or if SBP is less than 100 or if lightheadedness develops. They were counseled to bring in all blood pressure readings and medications next visit. * The patient has been counseled that regular office follow-up (at least every 3 months for now) is important for monitoring and for their health, and that it is their responsibility to make follow up appointments. * The patient also has been counseled that they must never stop or change any medications without discussing this with me (or another physician).  # Monoclonal gammopathy. * Advised to follow up with hematology. Myeloma, amyloid, and MGRS are unlikely.

## 2023-08-19 LAB
APPEARANCE: CLEAR
BILIRUBIN URINE: NEGATIVE
BLOOD URINE: NEGATIVE
COLOR: YELLOW
CREAT SPEC-SCNC: 193 MG/DL
GLUCOSE QUALITATIVE U: >=1000 MG/DL
KETONES URINE: ABNORMAL MG/DL
LEUKOCYTE ESTERASE URINE: NEGATIVE
MICROALBUMIN 24H UR DL<=1MG/L-MCNC: 5.2 MG/DL
MICROALBUMIN/CREAT 24H UR-RTO: 27 MG/G
NITRITE URINE: NEGATIVE
PH URINE: 5.5
PROTEIN URINE: 30 MG/DL
SPECIFIC GRAVITY URINE: 1.03
UROBILINOGEN URINE: 1 MG/DL

## 2023-09-05 ENCOUNTER — NON-APPOINTMENT (OUTPATIENT)
Age: 78
End: 2023-09-05

## 2023-09-05 ENCOUNTER — APPOINTMENT (OUTPATIENT)
Dept: HEART AND VASCULAR | Facility: CLINIC | Age: 78
End: 2023-09-05
Payer: COMMERCIAL

## 2023-09-05 VITALS
DIASTOLIC BLOOD PRESSURE: 80 MMHG | HEIGHT: 68 IN | WEIGHT: 189.99 LBS | HEART RATE: 71 BPM | SYSTOLIC BLOOD PRESSURE: 110 MMHG | TEMPERATURE: 97.9 F | OXYGEN SATURATION: 99 % | BODY MASS INDEX: 28.79 KG/M2

## 2023-09-05 DIAGNOSIS — E78.00 PURE HYPERCHOLESTEROLEMIA, UNSPECIFIED: ICD-10-CM

## 2023-09-05 PROCEDURE — 99214 OFFICE O/P EST MOD 30 MIN: CPT | Mod: 25

## 2023-09-05 PROCEDURE — 90662 IIV NO PRSV INCREASED AG IM: CPT

## 2023-09-05 PROCEDURE — G0008: CPT

## 2023-09-05 NOTE — HISTORY OF PRESENT ILLNESS
[FreeTextEntry1] : PCP- Dr Sandhya Bloom\par  EP- Dr Jeff\par  CTS- Dr Garcia\par  Plastics- Dr Ko\par  Hand- Dr Xiong\par  GI- Dr Cortez, colonoscopy Sept 2017\par  Dentist: Dr. Jorge Oh  \par  Ophtho- Dr Jesus Lantigua [phone]

## 2023-09-05 NOTE — REVIEW OF SYSTEMS
[Dyspnea on exertion] : dyspnea during exertion [Abdominal Pain] : abdominal pain [Tremor] : a tremor was seen [Negative] : Heme/Lymph [de-identified] : amio induced

## 2023-09-05 NOTE — REASON FOR VISIT
[FreeTextEntry1] : 77 -year-old male with history of high cholesterol, aortic aneurysm thoracic, hypertension, lumbago, pre-diabetes, Valve disease (status post AVR, MVr epair Dr. Caio Louise 9/9/2014). S/P  colonoscopy with Dr. Maximilian Cortez 9/14/17. Doing well overall, Denies CP, SOB, palpitations, orthopnea, LE swelling, dizziness, syncope. Walking and running daily, sometimes >10 miles, training for [place] marathon in 2018 after skipping 2017.   s/p dental extraction and implant. Reports during dental cleaning he was told he bled a lot and dentist would prefer he stop aspirin for future dental extraction and implant.  Training for [facility], race walked it without complications Nov 2018.  EKG: NSR @ 46 with 1st degree AVB. LAHB, ST-Tw abnormalities. Blocked APC, V paced x 2 beats Pacer set at 40 1/22/19 EKG: A Pacing, PVCs, 1st degree AVB, with a LAHB, possible IWMI, QTc 429 ST-Twave abnormalities.  10/10/19  3/18/19 A Fib discovered by Dr Jeff on 3/13/19, ASA changed to Eliquis 5/6/19 Pt with atypical pain in the axilla on the left, he thinks its the Eliquis.  No swelling or ecchymosis reported 7/15/19 K 5.5, Telmisartin reduced to 20mg. BP excellent.  c/o severe right shoulder pain.  Previously injected with relief. 9/23/19 Adm to  [facility]) with syncope and NSVT.  Trans to [facility].  VT ablation and Mexitil started. 10/10/19  Pt c/o chest  pressure, when questioned its sub Xiphoid.  Eating makes it better  11/14/19  New Deep Tw inversions, ? post pacing.  CCTA Nov 1 clean. Recently had a lot of "stomach" pains.  I suspect he has PUD, Trop sent, echo ordered 1/24/20 Here with several weeks of a cold/URI, saw an MD and given Z westley.  Here with wheezing, reduced exercise tolerance. + Wheezing, SOB, mild cough, non-productive, no fever or chills. Also here for clearance for cataract 10/15/20 Fullness in subxiphoid area.  (-) CTA Nov 2020.  Also has  off statin. 1/15/21 ARB DCed due to elevated Cr and K of 5.9, not taking Crestor due to nausea, knee pain, back pain.  More MONTEJO noted, will need to reassess Ao V 2/24/21 More MONTEJO again, getting worse, epigastric discomfort, worse if he hits a pot hole., or walking hard.  ?pleuritic component.  Feb 12-16 pt reports wt went up, not sleeping well. 4/5/21 In [facility] 4/16 to 4/19/21 with chest pain, + Nuc(fixed inferior and apical defects).  EF 23 %, Echo EF 30 %, mild to moderate MR. but (-) cath.  Still gets epigastric pain with hitting potholes.  + MONTEJO.  PAP 40 mm.  Feels better after HCTZ. 5/10/21 Had Shingles.  Had both Covid vaccines.  Having endoscopy 5/20/21 7/7/21 Dx with severe gastritis and H pylori.  Admitted to [facility] yesterday with AICD DC, 2/T VF. K 4.5, .   Amiodarone 400 mg BID started. 9/8/21 DC from [facility] 9/4/21 AFTER ADMISSION FOR ATRIAL TACHYCARDIA  seen by Dr Jeff. Pt had an upgrade to a BiV pacer.  Echo showed EF 25-30%.  I was called yesterday from the [facility] ER that he was there SOB and in mild HF.  Torsemide was increased to daily.  10/8/21  In [facility] ER 9/28/21 with SOB and abdominal tightness.  Cr was down to 1.32, BNP was 4237, up slightly.  We increased diuretic and sent pt home.  He feels dramatically better.  He is back on Torsemide 10mg daily because it causes dry mouth and he is reluctant to take more..  12/9/21 Abd tightness much better but not fully resolved.  3/10/22 Having different pain syndromes, has been to ER several times,   Still with SOB/MONTEJO and fatigue 9/21/22  Echo May 2022, EF 25%, mod to severe MR.  Needs f/u with Dr Melvin and Juan Antonio.  Pt doing well on current meds. 1/23/23 Epigastric pain has returned, ongoing MONTEJO. 6/1/23 In ER yesterday with rectal bleeding, had a rectal fissure.  Has right sciatica and tingling in left arm with arm extension. 9/5/23 Getting SOB and probable ascitres from the MR.   EKG:  AV PAced 9/8/21

## 2023-09-05 NOTE — PHYSICAL EXAM
[General Appearance - Well Developed] : well developed [Normal Appearance] : normal appearance [Well Groomed] : well groomed [General Appearance - Well Nourished] : well nourished [No Deformities] : no deformities [General Appearance - In No Acute Distress] : no acute distress [Normal Conjunctiva] : the conjunctiva exhibited no abnormalities [Eyelids - No Xanthelasma] : the eyelids demonstrated no xanthelasmas [Normal Oral Mucosa] : normal oral mucosa [No Oral Pallor] : no oral pallor [No Oral Cyanosis] : no oral cyanosis [Respiration, Rhythm And Depth] : normal respiratory rhythm and effort [Exaggerated Use Of Accessory Muscles For Inspiration] : no accessory muscle use [Auscultation Breath Sounds / Voice Sounds] : lungs were clear to auscultation bilaterally [Heart Rate And Rhythm] : heart rate and rhythm were normal [Heart Sounds] : normal S1 and S2 [Arterial Pulses Normal] : the arterial pulses were normal [Edema] : no peripheral edema present [Abdomen Tenderness] : non-tender [Abdomen Soft] : soft [Abdomen Mass (___ Cm)] : no abdominal mass palpated [Abnormal Walk] : normal gait [Gait - Sufficient For Exercise Testing] : the gait was sufficient for exercise testing [Nail Clubbing] : no clubbing of the fingernails [Cyanosis, Localized] : no localized cyanosis [Petechial Hemorrhages (___cm)] : no petechial hemorrhages [Skin Color & Pigmentation] : normal skin color and pigmentation [] : no rash [No Venous Stasis] : no venous stasis [Skin Lesions] : no skin lesions [No Skin Ulcers] : no skin ulcer [No Xanthoma] : no  xanthoma was observed [Oriented To Time, Place, And Person] : oriented to person, place, and time [Affect] : the affect was normal [Mood] : the mood was normal [No Anxiety] : not feeling anxious [FreeTextEntry1] : +3/6 systolic murmur

## 2023-09-05 NOTE — ASSESSMENT
[FreeTextEntry1] : Valve disease: clean coronaries on cardiac cath in 2014. status post AVR, MV repair Dr. Caio Louise 9/9/2014) SBE prophylaxis. okay to stop ASA 1 week before dental extraction and implant. EKG with 1 PVC. Echo done 7/2018. EF 40%  Valves OK.  Echo update Nov 2019 , EF 55-60 ??.  Echo done 1/21, EF 35-40 % closer to his baseline.  Ao V probably NL prosthetic valve. EF 30% in [facility] March 2021 and Sept 2021. EF 25-30 % on echo done here Aug 2021 Echo May 2022, EF 25 % with mod-severe MR..  No change on echo Nov 2022.   SOB/CHF- BNP sent, echo from 1/2021 with a drop in EF.  Await BNP level, 550.  EF 23 % on Nuc, 30 % on echo at [facility]. EF 50 % on echo in 2014, 45-50% in 2016, 40% in 2018, all were done at [facility].  Now its 23 % on Nuc and 30 % on echo at [facility] from March 2021.  Has CHF, BNP around 3-5 K [place] BW is 175 lbs., May be 170.  Remains with MONTEJO, will refer to the HF team.  Now on Entresto.  Labs were drawn today in ER, Cr 1.88. Clinically stable.  2.03  Abdominal Pain- worse if he hits a pothole. Will screen for a AAA. If (-) will refer to GI (Dr Cortez).  SONO (-) FOR AAA, F/U WITH Dr CORTEZ. Will discuss with Dr Cortez.  Found to have severe gastritis and H pylori.  Pain improving.  Also better with diuresis. Trial of Pepcid and Simethicone.  Also better with diuresis.  Pain getting worse again, to f/u with Dr Cortez Feb 6.  Only taking Pepcid, will have him get back on his Protonix.  Rectal fissure- Balmex and mineral oil  Chest/abd pain- will treat with Protonix x 30 days.  New EKG changes 11/14/19 not seen before, deep Tw inversions, QTc 474.  I suspect post pacing artifact, remotely  PUD, Trop sent, echo ordered.  Trop NL, EF and wall motion normal on echo. CCTA done Nov 1st, 2019 is clean. No coronary disease.  More upper abd discomfort, will give a trial of protonix. Still with pain, will screen for AAA, refer to GI.  Did not see GI, AAA screen is (-). He reports improvement with HCTZ . Now on Torsemide 10 mg daily.  Better after diuresis in ER Sept 28.  PreOP- complex pt but cleared for dental work.  Has h/o VT so no epinephrine.  Has AICD so no cautery unless AICD is shut off to avoid inappropriate shocks.  Needs SBE prophylaxis for dental work only. Pt is cleared for cataract surgery  PAF- changed from ASA to Eliquis by Dr ARIZMENDI.  No signs of trauma or bleeding on exam. Pt reassured that the Eliquis is not causing the pain.  Off Eliquis by Dr ARIZMENDI due to not feeling well.  Will rechallenge with 2.5 BID, discussed with Dr ARIZMENDI.  Amio restarted at 200 mg daily(pt stopped the load when the pills finished up)    VT- has non-sustained, i spoke to Dr Palencia who does not want him to run the [facility]. There is  a VT and syncope/sudden death risk, also has a thoracic aneurysm.  I discussed this with him and his wife, walking the Marathon is OK but no jogging.  He walked it and did well. Now seeing Dr Jeff.  Had syncope and adm to [facility], trans to [facility].  VT RFA, Sotolol changed to Mexitil Sept 2019.  CAN RETURN TO WORK NEXT MONDAY.  Had VF 7/6/21 and background VT, AICD DC.  Pt was started on Amio.  Dr ARIZMENDI called. Pt reports facial hyperpigmentation on Amio  Aortic aneurysm: Enlarged. 42 (ascending) 47 (descending) in 2014 Followup in 2015 reveals a max Ao of 46 mm CT angiogram 3/6/2017 aneurysm unchanged annual CT, due 3/2018.  Aorta 46mm April 2018, referred back to Dr Garcia,  48mm on CT 2019.  48-49 on CT May 2023 with Dr Garcia  HLD: on pravastatin 40mg, had muscle aches on 80mg,  8/2017, pt reported he was only taking it once in a while, now taking it daily will repeat lipid panel. Diet and exercise discussed in detail.   Feb 2018,  July 2020. starting CRESTOR 10 qod, pt off it  HTN: Have DC Lasix and changed to HCTZ weekly.  BP very good, Reduce Micardis  40 for persistent elevated K.  Increase HCTZ 25 to M & F. BP OK.  Micardis now at 20mg, DCed due to elevated Cr and K.  Tolerating Valsartan 40 mg.   CKD: 1.33 8/8/2017, repeat BMP Sept 2017, Cr 1.05, previously normal, on ARB  1.31 and K 5.9.  .  July 2021. Cr 1.13, K 4.5, Mg 2.1,  Cr 1.75, 1.88

## 2023-09-08 NOTE — PROGRESS NOTE ADULT - PROBLEM SELECTOR PROBLEM 3
Chronic HFrEF (heart failure with reduced ejection fraction) DASH Diet DASH Diet/Consistent Carbohydrate Diabetic Diets

## 2023-09-18 NOTE — ED ADULT NURSE NOTE - NS ED NURSE DISCH DISPOSITION
Spoke with pt  provided  arrival time of 8:00 for sx on 9/20/23 with Dr. Mullen @ [place]. Pt has eyedrops and packet    rhonchi/Anterior: Discharged

## 2023-09-25 ENCOUNTER — APPOINTMENT (OUTPATIENT)
Dept: HEART AND VASCULAR | Facility: CLINIC | Age: 78
End: 2023-09-25

## 2023-09-27 ENCOUNTER — APPOINTMENT (OUTPATIENT)
Dept: GASTROENTEROLOGY | Facility: CLINIC | Age: 78
End: 2023-09-27

## 2023-10-02 ENCOUNTER — OUTPATIENT (OUTPATIENT)
Dept: OUTPATIENT SERVICES | Facility: HOSPITAL | Age: 78
LOS: 1 days | End: 2023-10-02
Payer: COMMERCIAL

## 2023-10-02 ENCOUNTER — APPOINTMENT (OUTPATIENT)
Dept: CARDIOTHORACIC SURGERY | Facility: CLINIC | Age: 78
End: 2023-10-02
Payer: COMMERCIAL

## 2023-10-02 VITALS
RESPIRATION RATE: 15 BRPM | SYSTOLIC BLOOD PRESSURE: 112 MMHG | HEART RATE: 78 BPM | HEIGHT: 68 IN | DIASTOLIC BLOOD PRESSURE: 56 MMHG | WEIGHT: 189 LBS | BODY MASS INDEX: 28.64 KG/M2 | TEMPERATURE: 96.9 F | OXYGEN SATURATION: 97 %

## 2023-10-02 DIAGNOSIS — I35.0 NONRHEUMATIC AORTIC (VALVE) STENOSIS: ICD-10-CM

## 2023-10-02 PROCEDURE — 93306 TTE W/DOPPLER COMPLETE: CPT | Mod: 26

## 2023-10-02 PROCEDURE — C8929: CPT

## 2023-10-02 PROCEDURE — 99215 OFFICE O/P EST HI 40 MIN: CPT

## 2023-10-08 NOTE — PROGRESS NOTE ADULT - PROBLEM/PLAN-9
Patient is a 61y old  Female who presents with a chief complaint of fall (04 Oct 2023 18:38)      INTERVAL HPI/OVERNIGHT EVENTS: Seen and examined at bedside. No acute events overnight. Patient is quite anxious today. C/o pain in foot. Repeat XR done today.     ROS  10 point ROS negative, unless stated otherwise.    MEDICATIONS  (STANDING):  budesonide 160 MICROgram(s)/formoterol 4.5 MICROgram(s) Inhaler 2 Puff(s) Inhalation two times a day  clonazePAM  Tablet 1 milliGRAM(s) Oral three times a day  dextrose 5%. 1000 milliLiter(s) (100 mL/Hr) IV Continuous <Continuous>  dextrose 5%. 1000 milliLiter(s) (50 mL/Hr) IV Continuous <Continuous>  dextrose 50% Injectable 25 Gram(s) IV Push once  dextrose 50% Injectable 12.5 Gram(s) IV Push once  dextrose 50% Injectable 25 Gram(s) IV Push once  enoxaparin Injectable 40 milliGRAM(s) SubCutaneous every 24 hours  glucagon  Injectable 1 milliGRAM(s) IntraMuscular once  influenza   Vaccine 0.5 milliLiter(s) IntraMuscular once  insulin glargine Injectable (LANTUS) 4 Unit(s) SubCutaneous at bedtime  insulin lispro (ADMELOG) corrective regimen sliding scale   SubCutaneous three times a day before meals  lactated ringers. 1000 milliLiter(s) (75 mL/Hr) IV Continuous <Continuous>  loratadine 10 milliGRAM(s) Oral daily  nicotine - 21 mG/24Hr(s) Patch 1 Patch Transdermal daily  pantoprazole    Tablet 40 milliGRAM(s) Oral before breakfast  tiotropium 2.5 MICROgram(s) Inhaler 2 Puff(s) Inhalation daily  zinc oxide 40% Paste 1 Application(s) Topical every 12 hours    MEDICATIONS  (PRN):  acetaminophen     Tablet .. 650 milliGRAM(s) Oral every 6 hours PRN Temp greater or equal to 38C (100.4F), Mild Pain (1 - 3)  acetaminophen     Tablet .. 650 milliGRAM(s) Oral every 6 hours PRN Mild Pain (1 - 3)  albuterol    90 MICROgram(s) HFA Inhaler 2 Puff(s) Inhalation every 6 hours PRN Shortness of Breath and/or Wheezing  aluminum hydroxide/magnesium hydroxide/simethicone Suspension 30 milliLiter(s) Oral every 4 hours PRN Dyspepsia  dextrose Oral Gel 15 Gram(s) Oral once PRN Blood Glucose LESS THAN 70 milliGRAM(s)/deciliter  ketorolac   Injectable 15 milliGRAM(s) IV Push every 8 hours PRN Moderate Pain (4 - 6)  loperamide 2 milliGRAM(s) Oral daily PRN Diarrhea  melatonin 3 milliGRAM(s) Oral at bedtime PRN Insomnia  morphine  - Injectable 2 milliGRAM(s) IV Push every 6 hours PRN breakthrough pain  ondansetron Injectable 4 milliGRAM(s) IV Push every 8 hours PRN Nausea and/or Vomiting  oxyCODONE    IR 5 milliGRAM(s) Oral every 6 hours PRN Severe Pain (7 - 10)      Allergies    No Known Allergies    Intolerances        Vital Signs Last 24 Hrs  T(C): 37.4 (08 Oct 2023 10:31), Max: 37.4 (08 Oct 2023 10:31)  T(F): 99.3 (08 Oct 2023 10:31), Max: 99.3 (08 Oct 2023 10:31)  HR: 67 (08 Oct 2023 10:31) (67 - 94)  BP: 134/77 (08 Oct 2023 10:31) (127/79 - 144/77)  BP(mean): --  RR: 17 (08 Oct 2023 10:31) (16 - 18)  SpO2: 95% (08 Oct 2023 10:31) (92% - 99%)        PHYSICAL EXAM:    GENERAL: NAD,  no increased WOB  HEAD:  Atraumatic, Normocephalic  EYES: EOMI, PERRLA, conjunctiva and sclera clear  ENMT: Moist mucous membranes  NECK: Supple, No JVD  NERVOUS SYSTEM:  awake and alert, no focal neuro deficits   CHEST/LUNG: Clear to auscultation bilaterally; No rales, rhonchi, wheezing, or rubs  HEART: Regular rate and rhythm; No murmurs, rubs, or gallops  ABDOMEN: obese, Soft, Nontender, Nondistended; Bowel sounds present  EXTREMITIES:  Right foot wrapped up     LABS:                        16.2   7.55  )-----------( 168      ( 08 Oct 2023 07:30 )             50.6     10-08    140  |  102  |  15  ----------------------------<  165<H>  3.8   |  32<H>  |  1.04    Ca    8.3<L>      08 Oct 2023 07:30        Urinalysis Basic - ( 08 Oct 2023 07:30 )    Color: x / Appearance: x / SG: x / pH: x  Gluc: 165 mg/dL / Ketone: x  / Bili: x / Urobili: x   Blood: x / Protein: x / Nitrite: x   Leuk Esterase: x / RBC: x / WBC x   Sq Epi: x / Non Sq Epi: x / Bacteria: x      CAPILLARY BLOOD GLUCOSE      POCT Blood Glucose.: 145 mg/dL (08 Oct 2023 10:45)  POCT Blood Glucose.: 168 mg/dL (08 Oct 2023 07:17)  POCT Blood Glucose.: 163 mg/dL (07 Oct 2023 21:24)  POCT Blood Glucose.: 147 mg/dL (07 Oct 2023 15:57)      RADIOLOGY & ADDITIONAL TESTS:  < from: Xray Knee 3 Views, Right (10.03.23 @ 12:23) >  IMPRESSION:  Minimally displaced fracture right proximal fibula shaft, grossly   unchanged    < end of copied text >    Imaging Personally Reviewed:  [ X] YES  [ ] NO    Consultant(s) Notes Reviewed:  [ X] YES  [ ] NO    Care Discussed with Consultants/Other Providers [X ] YES  [ ] NO  
DISPLAY PLAN FREE TEXT
DISPLAY PLAN FREE TEXT

## 2023-10-10 ENCOUNTER — APPOINTMENT (OUTPATIENT)
Dept: CT IMAGING | Facility: HOSPITAL | Age: 78
End: 2023-10-10

## 2023-10-10 ENCOUNTER — OUTPATIENT (OUTPATIENT)
Dept: OUTPATIENT SERVICES | Facility: HOSPITAL | Age: 78
LOS: 1 days | End: 2023-10-10
Payer: COMMERCIAL

## 2023-10-10 DIAGNOSIS — Z98.89 OTHER SPECIFIED POSTPROCEDURAL STATES: Chronic | ICD-10-CM

## 2023-10-10 DIAGNOSIS — I34.0 NONRHEUMATIC MITRAL (VALVE) INSUFFICIENCY: ICD-10-CM

## 2023-10-10 PROCEDURE — 93312 ECHO TRANSESOPHAGEAL: CPT | Mod: 26

## 2023-10-10 PROCEDURE — 93312 ECHO TRANSESOPHAGEAL: CPT

## 2023-10-19 NOTE — ED ADULT NURSE NOTE - TEMPLATE
Communicable/Infectious Mustarde Flap Text: The defect edges were debeveled with a #15 scalpel blade.  Given the size, depth and location of the defect and the proximity to free margins a Mustarde flap was deemed most appropriate.  Using a sterile surgical marker, an appropriate flap was drawn incorporating the defect. The area thus outlined was incised with a #15 scalpel blade.  The skin margins were undermined to an appropriate distance in all directions utilizing iris scissors.

## 2023-10-24 ENCOUNTER — APPOINTMENT (OUTPATIENT)
Dept: HEART AND VASCULAR | Facility: CLINIC | Age: 78
End: 2023-10-24
Payer: COMMERCIAL

## 2023-10-24 VITALS
SYSTOLIC BLOOD PRESSURE: 119 MMHG | HEIGHT: 68 IN | WEIGHT: 187 LBS | DIASTOLIC BLOOD PRESSURE: 85 MMHG | HEART RATE: 69 BPM | OXYGEN SATURATION: 98 % | BODY MASS INDEX: 28.34 KG/M2 | TEMPERATURE: 98.3 F

## 2023-10-24 PROCEDURE — 99213 OFFICE O/P EST LOW 20 MIN: CPT

## 2023-10-25 ENCOUNTER — APPOINTMENT (OUTPATIENT)
Dept: HEART AND VASCULAR | Facility: CLINIC | Age: 78
End: 2023-10-25
Payer: COMMERCIAL

## 2023-10-25 ENCOUNTER — NON-APPOINTMENT (OUTPATIENT)
Age: 78
End: 2023-10-25

## 2023-10-25 VITALS
BODY MASS INDEX: 28.34 KG/M2 | DIASTOLIC BLOOD PRESSURE: 87 MMHG | HEART RATE: 69 BPM | HEIGHT: 68 IN | TEMPERATURE: 97 F | WEIGHT: 187 LBS | SYSTOLIC BLOOD PRESSURE: 126 MMHG

## 2023-10-25 DIAGNOSIS — Z95.810 PRESENCE OF AUTOMATIC (IMPLANTABLE) CARDIAC DEFIBRILLATOR: ICD-10-CM

## 2023-10-25 PROCEDURE — 99213 OFFICE O/P EST LOW 20 MIN: CPT | Mod: 25

## 2023-10-25 PROCEDURE — 93284 PRGRMG EVAL IMPLANTABLE DFB: CPT

## 2023-10-26 LAB — NT-PROBNP SERPL-MCNC: 1152 PG/ML

## 2023-10-30 ENCOUNTER — APPOINTMENT (OUTPATIENT)
Dept: CARDIOTHORACIC SURGERY | Facility: CLINIC | Age: 78
End: 2023-10-30
Payer: COMMERCIAL

## 2023-10-30 VITALS
HEART RATE: 65 BPM | HEIGHT: 68 IN | WEIGHT: 188 LBS | RESPIRATION RATE: 15 BRPM | OXYGEN SATURATION: 99 % | SYSTOLIC BLOOD PRESSURE: 109 MMHG | DIASTOLIC BLOOD PRESSURE: 68 MMHG | BODY MASS INDEX: 28.49 KG/M2 | TEMPERATURE: 97.4 F

## 2023-10-30 PROCEDURE — 99214 OFFICE O/P EST MOD 30 MIN: CPT

## 2023-11-02 VITALS
OXYGEN SATURATION: 98 % | HEIGHT: 69 IN | SYSTOLIC BLOOD PRESSURE: 144 MMHG | TEMPERATURE: 97 F | DIASTOLIC BLOOD PRESSURE: 104 MMHG | WEIGHT: 188.94 LBS | HEART RATE: 69 BPM

## 2023-11-02 RX ORDER — PANTOPRAZOLE SODIUM 20 MG/1
1 TABLET, DELAYED RELEASE ORAL
Qty: 0 | Refills: 0 | DISCHARGE

## 2023-11-02 RX ORDER — TRAMADOL HYDROCHLORIDE 50 MG/1
1 TABLET ORAL
Qty: 0 | Refills: 0 | DISCHARGE

## 2023-11-02 RX ORDER — APIXABAN 2.5 MG/1
1 TABLET, FILM COATED ORAL
Qty: 0 | Refills: 0 | DISCHARGE

## 2023-11-02 RX ORDER — SACUBITRIL AND VALSARTAN 24; 26 MG/1; MG/1
1 TABLET, FILM COATED ORAL
Qty: 0 | Refills: 0 | DISCHARGE

## 2023-11-02 RX ORDER — METOPROLOL TARTRATE 50 MG
1 TABLET ORAL
Qty: 0 | Refills: 0 | DISCHARGE

## 2023-11-02 RX ORDER — CHOLECALCIFEROL (VITAMIN D3) 125 MCG
1 CAPSULE ORAL
Qty: 0 | Refills: 0 | DISCHARGE

## 2023-11-02 RX ORDER — AMIODARONE HYDROCHLORIDE 400 MG/1
1 TABLET ORAL
Qty: 0 | Refills: 0 | DISCHARGE

## 2023-11-02 RX ORDER — MAGNESIUM OXIDE 400 MG ORAL TABLET 241.3 MG
1 TABLET ORAL
Qty: 0 | Refills: 0 | DISCHARGE

## 2023-11-02 RX ORDER — DAPAGLIFLOZIN 10 MG/1
1 TABLET, FILM COATED ORAL
Qty: 0 | Refills: 0 | DISCHARGE

## 2023-11-02 RX ORDER — GABAPENTIN 400 MG/1
1 CAPSULE ORAL
Qty: 0 | Refills: 0 | DISCHARGE

## 2023-11-02 RX ORDER — ROSUVASTATIN CALCIUM 5 MG/1
1 TABLET ORAL
Qty: 0 | Refills: 0 | DISCHARGE

## 2023-11-02 RX ORDER — METOPROLOL TARTRATE 50 MG
2 TABLET ORAL
Qty: 0 | Refills: 0 | DISCHARGE

## 2023-11-02 NOTE — H&P ADULT - NSHPLABSRESULTS_GEN_ALL_CORE
14.1   5.20  )-----------( 208      ( 03 Nov 2023 08:14 )             43.7       11-03    140  |  107  |  35<H>  ----------------------------<  109<H>  See note   |  23  |  1.58<H>    Ca    9.2      03 Nov 2023 08:11    TPro  8.4<H>  /  Alb  3.9  /  TBili  0.4  /  DBili  0.2  /  AST  See note  /  ALT  See note  /  AlkPhos  52  11-03      PT/INR - ( 03 Nov 2023 08:14 )   PT: 10.9 sec;   INR: 0.95          PTT - ( 03 Nov 2023 08:14 )  PTT:27.3 sec    CARDIAC MARKERS ( 03 Nov 2023 08:11 )  x     / x     / 101 U/L / x     / 1.6 ng/mL        Urinalysis Basic - ( 03 Nov 2023 08:11 )    Color: x / Appearance: x / SG: x / pH: x  Gluc: 109 mg/dL / Ketone: x  / Bili: x / Urobili: x   Blood: x / Protein: x / Nitrite: x   Leuk Esterase: x / RBC: x / WBC x   Sq Epi: x / Non Sq Epi: x / Bacteria: x

## 2023-11-02 NOTE — H&P ADULT - HISTORY OF PRESENT ILLNESS
Cardiologist: Dr. Chandler  Pharmacy:  Escort:    *No office notes faxed - history obtained from HIE notes*    77 yo male with a PMHx of HLD, aortic aneurysm (stable, monitored by Dr. Garcia), s/p SAVR and MV repair w ring in 2014 (Dr. Louise), CKD stage 3 (most recent Cr 2.03 7/2023, follows w Dr. Oh) pAfib (on Eliquis, last dose ____, follows w EP Dr. Joseph), PVC ablation, HFrEF (EF 10/15%, follows w HF Dr. Sheppard), CCRT-D ([facility]), moderate MR, recent ED visit for BRBPR (dx w anal fissures) who presented to his outpt cardiologist complaining of progressing dyspnea on minimal exertion with associated fatigue and atypical chest pain w associated 1-2 pillow orthopnea Pt denies any ___  palpitations, dizziness, syncope, diaphoresis, LE edema, PND, N/V/D, abd pain, cough, congestion, fever, chills or recent sick contact.    JIGAR 10/10/23: Mildly dilated LV. EF 10-15%. Normal RV size/fctn. Normal LA. A bioprosthetic valve noted in the aortic position which appears to be functioning normally. An annuloplasty ring is noted in the mitral position with mild MR. No pHTN. Mildly dilated aortic root. Mildly dilated ascending aorta.  TTE 10/2/23:  1. Mildly dilated LV. Severely reduced LVSF EF 10-15% with global hypokinesis. Normal RV size/fctn. Mitral annular prosthesis with mild mitral regurgitation. Bioprosthetic valve is seen in the aortic position with normal   function. Moderately dilated ascending aorta. Moderately dilated aortic root.  LHC 3/18/21: Normal coronaries.    In light of pts risk factors and continued CCS anginal equivalent symptoms, pt now presents to [facility] for RHC w Dr. Sheppard to determine if palliative inotropes are indicated.      Cardiologist: Dr. Chandler  Pharmacy:  Escort:    *No office notes faxed - history obtained from HIE notes*    79 yo male with a PMHx of HLD, aortic aneurysm (stable, monitored by Dr. Garcia), s/p SAVR and MV repair w ring in 2014 (Dr. Louise), CKD stage 3 (most recent Cr 2.03 7/2023, follows w Dr. Oh) pAfib (on Eliquis, last dose ____, follows w EP Dr. Joseph), PVC ablation, HFrEF (EF 10/15%, follows w HF Dr. Sheppard), CCRT-D ([facility]), moderate MR, recent ED visit for BRBPR (dx w anal fissures) who presented to his outpt cardiologist complaining of progressing dyspnea on minimal exertion with associated fatigue and atypical chest pain w associated 1-2 pillow orthopnea. Pt denies any ___  palpitations, dizziness, syncope, diaphoresis, LE edema, PND, N/V/D, abd pain, cough, congestion, fever, chills or recent sick contact.    JIGAR 10/10/23: Mildly dilated LV. EF 10-15%. Normal RV size/fctn. Normal LA. A bioprosthetic valve noted in the aortic position which appears to be functioning normally. An annuloplasty ring is noted in the mitral position with mild MR. No pHTN. Mildly dilated aortic root. Mildly dilated ascending aorta.  TTE 10/2/23:  Mildly dilated LV. Severely reduced LVSF EF 10-15% with global hypokinesis. Normal RV size/fctn. Mitral annular prosthesis with mild mitral regurgitation. Bioprosthetic valve is seen in the aortic position with normal   function. Moderately dilated ascending aorta. Moderately dilated aortic root.  LHC 3/18/21: Normal coronaries.    In light of pts risk factors and continued CCS anginal equivalent symptoms, pt now presents to [facility] for RHC w Dr. Sheppard to determine if palliative inotropes are indicated.      Cardiologist: Dr. Chandler  Pharmacy: Unknown  Escort: Daughter Margarita    *No office notes faxed - history obtained from HIE notes*    77 yo male with a PMHx of HLD, aortic aneurysm (stable, monitored by Dr. Garcia), s/p SAVR and MV repair w ring in 2014 (Dr. Louise), CKD stage 3 (most recent Cr 2.03 7/2023, follows w Dr. Oh) pAfib (on Eliquis, last dose10/31/23, follows w EP Dr. Joseph), PVC ablation, HFrEF (EF 10/15%, follows w HF Dr. Sheppard), CCRT-D ([facility]), moderate MR, recent ED visit for BRBPR (dx w anal fissures) who presented to his outpt cardiologist complaining of progressing dyspnea on minimal exertion (1 block)  with associated fatigue and atypical chest pain. Pt states he used to be quite active and over the last couple of months has not been able to walk as long. Pt denies chest pain at rest. Pt has had chest pain since his surgery in 2014. Pt denies any palpitations, dizziness, syncope, diaphoresis, LE edema, PND, N/V/D, abd pain, cough, congestion, fever, chills or recent sick contact.    JIGAR 10/10/23: Mildly dilated LV. EF 10-15%. Normal RV size/fctn. Normal LA. A bioprosthetic valve noted in the aortic position which appears to be functioning normally. An annuloplasty ring is noted in the mitral position with mild MR. No pHTN. Mildly dilated aortic root. Mildly dilated ascending aorta.  TTE 10/2/23:  Mildly dilated LV. Severely reduced LVSF EF 10-15% with global hypokinesis. Normal RV size/fctn. Mitral annular prosthesis with mild mitral regurgitation. Bioprosthetic valve is seen in the aortic position with normal   function. Moderately dilated ascending aorta. Moderately dilated aortic root.  C 3/18/21: Normal coronaries.    In light of pts risk factors and continued CCS III anginal equivalent symptoms, pt now presents to [facility] for RHC w Dr. Sheppard to determine if palliative inotropes are indicated.      Cardiologist: Dr. Chandler  Pharmacy: Unknown  Escort: Daughter Margarita    79 yo male with a PMHx of HLD, aortic aneurysm (stable, monitored by Dr. Garcia), s/p SAVR and MV repair w ring in 2014 (Dr. Louise), CKD stage 3 (most recent Cr 2.03 7/2023, follows w Dr. Oh) pAfib (on Eliquis, last dose10/31/23, follows w EP Dr. Joseph), PVC ablation, HFrEF (EF 10/15%, follows w HF Dr. Sheppard), CCRT-D ([facility]), moderate MR, recent ED visit for BRBPR (dx w anal fissures) who presented to his outpt cardiologist complaining of progressing dyspnea on minimal exertion (1 block)  with associated fatigue and atypical chest pain. Pt states he used to be quite active and over the last couple of months has not been able to walk as long. Pt denies chest pain at rest. Pt has had chest pain since his surgery in 2014. Pt denies any palpitations, dizziness, syncope, diaphoresis, LE edema, PND, N/V/D, abd pain, cough, congestion, fever, chills or recent sick contact.    JIGAR 10/10/23: Mildly dilated LV. EF 10-15%. Normal RV size/fctn. Normal LA. A bioprosthetic valve noted in the aortic position which appears to be functioning normally. An annuloplasty ring is noted in the mitral position with mild MR. No pHTN. Mildly dilated aortic root. Mildly dilated ascending aorta.  TTE 10/2/23:  Mildly dilated LV. Severely reduced LVSF EF 10-15% with global hypokinesis. Normal RV size/fctn. Mitral annular prosthesis with mild mitral regurgitation. Bioprosthetic valve is seen in the aortic position with normal   function. Moderately dilated ascending aorta. Moderately dilated aortic root.  C 3/18/21: Normal coronaries.    In light of pts risk factors and continued CCS III anginal equivalent symptoms, pt now presents to [facility] for RHC w Dr. Sheppard to determine if palliative inotropes are indicated.

## 2023-11-02 NOTE — H&P ADULT - ASSESSMENT
77 yo male with a PMHx of HLD, aortic aneurysm (stable, monitored by Dr. Garcia), s/p SAVR and MV repair w ring in 2014 (Dr. Louise), CKD stage 3 (most recent Cr 2.03 7/2023, follows w Dr. Oh) pAfib (on Eliquis, last dose10/31/23, follows w EP Dr. Joseph), PVC ablation, HFrEF (EF 10/15%, follows w HF Dr. Sheppard), CCRT-D ([facility]), moderate MR, recent ED visit for BRBPR (dx w anal fissures) who presents to [facility] for RHC with Dr. Sheppard in the setting of CCS class III anginal/anginal equivalent symptoms.       EKG: Normal paced rhythm with widened QRS (158ms) and prolonged QTC of 544ms		  ASA: III  Mallampati class:III   Anginal Class: III    -No Known Allergies  statins (Muscle Pain; Joint Pain)    -H/H = 14.1/43.7  . Pt denies BRBPR, hematuria, hematochezia, melena.    BUN/Cr = 35/1.58  EF=10-20%.    Sedation Plan:   Moderate  Patient Is Suitable Candidate For Sedation?     Yes    Risks & benefits of procedure and alternative therapy have been explained to the patient including but not limited to: allergic reaction, bleeding with possible need for blood transfusion, infection, renal and vascular compromise, limb damage, arrhythmia, stroke, vessel dissection/perforation, myocardial infarction, and emergent CABG. Informed consent obtained at bedside and included in chart.

## 2023-11-03 ENCOUNTER — INPATIENT (INPATIENT)
Facility: HOSPITAL | Age: 78
LOS: 3 days | Discharge: HOME | DRG: 253 | End: 2023-11-07
Attending: INTERNAL MEDICINE | Admitting: INTERNAL MEDICINE
Payer: COMMERCIAL

## 2023-11-03 DIAGNOSIS — Z98.89 OTHER SPECIFIED POSTPROCEDURAL STATES: Chronic | ICD-10-CM

## 2023-11-03 LAB
A1C WITH ESTIMATED AVERAGE GLUCOSE RESULT: 6.7 % — HIGH (ref 4–5.6)
A1C WITH ESTIMATED AVERAGE GLUCOSE RESULT: 6.7 % — HIGH (ref 4–5.6)
ALBUMIN SERPL ELPH-MCNC: 3.9 G/DL — SIGNIFICANT CHANGE UP (ref 3.3–5)
ALBUMIN SERPL ELPH-MCNC: 3.9 G/DL — SIGNIFICANT CHANGE UP (ref 3.3–5)
ALP SERPL-CCNC: 52 U/L — SIGNIFICANT CHANGE UP (ref 40–120)
ALP SERPL-CCNC: 52 U/L — SIGNIFICANT CHANGE UP (ref 40–120)
ALT FLD-CCNC: SIGNIFICANT CHANGE UP U/L (ref 10–45)
ALT FLD-CCNC: SIGNIFICANT CHANGE UP U/L (ref 10–45)
ANION GAP SERPL CALC-SCNC: 10 MMOL/L — SIGNIFICANT CHANGE UP (ref 5–17)
ANION GAP SERPL CALC-SCNC: 10 MMOL/L — SIGNIFICANT CHANGE UP (ref 5–17)
APTT BLD: 177.9 SEC — CRITICAL HIGH (ref 24.5–35.6)
APTT BLD: 177.9 SEC — CRITICAL HIGH (ref 24.5–35.6)
APTT BLD: 27.3 SEC — SIGNIFICANT CHANGE UP (ref 24.5–35.6)
APTT BLD: 27.3 SEC — SIGNIFICANT CHANGE UP (ref 24.5–35.6)
AST SERPL-CCNC: SIGNIFICANT CHANGE UP U/L (ref 10–40)
AST SERPL-CCNC: SIGNIFICANT CHANGE UP U/L (ref 10–40)
BASE EXCESS BLDV CALC-SCNC: -0.2 MMOL/L — SIGNIFICANT CHANGE UP (ref -2–3)
BASE EXCESS BLDV CALC-SCNC: -0.2 MMOL/L — SIGNIFICANT CHANGE UP (ref -2–3)
BASE EXCESS BLDV CALC-SCNC: -0.4 MMOL/L — SIGNIFICANT CHANGE UP (ref -2–3)
BASE EXCESS BLDV CALC-SCNC: -0.4 MMOL/L — SIGNIFICANT CHANGE UP (ref -2–3)
BASOPHILS # BLD AUTO: 0.01 K/UL — SIGNIFICANT CHANGE UP (ref 0–0.2)
BASOPHILS # BLD AUTO: 0.01 K/UL — SIGNIFICANT CHANGE UP (ref 0–0.2)
BASOPHILS NFR BLD AUTO: 0.2 % — SIGNIFICANT CHANGE UP (ref 0–2)
BASOPHILS NFR BLD AUTO: 0.2 % — SIGNIFICANT CHANGE UP (ref 0–2)
BILIRUB DIRECT SERPL-MCNC: 0.2 MG/DL — SIGNIFICANT CHANGE UP (ref 0–0.3)
BILIRUB DIRECT SERPL-MCNC: 0.2 MG/DL — SIGNIFICANT CHANGE UP (ref 0–0.3)
BILIRUB INDIRECT FLD-MCNC: 0.2 MG/DL — SIGNIFICANT CHANGE UP (ref 0.2–1)
BILIRUB INDIRECT FLD-MCNC: 0.2 MG/DL — SIGNIFICANT CHANGE UP (ref 0.2–1)
BILIRUB SERPL-MCNC: 0.4 MG/DL — SIGNIFICANT CHANGE UP (ref 0.2–1.2)
BILIRUB SERPL-MCNC: 0.4 MG/DL — SIGNIFICANT CHANGE UP (ref 0.2–1.2)
BLOOD GAS VENOUS - BLOOD UREA NITROGEN: 38 MG/DL — HIGH (ref 7–23)
BLOOD GAS VENOUS - BLOOD UREA NITROGEN: 38 MG/DL — HIGH (ref 7–23)
BLOOD GAS VENOUS - CREATININE: 2 MG/DL — HIGH (ref 0.5–1.3)
BLOOD GAS VENOUS - CREATININE: 2 MG/DL — HIGH (ref 0.5–1.3)
BUN SERPL-MCNC: 35 MG/DL — HIGH (ref 7–23)
BUN SERPL-MCNC: 35 MG/DL — HIGH (ref 7–23)
CA-I SERPL-SCNC: 1.21 MMOL/L — SIGNIFICANT CHANGE UP (ref 1.15–1.33)
CA-I SERPL-SCNC: 1.21 MMOL/L — SIGNIFICANT CHANGE UP (ref 1.15–1.33)
CA-I SERPL-SCNC: 1.22 MMOL/L — SIGNIFICANT CHANGE UP (ref 1.15–1.33)
CA-I SERPL-SCNC: 1.22 MMOL/L — SIGNIFICANT CHANGE UP (ref 1.15–1.33)
CA-I SERPL-SCNC: 1.24 MMOL/L — SIGNIFICANT CHANGE UP (ref 1.15–1.33)
CA-I SERPL-SCNC: 1.24 MMOL/L — SIGNIFICANT CHANGE UP (ref 1.15–1.33)
CALCIUM SERPL-MCNC: 9.2 MG/DL — SIGNIFICANT CHANGE UP (ref 8.4–10.5)
CALCIUM SERPL-MCNC: 9.2 MG/DL — SIGNIFICANT CHANGE UP (ref 8.4–10.5)
CHLORIDE BLDV-SCNC: 108 MMOL/L — SIGNIFICANT CHANGE UP (ref 96–108)
CHLORIDE BLDV-SCNC: 108 MMOL/L — SIGNIFICANT CHANGE UP (ref 96–108)
CHLORIDE SERPL-SCNC: 107 MMOL/L — SIGNIFICANT CHANGE UP (ref 96–108)
CHLORIDE SERPL-SCNC: 107 MMOL/L — SIGNIFICANT CHANGE UP (ref 96–108)
CHOLEST SERPL-MCNC: 295 MG/DL — HIGH
CHOLEST SERPL-MCNC: 295 MG/DL — HIGH
CK MB CFR SERPL CALC: 1.6 NG/ML — SIGNIFICANT CHANGE UP (ref 0–6.7)
CK MB CFR SERPL CALC: 1.6 NG/ML — SIGNIFICANT CHANGE UP (ref 0–6.7)
CK SERPL-CCNC: 101 U/L — SIGNIFICANT CHANGE UP (ref 30–200)
CK SERPL-CCNC: 101 U/L — SIGNIFICANT CHANGE UP (ref 30–200)
CO2 BLDV-SCNC: 22.1 MMOL/L — SIGNIFICANT CHANGE UP (ref 22–26)
CO2 BLDV-SCNC: 22.1 MMOL/L — SIGNIFICANT CHANGE UP (ref 22–26)
CO2 BLDV-SCNC: 26.1 MMOL/L — HIGH (ref 22–26)
CO2 BLDV-SCNC: 26.1 MMOL/L — HIGH (ref 22–26)
CO2 BLDV-SCNC: 27.4 MMOL/L — HIGH (ref 22–26)
CO2 BLDV-SCNC: 27.4 MMOL/L — HIGH (ref 22–26)
CO2 SERPL-SCNC: 23 MMOL/L — SIGNIFICANT CHANGE UP (ref 22–31)
CO2 SERPL-SCNC: 23 MMOL/L — SIGNIFICANT CHANGE UP (ref 22–31)
COHGB MFR BLDV: 1.1 % — SIGNIFICANT CHANGE UP
COHGB MFR BLDV: 1.1 % — SIGNIFICANT CHANGE UP
COHGB MFR BLDV: 1.2 % — SIGNIFICANT CHANGE UP
COHGB MFR BLDV: 1.2 % — SIGNIFICANT CHANGE UP
COHGB MFR BLDV: 1.3 % — SIGNIFICANT CHANGE UP
CREAT SERPL-MCNC: 1.58 MG/DL — HIGH (ref 0.5–1.3)
CREAT SERPL-MCNC: 1.58 MG/DL — HIGH (ref 0.5–1.3)
EGFR: 44 ML/MIN/1.73M2 — LOW
EGFR: 44 ML/MIN/1.73M2 — LOW
EOSINOPHIL # BLD AUTO: 0.01 K/UL — SIGNIFICANT CHANGE UP (ref 0–0.5)
EOSINOPHIL # BLD AUTO: 0.01 K/UL — SIGNIFICANT CHANGE UP (ref 0–0.5)
EOSINOPHIL NFR BLD AUTO: 0.2 % — SIGNIFICANT CHANGE UP (ref 0–6)
EOSINOPHIL NFR BLD AUTO: 0.2 % — SIGNIFICANT CHANGE UP (ref 0–6)
ESTIMATED AVERAGE GLUCOSE: 146 MG/DL — HIGH (ref 68–114)
ESTIMATED AVERAGE GLUCOSE: 146 MG/DL — HIGH (ref 68–114)
GAS PNL BLDV: 139 MMOL/L — SIGNIFICANT CHANGE UP (ref 136–145)
GAS PNL BLDV: 139 MMOL/L — SIGNIFICANT CHANGE UP (ref 136–145)
GAS PNL BLDV: 140 MMOL/L — SIGNIFICANT CHANGE UP (ref 136–145)
GAS PNL BLDV: 140 MMOL/L — SIGNIFICANT CHANGE UP (ref 136–145)
GAS PNL BLDV: 142 MMOL/L — SIGNIFICANT CHANGE UP (ref 136–145)
GAS PNL BLDV: 142 MMOL/L — SIGNIFICANT CHANGE UP (ref 136–145)
GLUCOSE BLDV-MCNC: 109 MG/DL — HIGH (ref 70–99)
GLUCOSE BLDV-MCNC: 109 MG/DL — HIGH (ref 70–99)
GLUCOSE BLDV-MCNC: 111 MG/DL — HIGH (ref 70–99)
GLUCOSE BLDV-MCNC: 111 MG/DL — HIGH (ref 70–99)
GLUCOSE BLDV-MCNC: 97 MG/DL — SIGNIFICANT CHANGE UP (ref 70–99)
GLUCOSE BLDV-MCNC: 97 MG/DL — SIGNIFICANT CHANGE UP (ref 70–99)
GLUCOSE SERPL-MCNC: 109 MG/DL — HIGH (ref 70–99)
GLUCOSE SERPL-MCNC: 109 MG/DL — HIGH (ref 70–99)
HCO3 BLDV-SCNC: 25 MMOL/L — SIGNIFICANT CHANGE UP (ref 22–29)
HCO3 BLDV-SCNC: 25 MMOL/L — SIGNIFICANT CHANGE UP (ref 22–29)
HCO3 BLDV-SCNC: 26 MMOL/L — SIGNIFICANT CHANGE UP (ref 22–29)
HCO3 BLDV-SCNC: 26 MMOL/L — SIGNIFICANT CHANGE UP (ref 22–29)
HCT VFR BLD CALC: 41.5 % — SIGNIFICANT CHANGE UP (ref 39–50)
HCT VFR BLD CALC: 41.5 % — SIGNIFICANT CHANGE UP (ref 39–50)
HCT VFR BLD CALC: 43.7 % — SIGNIFICANT CHANGE UP (ref 39–50)
HCT VFR BLD CALC: 43.7 % — SIGNIFICANT CHANGE UP (ref 39–50)
HCT VFR BLDA CALC: 43 % — SIGNIFICANT CHANGE UP
HCT VFR BLDA CALC: 43 % — SIGNIFICANT CHANGE UP
HCT VFR BLDA CALC: 48 % — SIGNIFICANT CHANGE UP
HCT VFR BLDA CALC: 48 % — SIGNIFICANT CHANGE UP
HDLC SERPL-MCNC: 79 MG/DL — SIGNIFICANT CHANGE UP
HDLC SERPL-MCNC: 79 MG/DL — SIGNIFICANT CHANGE UP
HGB BLD CALC-MCNC: 13.4 G/DL — SIGNIFICANT CHANGE UP (ref 12.6–17.4)
HGB BLD CALC-MCNC: 14.4 G/DL — SIGNIFICANT CHANGE UP (ref 12.6–17.4)
HGB BLD CALC-MCNC: 14.4 G/DL — SIGNIFICANT CHANGE UP (ref 12.6–17.4)
HGB BLD-MCNC: 13.3 G/DL — SIGNIFICANT CHANGE UP (ref 13–17)
HGB BLD-MCNC: 13.3 G/DL — SIGNIFICANT CHANGE UP (ref 13–17)
HGB BLD-MCNC: 14.1 G/DL — SIGNIFICANT CHANGE UP (ref 13–17)
HGB BLD-MCNC: 14.1 G/DL — SIGNIFICANT CHANGE UP (ref 13–17)
IMM GRANULOCYTES NFR BLD AUTO: 0.2 % — SIGNIFICANT CHANGE UP (ref 0–0.9)
IMM GRANULOCYTES NFR BLD AUTO: 0.2 % — SIGNIFICANT CHANGE UP (ref 0–0.9)
INR BLD: 0.95 — SIGNIFICANT CHANGE UP (ref 0.85–1.18)
INR BLD: 0.95 — SIGNIFICANT CHANGE UP (ref 0.85–1.18)
ISTAT INR: 1.1 — SIGNIFICANT CHANGE UP (ref 0.88–1.16)
ISTAT INR: 1.1 — SIGNIFICANT CHANGE UP (ref 0.88–1.16)
ISTAT PT: 12.7 SEC — SIGNIFICANT CHANGE UP (ref 10–12.9)
ISTAT PT: 12.7 SEC — SIGNIFICANT CHANGE UP (ref 10–12.9)
LACTATE BLDV-MCNC: 0.9 MMOL/L — SIGNIFICANT CHANGE UP (ref 0.5–2)
LACTATE BLDV-MCNC: 0.9 MMOL/L — SIGNIFICANT CHANGE UP (ref 0.5–2)
LIPID PNL WITH DIRECT LDL SERPL: 202 MG/DL — HIGH
LIPID PNL WITH DIRECT LDL SERPL: 202 MG/DL — HIGH
LYMPHOCYTES # BLD AUTO: 1.68 K/UL — SIGNIFICANT CHANGE UP (ref 1–3.3)
LYMPHOCYTES # BLD AUTO: 1.68 K/UL — SIGNIFICANT CHANGE UP (ref 1–3.3)
LYMPHOCYTES # BLD AUTO: 32.3 % — SIGNIFICANT CHANGE UP (ref 13–44)
LYMPHOCYTES # BLD AUTO: 32.3 % — SIGNIFICANT CHANGE UP (ref 13–44)
MCHC RBC-ENTMCNC: 32 GM/DL — SIGNIFICANT CHANGE UP (ref 32–36)
MCHC RBC-ENTMCNC: 32 GM/DL — SIGNIFICANT CHANGE UP (ref 32–36)
MCHC RBC-ENTMCNC: 32.3 GM/DL — SIGNIFICANT CHANGE UP (ref 32–36)
MCHC RBC-ENTMCNC: 32.3 GM/DL — SIGNIFICANT CHANGE UP (ref 32–36)
MCHC RBC-ENTMCNC: 32.3 PG — SIGNIFICANT CHANGE UP (ref 27–34)
MCHC RBC-ENTMCNC: 32.3 PG — SIGNIFICANT CHANGE UP (ref 27–34)
MCHC RBC-ENTMCNC: 32.4 PG — SIGNIFICANT CHANGE UP (ref 27–34)
MCHC RBC-ENTMCNC: 32.4 PG — SIGNIFICANT CHANGE UP (ref 27–34)
MCV RBC AUTO: 100 FL — SIGNIFICANT CHANGE UP (ref 80–100)
MCV RBC AUTO: 100 FL — SIGNIFICANT CHANGE UP (ref 80–100)
MCV RBC AUTO: 101.2 FL — HIGH (ref 80–100)
MCV RBC AUTO: 101.2 FL — HIGH (ref 80–100)
METHGB MFR BLDV: 0.3 % — SIGNIFICANT CHANGE UP
METHGB MFR BLDV: 0.3 % — SIGNIFICANT CHANGE UP
METHGB MFR BLDV: 0.6 % — SIGNIFICANT CHANGE UP
METHGB MFR BLDV: 0.6 % — SIGNIFICANT CHANGE UP
METHGB MFR BLDV: 0.7 % — SIGNIFICANT CHANGE UP
MONOCYTES # BLD AUTO: 0.45 K/UL — SIGNIFICANT CHANGE UP (ref 0–0.9)
MONOCYTES # BLD AUTO: 0.45 K/UL — SIGNIFICANT CHANGE UP (ref 0–0.9)
MONOCYTES NFR BLD AUTO: 8.7 % — SIGNIFICANT CHANGE UP (ref 2–14)
MONOCYTES NFR BLD AUTO: 8.7 % — SIGNIFICANT CHANGE UP (ref 2–14)
NEUTROPHILS # BLD AUTO: 3.04 K/UL — SIGNIFICANT CHANGE UP (ref 1.8–7.4)
NEUTROPHILS # BLD AUTO: 3.04 K/UL — SIGNIFICANT CHANGE UP (ref 1.8–7.4)
NEUTROPHILS NFR BLD AUTO: 58.4 % — SIGNIFICANT CHANGE UP (ref 43–77)
NEUTROPHILS NFR BLD AUTO: 58.4 % — SIGNIFICANT CHANGE UP (ref 43–77)
NON HDL CHOLESTEROL: 216 MG/DL — HIGH
NON HDL CHOLESTEROL: 216 MG/DL — HIGH
NRBC # BLD: 0 /100 WBCS — SIGNIFICANT CHANGE UP (ref 0–0)
PCO2 BLDV: 41 MMHG — LOW (ref 42–55)
PCO2 BLDV: 41 MMHG — LOW (ref 42–55)
PCO2 BLDV: 44 MMHG — SIGNIFICANT CHANGE UP (ref 42–55)
PCO2 BLDV: 44 MMHG — SIGNIFICANT CHANGE UP (ref 42–55)
PCO2 BLDV: 48 MMHG — SIGNIFICANT CHANGE UP (ref 42–55)
PCO2 BLDV: 48 MMHG — SIGNIFICANT CHANGE UP (ref 42–55)
PH BLDV: 7.34 — SIGNIFICANT CHANGE UP (ref 7.32–7.43)
PH BLDV: 7.34 — SIGNIFICANT CHANGE UP (ref 7.32–7.43)
PH BLDV: 7.39 — SIGNIFICANT CHANGE UP (ref 7.32–7.43)
PH BLDV: 7.39 — SIGNIFICANT CHANGE UP (ref 7.32–7.43)
PH BLDV: 7.4 — SIGNIFICANT CHANGE UP (ref 7.32–7.43)
PH BLDV: 7.4 — SIGNIFICANT CHANGE UP (ref 7.32–7.43)
PLATELET # BLD AUTO: 197 K/UL — SIGNIFICANT CHANGE UP (ref 150–400)
PLATELET # BLD AUTO: 197 K/UL — SIGNIFICANT CHANGE UP (ref 150–400)
PLATELET # BLD AUTO: 208 K/UL — SIGNIFICANT CHANGE UP (ref 150–400)
PLATELET # BLD AUTO: 208 K/UL — SIGNIFICANT CHANGE UP (ref 150–400)
PO2 BLDV: 38 MMHG — SIGNIFICANT CHANGE UP (ref 25–45)
PO2 BLDV: 38 MMHG — SIGNIFICANT CHANGE UP (ref 25–45)
PO2 BLDV: 44 MMHG — SIGNIFICANT CHANGE UP (ref 25–45)
PO2 BLDV: 44 MMHG — SIGNIFICANT CHANGE UP (ref 25–45)
POTASSIUM BLDV-SCNC: 4.3 MMOL/L — SIGNIFICANT CHANGE UP (ref 3.5–5.1)
POTASSIUM BLDV-SCNC: 4.3 MMOL/L — SIGNIFICANT CHANGE UP (ref 3.5–5.1)
POTASSIUM BLDV-SCNC: 4.5 MMOL/L — SIGNIFICANT CHANGE UP (ref 3.5–5.1)
POTASSIUM BLDV-SCNC: 4.5 MMOL/L — SIGNIFICANT CHANGE UP (ref 3.5–5.1)
POTASSIUM BLDV-SCNC: 4.8 MMOL/L — SIGNIFICANT CHANGE UP (ref 3.5–5.1)
POTASSIUM BLDV-SCNC: 4.8 MMOL/L — SIGNIFICANT CHANGE UP (ref 3.5–5.1)
POTASSIUM SERPL-MCNC: SIGNIFICANT CHANGE UP MMOL/L (ref 3.5–5.3)
POTASSIUM SERPL-MCNC: SIGNIFICANT CHANGE UP MMOL/L (ref 3.5–5.3)
POTASSIUM SERPL-SCNC: SIGNIFICANT CHANGE UP MMOL/L (ref 3.5–5.3)
POTASSIUM SERPL-SCNC: SIGNIFICANT CHANGE UP MMOL/L (ref 3.5–5.3)
PROT SERPL-MCNC: 8.4 G/DL — HIGH (ref 6–8.3)
PROT SERPL-MCNC: 8.4 G/DL — HIGH (ref 6–8.3)
PROTHROM AB SERPL-ACNC: 10.9 SEC — SIGNIFICANT CHANGE UP (ref 9.5–13)
PROTHROM AB SERPL-ACNC: 10.9 SEC — SIGNIFICANT CHANGE UP (ref 9.5–13)
RBC # BLD: 4.1 M/UL — LOW (ref 4.2–5.8)
RBC # BLD: 4.1 M/UL — LOW (ref 4.2–5.8)
RBC # BLD: 4.37 M/UL — SIGNIFICANT CHANGE UP (ref 4.2–5.8)
RBC # BLD: 4.37 M/UL — SIGNIFICANT CHANGE UP (ref 4.2–5.8)
RBC # FLD: 14.4 % — SIGNIFICANT CHANGE UP (ref 10.3–14.5)
RBC # FLD: 14.4 % — SIGNIFICANT CHANGE UP (ref 10.3–14.5)
RBC # FLD: 14.5 % — SIGNIFICANT CHANGE UP (ref 10.3–14.5)
RBC # FLD: 14.5 % — SIGNIFICANT CHANGE UP (ref 10.3–14.5)
SAO2 % BLDV: 56 % — LOW (ref 67–88)
SAO2 % BLDV: 56 % — LOW (ref 67–88)
SAO2 % BLDV: 57 % — LOW (ref 67–88)
SAO2 % BLDV: 57 % — LOW (ref 67–88)
SAO2 % BLDV: 58 % — LOW (ref 67–88)
SAO2 % BLDV: 58 % — LOW (ref 67–88)
SAO2 % BLDV: 80 % — SIGNIFICANT CHANGE UP (ref 67–88)
SAO2 % BLDV: 80 % — SIGNIFICANT CHANGE UP (ref 67–88)
SODIUM SERPL-SCNC: 140 MMOL/L — SIGNIFICANT CHANGE UP (ref 135–145)
SODIUM SERPL-SCNC: 140 MMOL/L — SIGNIFICANT CHANGE UP (ref 135–145)
TRIGL SERPL-MCNC: 70 MG/DL — SIGNIFICANT CHANGE UP
TRIGL SERPL-MCNC: 70 MG/DL — SIGNIFICANT CHANGE UP
WBC # BLD: 5.2 K/UL — SIGNIFICANT CHANGE UP (ref 3.8–10.5)
WBC # BLD: 5.2 K/UL — SIGNIFICANT CHANGE UP (ref 3.8–10.5)
WBC # BLD: 5.33 K/UL — SIGNIFICANT CHANGE UP (ref 3.8–10.5)
WBC # BLD: 5.33 K/UL — SIGNIFICANT CHANGE UP (ref 3.8–10.5)
WBC # FLD AUTO: 5.2 K/UL — SIGNIFICANT CHANGE UP (ref 3.8–10.5)
WBC # FLD AUTO: 5.2 K/UL — SIGNIFICANT CHANGE UP (ref 3.8–10.5)
WBC # FLD AUTO: 5.33 K/UL — SIGNIFICANT CHANGE UP (ref 3.8–10.5)
WBC # FLD AUTO: 5.33 K/UL — SIGNIFICANT CHANGE UP (ref 3.8–10.5)

## 2023-11-03 PROCEDURE — 93451 RIGHT HEART CATH: CPT | Mod: 26

## 2023-11-03 PROCEDURE — 99223 1ST HOSP IP/OBS HIGH 75: CPT | Mod: 25

## 2023-11-03 PROCEDURE — 99152 MOD SED SAME PHYS/QHP 5/>YRS: CPT

## 2023-11-03 RX ORDER — HEPARIN SODIUM 5000 [USP'U]/ML
3500 INJECTION INTRAVENOUS; SUBCUTANEOUS EVERY 6 HOURS
Refills: 0 | Status: DISCONTINUED | OUTPATIENT
Start: 2023-11-03 | End: 2023-11-06

## 2023-11-03 RX ORDER — HYDRALAZINE HCL 50 MG
50 TABLET ORAL THREE TIMES A DAY
Refills: 0 | Status: DISCONTINUED | OUTPATIENT
Start: 2023-11-03 | End: 2023-11-06

## 2023-11-03 RX ORDER — METOPROLOL TARTRATE 50 MG
100 TABLET ORAL
Refills: 0 | Status: DISCONTINUED | OUTPATIENT
Start: 2023-11-03 | End: 2023-11-06

## 2023-11-03 RX ORDER — DAPAGLIFLOZIN 10 MG/1
1 TABLET, FILM COATED ORAL
Refills: 0 | DISCHARGE

## 2023-11-03 RX ORDER — FAMOTIDINE 10 MG/ML
1 INJECTION INTRAVENOUS
Refills: 0 | DISCHARGE

## 2023-11-03 RX ORDER — SACUBITRIL AND VALSARTAN 24; 26 MG/1; MG/1
1 TABLET, FILM COATED ORAL
Refills: 0 | Status: DISCONTINUED | OUTPATIENT
Start: 2023-11-03 | End: 2023-11-06

## 2023-11-03 RX ORDER — AMIODARONE HYDROCHLORIDE 400 MG/1
100 TABLET ORAL DAILY
Refills: 0 | Status: DISCONTINUED | OUTPATIENT
Start: 2023-11-03 | End: 2023-11-06

## 2023-11-03 RX ORDER — HEPARIN SODIUM 5000 [USP'U]/ML
7000 INJECTION INTRAVENOUS; SUBCUTANEOUS EVERY 6 HOURS
Refills: 0 | Status: DISCONTINUED | OUTPATIENT
Start: 2023-11-03 | End: 2023-11-06

## 2023-11-03 RX ORDER — HEPARIN SODIUM 5000 [USP'U]/ML
INJECTION INTRAVENOUS; SUBCUTANEOUS
Qty: 25000 | Refills: 0 | Status: DISCONTINUED | OUTPATIENT
Start: 2023-11-03 | End: 2023-11-06

## 2023-11-03 RX ORDER — HEPARIN SODIUM 5000 [USP'U]/ML
INJECTION INTRAVENOUS; SUBCUTANEOUS
Qty: 25000 | Refills: 0 | Status: DISCONTINUED | OUTPATIENT
Start: 2023-11-03 | End: 2023-11-03

## 2023-11-03 RX ORDER — CHLORHEXIDINE GLUCONATE 213 G/1000ML
1 SOLUTION TOPICAL ONCE
Refills: 0 | Status: DISCONTINUED | OUTPATIENT
Start: 2023-11-03 | End: 2023-11-06

## 2023-11-03 RX ORDER — ISOSORBIDE DINITRATE 5 MG/1
20 TABLET ORAL THREE TIMES A DAY
Refills: 0 | Status: DISCONTINUED | OUTPATIENT
Start: 2023-11-03 | End: 2023-11-06

## 2023-11-03 RX ADMIN — HEPARIN SODIUM 1600 UNIT(S)/HR: 5000 INJECTION INTRAVENOUS; SUBCUTANEOUS at 20:18

## 2023-11-03 RX ADMIN — HEPARIN SODIUM 0 UNIT(S)/HR: 5000 INJECTION INTRAVENOUS; SUBCUTANEOUS at 22:15

## 2023-11-03 RX ADMIN — Medication 50 MILLIGRAM(S): at 14:50

## 2023-11-03 RX ADMIN — ISOSORBIDE DINITRATE 20 MILLIGRAM(S): 5 TABLET ORAL at 17:33

## 2023-11-03 RX ADMIN — HEPARIN SODIUM 1600 UNIT(S)/HR: 5000 INJECTION INTRAVENOUS; SUBCUTANEOUS at 15:48

## 2023-11-03 RX ADMIN — Medication 50 MILLIGRAM(S): at 23:04

## 2023-11-03 RX ADMIN — Medication 100 MILLIGRAM(S): at 23:04

## 2023-11-03 RX ADMIN — SACUBITRIL AND VALSARTAN 1 TABLET(S): 24; 26 TABLET, FILM COATED ORAL at 19:22

## 2023-11-03 RX ADMIN — HEPARIN SODIUM 1300 UNIT(S)/HR: 5000 INJECTION INTRAVENOUS; SUBCUTANEOUS at 23:25

## 2023-11-03 RX ADMIN — HEPARIN SODIUM 7000 UNIT(S): 5000 INJECTION INTRAVENOUS; SUBCUTANEOUS at 15:40

## 2023-11-03 NOTE — PATIENT PROFILE ADULT - FALL HARM RISK - HARM RISK INTERVENTIONS

## 2023-11-03 NOTE — CONSULT NOTE ADULT - SUBJECTIVE AND OBJECTIVE BOX
Patient is a 78y old  Male who presents with a chief complaint of Cardiac cath (02 Nov 2023 12:37)      HPI:  Cardiologist: Dr. Chandler  Pharmacy: Unknown  Escort: Daughter Margarita    77 yo male with a PMHx of HLD, aortic aneurysm (stable, monitored by Dr. Gacria), s/p SAVR and MV repair w ring in 2014 (Dr. Louise), CKD stage 3 (most recent Cr 2.03 7/2023, follows w Dr. Oh) pAfib (on Eliquis, last dose10/31/23, follows w EP Dr. Joseph), PVC ablation, HFrEF (EF 10/15%, follows w HF Dr. Sheppard), CCRT-D ([facility]), moderate MR, recent ED visit for BRBPR (dx w anal fissures) who presented to his outpt cardiologist complaining of progressing dyspnea on minimal exertion (1 block)  with associated fatigue and atypical chest pain. Pt states he used to be quite active and over the last couple of months has not been able to walk as long. Pt denies chest pain at rest. Pt has had chest pain since his surgery in 2014. Pt denies any palpitations, dizziness, syncope, diaphoresis, LE edema, PND, N/V/D, abd pain, cough, congestion, fever, chills or recent sick contact.    JIGAR 10/10/23: Mildly dilated LV. EF 10-15%. Normal RV size/fctn. Normal LA. A bioprosthetic valve noted in the aortic position which appears to be functioning normally. An annuloplasty ring is noted in the mitral position with mild MR. No pHTN. Mildly dilated aortic root. Mildly dilated ascending aorta.  TTE 10/2/23:  Mildly dilated LV. Severely reduced LVSF EF 10-15% with global hypokinesis. Normal RV size/fctn. Mitral annular prosthesis with mild mitral regurgitation. Bioprosthetic valve is seen in the aortic position with normal   function. Moderately dilated ascending aorta. Moderately dilated aortic root.  [facility] 3/18/21: Normal coronaries.    In light of pts risk factors and continued CCS III anginal equivalent symptoms, pt now presents to [facility] for RHC w Dr. Sheppard to determine if palliative inotropes are indicated.      (02 Nov 2023 12:37)      PAST MEDICAL & SURGICAL HISTORY:  Hypertension      S/P AVR (aortic valve replacement)      HLD (hyperlipidemia)      S/P ICD (internal cardiac defibrillator) procedure      Afib      History of thoracic aortic aneurysm repair      Stage 3 chronic kidney disease      PAF (paroxysmal atrial fibrillation)      Heart failure      Thoracic aortic aneurysm (TAA)      CAD (coronary artery disease)      Other postprocedural status  Right Shoulder      Other postprocedural status  S/P right knee arthroscopy      History of open heart surgery          HPI:                PREVIOUS DIAGNOSTIC TESTING:      ECHO  FINDINGS:    STRESS  FINDINGS:    CATHETERIZATION  FINDINGS:    MEDICATIONS  (STANDING):  aMIOdarone    Tablet 100 milliGRAM(s) Oral daily  chlorhexidine 4% Liquid 1 Application(s) Topical once  heparin  Infusion.  Unit(s)/Hr (16 mL/Hr) IV Continuous <Continuous>  hydrALAZINE 50 milliGRAM(s) Oral three times a day  isosorbide   dinitrate Tablet (ISORDIL) 20 milliGRAM(s) Oral three times a day  sacubitril 97 mG/valsartan 103 mG 1 Tablet(s) Oral two times a day    MEDICATIONS  (PRN):  heparin   Injectable 3500 Unit(s) IV Push every 6 hours PRN For aPTT between 40 - 57  heparin   Injectable 7000 Unit(s) IV Push every 6 hours PRN For aPTT less than 40      FAMILY HISTORY:      SOCIAL HISTORY:    CIGARETTES:    ALCOHOL:    REVIEW OF SYSTEMS      General:	    Skin/Breast:  	  Ophthalmologic:  	  ENMT:	    Respiratory and Thorax:  	  Cardiovascular:	    Gastrointestinal:	    Genitourinary:	    Musculoskeletal:	    Neurological:	    Psychiatric:	    Hematology/Lymphatics:	    Endocrine:	    Allergic/Immunologic:	    Vital Signs Last 24 Hrs  T(C): 36.1 (02 Nov 2023 12:37), Max: 36.1 (02 Nov 2023 12:37)  T(F): 97 (02 Nov 2023 12:37), Max: 97 (02 Nov 2023 12:37)  HR: 69 (02 Nov 2023 12:37) (69 - 69)  BP: 144/104 (02 Nov 2023 12:37) (144/104 - 144/104)  BP(mean): 117 (02 Nov 2023 12:37) (117 - 117)  RR: --  SpO2: 98% (02 Nov 2023 12:37) (98% - 98%)    Parameters below as of 02 Nov 2023 12:37  Patient On (Oxygen Delivery Method): room air        PHYSICAL EXAM:      Constitutional:    Eyes:    ENMT:    Neck:    Breasts:    Back:    Respiratory:    Cardiovascular:    Gastrointestinal:    Genitourinary:    Rectal:    Extremities:    Vascular:    Neurological:    Skin:    Lymph Nodes:    Musculoskeletal:    Psychiatric:            INTERPRETATION OF TELEMETRY:    ECG:    I&O's Detail      LABS:                        14.1   5.20  )-----------( 208      ( 03 Nov 2023 08:14 )             43.7     11-03    140  |  107  |  35<H>  ----------------------------<  109<H>  See note   |  23  |  1.58<H>    Ca    9.2      03 Nov 2023 08:11    TPro  8.4<H>  /  Alb  3.9  /  TBili  0.4  /  DBili  0.2  /  AST  See note  /  ALT  See note  /  AlkPhos  52  11-03    CARDIAC MARKERS ( 03 Nov 2023 08:11 )  x     / x     / 101 U/L / x     / 1.6 ng/mL      PT/INR - ( 03 Nov 2023 08:14 )   PT: 10.9 sec;   INR: 0.95          PTT - ( 03 Nov 2023 08:14 )  PTT:27.3 sec  Urinalysis Basic - ( 03 Nov 2023 08:11 )    Color: x / Appearance: x / SG: x / pH: x  Gluc: 109 mg/dL / Ketone: x  / Bili: x / Urobili: x   Blood: x / Protein: x / Nitrite: x   Leuk Esterase: x / RBC: x / WBC x   Sq Epi: x / Non Sq Epi: x / Bacteria: x      I&O's Summary    BNP  RADIOLOGY & ADDITIONAL STUDIES: 79 YO M with a history of ACC/AHA Stage C/D NICM (LV 6.2 cm, LVEF 25-30%) s/p ICD upgraded to CRT-D 9/2021 for chronic RV pacing, severe AI/MR s/p bioAVR/MVr 2014 history of VT/VT with ICD shocks and frequent PVC's s/p PVC ablation, pAF on eliquis, CKD IIIb (Cr 1.8), and aortic aneurysm who presented for outpatient RHC in setting of persistent NYHA IIIb symptoms which revealed elevated left sided filling pressures and low cardiac output in setting of high SVR. He was admitted for medical optimization and Barostim implantation.      All: none  Meds: Amiodarone 100, Eliquis 2.5 BID, Entresto 97/100, Farxiga 10, Hydralazine 25 TID, Isordil 10 TID, Toprol 100mg Qd, Torsemide 10mg QD   PSH: s/p AVR (aortic valve replacement), s/p ICD,  thoracic aortic aneurysm repair, s/p R knee arthroscopy    MEDICATIONS  (STANDING):  aMIOdarone    Tablet 100 milliGRAM(s) Oral daily  chlorhexidine 4% Liquid 1 Application(s) Topical once  heparin  Infusion.  Unit(s)/Hr (16 mL/Hr) IV Continuous <Continuous>  hydrALAZINE 50 milliGRAM(s) Oral three times a day  isosorbide   dinitrate Tablet (ISORDIL) 20 milliGRAM(s) Oral three times a day  sacubitril 97 mG/valsartan 103 mG 1 Tablet(s) Oral two times a day    MEDICATIONS  (PRN):  heparin   Injectable 3500 Unit(s) IV Push every 6 hours PRN For aPTT between 40 - 57  heparin   Injectable 7000 Unit(s) IV Push every 6 hours PRN For aPTT less than 40    Vital Signs Last 24 Hrs  T(C): 36.1 (02 Nov 2023 12:37), Max: 36.1 (02 Nov 2023 12:37)  T(F): 97 (02 Nov 2023 12:37), Max: 97 (02 Nov 2023 12:37)  HR: 69 (02 Nov 2023 12:37) (69 - 69)  BP: 144/104 (02 Nov 2023 12:37) (144/104 - 144/104)  BP(mean): 117 (02 Nov 2023 12:37) (117 - 117)  RR: --  SpO2: 98% (02 Nov 2023 12:37) (98% - 98%)    Parameters below as of 02 Nov 2023 12:37  Patient On (Oxygen Delivery Method): room air    PHYSICAL EXAM:  GENERAL: Middle aged male in NAD, speaks in full sentences, no signs of respiratory distress  HEAD:  Atraumatic, Normocephalic  EYES: EOMI, PERRLA, conjunctiva and sclera clear  NECK: Supple, JVP at clavicle   CHEST/LUNG: Clear to auscultation bilaterally; No wheeze; No crackles; No accessory muscles used  HEART: Regular rate and rhythm; No murmurs;   ABDOMEN: Soft, Nontender, Nondistended; Bowel sounds present; No guarding  EXTREMITIES:  2+ Peripheral Pulses, No cyanosis or edema  PSYCH: AAOx3  NEUROLOGY: non-focal  SKIN: No rashes or lesions    I&O's Detail      LABS:                        14.1   5.20  )-----------( 208      ( 03 Nov 2023 08:14 )             43.7     11-03    140  |  107  |  35<H>  ----------------------------<  109<H>  See note   |  23  |  1.58<H>    Ca    9.2      03 Nov 2023 08:11    TPro  8.4<H>  /  Alb  3.9  /  TBili  0.4  /  DBili  0.2  /  AST  See note  /  ALT  See note  /  AlkPhos  52  11-03    CARDIAC MARKERS ( 03 Nov 2023 08:11 )  x     / x     / 101 U/L / x     / 1.6 ng/mL      PT/INR - ( 03 Nov 2023 08:14 )   PT: 10.9 sec;   INR: 0.95          PTT - ( 03 Nov 2023 08:14 )  PTT:27.3 sec  Urinalysis Basic - ( 03 Nov 2023 08:11 )    Color: x / Appearance: x / SG: x / pH: x  Gluc: 109 mg/dL / Ketone: x  / Bili: x / Urobili: x   Blood: x / Protein: x / Nitrite: x   Leuk Esterase: x / RBC: x / WBC x   Sq Epi: x / Non Sq Epi: x / Bacteria: x      I&O's Summary    BNP  RADIOLOGY & ADDITIONAL STUDIES:

## 2023-11-03 NOTE — CONSULT NOTE ADULT - ASSESSMENT
78M with a history of ACC/AHA Stage C/D NICM (LV 6.2 cm, LVEF 25-30%) s/p ICD upgraded to CRT-D 9/2021 for chronic RV pacing, severe AI/MR s/p bioAVR/MVr 2014 history of VT/VT with ICD shocks and frequent PVC's s/p PVC ablation, pAF on eliquis, CKD IIIb (Cr 1.8), and aortic aneurysm who presented for outpatient RHC in setting of persistent NYHA IIIb symptoms. He was admitted for medical optimization and Barostim implantation.      Review of Studies:    RHC RFA 11/3/23: RA 9, PA 44/21(30), PCWP 21, PA sat 58% with Lorna CO/CI 3.3/1.6,  with SVR 2230      JIGAR 10/10/23: Mildly dilated LV. EF 10-15%. Normal RV size/fctn. Normal LA. A bioprosthetic valve noted in the aortic position which appears to be functioning normally. An annuloplasty ring is noted in the mitral position with mild MR. No pHTN. Mildly dilated aortic root. Mildly dilated ascending aorta.     TTE 10/2/23:  Mildly dilated LV. Severely reduced LVSF EF 10-15% with global hypokinesis. Normal RV size/fctn. Mitral annular prosthesis with mild mitral regurgitation. Bioprosthetic valve is seen in the aortic position with normal function. Moderately dilated ascending aorta. Moderately dilated aortic root.     [facility] 3/18/21: Normal coronaries 78M with a history of ACC/AHA Stage C/D NICM (LV 6.2 cm, LVEF 25-30%) s/p ICD upgraded to CRT-D 9/2021 for chronic RV pacing, severe AI/MR s/p bioAVR/MVr 2014 history of VT/VT with ICD shocks and frequent PVC's s/p PVC ablation, pAF on eliquis, CKD IIIb (Cr 1.8), and aortic aneurysm who presented for outpatient RHC in setting of persistent NYHA IIIb symptoms. He was admitted for medical optimization and Barostim implantation.      Review of Studies:    RHC RFA 11/3/23: RA 9, PA 44/21(30), PCWP 21, PA sat 58% with Lorna CO/CI 3.3/1.6,  with SVR 2230      JIGAR 10/10/23: Mildly dilated LV. EF 10-15%. Normal RV size/fctn. Normal LA. A bioprosthetic valve noted in the aortic position which appears to be functioning normally. An annuloplasty ring is noted in the mitral position with mild MR. No pHTN. Mildly dilated aortic root. Mildly dilated ascending aorta.     TTE 10/2/23:  Mildly dilated LV. Severely reduced LVSF EF 10-15% with global hypokinesis. Normal RV size/fctn. Mitral annular prosthesis with mild mitral regurgitation. Bioprosthetic valve is seen in the aortic position with normal function. Moderately dilated ascending aorta. Moderately dilated aortic root.     [facility] 3/18/21: Normal coronaries    #Chronic systolic heart failure   Etiology; NICM, possible infiltrative process, CMR deferred s/p device and advanced age.  Device: s/p CRT-D  - Planned for Barostim Monday with Jesus Manuel Gonzales and [place],   GDMT:   - continue entresto  mg BID  - please increased hydralazine to 50mg TID  - continue isordil 10mg TID and if BP tolerates increased to 20mg TID  - continue Toprol 100mg BID,   - hold MRA due to CKD and hyperkalemia.   - hold farxiga and can resume after Barostim implantation.   Diuretic: continue Torsemide 10mg QD  Advanced therapies: not a candidate for VAD/OHT due to comorbidities.   - Planning for Barostim implantation and tentative discharge Tuesday    # Valvular disease   - well functioning on recent JIGAR    # AF  CHADSVASC 4 on Eliquis as outpatient with multiple valvular interventions  - recommend bridge with heparin perioperatively

## 2023-11-03 NOTE — CONSULT NOTE ADULT - ATTENDING COMMENTS
79 YO M with a history of ACC/AHA Stage C/D NICM (LV 6.2 cm, LVEF 25-30%) s/p ICD upgraded to CRT-D 9/2021 for chronic RV pacing, severe AI/MR s/p bioAVR/MVr 2014 history of VT/VT with ICD shocks and frequent PVC's s/p PVC ablation, pAF on eliquis, CKD IIIb (Cr 1.8), and aortic aneurysm who presented for outpatient RHC with me in setting of persistent NYHA IIIb symptoms which revealed elevated left sided filling pressures and low cardiac output in setting of high SVR admitted for medical optimization and Barostim implantation.    Hemodynamics 11/3: RA 9, PA 44/21(30), PCWP 21, PA sat 58% with Lorna CO/CI 3.3/1.6,  with SVR 2230    Given NYHA III symptoms with low ejection fraction and poor candidacy for advanced therapies given age and comorbidities, he is an appropriate candidate for novel based HF medical therapy. In particular, Barostim has an indication for patients with LVEF < 35% and proBNP < 1600 as this patient has with data supporting possible improvements in quality of life. In addition, he has very high SVR despite maximum Entresto so the BP lowering from decreased sympathetic tone may improve symptoms and cardiac output.     PLAN  # Chronic systolic heart failure   - Planned for Barostim Monday with Jesus Manuel Gonzales and [place], reasoning as above  - Etiology: unclear etiology at this time, possibly infiltrative process given arrhythmia history. will defer MRI given device and age  - GDMT: current regimen is metoprolol succinate 100 BID, entresto  mg BID, Farxiga 10 mg daily, hydral 25 TID, and isordil 10 TID. defer MRA due to CKD and hyperkalemia. increase hydralizine to 50 mg TID and if tolerates can increase isordil to 20 mg TID. hold Farxiga and can resume after Barostim   - Diuretic: current regimen is torsemide 10 mg QD, would continue  - Device: s/p CRT-D, well functioning with > 95% BiV pacing  - Advanced therapies: not a candidate for VAD/OHT due to comorbidities. planning for Barostim as above     # Valvular disease   - well functioning on recent JIGAR    # AF  - hold eliquis given pending surgery and can bridge with heparin     Plan for tentative discharge Tuesday after Barostim

## 2023-11-04 DIAGNOSIS — N18.30 CHRONIC KIDNEY DISEASE, STAGE 3 UNSPECIFIED: ICD-10-CM

## 2023-11-04 DIAGNOSIS — I48.0 PAROXYSMAL ATRIAL FIBRILLATION: ICD-10-CM

## 2023-11-04 DIAGNOSIS — Z86.79 PERSONAL HISTORY OF OTHER DISEASES OF THE CIRCULATORY SYSTEM: ICD-10-CM

## 2023-11-04 DIAGNOSIS — Z95.2 PRESENCE OF PROSTHETIC HEART VALVE: ICD-10-CM

## 2023-11-04 DIAGNOSIS — Z98.890 OTHER SPECIFIED POSTPROCEDURAL STATES: ICD-10-CM

## 2023-11-04 DIAGNOSIS — I50.20 UNSPECIFIED SYSTOLIC (CONGESTIVE) HEART FAILURE: ICD-10-CM

## 2023-11-04 DIAGNOSIS — E78.5 HYPERLIPIDEMIA, UNSPECIFIED: ICD-10-CM

## 2023-11-04 LAB
ALBUMIN SERPL ELPH-MCNC: 3.3 G/DL — SIGNIFICANT CHANGE UP (ref 3.3–5)
ALBUMIN SERPL ELPH-MCNC: 3.3 G/DL — SIGNIFICANT CHANGE UP (ref 3.3–5)
ALP SERPL-CCNC: 49 U/L — SIGNIFICANT CHANGE UP (ref 40–120)
ALP SERPL-CCNC: 49 U/L — SIGNIFICANT CHANGE UP (ref 40–120)
ALT FLD-CCNC: 6 U/L — LOW (ref 10–45)
ALT FLD-CCNC: 6 U/L — LOW (ref 10–45)
ANION GAP SERPL CALC-SCNC: 8 MMOL/L — SIGNIFICANT CHANGE UP (ref 5–17)
ANION GAP SERPL CALC-SCNC: 8 MMOL/L — SIGNIFICANT CHANGE UP (ref 5–17)
APTT BLD: 143.2 SEC — CRITICAL HIGH (ref 24.5–35.6)
APTT BLD: 143.2 SEC — CRITICAL HIGH (ref 24.5–35.6)
APTT BLD: 88.8 SEC — HIGH (ref 24.5–35.6)
APTT BLD: 88.8 SEC — HIGH (ref 24.5–35.6)
APTT BLD: 95 SEC — HIGH (ref 24.5–35.6)
APTT BLD: 95 SEC — HIGH (ref 24.5–35.6)
AST SERPL-CCNC: 9 U/L — LOW (ref 10–40)
AST SERPL-CCNC: 9 U/L — LOW (ref 10–40)
BASOPHILS # BLD AUTO: 0.02 K/UL — SIGNIFICANT CHANGE UP (ref 0–0.2)
BASOPHILS # BLD AUTO: 0.02 K/UL — SIGNIFICANT CHANGE UP (ref 0–0.2)
BASOPHILS NFR BLD AUTO: 0.4 % — SIGNIFICANT CHANGE UP (ref 0–2)
BASOPHILS NFR BLD AUTO: 0.4 % — SIGNIFICANT CHANGE UP (ref 0–2)
BILIRUB SERPL-MCNC: 0.4 MG/DL — SIGNIFICANT CHANGE UP (ref 0.2–1.2)
BILIRUB SERPL-MCNC: 0.4 MG/DL — SIGNIFICANT CHANGE UP (ref 0.2–1.2)
BUN SERPL-MCNC: 34 MG/DL — HIGH (ref 7–23)
BUN SERPL-MCNC: 34 MG/DL — HIGH (ref 7–23)
CALCIUM SERPL-MCNC: 9 MG/DL — SIGNIFICANT CHANGE UP (ref 8.4–10.5)
CALCIUM SERPL-MCNC: 9 MG/DL — SIGNIFICANT CHANGE UP (ref 8.4–10.5)
CHLORIDE SERPL-SCNC: 111 MMOL/L — HIGH (ref 96–108)
CHLORIDE SERPL-SCNC: 111 MMOL/L — HIGH (ref 96–108)
CO2 SERPL-SCNC: 24 MMOL/L — SIGNIFICANT CHANGE UP (ref 22–31)
CO2 SERPL-SCNC: 24 MMOL/L — SIGNIFICANT CHANGE UP (ref 22–31)
CREAT SERPL-MCNC: 1.36 MG/DL — HIGH (ref 0.5–1.3)
CREAT SERPL-MCNC: 1.36 MG/DL — HIGH (ref 0.5–1.3)
EGFR: 53 ML/MIN/1.73M2 — LOW
EGFR: 53 ML/MIN/1.73M2 — LOW
EOSINOPHIL # BLD AUTO: 0.01 K/UL — SIGNIFICANT CHANGE UP (ref 0–0.5)
EOSINOPHIL # BLD AUTO: 0.01 K/UL — SIGNIFICANT CHANGE UP (ref 0–0.5)
EOSINOPHIL NFR BLD AUTO: 0.2 % — SIGNIFICANT CHANGE UP (ref 0–6)
EOSINOPHIL NFR BLD AUTO: 0.2 % — SIGNIFICANT CHANGE UP (ref 0–6)
GLUCOSE SERPL-MCNC: 114 MG/DL — HIGH (ref 70–99)
GLUCOSE SERPL-MCNC: 114 MG/DL — HIGH (ref 70–99)
HCT VFR BLD CALC: 43.4 % — SIGNIFICANT CHANGE UP (ref 39–50)
HCT VFR BLD CALC: 43.4 % — SIGNIFICANT CHANGE UP (ref 39–50)
HGB BLD-MCNC: 13.6 G/DL — SIGNIFICANT CHANGE UP (ref 13–17)
HGB BLD-MCNC: 13.6 G/DL — SIGNIFICANT CHANGE UP (ref 13–17)
IMM GRANULOCYTES NFR BLD AUTO: 0.2 % — SIGNIFICANT CHANGE UP (ref 0–0.9)
IMM GRANULOCYTES NFR BLD AUTO: 0.2 % — SIGNIFICANT CHANGE UP (ref 0–0.9)
LYMPHOCYTES # BLD AUTO: 1.75 K/UL — SIGNIFICANT CHANGE UP (ref 1–3.3)
LYMPHOCYTES # BLD AUTO: 1.75 K/UL — SIGNIFICANT CHANGE UP (ref 1–3.3)
LYMPHOCYTES # BLD AUTO: 35.9 % — SIGNIFICANT CHANGE UP (ref 13–44)
LYMPHOCYTES # BLD AUTO: 35.9 % — SIGNIFICANT CHANGE UP (ref 13–44)
MAGNESIUM SERPL-MCNC: 2.3 MG/DL — SIGNIFICANT CHANGE UP (ref 1.6–2.6)
MAGNESIUM SERPL-MCNC: 2.3 MG/DL — SIGNIFICANT CHANGE UP (ref 1.6–2.6)
MCHC RBC-ENTMCNC: 31.3 GM/DL — LOW (ref 32–36)
MCHC RBC-ENTMCNC: 31.3 GM/DL — LOW (ref 32–36)
MCHC RBC-ENTMCNC: 32.2 PG — SIGNIFICANT CHANGE UP (ref 27–34)
MCHC RBC-ENTMCNC: 32.2 PG — SIGNIFICANT CHANGE UP (ref 27–34)
MCV RBC AUTO: 102.8 FL — HIGH (ref 80–100)
MCV RBC AUTO: 102.8 FL — HIGH (ref 80–100)
MONOCYTES # BLD AUTO: 0.49 K/UL — SIGNIFICANT CHANGE UP (ref 0–0.9)
MONOCYTES # BLD AUTO: 0.49 K/UL — SIGNIFICANT CHANGE UP (ref 0–0.9)
MONOCYTES NFR BLD AUTO: 10.1 % — SIGNIFICANT CHANGE UP (ref 2–14)
MONOCYTES NFR BLD AUTO: 10.1 % — SIGNIFICANT CHANGE UP (ref 2–14)
NEUTROPHILS # BLD AUTO: 2.59 K/UL — SIGNIFICANT CHANGE UP (ref 1.8–7.4)
NEUTROPHILS # BLD AUTO: 2.59 K/UL — SIGNIFICANT CHANGE UP (ref 1.8–7.4)
NEUTROPHILS NFR BLD AUTO: 53.2 % — SIGNIFICANT CHANGE UP (ref 43–77)
NEUTROPHILS NFR BLD AUTO: 53.2 % — SIGNIFICANT CHANGE UP (ref 43–77)
NRBC # BLD: 0 /100 WBCS — SIGNIFICANT CHANGE UP (ref 0–0)
NRBC # BLD: 0 /100 WBCS — SIGNIFICANT CHANGE UP (ref 0–0)
PLATELET # BLD AUTO: 202 K/UL — SIGNIFICANT CHANGE UP (ref 150–400)
PLATELET # BLD AUTO: 202 K/UL — SIGNIFICANT CHANGE UP (ref 150–400)
POTASSIUM SERPL-MCNC: 4.8 MMOL/L — SIGNIFICANT CHANGE UP (ref 3.5–5.3)
POTASSIUM SERPL-MCNC: 4.8 MMOL/L — SIGNIFICANT CHANGE UP (ref 3.5–5.3)
POTASSIUM SERPL-SCNC: 4.8 MMOL/L — SIGNIFICANT CHANGE UP (ref 3.5–5.3)
POTASSIUM SERPL-SCNC: 4.8 MMOL/L — SIGNIFICANT CHANGE UP (ref 3.5–5.3)
PROT SERPL-MCNC: 7.1 G/DL — SIGNIFICANT CHANGE UP (ref 6–8.3)
PROT SERPL-MCNC: 7.1 G/DL — SIGNIFICANT CHANGE UP (ref 6–8.3)
RBC # BLD: 4.22 M/UL — SIGNIFICANT CHANGE UP (ref 4.2–5.8)
RBC # BLD: 4.22 M/UL — SIGNIFICANT CHANGE UP (ref 4.2–5.8)
RBC # FLD: 14.6 % — HIGH (ref 10.3–14.5)
RBC # FLD: 14.6 % — HIGH (ref 10.3–14.5)
SODIUM SERPL-SCNC: 143 MMOL/L — SIGNIFICANT CHANGE UP (ref 135–145)
SODIUM SERPL-SCNC: 143 MMOL/L — SIGNIFICANT CHANGE UP (ref 135–145)
WBC # BLD: 4.87 K/UL — SIGNIFICANT CHANGE UP (ref 3.8–10.5)
WBC # BLD: 4.87 K/UL — SIGNIFICANT CHANGE UP (ref 3.8–10.5)
WBC # FLD AUTO: 4.87 K/UL — SIGNIFICANT CHANGE UP (ref 3.8–10.5)
WBC # FLD AUTO: 4.87 K/UL — SIGNIFICANT CHANGE UP (ref 3.8–10.5)

## 2023-11-04 PROCEDURE — 99232 SBSQ HOSP IP/OBS MODERATE 35: CPT

## 2023-11-04 RX ADMIN — Medication 50 MILLIGRAM(S): at 21:13

## 2023-11-04 RX ADMIN — HEPARIN SODIUM 1000 UNIT(S)/HR: 5000 INJECTION INTRAVENOUS; SUBCUTANEOUS at 08:16

## 2023-11-04 RX ADMIN — HEPARIN SODIUM 1000 UNIT(S)/HR: 5000 INJECTION INTRAVENOUS; SUBCUTANEOUS at 20:12

## 2023-11-04 RX ADMIN — Medication 10 MILLIGRAM(S): at 07:12

## 2023-11-04 RX ADMIN — HEPARIN SODIUM 0 UNIT(S)/HR: 5000 INJECTION INTRAVENOUS; SUBCUTANEOUS at 06:08

## 2023-11-04 RX ADMIN — HEPARIN SODIUM 1000 UNIT(S)/HR: 5000 INJECTION INTRAVENOUS; SUBCUTANEOUS at 13:47

## 2023-11-04 RX ADMIN — Medication 50 MILLIGRAM(S): at 14:19

## 2023-11-04 RX ADMIN — ISOSORBIDE DINITRATE 20 MILLIGRAM(S): 5 TABLET ORAL at 05:34

## 2023-11-04 RX ADMIN — SACUBITRIL AND VALSARTAN 1 TABLET(S): 24; 26 TABLET, FILM COATED ORAL at 05:34

## 2023-11-04 RX ADMIN — Medication 100 MILLIGRAM(S): at 05:35

## 2023-11-04 RX ADMIN — ISOSORBIDE DINITRATE 20 MILLIGRAM(S): 5 TABLET ORAL at 20:22

## 2023-11-04 RX ADMIN — HEPARIN SODIUM 1000 UNIT(S)/HR: 5000 INJECTION INTRAVENOUS; SUBCUTANEOUS at 07:13

## 2023-11-04 RX ADMIN — HEPARIN SODIUM 1000 UNIT(S)/HR: 5000 INJECTION INTRAVENOUS; SUBCUTANEOUS at 11:40

## 2023-11-04 RX ADMIN — SACUBITRIL AND VALSARTAN 1 TABLET(S): 24; 26 TABLET, FILM COATED ORAL at 18:11

## 2023-11-04 RX ADMIN — ISOSORBIDE DINITRATE 20 MILLIGRAM(S): 5 TABLET ORAL at 11:20

## 2023-11-04 RX ADMIN — AMIODARONE HYDROCHLORIDE 100 MILLIGRAM(S): 400 TABLET ORAL at 05:34

## 2023-11-04 RX ADMIN — Medication 100 MILLIGRAM(S): at 18:04

## 2023-11-04 RX ADMIN — HEPARIN SODIUM 1000 UNIT(S)/HR: 5000 INJECTION INTRAVENOUS; SUBCUTANEOUS at 20:15

## 2023-11-04 NOTE — PROGRESS NOTE ADULT - PROBLEM SELECTOR PLAN 4
- JIGAR 10/10/23: EF 10-15%. A bioprosthetic valve noted in the aortic position which appears to be functioning normally. An annuloplasty ring is noted in the mitral position with mild MR. No pHTN. Mildly dilated aortic root. Mildly dilated ascending aorta. #s/p AVR and MVR   - Patient with hx bioprosthetic AVR and MVR, rigid internal fixation of sternum and KRYSTLE exclusion on 9/9/14  - Per Dr. Garcia ([facility]) in 1/2022, surgical repair of his valves are intact and stable.  - JIGAR as above #s/p bioprosthetic AVR and MVR, rigid internal fixation of sternum and KRYSTLE exclusion on 9/9/14  - Per Dr. Garcia ([facility]) in 1/2022, surgical repair of his valves are intact and stable.  - JIGAR as above

## 2023-11-04 NOTE — PROGRESS NOTE ADULT - PROBLEM SELECTOR PLAN 2
CKD Stage 3 (most recent Cr 2.03 7/2023)   - Follows with Dr. Oh  - Avoid nephrotoxic agents   - Renally dose medications   - CKD Stage 3 (most recent Cr 2.03 7/2023), currently SCr 1.36  - Follows with Dr. Oh  - Avoid nephrotoxic agents   - Renally dose medications   - Monitor SCr closely CKD Stage 3 (most recent Cr 2.03 7/2023), currently SCr 1.36, follows with Dr. Oh  - Avoid nephrotoxic agents   - Renally dose medications   - Monitor SCr closely

## 2023-11-04 NOTE — PROGRESS NOTE ADULT - ASSESSMENT
78-year-old male with a PMHx of HLD, aortic aneurysm (stable, monitored by Dr. Garcia), s/p SAVR and MV repair w ring in 2014 (Dr. Louise), CKD stage 3 (most recent Cr 2.03 7/2023, follows w Dr. Oh) pAfib (on Eliquis, last dose10/31/23, follows w EP Dr. Joseph), PVC ablation, HFrEF (EF 10/15%, follows w HF Dr. Sheppard), CCRT-D ([facility]), moderate MR, recent ED visit for BRBPR (dx w anal fissures) who is s/p RHC with Dr. Sheppard 11/3/23 revealing high left-sided filling pressures, low CI, and high SVR.    78-year-old male with a PMHx of HLD, aortic aneurysm (stable, monitored by Dr. Garcia), s/p SAVR and MV repair w ring in 2014 (Dr. Louise), CKD stage 3 (most recent Cr 2.03 7/2023, follows w Dr. Oh) pAfib (on Eliquis, last dose10/31/23, follows w EP Dr. Joseph), PVC ablation, HFrEF (EF 10/15%, follows w HF Dr. Sheppard), CCRT-D ([facility]), moderate MR, recent ED visit for BRBPR (dx w anal fissures) who is s/p RHC with Dr. Sheppard 11/3/23 revealing high left-sided filling pressures, low CI, and high SVR. Patient is planned for barostimulator device implantation with Dr. Jeff (EP) and Dr. Rivera (vascular) on 11/6/23.

## 2023-11-04 NOTE — PROGRESS NOTE ADULT - PROBLEM SELECTOR PLAN 3
Patient with history of thoracic aortic aneurysm, follows with Dr. Garcia ([facility])   - CT chest (1/7/22): ascending 4.8cm. distal arch/proximal descending 4.5cm  - Per Dr. Garcia (1/2022), aortic pathology stable and does not require surgical intervention; surgical repair of valves intact and stable Patient with history of thoracic aortic aneurysm, follows with Dr. Garcia ([facility])   - CT chest (1/7/22): ascending 4.8cm. distal arch/proximal descending 4.5cm  - Per Dr. Garcia (1/2022), aortic pathology stable and does not require surgical intervention; plan for follow up in [facility] in 2 years (1/2024) with CT chest without contrast and TTE

## 2023-11-04 NOTE — PROGRESS NOTE ADULT - PROBLEM SELECTOR PLAN 1
- s/p RHC 11/3/23 : RA 9, RV 41/4 (8), PA 44/21 (30), PCWP 21 mm Hg, Cardiac Output 3.3, Cardiac Index 1.6 (warm and well perfused). Access: RFV sheath; manually pulled.   - Patient being evaluated for barostimulator device on 11/6/23 with Dr. Jeff (EP) and   Dr. Foy (vascular)   - Etiology: unclear; possible infiltrative (given arrhythmia), will defer MRI given device and age   - GDMT: Toprol  mg BID, Entresto  mg BID, hydralazine 50 mg TID (increased), Isordil 20 mg TID (increased); MRA deferred due to CKD and hyperkalemia; Farxiga held until after Barostim   - Diuretic: current regimen torsemide 10 mg QD   - Device: s/p CRT-D, well-functioning w/ >95% BiV pacing  - Per Adv HF: not candidate for VAD/OHT due to comorbidities; plan for Barostim     HF called 11/3 at 7 pm, med rec was incorrect, patient is on toprol 100 mg BID, not QD; updated order, will need med rec fixed - s/p RHC 11/3/23 : RA 9, RV 41/4 (8), PA 44/21 (30), PCWP 21 mm Hg, Cardiac Output 3.3, Cardiac Index 1.6 (warm and well perfused). Access: RFV sheath; manually pulled.   - Patient being evaluated for barostimulator device on 11/6/23 with Dr. Jeff (EP) and   Dr. Foy (vascular)   - Etiology: unclear; possible infiltrative (given arrhythmia), will defer MRI given device and age   - JIGAR (10/10/23): EF 10-15%. A bioprosthetic valve noted in the aortic position which appears to be functioning normally. An annuloplasty ring is noted in the mitral position with mild MR. No pHTN. Mildly dilated aortic root. Mildly dilated ascending aorta.  - GDMT: Toprol  mg BID, Entresto  mg BID, hydralazine 50 mg TID (increased), Isordil 20 mg TID (increased); MRA deferred due to CKD and hyperkalemia; Farxiga held until after Barostim   - Diuretic: current regimen torsemide 10 mg QD   - Device: s/p CRT-D ([facility]), well-functioning w/ >95% BiV pacing  - Per Adv HF: not candidate for VAD/OHT due to comorbidities; plan for Barostim

## 2023-11-04 NOTE — PROGRESS NOTE ADULT - PROBLEM SELECTOR PLAN 6
Lipid panel: , TG 70, ,    - Lipid panel: , TG 70, ,    - Patient is statin-intolerant   - Consider initiation of alternate lipid-lowering therapies on outpatient basis     DVT ppx: heparin gtt (Eliquis held pending Barostim placement 11/6/23)  F: none; tolerating PO intake   E: Keep K > 4, Mg > 2  N: DASH/TLC w/ consistent carb     Code: Full  Dispo: pending clinical progression     Case discussed with: Dr. Chandler

## 2023-11-04 NOTE — PROGRESS NOTE ADULT - PROBLEM SELECTOR PLAN 5
Patient with pAFib, currently on Eliquis  - Anticoagulation: Eliquis; currently held in anticipation of procedure 11/6/23 (currently on heparin gtt)   - Rate control: Toprol  mg BID Patient with pAFib, currently on Eliquis  - Anticoagulation: Eliquis; currently held in anticipation of procedure 11/6/23 (currently on heparin gtt)   - Antiarrhythmic: Amiodarone 200 mg QD   - Rate control: Toprol  mg BID    #PVC-induced Vfib   - s/p ablation of great cardiac vein 9/2019 and [facility] (2014; Dr. Jeff EP)

## 2023-11-04 NOTE — PROGRESS NOTE ADULT - SUBJECTIVE AND OBJECTIVE BOX
Cardiology PA Progress Note    S: Pt seen and examined bedside. Patient states he feels well this morning.   Patient denies C/P, SOB, N/V, dizziness, palpitations, and diaphoresis.  Pt denies fever/chills, dysuria, abdominal pain, diarrhea, and cough  12 Point ROS otherwise negative except as per HPI/subjective.     O: Vital Signs Last 24 Hrs  T(C): 36.6 (2023 12:00), Max: 36.7 (2023 09:00)  T(F): 97.9 (2023 12:00), Max: 98.1 (2023 09:00)  HR: 70 (2023 14:15) (70 - 74)  BP: 105/66 (2023 14:15) (94/57 - 125/77)  BP(mean): 81 (2023 14:15) (71 - 95)  RR: 16 (2023 14:15) (16 - 19)  SpO2: 97% (2023 14:15) (95% - 99%)    Parameters below as of 2023 14:15  Patient On (Oxygen Delivery Method): room air    PHYSICAL EXAM:  GEN: NAD  HEENT: No JVD  PULM:  CTA B/L  CARD:  RRR, S1 and S2   ABD: +BS, NT, soft/ND	  EXT: No Edema B/L LE  NEURO: A+Ox3, no focal deficit  PSYCH: Mood Appropriate    LABS:                        13.6   4.87  )-----------( 202      ( 2023 05:28 )             43.4     11-04    143  |  111<H>  |  34<H>  ----------------------------<  114<H>  4.8   |  24  |  1.36<H>    Ca    9.0      2023 05:28  Mg     2.3     11-04    TPro  7.1  /  Alb  3.3  /  TBili  0.4  /  DBili  x   /  AST  9<L>  /  ALT  6<L>  /  AlkPhos  49  11-04    PT/INR - ( 2023 08:14 )   PT: 10.9 sec;   INR: 0.95          PTT - ( 2023 13:29 )  PTT:95.0 sec       @ 08:01  -   @ 16:55  --------------------------------------------------------  IN: 0 mL / OUT: 1000 mL / NET: -1000 mL      Daily     Daily Weight in k (2023 06:33)   Cardiology PA Progress Note    S: Pt seen and examined bedside. Patient states he feels well this morning.   Patient denies C/P, SOB, N/V, dizziness, palpitations, and diaphoresis.  Pt denies fever/chills, dysuria, abdominal pain, diarrhea, and cough  12 Point ROS otherwise negative except as per HPI/subjective.     O: Vital Signs Last 24 Hrs  T(C): 36.6 (2023 12:00), Max: 36.7 (2023 09:00)  T(F): 97.9 (2023 12:00), Max: 98.1 (2023 09:00)  HR: 70 (2023 14:15) (70 - 74)  BP: 105/66 (2023 14:15) (94/57 - 125/77)  BP(mean): 81 (2023 14:15) (71 - 95)  RR: 16 (2023 14:15) (16 - 19)  SpO2: 97% (2023 14:15) (95% - 99%)    Parameters below as of 2023 14:15  Patient On (Oxygen Delivery Method): room air    PHYSICAL EXAM:  GEN: NAD  HEENT: +JVD  PULM:  CTA B/L  CARD:  RRR, S1 and S2   ABD: +BS, NT, soft/ND	  EXT: No Edema B/L LE  NEURO: A+Ox3, no focal deficit  PSYCH: Mood Appropriate    LABS:                        13.6   4.87  )-----------( 202      ( 2023 05:28 )             43.4     11-04    143  |  111<H>  |  34<H>  ----------------------------<  114<H>  4.8   |  24  |  1.36<H>    Ca    9.0      2023 05:28  Mg     2.3     11-04    TPro  7.1  /  Alb  3.3  /  TBili  0.4  /  DBili  x   /  AST  9<L>  /  ALT  6<L>  /  AlkPhos  49  11-04    PT/INR - ( 2023 08:14 )   PT: 10.9 sec;   INR: 0.95          PTT - ( 2023 13:29 )  PTT:95.0 sec       @ 08:01  -   @ 16:55  --------------------------------------------------------  IN: 0 mL / OUT: 1000 mL / NET: -1000 mL      Daily     Daily Weight in k (2023 06:33)

## 2023-11-05 ENCOUNTER — TRANSCRIPTION ENCOUNTER (OUTPATIENT)
Age: 78
End: 2023-11-05

## 2023-11-05 LAB
ALBUMIN SERPL ELPH-MCNC: 3.3 G/DL — SIGNIFICANT CHANGE UP (ref 3.3–5)
ALBUMIN SERPL ELPH-MCNC: 3.3 G/DL — SIGNIFICANT CHANGE UP (ref 3.3–5)
ALP SERPL-CCNC: 54 U/L — SIGNIFICANT CHANGE UP (ref 40–120)
ALP SERPL-CCNC: 54 U/L — SIGNIFICANT CHANGE UP (ref 40–120)
ALT FLD-CCNC: 5 U/L — LOW (ref 10–45)
ALT FLD-CCNC: 5 U/L — LOW (ref 10–45)
ANION GAP SERPL CALC-SCNC: 9 MMOL/L — SIGNIFICANT CHANGE UP (ref 5–17)
ANION GAP SERPL CALC-SCNC: 9 MMOL/L — SIGNIFICANT CHANGE UP (ref 5–17)
APTT BLD: 89.8 SEC — HIGH (ref 24.5–35.6)
APTT BLD: 89.8 SEC — HIGH (ref 24.5–35.6)
AST SERPL-CCNC: 11 U/L — SIGNIFICANT CHANGE UP (ref 10–40)
AST SERPL-CCNC: 11 U/L — SIGNIFICANT CHANGE UP (ref 10–40)
BILIRUB SERPL-MCNC: 0.4 MG/DL — SIGNIFICANT CHANGE UP (ref 0.2–1.2)
BILIRUB SERPL-MCNC: 0.4 MG/DL — SIGNIFICANT CHANGE UP (ref 0.2–1.2)
BLD GP AB SCN SERPL QL: NEGATIVE — SIGNIFICANT CHANGE UP
BLD GP AB SCN SERPL QL: NEGATIVE — SIGNIFICANT CHANGE UP
BUN SERPL-MCNC: 35 MG/DL — HIGH (ref 7–23)
BUN SERPL-MCNC: 35 MG/DL — HIGH (ref 7–23)
CALCIUM SERPL-MCNC: 8.8 MG/DL — SIGNIFICANT CHANGE UP (ref 8.4–10.5)
CALCIUM SERPL-MCNC: 8.8 MG/DL — SIGNIFICANT CHANGE UP (ref 8.4–10.5)
CHLORIDE SERPL-SCNC: 106 MMOL/L — SIGNIFICANT CHANGE UP (ref 96–108)
CHLORIDE SERPL-SCNC: 106 MMOL/L — SIGNIFICANT CHANGE UP (ref 96–108)
CO2 SERPL-SCNC: 22 MMOL/L — SIGNIFICANT CHANGE UP (ref 22–31)
CO2 SERPL-SCNC: 22 MMOL/L — SIGNIFICANT CHANGE UP (ref 22–31)
CREAT SERPL-MCNC: 1.4 MG/DL — HIGH (ref 0.5–1.3)
CREAT SERPL-MCNC: 1.4 MG/DL — HIGH (ref 0.5–1.3)
EGFR: 51 ML/MIN/1.73M2 — LOW
EGFR: 51 ML/MIN/1.73M2 — LOW
GLUCOSE SERPL-MCNC: 138 MG/DL — HIGH (ref 70–99)
GLUCOSE SERPL-MCNC: 138 MG/DL — HIGH (ref 70–99)
HCT VFR BLD CALC: 44.1 % — SIGNIFICANT CHANGE UP (ref 39–50)
HCT VFR BLD CALC: 44.1 % — SIGNIFICANT CHANGE UP (ref 39–50)
HGB BLD-MCNC: 13.8 G/DL — SIGNIFICANT CHANGE UP (ref 13–17)
HGB BLD-MCNC: 13.8 G/DL — SIGNIFICANT CHANGE UP (ref 13–17)
MAGNESIUM SERPL-MCNC: 2.3 MG/DL — SIGNIFICANT CHANGE UP (ref 1.6–2.6)
MAGNESIUM SERPL-MCNC: 2.3 MG/DL — SIGNIFICANT CHANGE UP (ref 1.6–2.6)
MCHC RBC-ENTMCNC: 31.3 GM/DL — LOW (ref 32–36)
MCHC RBC-ENTMCNC: 31.3 GM/DL — LOW (ref 32–36)
MCHC RBC-ENTMCNC: 32.2 PG — SIGNIFICANT CHANGE UP (ref 27–34)
MCHC RBC-ENTMCNC: 32.2 PG — SIGNIFICANT CHANGE UP (ref 27–34)
MCV RBC AUTO: 103 FL — HIGH (ref 80–100)
MCV RBC AUTO: 103 FL — HIGH (ref 80–100)
NRBC # BLD: 0 /100 WBCS — SIGNIFICANT CHANGE UP (ref 0–0)
NRBC # BLD: 0 /100 WBCS — SIGNIFICANT CHANGE UP (ref 0–0)
PLATELET # BLD AUTO: 189 K/UL — SIGNIFICANT CHANGE UP (ref 150–400)
PLATELET # BLD AUTO: 189 K/UL — SIGNIFICANT CHANGE UP (ref 150–400)
POTASSIUM SERPL-MCNC: 4.3 MMOL/L — SIGNIFICANT CHANGE UP (ref 3.5–5.3)
POTASSIUM SERPL-MCNC: 4.3 MMOL/L — SIGNIFICANT CHANGE UP (ref 3.5–5.3)
POTASSIUM SERPL-SCNC: 4.3 MMOL/L — SIGNIFICANT CHANGE UP (ref 3.5–5.3)
POTASSIUM SERPL-SCNC: 4.3 MMOL/L — SIGNIFICANT CHANGE UP (ref 3.5–5.3)
PROT SERPL-MCNC: 7.3 G/DL — SIGNIFICANT CHANGE UP (ref 6–8.3)
PROT SERPL-MCNC: 7.3 G/DL — SIGNIFICANT CHANGE UP (ref 6–8.3)
RBC # BLD: 4.28 M/UL — SIGNIFICANT CHANGE UP (ref 4.2–5.8)
RBC # BLD: 4.28 M/UL — SIGNIFICANT CHANGE UP (ref 4.2–5.8)
RBC # FLD: 14.6 % — HIGH (ref 10.3–14.5)
RBC # FLD: 14.6 % — HIGH (ref 10.3–14.5)
RH IG SCN BLD-IMP: POSITIVE — SIGNIFICANT CHANGE UP
RH IG SCN BLD-IMP: POSITIVE — SIGNIFICANT CHANGE UP
SODIUM SERPL-SCNC: 137 MMOL/L — SIGNIFICANT CHANGE UP (ref 135–145)
SODIUM SERPL-SCNC: 137 MMOL/L — SIGNIFICANT CHANGE UP (ref 135–145)
WBC # BLD: 5.64 K/UL — SIGNIFICANT CHANGE UP (ref 3.8–10.5)
WBC # BLD: 5.64 K/UL — SIGNIFICANT CHANGE UP (ref 3.8–10.5)
WBC # FLD AUTO: 5.64 K/UL — SIGNIFICANT CHANGE UP (ref 3.8–10.5)
WBC # FLD AUTO: 5.64 K/UL — SIGNIFICANT CHANGE UP (ref 3.8–10.5)

## 2023-11-05 PROCEDURE — 93010 ELECTROCARDIOGRAM REPORT: CPT

## 2023-11-05 PROCEDURE — 99232 SBSQ HOSP IP/OBS MODERATE 35: CPT

## 2023-11-05 RX ADMIN — HEPARIN SODIUM 1000 UNIT(S)/HR: 5000 INJECTION INTRAVENOUS; SUBCUTANEOUS at 06:47

## 2023-11-05 RX ADMIN — ISOSORBIDE DINITRATE 20 MILLIGRAM(S): 5 TABLET ORAL at 06:02

## 2023-11-05 RX ADMIN — HEPARIN SODIUM 1000 UNIT(S)/HR: 5000 INJECTION INTRAVENOUS; SUBCUTANEOUS at 20:05

## 2023-11-05 RX ADMIN — Medication 50 MILLIGRAM(S): at 05:10

## 2023-11-05 RX ADMIN — SACUBITRIL AND VALSARTAN 1 TABLET(S): 24; 26 TABLET, FILM COATED ORAL at 05:11

## 2023-11-05 RX ADMIN — Medication 100 MILLIGRAM(S): at 18:33

## 2023-11-05 RX ADMIN — ISOSORBIDE DINITRATE 20 MILLIGRAM(S): 5 TABLET ORAL at 16:47

## 2023-11-05 RX ADMIN — ISOSORBIDE DINITRATE 20 MILLIGRAM(S): 5 TABLET ORAL at 11:22

## 2023-11-05 RX ADMIN — Medication 10 MILLIGRAM(S): at 06:01

## 2023-11-05 RX ADMIN — Medication 50 MILLIGRAM(S): at 21:49

## 2023-11-05 RX ADMIN — HEPARIN SODIUM 1000 UNIT(S)/HR: 5000 INJECTION INTRAVENOUS; SUBCUTANEOUS at 06:44

## 2023-11-05 RX ADMIN — AMIODARONE HYDROCHLORIDE 100 MILLIGRAM(S): 400 TABLET ORAL at 05:11

## 2023-11-05 RX ADMIN — Medication 50 MILLIGRAM(S): at 15:07

## 2023-11-05 RX ADMIN — HEPARIN SODIUM 1000 UNIT(S)/HR: 5000 INJECTION INTRAVENOUS; SUBCUTANEOUS at 08:23

## 2023-11-05 RX ADMIN — Medication 100 MILLIGRAM(S): at 06:01

## 2023-11-05 RX ADMIN — SACUBITRIL AND VALSARTAN 1 TABLET(S): 24; 26 TABLET, FILM COATED ORAL at 19:36

## 2023-11-05 NOTE — PROGRESS NOTE ADULT - PROBLEM SELECTOR PLAN 4
#s/p bioprosthetic AVR and MVR, rigid internal fixation of sternum and KRYSTLE exclusion on 9/9/14  - Per Dr. Garcia ([facility]) in 1/2022, surgical repair of his valves are intact and stable.  - JIGAR as above

## 2023-11-05 NOTE — PROGRESS NOTE ADULT - PROBLEM SELECTOR PLAN 1
Patient was admitted to hospital after ambulatory RHC procedure   - s/p RHC (11/3/23): RA 9, RV 41/4 (8), PA 44/21 (30), PCWP 21 mm Hg, Cardiac Output 3.3, Cardiac Index 1.6 (warm and well perfused). Access: RFV sheath; manually pulled.   - Patient being evaluated for barostimulator device on 11/6/23 with Dr. Jeff (EP) and   Dr. Foy (vascular)   - Etiology: unclear; possible infiltrative (given arrhythmia), will defer MRI given device and age   - JIGAR (10/10/23): EF 10-15%. A bioprosthetic valve noted in the aortic position which appears to be functioning normally. An annuloplasty ring is noted in the mitral position with mild MR. No pHTN. Mildly dilated aortic root. Mildly dilated ascending aorta.  - GDMT: Toprol  mg BID, Entresto  mg BID, hydralazine 50 mg TID (increased), Isordil 20 mg TID (increased); MRA deferred due to CKD and hyperkalemia; Farxiga held until after Barostim   - Diuretic: current regimen torsemide 10 mg QD   - Device: s/p CRT-D ([facility]), well-functioning w/ >95% BiV pacing  - Per Adv HF: not candidate for VAD/OHT due to comorbidities; plan for Barostim

## 2023-11-05 NOTE — PROGRESS NOTE ADULT - SUBJECTIVE AND OBJECTIVE BOX
Cardiology PA Progress Note    S: Pt seen and examined bedside.  Patient denies C/P, SOB, N/V, dizziness, palpitations, and diaphoresis.  Pt denies fever/chills, dysuria, abdominal pain, diarrhea, and cough  12 Point ROS otherwise negative except as per HPI/subjective.     O: Vital Signs Last 24 Hrs  T(C): 37.1 (2023 05:59), Max: 37.1 (2023 22:19)  T(F): 98.7 (2023 05:59), Max: 98.7 (2023 22:19)  HR: 72 (2023 05:59) (69 - 72)  BP: 117/71 (2023 05:59) (90/57 - 123/75)  BP(mean): 68 (2023 16:53) (68 - 87)  RR: 17 (2023 05:59) (16 - 18)  SpO2: 99% (2023 05:59) (96% - 100%)    Parameters below as of 2023 05:59  Patient On (Oxygen Delivery Method): room air    PHYSICAL EXAM:  GEN: NAD  HEENT: No JVD  PULM:  CTA B/L  CARD:  RRR, S1 and S2   ABD: +BS, NT, soft/ND	  EXT: No Edema B/L LE  NEURO: A+Ox3, no focal deficit  PSYCH: Mood Appropriate    LABS:                        13.8   5.64  )-----------( 189      ( 2023 05:30 )             44.1         143  |  111<H>  |  34<H>  ----------------------------<  114<H>  4.8   |  24  |  1.36<H>    Ca    9.0      2023 05:28  Mg     2.3         TPro  7.1  /  Alb  3.3  /  TBili  0.4  /  DBili  x   /  AST  9<L>  /  ALT  6<L>  /  AlkPhos  49  11-04    PT/INR - ( 2023 08:14 )   PT: 10.9 sec;   INR: 0.95          PTT - ( 2023 05:30 )  PTT:89.8 sec      11-04 @ 08:01  -   @ 07:00  --------------------------------------------------------  IN: 240 mL / OUT: 1800 mL / NET: -1560 mL      Daily     Daily Weight in k.2 (2023 05:59)   Cardiology PA Progress Note    S: Pt seen and examined bedside. Patient reports he feels well this AM.  Patient denies C/P, SOB, N/V, dizziness, palpitations, and diaphoresis.  Pt denies fever/chills, dysuria, abdominal pain, diarrhea, and cough  12 Point ROS otherwise negative except as per HPI/subjective.     O: Vital Signs Last 24 Hrs  T(C): 37.1 (2023 05:59), Max: 37.1 (2023 22:19)  T(F): 98.7 (2023 05:59), Max: 98.7 (2023 22:19)  HR: 72 (2023 05:59) (69 - 72)  BP: 117/71 (2023 05:59) (90/57 - 123/75)  BP(mean): 68 (2023 16:53) (68 - 87)  RR: 17 (2023 05:59) (16 - 18)  SpO2: 99% (2023 05:59) (96% - 100%)    Parameters below as of 2023 05:59  Patient On (Oxygen Delivery Method): room air    PHYSICAL EXAM:  GEN: NAD  HEENT: No JVD  PULM:  CTA B/L  CARD:  RRR, S1 and S2   ABD: +BS, NT, soft/ND	  EXT: No Edema B/L LE  NEURO: A+Ox3, no focal deficit  PSYCH: Mood Appropriate    LABS:                        13.8   5.64  )-----------( 189      ( 2023 05:30 )             44.1     11-04    143  |  111<H>  |  34<H>  ----------------------------<  114<H>  4.8   |  24  |  1.36<H>    Ca    9.0      2023 05:28  Mg     2.3     11-05    TPro  7.1  /  Alb  3.3  /  TBili  0.4  /  DBili  x   /  AST  9<L>  /  ALT  6<L>  /  AlkPhos  49  11-04    PT/INR - ( 2023 08:14 )   PT: 10.9 sec;   INR: 0.95          PTT - ( 2023 05:30 )  PTT:89.8 sec       @ 08:01  -   @ 07:00  --------------------------------------------------------  IN: 240 mL / OUT: 1800 mL / NET: -1560 mL      Daily     Daily Weight in k.2 (2023 05:59)   Cardiology PA Progress Note    S: Pt seen and examined bedside. Patient reports he feels well this AM.  Patient denies C/P, SOB, N/V, dizziness, palpitations, and diaphoresis.  Pt denies fever/chills, dysuria, abdominal pain, diarrhea, and cough  12 Point ROS otherwise negative except as per HPI/subjective.     O: Vital Signs Last 24 Hrs  T(C): 37.1 (2023 05:59), Max: 37.1 (2023 22:19)  T(F): 98.7 (2023 05:59), Max: 98.7 (2023 22:19)  HR: 72 (2023 05:59) (69 - 72)  BP: 117/71 (2023 05:59) (90/57 - 123/75)  BP(mean): 68 (2023 16:53) (68 - 87)  RR: 17 (2023 05:59) (16 - 18)  SpO2: 99% (2023 05:59) (96% - 100%)    Parameters below as of 2023 05:59  Patient On (Oxygen Delivery Method): room air    PHYSICAL EXAM:  GEN: NAD  HEENT: No JVD  PULM:  CTA B/L  CARD:  RRR, S1 and S2   ABD: +BS, NT, soft/ND	  EXT: No Edema B/L LE, femoral pulses 2+ b/l w/o bruits b/l, DP 2+ b/l, PT 2+ b/l  NEURO: A+Ox3, no focal deficit  PSYCH: Mood Appropriate    LABS:                        13.8   5.64  )-----------( 189      ( 2023 05:30 )             44.1     11-04    143  |  111<H>  |  34<H>  ----------------------------<  114<H>  4.8   |  24  |  1.36<H>    Ca    9.0      2023 05:28  Mg     2.3     11-05    TPro  7.1  /  Alb  3.3  /  TBili  0.4  /  DBili  x   /  AST  9<L>  /  ALT  6<L>  /  AlkPhos  49  11-04    PT/INR - ( 2023 08:14 )   PT: 10.9 sec;   INR: 0.95          PTT - ( 2023 05:30 )  PTT:89.8 sec      11 @ 08:01  -   @ 07:00  --------------------------------------------------------  IN: 240 mL / OUT: 1800 mL / NET: -1560 mL      Daily     Daily Weight in k.2 (2023 05:59)

## 2023-11-05 NOTE — PROGRESS NOTE ADULT - PROBLEM SELECTOR PLAN 6
Lipid panel: , TG 70, ,    - Patient is statin-intolerant   - Consider initiation of alternate lipid-lowering therapies on outpatient basis     DVT ppx: heparin gtt (Eliquis held pending Barostim placement 11/6/23)  F: none; tolerating PO intake   E: Keep K > 4, Mg > 2  N: DASH/TLC w/ consistent carb     Code: Full  Dispo: pending clinical progression     Case discussed with: Dr. Chandler Lipid panel: , TG 70, ,    - Patient is statin-intolerant   - Consider initiation of alternate lipid-lowering therapies on outpatient basis     DVT ppx: heparin gtt (Eliquis held pending Barostim placement 11/6/23)  F: none; tolerating PO intake   E: Keep K > 4, Mg > 2  N: DASH/TLC w/ consistent carb w/ fluid restriction     Code: Full  Dispo: pending clinical progression     Case discussed with: Dr. Chandler

## 2023-11-05 NOTE — PROGRESS NOTE ADULT - ASSESSMENT
78-year-old male with a PMHx of HLD, aortic aneurysm (stable, monitored by Dr. Garcia), s/p SAVR and MV repair w ring in 2014 (Dr. Louise), CKD stage 3 (most recent Cr 2.03 7/2023, follows w Dr. Oh) pAfib (on Eliquis, last dose 10/31/23, follows w EP Dr. Joseph), PVC ablation, HFrEF (EF 10/15%, follows w HF Dr. Sheppard), CCRT-D ([facility]), moderate MR, recent ED visit for BRBPR (dx w anal fissures) who is s/p RHC with Dr. Sheppard 11/3/23 revealing high left-sided filling pressures, low CI, and high SVR. Patient is planned for barostimulator device implantation with Dr. Jeff (EP) and Dr. Rivera (vascular) on 11/6/23.

## 2023-11-05 NOTE — PROGRESS NOTE ADULT - PROBLEM SELECTOR PLAN 2
CKD Stage 3 (most recent Cr 2.03 7/2023), currently SCr 1.36, follows with Dr. Oh  - Avoid nephrotoxic agents   - Renally dose medications   - Monitor SCr closely CKD Stage 3 (most recent Cr 2.03 7/2023), currently SCr 1.40, follows with Dr. Oh  - Avoid nephrotoxic agents   - Renally dose medications   - Monitor SCr closely

## 2023-11-05 NOTE — PROGRESS NOTE ADULT - PROBLEM SELECTOR PLAN 5
Patient with pAFib, currently on Eliquis  - Anticoagulation: Eliquis; currently held in anticipation of procedure 11/6/23 (currently on heparin gtt)   - Antiarrhythmic: Amiodarone 200 mg QD   - Rate control: Toprol  mg BID    #PVC-induced Vfib   - s/p ablation of great cardiac vein 9/2019 and [facility] (2014; Dr. Jeff EP) Patient with pAFib, currently on Eliquis  - Anticoagulation: Eliquis; currently held in anticipation of procedure 11/6/23 (currently on heparin gtt)   - Antiarrhythmic: Amiodarone 100 mg QD   - Rate control: Toprol  mg BID    #PVC-induced Vfib   - s/p ablation of great cardiac vein 9/2019 and [facility] (2014; Dr. Jeff EP)

## 2023-11-05 NOTE — PROGRESS NOTE ADULT - PROBLEM SELECTOR PLAN 3
Patient with history of thoracic aortic aneurysm, follows with Dr. Garcia ([facility])   - CT chest (1/7/22): ascending 4.8cm. distal arch/proximal descending 4.5cm  - Per Dr. Garcia (1/2022), aortic pathology stable and does not require surgical intervention; plan for follow up in [facility] in 2 years (1/2024) with CT chest without contrast and TTE Patient with history of thoracic aortic aneurysm, follows with Dr. Garcia ([facility])   - CT chest (1/7/22): ascending 4.8cm. distal arch/proximal descending 4.5cm  - Per outpatient note Dr. Garcia ([facility]) on (1/2022), aortic pathology stable and does not require surgical intervention; plan for follow up in [facility] in 2 years (1/2024) with CT chest without contrast and TTE

## 2023-11-06 ENCOUNTER — TRANSCRIPTION ENCOUNTER (OUTPATIENT)
Age: 78
End: 2023-11-06

## 2023-11-06 LAB
ALBUMIN SERPL ELPH-MCNC: 3.2 G/DL — LOW (ref 3.3–5)
ALBUMIN SERPL ELPH-MCNC: 3.2 G/DL — LOW (ref 3.3–5)
ALP SERPL-CCNC: 53 U/L — SIGNIFICANT CHANGE UP (ref 40–120)
ALP SERPL-CCNC: 53 U/L — SIGNIFICANT CHANGE UP (ref 40–120)
ALT FLD-CCNC: <5 U/L — LOW (ref 10–45)
ALT FLD-CCNC: <5 U/L — LOW (ref 10–45)
ANION GAP SERPL CALC-SCNC: 7 MMOL/L — SIGNIFICANT CHANGE UP (ref 5–17)
ANION GAP SERPL CALC-SCNC: 7 MMOL/L — SIGNIFICANT CHANGE UP (ref 5–17)
APTT BLD: 80.1 SEC — HIGH (ref 24.5–35.6)
APTT BLD: 80.1 SEC — HIGH (ref 24.5–35.6)
AST SERPL-CCNC: 11 U/L — SIGNIFICANT CHANGE UP (ref 10–40)
AST SERPL-CCNC: 11 U/L — SIGNIFICANT CHANGE UP (ref 10–40)
BASOPHILS # BLD AUTO: 0.02 K/UL — SIGNIFICANT CHANGE UP (ref 0–0.2)
BASOPHILS # BLD AUTO: 0.02 K/UL — SIGNIFICANT CHANGE UP (ref 0–0.2)
BASOPHILS NFR BLD AUTO: 0.3 % — SIGNIFICANT CHANGE UP (ref 0–2)
BASOPHILS NFR BLD AUTO: 0.3 % — SIGNIFICANT CHANGE UP (ref 0–2)
BILIRUB SERPL-MCNC: 0.4 MG/DL — SIGNIFICANT CHANGE UP (ref 0.2–1.2)
BILIRUB SERPL-MCNC: 0.4 MG/DL — SIGNIFICANT CHANGE UP (ref 0.2–1.2)
BLD GP AB SCN SERPL QL: NEGATIVE — SIGNIFICANT CHANGE UP
BLD GP AB SCN SERPL QL: NEGATIVE — SIGNIFICANT CHANGE UP
BUN SERPL-MCNC: 30 MG/DL — HIGH (ref 7–23)
BUN SERPL-MCNC: 30 MG/DL — HIGH (ref 7–23)
CALCIUM SERPL-MCNC: 8.8 MG/DL — SIGNIFICANT CHANGE UP (ref 8.4–10.5)
CALCIUM SERPL-MCNC: 8.8 MG/DL — SIGNIFICANT CHANGE UP (ref 8.4–10.5)
CHLORIDE SERPL-SCNC: 107 MMOL/L — SIGNIFICANT CHANGE UP (ref 96–108)
CHLORIDE SERPL-SCNC: 107 MMOL/L — SIGNIFICANT CHANGE UP (ref 96–108)
CO2 SERPL-SCNC: 25 MMOL/L — SIGNIFICANT CHANGE UP (ref 22–31)
CO2 SERPL-SCNC: 25 MMOL/L — SIGNIFICANT CHANGE UP (ref 22–31)
CREAT SERPL-MCNC: 1.45 MG/DL — HIGH (ref 0.5–1.3)
CREAT SERPL-MCNC: 1.45 MG/DL — HIGH (ref 0.5–1.3)
EGFR: 49 ML/MIN/1.73M2 — LOW
EGFR: 49 ML/MIN/1.73M2 — LOW
EOSINOPHIL # BLD AUTO: 0.02 K/UL — SIGNIFICANT CHANGE UP (ref 0–0.5)
EOSINOPHIL # BLD AUTO: 0.02 K/UL — SIGNIFICANT CHANGE UP (ref 0–0.5)
EOSINOPHIL NFR BLD AUTO: 0.3 % — SIGNIFICANT CHANGE UP (ref 0–6)
EOSINOPHIL NFR BLD AUTO: 0.3 % — SIGNIFICANT CHANGE UP (ref 0–6)
GLUCOSE SERPL-MCNC: 113 MG/DL — HIGH (ref 70–99)
GLUCOSE SERPL-MCNC: 113 MG/DL — HIGH (ref 70–99)
HCT VFR BLD CALC: 44 % — SIGNIFICANT CHANGE UP (ref 39–50)
HCT VFR BLD CALC: 44 % — SIGNIFICANT CHANGE UP (ref 39–50)
HGB BLD-MCNC: 14 G/DL — SIGNIFICANT CHANGE UP (ref 13–17)
HGB BLD-MCNC: 14 G/DL — SIGNIFICANT CHANGE UP (ref 13–17)
IMM GRANULOCYTES NFR BLD AUTO: 0.3 % — SIGNIFICANT CHANGE UP (ref 0–0.9)
IMM GRANULOCYTES NFR BLD AUTO: 0.3 % — SIGNIFICANT CHANGE UP (ref 0–0.9)
INR BLD: 0.94 — SIGNIFICANT CHANGE UP (ref 0.85–1.18)
INR BLD: 0.94 — SIGNIFICANT CHANGE UP (ref 0.85–1.18)
LYMPHOCYTES # BLD AUTO: 2.04 K/UL — SIGNIFICANT CHANGE UP (ref 1–3.3)
LYMPHOCYTES # BLD AUTO: 2.04 K/UL — SIGNIFICANT CHANGE UP (ref 1–3.3)
LYMPHOCYTES # BLD AUTO: 34.8 % — SIGNIFICANT CHANGE UP (ref 13–44)
LYMPHOCYTES # BLD AUTO: 34.8 % — SIGNIFICANT CHANGE UP (ref 13–44)
MAGNESIUM SERPL-MCNC: 2.2 MG/DL — SIGNIFICANT CHANGE UP (ref 1.6–2.6)
MAGNESIUM SERPL-MCNC: 2.2 MG/DL — SIGNIFICANT CHANGE UP (ref 1.6–2.6)
MCHC RBC-ENTMCNC: 31.8 GM/DL — LOW (ref 32–36)
MCHC RBC-ENTMCNC: 31.8 GM/DL — LOW (ref 32–36)
MCHC RBC-ENTMCNC: 32.7 PG — SIGNIFICANT CHANGE UP (ref 27–34)
MCHC RBC-ENTMCNC: 32.7 PG — SIGNIFICANT CHANGE UP (ref 27–34)
MCV RBC AUTO: 102.8 FL — HIGH (ref 80–100)
MCV RBC AUTO: 102.8 FL — HIGH (ref 80–100)
MONOCYTES # BLD AUTO: 0.69 K/UL — SIGNIFICANT CHANGE UP (ref 0–0.9)
MONOCYTES # BLD AUTO: 0.69 K/UL — SIGNIFICANT CHANGE UP (ref 0–0.9)
MONOCYTES NFR BLD AUTO: 11.8 % — SIGNIFICANT CHANGE UP (ref 2–14)
MONOCYTES NFR BLD AUTO: 11.8 % — SIGNIFICANT CHANGE UP (ref 2–14)
NEUTROPHILS # BLD AUTO: 3.07 K/UL — SIGNIFICANT CHANGE UP (ref 1.8–7.4)
NEUTROPHILS # BLD AUTO: 3.07 K/UL — SIGNIFICANT CHANGE UP (ref 1.8–7.4)
NEUTROPHILS NFR BLD AUTO: 52.5 % — SIGNIFICANT CHANGE UP (ref 43–77)
NEUTROPHILS NFR BLD AUTO: 52.5 % — SIGNIFICANT CHANGE UP (ref 43–77)
NRBC # BLD: 0 /100 WBCS — SIGNIFICANT CHANGE UP (ref 0–0)
NRBC # BLD: 0 /100 WBCS — SIGNIFICANT CHANGE UP (ref 0–0)
PLATELET # BLD AUTO: 217 K/UL — SIGNIFICANT CHANGE UP (ref 150–400)
PLATELET # BLD AUTO: 217 K/UL — SIGNIFICANT CHANGE UP (ref 150–400)
POTASSIUM SERPL-MCNC: 4.1 MMOL/L — SIGNIFICANT CHANGE UP (ref 3.5–5.3)
POTASSIUM SERPL-MCNC: 4.1 MMOL/L — SIGNIFICANT CHANGE UP (ref 3.5–5.3)
POTASSIUM SERPL-SCNC: 4.1 MMOL/L — SIGNIFICANT CHANGE UP (ref 3.5–5.3)
POTASSIUM SERPL-SCNC: 4.1 MMOL/L — SIGNIFICANT CHANGE UP (ref 3.5–5.3)
PROT SERPL-MCNC: 7.1 G/DL — SIGNIFICANT CHANGE UP (ref 6–8.3)
PROT SERPL-MCNC: 7.1 G/DL — SIGNIFICANT CHANGE UP (ref 6–8.3)
PROTHROM AB SERPL-ACNC: 10.7 SEC — SIGNIFICANT CHANGE UP (ref 9.5–13)
PROTHROM AB SERPL-ACNC: 10.7 SEC — SIGNIFICANT CHANGE UP (ref 9.5–13)
RBC # BLD: 4.28 M/UL — SIGNIFICANT CHANGE UP (ref 4.2–5.8)
RBC # BLD: 4.28 M/UL — SIGNIFICANT CHANGE UP (ref 4.2–5.8)
RBC # FLD: 14.5 % — SIGNIFICANT CHANGE UP (ref 10.3–14.5)
RBC # FLD: 14.5 % — SIGNIFICANT CHANGE UP (ref 10.3–14.5)
RH IG SCN BLD-IMP: POSITIVE — SIGNIFICANT CHANGE UP
RH IG SCN BLD-IMP: POSITIVE — SIGNIFICANT CHANGE UP
SODIUM SERPL-SCNC: 139 MMOL/L — SIGNIFICANT CHANGE UP (ref 135–145)
SODIUM SERPL-SCNC: 139 MMOL/L — SIGNIFICANT CHANGE UP (ref 135–145)
WBC # BLD: 5.86 K/UL — SIGNIFICANT CHANGE UP (ref 3.8–10.5)
WBC # BLD: 5.86 K/UL — SIGNIFICANT CHANGE UP (ref 3.8–10.5)
WBC # FLD AUTO: 5.86 K/UL — SIGNIFICANT CHANGE UP (ref 3.8–10.5)
WBC # FLD AUTO: 5.86 K/UL — SIGNIFICANT CHANGE UP (ref 3.8–10.5)

## 2023-11-06 PROCEDURE — 80048 BASIC METABOLIC PNL TOTAL CA: CPT

## 2023-11-06 PROCEDURE — 83036 HEMOGLOBIN GLYCOSYLATED A1C: CPT

## 2023-11-06 PROCEDURE — C1887: CPT

## 2023-11-06 PROCEDURE — U0005: CPT

## 2023-11-06 PROCEDURE — C1769: CPT

## 2023-11-06 PROCEDURE — U0003: CPT

## 2023-11-06 PROCEDURE — 0266T: CPT

## 2023-11-06 PROCEDURE — 82553 CREATINE MB FRACTION: CPT

## 2023-11-06 PROCEDURE — C1889: CPT

## 2023-11-06 PROCEDURE — 93017 CV STRESS TEST TRACING ONLY: CPT

## 2023-11-06 PROCEDURE — 80053 COMPREHEN METABOLIC PANEL: CPT

## 2023-11-06 PROCEDURE — 80061 LIPID PANEL: CPT

## 2023-11-06 PROCEDURE — 84560 ASSAY OF URINE/URIC ACID: CPT

## 2023-11-06 PROCEDURE — 84156 ASSAY OF PROTEIN URINE: CPT

## 2023-11-06 PROCEDURE — 83735 ASSAY OF MAGNESIUM: CPT

## 2023-11-06 PROCEDURE — 99232 SBSQ HOSP IP/OBS MODERATE 35: CPT

## 2023-11-06 PROCEDURE — 84300 ASSAY OF URINE SODIUM: CPT

## 2023-11-06 PROCEDURE — C8929: CPT

## 2023-11-06 PROCEDURE — 83935 ASSAY OF URINE OSMOLALITY: CPT

## 2023-11-06 PROCEDURE — 82570 ASSAY OF URINE CREATININE: CPT

## 2023-11-06 PROCEDURE — 36415 COLL VENOUS BLD VENIPUNCTURE: CPT

## 2023-11-06 PROCEDURE — C1825: CPT

## 2023-11-06 PROCEDURE — 86769 SARS-COV-2 COVID-19 ANTIBODY: CPT

## 2023-11-06 PROCEDURE — 99285 EMERGENCY DEPT VISIT HI MDM: CPT | Mod: 25

## 2023-11-06 PROCEDURE — 85610 PROTHROMBIN TIME: CPT

## 2023-11-06 PROCEDURE — 72100 X-RAY EXAM L-S SPINE 2/3 VWS: CPT

## 2023-11-06 PROCEDURE — A9505: CPT

## 2023-11-06 PROCEDURE — A9500: CPT

## 2023-11-06 PROCEDURE — 82550 ASSAY OF CK (CPK): CPT

## 2023-11-06 PROCEDURE — 85027 COMPLETE CBC AUTOMATED: CPT

## 2023-11-06 PROCEDURE — C1894: CPT

## 2023-11-06 PROCEDURE — 85025 COMPLETE CBC W/AUTO DIFF WBC: CPT

## 2023-11-06 PROCEDURE — 71046 X-RAY EXAM CHEST 2 VIEWS: CPT

## 2023-11-06 PROCEDURE — 83880 ASSAY OF NATRIURETIC PEPTIDE: CPT

## 2023-11-06 PROCEDURE — 84484 ASSAY OF TROPONIN QUANT: CPT

## 2023-11-06 PROCEDURE — 81003 URINALYSIS AUTO W/O SCOPE: CPT

## 2023-11-06 PROCEDURE — 84540 ASSAY OF URINE/UREA-N: CPT

## 2023-11-06 PROCEDURE — 78452 HT MUSCLE IMAGE SPECT MULT: CPT

## 2023-11-06 PROCEDURE — 85730 THROMBOPLASTIN TIME PARTIAL: CPT

## 2023-11-06 DEVICE — SURGICEL 4 X 8": Type: IMPLANTABLE DEVICE | Site: RIGHT | Status: FUNCTIONAL

## 2023-11-06 DEVICE — IMPLANTABLE DEVICE: Type: IMPLANTABLE DEVICE | Site: RIGHT | Status: FUNCTIONAL

## 2023-11-06 DEVICE — LIGATING CLIPS WECK HORIZON SMALL-WIDE (RED) 24: Type: IMPLANTABLE DEVICE | Site: RIGHT | Status: FUNCTIONAL

## 2023-11-06 RX ORDER — HEPARIN SODIUM 5000 [USP'U]/ML
7000 INJECTION INTRAVENOUS; SUBCUTANEOUS EVERY 6 HOURS
Refills: 0 | Status: DISCONTINUED | OUTPATIENT
Start: 2023-11-06 | End: 2023-11-06

## 2023-11-06 RX ORDER — HEPARIN SODIUM 5000 [USP'U]/ML
3500 INJECTION INTRAVENOUS; SUBCUTANEOUS EVERY 6 HOURS
Refills: 0 | Status: DISCONTINUED | OUTPATIENT
Start: 2023-11-06 | End: 2023-11-06

## 2023-11-06 RX ORDER — CALCIUM CARBONATE 500(1250)
1 TABLET ORAL EVERY 4 HOURS
Refills: 0 | Status: DISCONTINUED | OUTPATIENT
Start: 2023-11-06 | End: 2023-11-07

## 2023-11-06 RX ORDER — HEPARIN SODIUM 5000 [USP'U]/ML
INJECTION INTRAVENOUS; SUBCUTANEOUS
Qty: 25000 | Refills: 0 | Status: DISCONTINUED | OUTPATIENT
Start: 2023-11-06 | End: 2023-11-06

## 2023-11-06 RX ORDER — SACUBITRIL AND VALSARTAN 24; 26 MG/1; MG/1
1 TABLET, FILM COATED ORAL
Refills: 0 | Status: DISCONTINUED | OUTPATIENT
Start: 2023-11-06 | End: 2023-11-07

## 2023-11-06 RX ORDER — ACETAMINOPHEN 500 MG
1000 TABLET ORAL ONCE
Refills: 0 | Status: COMPLETED | OUTPATIENT
Start: 2023-11-06 | End: 2023-11-06

## 2023-11-06 RX ORDER — METOPROLOL TARTRATE 50 MG
100 TABLET ORAL
Refills: 0 | Status: DISCONTINUED | OUTPATIENT
Start: 2023-11-06 | End: 2023-11-07

## 2023-11-06 RX ORDER — ACETAMINOPHEN 500 MG
650 TABLET ORAL ONCE
Refills: 0 | Status: COMPLETED | OUTPATIENT
Start: 2023-11-06 | End: 2023-11-06

## 2023-11-06 RX ORDER — CHLORHEXIDINE GLUCONATE 213 G/1000ML
1 SOLUTION TOPICAL ONCE
Refills: 0 | Status: DISCONTINUED | OUTPATIENT
Start: 2023-11-06 | End: 2023-11-07

## 2023-11-06 RX ORDER — ISOSORBIDE DINITRATE 5 MG/1
20 TABLET ORAL THREE TIMES A DAY
Refills: 0 | Status: DISCONTINUED | OUTPATIENT
Start: 2023-11-06 | End: 2023-11-07

## 2023-11-06 RX ORDER — AMIODARONE HYDROCHLORIDE 400 MG/1
100 TABLET ORAL DAILY
Refills: 0 | Status: DISCONTINUED | OUTPATIENT
Start: 2023-11-06 | End: 2023-11-07

## 2023-11-06 RX ORDER — APIXABAN 2.5 MG/1
5 TABLET, FILM COATED ORAL
Refills: 0 | Status: DISCONTINUED | OUTPATIENT
Start: 2023-11-07 | End: 2023-11-07

## 2023-11-06 RX ORDER — HYDRALAZINE HCL 50 MG
50 TABLET ORAL THREE TIMES A DAY
Refills: 0 | Status: DISCONTINUED | OUTPATIENT
Start: 2023-11-06 | End: 2023-11-07

## 2023-11-06 RX ORDER — SIMETHICONE 80 MG/1
80 TABLET, CHEWABLE ORAL ONCE
Refills: 0 | Status: COMPLETED | OUTPATIENT
Start: 2023-11-06 | End: 2023-11-06

## 2023-11-06 RX ADMIN — AMIODARONE HYDROCHLORIDE 100 MILLIGRAM(S): 400 TABLET ORAL at 07:13

## 2023-11-06 RX ADMIN — Medication 10 MILLIGRAM(S): at 07:13

## 2023-11-06 RX ADMIN — HEPARIN SODIUM 1000 UNIT(S)/HR: 5000 INJECTION INTRAVENOUS; SUBCUTANEOUS at 08:02

## 2023-11-06 RX ADMIN — Medication 100 MILLIGRAM(S): at 19:09

## 2023-11-06 RX ADMIN — HEPARIN SODIUM 1000 UNIT(S)/HR: 5000 INJECTION INTRAVENOUS; SUBCUTANEOUS at 07:18

## 2023-11-06 RX ADMIN — Medication 650 MILLIGRAM(S): at 22:00

## 2023-11-06 RX ADMIN — ISOSORBIDE DINITRATE 20 MILLIGRAM(S): 5 TABLET ORAL at 07:13

## 2023-11-06 RX ADMIN — Medication 50 MILLIGRAM(S): at 23:58

## 2023-11-06 RX ADMIN — Medication 1 TABLET(S): at 21:17

## 2023-11-06 RX ADMIN — Medication 100 MILLIGRAM(S): at 06:08

## 2023-11-06 RX ADMIN — SACUBITRIL AND VALSARTAN 1 TABLET(S): 24; 26 TABLET, FILM COATED ORAL at 06:09

## 2023-11-06 RX ADMIN — SIMETHICONE 80 MILLIGRAM(S): 80 TABLET, CHEWABLE ORAL at 07:14

## 2023-11-06 RX ADMIN — Medication 400 MILLIGRAM(S): at 17:28

## 2023-11-06 RX ADMIN — Medication 50 MILLIGRAM(S): at 06:08

## 2023-11-06 RX ADMIN — Medication 650 MILLIGRAM(S): at 21:17

## 2023-11-06 RX ADMIN — Medication 1000 MILLIGRAM(S): at 17:43

## 2023-11-06 RX ADMIN — HEPARIN SODIUM 1000 UNIT(S)/HR: 5000 INJECTION INTRAVENOUS; SUBCUTANEOUS at 08:31

## 2023-11-06 RX ADMIN — ISOSORBIDE DINITRATE 20 MILLIGRAM(S): 5 TABLET ORAL at 19:09

## 2023-11-06 RX ADMIN — SACUBITRIL AND VALSARTAN 1 TABLET(S): 24; 26 TABLET, FILM COATED ORAL at 22:45

## 2023-11-06 NOTE — PROGRESS NOTE ADULT - PROBLEM SELECTOR PLAN 5
Patient with pAFib, currently on Eliquis  - Anticoagulation: Eliquis; currently held in anticipation of procedure 11/6/23 (currently on heparin gtt)   - Antiarrhythmic: Amiodarone 100 mg QD   - Rate control: Toprol  mg BID    #PVC-induced Vfib   - s/p ablation of great cardiac vein 9/2019 and [facility] (2014; Dr. Jfef EP) Patient with pAFib, currently on Eliquis  - Anticoagulation: Eliquis; currently held in anticipation of procedure 11/6/23 (currently on heparin gtt); heparin gtt will be held immediately prior to going to the OR as per vascular   - Antiarrhythmic: Amiodarone 100 mg QD   - Rate control: Toprol  mg BID    #PVC-induced Vfib   - s/p ablation of great cardiac vein 9/2019 and [facility] (2014; Dr. Jeff EP)

## 2023-11-06 NOTE — CONSULT NOTE ADULT - SUBJECTIVE AND OBJECTIVE BOX
OTOLARYNGOLOGY (ENT) CONSULTATION NOTE    PATIENT: ANNAMARIA LUKE     MRN: [id number]       : [birth year]  DATE OF ADMISSION:23  DATE OF SERVICE:  23 @ 17:23    CHIEF COMPLAINT: cough    HISTORY OF PRESENT ILLNESS:  ANNAMARIA LUKE  is a 78y Male with PMH HLD, aortic aneursym s/p SAVR/MV repair, CKD, pAFib, PVC ablation, HFrEF, who is s/p RHC 11/3 and now s/p barostimulator device implantation on the right by vascular surgery and electrophysiology today. Following extubation, he had significant non productive cough with multiple intermittent episodes. Slowly improving with further time since extubation. He denies hoarseness, reports his voice is at his baseline. He reports sore throat and right sided neck pain (at incision site). He has not had significant PO intake, however, denies difficulty swallowing his own secretions. No choking. He denies difficulty breathing.           PAST MEDICAL HISTORY:  Hypertension    S/P AVR (aortic valve replacement)    HLD (hyperlipidemia)    S/P ICD (internal cardiac defibrillator) procedure    Afib    History of thoracic aortic aneurysm repair    Stage 3 chronic kidney disease    PAF (paroxysmal atrial fibrillation)    Heart failure    Thoracic aortic aneurysm (TAA)    CAD (coronary artery disease)        CURRENT MEDICATIONS   acetaminophen   IVPB .. 1000 IV Intermittent      HOME MEDICATIONS:  amiodarone 200 mg oral tablet  Eliquis 2.5 mg oral tablet  Entresto 97 mg-103 mg oral tablet  Farxiga 10 mg oral tablet  fluticasone 50 mcg/inh inhalation powder  hydrALAZINE 25 mg oral tablet  isosorbide dinitrate 10 mg sublingual tablet  metoprolol succinate 100 mg oral tablet, extended release  torsemide 10 mg oral tablet      ALLERGIES:  No Known Allergies  statins (Muscle Pain; Joint Pain)    SOCIAL HISTORY: Pertinent included in HPI   FAMILY HISTORY      SURGICAL HISTORY:  Other postprocedural status    Other postprocedural status    History of open heart surgery        REVIEW OF SYSTEMS: The patient was asked and responded to a review of systems regarding the following symptoms: constitutional, eye, ears, nose, mouth, throat, cardiovascular, respiratory. Pertinent factors have been included in the HPI.       PHYSICAL EXAM:  ENT EXAM-   General: NAD, A+Ox3  Respiratory: No respiratory distress, stridor, or stertor  Voice quality: normal, no hoarseness  Face:  Symmetric without masses or lesions  Right: Pinna wnl  Left: Pinna wnl  Nose: nasal cavity clear bilaterally, inferior turbinates normal, mucosa normal without crusting or bleeding  OC/OP: tongue normal, tongue movement intact, floor of mouth WNL, no masses or lesions, OP clear, poor dentition  Neck: soft/flat, right incision c/d/i w/ dermabond on it, neck soft    LARYNGOSCOPY EXAM:     Verbal consent was obtained from patient prior to procedure.    Indication: cough s/p surgery    Flexible laryngoscopy was performed and revealed the following:    Nasopharynx had no mass or exudate.    Base of tongue was symmetric and not enlarged.    Vallecula was clear    Epiglottis, both aryepiglottic folds and both false vocal folds were normal    Arytenoids both without edema and erythema     True vocal folds were fully mobile and without lesions.     The patient tolerated the procedure well.        Vital Signs:  T(C): 36.4 (23 @ 16:11), Max: 36.6 (23 @ 05:52)  HR: 70 (23 @ 17:11) (65 - 80)  BP: 102/72 (23 @ 17:11) (90/54 - 125/72)  RR: 18 (23 @ 17:11) (16 - 20)  SpO2: 100% (23 @ 17:11) (97% - 100%)                        14.0   5.86  )-----------( 217      ( 2023 05:30 )             44.0    11-    139  |  107  |  30<H>  ----------------------------<  113<H>  4.1   |  25  |  1.45<H>    Ca    8.8      2023 05:30  Mg     2.2     -    TPro  7.1  /  Alb  3.2<L>  /  TBili  0.4  /  DBili  x   /  AST  11  /  ALT  <5<L>  /  AlkPhos  53  11-06   PT/INR - ( 2023 05:30 )   PT: 10.7 sec;   INR: 0.94          PTT - ( 2023 05:30 )  PTT:80.1 [id number]    MICROBIOLOGY:      PATHOLOGY:

## 2023-11-06 NOTE — ED PROVIDER NOTE - EYES, MLM
----- Message from Khalida Zamora DO sent at 11/3/2023  6:18 PM EDT -----  Let pt know her repeat blood counts are still slightly low at 11.7(should be above 12).  Her iron is ok and her vitamin B12 level was at the lower end of normal.  She could take an OTC vitamin B12 supplement.   I reviewed her recent note from oncoBryant carson.  We can continue to monitor.   Please print onco note from 10/20/23  
My chart message sent.   
Clear bilaterally, pupils equal, round and reactive to light.

## 2023-11-06 NOTE — PROGRESS NOTE ADULT - PROBLEM SELECTOR PLAN 6
Lipid panel: , TG 70, ,    - Patient is statin-intolerant   - Consider initiation of alternate lipid-lowering therapies on outpatient basis     DVT ppx: heparin gtt (Eliquis held pending Barostim placement 11/6/23)  F: none; tolerating PO intake   E: Keep K > 4, Mg > 2  N: DASH/TLC w/ consistent carb w/ fluid restriction     Code: Full  Dispo: pending clinical progression     Case discussed with: Dr. Chandler

## 2023-11-06 NOTE — PROGRESS NOTE ADULT - PROBLEM SELECTOR PLAN 2
CKD Stage 3 (most recent Cr 2.03 7/2023), currently SCr 1.40, follows with Dr. Oh  - Avoid nephrotoxic agents   - Renally dose medications   - Monitor SCr closely

## 2023-11-06 NOTE — PROGRESS NOTE ADULT - SUBJECTIVE AND OBJECTIVE BOX
POST OPERATIVE CHECK    S: Patient states his pain is well controlled. Having sore throat and intermittent cough. Seen by ENT for scope.     O:     T(C): 36.4 (11-06-23 @ 16:11), Max: 36.6 (11-06-23 @ 05:52)  HR: 70 (11-06-23 @ 17:11) (65 - 80)  BP: 102/72 (11-06-23 @ 17:11) (90/54 - 125/72)  RR: 18 (11-06-23 @ 17:11) (16 - 20)  SpO2: 100% (11-06-23 @ 17:11) (97% - 100%)    Physical Exam:     General: Awake, alert. Well appearing.   CV: HDS, WWP  Pulm: On 2L NC  Abd: s/nt/nd  Incision: Right neck and subclavicular incisions c/d/i. Closed w/ glue and suture.   Extremities: Trace edema    Labs:          No post-op labs    Rads:     A/P: 78M POD0 barostim implantation, in expected condition post-operatively.     - F/u trial of void  - Appreciate ENT evaluation given concern for possible nerve injury in the setting of coughing / clearing throat following carotid exposure.   - Pain control per primary   - Ok to resume Eliquis tomorrow in preparation for discharge.   - Vascular surgery (Team 3) will continue to follow. Please call if we can be of assistance. The phone number is [phone] and we can be reached there 24/7.

## 2023-11-06 NOTE — PROGRESS NOTE ADULT - PROBLEM SELECTOR PROBLEM 4
Status post mitral valve replacement

## 2023-11-06 NOTE — PROGRESS NOTE ADULT - SUBJECTIVE AND OBJECTIVE BOX
Cardiology PA Adult Progress Note    SUBJECTIVE ASSESSMENT: Overnight no acute events; Patient laying comfortably in bed. Currently denies CP, dizziness, palpitations, SOB, dyspnea, coughing, headaches, N/V/D/abdominal pain. Patient understands plan for the day.   	  MEDICATIONS:  aMIOdarone    Tablet 100 milliGRAM(s) Oral daily  hydrALAZINE 50 milliGRAM(s) Oral three times a day  isosorbide   dinitrate Tablet (ISORDIL) 20 milliGRAM(s) Oral three times a day  metoprolol succinate  milliGRAM(s) Oral two times a day  sacubitril 97 mG/valsartan 103 mG 1 Tablet(s) Oral two times a day  torsemide 10 milliGRAM(s) Oral daily              chlorhexidine 4% Liquid 1 Application(s) Topical once  heparin   Injectable 7000 Unit(s) IV Push every 6 hours PRN  heparin   Injectable 3500 Unit(s) IV Push every 6 hours PRN  heparin  Infusion.  Unit(s)/Hr IV Continuous <Continuous>    	  VITAL SIGNS:  T(C): 36.6 (11-06-23 @ 05:52), Max: 36.8 (11-05-23 @ 11:20)  HR: 68 (11-06-23 @ 07:11) (68 - 94)  BP: 106/72 (11-06-23 @ 07:11) (91/53 - 126/79)  RR: 19 (11-06-23 @ 05:52) (15 - 19)  SpO2: 97% (11-06-23 @ 05:52) (96% - 99%)  Wt(kg): --    I&O's Summary    05 Nov 2023 07:01  -  06 Nov 2023 07:00  --------------------------------------------------------  IN: 230 mL / OUT: 2316 mL / NET: -2086 mL                                           PHYSICAL EXAM:  Appearance: Normal	  HEENT: Normal oral mucosa, PERRL, EOMI	  Neck: Supple,   - JVD;   Cardiovascular: Normal S1 S2, No murmurs  Respiratory: Lungs clear to auscultation/Decreased Breath Sounds/No Rales, Rhonchi, Wheezing	  Gastrointestinal:  Soft, Non-tender, + BS	  Skin: No rashes, No ecchymoses, No cyanosis  Extremities: Normal range of motion, No clubbing, cyanosis or edema  Vascular: Peripheral pulses palpable 2+ bilaterally  Neurologic: Non-focal  Psychiatry: A & O x 3, Mood & affect appropriate    LABS:	 	               14.0   5.86  )-----------( 217      ( 06 Nov 2023 05:30 )             44.0     11-06    139  |  107  |  30<H>  ----------------------------<  113<H>  4.1   |  25  |  1.45<H>    Ca    8.8      06 Nov 2023 05:30  Mg     2.2     11-06    TPro  7.1  /  Alb  3.2<L>  /  TBili  0.4  /  DBili  x   /  AST  11  /  ALT  <5<L>  /  AlkPhos  53  11-06    PT/INR - ( 06 Nov 2023 05:30 )   PT: 10.7 sec;   INR: 0.94          PTT - ( 06 Nov 2023 05:30 )  PTT:80.1 sec Cardiology PA Adult Progress Note    SUBJECTIVE ASSESSMENT: Overnight no acute events; Patient laying comfortably in bed. Currently denies CP, dizziness, palpitations, SOB, dyspnea, coughing, headaches, N/V/D/abdominal pain. Patient understands plan for the day. Will go to OR today for barostimulator implantation device with EP and Vasc  	  MEDICATIONS:  aMIOdarone    Tablet 100 milliGRAM(s) Oral daily  hydrALAZINE 50 milliGRAM(s) Oral three times a day  isosorbide   dinitrate Tablet (ISORDIL) 20 milliGRAM(s) Oral three times a day  metoprolol succinate  milliGRAM(s) Oral two times a day  sacubitril 97 mG/valsartan 103 mG 1 Tablet(s) Oral two times a day  torsemide 10 milliGRAM(s) Oral daily  chlorhexidine 4% Liquid 1 Application(s) Topical once  heparin   Injectable 7000 Unit(s) IV Push every 6 hours PRN  heparin   Injectable 3500 Unit(s) IV Push every 6 hours PRN  heparin  Infusion.  Unit(s)/Hr IV Continuous <Continuous>    	  VITAL SIGNS:  T(C): 36.6 (11-06-23 @ 05:52), Max: 36.8 (11-05-23 @ 11:20)  HR: 68 (11-06-23 @ 07:11) (68 - 94)  BP: 106/72 (11-06-23 @ 07:11) (91/53 - 126/79)  RR: 19 (11-06-23 @ 05:52) (15 - 19)  SpO2: 97% (11-06-23 @ 05:52) (96% - 99%)  Wt(kg): --    I&O's Summary    05 Nov 2023 07:01  -  06 Nov 2023 07:00  --------------------------------------------------------  IN: 230 mL / OUT: 2316 mL / NET: -2086 mL                                           PHYSICAL EXAM:  Appearance: Normal	  HEENT: Normal oral mucosa, PERRL, EOMI	  Neck: Supple,   - JVD;   Cardiovascular: Normal S1 S2, No murmurs  Respiratory: Lungs clear to auscultation No Rales, Rhonchi, Wheezing	  Gastrointestinal:  Soft, Non-tender, + BS	  Skin: No rashes, No ecchymoses, No cyanosis  Extremities: Normal range of motion, No clubbing, cyanosis or edema  Vascular: Peripheral pulses palpable 2+ bilaterally  Neurologic: Non-focal  Psychiatry: A & O x 3, Mood & affect appropriate    LABS:	 	               14.0   5.86  )-----------( 217      ( 06 Nov 2023 05:30 )             44.0     11-06    139  |  107  |  30<H>  ----------------------------<  113<H>  4.1   |  25  |  1.45<H>    Ca    8.8      06 Nov 2023 05:30  Mg     2.2     11-06    TPro  7.1  /  Alb  3.2<L>  /  TBili  0.4  /  DBili  x   /  AST  11  /  ALT  <5<L>  /  AlkPhos  53  11-06    PT/INR - ( 06 Nov 2023 05:30 )   PT: 10.7 sec;   INR: 0.94          PTT - ( 06 Nov 2023 05:30 )  PTT:80.1 sec

## 2023-11-06 NOTE — BRIEF OPERATIVE NOTE - OPERATION/FINDINGS
Carotid Barostim implant placed over R carotid body via cervical incision and chest wall pocket made for device. Incisions closed in layers

## 2023-11-06 NOTE — CONSULT NOTE ADULT - ASSESSMENT
-------------------------------  ASSESSMENT/PLAN:    IMPRESSION: ANNAMARIA LUKE  is a 78y Male with PMH HLD, aortic aneursym s/p SAVR/MV repair, CKD, pAFib, PVC ablation, HFrEF, who is s/p RHC 11/3 and now s/p R barostimulator device implantation, now w/ cough after extubation, slowly improving. AFVSS, respirating comfortably on room air. Laryngoscopy demonstrating BL mobile VC.     RECOMMENDATIONS:  - Pain control and supportive measures for cough  - No ENT intervention at this time, ENT to sign off    ---  Thank you for the kind referral and for allowing me to share in the care of ANNAMARIA LUKE If you have any questions, please do not hesitate to contact me.     Sincerely,  Nancy Tucker  11-06-23 @ 17:23

## 2023-11-06 NOTE — PROGRESS NOTE ADULT - ASSESSMENT
78-year-old male with a PMHx of HLD, aortic aneurysm (stable, monitored by Dr. Garcia), s/p SAVR and MV repair w ring in 2014 (Dr. Louise), CKD stage 3 (most recent Cr 2.03 7/2023, follows w Dr. Oh) pAfib (on Eliquis, last dose 10/31/23, follows w EP Dr. Joseph), PVC ablation, HFrEF (EF 10/15%, follows w HF Dr. Sheppard), CCRT-D ([facility]), moderate MR, recent ED visit for BRBPR (dx w anal fissures) who is s/p RHC with Dr. Sheppard 11/3/23 revealing high left-sided filling pressures, low CI, and high SVR. Patient is planned for barostimulator device implantation with Dr. Jeff (EP) and Dr. Rivera (vascular) on 11/6/23.  78-year-old male with a PMHx of HLD, aortic aneurysm (stable, monitored by Dr. Garcia), s/p SAVR and MV repair w ring in 2014 (Dr. Louise), CKD stage 3 (most recent Cr 2.03 7/2023, follows w Dr. Oh) pAfib (on Eliquis, last dose 10/31/23, follows w EP Dr. Joseph), PVC ablation, HFrEF (EF 10/15%, follows w HF Dr. Sheppard), CCRT-D ([facility]), moderate MR, recent ED visit for BRBPR (dx w anal fissures) who is s/p RHC with Dr. Sheppard 11/3/23 revealing high left-sided filling pressures, low CI, and high SVR. Patient is planned for barostimulator device implantation with Dr. Jeff (EP) and Dr. Rivera (vascular) today on 11/6/23.

## 2023-11-06 NOTE — PROGRESS NOTE ADULT - PROBLEM SELECTOR PLAN 3
Patient with history of thoracic aortic aneurysm, follows with Dr. Garcia (CTSx)   - CT chest (1/7/22): ascending 4.8cm. distal arch/proximal descending 4.5cm  - Per outpatient note Dr. Garcia (CTS) on (1/2022), aortic pathology stable and does not require surgical intervention; plan for follow up in [facility] years (1/2024) with CT chest without contrast and TTE

## 2023-11-06 NOTE — PRE-ANESTHESIA EVALUATION ADULT - NSANTHOSAYNRD_GEN_A_CORE
No. SHALONDA screening performed.  STOP BANG Legend: 0-2 = LOW Risk; 3-4 = INTERMEDIATE Risk; 5-8 = HIGH Risk

## 2023-11-06 NOTE — PRE-ANESTHESIA EVALUATION ADULT - NSANTHPMHFT_GEN_ALL_CORE
79 yo male with a PMHx of HLD, aortic aneurysm (stable, monitored by Dr. Garcia), s/p SAVR and MV repair w ring in 2014 (Dr. Louise), CKD stage 3 (most recent Cr 2.03 7/2023, follows w Dr. Oh) pAfib (on Eliquis, last dose10/31/23, follows w EP Dr. Joseph), PVC ablation, HFrEF (EF 10/15%, follows w HF Dr. Sheppard), CCRT-D ([facility]), moderate MR, recent ED visit for BRBPR (dx w anal fissures) who presented to his outpt cardiologist complaining of progressing dyspnea on minimal exertion (1 block)  with associated fatigue and atypical chest pain. Pt states he used to be quite active and over the last couple of months has not been able to walk as long. Pt denies chest pain at rest. Pt has had chest pain since his surgery in 2014. Pt denies any palpitations, dizziness, syncope, diaphoresis, LE edema, PND, N/V/D, abd pain, cough, congestion, fever, chills or recent sick contact.    JIGAR 10/10/23: Mildly dilated LV. EF 10-15%. Normal RV size/fctn. Normal LA. A bioprosthetic valve noted in the aortic position which appears to be functioning normally. An annuloplasty ring is noted in the mitral position with mild MR. No pHTN. Mildly dilated aortic root. Mildly dilated ascending aorta.  TTE 10/2/23:  Mildly dilated LV. Severely reduced LVSF EF 10-15% with global hypokinesis. Normal RV size/fctn. Mitral annular prosthesis with mild mitral regurgitation. Bioprosthetic valve is seen in the aortic position with normal   function. Moderately dilated ascending aorta. Moderately dilated aortic root.  LHC 3/18/21: Normal coronaries.    In light of pts risk factors and continued CCS III anginal equivalent symptoms, pt now presents to [facility] for RHC w Dr. Sheppard to determine if palliative inotropes are indicated.

## 2023-11-07 ENCOUNTER — TRANSCRIPTION ENCOUNTER (OUTPATIENT)
Age: 78
End: 2023-11-07

## 2023-11-07 ENCOUNTER — NON-APPOINTMENT (OUTPATIENT)
Age: 78
End: 2023-11-07

## 2023-11-07 VITALS
OXYGEN SATURATION: 100 % | HEART RATE: 69 BPM | SYSTOLIC BLOOD PRESSURE: 93 MMHG | DIASTOLIC BLOOD PRESSURE: 63 MMHG | TEMPERATURE: 98 F | RESPIRATION RATE: 18 BRPM

## 2023-11-07 LAB
ANION GAP SERPL CALC-SCNC: 7 MMOL/L — SIGNIFICANT CHANGE UP (ref 5–17)
ANION GAP SERPL CALC-SCNC: 7 MMOL/L — SIGNIFICANT CHANGE UP (ref 5–17)
APTT BLD: 28.7 SEC — SIGNIFICANT CHANGE UP (ref 24.5–35.6)
APTT BLD: 28.7 SEC — SIGNIFICANT CHANGE UP (ref 24.5–35.6)
BUN SERPL-MCNC: 30 MG/DL — HIGH (ref 7–23)
BUN SERPL-MCNC: 30 MG/DL — HIGH (ref 7–23)
CALCIUM SERPL-MCNC: 8.6 MG/DL — SIGNIFICANT CHANGE UP (ref 8.4–10.5)
CALCIUM SERPL-MCNC: 8.6 MG/DL — SIGNIFICANT CHANGE UP (ref 8.4–10.5)
CHLORIDE SERPL-SCNC: 104 MMOL/L — SIGNIFICANT CHANGE UP (ref 96–108)
CHLORIDE SERPL-SCNC: 104 MMOL/L — SIGNIFICANT CHANGE UP (ref 96–108)
CO2 SERPL-SCNC: 25 MMOL/L — SIGNIFICANT CHANGE UP (ref 22–31)
CO2 SERPL-SCNC: 25 MMOL/L — SIGNIFICANT CHANGE UP (ref 22–31)
CREAT SERPL-MCNC: 1.48 MG/DL — HIGH (ref 0.5–1.3)
CREAT SERPL-MCNC: 1.48 MG/DL — HIGH (ref 0.5–1.3)
EGFR: 48 ML/MIN/1.73M2 — LOW
EGFR: 48 ML/MIN/1.73M2 — LOW
GLUCOSE SERPL-MCNC: 113 MG/DL — HIGH (ref 70–99)
GLUCOSE SERPL-MCNC: 113 MG/DL — HIGH (ref 70–99)
HCT VFR BLD CALC: 43.8 % — SIGNIFICANT CHANGE UP (ref 39–50)
HCT VFR BLD CALC: 43.8 % — SIGNIFICANT CHANGE UP (ref 39–50)
HGB BLD-MCNC: 13.9 G/DL — SIGNIFICANT CHANGE UP (ref 13–17)
HGB BLD-MCNC: 13.9 G/DL — SIGNIFICANT CHANGE UP (ref 13–17)
MAGNESIUM SERPL-MCNC: 2.1 MG/DL — SIGNIFICANT CHANGE UP (ref 1.6–2.6)
MAGNESIUM SERPL-MCNC: 2.1 MG/DL — SIGNIFICANT CHANGE UP (ref 1.6–2.6)
MCHC RBC-ENTMCNC: 31.7 GM/DL — LOW (ref 32–36)
MCHC RBC-ENTMCNC: 31.7 GM/DL — LOW (ref 32–36)
MCHC RBC-ENTMCNC: 32.7 PG — SIGNIFICANT CHANGE UP (ref 27–34)
MCHC RBC-ENTMCNC: 32.7 PG — SIGNIFICANT CHANGE UP (ref 27–34)
MCV RBC AUTO: 103.1 FL — HIGH (ref 80–100)
MCV RBC AUTO: 103.1 FL — HIGH (ref 80–100)
NRBC # BLD: 0 /100 WBCS — SIGNIFICANT CHANGE UP (ref 0–0)
NRBC # BLD: 0 /100 WBCS — SIGNIFICANT CHANGE UP (ref 0–0)
PLATELET # BLD AUTO: 203 K/UL — SIGNIFICANT CHANGE UP (ref 150–400)
PLATELET # BLD AUTO: 203 K/UL — SIGNIFICANT CHANGE UP (ref 150–400)
POTASSIUM SERPL-MCNC: 4.8 MMOL/L — SIGNIFICANT CHANGE UP (ref 3.5–5.3)
POTASSIUM SERPL-MCNC: 4.8 MMOL/L — SIGNIFICANT CHANGE UP (ref 3.5–5.3)
POTASSIUM SERPL-SCNC: 4.8 MMOL/L — SIGNIFICANT CHANGE UP (ref 3.5–5.3)
POTASSIUM SERPL-SCNC: 4.8 MMOL/L — SIGNIFICANT CHANGE UP (ref 3.5–5.3)
RBC # BLD: 4.25 M/UL — SIGNIFICANT CHANGE UP (ref 4.2–5.8)
RBC # BLD: 4.25 M/UL — SIGNIFICANT CHANGE UP (ref 4.2–5.8)
RBC # FLD: 14.8 % — HIGH (ref 10.3–14.5)
RBC # FLD: 14.8 % — HIGH (ref 10.3–14.5)
SODIUM SERPL-SCNC: 136 MMOL/L — SIGNIFICANT CHANGE UP (ref 135–145)
SODIUM SERPL-SCNC: 136 MMOL/L — SIGNIFICANT CHANGE UP (ref 135–145)
WBC # BLD: 7.21 K/UL — SIGNIFICANT CHANGE UP (ref 3.8–10.5)
WBC # BLD: 7.21 K/UL — SIGNIFICANT CHANGE UP (ref 3.8–10.5)
WBC # FLD AUTO: 7.21 K/UL — SIGNIFICANT CHANGE UP (ref 3.8–10.5)
WBC # FLD AUTO: 7.21 K/UL — SIGNIFICANT CHANGE UP (ref 3.8–10.5)

## 2023-11-07 PROCEDURE — 85730 THROMBOPLASTIN TIME PARTIAL: CPT

## 2023-11-07 PROCEDURE — 85025 COMPLETE CBC W/AUTO DIFF WBC: CPT

## 2023-11-07 PROCEDURE — 82248 BILIRUBIN DIRECT: CPT

## 2023-11-07 PROCEDURE — 86850 RBC ANTIBODY SCREEN: CPT

## 2023-11-07 PROCEDURE — C1825: CPT

## 2023-11-07 PROCEDURE — 80061 LIPID PANEL: CPT

## 2023-11-07 PROCEDURE — 82803 BLOOD GASES ANY COMBINATION: CPT

## 2023-11-07 PROCEDURE — 85610 PROTHROMBIN TIME: CPT

## 2023-11-07 PROCEDURE — 86901 BLOOD TYPING SEROLOGIC RH(D): CPT

## 2023-11-07 PROCEDURE — 93005 ELECTROCARDIOGRAM TRACING: CPT

## 2023-11-07 PROCEDURE — C1889: CPT

## 2023-11-07 PROCEDURE — 82553 CREATINE MB FRACTION: CPT

## 2023-11-07 PROCEDURE — C1894: CPT

## 2023-11-07 PROCEDURE — 80048 BASIC METABOLIC PNL TOTAL CA: CPT

## 2023-11-07 PROCEDURE — 80053 COMPREHEN METABOLIC PANEL: CPT

## 2023-11-07 PROCEDURE — 85027 COMPLETE CBC AUTOMATED: CPT

## 2023-11-07 PROCEDURE — 83735 ASSAY OF MAGNESIUM: CPT

## 2023-11-07 PROCEDURE — 99232 SBSQ HOSP IP/OBS MODERATE 35: CPT | Mod: GC

## 2023-11-07 PROCEDURE — 36415 COLL VENOUS BLD VENIPUNCTURE: CPT

## 2023-11-07 PROCEDURE — C1887: CPT

## 2023-11-07 PROCEDURE — 83036 HEMOGLOBIN GLYCOSYLATED A1C: CPT

## 2023-11-07 PROCEDURE — 99239 HOSP IP/OBS DSCHRG MGMT >30: CPT

## 2023-11-07 PROCEDURE — C1769: CPT

## 2023-11-07 PROCEDURE — 86900 BLOOD TYPING SEROLOGIC ABO: CPT

## 2023-11-07 PROCEDURE — 82550 ASSAY OF CK (CPK): CPT

## 2023-11-07 RX ORDER — AMIODARONE HYDROCHLORIDE 400 MG/1
0.5 TABLET ORAL
Refills: 0 | DISCHARGE

## 2023-11-07 RX ORDER — SACUBITRIL AND VALSARTAN 24; 26 MG/1; MG/1
1 TABLET, FILM COATED ORAL
Refills: 0 | DISCHARGE

## 2023-11-07 RX ORDER — METOPROLOL TARTRATE 50 MG
1 TABLET ORAL
Qty: 0 | Refills: 0 | DISCHARGE
Start: 2023-11-07

## 2023-11-07 RX ORDER — METOPROLOL TARTRATE 50 MG
1 TABLET ORAL
Qty: 60 | Refills: 2
Start: 2023-11-07 | End: 2024-02-04

## 2023-11-07 RX ORDER — APIXABAN 2.5 MG/1
1 TABLET, FILM COATED ORAL
Qty: 0 | Refills: 0
Start: 2023-11-07

## 2023-11-07 RX ORDER — AMIODARONE HYDROCHLORIDE 400 MG/1
0.5 TABLET ORAL
Qty: 0 | Refills: 0 | DISCHARGE
Start: 2023-11-07

## 2023-11-07 RX ORDER — HYDRALAZINE HCL 50 MG
1 TABLET ORAL
Qty: 90 | Refills: 2
Start: 2023-11-07 | End: 2024-02-04

## 2023-11-07 RX ORDER — FLUTICASONE PROPIONATE 220 MCG
1 AEROSOL WITH ADAPTER (GRAM) INHALATION
Refills: 0 | DISCHARGE

## 2023-11-07 RX ORDER — METOPROLOL TARTRATE 50 MG
1 TABLET ORAL
Refills: 0 | DISCHARGE

## 2023-11-07 RX ORDER — ISOSORBIDE DINITRATE 5 MG/1
1 TABLET ORAL
Qty: 30 | Refills: 0
Start: 2023-11-07

## 2023-11-07 RX ORDER — HYDRALAZINE HCL 50 MG
1 TABLET ORAL
Refills: 0 | DISCHARGE

## 2023-11-07 RX ORDER — APIXABAN 2.5 MG/1
1 TABLET, FILM COATED ORAL
Refills: 0 | DISCHARGE

## 2023-11-07 RX ORDER — ISOSORBIDE DINITRATE 5 MG/1
1 TABLET ORAL
Refills: 0 | DISCHARGE

## 2023-11-07 RX ORDER — APIXABAN 2.5 MG/1
1 TABLET, FILM COATED ORAL
Qty: 30 | Refills: 3
Start: 2023-11-07

## 2023-11-07 RX ORDER — HYDRALAZINE HCL 50 MG
1 TABLET ORAL
Qty: 0 | Refills: 0 | DISCHARGE
Start: 2023-11-07

## 2023-11-07 RX ORDER — SACUBITRIL AND VALSARTAN 24; 26 MG/1; MG/1
1 TABLET, FILM COATED ORAL
Qty: 30 | Refills: 3
Start: 2023-11-07

## 2023-11-07 RX ORDER — ISOSORBIDE DINITRATE 5 MG/1
1 TABLET ORAL
Qty: 0 | Refills: 0
Start: 2023-11-07

## 2023-11-07 RX ADMIN — AMIODARONE HYDROCHLORIDE 100 MILLIGRAM(S): 400 TABLET ORAL at 11:22

## 2023-11-07 RX ADMIN — APIXABAN 5 MILLIGRAM(S): 2.5 TABLET, FILM COATED ORAL at 06:49

## 2023-11-07 RX ADMIN — Medication 100 MILLIGRAM(S): at 06:57

## 2023-11-07 RX ADMIN — Medication 1 TABLET(S): at 11:23

## 2023-11-07 RX ADMIN — ISOSORBIDE DINITRATE 20 MILLIGRAM(S): 5 TABLET ORAL at 06:50

## 2023-11-07 RX ADMIN — Medication 50 MILLIGRAM(S): at 06:50

## 2023-11-07 RX ADMIN — ISOSORBIDE DINITRATE 20 MILLIGRAM(S): 5 TABLET ORAL at 11:23

## 2023-11-07 RX ADMIN — Medication 10 MILLIGRAM(S): at 06:50

## 2023-11-07 RX ADMIN — SACUBITRIL AND VALSARTAN 1 TABLET(S): 24; 26 TABLET, FILM COATED ORAL at 06:49

## 2023-11-07 NOTE — PROGRESS NOTE ADULT - SUBJECTIVE AND OBJECTIVE BOX
DAILY PROGRESS NOTE    S: Patient feels well. Pain well controlled. Has had an improvement in hoarseness and coughing overnight.     O:     T(C): 36.8 (11-07-23 @ 06:19), Max: 36.9 (11-06-23 @ 21:19)  HR: 69 (11-07-23 @ 06:19) (65 - 78)  BP: 105/68 (11-07-23 @ 06:19) (90/54 - 123/89)  RR: 16 (11-07-23 @ 06:19) (16 - 20)  SpO2: 98% (11-07-23 @ 06:19) (95% - 100%)    Physical Exam:     General: Awake, alert.   CV: HDS, WWP  Pulm: On room air  Abd: S/nt/nd  Extremity: No significant edema  Incision: Left neck and infraclavicular chest incisions c/d/i w/o mass, fluctuance or hematoma.     Labs:                       14.0   5.86  )-----------( 217      ( 06 Nov 2023 05:30 )             44.0   11-06    139  |  107  |  30<H>  ----------------------------<  113<H>  4.1   |  25  |  1.45<H>    Ca    8.8      06 Nov 2023 05:30  Mg     2.2     11-06    TPro  7.1  /  Alb  3.2<L>  /  TBili  0.4  /  DBili  x   /  AST  11  /  ALT  <5<L>  /  AlkPhos  53  11-06    Rads: Direct laryngoscopy yesterday w/ movement of bilateral vocal cords noted.     A/P: A/P: 78M POD0 barostim implantation, in expected condition post-operatively.     - Appreciate ENT evaluation ruling out possible nerve injury in the setting of coughing / clearing throat following carotid exposure.   - Ok to resume Eliquis today in preparation for discharge.   - Ok for discharge from vascular surgery perspective.   - Follow up requested of clinic this morning. The follow up appointment w/ Dr. Foy in 1-2 weeks should be scheduled in [place] this morning. ([address], [facility], [address] [address] | [phone] |Fax: [phone])   - Vascular surgery (Team 3) will continue to follow. Please call if we can be of assistance. The phone number is [phone] and we can be reached there 24/7.

## 2023-11-07 NOTE — DISCHARGE NOTE PROVIDER - NSDCFUADDINST_GEN_ALL_CORE_FT
Please follow up with Dr. Foy in 1 week. Call the office at [phone] with any questions.    NO sharp neck turns. NO driving until you see surgeon in office.  If you have a persistent headache that is not improved with Tylenol, please call MD.     Call 911 if you or someone you know experiences the following symptoms of stroke (can be remembered by BE FAST):  •Balance: Dizziness, loss of balance, or a sense of falling  •Eyes: Sudden double vision or blurred vision  •Face: drooping of one side of the face  •Arm: arm weakness  •Speech:  Sudden trouble talking or slurred speech, trouble understanding others  •Time: Time to call for an ambulance fast!

## 2023-11-07 NOTE — DISCHARGE NOTE PROVIDER - NSDCCPTREATMENT_GEN_ALL_CORE_FT
PRINCIPAL PROCEDURE  Procedure: Insertion, carotid body electronic stimulator  Findings and Treatment: The procedure you had done is called a [facility]. Please abide by the following post-operative instructions:   - You should restart your Eliquis 5mg twice daily   - Do not shower for 5 days  - Do not drive or operate heavy machinery for 1 week   - Do not submerge in water (example: baths, swimming) for 1 month.  - Do not lift your left arm greater than shoulder height for 6 weeks.  - Do not lift anything heavier than 5-10 lbs with your left arm for 6 weeks.  - Any sudden swelling, redness, fever, discharge, or severe pain, call 911 or go to the nearest hospital.     PRINCIPAL PROCEDURE  Procedure: Insertion, carotid body electronic stimulator  Findings and Treatment: The procedure you had done is called a [facility]. Please abide by the following post-operative instructions:   Please follow up with Dr. Foy in 1 week. Call the office at [phone] with any questions.  NO sharp neck turns. NO driving until you see surgeon in office.  If you have a persistent headache that is not improved with Tylenol, please call MD.   Call 911 if you or someone you know experiences the following symptoms of stroke (can be remembered by BE FAST):  •Balance: Dizziness, loss of balance, or a sense of falling  •Eyes: Sudden double vision or blurred vision  •Face: drooping of one side of the face  •Arm: arm weakness  •Speech:  Sudden trouble talking or slurred speech, trouble understanding others  •Time: Time to call for an ambulance fast!     PRINCIPAL PROCEDURE  Procedure: Insertion, carotid body electronic stimulator  Findings and Treatment: The procedure you had done is called a [facility]. Please abide by the following post-operative instructions:   Please follow up with Dr. Foy on 11/21/23. Call the office at [phone] with any questions.  NO sharp neck turns. NO driving until you see surgeon in office.  If you have a persistent headache that is not improved with Tylenol, please call MD.   Call 502 if you or someone you know experiences the following symptoms of stroke (can be remembered by BE FAST):  •Balance: Dizziness, loss of balance, or a sense of falling  •Eyes: Sudden double vision or blurred vision  •Face: drooping of one side of the face  •Arm: arm weakness  •Speech:  Sudden trouble talking or slurred speech, trouble understanding others  •Time: Time to call for an ambulance fast!

## 2023-11-07 NOTE — DISCHARGE NOTE NURSING/CASE MANAGEMENT/SOCIAL WORK - NSDCVIVACCINE_GEN_ALL_CORE_FT
influenza, injectable, quadrivalent, preservative free; 18-Sep-2019 10:10; Yeny Guevara (RN); [employer]; [id number] (Exp. Date: 30-Jun-2020); IntraMuscular; Deltoid Left.; 0.5 milliLiter(s); VIS (VIS Published: 15-Aug-2019, VIS Presented: 18-Sep-2019);

## 2023-11-07 NOTE — PROGRESS NOTE ADULT - ASSESSMENT
78M with a history of ACC/AHA Stage C/D NICM (LV 6.2 cm, LVEF 25-30%) s/p ICD upgraded to CRT-D 9/2021 for chronic RV pacing, severe AI/MR s/p bioAVR/MVr 2014 history of VT/VT with ICD shocks and frequent PVC's s/p PVC ablation, pAF on eliquis, CKD IIIb (Cr 1.8), and aortic aneurysm who presented for outpatient RHC in setting of persistent NYHA IIIb symptoms. He was admitted for medical optimization and Barostim implantation.      Review of Studies:  RHC RFA 11/3/23: RA 9, PA 44/21(30), PCWP 21, PA sat 58% with Lorna CO/CI 3.3/1.6,  with SVR 2230    JIGAR 10/10/23: Mildly dilated LV. EF 10-15%. Normal RV size/fctn. Normal LA. A bioprosthetic valve noted in the aortic position which appears to be functioning normally. An annuloplasty ring is noted in the mitral position with mild MR. No pHTN. Mildly dilated aortic root. Mildly dilated ascending aorta.   TTE 10/2/23:  Mildly dilated LV. Severely reduced LVSF EF 10-15% with global hypokinesis. Normal RV size/fctn. Mitral annular prosthesis with mild mitral regurgitation. Bioprosthetic valve is seen in the aortic position with normal function. Moderately dilated ascending aorta. Moderately dilated aortic root.   LH 3/18/21: Normal coronaries    #Chronic systolic heart failure   Etiology; NICM, possible infiltrative process, CMR deferred s/p device and advanced age.  Device: s/p CRT-D  - s/p Barostim with Jesus Manuel Gonzales and [place],   GDMT:   - continue entresto  mg BID  - continue hydralazine to 50mg TID  - continue isordil 20mg TID  - continue Toprol 100mg BID   - hold MRA due to CKD and hyperkalemia.   - resume farxiga on discharge   Diuretic: continue Torsemide 10mg QD  Advanced therapies: not a candidate for VAD/OHT due to comorbidities.   - Planning for Barostim implantation and tentative discharge Tuesday    # Valvular disease   - well functioning on recent JIGAR    # AF  CHADSVASC 4 on Eliquis as outpatient with multiple valvular interventions  - c/w eliquis

## 2023-11-07 NOTE — DISCHARGE NOTE PROVIDER - NSDCMRMEDTOKEN_GEN_ALL_CORE_FT
amiodarone 200 mg oral tablet: 0.5 tab(s) orally  Eliquis 2.5 mg oral tablet: 1 tab(s) orally 2 times a day  Entresto 97 mg-103 mg oral tablet: 1 tab(s) orally 2 times a day  Farxiga 10 mg oral tablet: 1 tab(s) orally once a day  fluticasone 50 mcg/inh inhalation powder: 1 puff(s) inhaled  hydrALAZINE 25 mg oral tablet: 1 tab(s) orally 3 times a day  isosorbide dinitrate 10 mg sublingual tablet: 1 tab(s) sublingual 3 times a day  metoprolol succinate 100 mg oral tablet, extended release: 1 tab(s) orally once a day  torsemide 10 mg oral tablet: 1 tab(s) orally once a day   amiodarone 200 mg oral tablet: 0.5 tab(s) orally once a day  Entresto 97 mg-103 mg oral tablet: 1 tab(s) orally 2 times a day  Farxiga 10 mg oral tablet: 1 tab(s) orally once a day  hydrALAZINE 50 mg oral tablet: 1 tab(s) orally 3 times a day  metoprolol succinate 100 mg oral tablet, extended release: 1 tab(s) orally 2 times a day  torsemide 10 mg oral tablet: 1 tab(s) orally once a day   amiodarone 200 mg oral tablet: 0.5 tab(s) orally once a day  apixaban 5 mg oral tablet: 1 tab(s) orally 2 times a day  Entresto 97 mg-103 mg oral tablet: 1 tab(s) orally 2 times a day  Farxiga 10 mg oral tablet: 1 tab(s) orally once a day  hydrALAZINE 50 mg oral tablet: 1 tab(s) orally 3 times a day  isosorbide dinitrate 20 mg oral tablet: 1 tab(s) orally 3 times a day  metoprolol succinate 100 mg oral tablet, extended release: 1 tab(s) orally 2 times a day  torsemide 10 mg oral tablet: 1 tab(s) orally once a day

## 2023-11-07 NOTE — DISCHARGE NOTE PROVIDER - CARE PROVIDER_API CALL
Luciano Chandler  Cardiovascular Disease  [address] [address]  Phone: [phone]  Fax: [phone]  Scheduled Appointment: 12/01/2023    Chon Foy  Vascular Surgery  [address]  [place] [address]  Phone: [phone]  Fax: [phone]  Follow Up Time: 2 weeks   Luciano Chandler  Cardiovascular Disease  [address] [address]  Phone: [phone]  Fax: [phone]  Established Patient  Scheduled Appointment: 12/01/2023 09:40 AM    Chon Foy  Vascular Surgery  [address]  [place] [address]  Phone: [phone]  Fax: [phone]  Scheduled Appointment: 11/21/2023 08:45 AM    Andrey Jeff  Cardiac Electrophysiology  [address], [address] [place] [address]  Phone: [phone]  Fax: [phone]  Established Patient  Scheduled Appointment: 12/06/2023 02:30 PM    Jay Sheppard  Adv Heart Fail Trnsplnt Cardio  [address] [address]  Phone: [phone]  Fax: [phone]  Established Patient  Scheduled Appointment: 12/18/2023 03:20 PM   Luciano Chandler  Cardiovascular Disease  [address] [address]  Phone: [phone]  Fax: [phone]  Established Patient  Scheduled Appointment: 12/01/2023 09:40 AM    Chon Foy  Vascular Surgery  [address]  [place] [address]  Phone: [phone]  Fax: [phone]  Scheduled Appointment: 11/21/2023 08:45 AM    Andrey Jeff  Cardiac Electrophysiology  [address], [address] [place] [address]  Phone: [phone]  Fax: [phone]  Established Patient  Scheduled Appointment: 11/08/2023 09:45 AM    Jay Sheppard  Adv Heart Fail Trnsplnt Cardio  [address] [address]  Phone: [phone]  Fax: [phone]  Established Patient  Scheduled Appointment: 12/18/2023 03:20 PM

## 2023-11-07 NOTE — DISCHARGE NOTE PROVIDER - HOSPITAL COURSE
77 YO M with a history of ACC/AHA Stage C/D NICM (LV 6.2 cm, LVEF 25-30%) s/p ICD upgraded to CRT-D 9/2021 for chronic RV pacing, severe AI/MR s/p bioAVR/MVr 2014 (Dr. Louise) history of VT/VT with ICD shocks and frequent PVC's s/p PVC ablation, pAF on eliquis, CKD IIIb (Cr 2.03), and a stable aortic aneurysm (monitored by Dr. Garcia) who presented for outpatient RHC in setting of persistent NYHA IIIb symptoms which revealed elevated left sided filling pressures and low cardiac output in setting of high SVR. He was admitted for medical optimization and Barostim implantation. 79 YO M with a history of ACC/AHA Stage C/D NICM (LV 6.2 cm, LVEF 25-30%) s/p ICD upgraded to CRT-D 9/2021 for chronic RV pacing, severe AI/MR s/p bioAVR/MVr 2014 (Dr. Louise) history of VT/VT with ICD shocks and frequent PVC's s/p PVC ablation, pAF on eliquis, CKD IIIb (Cr 2.03), and a stable aortic aneurysm (monitored by Dr. Garcia) who presented for outpatient RHC in setting of persistent NYHA IIIb symptoms which revealed elevated left sided filling pressures and low cardiac output in setting of high SVR. He was admitted for medical optimization and Barostim implantation. 79 YO M with a history of ACC/AHA Stage C/D NICM (LV 6.2 cm, LVEF 25-30%) s/p ICD upgraded to CRT-D 9/2021 for chronic RV pacing, severe AI/MR s/p bioAVR/MVr 2014 (Dr. Louise) history of VT/VT with ICD shocks and frequent PVC's s/p PVC ablation, pAF on eliquis, CKD IIIb (Cr 2.03), and a stable aortic aneurysm (monitored by Dr. Garcia) who presented for outpatient RHC in setting of persistent NYHA IIIb symptoms which revealed elevated left sided filling pressures and low cardiac output in setting of high SVR. He was admitted for medical optimization and Barostim implantation.       79 YO M with a history of ACC/AHA Stage C/D NICM (LV 6.2 cm, LVEF 25-30%) s/p ICD upgraded to CRT-D 9/2021 for chronic RV pacing, severe AI/MR s/p bioAVR/MVr 2014 (Dr. Louise) history of VT/VT with ICD shocks and frequent PVC's s/p PVC ablation, pAF on eliquis, CKD IIIb (Cr 2.03), and a stable aortic aneurysm (monitored by Dr. Garcia) who presented for outpatient RHC in setting of persistent NYHA IIIb symptoms which revealed elevated left sided filling pressures and low cardiac output in setting of high SVR. He was admitted for medical optimization and Barostim implantation.      On 11/7/23, Dr. Love and Dr. Busby (vascular and EP) implanted a Barostimulator device. Post-procedure, patient had slight hoarseness and a persistent cough. ENT was consulted for a bedside laryngoscopy to rule out perforation of the Recurrent Laryngeal nerve. ENT ruled out perforation of the RLN and patient attributed it to GERD and symptoms resolved with TUMS. Overnight, patient was stable with no events on telemetry. POD 1 RIJ and right sided chest wall are C/D/I without bleeding, ecchymoses or hematoma.     As per 77 YO M with a history of ACC/AHA Stage C/D NICM (LV 6.2 cm, LVEF 25-30%) s/p ICD upgraded to CRT-D 9/2021 for chronic RV pacing, severe AI/MR s/p bioAVR/MVr 2014 (Dr. Louise) history of VT/VT with ICD shocks and frequent PVC's s/p PVC ablation, pAF on eliquis, CKD IIIb (Cr 2.03), and a stable aortic aneurysm (monitored by Dr. Garcia) who presented for outpatient RHC in setting of persistent NYHA IIIb symptoms which revealed elevated left sided filling pressures and low cardiac output in setting of high SVR. He was admitted for medical optimization and Barostim implantation.      On 11/7/23, Dr. Love and Dr. Busby (vascular and EP) implanted a Barostimulator device. Post-procedure, patient had slight hoarseness and a persistent cough. ENT was consulted for a bedside laryngoscopy to rule out perforation of the Recurrent Laryngeal nerve. ENT ruled out perforation of the RLN and patient attributed it to GERD and symptoms resolved with TUMS. Overnight, patient was stable with no events on telemetry. POD 1 RIJ and right sided chest wall are C/D/I without bleeding, ecchymoses or hematoma.     As per HF, patient will go home on the same home medication regimen. He will follow up with Dr. Chandler as his primary cardiologist (an appointment for 7-10 days was attempted however due to availability there is nothing available prior to 12/1/23.) He will follow up with Dr. Hernandez and Dr. Mccormick within 2 weeks for barostim titration. He will also see Dr. Sheppard on 12/18/23 for HF follow up. 77 YO M with a history of ACC/AHA Stage C/D NICM (LV 6.2 cm, LVEF 25-30%) s/p ICD upgraded to CRT-D 9/2021 for chronic RV pacing, severe AI/MR s/p bioAVR/MVr 2014 (Dr. Louise) history of VT/VT with ICD shocks and frequent PVC's s/p PVC ablation, pAF on eliquis, CKD IIIb (Cr 2.03), and a stable aortic aneurysm (monitored by Dr. Garcia) who presented for outpatient RHC in setting of persistent NYHA IIIb symptoms which revealed elevated left sided filling pressures and low cardiac output in setting of high SVR. He was admitted for medical optimization and Barostim implantation.      On 11/7/23, Dr. Love and Dr. Busby (vascular and EP) implanted a Barostimulator device. Post-procedure, patient had slight hoarseness and a persistent cough. ENT was consulted for a bedside laryngoscopy to rule out perforation of the Recurrent Laryngeal nerve. ENT ruled out perforation of the RLN and patient attributed it to GERD and symptoms resolved with TUMS. Overnight, patient was stable with no events on telemetry. POD 1 RIJ and right sided chest wall are C/D/I without bleeding, ecchymoses or hematoma.     He will follow up with Dr. Chandler as his primary cardiologist (an appointment for 7-10 days was attempted however due to availability there is nothing available prior to 12/1/23.) He will follow up with Dr. Hernandez and Dr. Mccormick within 2 weeks for barostim titration. He will also see Dr. Sheppard on 12/18/23 for HF follow up.     New meds: none  Changed meds: hydralizine 25 TID increased to 50 TID, toprol 100 QD increased to BID, and isordil 10 mg TID increased to 20 TID   Discontinued: none

## 2023-11-07 NOTE — DISCHARGE NOTE PROVIDER - NSDCCPCAREPLAN_GEN_ALL_CORE_FT
PRINCIPAL DISCHARGE DIAGNOSIS  Diagnosis: HFrEF (heart failure with reduced ejection fraction)  Assessment and Plan of Treatment:      PRINCIPAL DISCHARGE DIAGNOSIS  Diagnosis: HFrEF (heart failure with reduced ejection fraction)  Assessment and Plan of Treatment: You have a weak heart, also known as Congestive Heart Failure (CHF). Heart failure is a condition in which the heart does not pump or fill with blood well. As a result, the heart lags behind in its job of moving blood throughout the body. This can lead to symptoms such as swelling, trouble breathing, and feeling tired. Your Ejection Fraction (EF) is 10-15%a normal EF is 55-60%.  -Please continue torsemide 10 mg daily to prevent fluid build up in the body.  -Avoid drinking more than 1.5L of fluid daily and maintain a low salt diet (max 2grams daily).  -Please weigh yourself daily, for any significant increases in daily weight of 2lbs/day or 5lbs/week with associated swelling in the legs or abdomen and/or shortness of breath, please call your doctor or go to the emergency room.  -Follow up with Dr. Chandler in 1 month

## 2023-11-07 NOTE — PROGRESS NOTE ADULT - ATTENDING COMMENTS
77 YO M with a history of ACC/AHA Stage C/D NICM (LV 6.2 cm, LVEF 25-30%) s/p ICD upgraded to CRT-D 9/2021 for chronic RV pacing, severe AI/MR s/p bioAVR/MVr 2014 history of VT/VT with ICD shocks and frequent PVC's s/p PVC ablation, pAF on eliquis, CKD IIIb (Cr 1.8), and aortic aneurysm who presented for outpatient RHC with me in setting of persistent NYHA IIIb symptoms which revealed elevated left sided filling pressures and low cardiac output in setting of high SVR admitted for medical optimization and Barostim implantation.    Hemodynamics 11/3: RA 9, PA 44/21(30), PCWP 21, PA sat 58% with Lorna CO/CI 3.3/1.6,  with SVR 2230    Given NYHA III symptoms with low ejection fraction and poor candidacy for advanced therapies given age and comorbidities, he is an appropriate candidate for novel based HF medical therapy. In particular, Barostim has an indication for patients with LVEF < 35% and proBNP < 1600 as this patient has with data supporting possible improvements in quality of life. In addition, he has very high SVR despite maximum Entresto so the BP lowering from decreased sympathetic tone may improve symptoms and cardiac output.     He is now s/p Barostim 11/6 and well compensated.     PLAN  # Chronic systolic heart failure   - s/p Barostim 11/6, will be titrated at EP visits with Dr. ARIZMENDI  - Etiology: unclear etiology at this time, possibly infiltrative process given arrhythmia history. will defer MRI given device and age  - GDMT: current regimen is metoprolol succinate 100 BID, entresto  mg BID, Farxiga 10 mg daily, hydral 50 TID (increased), and isordil 20 TID (increased). defer MRA due to CKD and hyperkalemia. resume Farxiga on discharge   - Diuretic: current regimen is torsemide 10 mg QD, would continue  - Device: s/p CRT-D, well functioning with > 95% BiV pacing  - Advanced therapies: not a candidate for VAD/OHT due to age/comorbidities. s/p Barostim as above     # Valvular disease   - well functioning on recent JIGAR    # AF  - continue eliquis    No contraindications to d/c from HF perspective.

## 2023-11-07 NOTE — DISCHARGE NOTE PROVIDER - NSDCFUSCHEDAPPT_GEN_ALL_CORE_FT
Luciano Chandler  [facility]  HEARTVASC [address]  Scheduled Appointment: 12/01/2023    Esau Oh  [facility]  NEPHRO [address]  Scheduled Appointment: 12/05/2023    Luciano Chandler  [facility]  HEARTVASC [address]  Scheduled Appointment: 12/07/2023    [facility]  HEARTVASC [address]  Scheduled Appointment: 01/26/2024     Chon Foy  [facility]  VASCULAR [address]  Scheduled Appointment: 11/21/2023    Luciano Chandler  [facility]  HEARTVASC [address]  Scheduled Appointment: 12/01/2023    Esau Oh  [facility]  NEPHRO [address]  Scheduled Appointment: 12/05/2023    Andrey Jeff  [facility]  HEARTVASC [address]  Scheduled Appointment: 12/06/2023    Luciano Chandler  [facility]  HEARTVASC [address]  Scheduled Appointment: 12/07/2023    Jay Sheppard  [facility]  HEARTVASC [address]  Scheduled Appointment: 12/18/2023    [facility]  HEARTVASC [address]  Scheduled Appointment: 01/26/2024     Andrey Jeff  [facility]  HEARTVASC [address]  Scheduled Appointment: 11/08/2023    Chon Foy  [facility]  VASCULAR [address]  Scheduled Appointment: 11/21/2023    Luciano Chandler  [facility]  HEARTVASC [address]  Scheduled Appointment: 12/01/2023    Esau Oh  [facility]  NEPHRO [address]  Scheduled Appointment: 12/05/2023    Luciano Chandler  [facility]  HEARTVASC [address]  Scheduled Appointment: 12/07/2023    Jay Sheppard  [facility]  HEARTVASC [address]  Scheduled Appointment: 12/18/2023    [facility]  HEARTVASC [address]  Scheduled Appointment: 01/26/2024     Chon Foy  [facility]  VASCULAR [address]  Scheduled Appointment: 11/21/2023    Luciano Chandler  [facility]  HEARTVASC [address]  Scheduled Appointment: 12/01/2023    Esau Oh  [facility]  NEPHRO [address]  Scheduled Appointment: 12/05/2023    Luciano Chandler  [facility]  HEARTVASC [address]  Scheduled Appointment: 12/07/2023    Jay Sheppard  Health system Physician [facility]  HEARTVASC [address]  Scheduled Appointment: 12/18/2023    [facility]  HEARTVASC [address]  Scheduled Appointment: 01/26/2024

## 2023-11-07 NOTE — DISCHARGE NOTE PROVIDER - CARE PROVIDERS DIRECT ADDRESSES
[email or web address]

## 2023-11-07 NOTE — DISCHARGE NOTE NURSING/CASE MANAGEMENT/SOCIAL WORK - PATIENT PORTAL LINK FT
You can access the FollowMyHealth Patient Portal offered by [facility] by registering at the following website: [email or web address]. By joining [facility]’s FollowMyHealth portal, you will also be able to view your health information using other applications (apps) compatible with our system.

## 2023-11-07 NOTE — PROGRESS NOTE ADULT - SUBJECTIVE AND OBJECTIVE BOX
Cardiology Consult    O/N:  Interval History: patient was seen and examined in am. s/p barostim       OBJECTIVE  Vitals:  T(C): 36.7 (11-07-23 @ 11:22), Max: 37.6 (11-07-23 @ 08:36)  HR: 69 (11-07-23 @ 11:22) (68 - 78)  BP: 93/63 (11-07-23 @ 11:22) (90/54 - 113/78)  RR: 18 (11-07-23 @ 11:22) (16 - 20)  SpO2: 100% (11-07-23 @ 11:22) (95% - 100%)  Wt(kg): --    I/O:  I&O's Summary    06 Nov 2023 07:01  -  07 Nov 2023 07:00  --------------------------------------------------------  IN: 0 mL / OUT: 750 mL / NET: -750 mL    07 Nov 2023 07:01  -  07 Nov 2023 13:41  --------------------------------------------------------  IN: 180 mL / OUT: 350 mL / NET: -170 mL        PHYSICAL EXAM:  GENERAL: Middle aged male in NAD, speaks in full sentences, no signs of respiratory distress  HEAD:  Atraumatic, Normocephalic  EYES: EOMI, PERRLA, conjunctiva and sclera clear  NECK: Supple, JVP at clavicle   CHEST/LUNG: Clear to auscultation bilaterally; No wheeze; No crackles; No accessory muscles used  HEART: Regular rate and rhythm; No murmurs;   ABDOMEN: Soft, Nontender, Nondistended; Bowel sounds present; No guarding  EXTREMITIES:  2+ Peripheral Pulses, No cyanosis or edema  PSYCH: AAOx3  	  LABS:                        13.9   7.21  )-----------( 203      ( 07 Nov 2023 05:30 )             43.8     11-07    136  |  104  |  30<H>  ----------------------------<  113<H>  4.8   |  25  |  1.48<H>    Ca    8.6      07 Nov 2023 05:30  Mg     2.1     11-07    TPro  7.1  /  Alb  3.2<L>  /  TBili  0.4  /  DBili  x   /  AST  11  /  ALT  <5<L>  /  AlkPhos  53  11-06    PT/INR - ( 06 Nov 2023 05:30 )   PT: 10.7 sec;   INR: 0.94          PTT - ( 07 Nov 2023 05:30 )  PTT:28.7 sec  Urinalysis Basic - ( 07 Nov 2023 05:30 )    Color: x / Appearance: x / SG: x / pH: x  Gluc: 113 mg/dL / Ketone: x  / Bili: x / Urobili: x   Blood: x / Protein: x / Nitrite: x   Leuk Esterase: x / RBC: x / WBC x   Sq Epi: x / Non Sq Epi: x / Bacteria: x        RADIOLOGY & ADDITIONAL TESTS:  Reviewed .    MEDICATIONS  (STANDING):  aMIOdarone    Tablet 100 milliGRAM(s) Oral daily  apixaban 5 milliGRAM(s) Oral two times a day  chlorhexidine 4% Liquid 1 Application(s) Topical once  hydrALAZINE 50 milliGRAM(s) Oral three times a day  isosorbide   dinitrate Tablet (ISORDIL) 20 milliGRAM(s) Oral three times a day  metoprolol succinate  milliGRAM(s) Oral two times a day  sacubitril 97 mG/valsartan 103 mG 1 Tablet(s) Oral two times a day  torsemide 10 milliGRAM(s) Oral daily    MEDICATIONS  (PRN):  calcium carbonate    500 mG (Tums) Chewable 1 Tablet(s) Chew every 4 hours PRN Heartburn

## 2023-11-08 ENCOUNTER — APPOINTMENT (OUTPATIENT)
Dept: HEART AND VASCULAR | Facility: CLINIC | Age: 78
End: 2023-11-08

## 2023-11-09 DIAGNOSIS — Z95.3 PRESENCE OF XENOGENIC HEART VALVE: ICD-10-CM

## 2023-11-09 DIAGNOSIS — N18.32 CHRONIC KIDNEY DISEASE, STAGE 3B: ICD-10-CM

## 2023-11-09 DIAGNOSIS — I13.0 HYPERTENSIVE HEART AND CHRONIC KIDNEY DISEASE WITH HEART FAILURE AND STAGE 1 THROUGH STAGE 4 CHRONIC KIDNEY DISEASE, OR UNSPECIFIED CHRONIC KIDNEY DISEASE: ICD-10-CM

## 2023-11-09 DIAGNOSIS — E78.5 HYPERLIPIDEMIA, UNSPECIFIED: ICD-10-CM

## 2023-11-09 DIAGNOSIS — I71.21 ANEURYSM OF THE ASCENDING AORTA, WITHOUT RUPTURE: ICD-10-CM

## 2023-11-09 DIAGNOSIS — I50.22 CHRONIC SYSTOLIC (CONGESTIVE) HEART FAILURE: ICD-10-CM

## 2023-11-09 DIAGNOSIS — I34.0 NONRHEUMATIC MITRAL (VALVE) INSUFFICIENCY: ICD-10-CM

## 2023-11-09 DIAGNOSIS — Z79.01 LONG TERM (CURRENT) USE OF ANTICOAGULANTS: ICD-10-CM

## 2023-11-09 DIAGNOSIS — K21.9 GASTRO-ESOPHAGEAL REFLUX DISEASE WITHOUT ESOPHAGITIS: ICD-10-CM

## 2023-11-09 DIAGNOSIS — I48.0 PAROXYSMAL ATRIAL FIBRILLATION: ICD-10-CM

## 2023-11-09 DIAGNOSIS — R94.31 ABNORMAL ELECTROCARDIOGRAM [ECG] [EKG]: ICD-10-CM

## 2023-11-09 DIAGNOSIS — I42.8 OTHER CARDIOMYOPATHIES: ICD-10-CM

## 2023-11-09 DIAGNOSIS — I71.23 ANEURYSM OF THE DESCENDING THORACIC AORTA, WITHOUT RUPTURE: ICD-10-CM

## 2023-11-09 DIAGNOSIS — Z95.810 PRESENCE OF AUTOMATIC (IMPLANTABLE) CARDIAC DEFIBRILLATOR: ICD-10-CM

## 2023-11-14 NOTE — DISCHARGE NOTE PROVIDER - CARE PROVIDER_API CALL
[facility]  Nurses Note -- 4 Eyes      11/14/2023       Skin assessed on: Q Shift      [x] No Pressure Injuries Present    [x]Prevention Measures Documented    [] Yes LDA  for Pressure Injury Previously documented     [] Yes New Pressure Injury Discovered   [] LDA for New Pressure Injury Added      Attending RN:  Danita Austin RN     Second RN:  Erik TAYLOR       Sandhya Bloom)  Internal Medicine  [address] [place] [address]  Phone: [phone]  Fax: [phone]  Established Patient  Follow Up Time: 1 week

## 2023-11-21 ENCOUNTER — APPOINTMENT (OUTPATIENT)
Dept: HEART AND VASCULAR | Facility: CLINIC | Age: 78
End: 2023-11-21
Payer: COMMERCIAL

## 2023-11-21 ENCOUNTER — APPOINTMENT (OUTPATIENT)
Dept: VASCULAR SURGERY | Facility: CLINIC | Age: 78
End: 2023-11-21
Payer: COMMERCIAL

## 2023-11-21 VITALS
HEIGHT: 68 IN | DIASTOLIC BLOOD PRESSURE: 83 MMHG | HEART RATE: 72 BPM | SYSTOLIC BLOOD PRESSURE: 200 MMHG | BODY MASS INDEX: 28.49 KG/M2 | WEIGHT: 188 LBS

## 2023-11-21 PROCEDURE — 93283 PRGRMG EVAL IMPLANTABLE DFB: CPT

## 2023-11-21 PROCEDURE — 99213 OFFICE O/P EST LOW 20 MIN: CPT | Mod: 25

## 2023-11-21 PROCEDURE — 99024 POSTOP FOLLOW-UP VISIT: CPT

## 2023-12-01 ENCOUNTER — APPOINTMENT (OUTPATIENT)
Dept: HEART AND VASCULAR | Facility: CLINIC | Age: 78
End: 2023-12-01
Payer: COMMERCIAL

## 2023-12-01 ENCOUNTER — NON-APPOINTMENT (OUTPATIENT)
Age: 78
End: 2023-12-01

## 2023-12-01 VITALS
HEART RATE: 71 BPM | DIASTOLIC BLOOD PRESSURE: 82 MMHG | SYSTOLIC BLOOD PRESSURE: 115 MMHG | WEIGHT: 187 LBS | HEIGHT: 68 IN | OXYGEN SATURATION: 98 % | BODY MASS INDEX: 28.34 KG/M2 | TEMPERATURE: 98.1 F

## 2023-12-01 DIAGNOSIS — I34.0 NONRHEUMATIC MITRAL (VALVE) INSUFFICIENCY: ICD-10-CM

## 2023-12-01 PROCEDURE — 99214 OFFICE O/P EST MOD 30 MIN: CPT

## 2023-12-01 RX ORDER — FAMOTIDINE 20 MG/1
20 TABLET, FILM COATED ORAL
Qty: 90 | Refills: 1 | Status: ACTIVE | COMMUNITY
Start: 2022-09-28 | End: 1900-01-01

## 2023-12-04 ENCOUNTER — APPOINTMENT (OUTPATIENT)
Dept: NEPHROLOGY | Facility: CLINIC | Age: 78
End: 2023-12-04
Payer: COMMERCIAL

## 2023-12-04 ENCOUNTER — LABORATORY RESULT (OUTPATIENT)
Age: 78
End: 2023-12-04

## 2023-12-04 VITALS — HEART RATE: 69 BPM | DIASTOLIC BLOOD PRESSURE: 65 MMHG | SYSTOLIC BLOOD PRESSURE: 93 MMHG

## 2023-12-04 VITALS — SYSTOLIC BLOOD PRESSURE: 111 MMHG | DIASTOLIC BLOOD PRESSURE: 83 MMHG

## 2023-12-04 VITALS — WEIGHT: 188 LBS | BODY MASS INDEX: 28.59 KG/M2

## 2023-12-04 DIAGNOSIS — R79.9 ABNORMAL FINDING OF BLOOD CHEMISTRY, UNSPECIFIED: ICD-10-CM

## 2023-12-04 DIAGNOSIS — R39.11 HESITANCY OF MICTURITION: ICD-10-CM

## 2023-12-04 PROCEDURE — 99214 OFFICE O/P EST MOD 30 MIN: CPT | Mod: 25

## 2023-12-04 PROCEDURE — 36415 COLL VENOUS BLD VENIPUNCTURE: CPT

## 2023-12-05 LAB
25(OH)D3 SERPL-MCNC: 32.7 NG/ML
ALBUMIN SERPL ELPH-MCNC: 4.1 G/DL
ALP BLD-CCNC: 58 U/L
ALT SERPL-CCNC: 8 U/L
ANION GAP SERPL CALC-SCNC: 17 MMOL/L
APO B SERPL-MCNC: 148 MG/DL
APPEARANCE: CLEAR
AST SERPL-CCNC: 15 U/L
BASOPHILS # BLD AUTO: 0.02 K/UL
BASOPHILS NFR BLD AUTO: 0.4 %
BILIRUB SERPL-MCNC: 0.3 MG/DL
BILIRUBIN URINE: NEGATIVE
BLOOD URINE: NEGATIVE
BUN SERPL-MCNC: 39 MG/DL
CALCIUM SERPL-MCNC: 9.7 MG/DL
CALCIUM SERPL-MCNC: 9.7 MG/DL
CHLORIDE SERPL-SCNC: 107 MMOL/L
CHOLEST SERPL-MCNC: 298 MG/DL
CO2 SERPL-SCNC: 20 MMOL/L
COLOR: YELLOW
CREAT SERPL-MCNC: 1.83 MG/DL
CREAT SPEC-SCNC: 108 MG/DL
CREAT SPEC-SCNC: 108 MG/DL
CREAT/PROT UR: 0.1 RATIO
CYSTATIN C SERPL-MCNC: 1.41 MG/L
EGFR: 37 ML/MIN/1.73M2
EOSINOPHIL # BLD AUTO: 0.02 K/UL
EOSINOPHIL NFR BLD AUTO: 0.4 %
ESTIMATED AVERAGE GLUCOSE: 143 MG/DL
FERRITIN SERPL-MCNC: 240 NG/ML
GFR/BSA.PRED SERPLBLD CYS-BASED-ARV: 46 ML/MIN/1.73M2
GLUCOSE QUALITATIVE U: >=1000 MG/DL
GLUCOSE SERPL-MCNC: 106 MG/DL
HBA1C MFR BLD HPLC: 6.6 %
HCT VFR BLD CALC: 46.7 %
HDLC SERPL-MCNC: 81 MG/DL
HGB BLD-MCNC: 14.4 G/DL
IMM GRANULOCYTES NFR BLD AUTO: 0.2 %
KETONES URINE: NEGATIVE MG/DL
LDLC SERPL CALC-MCNC: 208 MG/DL
LEUKOCYTE ESTERASE URINE: NEGATIVE
LYMPHOCYTES # BLD AUTO: 2.26 K/UL
LYMPHOCYTES NFR BLD AUTO: 47.8 %
MAGNESIUM SERPL-MCNC: 2.5 MG/DL
MAN DIFF?: NORMAL
MCHC RBC-ENTMCNC: 30.8 GM/DL
MCHC RBC-ENTMCNC: 32.7 PG
MCV RBC AUTO: 106.1 FL
MICROALBUMIN 24H UR DL<=1MG/L-MCNC: 1.3 MG/DL
MICROALBUMIN/CREAT 24H UR-RTO: 12 MG/G
MONOCYTES # BLD AUTO: 0.46 K/UL
MONOCYTES NFR BLD AUTO: 9.7 %
NEUTROPHILS # BLD AUTO: 1.96 K/UL
NEUTROPHILS NFR BLD AUTO: 41.5 %
NITRITE URINE: NEGATIVE
NONHDLC SERPL-MCNC: 217 MG/DL
PARATHYROID HORMONE INTACT: 48 PG/ML
PH URINE: 5.5
PHOSPHATE SERPL-MCNC: 4 MG/DL
PLATELET # BLD AUTO: 175 K/UL
POTASSIUM SERPL-SCNC: 6.1 MMOL/L
PROT SERPL-MCNC: 8.1 G/DL
PROT UR-MCNC: 10 MG/DL
PROTEIN URINE: NEGATIVE MG/DL
RBC # BLD: 4.4 M/UL
RBC # FLD: 14.9 %
SODIUM SERPL-SCNC: 143 MMOL/L
SPECIFIC GRAVITY URINE: 1.02
TRIGL SERPL-MCNC: 62 MG/DL
TSH SERPL-ACNC: 1.82 UIU/ML
UROBILINOGEN URINE: 0.2 MG/DL
VIT B12 SERPL-MCNC: 331 PG/ML
WBC # FLD AUTO: 4.73 K/UL

## 2023-12-06 ENCOUNTER — APPOINTMENT (OUTPATIENT)
Dept: NEPHROLOGY | Facility: CLINIC | Age: 78
End: 2023-12-06
Payer: COMMERCIAL

## 2023-12-06 ENCOUNTER — APPOINTMENT (OUTPATIENT)
Dept: HEART AND VASCULAR | Facility: CLINIC | Age: 78
End: 2023-12-06

## 2023-12-06 VITALS — SYSTOLIC BLOOD PRESSURE: 100 MMHG | HEART RATE: 69 BPM | DIASTOLIC BLOOD PRESSURE: 69 MMHG

## 2023-12-06 VITALS — WEIGHT: 185 LBS | BODY MASS INDEX: 28.13 KG/M2

## 2023-12-06 PROCEDURE — 36415 COLL VENOUS BLD VENIPUNCTURE: CPT

## 2023-12-06 PROCEDURE — 99214 OFFICE O/P EST MOD 30 MIN: CPT | Mod: 25

## 2023-12-07 ENCOUNTER — APPOINTMENT (OUTPATIENT)
Dept: HEART AND VASCULAR | Facility: CLINIC | Age: 78
End: 2023-12-07

## 2023-12-07 LAB
ALBUMIN SERPL ELPH-MCNC: 4.1 G/DL
ALP BLD-CCNC: 53 U/L
ALT SERPL-CCNC: 11 U/L
ANION GAP SERPL CALC-SCNC: 12 MMOL/L
AST SERPL-CCNC: 18 U/L
BASOPHILS # BLD AUTO: 0.02 K/UL
BASOPHILS NFR BLD AUTO: 0.4 %
BILIRUB SERPL-MCNC: 0.4 MG/DL
BUN SERPL-MCNC: 39 MG/DL
CALCIUM SERPL-MCNC: 9.7 MG/DL
CHLORIDE SERPL-SCNC: 101 MMOL/L
CO2 SERPL-SCNC: 25 MMOL/L
CREAT SERPL-MCNC: 1.84 MG/DL
EGFR: 37 ML/MIN/1.73M2
EOSINOPHIL # BLD AUTO: 0.03 K/UL
EOSINOPHIL NFR BLD AUTO: 0.6 %
GLUCOSE SERPL-MCNC: 89 MG/DL
HCT VFR BLD CALC: 45.4 %
HGB BLD-MCNC: 13.8 G/DL
IMM GRANULOCYTES NFR BLD AUTO: 0.2 %
LYMPHOCYTES # BLD AUTO: 2.22 K/UL
LYMPHOCYTES NFR BLD AUTO: 46.9 %
MAN DIFF?: NORMAL
MCHC RBC-ENTMCNC: 30.4 GM/DL
MCHC RBC-ENTMCNC: 32.1 PG
MCV RBC AUTO: 105.6 FL
MONOCYTES # BLD AUTO: 0.48 K/UL
MONOCYTES NFR BLD AUTO: 10.1 %
NEUTROPHILS # BLD AUTO: 1.97 K/UL
NEUTROPHILS NFR BLD AUTO: 41.8 %
PLATELET # BLD AUTO: 191 K/UL
POTASSIUM SERPL-SCNC: 4.9 MMOL/L
PROT SERPL-MCNC: 7.8 G/DL
RBC # BLD: 4.3 M/UL
RBC # FLD: 15.1 %
SODIUM SERPL-SCNC: 137 MMOL/L
WBC # FLD AUTO: 4.73 K/UL

## 2023-12-12 ENCOUNTER — APPOINTMENT (OUTPATIENT)
Dept: UROLOGY | Facility: CLINIC | Age: 78
End: 2023-12-12
Payer: COMMERCIAL

## 2023-12-12 ENCOUNTER — APPOINTMENT (OUTPATIENT)
Dept: UROLOGY | Facility: CLINIC | Age: 78
End: 2023-12-12

## 2023-12-12 VITALS
HEIGHT: 68 IN | WEIGHT: 185 LBS | TEMPERATURE: 97.7 F | OXYGEN SATURATION: 98 % | HEART RATE: 68 BPM | BODY MASS INDEX: 28.04 KG/M2 | DIASTOLIC BLOOD PRESSURE: 75 MMHG | SYSTOLIC BLOOD PRESSURE: 110 MMHG

## 2023-12-12 PROCEDURE — 99203 OFFICE O/P NEW LOW 30 MIN: CPT

## 2023-12-12 PROCEDURE — 51798 US URINE CAPACITY MEASURE: CPT

## 2023-12-14 NOTE — DISCUSSION/SUMMARY
[FreeTextEntry1] : 78 year old male with HTN, HLD, aortic aneurysm thoracic, AVR and mitral valve repair in 2014, paroxysmal atrial fibrillation, PVCs and ICD s/p recent PVC ablation, who had an appropriate ICD shock now s/p upgrade to BiV ICD (EF from 40% to 15-20% with pacing dependance), who presents for follow up s/p Barostim implant 11/6/23.   #CHF GDMT NYHA Class II symptoms LVEF 10-15%  #Barostim Presents for uptitration s/p implant 11/6 Uptitrated to 1ma today (/88) No adverse reactions noted Patient will present in 2 weeks for further up-titration  We had a long discussion regarding Barostm therapy, the way the technology works and set clear expectations regarding improvement. Discussed how improvement with heart failure can mean no further clinical deterioration. Discussed how therapy is intended to improve quality of life and reduce heart failure exacerbations requiring re admissions. Patient knows to call with any quesions or concerns. Will followup in 2 weeks.

## 2023-12-14 NOTE — PHYSICAL EXAM
[Palpable Crepitus] : no palpable crepitus [Bleeding] : no active bleeding [Foul Odor] : no foul smell [Purulent Drainage] : no purulent drainage [Serous Drainage] : no serous drainage [Erythema] : not erythematous [Warm] : not warm [Tender] : not tender [Indurated] : not indurated [Fluctuant] : not fluctuant

## 2023-12-14 NOTE — PROCEDURE
[de-identified] : very long AV delay [de-identified] : [place] [de-identified] : [place]iao [de-identified] : [id number] [de-identified] : 2021 [de-identified] :  [de-identified] : 4.6 years  [de-identified] :  \par  AV delay 250   DDD 50\par  changed to DDD 70 AV delay 250 [de-identified] : see paceart

## 2023-12-14 NOTE — HISTORY OF PRESENT ILLNESS
[Palpitations] : no palpitations [SOB] : no dyspnea [Syncope] : no syncope [Dizziness] : no dizziness [Chest Pain] : no chest pain or discomfort [Shoulder Pain] : no shoulder pain [Pain at Site] : no pain at device site [Erythema at Site] : no erythema at device site [Swelling at Site] : no swelling at device site [FreeTextEntry1] : 78 year old male with HTN, HLD, aortic aneurysm thoracic, AVR and mitral valve repair in 2014, paroxysmal atrial fibrillation, PVCs and ICD s/p recent PVC ablation, who had an appropriate ICD shock now s/p upgrade to BiV ICD (EF from 40% to 15-20% with pacing dependance), who presents for follow up s/p Barostim implant 11/6/23.   He states that he had a ICD placed after his AVR.  He followed up with Dr. Palencia; whose office moved and he transferred care here.  He was on Sotalol for history of PVCs and  NSVT; however he had short bursts of VT/torsades and it was felt the sotalol was proarrhythmic and stopped.  He was placed on Metoprolol.     He was admitted to [facility] 9/2019 with syncope correlated with VT and Vfib.  No therapy needed as he spontaneously converted.  One episode of afib, but overall burden extremely low.  Cath with mildly obstructive CAD.  He also underwent an EP study with PVC ablation from great cardiac vein.  He was discharged on Mexiletine (now off).     7/2021 he was admitted to an OSH with ICD shock.  He was discharged on amiodarone load which he self discontinued.   EF was down and he underwent a BiV upgrade.    He was seen in the office and is now on Amiodarone, Metoprolol, Eliquis and optimal heart failure medication. He presents for followup to turn on Barostim device.  He denies any SOB, edema, palpitations, syncope, near syncope.  He is compliant with all of his medications including eliquis and amiodarone. Reports he has to pause after 2-3 blocks before stopping with shortness of breath- which is stable   JIGAR 12/2022: severe LV dysfunction, posterior MVL tethering with moderate MR and mean gradient 2 mmHg, well functioning bioAVR, mild-mod TR, moderate PI  TTE 11/2022: LV 6.7 cm, LVEF 20-25%, mild concentric LVH, bioAVR with minimal AI, mild RV dysfunction, mod-severe valvular MR (posterior leaflet appears frozen) and mean gradient 8 mmHg, moderate TR, at least moderate PI, estimated PASP 53 mmHg, dilated aorta 4.6 cm at sinus of Valsalva Echo 5/9/2022 EF 25-30% Echo 8/2021 EF 25-30% Echo 1/2021 EF 35-40% Echo 11/2019 EF 55-60%

## 2023-12-14 NOTE — ADDENDUM
[FreeTextEntry1] : IIda am scribing for and the presence of Dr. Jeff the following sections: HPI, PMH,Family/social history, ROS, Physical Exam, Assessment / Plan.   I, Andrey Jeff, personally performed the services described in the documentation, reviewed the documentation recorded by the scribe in my presence and it accurately and completely records my words and actions.

## 2023-12-14 NOTE — REVIEW OF SYSTEMS
[Fever] : no fever [Chills] : no chills [Weight Gain (___ Lbs)] : no recent weight gain [Weight Loss (___ Lbs)] : no recent weight loss [SOB] : no shortness of breath [Chest Discomfort] : no chest discomfort [Lower Ext Edema] : no extremity edema [Palpitations] : no palpitations [Syncope] : no syncope [Cough] : no cough [Wheezing] : no wheezing

## 2023-12-18 ENCOUNTER — APPOINTMENT (OUTPATIENT)
Dept: HEART AND VASCULAR | Facility: CLINIC | Age: 78
End: 2023-12-18
Payer: COMMERCIAL

## 2023-12-18 ENCOUNTER — APPOINTMENT (OUTPATIENT)
Dept: HEART AND VASCULAR | Facility: CLINIC | Age: 78
End: 2023-12-18

## 2023-12-18 ENCOUNTER — NON-APPOINTMENT (OUTPATIENT)
Age: 78
End: 2023-12-18

## 2023-12-18 VITALS
WEIGHT: 192 LBS | DIASTOLIC BLOOD PRESSURE: 72 MMHG | OXYGEN SATURATION: 96 % | TEMPERATURE: 97.8 F | BODY MASS INDEX: 29.1 KG/M2 | HEART RATE: 61 BPM | SYSTOLIC BLOOD PRESSURE: 100 MMHG | HEIGHT: 68 IN

## 2023-12-18 PROCEDURE — 99213 OFFICE O/P EST LOW 20 MIN: CPT

## 2023-12-18 NOTE — REASON FOR VISIT
PATIENT IS AWARE    SHE IS STILL ICING HER SHOULDER BUT STATES SHE NO LONGER HAS TO WALK WITH A CANE  [Cardiac Failure] : cardiac failure

## 2023-12-18 NOTE — DISCUSSION/SUMMARY
[FreeTextEntry1] : # Chronic systolic heart failure - Etiology: unclear etiology at this time, possibly infiltrative process given arrhythmia history. will defer MRI given device and age - GDMT: current regimen is metoprolol succinate 100 BID, entresto  mg BID, Farxiga 10 mg daily, hydral 50 TID, and isordil 20 TID. defer MRA due to CKD and hyperkalemia.  - Diuretic: current regimen is torsemide 10 mg QD, will continue - Device: s/p CRT-D, well functioning with > 95% BiV pacing  - Advanced therapies: not a candidate for VAD/OHT due to comorbidities - s/p Barostim 11/2023, continues to undergo device uptitration  - If no improvement of symptoms with Barostim will plan for repeat RHC and consider palliative inotropes if indicated  - Labs: 12/2023 with K 4.9 and Cr 1.8   # Valvular disease - severe MR noted on TTE and exam suggestive of significant AI not no valvular disease on JIGAR x 2   # History of VT/VF with ICD shocks and frequent PVCs - follows with Dr. ARIZMENDI and on amiodarone  # pAF - on eliquis  # Hyperlipidemia - LDL noted to be 204 on lipids 12/2023 checked by Dr. Matthews, will notify Dr. Chandler as patient reports issues with statins in the past   # Aortic aneurysm - has been stable over several years, follows with Dr. Caceres   # CKD IIIb with baseline Cr 1.8 - continue Farxiga  - follows with Dr. Matthews  Return to clinic in 3 months. Continue Barostim uptitration with EP in the interim

## 2023-12-18 NOTE — HISTORY OF PRESENT ILLNESS
[FreeTextEntry1] : Referring: Dr. Chandler  Mr. Kenney is a 79 YO M with a history of ACC/AHA Stage C/D NICM (LV 6.2 cm, LVEF 25-30%) s/p ICD upgraded to CRT-D 9/2021 for chronic RV pacing and Barostim 11/2023, severe AI/MR s/p bioAVR/MVr 2014 history of VT/VT with ICD shocks and frequent PVC's s/p PVC ablation, pAF on eliquis, CKD IIIb (Cr 1.8), and aortic aneurysm presenting to HF clinic for further management.   He was very active at baseline and used to run marathons. He was diagnosed with a cardiomyopathy in 2014 with LVEF ~45% in the setting of severe AI with moderate-severe MR and subsequently underwent bioAVR and MVr in 2014. He underwent a primary prevention ICD shortly after surgery. He did well for many years after surgery and LVEF historically been 40-45%. He developed syncope 9/2019 related to VT/VF which self resolved and he underwent PVC ablation this admission. He was admitted 7/2021 with ICD shock for which he was started on amiodarone. His EF declined from 45% to 30% to 15% in 9/2021 and was being chronically RV paced so underwent CRT-D upgrade.   Due to persistent dyspnea he was referred to HF clinic 4/2022. There was concern for symptomatic severe MR and JIGAR pursued which revealed MR was only moderate. He has tolerated escalation of HF medical therapy but has remained with NYHA III symptoms. RHC performed which revealed moderately elevated filling pressures and low cardiac output in setting of very high SVR despite max Entresto.   Barostim was implanted on 11/2023. He has been undergoing Barostim uptitration. So far he has not had improvement in NYHA class or symptoms.   He presents today for HF followup. He overall feels the same since last visit. He states he can walk 2 blocks before stopping due to fatigue and dyspnea. He has not tried stairs but thinks he can do 1 flight of stairs. He occasionally notes dyspnea during household activities. Denies orthopnea or lower extremity edema. Denies dizziness or lightheadedness. Continues to note chronic epigastric pain.

## 2023-12-18 NOTE — ASSESSMENT
[FreeTextEntry1] : 77 YO M with a history of ACC/AHA Stage C/D NICM (LV 6.2 cm, LVEF 25-30%) s/p ICD upgraded to CRT-D 9/2021 for chronic RV pacing and Barostim 11/2023, severe AI/MR s/p bioAVR/MVr 2014 history of VT/VT with ICD shocks and frequent PVC's s/p PVC ablation, pAF on eliquis, CKD IIIb (Cr 1.8), and aortic aneurysm presenting to HF clinic for further management.   Today he reports NYHA III symptoms and appears euvolemic on exam. He has not yet had functional status improvement with Barostim but it is still early.

## 2023-12-18 NOTE — PHYSICAL EXAM
[Well Developed] : well developed [Well Nourished] : well nourished [Clear Lung Fields] : clear lung fields [Good Air Entry] : good air entry [Soft] : abdomen soft [Normal Gait] : normal gait [Moves all extremities] : moves all extremities [No Focal Deficits] : no focal deficits [Alert and Oriented] : alert and oriented [Normal memory] : normal memory [de-identified] : JVP 8 cm H20 [de-identified] : Normal S1/S2, 2/6 systolic murmur at apex  [de-identified] : Mildly tender in mid epigastrum  [de-identified] : Warm, no edema

## 2023-12-18 NOTE — CARDIOLOGY SUMMARY
[de-identified] : \par  EKG 9/2021: a-paced, BiV paced\par   [de-identified] : \par  ICD interrogation 4/2022: 98% BiV pacing, no VT events\par   [de-identified] : JIGAR 10/2023: severe LV dysfunction, mild MR, well functioning bioAVR  TTE 10/2023: LV 6.4 cm, LVEF 10-15%, LVOT VTI 10 cm, normal RV size/function, no significant valvular disease   JIGAR 12/2022: severe LV dysfunction, posterior MVL tethering with moderate MR and mean gradient 2 mmHg, well functioning bioAVR, mild-mod TR, moderate PI  TTE 11/2022: LV 6.7 cm, LVEF 20-25%, mild concentric LVH, bioAVR with minimal AI, mild RV dysfunction, mod-severe valvular MR (posterior leaflet appears frozen) and mean gradient 8 mmHg, moderate TR, at least moderate PI, estimated PASP 53 mmHg, dilated aorta 4.6 cm at sinus of Valsalva  TTE 5/2022 (report, unable to view images:LV 6.2 cm, LVEF 30-35%, normal RV size/function, bioAVR with mild AI, mean gradient 3 mmHg across MVr with moderate-severe MR, estimated PASP 52 mmHg, 4.6 cm aortic root  TTE 9/2021: LV 6.2 cm, LVEF 15%, LVOT VTI 7 cm, mild RV dysfunction, well functioning bioAVR, mean gradient 4 mmHg across MVr with mild-moderate MR, moderate TR, estimated PASP 31 mmHg, 4.6 cm ascending aorta  [de-identified] : [facility] 11/2023: RA 9, PA 44/21(30), PCWP 21, PA sat 58% with Lorna CO/CI 3.3/1.6,  with SVR 2230 [facility] 3/2021: normal coronary arteries

## 2023-12-20 ENCOUNTER — APPOINTMENT (OUTPATIENT)
Dept: HEART AND VASCULAR | Facility: CLINIC | Age: 78
End: 2023-12-20
Payer: COMMERCIAL

## 2023-12-20 VITALS
DIASTOLIC BLOOD PRESSURE: 74 MMHG | HEIGHT: 68 IN | WEIGHT: 186 LBS | BODY MASS INDEX: 28.19 KG/M2 | HEART RATE: 74 BPM | SYSTOLIC BLOOD PRESSURE: 105 MMHG | TEMPERATURE: 97.2 F

## 2023-12-20 PROCEDURE — 99213 OFFICE O/P EST LOW 20 MIN: CPT | Mod: 25

## 2023-12-20 PROCEDURE — 93284 PRGRMG EVAL IMPLANTABLE DFB: CPT

## 2023-12-28 ENCOUNTER — APPOINTMENT (OUTPATIENT)
Dept: NEPHROLOGY | Facility: CLINIC | Age: 78
End: 2023-12-28
Payer: COMMERCIAL

## 2023-12-28 VITALS — SYSTOLIC BLOOD PRESSURE: 117 MMHG | DIASTOLIC BLOOD PRESSURE: 60 MMHG | HEART RATE: 69 BPM

## 2023-12-28 DIAGNOSIS — R80.9 PROTEINURIA, UNSPECIFIED: ICD-10-CM

## 2023-12-28 PROCEDURE — 99214 OFFICE O/P EST MOD 30 MIN: CPT | Mod: 25

## 2023-12-28 PROCEDURE — 36415 COLL VENOUS BLD VENIPUNCTURE: CPT

## 2023-12-28 NOTE — DISCUSSION/SUMMARY
[Pacemaker Function Normal] : normal pacemaker function [FreeTextEntry1] : 78 year old male with HTN, HLD, aortic aneurysm thoracic, AVR and mitral valve repair in 2014, paroxysmal atrial fibrillation, PVCs and ICD s/p recent PVC ablation, who had an appropriate ICD shock now s/p upgrade to BiV ICD (EF from 40% to 15-20% with pacing dependance), who presents for follow up s/p Barostim implant 11/6/23.   #CHF GDMT NYHA Class II symptoms LVEF 10-15%  #High potassium Patient had recent labs with Dr. Oh, K+ found to be high, on Valtassa   #Barostim Presents for uptitration s/p implant 11/6 Uptitrated to 1.4 ma today  No adverse reactions noted Patient will present in 4-5 weeks for further up-titration  We had a long discussion regarding Barostm therapy, the way the technology works and set clear expectations regarding improvement. Discussed how improvement with heart failure can mean no further clinical deterioration. Discussed how therapy is intended to improve quality of life and reduce heart failure exacerbations requiring re admissions. Patient knows to call with any questions or concerns. Will followup in 4-5 weeks.

## 2023-12-28 NOTE — ASSESSMENT
[FreeTextEntry1] : Upitration 11/21/23 /76 Amp 1  /88   Uptitration 12/20/23 (6 weeks post) /84 Amp 1.4 pulse width 80

## 2023-12-28 NOTE — ASSESSMENT
[FreeTextEntry1] :   # Hyperkalemia. * Striict low K diet. * Cont veltassa. * Cont lower dose entresto. * Recheck K.   # HTN controlled. No lightheadedness. * Cont metoprolol, torsemide, entresto, hydralazine. * The patient's blood pressure was checked with the Omron [id number]-[id number] using the SPRINT trial protocol after sitting quietly in an empty room with arm supported, back supported, and feet on the floor for 5 minutes. The average of 3 readings were taken. * A counseling information sheet on blood pressure and staying healthy has been given (which they have been instructed to read). * The patient has been counseled to check their BP at home with an automatic arm cuff, write down the readings, and reach me directly on the phone immediately if they are persistently > 180 systolic or if SBP is less than 100 or if lightheadedness develops. They were counseled to bring in all blood pressure readings and medications next visit. * The patient has been counseled that regular office follow-up (at least every 2 months for now) is important for monitoring and for their health, and that it is their responsibility to make follow up appointments. * The patient also has been counseled that they must never stop or change any medications without discussing this with me (or another physician).  # CKD stage 3 likeliest due to type 2 cardiorenal syndrome and aging, possible DM nephropathy. * Recheck labs. * Will avoid MRA. * Therapies for kidney disease: blood pressure control; proteinuria reduction with ARB/ACEi (HELD); SGLT2i; other evidence-based therapies recommended including exercise, a plant-based lower oxalate diet, and 400 mcg folic acid daily * Cardiovascular disease prevention: counseling on healthy diet, physical activity, weight loss, alcohol limitation, blood pressure control; cardiology evaluation/followup advised * A counseling information sheet on CKD has been given (which they have been instructed to read). * The patient has been counseled that chronic kidney disease is a significant condition and regular office follow-up with me (at least every 3 months for now) is important for monitoring and their health, and that it is their responsibility to make a follow-up appointment. * The patient has been counseled never to stop taking their medications without discussing it with me or another doctor. * The patient has been counseled on avoiding NSAIDs. * The patient has been counseled on risk of worsening kidney function and instructed to immediately call and speak with me and go immediately to ER with any severe symptoms, nausea, vomiting, diarrhea, chest pain, or shortness of breath.    # Hyperchol. * Cardiology managing. Inability to tolerate statins.

## 2023-12-28 NOTE — HISTORY OF PRESENT ILLNESS
[SOB] : no dyspnea [Palpitations] : no palpitations [Syncope] : no syncope [Dizziness] : no dizziness [Chest Pain] : no chest pain or discomfort [Shoulder Pain] : no shoulder pain [Pain at Site] : no pain at device site [Erythema at Site] : no erythema at device site [Swelling at Site] : no swelling at device site [FreeTextEntry1] : 78 year old male with HTN, HLD, aortic aneurysm thoracic, AVR and mitral valve repair in 2014, paroxysmal atrial fibrillation, PVCs and ICD s/p recent PVC ablation, who had an appropriate ICD shock now s/p upgrade to BiV ICD (EF from 40% to 15-20% with pacing dependance), who presents for follow up s/p Barostim implant 11/6/23.   He states that he had a ICD placed after his AVR.  He followed up with Dr. Palencia; whose office moved and he transferred care here.  He was on Sotalol for history of PVCs and  NSVT; however he had short bursts of VT/torsades and it was felt the sotalol was proarrhythmic and stopped.  He was placed on Metoprolol.     He was admitted to [facility] 9/2019 with syncope correlated with VT and Vfib.  No therapy needed as he spontaneously converted.  One episode of afib, but overall burden extremely low.  Cath with mildly obstructive CAD.  He also underwent an EP study with PVC ablation from great cardiac vein.  He was discharged on Mexiletine (now off).     7/2021 he was admitted to an OSH with ICD shock.  He was discharged on amiodarone load which he self discontinued.   EF was down and he underwent a BiV upgrade.    He was seen in the office and is now on Amiodarone, Metoprolol, Eliquis and optimal heart failure medication. He presents for followup to turn on Barostim device.  Level of potassium is high on last exam with Dr. Oh (followup in 3 weeks) on Valtassa. Saw [place] yesterday, no changes.   He denies any SOB, edema, palpitations, syncope, near syncope.  He is compliant with all of his medications including eliquis and amiodarone. Reports he has to pause after 2-3 blocks before stopping with shortness of breath- which is stable.   JIGAR 12/2022: severe LV dysfunction, posterior MVL tethering with moderate MR and mean gradient 2 mmHg, well functioning bioAVR, mild-mod TR, moderate PI  TTE 11/2022: LV 6.7 cm, LVEF 20-25%, mild concentric LVH, bioAVR with minimal AI, mild RV dysfunction, mod-severe valvular MR (posterior leaflet appears frozen) and mean gradient 8 mmHg, moderate TR, at least moderate PI, estimated PASP 53 mmHg, dilated aorta 4.6 cm at sinus of Valsalva Echo 5/9/2022 EF 25-30% Echo 8/2021 EF 25-30% Echo 1/2021 EF 35-40% Echo 11/2019 EF 55-60%

## 2023-12-28 NOTE — REASON FOR VISIT
[Follow-up Device Check] : is here today for a follow-up device check visit for [Other ___] : [unfilled] [Follow-Up - Clinic] : a clinic follow-up of [FreeTextEntry1] : pacemaker

## 2023-12-28 NOTE — PROCEDURE
[No] : not [NSR] : normal sinus rhythm [ICD] : Implantable cardioverter-defibrillator [DDDR] : DDDR [Threshold Testing Performed] : Threshold testing was performed [None] : none [de-identified] : very long AV delay [de-identified] : [place] [de-identified] : [place] [de-identified] : [id number] [de-identified] : 2021 [de-identified] :  [de-identified] :  \par  AV delay 250   DDD 50\par  changed to DDD 70 AV delay 250 [de-identified] : 4.6 years  [de-identified] : see paceart No new events

## 2023-12-28 NOTE — HISTORY OF PRESENT ILLNESS
[FreeTextEntry1] : Kindly referred by Dr. Sandhya Bloom elevated creatinine. [employer].  * Entresto restart at lower dose with veltassa.   Previous history (06Dec23): * K 6.1. He denies intake of high K foods. Entresto was stopped. * Elevated LDL. He says he is not taking pravstatin because of difficulty walking. * CKD stable, creatinine 1.83.   Previous history (04Dec23): * eGFR 40 (avg. of CKD-EPIcr & CKD-EPIcys). No albuminuria. * Following up with Dr. Hanson for monoclonal gammopathy. * Following up with cardiologist. * Working as . * K 4.9 on low K diet. * BP controlled. BP 100s at home. No lightheadedness. No CP/SOB. Compliant with medications. * Per Dr. Chandler: "HLD: on pravastatin 40mg, had muscle aches on 80mg,  8/2017, pt reported he was only taking it once in a while, now taking it daily will repeat lipid panel. Diet and exercise discussed in detail.  Feb 2018,  July 2020. starting CRESTOR 10 qod, pt off it". * Occasional urinary hesitancy.   Previous history (17Aug23): * Creatinine 2.03 in 26Jul (eGFR 33 by CKD-EPI). No [place]. * BP controlled. BP 110s at home. Not < 100. No lightheadedness. No CP/SOB at rest. Compliant with medications. * Following up with Dr. Hanson for monoclonal gammopathy. * Following up with cardiologist. * Working as . * K 4.9 on low K diet.   Previous history (25Apr23): * HTN controlled. BP 110s at home. No lightheadedness. No CP. No SOB at rest. Compliant with medications.   * Following up with Dr. Hanson for monoclonal gammopathy. * Chronic slight SOB. Following up closely with cardiologist.  * CKD stable, creatinine 1.7 in April 4. * Hyperkalemia controlled.   Previous history (24Jan23): * CKD stable, creatinine 1.8. * K 5.4. Counseled on following low K diet. He occsaionally eats bananas.  * HTN controlled. No lightheadedness. No CP. Compliant with medications.  Following up with Dr. Hanson for monoclonal gammopathy. * Chronic slight SOB. Following up closely with cardiologist.   Previous history (18Nov22): * CKD stable, creatinine 1.7 on Sep 21. . * 13 - 15 mg albuminuria. * * HTN controlled. No lightheadedness.  Compliant with medications. * Following up with Dr. Hanson for monoclonal gammopathy. * CP/SOB now significantly improved, none currnetly. Following up closely with cardiologist.   Previous history (18Aug22): * CKD stable, creatinine 1.87. 55 mg albuminuria. * HTN controlled. No lightheadedness. Compliant with medications.  Following up with Dr. Hanson for monoclonal gammopathy. * Hyperkalemia controlled. * Rash on legs. Seeing allergist. * Pneumonia on 5/21 - 5/22. * Persistent SOB/CP with minimal to moderate exertion. Per patient, this is unchanged and cardiologist is aware.   Previous history (19May22): * CKD stable, creatinine 1.87. * HTN controlled. No lightheadedness. No CP/SOB. Compliant with medications.  Following up with Dr. Hanson for monoclonal gammopathy. * Hyperkalemia controlled. * He has urinary urgency for several weeks. No dysuria. 5x nocturia.   Previous history (22Feb22): * HTN controlled.  - 120s / 80s at home. No lightheadedness. Compliant with medications. 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 * CKD progressive, creatinine 1.91. Albuminuria decreased to 48. * Occasional chronic abd pain being evaluated, none currently.   Previous history (06Dec21): * CKD stable, creatinine 1.51.  Albuminuria decreased to 91 mg from 3280 mg (?). Farxiga 10 started. 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 * HTN controlled. BP 120s at home. No lightheadedness. Compliant with medications.   Previous history (10Nov21): * HTN controlled. BP 110s at home. No lightheadedness. Compliant with medications. * 3280 mg albuminuria. * CKD stable, creatinine 1.45. 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   Previous history (09Sep21): * Events reviewed. Admitted to [facility] for CHF exacerbation and ICD upgrade. EF < 23 - 30.* CKD stable, creatinine 1.55.  Labs from 9/7 reviewed. Creatinine 1.45. K 4. 298 mg albuminuria.  * Hyperkalemia controlled. Not on lokelma or veltassa.  * HTN controlled. /80s at home. No lightheadedness. Compliant with medications. 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   Previous history (31Aug21): * Dx with Covid 3 weeks ago. Now no cough, or other symptoms. Did not require hospitalization. He has SOB with walking walking 1 block. * Lokelma started for hyperkalemia (K of 6). He is now following low potassium diet. . * CKD stable, creatinine 1.86. * He had stopped torsemide. BP 100s.   Previous history (03Aug21): Labs reviewed. Baseline eGFR 43 - 56 since 2019. On 14Jul21, his creatinine increased to 1.82 (eGFR 35 - 41). The patient denies exposure to chronic NSAIDs, chronic PPIs, creatine, or herbal supplements. The patient denies a history of kidney stones or pyelonephritis. 4 - 5 times. No recent renal ultrasound. They are unaware of proteinuria or hematuria.  EF 23% in 4/21. On amiodarone, metoprolol, lasix 20 qod, HCTZ twice weekly valsartan 40. SOB with one block walking. Stable LE edema. ProBNP incresaed from 515 to 1360.  * HTN controlled.  No lightheadedness. Compliant with medications. He believes he is taking amlodipine.

## 2023-12-29 LAB
ALBUMIN SERPL ELPH-MCNC: 4 G/DL
ALP BLD-CCNC: 63 U/L
ALT SERPL-CCNC: 10 U/L
ANION GAP SERPL CALC-SCNC: 16 MMOL/L
AST SERPL-CCNC: 14 U/L
BASOPHILS # BLD AUTO: 0.02 K/UL
BASOPHILS NFR BLD AUTO: 0.4 %
BILIRUB SERPL-MCNC: 0.3 MG/DL
BUN SERPL-MCNC: 29 MG/DL
CALCIUM SERPL-MCNC: 9.4 MG/DL
CHLORIDE SERPL-SCNC: 106 MMOL/L
CO2 SERPL-SCNC: 23 MMOL/L
CREAT SERPL-MCNC: 1.63 MG/DL
CYSTATIN C SERPL-MCNC: 1.22 MG/L
EGFR: 42 ML/MIN/1.73M2
EOSINOPHIL # BLD AUTO: 0.03 K/UL
EOSINOPHIL NFR BLD AUTO: 0.6 %
GFR/BSA.PRED SERPLBLD CYS-BASED-ARV: 55 ML/MIN/1.73M2
GLUCOSE SERPL-MCNC: 124 MG/DL
HCT VFR BLD CALC: 42 %
HGB BLD-MCNC: 13 G/DL
IMM GRANULOCYTES NFR BLD AUTO: 0.2 %
LYMPHOCYTES # BLD AUTO: 2.47 K/UL
LYMPHOCYTES NFR BLD AUTO: 46 %
MAGNESIUM SERPL-MCNC: 1.9 MG/DL
MAN DIFF?: NORMAL
MCHC RBC-ENTMCNC: 31 GM/DL
MCHC RBC-ENTMCNC: 32.2 PG
MCV RBC AUTO: 104 FL
MONOCYTES # BLD AUTO: 0.51 K/UL
MONOCYTES NFR BLD AUTO: 9.5 %
NEUTROPHILS # BLD AUTO: 2.33 K/UL
NEUTROPHILS NFR BLD AUTO: 43.3 %
PHOSPHATE SERPL-MCNC: 2.9 MG/DL
PLATELET # BLD AUTO: 186 K/UL
POTASSIUM SERPL-SCNC: 3.2 MMOL/L
PROT SERPL-MCNC: 7.5 G/DL
RBC # BLD: 4.04 M/UL
RBC # FLD: 14.9 %
SODIUM SERPL-SCNC: 145 MMOL/L
WBC # FLD AUTO: 5.37 K/UL

## 2023-12-29 RX ORDER — PATIROMER 8.4 G/1
8.4 POWDER, FOR SUSPENSION ORAL
Qty: 30 | Refills: 10 | Status: DISCONTINUED | COMMUNITY
Start: 2023-12-06 | End: 2023-12-29

## 2024-01-10 ENCOUNTER — APPOINTMENT (OUTPATIENT)
Dept: NEPHROLOGY | Facility: CLINIC | Age: 79
End: 2024-01-10
Payer: COMMERCIAL

## 2024-01-10 ENCOUNTER — LABORATORY RESULT (OUTPATIENT)
Age: 79
End: 2024-01-10

## 2024-01-10 VITALS — BODY MASS INDEX: 29.04 KG/M2 | WEIGHT: 191 LBS

## 2024-01-10 VITALS — SYSTOLIC BLOOD PRESSURE: 105 MMHG | DIASTOLIC BLOOD PRESSURE: 75 MMHG | HEART RATE: 69 BPM

## 2024-01-10 PROCEDURE — 99214 OFFICE O/P EST MOD 30 MIN: CPT | Mod: 25

## 2024-01-10 PROCEDURE — 36415 COLL VENOUS BLD VENIPUNCTURE: CPT

## 2024-01-10 NOTE — ASSESSMENT
[FreeTextEntry1] : -- # Hyperkalemia. * Striict low K diet. * Cont lower dose entresto. * Now off veltassa.   # HTN controlled. No lightheadedness. * Cont metoprolol, torsemide, entresto, hydralazine. * The patient's blood pressure was checked with the Omron [id number]-907XL using the SPRINT trial protocol after sitting quietly in an empty room with arm supported, back supported, and feet on the floor for 5 minutes. The average of 3 readings were taken. * A counseling information sheet on blood pressure and staying healthy has been given (which they have been instructed to read). * The patient has been counseled to check their BP at home with an automatic arm cuff, write down the readings, and reach me directly on the phone immediately if they are persistently > 180 systolic or if SBP is less than 100 or if lightheadedness develops. They were counseled to bring in all blood pressure readings and medications next visit. * The patient has been counseled that regular office follow-up (at least every 2 months for now) is important for monitoring and for their health, and that it is their responsibility to make follow up appointments. * The patient also has been counseled that they must never stop or change any medications without discussing this with me (or another physician).  # CKD stage 3 likeliest due to type 2 cardiorenal syndrome and aging, possible DM nephropathy. * Recheck labs. * Will avoid MRA given previous hyperkalemia.  * Therapies for kidney disease: blood pressure control; proteinuria reduction with ARB/ACEi; SGLT2i; other evidence-based therapies recommended including exercise, a plant-based lower oxalate diet, and 400 mcg folic acid daily * Cardiovascular disease prevention: counseling on healthy diet, physical activity, weight loss, alcohol limitation, blood pressure control; cardiology evaluation/followup advised * A counseling information sheet on CKD has been given (which they have been instructed to read). * The patient has been counseled that chronic kidney disease is a significant condition and regular office follow-up with me (at least every 1-2  months for now) is important for monitoring and their health, and that it is their responsibility to make a follow-up appointment. * The patient has been counseled never to stop taking their medications without discussing it with me or another doctor. * The patient has been counseled on avoiding NSAIDs. * The patient has been counseled on risk of worsening kidney function and instructed to immediately call and speak with me and go immediately to ER with any severe symptoms, nausea, vomiting, diarrhea, chest pain, or shortness of breath.   # Hyperchol. * Cardiology managing. Inability to tolerate statins.  # "Orange" urine smell. * Doubt UTI. May be related to foods or medications. * Check urine culture.

## 2024-01-10 NOTE — HISTORY OF PRESENT ILLNESS
[FreeTextEntry1] : Kindly referred by Dr. Sandhya Bloom elevated creatinine. [employer].  K decresed to 3.2. Veltassa held. * CKD stable, creatinine 1.63. * BP controlled.  BP 110s at home. No lightheadedness. No CP. Compliant with medications. He has had longstanding atypical chest pain (? GERD) since 2014 which has not changed. None currently.  Chronic SOB with moderate exertion unchanged. *  He notes strong odor when he burps. Notes occasional "orange" smell in urine. No asparagus. No dysuria. He has seen Dr. Aldrich and Dr. Cortez.   * Entresto restart at lower dose with veltassa.  Previous history (06Dec23): * K 6.1. He denies intake of high K foods. Entresto was stopped. * Elevated LDL. He says he is not taking pravstatin because of difficulty walking. * CKD stable, creatinine 1.83.   Previous history (04Dec23): * eGFR 40 (avg. of CKD-EPIcr & CKD-EPIcys). No albuminuria. * Following up with Dr. Hanson for monoclonal gammopathy. * Following up with cardiologist. * Working as . * K 4.9 on low K diet. * BP controlled. BP 100s at home. No lightheadedness. No CP/SOB. Compliant with medications. * Per Dr. Chandler: "HLD: on pravastatin 40mg, had muscle aches on 80mg,  8/2017, pt reported he was only taking it once in a while, now taking it daily will repeat lipid panel. Diet and exercise discussed in detail.  Feb 2018,  July 2020. starting CRESTOR 10 qod, pt off it". * Occasional urinary hesitancy.   Previous history (17Aug23): * Creatinine 2.03 in 26Jul (eGFR 33 by CKD-EPI). No Ualb. * BP controlled. BP 110s at home. Not < 100. No lightheadedness. No CP/SOB at rest. Compliant with medications. * Following up with Dr. Hanson for monoclonal gammopathy. * Following up with cardiologist. * Working as . * K 4.9 on low K diet.   Previous history (25Apr23): * HTN controlled. BP 110s at home. No lightheadedness. No CP. No SOB at rest. Compliant with medications.   * Following up with Dr. Hanson for monoclonal gammopathy. * Chronic slight SOB. Following up closely with cardiologist.  * CKD stable, creatinine 1.7 in April 4. * Hyperkalemia controlled.   Previous history (24Jan23): * CKD stable, creatinine 1.8. * K 5.4. Counseled on following low K diet. He occsaionally eats bananas.  * HTN controlled. No lightheadedness. No CP. Compliant with medications.  Following up with Dr. Hanson for monoclonal gammopathy. * Chronic slight SOB. Following up closely with cardiologist.   Previous history (18Nov22): * CKD stable, creatinine 1.7 on Sep 21. . * 13 - 15 mg albuminuria. * * HTN controlled. No lightheadedness.  Compliant with medications. * Following up with Dr. Hanson for monoclonal gammopathy. * CP/SOB now significantly improved, none currnetly. Following up closely with cardiologist.   Previous history (18Aug22): * CKD stable, creatinine 1.87. 55 mg albuminuria. * HTN controlled. No lightheadedness. Compliant with medications.  Following up with Dr. Hanson for monoclonal gammopathy. * Hyperkalemia controlled. * Rash on legs. Seeing allergist. * Pneumonia on 5/21 - 5/22. * Persistent SOB/CP with minimal to moderate exertion. Per patient, this is unchanged and cardiologist is aware.   Previous history (19May22): * CKD stable, creatinine 1.87. * HTN controlled. No lightheadedness. No CP/SOB. Compliant with medications.  Following up with Dr. Hanson for monoclonal gammopathy. * Hyperkalemia controlled. * He has urinary urgency for several weeks. No dysuria. 5x nocturia.   Previous history (22Feb22): * HTN controlled.  - 120s / 80s at home. No lightheadedness. Compliant with medications. 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 * CKD progressive, creatinine 1.91. Albuminuria decreased to 48. * Occasional chronic abd pain being evaluated, none currently.   Previous history (06Dec21): * CKD stable, creatinine 1.51.  Albuminuria decreased to 91 mg from 3280 mg (?). Farxiga 10 started. 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 * HTN controlled. BP 120s at home. No lightheadedness. Compliant with medications.   Previous history (10Nov21): * HTN controlled. BP 110s at home. No lightheadedness. Compliant with medications. * 3280 mg albuminuria. * CKD stable, creatinine 1.45. 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   Previous history (09Sep21): * Events reviewed. Admitted to [facility] for CHF exacerbation and ICD upgrade. EF < 23 - 30.* CKD stable, creatinine 1.55.  Labs from 9/7 reviewed. Creatinine 1.45. K 4. 298 mg albuminuria.  * Hyperkalemia controlled. Not on lokelma or veltassa.  * HTN controlled. /80s at home. No lightheadedness. Compliant with medications. 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   Previous history (31Aug21): * Dx with Covid 3 weeks ago. Now no cough, or other symptoms. Did not require hospitalization. He has SOB with walking walking 1 block. * Lokelma started for hyperkalemia (K of 6). He is now following low potassium diet. . * CKD stable, creatinine 1.86. * He had stopped torsemide. BP 100s.   Previous history (03Aug21): Labs reviewed. Baseline eGFR 43 - 56 since 2019. On 14Jul21, his creatinine increased to 1.82 (eGFR 35 - 41). The patient denies exposure to chronic NSAIDs, chronic PPIs, creatine, or herbal supplements. The patient denies a history of kidney stones or pyelonephritis. 4 - 5 times. No recent renal ultrasound. They are unaware of proteinuria or hematuria.  EF 23% in 4/21. On amiodarone, metoprolol, lasix 20 qod, HCTZ twice weekly valsartan 40. SOB with one block walking. Stable LE edema. ProBNP incresaed from 515 to 1360.  * HTN controlled.  No lightheadedness. Compliant with medications. He believes he is taking amlodipine.

## 2024-01-10 NOTE — PHYSICAL EXAM
[General Appearance - Alert] : alert [General Appearance - In No Acute Distress] : in no acute distress [Neck Appearance] : the appearance of the neck was normal [Neck Cervical Mass (___cm)] : no neck mass was observed [Jugular Venous Distention Increased] : there was no jugular-venous distention [Thyroid Diffuse Enlargement] : the thyroid was not enlarged [Thyroid Nodule] : there were no palpable thyroid nodules [Auscultation Breath Sounds / Voice Sounds] : lungs were clear to auscultation bilaterally [Heart Rate And Rhythm] : heart rate was normal and rhythm regular [Heart Sounds] : normal S1 and S2 [Heart Sounds Gallop] : no gallops [Murmurs] : no murmurs [Heart Sounds Pericardial Friction Rub] : no pericardial rub [Edema] : there was no peripheral edema [Abdomen Soft] : soft [Abdomen Tenderness] : non-tender [] : no hepato-splenomegaly [Abdomen Mass (___ Cm)] : no abdominal mass palpated [Cervical Lymph Nodes Enlarged Posterior Bilaterally] : posterior cervical [Cervical Lymph Nodes Enlarged Anterior Bilaterally] : anterior cervical [Supraclavicular Lymph Nodes Enlarged Bilaterally] : supraclavicular [FreeTextEntry1] : pitting ankles

## 2024-01-11 ENCOUNTER — NON-APPOINTMENT (OUTPATIENT)
Age: 79
End: 2024-01-11

## 2024-01-13 LAB
ALBUMIN SERPL ELPH-MCNC: 3.9 G/DL
ALP BLD-CCNC: 49 U/L
ALT SERPL-CCNC: 13 U/L
ANION GAP SERPL CALC-SCNC: 12 MMOL/L
APPEARANCE: CLEAR
AST SERPL-CCNC: 19 U/L
BACTERIA UR CULT: NORMAL
BASOPHILS # BLD AUTO: 0.02 K/UL
BASOPHILS NFR BLD AUTO: 0.4 %
BILIRUB SERPL-MCNC: 0.4 MG/DL
BILIRUBIN URINE: NEGATIVE
BLOOD URINE: NEGATIVE
BUN SERPL-MCNC: 26 MG/DL
CALCIUM SERPL-MCNC: 9.2 MG/DL
CHLORIDE SERPL-SCNC: 105 MMOL/L
CO2 SERPL-SCNC: 26 MMOL/L
COLOR: YELLOW
CREAT SERPL-MCNC: 1.39 MG/DL
CREAT SPEC-SCNC: 88 MG/DL
CYSTATIN C SERPL-MCNC: 1.29 MG/L
EGFR: 51 ML/MIN/1.73M2
EOSINOPHIL # BLD AUTO: 0.03 K/UL
EOSINOPHIL NFR BLD AUTO: 0.6 %
GFR/BSA.PRED SERPLBLD CYS-BASED-ARV: 51 ML/MIN/1.73M2
GLUCOSE QUALITATIVE U: 500 MG/DL
GLUCOSE SERPL-MCNC: 95 MG/DL
HCT VFR BLD CALC: 43 %
HGB BLD-MCNC: 13.2 G/DL
IMM GRANULOCYTES NFR BLD AUTO: 0.2 %
KETONES URINE: NEGATIVE MG/DL
LEUKOCYTE ESTERASE URINE: NEGATIVE
LYMPHOCYTES # BLD AUTO: 2.07 K/UL
LYMPHOCYTES NFR BLD AUTO: 44.2 %
MAGNESIUM SERPL-MCNC: 2.3 MG/DL
MAN DIFF?: NORMAL
MCHC RBC-ENTMCNC: 30.7 GM/DL
MCHC RBC-ENTMCNC: 32.8 PG
MCV RBC AUTO: 107 FL
MICROALBUMIN 24H UR DL<=1MG/L-MCNC: 7.7 MG/DL
MICROALBUMIN/CREAT 24H UR-RTO: 88 MG/G
MONOCYTES # BLD AUTO: 0.42 K/UL
MONOCYTES NFR BLD AUTO: 9 %
NEUTROPHILS # BLD AUTO: 2.13 K/UL
NEUTROPHILS NFR BLD AUTO: 45.6 %
NITRITE URINE: NEGATIVE
PH URINE: 5.5
PHOSPHATE SERPL-MCNC: 3.3 MG/DL
PLATELET # BLD AUTO: 231 K/UL
POTASSIUM SERPL-SCNC: 4.6 MMOL/L
PROT SERPL-MCNC: 7.5 G/DL
PROTEIN URINE: 30 MG/DL
RBC # BLD: 4.02 M/UL
RBC # FLD: 15.3 %
SODIUM SERPL-SCNC: 143 MMOL/L
SPECIFIC GRAVITY URINE: 1.01
UROBILINOGEN URINE: 0.2 MG/DL
WBC # FLD AUTO: 4.68 K/UL

## 2024-02-06 ENCOUNTER — APPOINTMENT (OUTPATIENT)
Dept: INTERNAL MEDICINE | Facility: CLINIC | Age: 79
End: 2024-02-06
Payer: COMMERCIAL

## 2024-02-06 ENCOUNTER — NON-APPOINTMENT (OUTPATIENT)
Age: 79
End: 2024-02-06

## 2024-02-06 ENCOUNTER — LABORATORY RESULT (OUTPATIENT)
Age: 79
End: 2024-02-06

## 2024-02-06 VITALS
DIASTOLIC BLOOD PRESSURE: 80 MMHG | OXYGEN SATURATION: 99 % | HEART RATE: 73 BPM | SYSTOLIC BLOOD PRESSURE: 131 MMHG | BODY MASS INDEX: 28.79 KG/M2 | WEIGHT: 190 LBS | HEIGHT: 68 IN | TEMPERATURE: 96.9 F

## 2024-02-06 DIAGNOSIS — Z87.898 PERSONAL HISTORY OF OTHER SPECIFIED CONDITIONS: ICD-10-CM

## 2024-02-06 DIAGNOSIS — Z79.899 OTHER LONG TERM (CURRENT) DRUG THERAPY: ICD-10-CM

## 2024-02-06 DIAGNOSIS — R45.89 OTHER SYMPTOMS AND SIGNS INVOLVING EMOTIONAL STATE: ICD-10-CM

## 2024-02-06 DIAGNOSIS — M79.89 OTHER SPECIFIED SOFT TISSUE DISORDERS: ICD-10-CM

## 2024-02-06 DIAGNOSIS — S09.90XA UNSPECIFIED INJURY OF HEAD, INITIAL ENCOUNTER: ICD-10-CM

## 2024-02-06 DIAGNOSIS — G47.00 INSOMNIA, UNSPECIFIED: ICD-10-CM

## 2024-02-06 DIAGNOSIS — K29.50 UNSPECIFIED CHRONIC GASTRITIS W/OUT BLEEDING: ICD-10-CM

## 2024-02-06 DIAGNOSIS — R07.89 OTHER CHEST PAIN: ICD-10-CM

## 2024-02-06 DIAGNOSIS — Z87.19 PERSONAL HISTORY OF OTHER DISEASES OF THE DIGESTIVE SYSTEM: ICD-10-CM

## 2024-02-06 DIAGNOSIS — Z87.438 PERSONAL HISTORY OF OTHER DISEASES OF MALE GENITAL ORGANS: ICD-10-CM

## 2024-02-06 DIAGNOSIS — R68.89 OTHER GENERAL SYMPTOMS AND SIGNS: ICD-10-CM

## 2024-02-06 DIAGNOSIS — R10.13 EPIGASTRIC PAIN: ICD-10-CM

## 2024-02-06 PROCEDURE — 99214 OFFICE O/P EST MOD 30 MIN: CPT

## 2024-02-06 PROCEDURE — 36415 COLL VENOUS BLD VENIPUNCTURE: CPT

## 2024-02-06 NOTE — HEALTH RISK ASSESSMENT
[0] : 2) Feeling down, depressed, or hopeless: Not at all (0) [PHQ-2 Negative - No further assessment needed] : PHQ-2 Negative - No further assessment needed [[id number]] : 0

## 2024-02-06 NOTE — HISTORY OF PRESENT ILLNESS
[de-identified] : Mr. Kenney is a 77 YO M with a history of ACC/AHA Stage C/D NICM (LV 6.2 cm, LVEF 25-30%) s/p ICD,, pAF on eliquis, CKD IIIb (Cr 1.8), aortic aneurysm, gastritis/GERD presenting today for f/u. COntinues to have atypical CP that has been consistently determined to be non-cardiac. EGD in 2021 revealed severe reflux. On H2 blocker. Saw Dr Cortez in May. He had taken Neurotin for the pain several years ago but it ddn't help and was d/c;d. Had recent labs with nephro. Today, his biggest concern is his skin. He has multiple target lesions that appear to be worsening. SOme version of these has been present for years. At the onset, he was given a "Cream" (appears to bave been an antifungal based on the chart) which helped while using it.

## 2024-02-06 NOTE — REVIEW OF SYSTEMS
[Negative] : Heme/Lymph [Chest Pain] : chest pain [Palpitations] : no palpitations [Orthopena] : no orthopnea [Skin Rash] : skin rash

## 2024-02-06 NOTE — PHYSICAL EXAM
[Normal Sclera/Conjunctiva] : normal sclera/conjunctiva [Normal Outer Ear/Nose] : the outer ears and nose were normal in appearance [Normal] : no respiratory distress, lungs were clear to auscultation bilaterally and no accessory muscle use [Normal Rate] : normal rate  [Regular Rhythm] : with a regular rhythm [No Focal Deficits] : no focal deficits [Normal Affect] : the affect was normal [Alert and Oriented x3] : oriented to person, place, and time [de-identified] : B/L multiple target lesions

## 2024-02-06 NOTE — ASSESSMENT
[FreeTextEntry1] : BP good. Target lesions likely fungal but r/o lyme, cutaneous lupus. ?Erythema annulare? If labs WNL, will refer to derm for biopsy.

## 2024-02-07 ENCOUNTER — APPOINTMENT (OUTPATIENT)
Dept: HEART AND VASCULAR | Facility: CLINIC | Age: 79
End: 2024-02-07
Payer: COMMERCIAL

## 2024-02-07 VITALS
BODY MASS INDEX: 28.64 KG/M2 | SYSTOLIC BLOOD PRESSURE: 92 MMHG | HEART RATE: 92 BPM | HEIGHT: 68 IN | DIASTOLIC BLOOD PRESSURE: 68 MMHG | OXYGEN SATURATION: 98 % | WEIGHT: 189 LBS

## 2024-02-07 DIAGNOSIS — I48.91 UNSPECIFIED ATRIAL FIBRILLATION: ICD-10-CM

## 2024-02-07 LAB
ANA SER IF-ACNC: NEGATIVE
RHEUMATOID FACT SER QL: <10 IU/ML

## 2024-02-07 PROCEDURE — 99213 OFFICE O/P EST LOW 20 MIN: CPT | Mod: 25

## 2024-02-07 PROCEDURE — 93283 PRGRMG EVAL IMPLANTABLE DFB: CPT

## 2024-02-08 NOTE — HISTORY OF PRESENT ILLNESS
[Palpitations] : no palpitations [SOB] : no dyspnea [Syncope] : no syncope [Dizziness] : no dizziness [Chest Pain] : no chest pain or discomfort [Shoulder Pain] : no shoulder pain [Pain at Site] : no pain at device site [Erythema at Site] : no erythema at device site [Swelling at Site] : no swelling at device site [FreeTextEntry1] : 78 year old male with HTN, HLD, aortic aneurysm thoracic, AVR and mitral valve repair in 2014, paroxysmal atrial fibrillation, PVCs and ICD s/p recent PVC ablation, who had an appropriate ICD shock now s/p upgrade to BiV ICD (EF from 40% to 15-20% with pacing dependance), who presents for follow up s/p Barostim implant 11/6/23.   He states that he had a ICD placed after his AVR.  He followed up with Dr. Palencia; whose office moved and he transferred care here.  He was on Sotalol for history of PVCs and  NSVT; however he had short bursts of VT/torsades and it was felt the sotalol was proarrhythmic and stopped.  He was placed on Metoprolol.     He was admitted to [facility] 9/2019 with syncope correlated with VT and Vfib.  No therapy needed as he spontaneously converted.  One episode of afib, but overall burden extremely low.  Cath with mildly obstructive CAD.  He also underwent an EP study with PVC ablation from great cardiac vein.  He was discharged on Mexiletine (now off).     7/2021 he was admitted to an OSH with ICD shock.  He was discharged on amiodarone load which he self discontinued.   EF was down and he underwent a BiV upgrade.    He was seen in the office and is now on Amiodarone, Metoprolol, Eliquis and optimal heart failure medication. He presents for followup to turn on Barostim device. Level of potassium is high on last exam with Dr. Oh (followup in 3 weeks) on Valtassa. Saw [place] yesterday, no changes.   He denies any SOB, edema, palpitations, syncope, near syncope.  He is compliant with all of his medications including eliquis and amiodarone. Reports he has to pause after 5 blocks before stopping with shortness of breath- which is stable. Barostim uptitration limited due to muscle stimulation in neck, will continue to slowly uptitrate.   JIGAR 12/2022: severe LV dysfunction, posterior MVL tethering with moderate MR and mean gradient 2 mmHg, well functioning bioAVR, mild-mod TR, moderate PI  TTE 11/2022: LV 6.7 cm, LVEF 20-25%, mild concentric LVH, bioAVR with minimal AI, mild RV dysfunction, mod-severe valvular MR (posterior leaflet appears frozen) and mean gradient 8 mmHg, moderate TR, at least moderate PI, estimated PASP 53 mmHg, dilated aorta 4.6 cm at sinus of Valsalva Echo 5/9/2022 EF 25-30% Echo 8/2021 EF 25-30% Echo 1/2021 EF 35-40% Echo 11/2019 EF 55-60%

## 2024-02-08 NOTE — PROCEDURE
[No] : not [NSR] : normal sinus rhythm [ICD] : Implantable cardioverter-defibrillator [DDDR] : DDDR [Threshold Testing Performed] : Threshold testing was performed [None] : none [de-identified] : very long AV delay [de-identified] : [place] [de-identified] : [place] [de-identified] : [id number] [de-identified] : 2021 [de-identified] :  [de-identified] : 4.6 years  [de-identified] : see paceart No new events [de-identified] :  \par  AV delay 250   DDD 50\par  changed to DDD 70 AV delay 250

## 2024-02-08 NOTE — ASSESSMENT
[FreeTextEntry1] : Upitration 11/21/23 /76 Amp 1  /88   Uptitration 12/20/23 (6 weeks post) /84 Amp 1.4 pulse width 80  Uptitration 2/7/2024 /66 Amp 1.8 (feels stimulation in neck and reports tongue feels heavy, tightness in jaw)  BP 95/63 Amp 2.0 pulse width 80 /64 Amp 2.2 pulse width 65

## 2024-02-08 NOTE — DISCUSSION/SUMMARY
[Pacemaker Function Normal] : normal pacemaker function [FreeTextEntry1] : 78 year old male with HTN, HLD, aortic aneurysm thoracic, AVR and mitral valve repair in 2014, paroxysmal atrial fibrillation, PVCs and ICD s/p recent PVC ablation, who had an appropriate ICD shock now s/p upgrade to BiV ICD (EF from 40% to 15-20% with pacing dependance), who presents for follow up s/p Barostim implant 11/6/23.   #CHF GDMT NYHA Class II symptoms LVEF 10-15%  #High potassium Patient had recent labs with Dr. Oh, K+ found to be high, on Valtassa   #Barostim Presents for uptitration s/p implant 11/6 Uptitrated to 2.2 ma @65 pulse width today  No adverse reactions noted Patient will present in 4-5 weeks for further up-titration  We had a long discussion regarding Barostim therapy, the way the technology works and set clear expectations regarding improvement. Discussed how improvement with heart failure can mean no further clinical deterioration. Discussed how therapy is intended to improve quality of life and reduce heart failure exacerbations requiring re admissions. Patient knows to call with any questions or concerns. Will followup in 2 weeks.

## 2024-02-14 NOTE — PROGRESS NOTE ADULT - PROBLEM SELECTOR PLAN 1
Tmax at home 101~ for past 3 days. Started having Headache and myalgia the same time. Tmax on arrival in .7. Low grade fever on floor. No leukocytosis. UA negative for infection. report non productive cough staretd 3 days ago. CXR + for right lower lobular pneumonia . Due to cardiac and renal comorbidities and age>65, admitted for inpt CAP managemnet.   -C/w CTX 2g IVP  -C/w Azithromycin 500mg x3 days  -F/u BCx Vaccine status unknown

## 2024-02-16 ENCOUNTER — APPOINTMENT (OUTPATIENT)
Age: 79
End: 2024-02-16
Payer: COMMERCIAL

## 2024-02-16 ENCOUNTER — NON-APPOINTMENT (OUTPATIENT)
Age: 79
End: 2024-02-16

## 2024-02-16 PROCEDURE — 99443: CPT | Mod: 93

## 2024-02-16 PROCEDURE — 99213 OFFICE O/P EST LOW 20 MIN: CPT | Mod: 95

## 2024-02-16 NOTE — ASSESSMENT
[FreeTextEntry1] : Atypical chest pain and malodorous 'abdominal breath'  Continues to have atypical CP that has been consistently determined to be non-cardiac. EGD in 2021 revealed severe reflux. On H2 blocker. He insists on getting another EGD. This will be non diagnostic however especially in light of his many comorbidities.  He had taken Neurotin for the pain several years ago but it ddn't help and was d/c;d.  I will continue to suggest social work, pain management and reassurance and will work with Dr. Bloom in trying to get him on board with realistic goals of care and to address his anxiety, and his tangential complaints.

## 2024-02-16 NOTE — HISTORY OF PRESENT ILLNESS
[de-identified] : 78 M highly tangential historian with PMHx H.Pylori gastritis tx with quad therapy, treated and eradicated confirmed x 2 by breath test, multiple significant comorbidities including afib AF/VT now s/p PPM (9/19), CKD, s/p AV and MV repairs, HTN, compensated diastolic chronic HF, prediabetes, c/o continued substernal  tight chest pain, constant, non radiating since his AVR in 2014, vinegar smell coming from his stomach and abdominal swelling that is relieved by lasix. Today he also reports anxiety and frustration that his many doctors are 'missing something and if not diagnosed soon he will die before getting treatment"  He reports normal bowel movements, stable weight and no alarm symptoms. He has been resistant to suggestions for pain management and advice from his primary care MD Sandhya Doshi whom I spoke with today.  5/9/23 - pt complaining of continued 24/7 sternal pain, no medication improves it  - egd 2021 severe gastritis, duodenum normal, multiple sessile polyps. path: hyperplastic and chronic gastritis   Previous hx:   Pain is along the line of patients scar. Pain is worse with inhalation and lying down. No alleviating factors. Pt saw cardiologist Dr. Cobos on 5/9/22 to undergo echo, report pending. Pt states he gets short of breath and fatigued after walking a 1/2 flight of stairs or 1-2 blocks. Pt is currently taking pantoprazole 40 mg and Pepcid 20 mg daily. Ct scan from 1/2022 of upper abdomen WNL.   Diet- Vegetable, fruit, chicken, fish, limit fried foods  Denies fever, chill, nausea, vomiting, wt loss, poor appetite, hematochezia, melena.   Endoscopy 6/21: gastritis s/p biopsy (chronic gastritis w/ cryptitis and crypt distortion), sessile polyp in the stomach s/p polypectomy (hyperplastic polyp). Colonoscopy 2017: L sided diverticulosis, internal hemorrhoid (repeat in 7-10 year)

## 2024-02-16 NOTE — REASON FOR VISIT
[Home] : at home, [unfilled] , at the time of the visit. [Medical Office: ([facility])___] : at the medical office located in  [Patient] : the patient [Follow-up] : a follow-up of an existing diagnosis

## 2024-02-20 ENCOUNTER — LABORATORY RESULT (OUTPATIENT)
Age: 79
End: 2024-02-20

## 2024-02-20 ENCOUNTER — APPOINTMENT (OUTPATIENT)
Dept: DERMATOLOGY | Facility: CLINIC | Age: 79
End: 2024-02-20
Payer: COMMERCIAL

## 2024-02-20 DIAGNOSIS — R21 RASH AND OTHER NONSPECIFIC SKIN ERUPTION: ICD-10-CM

## 2024-02-20 DIAGNOSIS — Z12.83 ENCOUNTER FOR SCREENING FOR MALIGNANT NEOPLASM OF SKIN: ICD-10-CM

## 2024-02-20 DIAGNOSIS — D48.9 NEOPLASM OF UNCERTAIN BEHAVIOR, UNSPECIFIED: ICD-10-CM

## 2024-02-20 DIAGNOSIS — D17.22 BENIGN LIPOMATOUS NEOPLASM OF SKIN AND SUBCUTANEOUS TISSUE OF LEFT ARM: ICD-10-CM

## 2024-02-20 DIAGNOSIS — D22.9 MELANOCYTIC NEVI, UNSPECIFIED: ICD-10-CM

## 2024-02-20 DIAGNOSIS — L60.8 OTHER NAIL DISORDERS: ICD-10-CM

## 2024-02-20 PROCEDURE — 11104 PUNCH BX SKIN SINGLE LESION: CPT

## 2024-02-20 PROCEDURE — 99203 OFFICE O/P NEW LOW 30 MIN: CPT | Mod: 25

## 2024-02-20 NOTE — PHYSICAL EXAM
[Alert] : alert [Oriented x 3] : ~L oriented x 3 [Well Nourished] : well nourished [Conjunctiva Non-injected] : conjunctiva non-injected [No Visual Lymphadenopathy] : no visual  lymphadenopathy [No Clubbing] : no clubbing [No Edema] : no edema [No Bromhidrosis] : no bromhidrosis [No Chromhidrosis] : no chromhidrosis [Full Body Skin Exam Performed] : performed [FreeTextEntry3] : Genital exam deferred per pt preference.   -. -Scattered tan waxy/keratotic, stuck-on appearing papules/plaques with pseudohorn cysts.  With dermoscopy, milia-like cysts and comedo-like openings are noted. - On the trunk and upper/lower extremities bilaterally, are multiple scattered tan to brown macules and papules with regular borders. Some of these were examined dermoscopically and had reassuring features. - Fingernail plates- multiple bluish grey and brown linear streaks  - chest, shin, calf, thighs- scattered dark brown finely scaled round to annular thin plaques. KALE scraping was negative for fungal or yeast elements. No involvement of palms or soles

## 2024-02-20 NOTE — HISTORY OF PRESENT ILLNESS
[FreeTextEntry1] : NPV- Skin Check, rash  [de-identified] : Mr. Elian Kenney is a 79 yo M presenting for skin check and rash.   -Rash: started "years ago" and affects his legs and chest. Not itchy. Has improved w/ topical antifungals and betamethasone initially. however, it recurred a few months ago and betamethasone (applied every 3 days) has not been helping. Denies noticing any oral or genital sores.  -Fingernail hyperpigmentation  PMH: no history skin ca FHx: no family hx skin ca

## 2024-02-20 NOTE — ASSESSMENT
[FreeTextEntry1] : #Skin cancer screening  Sun protection reviewed. The patient was educated regarding appropriate sun protection measures, including wearing sunscreen with SPF 30 or higher when outdoors, sun protective clothing, and sun avoidance.  #Seborrheic keratosis  #Benign appearing nevi Patient was reassured of the benign nature of these findings. No further treatment needed at this time. If any lesion changes or becomes symptomatic I recommend follow-up  #Generalized scaly plaques ddx: nummular dermatitis, tinea versicolor, secondary syphilis, MF, asteototic dermatitis KALE scraping was negative. Reportedly recalcitrant to betamethasone.  Recommend biopsy for further evaluation.    Biopsy by 4mm Punch Technique Procedure Note.  Location: right anterior thigh.  After discussion of risks, verbal consent was obtained and a time out was performed.  The area was cleaned with an alcohol swab.  Anesthesia: 1% lidocaine with 1:100,000 epinephrine.  Closure with 4-0 Chromic interrupted suture.  Specimen was sent for pathologic examination.  Wound care was reviewed with the patient.  Will contact patient with biopsy results.  #Melanonychia  Since involves all nail plates, low suspicion for malignancy at this time. Suspect medication related hyperpigmentation secondary to amiodarone. Other ddx includes onychomycosis or racial melanonychia.  Does not bother him, ok to monitor for changes.   F/U PRN , pending biopsy results

## 2024-02-21 ENCOUNTER — NON-APPOINTMENT (OUTPATIENT)
Age: 79
End: 2024-02-21

## 2024-02-21 ENCOUNTER — APPOINTMENT (OUTPATIENT)
Dept: HEART AND VASCULAR | Facility: CLINIC | Age: 79
End: 2024-02-21
Payer: COMMERCIAL

## 2024-02-21 VITALS
DIASTOLIC BLOOD PRESSURE: 82 MMHG | BODY MASS INDEX: 28.64 KG/M2 | TEMPERATURE: 96 F | HEART RATE: 82 BPM | HEIGHT: 68 IN | WEIGHT: 189 LBS | SYSTOLIC BLOOD PRESSURE: 118 MMHG

## 2024-02-21 PROCEDURE — 99214 OFFICE O/P EST MOD 30 MIN: CPT

## 2024-02-26 ENCOUNTER — NON-APPOINTMENT (OUTPATIENT)
Age: 79
End: 2024-02-26

## 2024-02-26 LAB — T PALLIDUM AB SER QL IA: POSITIVE

## 2024-02-27 ENCOUNTER — OUTPATIENT (OUTPATIENT)
Dept: OUTPATIENT SERVICES | Facility: HOSPITAL | Age: 79
LOS: 1 days | End: 2024-02-27

## 2024-02-27 ENCOUNTER — APPOINTMENT (OUTPATIENT)
Dept: INFECTIOUS DISEASE | Facility: CLINIC | Age: 79
End: 2024-02-27
Payer: COMMERCIAL

## 2024-02-27 VITALS
HEIGHT: 70 IN | DIASTOLIC BLOOD PRESSURE: 77 MMHG | WEIGHT: 195 LBS | HEART RATE: 75 BPM | BODY MASS INDEX: 27.92 KG/M2 | OXYGEN SATURATION: 97 % | SYSTOLIC BLOOD PRESSURE: 115 MMHG

## 2024-02-27 DIAGNOSIS — Z95.3 PRESENCE OF XENOGENIC HEART VALVE: ICD-10-CM

## 2024-02-27 DIAGNOSIS — I35.1 NONRHEUMATIC AORTIC (VALVE) INSUFFICIENCY: ICD-10-CM

## 2024-02-27 LAB
1/2 ANTIBODY TEST: NORMAL
NOTE POCT HIV1: NORMAL
NOTE POCT HIV2: NORMAL
QUALITY CONTROL: YES

## 2024-02-27 PROCEDURE — 99214 OFFICE O/P EST MOD 30 MIN: CPT | Mod: 25,GC

## 2024-02-27 RX ORDER — PENICILLIN G BENZATHINE 2400000 [IU]/4ML
2400000 INJECTION, SUSPENSION INTRAMUSCULAR
Qty: 0 | Refills: 0 | Status: COMPLETED | OUTPATIENT
Start: 2024-02-27

## 2024-02-27 RX ADMIN — PENICILLIN G BENZATHINE 0 UNIT/4ML: 2400000 INJECTION, SUSPENSION INTRAMUSCULAR at 00:00

## 2024-02-27 NOTE — HISTORY OF PRESENT ILLNESS
[Other: _____] : [unfilled] [de-identified] : Mr. Kenney is a 79 YO M with a history of ACC/AHA Stage C/D NICM (LV 6.2 cm, LVEF 25-30%) s/p ICD, afib on Eliquis, CKD IIIb (Cr 1.8), aortic aneurysm, gastritis/GERD presenting today for positive syphilis screen. States he has been going to dermatologist for years for rash on b/l LE. Recently was on betamethasone without resolution and now awaiting biopsy results. Was tested for syphilis which came back positive. Patient has been monogamous with his wife for the past 19 years and has not been sexually active for the past 10 due to his extensive cardiac history. States he has been off balance since his barostim surgery but was told that would be a side effect. Otherwise denies any penile rash or discharge or changes in vision or hearing, loss of sensation in LE or off-set gait. Reports foul smelling urine and has been told by previous doctors that it is from his diet. Denies any dysuria.   Was referred by Dr. House due to treponemal test and reflex poitive with negative RPR and no history of syphilis treatment in the past. [FreeTextEntry1] : Referred for positive direct treponemal test with negative RPR

## 2024-02-27 NOTE — PHYSICAL EXAM
[No Acute Distress] : no acute distress [Well Nourished] : well nourished [Well Developed] : well developed [Normal Sclera/Conjunctiva] : normal sclera/conjunctiva [PERRL] : pupils equal round and reactive to light [Normal Outer Ear/Nose] : the outer ears and nose were normal in appearance [No JVD] : no jugular venous distention [Supple] : supple [No Lymphadenopathy] : no lymphadenopathy [No Respiratory Distress] : no respiratory distress  [Clear to Auscultation] : lungs were clear to auscultation bilaterally [No Accessory Muscle Use] : no accessory muscle use [Normal Rate] : normal rate  [Regular Rhythm] : with a regular rhythm [Normal S1, S2] : normal S1 and S2 [No Murmur] : no murmur heard [No Edema] : there was no peripheral edema [Non Tender] : non-tender [Soft] : abdomen soft [Non-distended] : non-distended [Normal Bowel Sounds] : normal bowel sounds [No HSM] : no HSM [No Joint Swelling] : no joint swelling [No Focal Deficits] : no focal deficits [Coordination Grossly Intact] : coordination grossly intact [Deep Tendon Reflexes (DTR)] : deep tendon reflexes were 2+ and symmetric [Normal Gait] : normal gait [Normal Insight/Judgement] : insight and judgment were intact [Normal Affect] : the affect was normal [de-identified] : Sensation intact on feet b/l, normal reflexes UE and LE, 5/5 strength UE and LE [de-identified] : b/l LE rash

## 2024-02-27 NOTE — END OF VISIT
Patient Seen in: [facility] In [address] [place] [address]    History   Patient presents with:  Urinary Symptoms (urologic)    Stated Complaint: bladder pressure/painful urination    HPI    The patient is a 12-year-old male with prior history of UTI a [] : Resident oriented ×3.   The patient's motor strength is 5 out of 5 and symmetric in the upper and lower extremities bilaterally  Extremities: No focal swelling or tenderness  Skin: No pallor, no redness or warmth to the touch      ED Course     Labs Reviewed   URINE [FreeTextEntry3] : I saw and evaluated the patient. No evidence of neuro- syphilis based on neuro exam and lack of symptoms Cardiac Syphilis? Can not be proven, but will treat accordingly.  Checked POC HIV test- negative Urine for GC and chlamydia Wife should be tested. Benzathine PNC G 2.4 today RTC in 1 na d2 weeks for 2nd and third dose

## 2024-02-28 DIAGNOSIS — A53.9 SYPHILIS, UNSPECIFIED: ICD-10-CM

## 2024-02-28 DIAGNOSIS — Z95.3 PRESENCE OF XENOGENIC HEART VALVE: ICD-10-CM

## 2024-02-28 DIAGNOSIS — I35.1 NONRHEUMATIC AORTIC (VALVE) INSUFFICIENCY: ICD-10-CM

## 2024-02-28 NOTE — DISCUSSION/SUMMARY
[Pacemaker Function Normal] : normal pacemaker function [FreeTextEntry1] : 78 year old male with HTN, HLD, aortic aneurysm thoracic, AVR and mitral valve repair in 2014, paroxysmal atrial fibrillation, PVCs and ICD s/p recent PVC ablation, who had an appropriate ICD shock now s/p upgrade to BiV ICD (EF from 40% to 15-20% with pacing dependance), who presents for follow up s/p Barostim implant 11/6/23.   #CHF GDMT NYHA Class II symptoms LVEF 10-15%  #High potassium Patient had recent labs with Dr. Oh, K+ found to be high, on Valtassa   #Barostim Presents for uptitration s/p implant 11/6 Uptitrated to 2.6 ma @65 pulse width today  No adverse reactions noted Patient will present in 4-5 weeks for further up-titration  We had a long discussion regarding Barostim therapy, the way the technology works and set clear expectations regarding improvement. Discussed how improvement with heart failure can mean no further clinical deterioration. Discussed how therapy is intended to improve quality of life and reduce heart failure exacerbations requiring re admissions. Patient knows to call with any questions or concerns. Will followup in 2 weeks.

## 2024-02-28 NOTE — ASSESSMENT
[FreeTextEntry1] : Upitration 11/21/23 /76 Amp 1  /88   Uptitration 12/20/23 (6 weeks post) /84 Amp 1.4 pulse width 80  Uptitration 2/7/2024 /66 Amp 1.8 (feels stimulation in neck and reports tongue feels heavy, tightness in jaw)  BP 95/63 Amp 2.0 pulse width 80 /64 Amp 2.2 pulse width 65  Uptitration 2/21/24 /80 Amp 2.6

## 2024-02-28 NOTE — PROCEDURE
[No] : not [NSR] : normal sinus rhythm [ICD] : Implantable cardioverter-defibrillator [DDDR] : DDDR [Threshold Testing Performed] : Threshold testing was performed [None] : none [de-identified] : [place] [de-identified] : very long AV delay [de-identified] : [place] [de-identified] : 2021 [de-identified] : [id number] [de-identified] :  [de-identified] : 4.6 years  [de-identified] :  \par  AV delay 250   DDD 50\par  changed to DDD 70 AV delay 250 [de-identified] : see paceart No new events

## 2024-02-28 NOTE — REVIEW OF SYSTEMS
[Feeling Fatigued] : feeling fatigued [Negative] : Heme/Lymph [Fever] : no fever [Chills] : no chills [Weight Gain (___ Lbs)] : no recent weight gain [Weight Loss (___ Lbs)] : no recent weight loss [SOB] : no shortness of breath [Chest Discomfort] : no chest discomfort [Lower Ext Edema] : no extremity edema [Palpitations] : no palpitations [Syncope] : no syncope [Cough] : no cough [Wheezing] : no wheezing

## 2024-02-28 NOTE — HISTORY OF PRESENT ILLNESS
[Palpitations] : no palpitations [SOB] : no dyspnea [Syncope] : no syncope [Chest Pain] : no chest pain or discomfort [Dizziness] : no dizziness [Pain at Site] : no pain at device site [Shoulder Pain] : no shoulder pain [Swelling at Site] : no swelling at device site [Erythema at Site] : no erythema at device site [FreeTextEntry1] : 78 year old male with HTN, HLD, aortic aneurysm thoracic, AVR and mitral valve repair in 2014, paroxysmal atrial fibrillation, PVCs and ICD s/p recent PVC ablation, who had an appropriate ICD shock now s/p upgrade to BiV ICD (EF from 40% to 15-20% with pacing dependance), who presents for follow up s/p Barostim implant 11/6/23.   He states that he had a ICD placed after his AVR.  He followed up with Dr. Palencia; whose office moved and he transferred care here.  He was on Sotalol for history of PVCs and  NSVT; however he had short bursts of VT/torsades and it was felt the sotalol was proarrhythmic and stopped.  He was placed on Metoprolol.     He was admitted to [facility] 9/2019 with syncope correlated with VT and Vfib.  No therapy needed as he spontaneously converted.  One episode of afib, but overall burden extremely low.  Cath with mildly obstructive CAD.  He also underwent an EP study with PVC ablation from great cardiac vein.  He was discharged on Mexiletine (now off).     7/2021 he was admitted to an OSH with ICD shock.  He was discharged on amiodarone load which he self discontinued.   EF was down and he underwent a BiV upgrade.    He was seen in the office and is now on Amiodarone, Metoprolol, Eliquis and optimal heart failure medication. He presents for followup to turn on Barostim device. Level of potassium is high on last exam with Dr. Oh (followup in 3 weeks) on Valtassa. Saw Silver Dunbar yesterday, no changes.   He denies any SOB, edema, palpitations, syncope, near syncope.  He is compliant with all of his medications including eliquis and amiodarone. Reports he has to pause after 5 blocks before stopping with shortness of breath- which is stable. Barostim uptitration limited due to muscle stimulation in neck, will continue to slowly uptitrate.   JIGAR 12/2022: severe LV dysfunction, posterior MVL tethering with moderate MR and mean gradient 2 mmHg, well functioning bioAVR, mild-mod TR, moderate PI  TTE 11/2022: LV 6.7 cm, LVEF 20-25%, mild concentric LVH, bioAVR with minimal AI, mild RV dysfunction, mod-severe valvular MR (posterior leaflet appears frozen) and mean gradient 8 mmHg, moderate TR, at least moderate PI, estimated PASP 53 mmHg, dilated aorta 4.6 cm at sinus of Valsalva Echo 5/9/2022 EF 25-30% Echo 8/2021 EF 25-30% Echo 1/2021 EF 35-40% Echo 11/2019 EF 55-60%

## 2024-02-28 NOTE — PHYSICAL EXAM
[General Appearance - Well Developed] : well developed [Normal Appearance] : normal appearance [Well Groomed] : well groomed [General Appearance - Well Nourished] : well nourished [No Deformities] : no deformities [General Appearance - In No Acute Distress] : no acute distress [] : no respiratory distress [Respiration, Rhythm And Depth] : normal respiratory rhythm and effort [Exaggerated Use Of Accessory Muscles For Inspiration] : no accessory muscle use [Auscultation Breath Sounds / Voice Sounds] : lungs were clear to auscultation bilaterally [Left Infraclavicular] : left infraclavicular area [Clean] : clean [Dry] : dry [Well-Healed] : well-healed [5th Left ICS - MCL] : palpated at the 5th LICS in the midclavicular line [Normal Rate] : normal [Rhythm Regular] : regular [Normal S1] : normal S1 [Normal S2] : normal S2 [___ +] : [unfilled]U+ pitting edema to the right ankle [Normal Conjunctiva] : the conjunctiva exhibited no abnormalities [Abnormal Walk] : normal gait [Skin Color & Pigmentation] : normal skin color and pigmentation [Oriented To Time, Place, And Person] : oriented to person, place, and time [Affect] : the affect was normal [Mood] : the mood was normal [No Anxiety] : not feeling anxious [Palpable Crepitus] : no palpable crepitus [Foul Odor] : no foul smell [Bleeding] : no active bleeding [Purulent Drainage] : no purulent drainage [Serous Drainage] : no serous drainage [Erythema] : not erythematous [Warm] : not warm [Tender] : not tender [Indurated] : not indurated [Fluctuant] : not fluctuant

## 2024-02-29 LAB
C TRACH RRNA SPEC QL NAA+PROBE: NOT DETECTED
N GONORRHOEA RRNA SPEC QL NAA+PROBE: NOT DETECTED
SOURCE AMPLIFICATION: NORMAL

## 2024-03-01 RX ORDER — TORSEMIDE 10 MG/1
10 TABLET ORAL DAILY
Qty: 90 | Refills: 3 | Status: ACTIVE | COMMUNITY
Start: 2021-08-03 | End: 1900-01-01

## 2024-03-06 ENCOUNTER — APPOINTMENT (OUTPATIENT)
Dept: INFECTIOUS DISEASE | Facility: CLINIC | Age: 79
End: 2024-03-06
Payer: COMMERCIAL

## 2024-03-06 ENCOUNTER — OUTPATIENT (OUTPATIENT)
Dept: OUTPATIENT SERVICES | Facility: HOSPITAL | Age: 79
LOS: 1 days | End: 2024-03-06

## 2024-03-06 ENCOUNTER — MED ADMIN CHARGE (OUTPATIENT)
Age: 79
End: 2024-03-06

## 2024-03-06 DIAGNOSIS — Z98.89 OTHER SPECIFIED POSTPROCEDURAL STATES: Chronic | ICD-10-CM

## 2024-03-06 DIAGNOSIS — A53.9 SYPHILIS, UNSPECIFIED: ICD-10-CM

## 2024-03-06 PROCEDURE — 99211 OFF/OP EST MAY X REQ PHY/QHP: CPT

## 2024-03-06 RX ORDER — PENICILLIN G BENZATHINE 2400000 [IU]/4ML
2400000 INJECTION, SUSPENSION INTRAMUSCULAR
Qty: 0 | Refills: 0 | Status: COMPLETED | OUTPATIENT
Start: 2024-03-06

## 2024-03-06 RX ADMIN — PENICILLIN G BENZATHINE 0 UNIT/4ML: 2400000 INJECTION, SUSPENSION INTRAMUSCULAR at 00:00

## 2024-03-07 NOTE — ED PROVIDER NOTE - IV ALTEPLASE ADMIN OUTSIDE HIDDEN
----- Message from Marie Feldman sent at 3/7/2024  2:41 PM CST -----  Contact: [phone]  Pt is calling regarding questions before scheduling . Please call back at [phone] . Thanksdj     
show

## 2024-03-08 PROBLEM — A53.9 SYPHILIS: Status: ACTIVE | Noted: 2024-02-23

## 2024-03-12 ENCOUNTER — APPOINTMENT (OUTPATIENT)
Dept: INFECTIOUS DISEASE | Facility: CLINIC | Age: 79
End: 2024-03-12
Payer: COMMERCIAL

## 2024-03-12 ENCOUNTER — TRANSCRIPTION ENCOUNTER (OUTPATIENT)
Age: 79
End: 2024-03-12

## 2024-03-12 ENCOUNTER — OUTPATIENT (OUTPATIENT)
Dept: OUTPATIENT SERVICES | Facility: HOSPITAL | Age: 79
LOS: 1 days | End: 2024-03-12

## 2024-03-12 PROCEDURE — 99211 OFF/OP EST MAY X REQ PHY/QHP: CPT

## 2024-03-12 RX ORDER — PENICILLIN G BENZATHINE 2400000 [IU]/4ML
2400000 INJECTION, SUSPENSION INTRAMUSCULAR
Qty: 0 | Refills: 0 | Status: COMPLETED | OUTPATIENT
Start: 2024-03-12

## 2024-03-12 RX ADMIN — PENICILLIN G BENZATHINE 0 UNIT/4ML: 2400000 INJECTION, SUSPENSION INTRAMUSCULAR at 00:00

## 2024-03-13 DIAGNOSIS — K22.5 DIVERTICULUM OF ESOPHAGUS, ACQUIRED: ICD-10-CM

## 2024-03-18 ENCOUNTER — APPOINTMENT (OUTPATIENT)
Dept: NEPHROLOGY | Facility: CLINIC | Age: 79
End: 2024-03-18
Payer: COMMERCIAL

## 2024-03-18 VITALS — HEART RATE: 70 BPM | SYSTOLIC BLOOD PRESSURE: 98 MMHG | DIASTOLIC BLOOD PRESSURE: 91 MMHG

## 2024-03-18 VITALS — WEIGHT: 192 LBS | BODY MASS INDEX: 27.55 KG/M2

## 2024-03-18 DIAGNOSIS — R73.09 OTHER ABNORMAL GLUCOSE: ICD-10-CM

## 2024-03-18 PROCEDURE — G2211 COMPLEX E/M VISIT ADD ON: CPT

## 2024-03-18 PROCEDURE — 36415 COLL VENOUS BLD VENIPUNCTURE: CPT

## 2024-03-18 PROCEDURE — 99214 OFFICE O/P EST MOD 30 MIN: CPT

## 2024-03-18 NOTE — PHYSICAL EXAM
[General Appearance - Alert] : alert [General Appearance - In No Acute Distress] : in no acute distress [Neck Appearance] : the appearance of the neck was normal [Neck Cervical Mass (___cm)] : no neck mass was observed [Jugular Venous Distention Increased] : there was no jugular-venous distention [Thyroid Diffuse Enlargement] : the thyroid was not enlarged [Thyroid Nodule] : there were no palpable thyroid nodules [Heart Rate And Rhythm] : heart rate was normal and rhythm regular [Heart Sounds] : normal S1 and S2 [Heart Sounds Gallop] : no gallops [Murmurs] : no murmurs [Heart Sounds Pericardial Friction Rub] : no pericardial rub [Edema] : there was no peripheral edema [Abdomen Soft] : soft [Abdomen Tenderness] : non-tender [] : no hepato-splenomegaly [Abdomen Mass (___ Cm)] : no abdominal mass palpated [Cervical Lymph Nodes Enlarged Posterior Bilaterally] : posterior cervical [Cervical Lymph Nodes Enlarged Anterior Bilaterally] : anterior cervical [Supraclavicular Lymph Nodes Enlarged Bilaterally] : supraclavicular [FreeTextEntry1] : pitting ankles

## 2024-03-18 NOTE — HISTORY OF PRESENT ILLNESS
[FreeTextEntry1] : Kindly referred by Dr. Sandhya Bloom elevated creatinine. [employer].  * Rx for syphillis (identified by derm due to skin rash). * K increased to 4.6 off veltassa. * CKD stable, creatinine 1.36 2 months ago. * BP controlled. BP 110s at home. SOB with moderate exertion. No CP. No lightheadedness. Compliant with medications.   Previous history (10Jan24): K decresed to 3.2. Veltassa held. * CKD stable, creatinine 1.63. * BP controlled.  BP 110s at home. No lightheadedness. No CP. Compliant with medications. He has had longstanding atypical chest pain (? GERD) since 2014 which has not changed. None currently.  Chronic SOB with moderate exertion unchanged. *  He notes strong odor when he burps. Notes occasional "orange" smell in urine. No asparagus. No dysuria. He has seen Dr. Aldrich and Dr. Cortez.   * Entresto restart at lower dose with veltassa.  Previous history (06Dec23): * K 6.1. He denies intake of high K foods. Entresto was stopped. * Elevated LDL. He says he is not taking pravstatin because of difficulty walking. * CKD stable, creatinine 1.83.   Previous history (04Dec23): * eGFR 40 (avg. of CKD-EPIcr & CKD-EPIcys). No albuminuria. * Following up with Dr. Hanson for monoclonal gammopathy. * Following up with cardiologist. * Working as . * K 4.9 on low K diet. * BP controlled. BP 100s at home. No lightheadedness. No CP/SOB. Compliant with medications. * Per Dr. Chandler: "HLD: on pravastatin 40mg, had muscle aches on 80mg,  8/2017, pt reported he was only taking it once in a while, now taking it daily will repeat lipid panel. Diet and exercise discussed in detail.  Feb 2018,  July 2020. starting CRESTOR 10 qod, pt off it". * Occasional urinary hesitancy.   Previous history (17Aug23): * Creatinine 2.03 in 26Jul (eGFR 33 by CKD-EPI). No [place]. * BP controlled. BP 110s at home. Not < 100. No lightheadedness. No CP/SOB at rest. Compliant with medications. * Following up with Dr. Hanson for monoclonal gammopathy. * Following up with cardiologist. * Working as . * K 4.9 on low K diet.   Previous history (25Apr23): * HTN controlled. BP 110s at home. No lightheadedness. No CP. No SOB at rest. Compliant with medications.   * Following up with Dr. Hanson for monoclonal gammopathy. * Chronic slight SOB. Following up closely with cardiologist.  * CKD stable, creatinine 1.7 in April 4. * Hyperkalemia controlled.   Previous history (24Jan23): * CKD stable, creatinine 1.8. * K 5.4. Counseled on following low K diet. He occsaionally eats bananas.  * HTN controlled. No lightheadedness. No CP. Compliant with medications.  Following up with Dr. Hanson for monoclonal gammopathy. * Chronic slight SOB. Following up closely with cardiologist.   Previous history (18Nov22): * CKD stable, creatinine 1.7 on Sep 21. . * 13 - 15 mg albuminuria. * * HTN controlled. No lightheadedness.  Compliant with medications. * Following up with Dr. Hanson for monoclonal gammopathy. * CP/SOB now significantly improved, none currnetly. Following up closely with cardiologist.   Previous history (18Aug22): * CKD stable, creatinine 1.87. 55 mg albuminuria. * HTN controlled. No lightheadedness. Compliant with medications.  Following up with Dr. Hanson for monoclonal gammopathy. * Hyperkalemia controlled. * Rash on legs. Seeing allergist. * Pneumonia on 5/21 - 5/22. * Persistent SOB/CP with minimal to moderate exertion. Per patient, this is unchanged and cardiologist is aware.   Previous history (19May22): * CKD stable, creatinine 1.87. * HTN controlled. No lightheadedness. No CP/SOB. Compliant with medications.  Following up with Dr. Hanson for monoclonal gammopathy. * Hyperkalemia controlled. * He has urinary urgency for several weeks. No dysuria. 5x nocturia.   Previous history (22Feb22): * HTN controlled.  - 120s / 80s at home. No lightheadedness. Compliant with medications. 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 * CKD progressive, creatinine 1.91. Albuminuria decreased to 48. * Occasional chronic abd pain being evaluated, none currently.   Previous history (06Dec21): * CKD stable, creatinine 1.51.  Albuminuria decreased to 91 mg from 3280 mg (?). Farxiga 10 started. 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 * HTN controlled. BP 120s at home. No lightheadedness. Compliant with medications.   Previous history (10Nov21): * HTN controlled. BP 110s at home. No lightheadedness. Compliant with medications. * 3280 mg albuminuria. * CKD stable, creatinine 1.45. 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   Previous history (09Sep21): * Events reviewed. Admitted to [facility] for CHF exacerbation and ICD upgrade. EF < 23 - 30.* CKD stable, creatinine 1.55.  Labs from 9/7 reviewed. Creatinine 1.45. K 4. 298 mg albuminuria.  * Hyperkalemia controlled. Not on lokelma or veltassa.  * HTN controlled. /80s at home. No lightheadedness. Compliant with medications. 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   Previous history (31Aug21): * Dx with Covid 3 weeks ago. Now no cough, or other symptoms. Did not require hospitalization. He has SOB with walking walking 1 block. * Lokelma started for hyperkalemia (K of 6). He is now following low potassium diet. . * CKD stable, creatinine 1.86. * He had stopped torsemide. BP 100s.   Previous history (03Aug21): Labs reviewed. Baseline eGFR 43 - 56 since 2019. On 14Jul21, his creatinine increased to 1.82 (eGFR 35 - 41). The patient denies exposure to chronic NSAIDs, chronic PPIs, creatine, or herbal supplements. The patient denies a history of kidney stones or pyelonephritis. 4 - 5 times. No recent renal ultrasound. They are unaware of proteinuria or hematuria.  EF 23% in 4/21. On amiodarone, metoprolol, lasix 20 qod, HCTZ twice weekly valsartan 40. SOB with one block walking. Stable LE edema. ProBNP incresaed from 515 to 1360.  * HTN controlled.  No lightheadedness. Compliant with medications. He believes he is taking amlodipine.

## 2024-03-18 NOTE — ASSESSMENT
[FreeTextEntry1] : -- # Hyperkalemia. * Striict low K diet. * Cont lower dose entresto. * Now off veltassa.  # HTN controlled. No lightheadedness. * Cont metoprolol, torsemide, entresto, hydralazine. * The patient's blood pressure was checked with the [employer] [id number] using the SPRINT trial protocol after sitting quietly in an empty room with arm supported, back supported, and feet on the floor for 5 minutes. The average of 3 readings were taken. * A counseling information sheet on blood pressure and staying healthy has been given (which they have been instructed to read). * The patient has been counseled to check their BP at home with an automatic arm cuff, write down the readings, and reach me directly on the phone immediately if they are persistently > 180 systolic or if SBP is less than 100 or if lightheadedness develops. They were counseled to bring in all blood pressure readings and medications next visit. * The patient has been counseled that regular office follow-up (at least every 2 months for now) is important for monitoring and for their health, and that it is their responsibility to make follow up appointments. * The patient also has been counseled that they must never stop or change any medications without discussing this with me (or another physician).  # CKD stage 3 likeliest due to type 2 cardiorenal syndrome and aging, possible DM nephropathy. * Recheck labs. * Will avoid MRA given previous hyperkalemia. * Therapies for kidney disease: blood pressure control; proteinuria reduction with ARB/ACEi; SGLT2i; other evidence-based therapies recommended including exercise, a plant-based lower oxalate diet, and 400 mcg folic acid daily * Cardiovascular disease prevention: counseling on healthy diet, physical activity, weight loss, alcohol limitation, blood pressure control; cardiology evaluation/followup advised * A counseling information sheet on CKD has been given (which they have been instructed to read). * The patient has been counseled that chronic kidney disease is a significant condition and regular office follow-up with me (at least every 2 months for now) is important for monitoring and their health, and that it is their responsibility to make a follow-up appointment. * The patient has been counseled never to stop taking their medications without discussing it with me or another doctor. * The patient has been counseled on avoiding NSAIDs. * The patient has been counseled on risk of worsening kidney function and instructed to immediately call and speak with me and go immediately to ER with any severe symptoms, nausea, vomiting, diarrhea, chest pain, or shortness of breath.   # Hyperchol. * Cardiology managing. Inability to tolerate statins.

## 2024-03-19 ENCOUNTER — OUTPATIENT (OUTPATIENT)
Dept: OUTPATIENT SERVICES | Facility: HOSPITAL | Age: 79
LOS: 1 days | End: 2024-03-19
Payer: COMMERCIAL

## 2024-03-19 ENCOUNTER — APPOINTMENT (OUTPATIENT)
Dept: RADIOLOGY | Facility: HOSPITAL | Age: 79
End: 2024-03-19
Payer: COMMERCIAL

## 2024-03-19 DIAGNOSIS — Z98.89 OTHER SPECIFIED POSTPROCEDURAL STATES: Chronic | ICD-10-CM

## 2024-03-19 DIAGNOSIS — A53.9 SYPHILIS, UNSPECIFIED: ICD-10-CM

## 2024-03-19 PROCEDURE — 74220 X-RAY XM ESOPHAGUS 1CNTRST: CPT | Mod: 26

## 2024-03-19 PROCEDURE — 74220 X-RAY XM ESOPHAGUS 1CNTRST: CPT

## 2024-03-21 ENCOUNTER — APPOINTMENT (OUTPATIENT)
Dept: HEART AND VASCULAR | Facility: CLINIC | Age: 79
End: 2024-03-21

## 2024-03-25 ENCOUNTER — APPOINTMENT (OUTPATIENT)
Dept: HEART AND VASCULAR | Facility: CLINIC | Age: 79
End: 2024-03-25
Payer: COMMERCIAL

## 2024-03-25 ENCOUNTER — NON-APPOINTMENT (OUTPATIENT)
Age: 79
End: 2024-03-25

## 2024-03-25 ENCOUNTER — RX RENEWAL (OUTPATIENT)
Age: 79
End: 2024-03-25

## 2024-03-25 VITALS
OXYGEN SATURATION: 97 % | HEART RATE: 79 BPM | WEIGHT: 193 LBS | HEIGHT: 70 IN | TEMPERATURE: 98 F | SYSTOLIC BLOOD PRESSURE: 132 MMHG | DIASTOLIC BLOOD PRESSURE: 80 MMHG | BODY MASS INDEX: 27.63 KG/M2

## 2024-03-25 PROCEDURE — G2211 COMPLEX E/M VISIT ADD ON: CPT

## 2024-03-25 PROCEDURE — 99213 OFFICE O/P EST LOW 20 MIN: CPT

## 2024-03-25 RX ORDER — DAPAGLIFLOZIN 10 MG/1
10 TABLET, FILM COATED ORAL
Qty: 90 | Refills: 0 | Status: ACTIVE | COMMUNITY
Start: 2024-03-25 | End: 1900-01-01

## 2024-03-25 RX ORDER — ISOSORBIDE DINITRATE 20 MG/1
20 TABLET ORAL
Refills: 0 | Status: ACTIVE | COMMUNITY

## 2024-03-25 NOTE — ASSESSMENT
[FreeTextEntry1] : 79 YO M with a history of ACC/AHA Stage C/D NICM (LV 6.2 cm, LVEF 25-30%) s/p ICD upgraded to CRT-D 9/2021 for chronic RV pacing and Barostim 11/2023, severe AI/MR s/p bioAVR/MVr 2014 history of VT/VT with ICD shocks and frequent PVC's s/p PVC ablation, pAF on eliquis, CKD IIIb (Cr 1.8), and aortic aneurysm presenting to HF clinic for further management.   Today he reports NYHA III symptoms and appears euvolemic on exam.

## 2024-03-25 NOTE — DISCUSSION/SUMMARY
[FreeTextEntry1] : # Chronic systolic heart failure - Etiology: unclear etiology at this time, possibly infiltrative process given arrhythmia history. will defer MRI given device and age - GDMT: current regimen is metoprolol succinate 100 BID, entresto 24-26 mg BID (previously high dose, lowered for hyperkalemia), Farxiga 10 mg daily, hydral 50 TID, and isordil 20 TID. will increase entresto to 49-51 mg BID and repeat labs next week. defer MRA due to CKD and hyperkalemia.   - Diuretic: current regimen is torsemide 10 mg QD, will continue - Device: s/p CRT-D, well functioning with > 95% BiV pacing  - Advanced therapies: not a candidate for VAD/OHT due to comorbidities - s/p Barostim 11/2023, continues to undergo device uptitration  - If no improvement of symptoms with Barostim will plan for repeat RHC and consider palliative inotropes if indicated  - Labs: 3/18/2024 with K 5.0 and Cr 1.9 (eGFR 35), repeat next week on higher dose Entresto, if K high will start standing K binders   # Valvular disease - exam suggestive of significant AI not no valvular disease on JIGAR x 2   # History of VT/VF with ICD shocks and frequent PVCs - follows with Dr. ARIZMENDI and on amiodarone  # pAF - on eliquis  # Hyperlipidemia - LDL noted to be 204 on lipids 12/2023 - follows with Dr. Chandler   # Aortic aneurysm - has been stable over several years, follows with Dr. Garcia and Ramesh   # CKD IIIb with baseline Cr 1.8 - continue Farxiga  - follows with Dr. Matthews  Return to clinic in 3 months. Continue Barostim uptitration with EP in the interim. Labs next week

## 2024-03-25 NOTE — HISTORY OF PRESENT ILLNESS
[FreeTextEntry1] : Referring: Dr. Chandler  Mr. Kenney is a 77 YO M with a history of ACC/AHA Stage C/D NICM (LV 6.2 cm, LVEF 25-30%) s/p ICD upgraded to CRT-D 9/2021 for chronic RV pacing and Barostim 11/2023, severe AI/MR s/p bioAVR/MVr 2014 history of VT/VT with ICD shocks and frequent PVC's s/p PVC ablation, pAF on eliquis, CKD IIIb (Cr 1.8), and aortic aneurysm presenting to HF clinic for further management.   He was very active at baseline and used to run marathons. He was diagnosed with a cardiomyopathy in 2014 with LVEF ~45% in the setting of severe AI with moderate-severe MR and subsequently underwent bioAVR and MVr in 2014. He underwent a primary prevention ICD shortly after surgery. He did well for many years after surgery and LVEF historically been 40-45%. He developed syncope 9/2019 related to VT/VF which self resolved and he underwent PVC ablation this admission. He was admitted 7/2021 with ICD shock for which he was started on amiodarone. His EF declined from 45% to 30% to 15% in 9/2021 and was being chronically RV paced so underwent CRT-D upgrade.   Due to persistent dyspnea he was referred to HF clinic 4/2022. There was concern for symptomatic severe MR and JIGAR pursued which revealed MR was only moderate. He has tolerated escalation of HF medical therapy but has remained with NYHA III symptoms. RHC performed which revealed moderately elevated filling pressures and low cardiac output in setting of very high SVR despite max Entresto.   Barostim was implanted on 11/2023. He has been undergoing Barostim uptitration which has been complicated by some vocal cord stimulation.  He presents today for HF followup. He overall feels the same since last visit. He states he can walk 2 blocks before stopping due to fatigue and dyspnea. He has not tried stairs but thinks he can do 1 flight of stairs. He occasionally notes dyspnea during household activities. Denies orthopnea or lower extremity edema. Denies dizziness or lightheadedness. Continues to note chronic epigastric pain.

## 2024-03-25 NOTE — PHYSICAL EXAM
[Well Developed] : well developed [Well Nourished] : well nourished [Clear Lung Fields] : clear lung fields [Good Air Entry] : good air entry [Soft] : abdomen soft [Normal Gait] : normal gait [Moves all extremities] : moves all extremities [No Focal Deficits] : no focal deficits [Alert and Oriented] : alert and oriented [Normal memory] : normal memory [de-identified] : JVP 8 cm H20 [de-identified] : Normal S1/S2, 2/6 systolic murmur at apex  [de-identified] : Warm, no edema  [de-identified] : Mildly tender in mid epigastrum

## 2024-03-26 ENCOUNTER — APPOINTMENT (OUTPATIENT)
Dept: HEART AND VASCULAR | Facility: CLINIC | Age: 79
End: 2024-03-26
Payer: COMMERCIAL

## 2024-03-26 ENCOUNTER — RX RENEWAL (OUTPATIENT)
Age: 79
End: 2024-03-26

## 2024-03-26 ENCOUNTER — NON-APPOINTMENT (OUTPATIENT)
Age: 79
End: 2024-03-26

## 2024-03-26 PROCEDURE — 93296 REM INTERROG EVL PM/IDS: CPT

## 2024-03-26 PROCEDURE — 93295 DEV INTERROG REMOTE 1/2/MLT: CPT

## 2024-03-27 LAB
ALBUMIN SERPL ELPH-MCNC: 4.3 G/DL
ALP BLD-CCNC: 61 U/L
ALT SERPL-CCNC: 7 U/L
ANION GAP SERPL CALC-SCNC: 14 MMOL/L
APPEARANCE: CLEAR
AST SERPL-CCNC: 18 U/L
BASOPHILS # BLD AUTO: 0.02 K/UL
BASOPHILS NFR BLD AUTO: 0.4 %
BILIRUB SERPL-MCNC: 0.3 MG/DL
BILIRUBIN URINE: NEGATIVE
BLOOD URINE: NEGATIVE
BUN SERPL-MCNC: 37 MG/DL
CALCIUM SERPL-MCNC: 9.7 MG/DL
CHLORIDE SERPL-SCNC: 103 MMOL/L
CO2 SERPL-SCNC: 24 MMOL/L
COLOR: YELLOW
CREAT SERPL-MCNC: 1.91 MG/DL
CREAT SPEC-SCNC: 41 MG/DL
CYSTATIN C SERPL-MCNC: 1.67 MG/L
EGFR: 35 ML/MIN/1.73M2
EOSINOPHIL # BLD AUTO: 0.03 K/UL
EOSINOPHIL NFR BLD AUTO: 0.6 %
GFR/BSA.PRED SERPLBLD CYS-BASED-ARV: 36 ML/MIN/1.73M2
GLUCOSE QUALITATIVE U: 250 MG/DL
GLUCOSE SERPL-MCNC: 92 MG/DL
HCT VFR BLD CALC: 49.4 %
HGB BLD-MCNC: 15.2 G/DL
IMM GRANULOCYTES NFR BLD AUTO: 0.2 %
KETONES URINE: NEGATIVE MG/DL
LEUKOCYTE ESTERASE URINE: NEGATIVE
LYMPHOCYTES # BLD AUTO: 2.21 K/UL
LYMPHOCYTES NFR BLD AUTO: 46.5 %
MAGNESIUM SERPL-MCNC: 2.5 MG/DL
MAN DIFF?: NORMAL
MCHC RBC-ENTMCNC: 30.8 GM/DL
MCHC RBC-ENTMCNC: 31.9 PG
MCV RBC AUTO: 103.6 FL
MICROALBUMIN 24H UR DL<=1MG/L-MCNC: <1.2 MG/DL
MICROALBUMIN/CREAT 24H UR-RTO: NORMAL MG/G
MONOCYTES # BLD AUTO: 0.48 K/UL
MONOCYTES NFR BLD AUTO: 10.1 %
NEUTROPHILS # BLD AUTO: 2 K/UL
NEUTROPHILS NFR BLD AUTO: 42.2 %
NITRITE URINE: NEGATIVE
PH URINE: 5.5
PHOSPHATE SERPL-MCNC: 4.2 MG/DL
PLATELET # BLD AUTO: 273 K/UL
POTASSIUM SERPL-SCNC: 5 MMOL/L
PROT SERPL-MCNC: 8.6 G/DL
PROTEIN URINE: NEGATIVE MG/DL
RBC # BLD: 4.77 M/UL
RBC # FLD: 14.2 %
SODIUM SERPL-SCNC: 142 MMOL/L
SPECIFIC GRAVITY URINE: 1.01
UROBILINOGEN URINE: 0.2 MG/DL
WBC # FLD AUTO: 4.75 K/UL

## 2024-04-02 LAB — DERMATOLOGY BIOPSY: NORMAL

## 2024-04-05 ENCOUNTER — RX RENEWAL (OUTPATIENT)
Age: 79
End: 2024-04-05

## 2024-04-05 RX ORDER — DAPAGLIFLOZIN 10 MG/1
10 TABLET, FILM COATED ORAL
Qty: 90 | Refills: 3 | Status: ACTIVE | COMMUNITY
Start: 2021-11-10 | End: 1900-01-01

## 2024-04-07 RX ORDER — SACUBITRIL AND VALSARTAN 49; 51 MG/1; MG/1
49-51 TABLET, FILM COATED ORAL
Qty: 60 | Refills: 3 | Status: ACTIVE | COMMUNITY
Start: 2023-01-03 | End: 1900-01-01

## 2024-04-08 ENCOUNTER — NON-APPOINTMENT (OUTPATIENT)
Age: 79
End: 2024-04-08

## 2024-04-08 ENCOUNTER — APPOINTMENT (OUTPATIENT)
Dept: HEART AND VASCULAR | Facility: CLINIC | Age: 79
End: 2024-04-08
Payer: COMMERCIAL

## 2024-04-08 VITALS
OXYGEN SATURATION: 98 % | DIASTOLIC BLOOD PRESSURE: 60 MMHG | TEMPERATURE: 98 F | HEART RATE: 92 BPM | BODY MASS INDEX: 27.63 KG/M2 | WEIGHT: 193 LBS | SYSTOLIC BLOOD PRESSURE: 106 MMHG | HEIGHT: 70 IN

## 2024-04-08 DIAGNOSIS — Z98.890 OTHER SPECIFIED POSTPROCEDURAL STATES: ICD-10-CM

## 2024-04-08 DIAGNOSIS — Z01.818 ENCOUNTER FOR OTHER PREPROCEDURAL EXAMINATION: ICD-10-CM

## 2024-04-08 DIAGNOSIS — E78.5 HYPERLIPIDEMIA, UNSPECIFIED: ICD-10-CM

## 2024-04-08 PROCEDURE — 93000 ELECTROCARDIOGRAM COMPLETE: CPT | Mod: NC

## 2024-04-08 PROCEDURE — 99214 OFFICE O/P EST MOD 30 MIN: CPT | Mod: 25

## 2024-04-08 PROCEDURE — 36415 COLL VENOUS BLD VENIPUNCTURE: CPT

## 2024-04-08 RX ORDER — AMOXICILLIN 500 MG/1
500 TABLET, FILM COATED ORAL
Qty: 16 | Refills: 3 | Status: ACTIVE | COMMUNITY
Start: 2017-11-06 | End: 1900-01-01

## 2024-04-10 PROBLEM — Z01.818 PREOP EXAMINATION: Status: ACTIVE | Noted: 2019-11-20

## 2024-04-10 LAB
CHOLEST SERPL-MCNC: 314 MG/DL
HDLC SERPL-MCNC: 70 MG/DL
LDLC SERPL CALC-MCNC: 230 MG/DL
NONHDLC SERPL-MCNC: 244 MG/DL
TRIGL SERPL-MCNC: 86 MG/DL

## 2024-04-10 RX ORDER — EVOLOCUMAB 140 MG/ML
140 INJECTION, SOLUTION SUBCUTANEOUS
Qty: 6 | Refills: 3 | Status: ACTIVE | COMMUNITY
Start: 2024-04-10 | End: 1900-01-01

## 2024-04-10 NOTE — REASON FOR VISIT
[FreeTextEntry1] : 78 -year-old male with history of high cholesterol, aortic aneurysm thoracic, hypertension, lumbago, pre-diabetes, Valve disease (status post AVR, MVr epair Dr. Caio Louise 9/9/2014). S/P  colonoscopy with Dr. Maximilian Cortez 9/14/17. Doing well overall, Denies CP, SOB, palpitations, orthopnea, LE swelling, dizziness, syncope. Walking and running daily, sometimes >10 miles, training for [place] marathon in 2018 after skipping 2017.   s/p dental extraction and implant. Reports during dental cleaning he was told he bled a lot and dentist would prefer he stop aspirin for future dental extraction and implant.  Training for [facility], race walked it without complications Nov 2018.  EKG: NSR @ 46 with 1st degree AVB. LAHB, ST-Tw abnormalities. Blocked APC, V paced x 2 beats Pacer set at 40 1/22/19 EKG: A Pacing, PVCs, 1st degree AVB, with a LAHB, possible IWMI, QTc 429 ST-Twave abnormalities.  10/10/19  3/18/19 A Fib discovered by Dr Jeff on 3/13/19, ASA changed to Eliquis 5/6/19 Pt with atypical pain in the axilla on the left, he thinks its the Eliquis.  No swelling or ecchymosis reported 7/15/19 K 5.5, Telmisartin reduced to 20mg. BP excellent.  c/o severe right shoulder pain.  Previously injected with relief. 9/23/19 Adm to  [facility] with syncope and NSVT.  Trans to [facility].  VT ablation and Mexitil started. 10/10/19  Pt c/o chest  pressure, when questioned its sub Xiphoid.  Eating makes it better  11/14/19  New Deep Tw inversions, ? post pacing.  CCTA Nov 1 clean. Recently had a lot of "stomach" pains.  I suspect he has PUD, Trop sent, echo ordered 1/24/20 Here with several weeks of a cold/URI, saw an MD and given Z westley.  Here with wheezing, reduced exercise tolerance. + Wheezing, SOB, mild cough, non-productive, no fever or chills. Also here for clearance for cataract 10/15/20 Fullness in subxiphoid area.  (-) CTA Nov 2020.  Also has  off statin. 1/15/21 ARB DCed due to elevated Cr and K of 5.9, not taking Crestor due to nausea, knee pain, back pain.  More MONTEJO noted, will need to reassess Ao V 2/24/21 More MONTEJO again, getting worse, epigastric discomfort, worse if he hits a pot hole., or walking hard.  ?pleuritic component.  Feb 12-16 pt reports wt went up, not sleeping well. 4/5/21 In [facility] 4/16 to 4/19/21 with chest pain, + Nuc(fixed inferior and apical defects).  EF 23 %, Echo EF 30 %, mild to moderate MR. but (-) cath.  Still gets epigastric pain with hitting potholes.  + MONTEJO.  PAP 40 mm.  Feels better after HCTZ. 5/10/21 Had Shingles.  Had both Covid vaccines.  Having endoscopy 5/20/21 7/7/21 Dx with severe gastritis and H pylori.  Admitted to [facility] yesterday with AICD DC, 2/T VF. K 4.5, .   Amiodarone 400 mg BID started. 9/8/21 DC from [facility] 9/4/21 AFTER ADMISSION FOR ATRIAL TACHYCARDIA  seen by Dr Jeff. Pt had an upgrade to a BiV pacer.  Echo showed EF 25-30%.  I was called yesterday from the [facility] ER that he was there SOB and in mild HF.  Torsemide was increased to daily.  10/8/21  In [facility] ER 9/28/21 with SOB and abdominal tightness.  Cr was down to 1.32, BNP was 4237, up slightly.  We increased diuretic and sent pt home.  He feels dramatically better.  He is back on Torsemide 10mg daily because it causes dry mouth and he is reluctant to take more..  12/9/21 Abd tightness much better but not fully resolved.  3/10/22 Having different pain syndromes, has been to ER several times,   Still with SOB/MONTEJO and fatigue 9/21/22  Echo May 2022, EF 25%, mod to severe MR.  Needs f/u with Dr Melvin and Juan Antonio.  Pt doing well on current meds. 1/23/23 Epigastric pain has returned, ongoing MONTEJO. 6/1/23 In ER yesterday with rectal bleeding, had a rectal fissure.  Has right sciatica and tingling in left arm with arm extension. 9/5/23 Getting SOB and probable ascitres from the MR. 12/1/23  In [facility] Nov 3-7,  Dr. Love and Dr. Busby (vascular and EP) implanted a Barostimulator device.   EKG:  AV PAced 9/8/21 4/8/24 had a fever last week. stable SOB. feels down about his health and that he is not getting better. feels extensive pain in his face with walking he attributes to a dental issue. going to [facility] with Dr Melendez -fax [phone]. going to the dentist office. getting a colonoscopy and an endoscopy 4/17.

## 2024-04-10 NOTE — REVIEW OF SYSTEMS
[Dyspnea on exertion] : dyspnea during exertion [Abdominal Pain] : abdominal pain [Tremor] : a tremor was seen [Negative] : Heme/Lymph [de-identified] : amio induced

## 2024-04-10 NOTE — HISTORY OF PRESENT ILLNESS
[FreeTextEntry1] : PCP- Dr Sandhya Bloom EP- Dr Jeff CTS- Dr Garcia Plastics- Dr Ko Hand- Dr Xiong GI- Dr Cortez, colonoscopy Sept 2017 Dentist: Dr. Jorge Oh   Ophtho- Dr Jesus Lantigua [phone]  [facility] with Dr Melendez -fax [phone]

## 2024-04-10 NOTE — ASSESSMENT
[FreeTextEntry1] : Valve disease: clean coronaries on cardiac cath in 2014. status post AVR, MV repair Dr. Caio Louise 9/9/2014) SBE prophylaxis. okay to stop ASA 1 week before dental extraction and implant. EKG with 1 PVC. Echo done 7/2018. EF 40% Valves OK. Echo update Nov 2019 , EF 55-60 ??. Echo done 1/21, EF 35-40 % closer to his baseline. Ao V probably NL prosthetic valve. EF 30% in [facility] March 2021 and Sept 2021. EF 25-30 % on echo done here Aug 2021 Echo May 2022, EF 25 % with mod-severe MR.. No change on echo Nov 2022.  SOB/CHF- BNP sent, echo from 1/2021 with a drop in EF. Await BNP level, 550. EF 23 % on Nuc, 30 % on echo at [facility]. EF 50 % on echo in 2014, 45-50% in 2016, 40% in 2018, all were done at [facility]. Now its 23 % on Nuc and 30 % on echo at [facility] from March 2021. Has CHF, BNP around 3-5 K [place] BW is 175 lbs., May be 170. Remains with MONTEJO, will refer to the HF team. Now on Entresto. Labs were drawn today in ER, Cr 1.88. Clinically stable. 2.03. 4/8/10 discussed thoroughly with pt. reviewed with Dr Sheppard. hope to increase barostim for better clinical response   Abdominal Pain- worse if he hits a pothole. Will screen for a AAA. If (-) will refer to GI (Dr Cortez). SONO (-) FOR AAA, F/U WITH Dr CORTEZ. Will discuss with Dr Cortez. Found to have severe gastritis and H pylori. Pain improving. Also better with diuresis. Trial of Pepcid and Simethicone. Also better with diuresis. Pain getting worse again, to f/u with Dr Cortez Feb 6. Only taking Pepcid,    Chest/abd pain- will treat with Protonix x 30 days. New EKG changes 11/14/19 not seen before, deep Tw inversions, QTc 474. I suspect post pacing artifact, remotely PUD, Trop sent, echo ordered. Trop NL, EF and wall motion normal on echo. CCTA done Nov 1st, 2019 is clean. No coronary disease. More upper abd discomfort, will give a trial of protonix. Still with pain, will screen for AAA, refer to GI. Did not see GI, AAA screen is (-). He reports improvement with HCTZ. Now on Torsemide 10 mg daily. Better after diuresis in ER Sept 28.  PreOP- complex pt but cleared for dental work. Has h/o VT so no epinephrine. Has AICD so no cautery unless AICD is shut off to avoid inappropriate shocks. Needs SBE prophylaxis for dental work only. Pt is cleared for cataract surgery.  On Eliquis. 4/8/24 pt cleared for colonoscopy, can hold eliquis for 2 days prior. for dental work, pt must take SBE ppx. stay on eliquis, will contact us if further work needs to be done.   PAF- changed from ASA to Eliquis by Dr ARIZMENDI. No signs of trauma or bleeding on exam. Pt reassured that the Eliquis is not causing the pain. Off Eliquis by Dr ARIZMENDI due to not feeling well. Will rechallenge with 2.5 BID, discussed with Dr ARIZMENDI. Amio restarted at 200 mg daily(pt stopped the load when the pills finished up)  VT- has non-sustained, i spoke to Dr Palencia who does not want him to run the [facility] Marathon. There is a VT and syncope/sudden death risk, also has a thoracic aneurysm. I discussed this with him and his wife, walking the Marathon is OK but no jogging. He walked it and did well. Now seeing Dr Jeff. Had syncope and [facility], trans to [facility]. VT RFA, Sotolol changed to Mexitil Sept 2019. CAN RETURN TO WORK NEXT MONDAY. Had VF 7/6/21 and background VT, AICD DC. Pt was started on Amio. Dr ARIZMENDI called. Pt reports facial hyperpigmentation on Amio  Aortic aneurysm: Enlarged. 42 (ascending) 47 (descending) in 2014 Followup in 2015 reveals a max Ao of 46 mm CT angiogram 3/6/2017 aneurysm unchanged annual CT, due 3/2018. Aorta 46mm April 2018, referred back to Dr Garcia, 48mm on CT 2019. 48-49 on CT May 2023 with Dr Garcia  HLD: on pravastatin 40mg, had muscle aches on 80mg,  8/2017, pt reported he was only taking it once in a while, now taking it daily will repeat lipid panel. Diet and exercise discussed in detail.  Feb 2018,  July 2020. starting CRESTOR 10 qod, pt off it. 4/8/24 discussed thoroughly. needs treatment, will check lipids today. will discuss [id number] with family if LDL remains significantly elevated as pt has tried and failed both lipitor and crestor.   HTN: Now on Max HF meds.  CKD: 1.33 8/8/2017, repeat BMP Sept 2017, Cr 1.05, previously normal, on ARB 1.31 and K 5.9.. July 2021. Cr 1.13, K 4.5, Mg 2.1, Cr 1.75, 1.88.

## 2024-04-17 ENCOUNTER — TRANSCRIPTION ENCOUNTER (OUTPATIENT)
Age: 79
End: 2024-04-17

## 2024-04-17 ENCOUNTER — OUTPATIENT (OUTPATIENT)
Dept: OUTPATIENT SERVICES | Facility: HOSPITAL | Age: 79
LOS: 1 days | Discharge: HOME | End: 2024-04-17
Payer: COMMERCIAL

## 2024-04-17 VITALS — HEIGHT: 72 IN | WEIGHT: 198.42 LBS

## 2024-04-17 DIAGNOSIS — Z98.89 OTHER SPECIFIED POSTPROCEDURAL STATES: Chronic | ICD-10-CM

## 2024-04-17 PROCEDURE — 43235 EGD DIAGNOSTIC BRUSH WASH: CPT

## 2024-04-17 PROCEDURE — G0105: CPT

## 2024-04-18 ENCOUNTER — APPOINTMENT (OUTPATIENT)
Dept: HEART AND VASCULAR | Facility: CLINIC | Age: 79
End: 2024-04-18

## 2024-04-24 ENCOUNTER — APPOINTMENT (OUTPATIENT)
Dept: HEART AND VASCULAR | Facility: CLINIC | Age: 79
End: 2024-04-24
Payer: COMMERCIAL

## 2024-04-24 VITALS
TEMPERATURE: 96.3 F | OXYGEN SATURATION: 96 % | WEIGHT: 190 LBS | BODY MASS INDEX: 27.2 KG/M2 | SYSTOLIC BLOOD PRESSURE: 107 MMHG | DIASTOLIC BLOOD PRESSURE: 71 MMHG | HEIGHT: 70 IN | HEART RATE: 76 BPM

## 2024-04-24 DIAGNOSIS — R06.02 SHORTNESS OF BREATH: ICD-10-CM

## 2024-04-24 PROCEDURE — 99213 OFFICE O/P EST LOW 20 MIN: CPT | Mod: 25

## 2024-04-24 PROCEDURE — 93284 PRGRMG EVAL IMPLANTABLE DFB: CPT

## 2024-04-25 DIAGNOSIS — I12.9 HYPERTENSIVE CHRONIC KIDNEY DISEASE WITH STAGE 1 THROUGH STAGE 4 CHRONIC KIDNEY DISEASE, OR UNSPECIFIED CHRONIC KIDNEY DISEASE: ICD-10-CM

## 2024-04-25 DIAGNOSIS — N18.30 CHRONIC KIDNEY DISEASE, STAGE 3 UNSPECIFIED: ICD-10-CM

## 2024-04-25 DIAGNOSIS — Z12.11 ENCOUNTER FOR SCREENING FOR MALIGNANT NEOPLASM OF COLON: ICD-10-CM

## 2024-04-25 DIAGNOSIS — K64.4 RESIDUAL HEMORRHOIDAL SKIN TAGS: ICD-10-CM

## 2024-04-25 DIAGNOSIS — E78.5 HYPERLIPIDEMIA, UNSPECIFIED: ICD-10-CM

## 2024-04-25 DIAGNOSIS — K64.8 OTHER HEMORRHOIDS: ICD-10-CM

## 2024-04-25 DIAGNOSIS — I48.0 PAROXYSMAL ATRIAL FIBRILLATION: ICD-10-CM

## 2024-04-25 DIAGNOSIS — I25.10 ATHEROSCLEROTIC HEART DISEASE OF NATIVE CORONARY ARTERY WITHOUT ANGINA PECTORIS: ICD-10-CM

## 2024-04-25 DIAGNOSIS — K57.30 DIVERTICULOSIS OF LARGE INTESTINE WITHOUT PERFORATION OR ABSCESS WITHOUT BLEEDING: ICD-10-CM

## 2024-04-25 DIAGNOSIS — Z86.010 PERSONAL HISTORY OF COLONIC POLYPS: ICD-10-CM

## 2024-04-25 DIAGNOSIS — K44.9 DIAPHRAGMATIC HERNIA WITHOUT OBSTRUCTION OR GANGRENE: ICD-10-CM

## 2024-05-01 NOTE — DISCUSSION/SUMMARY
[Pacemaker Function Normal] : normal pacemaker function [FreeTextEntry1] : 78 year old male with HTN, HLD, aortic aneurysm thoracic, AVR and mitral valve repair in 2014, paroxysmal atrial fibrillation, PVCs and ICD s/p recent PVC ablation, who had an appropriate ICD shock now s/p upgrade to BiV ICD (EF from 40% to 15-20% with pacing dependance), who presents for follow up s/p Barostim implant 11/6/23.   #CHF GDMT NYHA Class II symptoms LVEF 10-15% (10/2023)  #Anticoagulation Maintained on eliquis 5mg BID for PAF  #VT Normal ICD function  No therapies  #Amiodarone Will take 200mg  3x per week due to gait changes   #Barostim Presents for uptitration s/p implant 11/6 Uptitrated to 2.6 ma @65 pulse width today  No adverse reactions noted, esophagram within normal limits (patient endorses jaw tightness and changes to swallowing) Efforts to change frequency, amplitude and pulse width, patient at 2.8 amp   We had a long discussion regarding Barostim therapy, the way the technology works and set clear expectations regarding improvement. Discussed no set number for Barostim device that needs to be reached to achieve therapeutic benefit. Discussed how improvement with heart failure can mean no further clinical deterioration. Discussed how therapy is intended to improve quality of life and reduce heart failure exacerbations requiring re admissions. Patient knows to call with any questions or concerns. Will followup in 3 months.

## 2024-05-01 NOTE — PROCEDURE
[No] : not [NSR] : normal sinus rhythm [ICD] : Implantable cardioverter-defibrillator [DDDR] : DDDR [Threshold Testing Performed] : Threshold testing was performed [None] : none [de-identified] : very long AV delay [de-identified] : [place] [de-identified] : [place] [de-identified] : [id number] [de-identified] : 2021 [de-identified] :  [de-identified] : 4.6 years  [de-identified] :  \par  AV delay 250   DDD 50\par  changed to DDD 70 AV delay 250 [de-identified] : see paceart No new events

## 2024-05-01 NOTE — ASSESSMENT
[FreeTextEntry1] : Upitration 11/21/23 /76 Amp 1  /88   Uptitration 12/20/23 (6 weeks post) /84 Amp 1.4 pulse width 80  Uptitration 2/7/2024 /66 Amp 1.8 (feels stimulation in neck and reports tongue feels heavy, tightness in jaw)  BP 95/63 Amp 2.0 pulse width 80 /64 Amp 2.2 pulse width 65  Uptitration 2/21/24 /80 Amp 2.6  Uptitration 4/24/24 .81 Amp 2.8 pulse width 65  Uptitration stopped due to stimulation and speech changes Efforts to change frequency, amplitude and pulse width  If change to frequency could be oversensing from ICD requiring re-programming

## 2024-05-01 NOTE — HISTORY OF PRESENT ILLNESS
[Palpitations] : no palpitations [SOB] : no dyspnea [Syncope] : no syncope [Dizziness] : no dizziness [Chest Pain] : no chest pain or discomfort [Shoulder Pain] : no shoulder pain [Pain at Site] : no pain at device site [Erythema at Site] : no erythema at device site [Swelling at Site] : no swelling at device site [FreeTextEntry1] : 78 year old male with HTN, HLD, aortic aneurysm thoracic, AVR and mitral valve repair in 2014, paroxysmal atrial fibrillation, PVCs and ICD s/p recent PVC ablation, who had an appropriate ICD shock now s/p upgrade to BiV ICD (EF from 40% to 15-20% with pacing dependance), who presents for follow up s/p Barostim implant 11/6/23.   He states that he had a ICD placed after his AVR.  He followed up with Dr. Palencia; whose office moved and he transferred care here.  He was on Sotalol for history of PVCs and  NSVT; however he had short bursts of VT/torsades and it was felt the sotalol was proarrhythmic and stopped.  He was placed on Metoprolol.     He was admitted to [facility] 9/2019 with syncope correlated with VT and Vfib.  No therapy needed as he spontaneously converted.  One episode of afib, but overall burden extremely low.  Cath with mildly obstructive CAD.  He also underwent an EP study with PVC ablation from great cardiac vein.  He was discharged on Mexiletine (now off).     7/2021 he was admitted to an OSH with ICD shock.  He was discharged on amiodarone load which he self discontinued.  EF was down and he underwent a BiV upgrade.    He was seen in the office and is now on Amiodarone, Metoprolol, Eliquis and optimal heart failure medication. Patient endorsed muscle stimulation with Barostim uptitration. Patient reports esophagram normal, still reports he still feels weakness in muscles in jaw from nerve stim and speech changes.   He denies any SOB, edema, palpitations, syncope, near syncope.  He is compliant with all of his medications including eliquis and amiodarone. He states he can walk 2 blocks before stopping due to fatigue and dyspnea. Denies orthopnea or lower extremity edema. Denies dizziness or lightheadedness.   TTE 10/2023: LV 6.4 cm, LVEF 10-15%, LVOT VTI 10 cm, normal RV size/function, no significant valvular disease  JIGAR 12/2022: severe LV dysfunction, posterior MVL tethering with moderate MR and mean gradient 2 mmHg, well functioning bioAVR, mild-mod TR, moderate PI  TTE 11/2022: LV 6.7 cm, LVEF 20-25%, mild concentric LVH, bioAVR with minimal AI, mild RV dysfunction, mod-severe valvular MR (posterior leaflet appears frozen) and mean gradient 8 mmHg, moderate TR, at least moderate PI, estimated PASP 53 mmHg, dilated aorta 4.6 cm at sinus of Valsalva Echo 5/9/2022 EF 25-30% Echo 8/2021 EF 25-30% Echo 1/2021 EF 35-40% Echo 11/2019 EF 55-60%

## 2024-05-10 RX ORDER — AMIODARONE HYDROCHLORIDE 200 MG/1
200 TABLET ORAL DAILY
Qty: 45 | Refills: 3 | Status: ACTIVE | COMMUNITY
Start: 2021-07-07 | End: 1900-01-01

## 2024-05-10 RX ORDER — HYDRALAZINE HYDROCHLORIDE 50 MG/1
50 TABLET ORAL 3 TIMES DAILY
Qty: 270 | Refills: 0 | Status: ACTIVE | COMMUNITY
Start: 2023-06-12 | End: 1900-01-01

## 2024-05-10 RX ORDER — METOPROLOL SUCCINATE 100 MG/1
100 TABLET, EXTENDED RELEASE ORAL
Qty: 180 | Refills: 3 | Status: ACTIVE | COMMUNITY
Start: 2019-05-22 | End: 1900-01-01

## 2024-05-10 RX ORDER — ISOSORBIDE DINITRATE 20 MG/1
20 TABLET ORAL
Qty: 270 | Refills: 3 | Status: ACTIVE | COMMUNITY
Start: 2023-06-12 | End: 1900-01-01

## 2024-05-10 RX ORDER — APIXABAN 5 MG/1
5 TABLET, FILM COATED ORAL
Qty: 180 | Refills: 3 | Status: ACTIVE | COMMUNITY
Start: 2021-09-08 | End: 1900-01-01

## 2024-05-15 ENCOUNTER — APPOINTMENT (OUTPATIENT)
Dept: NEPHROLOGY | Facility: CLINIC | Age: 79
End: 2024-05-15
Payer: MEDICARE

## 2024-05-15 VITALS — BODY MASS INDEX: 26.98 KG/M2 | WEIGHT: 188 LBS

## 2024-05-15 VITALS — SYSTOLIC BLOOD PRESSURE: 110 MMHG | HEART RATE: 70 BPM | DIASTOLIC BLOOD PRESSURE: 70 MMHG

## 2024-05-15 DIAGNOSIS — E55.9 VITAMIN D DEFICIENCY, UNSPECIFIED: ICD-10-CM

## 2024-05-15 DIAGNOSIS — I10 ESSENTIAL (PRIMARY) HYPERTENSION: ICD-10-CM

## 2024-05-15 DIAGNOSIS — R79.89 OTHER SPECIFIED ABNORMAL FINDINGS OF BLOOD CHEMISTRY: ICD-10-CM

## 2024-05-15 DIAGNOSIS — E87.5 HYPERKALEMIA: ICD-10-CM

## 2024-05-15 DIAGNOSIS — N18.31 CHRONIC KIDNEY DISEASE, STAGE 3A: ICD-10-CM

## 2024-05-15 PROCEDURE — 36415 COLL VENOUS BLD VENIPUNCTURE: CPT

## 2024-05-15 PROCEDURE — 99214 OFFICE O/P EST MOD 30 MIN: CPT

## 2024-05-15 PROCEDURE — G2211 COMPLEX E/M VISIT ADD ON: CPT

## 2024-05-15 NOTE — ASSESSMENT
[FreeTextEntry1] : -- # Hyperkalemia. * Striict low K diet. * Cont lower dose entresto. * Now off veltassa.  # HTN controlled. No lightheadedness. * Cont metoprolol, torsemide, entresto, hydralazine. * The patient's blood pressure was checked with the [employer] [id number] using the SPRINT trial protocol after sitting quietly in an empty room with arm supported, back supported, and feet on the floor for 5 minutes. The average of 3 readings were taken. * A counseling information sheet on blood pressure and staying healthy has been given (which they have been instructed to read). * The patient has been counseled to check their BP at home with an automatic arm cuff, write down the readings, and reach me directly on the phone immediately if they are persistently > 180 systolic or if SBP is less than 100 or if lightheadedness develops. They were counseled to bring in all blood pressure readings and medications next visit. * The patient has been counseled that regular office follow-up (at least every 2 months for now) is important for monitoring and for their health, and that it is their responsibility to make follow up appointments. * The patient also has been counseled that they must never stop or change any medications without discussing this with me (or another physician).  # CKD stage 3 likeliest due to type 2 cardiorenal syndrome and aging, possible DM nephropathy. * Recheck labs. * Will avoid MRA given previous hyperkalemia. * Therapies for kidney disease: blood pressure control; proteinuria reduction with ARB/ACEi; SGLT2i; other evidence-based therapies recommended including exercise, a plant-based lower oxalate diet, and 400 mcg folic acid daily * Cardiovascular disease prevention: counseling on healthy diet, physical activity, weight loss, alcohol limitation, blood pressure control; cardiology evaluation/followup advised * A counseling information sheet on CKD has been given (which they have been instructed to read). * The patient has been counseled that chronic kidney disease is a significant condition and regular office follow-up with me (at least every 2 months for now) is important for monitoring and their health, and that it is their responsibility to make a follow-up appointment. * The patient has been counseled never to stop taking their medications without discussing it with me or another doctor. * The patient has been counseled on avoiding NSAIDs. * The patient has been counseled on risk of worsening kidney function and instructed to immediately call and speak with me and go immediately to ER with any severe symptoms, nausea, vomiting, diarrhea, chest pain, or shortness of breath.   # Hyperchol. * Cardiology managing. Inability to tolerate statins.  # Vitamin D def. * Recheck.

## 2024-05-15 NOTE — HISTORY OF PRESENT ILLNESS
[FreeTextEntry1] : Kindly referred by Dr. Sandhya Bloom elevated creatinine. [employer].  * BP controlled. BP 110s at home. No SOB with exertion. No CP. No lightheadedness. Compliant with medications. * K 5 off veltassa. * CKD relatively, eGFR 36 (avg. of CKD-EPIcr & CKD-EPIcys).   Previous history (18Mar24): * Rx for syphillis (identified by derm due to skin rash). * K increased to 4.6 off veltassa. * CKD stable, creatinine 1.36 2 months ago. * BP controlled. BP 110s at home. SOB with moderate exertion. No CP. No lightheadedness. Compliant with medications.   Previous history (10Jan24): K decresed to 3.2. Veltassa held. * CKD stable, creatinine 1.63. * BP controlled.  BP 110s at home. No lightheadedness. No CP. Compliant with medications. He has had longstanding atypical chest pain (? GERD) since 2014 which has not changed. None currently.  Chronic SOB with moderate exertion unchanged. *  He notes strong odor when he burps. Notes occasional "orange" smell in urine. No asparagus. No dysuria. He has seen Dr. Aldrich and Dr. Cortez.   * Entresto restart at lower dose with veltassa.  Previous history (06Dec23): * K 6.1. He denies intake of high K foods. Entresto was stopped. * Elevated LDL. He says he is not taking pravstatin because of difficulty walking. * CKD stable, creatinine 1.83.   Previous history (04Dec23): * eGFR 40 (avg. of CKD-EPIcr & CKD-EPIcys). No albuminuria. * Following up with Dr. Hanson for monoclonal gammopathy. * Following up with cardiologist. * Working as . * K 4.9 on low K diet. * BP controlled. BP 100s at home. No lightheadedness. No CP/SOB. Compliant with medications. * Per Dr. Chandler: "HLD: on pravastatin 40mg, had muscle aches on 80mg,  8/2017, pt reported he was only taking it once in a while, now taking it daily will repeat lipid panel. Diet and exercise discussed in detail.  Feb 2018,  July 2020. starting CRESTOR 10 qod, pt off it". * Occasional urinary hesitancy.   Previous history (17Aug23): * Creatinine 2.03 in 26Jul (eGFR 33 by CKD-EPI). No [place]. * BP controlled. BP 110s at home. Not < 100. No lightheadedness. No CP/SOB at rest. Compliant with medications. * Following up with Dr. Hanson for monoclonal gammopathy. * Following up with cardiologist. * Working as . * K 4.9 on low K diet.   Previous history (25Apr23): * HTN controlled. BP 110s at home. No lightheadedness. No CP. No SOB at rest. Compliant with medications.   * Following up with Dr. Hanson for monoclonal gammopathy. * Chronic slight SOB. Following up closely with cardiologist.  * CKD stable, creatinine 1.7 in April 4. * Hyperkalemia controlled.   Previous history (24Jan23): * CKD stable, creatinine 1.8. * K 5.4. Counseled on following low K diet. He occsaionally eats bananas.  * HTN controlled. No lightheadedness. No CP. Compliant with medications.  Following up with Dr. Hanson for monoclonal gammopathy. * Chronic slight SOB. Following up closely with cardiologist.   Previous history (18Nov22): * CKD stable, creatinine 1.7 on Sep 21. . * 13 - 15 mg albuminuria. * * HTN controlled. No lightheadedness.  Compliant with medications. * Following up with Dr. Hanson for monoclonal gammopathy. * CP/SOB now significantly improved, none currnetly. Following up closely with cardiologist.   Previous history (18Aug22): * CKD stable, creatinine 1.87. 55 mg albuminuria. * HTN controlled. No lightheadedness. Compliant with medications.  Following up with Dr. Hanson for monoclonal gammopathy. * Hyperkalemia controlled. * Rash on legs. Seeing allergist. * Pneumonia on 5/21 - 5/22. * Persistent SOB/CP with minimal to moderate exertion. Per patient, this is unchanged and cardiologist is aware.   Previous history (19May22): * CKD stable, creatinine 1.87. * HTN controlled. No lightheadedness. No CP/SOB. Compliant with medications.  Following up with Dr. Hanson for monoclonal gammopathy. * Hyperkalemia controlled. * He has urinary urgency for several weeks. No dysuria. 5x nocturia.   Previous history (22Feb22): * HTN controlled.  - 120s / 80s at home. No lightheadedness. Compliant with medications. 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 * CKD progressive, creatinine 1.91. Albuminuria decreased to 48. * Occasional chronic abd pain being evaluated, none currently.   Previous history (06Dec21): * CKD stable, creatinine 1.51.  Albuminuria decreased to 91 mg from 3280 mg (?). Farxiga 10 started. 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 * HTN controlled. BP 120s at home. No lightheadedness. Compliant with medications.   Previous history (10Nov21): * HTN controlled. BP 110s at home. No lightheadedness. Compliant with medications. * 3280 mg albuminuria. * CKD stable, creatinine 1.45. 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   Previous history (09Sep21): * Events reviewed. Admitted to [facility] for CHF exacerbation and ICD upgrade. EF < 23 - 30.* CKD stable, creatinine 1.55.  Labs from 9/7 reviewed. Creatinine 1.45. K 4. 298 mg albuminuria.  * Hyperkalemia controlled. Not on lokelma or veltassa.  * HTN controlled. /80s at home. No lightheadedness. Compliant with medications. 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   Previous history (31Aug21): * Dx with Covid 3 weeks ago. Now no cough, or other symptoms. Did not require hospitalization. He has SOB with walking walking 1 block. * Lokelma started for hyperkalemia (K of 6). He is now following low potassium diet. . * CKD stable, creatinine 1.86. * He had stopped torsemide. BP 100s.   Previous history (03Aug21): Labs reviewed. Baseline eGFR 43 - 56 since 2019. On [id number], his creatinine increased to 1.82 (eGFR 35 - 41). The patient denies exposure to chronic NSAIDs, chronic PPIs, creatine, or herbal supplements. The patient denies a history of kidney stones or pyelonephritis. 4 - 5 times. No recent renal ultrasound. They are unaware of proteinuria or hematuria.  EF 23% in 4/21. On amiodarone, metoprolol, lasix 20 qod, HCTZ twice weekly valsartan 40. SOB with one block walking. Stable LE edema. ProBNP incresaed from 515 to 1360.  * HTN controlled.  No lightheadedness. Compliant with medications. He believes he is taking amlodipine.

## 2024-05-22 RX ORDER — BEXAGLIFLOZIN 20 MG/1
20 TABLET ORAL
Qty: 90 | Refills: 3 | Status: ACTIVE | COMMUNITY
Start: 2024-05-22 | End: 1900-01-01

## 2024-05-25 LAB
25(OH)D3 SERPL-MCNC: 51.9 NG/ML
ALBUMIN SERPL ELPH-MCNC: 4.2 G/DL
ALP BLD-CCNC: 60 U/L
ALT SERPL-CCNC: 10 U/L
ANION GAP SERPL CALC-SCNC: 12 MMOL/L
AST SERPL-CCNC: 22 U/L
BASOPHILS # BLD AUTO: 0.02 K/UL
BASOPHILS NFR BLD AUTO: 0.3 %
BILIRUB SERPL-MCNC: 0.4 MG/DL
BUN SERPL-MCNC: 35 MG/DL
CALCIUM SERPL-MCNC: 9.5 MG/DL
CHLORIDE SERPL-SCNC: 104 MMOL/L
CO2 SERPL-SCNC: 25 MMOL/L
CREAT SERPL-MCNC: 1.79 MG/DL
CYSTATIN C SERPL-MCNC: 1.53 MG/L
EGFR: 38 ML/MIN/1.73M2
EOSINOPHIL # BLD AUTO: 0.03 K/UL
EOSINOPHIL NFR BLD AUTO: 0.5 %
GFR/BSA.PRED SERPLBLD CYS-BASED-ARV: 41 ML/MIN/1.73M2
GLUCOSE SERPL-MCNC: 101 MG/DL
HCT VFR BLD CALC: 45.2 %
HGB BLD-MCNC: 14 G/DL
IMM GRANULOCYTES NFR BLD AUTO: 0.2 %
LYMPHOCYTES # BLD AUTO: 2.3 K/UL
LYMPHOCYTES NFR BLD AUTO: 40.2 %
MAGNESIUM SERPL-MCNC: 2.4 MG/DL
MAN DIFF?: NORMAL
MCHC RBC-ENTMCNC: 31 GM/DL
MCHC RBC-ENTMCNC: 32 PG
MCV RBC AUTO: 103.2 FL
MONOCYTES # BLD AUTO: 0.51 K/UL
MONOCYTES NFR BLD AUTO: 8.9 %
NEUTROPHILS # BLD AUTO: 2.85 K/UL
NEUTROPHILS NFR BLD AUTO: 49.9 %
PHOSPHATE SERPL-MCNC: 3.8 MG/DL
PLATELET # BLD AUTO: 291 K/UL
POTASSIUM SERPL-SCNC: 5.3 MMOL/L
PROT SERPL-MCNC: 7.9 G/DL
RBC # BLD: 4.38 M/UL
RBC # FLD: 15 %
SODIUM SERPL-SCNC: 141 MMOL/L
VIT B12 SERPL-MCNC: 347 PG/ML
WBC # FLD AUTO: 5.72 K/UL

## 2024-06-26 ENCOUNTER — NON-APPOINTMENT (OUTPATIENT)
Age: 79
End: 2024-06-26

## 2024-06-26 ENCOUNTER — APPOINTMENT (OUTPATIENT)
Dept: HEART AND VASCULAR | Facility: CLINIC | Age: 79
End: 2024-06-26
Payer: COMMERCIAL

## 2024-06-26 VITALS
DIASTOLIC BLOOD PRESSURE: 68 MMHG | WEIGHT: 187.38 LBS | BODY MASS INDEX: 26.82 KG/M2 | HEART RATE: 70 BPM | SYSTOLIC BLOOD PRESSURE: 115 MMHG | HEIGHT: 70 IN

## 2024-06-26 DIAGNOSIS — I50.22 CHRONIC SYSTOLIC (CONGESTIVE) HEART FAILURE: ICD-10-CM

## 2024-06-26 PROCEDURE — 99213 OFFICE O/P EST LOW 20 MIN: CPT | Mod: 25

## 2024-06-26 PROCEDURE — 93284 PRGRMG EVAL IMPLANTABLE DFB: CPT

## 2024-07-09 DIAGNOSIS — R07.89 OTHER CHEST PAIN: ICD-10-CM

## 2024-07-12 ENCOUNTER — APPOINTMENT (OUTPATIENT)
Dept: HEART AND VASCULAR | Facility: CLINIC | Age: 79
End: 2024-07-12
Payer: COMMERCIAL

## 2024-07-12 PROCEDURE — 36415 COLL VENOUS BLD VENIPUNCTURE: CPT

## 2024-07-15 ENCOUNTER — APPOINTMENT (OUTPATIENT)
Dept: NEPHROLOGY | Facility: CLINIC | Age: 79
End: 2024-07-15
Payer: COMMERCIAL

## 2024-07-15 VITALS — SYSTOLIC BLOOD PRESSURE: 97 MMHG | DIASTOLIC BLOOD PRESSURE: 78 MMHG

## 2024-07-15 VITALS — WEIGHT: 185 LBS | BODY MASS INDEX: 26.54 KG/M2

## 2024-07-15 DIAGNOSIS — R80.9 PROTEINURIA, UNSPECIFIED: ICD-10-CM

## 2024-07-15 DIAGNOSIS — R79.89 OTHER SPECIFIED ABNORMAL FINDINGS OF BLOOD CHEMISTRY: ICD-10-CM

## 2024-07-15 DIAGNOSIS — E87.5 HYPERKALEMIA: ICD-10-CM

## 2024-07-15 DIAGNOSIS — D47.2 MONOCLONAL GAMMOPATHY: ICD-10-CM

## 2024-07-15 DIAGNOSIS — R79.9 ABNORMAL FINDING OF BLOOD CHEMISTRY, UNSPECIFIED: ICD-10-CM

## 2024-07-15 DIAGNOSIS — I50.22 CHRONIC SYSTOLIC (CONGESTIVE) HEART FAILURE: ICD-10-CM

## 2024-07-15 DIAGNOSIS — N18.31 CHRONIC KIDNEY DISEASE, STAGE 3A: ICD-10-CM

## 2024-07-15 LAB
HDLC SERPL-MCNC: 71 MG/DL
LDLC SERPL CALC-MCNC: 115 MG/DL
NONHDLC SERPL-MCNC: 127 MG/DL
TRIGL SERPL-MCNC: 66 MG/DL

## 2024-07-15 PROCEDURE — 99214 OFFICE O/P EST MOD 30 MIN: CPT | Mod: 25

## 2024-07-15 PROCEDURE — 36415 COLL VENOUS BLD VENIPUNCTURE: CPT

## 2024-07-16 LAB
25(OH)D3 SERPL-MCNC: 34.7 NG/ML
ALBUMIN SERPL ELPH-MCNC: 4.3 G/DL
ALP BLD-CCNC: 57 U/L
ALT SERPL-CCNC: 8 U/L
ANION GAP SERPL CALC-SCNC: 12 MMOL/L
APPEARANCE: CLEAR
AST SERPL-CCNC: 16 U/L
BASOPHILS # BLD AUTO: 0.01 K/UL
BASOPHILS NFR BLD AUTO: 0.2 %
BILIRUB SERPL-MCNC: 0.4 MG/DL
BILIRUBIN URINE: NEGATIVE
BLOOD URINE: NEGATIVE
BUN SERPL-MCNC: 26 MG/DL
CALCIUM SERPL-MCNC: 9.4 MG/DL
CHLORIDE SERPL-SCNC: 105 MMOL/L
CO2 SERPL-SCNC: 24 MMOL/L
COLOR: YELLOW
CREAT SERPL-MCNC: 1.61 MG/DL
CREAT SPEC-SCNC: 194 MG/DL
CYSTATIN C SERPL-MCNC: 1.6 MG/L
EGFR: 43 ML/MIN/1.73M2
EOSINOPHIL # BLD AUTO: 0.02 K/UL
EOSINOPHIL NFR BLD AUTO: 0.4 %
GFR/BSA.PRED SERPLBLD CYS-BASED-ARV: 39 ML/MIN/1.73M2
GLUCOSE QUALITATIVE U: >=1000 MG/DL
GLUCOSE SERPL-MCNC: 89 MG/DL
HCT VFR BLD CALC: 44.2 %
HGB BLD-MCNC: 13.9 G/DL
IMM GRANULOCYTES NFR BLD AUTO: 0.2 %
KETONES URINE: NEGATIVE MG/DL
LEUKOCYTE ESTERASE URINE: NEGATIVE
LYMPHOCYTES # BLD AUTO: 2.08 K/UL
LYMPHOCYTES NFR BLD AUTO: 42.7 %
MAGNESIUM SERPL-MCNC: 2.3 MG/DL
MAN DIFF?: NORMAL
MCHC RBC-ENTMCNC: 31.4 GM/DL
MCHC RBC-ENTMCNC: 32.6 PG
MCV RBC AUTO: 103.8 FL
MICROALBUMIN 24H UR DL<=1MG/L-MCNC: 1.3 MG/DL
MICROALBUMIN/CREAT 24H UR-RTO: 6 MG/G
MONOCYTES # BLD AUTO: 0.6 K/UL
MONOCYTES NFR BLD AUTO: 12.3 %
NEUTROPHILS # BLD AUTO: 2.15 K/UL
NEUTROPHILS NFR BLD AUTO: 44.2 %
NITRITE URINE: NEGATIVE
PH URINE: 5.5
PHOSPHATE SERPL-MCNC: 4 MG/DL
PLATELET # BLD AUTO: 236 K/UL
POTASSIUM SERPL-SCNC: 4.6 MMOL/L
PROT SERPL-MCNC: 7.8 G/DL
PROTEIN URINE: NORMAL MG/DL
RBC # BLD: 4.26 M/UL
RBC # FLD: 15.4 %
SODIUM SERPL-SCNC: 142 MMOL/L
SPECIFIC GRAVITY URINE: 1.03
UROBILINOGEN URINE: 0.2 MG/DL
VIT B12 SERPL-MCNC: 828 PG/ML
WBC # FLD AUTO: 4.87 K/UL

## 2024-07-18 RX ORDER — RIVAROXABAN 20 MG/1
20 TABLET, FILM COATED ORAL
Qty: 90 | Refills: 1 | Status: ACTIVE | COMMUNITY
Start: 2024-07-18 | End: 1900-01-01

## 2024-08-04 NOTE — ED ADULT NURSE NOTE - CARDIO ASSESSMENT
Preventive Care Visit  [facility]  Vicente Tomlin MD, Family Medicine  Jul 17, 2024    Subjective   Agustin is a 75 year old, presenting for the following:  Wellness Visit    HPI    Agustin is a 75-year-old here today for his Medicare annual wellness visit, subsequent.    Overall, he is doing quite well.  He has been monitoring his blood pressure extremely carefully.  His morning readings average 124/84 and his afternoon readings average 130/84.    In addition, he has been checking his blood sugar readings.  They have been averaging 140 7 in the morning.  Typically, they are up to 160 8 in the evening and averaging about 157.    He has a history of prostate cancer and is status post prostatectomy.  His previous PSA tumor marker postop was less than 0.04.  He is very pleased with that.    He is due for an eye exam and will get that scheduled.  He is wearing hearing aids and they seem to be working fairly well.          7/15/2024   General Health   How would you rate your overall physical health? Good   Feel stress (tense, anxious, or unable to sleep) Not at all            7/15/2024   Nutrition   Diet: Carbohydrate counting            7/15/2024   Exercise   Days per week of moderate/strenous exercise 0 days   Average minutes spent exercising at this level 0 min            7/15/2024   Social Factors   Frequency of gathering with friends or relatives Twice a week   Worry food won't last until get money to buy more Patient declined   Food not last or not have enough money for food? Patient declined   Do you have housing? (Housing is defined as stable permanent housing and does not include staying ouside in a car, in a tent, in an abandoned building, in an overnight shelter, or couch-surfing.) Patient declined   Are you worried about losing your housing? Patient declined   Lack of transportation? Patient declined   Unable to get utilities (heat,electricity)? Patient declined          7/15/2024   Activities of Daily  Living- Home Safety   Needs help with the following daily activites None of the above   Safety concerns in the home None of the above            7/15/2024   Dental   Dentist two times every year? Yes            7/15/2024   Hearing Screening   Hearing concerns? (!) I FEEL THAT PEOPLE ARE MUMBLING OR NOT SPEAKING CLEARLY.    (!) I NEED TO ASK PEOPLE TO SPEAK UP OR REPEAT THEMSELVES.    (!) IT'S HARD TO FOLLOW A CONVERSATION IN A NOISY RESTAURANT OR CROWDED ROOM.    (!) TROUBLE UNDESTANDING A SPEAKER IN A PUBLIC MEETING OR [facility].    (!) TROUBLE UNDERSTANDING SOFT OR WHISPERED SPEECH.    (!) TROUBLE UNDERSTANDING SPEECH ON THE TELEPHONE       Multiple values from one day are sorted in reverse-chronological order           7/15/2024   Urinary Incontinence Screening   Bothered by leaking urine in past 6 months Yes            7/15/2024   TB Screening   Were you born outside of the [place]? No      Today's PHQ-2 Score:       7/17/2024    12:51 PM   PHQ-2 ( 1999 [employer])   Q1: Little interest or pleasure in doing things 0   Q2: Feeling down, depressed or hopeless 0   PHQ-2 Score 0   Q1: Little interest or pleasure in doing things Not at all   Q2: Feeling down, depressed or hopeless Not at all   PHQ-2 Score 0           7/15/2024   Substance Use   Alcohol more than 3/day or more than 7/wk No   Do you have a current opioid prescription? No   How severe/bad is pain from 1 to 10? 2/10   Do you use any other substances recreationally? No        Social History     Tobacco Use    Smoking status: Never    Smokeless tobacco: Never   Substance Use Topics    Alcohol use: Yes     Alcohol/week: 1.0 standard drink of alcohol     Types: 1 Standard drinks or equivalent per week     Comment: occasional    Drug use: No     History reviewed. No pertinent family history.    ASCVD Risk   The 10-year ASCVD risk score (Altaf SILVEIRA, et al., 2019) is: 57.5%    Values used to calculate the score:      Age: 75 years      Sex: Male      Is  "Non- : No      Diabetic: Yes      Tobacco smoker: No      Systolic Blood Pressure: 152 mmHg      Is BP treated: No      HDL Cholesterol: 42 mg/dL      Total Cholesterol: 169 mg/dL    The following health maintenance items are reviewed in Epic and correct as of today:  Health Maintenance   Topic Date Due    ADVANCE CARE PLANNING  Never done    RSV VACCINE (Pregnancy & 60+) (1 - 1-dose 60+ series) Never done    ZOSTER IMMUNIZATION (2 of 3) 03/24/2014    DTAP/TDAP/TD IMMUNIZATION (2 - Td or Tdap) 03/08/2022    COVID-19 Vaccine (1 - 2023-24 season) Never done    EYE EXAM  02/28/2024    INFLUENZA VACCINE (1) 09/01/2024    A1C  01/17/2025    MEDICARE ANNUAL WELLNESS VISIT  07/17/2025    BMP  07/17/2025    LIPID  07/17/2025    MICROALBUMIN  07/17/2025    DIABETIC FOOT EXAM  07/17/2025    FALL RISK ASSESSMENT  07/17/2025    HEPATITIS C SCREENING  Completed    PHQ-2 (once per calendar year)  Completed    Pneumococcal Vaccine: 65+ Years  Completed    IPV IMMUNIZATION  Aged Out    HPV IMMUNIZATION  Aged Out    MENINGITIS IMMUNIZATION  Aged Out    RSV MONOCLONAL ANTIBODY  Aged Out    COLORECTAL CANCER SCREENING  Discontinued     Review of Systems  Constitutional, neuro, ENT, endocrine, pulmonary, cardiac, gastrointestinal, genitourinary, musculoskeletal, integument and psychiatric systems are negative, except as otherwise noted.     Objective    Exam  BP (!) 152/90   Pulse 79   Temp 98.1  F (36.7  C)   Ht 1.758 m (5' 9.21\")   Wt 82.6 kg (182 lb 0.6 oz)   SpO2 99%   BMI 26.72 kg/m       Estimated body mass index is 26.72 kg/m  as calculated from the following:    Height as of this encounter: 1.758 m (5' 9.21\").    Weight as of this encounter: 82.6 kg (182 lb 0.6 oz).    Physical Exam  GENERAL: alert and no distress  EYES: Eyes grossly normal to inspection, PERRL and conjunctivae and sclerae normal  HENT: ear canals and TM's normal, nose and mouth without ulcers or lesions  NECK: no adenopathy, no " asymmetry, masses, or scars  RESP: lungs clear to auscultation - no rales, rhonchi or wheezes  CV: regular rate and rhythm, normal S1 S2, no S3 or S4, no murmur, click or rub, no peripheral edema  MS: no gross musculoskeletal defects noted, no edema.    Monofilament testing of the feet revealed normal sensation in all 10 toes.  SKIN: no suspicious lesions or rashes  NEURO: Normal strength and tone, mentation intact and speech normal  PSYCH: mentation appears normal, affect normal/bright    Laboratory studies: Lipid panel, ALT, A1c, BMP, and urine microalbumin, all pending      Assessment/Plan:    Agustin is a 75-year-old here today for his Medicare annual wellness visit, subsequent.  He is doing well and is up-to-date with almost all healthcare maintenance strategies with 1 exception: He technically could receive a number of immunizations.  He declines them.    Type 2 diabetes, without complication, without long-term current use of insulin.  Likely at its optimal care as he is on aspirin daily, A1c should be less than 8%; blood pressure is well under 140/90; he does not smoke, and his LDL will likely be less than 100.    He is due for an eye exam and he will make sure that I get the report regarding that.    Recent surgery for prostate cancer.  PSA tumor marker undetectably low.  Agustin will follow-up with urology.    I look forward to seeing Agustin back in approximately 6 months for his next diabetic follow-up.      Signed Electronically by: Vicente Tomlin MD   ---

## 2024-08-06 ENCOUNTER — APPOINTMENT (OUTPATIENT)
Dept: INTERNAL MEDICINE | Facility: CLINIC | Age: 79
End: 2024-08-06

## 2024-08-06 ENCOUNTER — LABORATORY RESULT (OUTPATIENT)
Age: 79
End: 2024-08-06

## 2024-08-06 ENCOUNTER — APPOINTMENT (OUTPATIENT)
Dept: OTOLARYNGOLOGY | Facility: CLINIC | Age: 79
End: 2024-08-06

## 2024-08-06 ENCOUNTER — OUTPATIENT (OUTPATIENT)
Dept: OUTPATIENT SERVICES | Facility: HOSPITAL | Age: 79
LOS: 1 days | End: 2024-08-06

## 2024-08-06 DIAGNOSIS — Z98.89 OTHER SPECIFIED POSTPROCEDURAL STATES: Chronic | ICD-10-CM

## 2024-08-06 PROBLEM — Z01.818 PREOP EXAMINATION: Status: RESOLVED | Noted: 2019-11-20 | Resolved: 2024-08-06

## 2024-08-06 PROBLEM — R09.82 POSTNASAL DRIP: Status: ACTIVE | Noted: 2024-08-06

## 2024-08-06 PROCEDURE — 99213 OFFICE O/P EST LOW 20 MIN: CPT

## 2024-08-06 PROCEDURE — 99212 OFFICE O/P EST SF 10 MIN: CPT

## 2024-08-07 DIAGNOSIS — A53.9 SYPHILIS, UNSPECIFIED: ICD-10-CM

## 2024-08-07 NOTE — PHYSICAL EXAM
[No Acute Distress] : no acute distress [Well Nourished] : well nourished [Well Developed] : well developed [Well-Appearing] : well-appearing [Normal Sclera/Conjunctiva] : normal sclera/conjunctiva [PERRL] : pupils equal round and reactive to light [EOMI] : extraocular movements intact [Normal Outer Ear/Nose] : the outer ears and nose were normal in appearance [Normal Oropharynx] : the oropharynx was normal [No JVD] : no jugular venous distention [Supple] : supple [No Lymphadenopathy] : no lymphadenopathy [Thyroid Normal, No Nodules] : the thyroid was normal and there were no nodules present [No Respiratory Distress] : no respiratory distress  [Clear to Auscultation] : lungs were clear to auscultation bilaterally [No Accessory Muscle Use] : no accessory muscle use [Normal Rate] : normal rate  [Regular Rhythm] : with a regular rhythm [Normal S1, S2] : normal S1 and S2 [No Murmur] : no murmur heard [No Carotid Bruits] : no carotid bruits [No Abdominal Bruit] : a ~M bruit was not heard ~T in the abdomen [No Varicosities] : no varicosities [Pedal Pulses Present] : the pedal pulses are present [No Edema] : there was no peripheral edema [No Palpable Aorta] : no palpable aorta [No Extremity Clubbing/Cyanosis] : no extremity clubbing/cyanosis [Soft] : abdomen soft [Non Tender] : non-tender [Non-distended] : non-distended [No Masses] : no abdominal mass palpated [No HSM] : no HSM [Normal Bowel Sounds] : normal bowel sounds [Normal Posterior Cervical Nodes] : no posterior cervical lymphadenopathy [Normal Anterior Cervical Nodes] : no anterior cervical lymphadenopathy [No CVA Tenderness] : no CVA  tenderness [No Spinal Tenderness] : no spinal tenderness [No Joint Swelling] : no joint swelling [Grossly Normal Strength/Tone] : grossly normal strength/tone [Coordination Grossly Intact] : coordination grossly intact [No Focal Deficits] : no focal deficits [Normal Gait] : normal gait [Deep Tendon Reflexes (DTR)] : deep tendon reflexes were 2+ and symmetric [Normal Affect] : the affect was normal [Normal Insight/Judgement] : insight and judgment were intact [de-identified] : No obvious goiter  [de-identified] : Grossly normal S1S2 with possible S3. + Sternotomy scar noted  [de-identified] : + Diffuse pink plaques, ~ 4-5cm in diameter on b/l lower extremities. Smaller hyperpigmented lesions on b/l arms and on chest.

## 2024-08-07 NOTE — END OF VISIT
[] : Resident [FreeTextEntry3] : Patient seen and examined by me in conjunction with resident physician.  Agree with chart as noted.

## 2024-08-07 NOTE — REVIEW OF SYSTEMS
[Negative] : Heme/Lymph [Itching] : itching [Skin Rash] : skin rash [FreeTextEntry9] : + Shooting pains in his L foot and toe

## 2024-08-07 NOTE — PHYSICAL EXAM
[No Acute Distress] : no acute distress [Well Nourished] : well nourished [Well Developed] : well developed [Well-Appearing] : well-appearing [Normal Sclera/Conjunctiva] : normal sclera/conjunctiva [PERRL] : pupils equal round and reactive to light [EOMI] : extraocular movements intact [Normal Outer Ear/Nose] : the outer ears and nose were normal in appearance [Normal Oropharynx] : the oropharynx was normal [No JVD] : no jugular venous distention [No Lymphadenopathy] : no lymphadenopathy [Supple] : supple [Thyroid Normal, No Nodules] : the thyroid was normal and there were no nodules present [No Respiratory Distress] : no respiratory distress  [No Accessory Muscle Use] : no accessory muscle use [Clear to Auscultation] : lungs were clear to auscultation bilaterally [Normal Rate] : normal rate  [Regular Rhythm] : with a regular rhythm [Normal S1, S2] : normal S1 and S2 [No Murmur] : no murmur heard [No Carotid Bruits] : no carotid bruits [No Abdominal Bruit] : a ~M bruit was not heard ~T in the abdomen [No Varicosities] : no varicosities [Pedal Pulses Present] : the pedal pulses are present [No Edema] : there was no peripheral edema [No Palpable Aorta] : no palpable aorta [No Extremity Clubbing/Cyanosis] : no extremity clubbing/cyanosis [Soft] : abdomen soft [Non Tender] : non-tender [Non-distended] : non-distended [No HSM] : no HSM [No Masses] : no abdominal mass palpated [Normal Posterior Cervical Nodes] : no posterior cervical lymphadenopathy [Normal Bowel Sounds] : normal bowel sounds [Normal Anterior Cervical Nodes] : no anterior cervical lymphadenopathy [No CVA Tenderness] : no CVA  tenderness [No Spinal Tenderness] : no spinal tenderness [No Joint Swelling] : no joint swelling [Grossly Normal Strength/Tone] : grossly normal strength/tone [Coordination Grossly Intact] : coordination grossly intact [No Focal Deficits] : no focal deficits [Normal Gait] : normal gait [Deep Tendon Reflexes (DTR)] : deep tendon reflexes were 2+ and symmetric [Normal Affect] : the affect was normal [Normal Insight/Judgement] : insight and judgment were intact [de-identified] : No obvious goiter  [de-identified] : Grossly normal S1S2 with possible S3. + Sternotomy scar noted  [de-identified] : + Diffuse pink plaques, ~ 4-5cm in diameter on b/l lower extremities. Smaller hyperpigmented lesions on b/l arms and on chest.

## 2024-08-07 NOTE — HISTORY OF PRESENT ILLNESS
[FreeTextEntry1] : Followup on Syphilis  [de-identified] : Mr. Kenney is a n 79 y/o M with a PMHx of HFrEF (LV 6.2 cm, LVEF 25-30%) s/p ICD, afib on Eliquis, CKD IIIb (Cr 1.8), aortic aneurysm, gastritis/GERD presenting today for followup visit on positive Syphilis test. Pt was referred by derm and his PCP Dr. Bloom for positive FTA titer on 2/20/2024 with negative RPR. Pt states he was never told he had syphilis, denies knowing if he was ever treated before, and states he was not sexually active since 2014. Pt received 3 injections of Penicillin in Feb. Now returns for followup visit. did not feel any different after inj. no new HAs, vision changes, hearing changes.  On ROS, pt complains of rash which has been present since 1/2024, improved with cream, now worsening with sun involvement. Pt also complains of shooting pains in his L great toe and foot. Pt also notes increased hyperpigmentation in his face and extremities. Pt has hx of CKD for which he follows Nephrology and has hx of ICM s/p Barostim in 11/2023. Compliant with all medications.

## 2024-08-07 NOTE — HISTORY OF PRESENT ILLNESS
[FreeTextEntry1] : Followup on Syphilis  [de-identified] : Mr. Kenney is a n 79 y/o M with a PMHx of HFrEF (LV 6.2 cm, LVEF 25-30%) s/p ICD, afib on Eliquis, CKD IIIb (Cr 1.8), aortic aneurysm, gastritis/GERD presenting today for followup visit on positive Syphilis test. Pt was referred by derm and his PCP Dr. Bloom for positive FTA titer on 2/20/2024 with negative RPR. Pt states he was never told he had syphilis, denies knowing if he was ever treated before, and states he was not sexually active since 2014. Pt received 3 injections of Penicillin in Feb. Now returns for followup visit. did not feel any different after inj. no new HAs, vision changes, hearing changes.  On ROS, pt complains of rash which has been present since 1/2024, improved with cream, now worsening with sun involvement. Pt also complains of shooting pains in his L great toe and foot. Pt also notes increased hyperpigmentation in his face and extremities. Pt has hx of CKD for which he follows Nephrology and has hx of ICM s/p Barostim in 11/2023. Compliant with all medications.

## 2024-08-21 ENCOUNTER — APPOINTMENT (OUTPATIENT)
Dept: DERMATOLOGY | Facility: CLINIC | Age: 79
End: 2024-08-21
Payer: MEDICARE

## 2024-08-21 DIAGNOSIS — Z12.83 ENCOUNTER FOR SCREENING FOR MALIGNANT NEOPLASM OF SKIN: ICD-10-CM

## 2024-08-21 DIAGNOSIS — L30.9 DERMATITIS, UNSPECIFIED: ICD-10-CM

## 2024-08-21 DIAGNOSIS — D22.9 MELANOCYTIC NEVI, UNSPECIFIED: ICD-10-CM

## 2024-08-21 PROCEDURE — 99214 OFFICE O/P EST MOD 30 MIN: CPT

## 2024-08-21 PROCEDURE — 99204 OFFICE O/P NEW MOD 45 MIN: CPT

## 2024-08-21 RX ORDER — TRIAMCINOLONE ACETONIDE 1 MG/G
0.1 CREAM TOPICAL
Qty: 1 | Refills: 1 | Status: ACTIVE | COMMUNITY
Start: 2024-08-21 | End: 1900-01-01

## 2024-09-02 NOTE — ED ADULT NURSE NOTE - NS PRO AD ANY ON CHART
Patient presents to the ED for 10/10 sharp, cramping intermittent abdominal pain that began this morning. Also reports vomiting that started an hour ago. Patient was recently seen here on 8/25 as a transfer from [facility]. At OSH CT abd/pel demonstrated gastric and small bowel distention, concerning for an ileus. CTA chest showed acute PE involving the lobar and segmental branches of bilateral lungs. Patient was discharged on the 8/30 from [facility].  
No

## 2024-09-04 RX ORDER — RIVAROXABAN 15 MG/1
15 TABLET, FILM COATED ORAL
Qty: 90 | Refills: 3 | Status: ACTIVE | COMMUNITY
Start: 2024-09-04 | End: 1900-01-01

## 2024-09-04 NOTE — PHYSICAL EXAM
normal mood with appropriate affect [General Appearance - Well Developed] : well developed [Normal Appearance] : normal appearance [Well Groomed] : well groomed [General Appearance - Well Nourished] : well nourished [No Deformities] : no deformities [General Appearance - In No Acute Distress] : no acute distress [] : no respiratory distress [Respiration, Rhythm And Depth] : normal respiratory rhythm and effort [Exaggerated Use Of Accessory Muscles For Inspiration] : no accessory muscle use [Left Infraclavicular] : left infraclavicular area [Clean] : clean [Dry] : dry [Well-Healed] : well-healed [5th Left ICS - MCL] : palpated at the 5th LICS in the midclavicular line [Normal Rate] : normal [Rhythm Regular] : regular [Normal S1] : normal S1 [Normal S2] : normal S2 [___ +] : [unfilled]U+ pitting edema to the right ankle [Normal Conjunctiva] : the conjunctiva exhibited no abnormalities [Abnormal Walk] : normal gait [Skin Color & Pigmentation] : normal skin color and pigmentation [Oriented To Time, Place, And Person] : oriented to person, place, and time [Affect] : the affect was normal [Mood] : the mood was normal [No Anxiety] : not feeling anxious [Auscultation Breath Sounds / Voice Sounds] : lungs were clear to auscultation bilaterally [Palpable Crepitus] : no palpable crepitus [Bleeding] : no active bleeding [Foul Odor] : no foul smell [Purulent Drainage] : no purulent drainage [Serosanguineous Drainage] : no serosanquineous drainage [Serous Drainage] : no serous drainage [Erythema] : not erythematous [Warm] : not warm [Tender] : not tender [Indurated] : not indurated [Fluctuant] : not fluctuant

## 2024-09-18 ENCOUNTER — APPOINTMENT (OUTPATIENT)
Dept: OTOLARYNGOLOGY | Facility: CLINIC | Age: 79
End: 2024-09-18

## 2024-10-01 ENCOUNTER — APPOINTMENT (OUTPATIENT)
Dept: DERMATOLOGY | Facility: CLINIC | Age: 79
End: 2024-10-01
Payer: COMMERCIAL

## 2024-10-01 DIAGNOSIS — B36.0 PITYRIASIS VERSICOLOR: ICD-10-CM

## 2024-10-01 PROCEDURE — 99214 OFFICE O/P EST MOD 30 MIN: CPT

## 2024-10-01 RX ORDER — KETOCONAZOLE 20 MG/ML
2 SUSPENSION TOPICAL
Qty: 2 | Refills: 2 | Status: ACTIVE | COMMUNITY
Start: 2024-10-01 | End: 1900-01-01

## 2024-10-01 NOTE — HISTORY OF PRESENT ILLNESS
[FreeTextEntry1] : RPV: dermatitis  [de-identified] : Elian Kenney 78 y/o M presents for a F/U for rash on legs and chest.   Rash and itching on legs much better after starting baby shampoo, triam, and moisturizer.  Rash on chest unchanged. not itchy. ____________________________________________  Chief Complaint: RPA- FBSE Elian Kenney 80yo M follow up for FBSE.  Rash on skin still present. Not very itchy. Saw ID for latent syphilis. S/p 3 injections penicillin in Feb 2024. Rash initially went away , but came back. Now on chest, arms, legs. Not symptomatic.  ___ Mr. Elian Kenney is a 77 yo M presenting for skin check and rash.  -Rash: started "years ago" and affects his legs and chest. Not itchy. Has improved w/ topical antifungals and betamethasone initially. however, it recurred a few months ago and betamethasone (applied every 3 days) has not been helping. Denies noticing any oral or genital sores. -Fingernail hyperpigmentation  PMH: no history skin ca FHx: no family hx skin ca

## 2024-10-01 NOTE — ASSESSMENT
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  10/1/24- clinical improved.  Plan: -c/w gentle soap and moisturizer daily -c/w TAC 0.1 cream BID bid for two weeks on and two weeks as needed for itching. -RTC if there is worsening.   #Fine scaled psoriasiform plaques - chest Favor tinea versicolor Recommend ketoconazole 2% wash 3 times weekly until eruption is resolved.  Patient given instructions to apply wash and wait 5-10 minutes before rinsing off. RTC if no improvement after 4 weeks  Follow up 2/2025 for annual skin check or sooner prn

## 2024-10-01 NOTE — PHYSICAL EXAM
[Alert] : alert [Oriented x 3] : ~L oriented x 3 [Well Nourished] : well nourished [Conjunctiva Non-injected] : conjunctiva non-injected [No Visual Lymphadenopathy] : no visual  lymphadenopathy [No Clubbing] : no clubbing [No Edema] : no edema [No Bromhidrosis] : no bromhidrosis [No Chromhidrosis] : no chromhidrosis [Full Body Skin Exam Performed] : performed [FreeTextEntry3] :  - chest,  scattered tan brown finely scaled round to annular thin plaques.  - lower legs: hyperpigmented and hypopigmented patches, resolution of prior scaling

## 2024-10-23 ENCOUNTER — APPOINTMENT (OUTPATIENT)
Dept: HEART AND VASCULAR | Facility: CLINIC | Age: 79
End: 2024-10-23

## 2024-10-23 ENCOUNTER — APPOINTMENT (OUTPATIENT)
Dept: HEART AND VASCULAR | Facility: CLINIC | Age: 79
End: 2024-10-23
Payer: COMMERCIAL

## 2024-10-23 VITALS
HEIGHT: 70 IN | TEMPERATURE: 97.9 F | DIASTOLIC BLOOD PRESSURE: 82 MMHG | HEART RATE: 74 BPM | SYSTOLIC BLOOD PRESSURE: 131 MMHG | OXYGEN SATURATION: 99 %

## 2024-10-23 DIAGNOSIS — R73.09 OTHER ABNORMAL GLUCOSE: ICD-10-CM

## 2024-10-23 DIAGNOSIS — I34.0 NONRHEUMATIC MITRAL (VALVE) INSUFFICIENCY: ICD-10-CM

## 2024-10-23 DIAGNOSIS — I10 ESSENTIAL (PRIMARY) HYPERTENSION: ICD-10-CM

## 2024-10-23 DIAGNOSIS — I50.22 CHRONIC SYSTOLIC (CONGESTIVE) HEART FAILURE: ICD-10-CM

## 2024-10-23 DIAGNOSIS — R06.02 SHORTNESS OF BREATH: ICD-10-CM

## 2024-10-23 DIAGNOSIS — I48.91 UNSPECIFIED ATRIAL FIBRILLATION: ICD-10-CM

## 2024-10-23 PROCEDURE — 36415 COLL VENOUS BLD VENIPUNCTURE: CPT

## 2024-10-23 PROCEDURE — 99214 OFFICE O/P EST MOD 30 MIN: CPT | Mod: 25

## 2024-10-25 ENCOUNTER — APPOINTMENT (OUTPATIENT)
Dept: HEART AND VASCULAR | Facility: CLINIC | Age: 79
End: 2024-10-25
Payer: COMMERCIAL

## 2024-10-25 DIAGNOSIS — E87.5 HYPERKALEMIA: ICD-10-CM

## 2024-10-25 PROCEDURE — 36415 COLL VENOUS BLD VENIPUNCTURE: CPT

## 2024-10-28 LAB
ANION GAP SERPL CALC-SCNC: 14 MMOL/L
BUN SERPL-MCNC: 33 MG/DL
CALCIUM SERPL-MCNC: 9.5 MG/DL
CHLORIDE SERPL-SCNC: 105 MMOL/L
CO2 SERPL-SCNC: 23 MMOL/L
CREAT SERPL-MCNC: 1.68 MG/DL
EGFR: 41 ML/MIN/1.73M2
GLUCOSE SERPL-MCNC: 119 MG/DL
POTASSIUM SERPL-SCNC: 5.3 MMOL/L
SODIUM SERPL-SCNC: 141 MMOL/L

## 2024-10-29 LAB
ALBUMIN SERPL ELPH-MCNC: 4.3 G/DL
ALP BLD-CCNC: 64 U/L
ALT SERPL-CCNC: 9 U/L
ANION GAP SERPL CALC-SCNC: 13 MMOL/L
AST SERPL-CCNC: 16 U/L
BILIRUB SERPL-MCNC: 0.5 MG/DL
BUN SERPL-MCNC: 28 MG/DL
CALCIUM SERPL-MCNC: 9.6 MG/DL
CHLORIDE SERPL-SCNC: 107 MMOL/L
CHOLEST SERPL-MCNC: 210 MG/DL
CO2 SERPL-SCNC: 22 MMOL/L
CREAT SERPL-MCNC: 1.58 MG/DL
EGFR: 44 ML/MIN/1.73M2
ESTIMATED AVERAGE GLUCOSE: 137 MG/DL
GLUCOSE SERPL-MCNC: 102 MG/DL
HBA1C MFR BLD HPLC: 6.4 %
HDLC SERPL-MCNC: 86 MG/DL
LDLC SERPL CALC-MCNC: 113 MG/DL
NONHDLC SERPL-MCNC: 124 MG/DL
POTASSIUM SERPL-SCNC: 6.1 MMOL/L
PROT SERPL-MCNC: 8.4 G/DL
SODIUM SERPL-SCNC: 142 MMOL/L
T3 SERPL-MCNC: 79 NG/DL
T4 SERPL-MCNC: 10.8 UG/DL
TRIGL SERPL-MCNC: 63 MG/DL
TSH SERPL-ACNC: 1.9 UIU/ML

## 2024-11-08 ENCOUNTER — APPOINTMENT (OUTPATIENT)
Dept: NEPHROLOGY | Facility: CLINIC | Age: 79
End: 2024-11-08
Payer: COMMERCIAL

## 2024-11-08 VITALS — DIASTOLIC BLOOD PRESSURE: 81 MMHG | SYSTOLIC BLOOD PRESSURE: 117 MMHG | HEART RATE: 70 BPM

## 2024-11-08 VITALS — BODY MASS INDEX: 26.69 KG/M2 | WEIGHT: 186 LBS

## 2024-11-08 DIAGNOSIS — Z86.79 PERSONAL HISTORY OF OTHER DISEASES OF THE CIRCULATORY SYSTEM: ICD-10-CM

## 2024-11-08 DIAGNOSIS — I50.22 CHRONIC SYSTOLIC (CONGESTIVE) HEART FAILURE: ICD-10-CM

## 2024-11-08 DIAGNOSIS — I10 ESSENTIAL (PRIMARY) HYPERTENSION: ICD-10-CM

## 2024-11-08 DIAGNOSIS — E87.5 HYPERKALEMIA: ICD-10-CM

## 2024-11-08 DIAGNOSIS — G25.0 ESSENTIAL TREMOR: ICD-10-CM

## 2024-11-08 DIAGNOSIS — N18.31 CHRONIC KIDNEY DISEASE, STAGE 3A: ICD-10-CM

## 2024-11-08 DIAGNOSIS — R10.13 EPIGASTRIC PAIN: ICD-10-CM

## 2024-11-08 DIAGNOSIS — R73.09 OTHER ABNORMAL GLUCOSE: ICD-10-CM

## 2024-11-08 PROCEDURE — 99214 OFFICE O/P EST MOD 30 MIN: CPT

## 2024-11-08 PROCEDURE — G2211 COMPLEX E/M VISIT ADD ON: CPT | Mod: NC

## 2024-11-09 LAB
APPEARANCE: CLEAR
BILIRUBIN URINE: NEGATIVE
BLOOD URINE: NEGATIVE
COLOR: YELLOW
CREAT SPEC-SCNC: 151 MG/DL
GLUCOSE QUALITATIVE U: >=1000 MG/DL
KETONES URINE: NEGATIVE MG/DL
LEUKOCYTE ESTERASE URINE: NEGATIVE
MICROALBUMIN 24H UR DL<=1MG/L-MCNC: 2.2 MG/DL
MICROALBUMIN/CREAT 24H UR-RTO: 15 MG/G
NITRITE URINE: NEGATIVE
PH URINE: 5.5
PROTEIN URINE: NORMAL MG/DL
SPECIFIC GRAVITY URINE: 1.02
UROBILINOGEN URINE: 0.2 MG/DL

## 2024-11-11 ENCOUNTER — APPOINTMENT (OUTPATIENT)
Dept: HEART AND VASCULAR | Facility: CLINIC | Age: 79
End: 2024-11-11
Payer: COMMERCIAL

## 2024-11-11 VITALS
OXYGEN SATURATION: 98 % | HEIGHT: 70 IN | TEMPERATURE: 97.7 F | DIASTOLIC BLOOD PRESSURE: 90 MMHG | SYSTOLIC BLOOD PRESSURE: 125 MMHG | BODY MASS INDEX: 26.77 KG/M2 | WEIGHT: 187 LBS | HEART RATE: 42 BPM

## 2024-11-11 PROCEDURE — 93306 TTE W/DOPPLER COMPLETE: CPT

## 2024-11-13 ENCOUNTER — APPOINTMENT (OUTPATIENT)
Dept: DERMATOLOGY | Facility: CLINIC | Age: 79
End: 2024-11-13

## 2024-11-30 ENCOUNTER — EMERGENCY (EMERGENCY)
Facility: HOSPITAL | Age: 79
LOS: 1 days | Discharge: HOME | End: 2024-11-30
Attending: STUDENT IN AN ORGANIZED HEALTH CARE EDUCATION/TRAINING PROGRAM | Admitting: STUDENT IN AN ORGANIZED HEALTH CARE EDUCATION/TRAINING PROGRAM
Payer: COMMERCIAL

## 2024-11-30 VITALS
SYSTOLIC BLOOD PRESSURE: 134 MMHG | RESPIRATION RATE: 19 BRPM | WEIGHT: 186.07 LBS | DIASTOLIC BLOOD PRESSURE: 88 MMHG | HEART RATE: 70 BPM | TEMPERATURE: 97 F | HEIGHT: 68 IN | OXYGEN SATURATION: 98 %

## 2024-11-30 VITALS
OXYGEN SATURATION: 98 % | TEMPERATURE: 98 F | SYSTOLIC BLOOD PRESSURE: 128 MMHG | DIASTOLIC BLOOD PRESSURE: 78 MMHG | HEART RATE: 63 BPM | RESPIRATION RATE: 19 BRPM

## 2024-11-30 DIAGNOSIS — Z98.89 OTHER SPECIFIED POSTPROCEDURAL STATES: Chronic | ICD-10-CM

## 2024-11-30 LAB
ANION GAP SERPL CALC-SCNC: 8 MMOL/L — SIGNIFICANT CHANGE UP (ref 5–17)
APTT BLD: 39.3 SEC — HIGH (ref 24.5–35.6)
BASOPHILS # BLD AUTO: 0.03 K/UL — SIGNIFICANT CHANGE UP (ref 0–0.2)
BASOPHILS NFR BLD AUTO: 0.6 % — SIGNIFICANT CHANGE UP (ref 0–2)
BUN SERPL-MCNC: 33 MG/DL — HIGH (ref 7–23)
CALCIUM SERPL-MCNC: 9.3 MG/DL — SIGNIFICANT CHANGE UP (ref 8.4–10.5)
CHLORIDE SERPL-SCNC: 102 MMOL/L — SIGNIFICANT CHANGE UP (ref 96–108)
CO2 SERPL-SCNC: 28 MMOL/L — SIGNIFICANT CHANGE UP (ref 22–31)
CREAT SERPL-MCNC: 1.64 MG/DL — HIGH (ref 0.5–1.3)
EGFR: 42 ML/MIN/1.73M2 — LOW
EOSINOPHIL # BLD AUTO: 0.03 K/UL — SIGNIFICANT CHANGE UP (ref 0–0.5)
EOSINOPHIL NFR BLD AUTO: 0.6 % — SIGNIFICANT CHANGE UP (ref 0–6)
GLUCOSE SERPL-MCNC: 94 MG/DL — SIGNIFICANT CHANGE UP (ref 70–99)
HCT VFR BLD CALC: 46.5 % — SIGNIFICANT CHANGE UP (ref 39–50)
HGB BLD-MCNC: 14.4 G/DL — SIGNIFICANT CHANGE UP (ref 13–17)
IMM GRANULOCYTES NFR BLD AUTO: 0.4 % — SIGNIFICANT CHANGE UP (ref 0–0.9)
INR BLD: 1.36 — HIGH (ref 0.85–1.16)
LYMPHOCYTES # BLD AUTO: 2.75 K/UL — SIGNIFICANT CHANGE UP (ref 1–3.3)
LYMPHOCYTES # BLD AUTO: 52.4 % — HIGH (ref 13–44)
MCHC RBC-ENTMCNC: 31 G/DL — LOW (ref 32–36)
MCHC RBC-ENTMCNC: 31.5 PG — SIGNIFICANT CHANGE UP (ref 27–34)
MCV RBC AUTO: 101.8 FL — HIGH (ref 80–100)
MONOCYTES # BLD AUTO: 0.55 K/UL — SIGNIFICANT CHANGE UP (ref 0–0.9)
MONOCYTES NFR BLD AUTO: 10.5 % — SIGNIFICANT CHANGE UP (ref 2–14)
NEUTROPHILS # BLD AUTO: 1.87 K/UL — SIGNIFICANT CHANGE UP (ref 1.8–7.4)
NEUTROPHILS NFR BLD AUTO: 35.5 % — LOW (ref 43–77)
NRBC # BLD: 0 /100 WBCS — SIGNIFICANT CHANGE UP (ref 0–0)
NT-PROBNP SERPL-SCNC: 458 PG/ML — HIGH (ref 0–300)
PLATELET # BLD AUTO: 243 K/UL — SIGNIFICANT CHANGE UP (ref 150–400)
POTASSIUM SERPL-MCNC: 4.3 MMOL/L — SIGNIFICANT CHANGE UP (ref 3.5–5.3)
POTASSIUM SERPL-SCNC: 4.3 MMOL/L — SIGNIFICANT CHANGE UP (ref 3.5–5.3)
PROTHROM AB SERPL-ACNC: 15.9 SEC — HIGH (ref 9.9–13.4)
RBC # BLD: 4.57 M/UL — SIGNIFICANT CHANGE UP (ref 4.2–5.8)
RBC # FLD: 14.8 % — HIGH (ref 10.3–14.5)
SODIUM SERPL-SCNC: 138 MMOL/L — SIGNIFICANT CHANGE UP (ref 135–145)
TROPONIN T, HIGH SENSITIVITY RESULT: 13 NG/L — SIGNIFICANT CHANGE UP (ref 0–51)
TROPONIN T, HIGH SENSITIVITY RESULT: 18 NG/L — SIGNIFICANT CHANGE UP (ref 0–51)
WBC # BLD: 5.25 K/UL — SIGNIFICANT CHANGE UP (ref 3.8–10.5)
WBC # FLD AUTO: 5.25 K/UL — SIGNIFICANT CHANGE UP (ref 3.8–10.5)

## 2024-11-30 PROCEDURE — 83880 ASSAY OF NATRIURETIC PEPTIDE: CPT

## 2024-11-30 PROCEDURE — 71045 X-RAY EXAM CHEST 1 VIEW: CPT | Mod: 26

## 2024-11-30 PROCEDURE — 99285 EMERGENCY DEPT VISIT HI MDM: CPT

## 2024-11-30 PROCEDURE — 85025 COMPLETE CBC W/AUTO DIFF WBC: CPT

## 2024-11-30 PROCEDURE — 85610 PROTHROMBIN TIME: CPT

## 2024-11-30 PROCEDURE — 71045 X-RAY EXAM CHEST 1 VIEW: CPT

## 2024-11-30 PROCEDURE — 99283 EMERGENCY DEPT VISIT LOW MDM: CPT | Mod: 25

## 2024-11-30 PROCEDURE — 84484 ASSAY OF TROPONIN QUANT: CPT

## 2024-11-30 PROCEDURE — 80048 BASIC METABOLIC PNL TOTAL CA: CPT

## 2024-11-30 PROCEDURE — 85730 THROMBOPLASTIN TIME PARTIAL: CPT

## 2024-11-30 PROCEDURE — 36415 COLL VENOUS BLD VENIPUNCTURE: CPT

## 2024-11-30 NOTE — ED PROVIDER NOTE - PATIENT PORTAL LINK FT
You can access the FollowMyHealth Patient Portal offered by [facility] by registering at the following website: [email or web address]. By joining [employer]’s FollowMyHealth portal, you will also be able to view your health information using other applications (apps) compatible with our system.

## 2024-11-30 NOTE — ED PROVIDER NOTE - OBJECTIVE STATEMENT
79 year old with history of ACC/AHA Stage C/D NICM with LVEF 25-30% sp ICD upgraded to CRT-D for chronic RV pacing, and Barostim implantation last year, severe AI/MR sp bioAVR/MVR, history of VT/BT with ICD shocks and frequent PVCs sp ablration, pAF on eliquis, CKD IIIb (Cr 2.03) and a stable aortic aneursym (monitored by Dr. Garcia) presenting with 3 weeks of intermittent right sided chest pain with intermittent sob. No nausea or vomiting. Pain non radiating. No lightheadedness or dizziness. No leg pain or leg swelling. ROS as above.

## 2024-11-30 NOTE — ED ADULT NURSE NOTE - CHIEF COMPLAINT QUOTE
Pt has Barostim implant reporting chest pain, neck pain, and  mild shortness of breath. Symptoms reportedly began this AM. Hx cardiac surgery, HTN, HLD, afib, CKD.

## 2024-11-30 NOTE — ED ADULT TRIAGE NOTE - CHIEF COMPLAINT QUOTE
Pt has Barostim implant and is having pain to neck and check with mild shortness of breath top this AM. Hx cardiac surgery, HTN, HLD, afib, CKD. Pt has Barostim implant reporting chest pain, neck pain, and  mild shortness of breath. Symptoms reportedly began this AM. Hx cardiac surgery, HTN, HLD, afib, CKD.

## 2024-11-30 NOTE — ED ADULT NURSE NOTE - NSFALLUNIVINTERV_ED_ALL_ED
Bed/Stretcher in lowest position, wheels locked, appropriate side rails in place/Call bell, personal items and telephone in reach/Instruct patient to call for assistance before getting out of bed/chair/stretcher/Non-slip footwear applied when patient is off stretcher/[place] to call system/Physically safe environment - no spills, clutter or unnecessary equipment/Purposeful proactive rounding/Room/bathroom lighting operational, light cord in reach

## 2024-11-30 NOTE — ED PROVIDER NOTE - NSFOLLOWUPINSTRUCTIONS_ED_ALL_ED_FT
Please return for worsening symptoms, chest pain, shortness of breath, lightheadedness, dizziness. Please follow up closely with your primary physician and cardiologist.     I hope you feel better soon. Happy Holidays!    Sincerely,  Flavio Winter MD

## 2024-11-30 NOTE — ED ADULT NURSE NOTE - OBJECTIVE STATEMENT
79yoM came to ED c/o headache and neck pain x3 weeks. Pt states that he has been having neck pain that radiates down his arm to his fingers. Pt states it feels like pins and needles down my arm. Pt denies any new or worsening symptoms. States he has been taking his medication as ordered. Pt c/o mild SOB after walking from the train. Pt placed on CCM. IV placed. NO other medical complaints. Pt denies chest pain, N/V/D.

## 2024-12-02 DIAGNOSIS — R07.89 OTHER CHEST PAIN: ICD-10-CM

## 2024-12-02 DIAGNOSIS — Z79.01 LONG TERM (CURRENT) USE OF ANTICOAGULANTS: ICD-10-CM

## 2024-12-02 DIAGNOSIS — Z98.890 OTHER SPECIFIED POSTPROCEDURAL STATES: ICD-10-CM

## 2024-12-02 DIAGNOSIS — Z95.810 PRESENCE OF AUTOMATIC (IMPLANTABLE) CARDIAC DEFIBRILLATOR: ICD-10-CM

## 2024-12-02 DIAGNOSIS — R06.02 SHORTNESS OF BREATH: ICD-10-CM

## 2024-12-02 DIAGNOSIS — N18.30 CHRONIC KIDNEY DISEASE, STAGE 3 UNSPECIFIED: ICD-10-CM

## 2024-12-02 DIAGNOSIS — I48.0 PAROXYSMAL ATRIAL FIBRILLATION: ICD-10-CM

## 2024-12-02 DIAGNOSIS — Z86.79 PERSONAL HISTORY OF OTHER DISEASES OF THE CIRCULATORY SYSTEM: ICD-10-CM

## 2024-12-03 ENCOUNTER — APPOINTMENT (OUTPATIENT)
Dept: HEART AND VASCULAR | Facility: CLINIC | Age: 79
End: 2024-12-03

## 2024-12-03 PROCEDURE — 93296 REM INTERROG EVL PM/IDS: CPT

## 2024-12-03 PROCEDURE — 93295 DEV INTERROG REMOTE 1/2/MLT: CPT

## 2024-12-13 ENCOUNTER — APPOINTMENT (OUTPATIENT)
Dept: HEART AND VASCULAR | Facility: CLINIC | Age: 79
End: 2024-12-13

## 2024-12-23 ENCOUNTER — APPOINTMENT (OUTPATIENT)
Dept: HEART FAILURE | Facility: CLINIC | Age: 79
End: 2024-12-23
Payer: COMMERCIAL

## 2024-12-23 VITALS
DIASTOLIC BLOOD PRESSURE: 68 MMHG | OXYGEN SATURATION: 98 % | WEIGHT: 188 LBS | BODY MASS INDEX: 26.92 KG/M2 | HEART RATE: 70 BPM | TEMPERATURE: 97.7 F | SYSTOLIC BLOOD PRESSURE: 104 MMHG | HEIGHT: 70 IN

## 2024-12-23 DIAGNOSIS — I48.91 UNSPECIFIED ATRIAL FIBRILLATION: ICD-10-CM

## 2024-12-23 DIAGNOSIS — I10 ESSENTIAL (PRIMARY) HYPERTENSION: ICD-10-CM

## 2024-12-23 PROCEDURE — G2211 COMPLEX E/M VISIT ADD ON: CPT | Mod: NC

## 2024-12-23 PROCEDURE — 99213 OFFICE O/P EST LOW 20 MIN: CPT

## 2024-12-24 LAB
ANION GAP SERPL CALC-SCNC: 14 MMOL/L
BUN SERPL-MCNC: 28 MG/DL
CALCIUM SERPL-MCNC: 9.5 MG/DL
CHLORIDE SERPL-SCNC: 105 MMOL/L
CO2 SERPL-SCNC: 23 MMOL/L
CREAT SERPL-MCNC: 1.63 MG/DL
EGFR: 42 ML/MIN/1.73M2
GLUCOSE SERPL-MCNC: 92 MG/DL
NT-PROBNP SERPL-MCNC: 1182 PG/ML
POTASSIUM SERPL-SCNC: 4.7 MMOL/L
SODIUM SERPL-SCNC: 141 MMOL/L

## 2025-01-02 ENCOUNTER — NON-APPOINTMENT (OUTPATIENT)
Age: 80
End: 2025-01-02

## 2025-01-02 ENCOUNTER — APPOINTMENT (OUTPATIENT)
Dept: NEPHROLOGY | Facility: CLINIC | Age: 80
End: 2025-01-02
Payer: COMMERCIAL

## 2025-01-02 VITALS — HEART RATE: 72 BPM | DIASTOLIC BLOOD PRESSURE: 93 MMHG | SYSTOLIC BLOOD PRESSURE: 89 MMHG

## 2025-01-02 VITALS — WEIGHT: 188 LBS | BODY MASS INDEX: 26.98 KG/M2

## 2025-01-02 DIAGNOSIS — N18.31 CHRONIC KIDNEY DISEASE, STAGE 3A: ICD-10-CM

## 2025-01-02 DIAGNOSIS — I50.22 CHRONIC SYSTOLIC (CONGESTIVE) HEART FAILURE: ICD-10-CM

## 2025-01-02 DIAGNOSIS — I10 ESSENTIAL (PRIMARY) HYPERTENSION: ICD-10-CM

## 2025-01-02 DIAGNOSIS — E87.5 HYPERKALEMIA: ICD-10-CM

## 2025-01-02 PROCEDURE — G2211 COMPLEX E/M VISIT ADD ON: CPT | Mod: NC

## 2025-01-02 PROCEDURE — 99214 OFFICE O/P EST MOD 30 MIN: CPT

## 2025-01-07 ENCOUNTER — APPOINTMENT (OUTPATIENT)
Dept: HEART AND VASCULAR | Facility: CLINIC | Age: 80
End: 2025-01-07
Payer: MEDICARE

## 2025-01-07 VITALS
HEART RATE: 51 BPM | TEMPERATURE: 97.5 F | HEIGHT: 70 IN | BODY MASS INDEX: 27.2 KG/M2 | SYSTOLIC BLOOD PRESSURE: 123 MMHG | WEIGHT: 190 LBS | DIASTOLIC BLOOD PRESSURE: 85 MMHG

## 2025-01-07 PROCEDURE — 99213 OFFICE O/P EST LOW 20 MIN: CPT | Mod: 25

## 2025-01-07 PROCEDURE — 93284 PRGRMG EVAL IMPLANTABLE DFB: CPT

## 2025-01-15 ENCOUNTER — RX RENEWAL (OUTPATIENT)
Age: 80
End: 2025-01-15

## 2025-01-21 ENCOUNTER — RX RENEWAL (OUTPATIENT)
Age: 80
End: 2025-01-21

## 2025-01-24 ENCOUNTER — APPOINTMENT (OUTPATIENT)
Dept: HEART AND VASCULAR | Facility: CLINIC | Age: 80
End: 2025-01-24
Payer: COMMERCIAL

## 2025-01-24 DIAGNOSIS — N18.31 CHRONIC KIDNEY DISEASE, STAGE 3A: ICD-10-CM

## 2025-01-24 PROCEDURE — 36415 COLL VENOUS BLD VENIPUNCTURE: CPT

## 2025-01-27 ENCOUNTER — NON-APPOINTMENT (OUTPATIENT)
Age: 80
End: 2025-01-27

## 2025-01-27 LAB
ANION GAP SERPL CALC-SCNC: 28 MMOL/L
BUN SERPL-MCNC: 31 MG/DL
CALCIUM SERPL-MCNC: 10.2 MG/DL
CHLORIDE SERPL-SCNC: 109 MMOL/L
CO2 SERPL-SCNC: 14 MMOL/L
CREAT SERPL-MCNC: 1.86 MG/DL
EGFR: 36 ML/MIN/1.73M2
GLUCOSE SERPL-MCNC: 87 MG/DL
NT-PROBNP SERPL-MCNC: 1139 PG/ML
POTASSIUM SERPL-SCNC: 4.7 MMOL/L
SODIUM SERPL-SCNC: 151 MMOL/L

## 2025-02-04 ENCOUNTER — APPOINTMENT (OUTPATIENT)
Dept: HEART AND VASCULAR | Facility: CLINIC | Age: 80
End: 2025-02-04
Payer: MEDICARE

## 2025-02-04 VITALS
SYSTOLIC BLOOD PRESSURE: 113 MMHG | TEMPERATURE: 97.1 F | BODY MASS INDEX: 26.77 KG/M2 | DIASTOLIC BLOOD PRESSURE: 85 MMHG | HEIGHT: 70 IN | HEART RATE: 72 BPM | WEIGHT: 187 LBS

## 2025-02-04 DIAGNOSIS — I48.91 UNSPECIFIED ATRIAL FIBRILLATION: ICD-10-CM

## 2025-02-04 DIAGNOSIS — I50.22 CHRONIC SYSTOLIC (CONGESTIVE) HEART FAILURE: ICD-10-CM

## 2025-02-04 PROCEDURE — 99213 OFFICE O/P EST LOW 20 MIN: CPT | Mod: 25

## 2025-02-04 PROCEDURE — 93284 PRGRMG EVAL IMPLANTABLE DFB: CPT

## 2025-02-05 ENCOUNTER — APPOINTMENT (OUTPATIENT)
Dept: INTERNAL MEDICINE | Facility: CLINIC | Age: 80
End: 2025-02-05
Payer: COMMERCIAL

## 2025-02-05 VITALS
HEIGHT: 70 IN | SYSTOLIC BLOOD PRESSURE: 115 MMHG | TEMPERATURE: 97.2 F | BODY MASS INDEX: 26.77 KG/M2 | DIASTOLIC BLOOD PRESSURE: 78 MMHG | OXYGEN SATURATION: 97 % | WEIGHT: 187 LBS | HEART RATE: 78 BPM

## 2025-02-05 DIAGNOSIS — R73.09 OTHER ABNORMAL GLUCOSE: ICD-10-CM

## 2025-02-05 DIAGNOSIS — R10.13 EPIGASTRIC PAIN: ICD-10-CM

## 2025-02-05 DIAGNOSIS — K29.50 UNSPECIFIED CHRONIC GASTRITIS W/OUT BLEEDING: ICD-10-CM

## 2025-02-05 DIAGNOSIS — N18.31 CHRONIC KIDNEY DISEASE, STAGE 3A: ICD-10-CM

## 2025-02-05 DIAGNOSIS — L30.9 DERMATITIS, UNSPECIFIED: ICD-10-CM

## 2025-02-05 PROCEDURE — G2211 COMPLEX E/M VISIT ADD ON: CPT | Mod: NC

## 2025-02-05 PROCEDURE — 36415 COLL VENOUS BLD VENIPUNCTURE: CPT

## 2025-02-05 PROCEDURE — 99214 OFFICE O/P EST MOD 30 MIN: CPT

## 2025-02-06 LAB
ALBUMIN SERPL ELPH-MCNC: 4.2 G/DL
ALP BLD-CCNC: 63 U/L
ALT SERPL-CCNC: 9 U/L
ANION GAP SERPL CALC-SCNC: 15 MMOL/L
AST SERPL-CCNC: 17 U/L
BILIRUB SERPL-MCNC: 0.4 MG/DL
BUN SERPL-MCNC: 32 MG/DL
CALCIUM SERPL-MCNC: 9.5 MG/DL
CHLORIDE SERPL-SCNC: 108 MMOL/L
CO2 SERPL-SCNC: 22 MMOL/L
CREAT SERPL-MCNC: 1.79 MG/DL
EGFR: 38 ML/MIN/1.73M2
ESTIMATED AVERAGE GLUCOSE: 140 MG/DL
GLUCOSE SERPL-MCNC: 105 MG/DL
HBA1C MFR BLD HPLC: 6.5 %
NT-PROBNP SERPL-MCNC: 378 PG/ML
POTASSIUM SERPL-SCNC: 4.8 MMOL/L
PROT SERPL-MCNC: 8.3 G/DL
SODIUM SERPL-SCNC: 145 MMOL/L

## 2025-03-18 ENCOUNTER — APPOINTMENT (OUTPATIENT)
Dept: NEPHROLOGY | Facility: CLINIC | Age: 80
End: 2025-03-18
Payer: COMMERCIAL

## 2025-03-18 VITALS — SYSTOLIC BLOOD PRESSURE: 93 MMHG | DIASTOLIC BLOOD PRESSURE: 58 MMHG | HEART RATE: 64 BPM

## 2025-03-18 VITALS — WEIGHT: 186.31 LBS | BODY MASS INDEX: 26.73 KG/M2

## 2025-03-18 VITALS — SYSTOLIC BLOOD PRESSURE: 104 MMHG | DIASTOLIC BLOOD PRESSURE: 71 MMHG

## 2025-03-18 DIAGNOSIS — I50.22 CHRONIC SYSTOLIC (CONGESTIVE) HEART FAILURE: ICD-10-CM

## 2025-03-18 DIAGNOSIS — I10 ESSENTIAL (PRIMARY) HYPERTENSION: ICD-10-CM

## 2025-03-18 DIAGNOSIS — N18.31 CHRONIC KIDNEY DISEASE, STAGE 3A: ICD-10-CM

## 2025-03-18 DIAGNOSIS — E87.5 HYPERKALEMIA: ICD-10-CM

## 2025-03-18 DIAGNOSIS — R80.9 PROTEINURIA, UNSPECIFIED: ICD-10-CM

## 2025-03-18 PROCEDURE — 99214 OFFICE O/P EST MOD 30 MIN: CPT

## 2025-03-18 PROCEDURE — G2211 COMPLEX E/M VISIT ADD ON: CPT | Mod: NC

## 2025-03-20 ENCOUNTER — LABORATORY RESULT (OUTPATIENT)
Age: 80
End: 2025-03-20

## 2025-03-20 ENCOUNTER — APPOINTMENT (OUTPATIENT)
Dept: DERMATOLOGY | Facility: CLINIC | Age: 80
End: 2025-03-20
Payer: COMMERCIAL

## 2025-03-20 DIAGNOSIS — L21.9 SEBORRHEIC DERMATITIS, UNSPECIFIED: ICD-10-CM

## 2025-03-20 DIAGNOSIS — B36.0 PITYRIASIS VERSICOLOR: ICD-10-CM

## 2025-03-20 DIAGNOSIS — L82.1 OTHER SEBORRHEIC KERATOSIS: ICD-10-CM

## 2025-03-20 DIAGNOSIS — Z12.83 ENCOUNTER FOR SCREENING FOR MALIGNANT NEOPLASM OF SKIN: ICD-10-CM

## 2025-03-20 DIAGNOSIS — D22.9 MELANOCYTIC NEVI, UNSPECIFIED: ICD-10-CM

## 2025-03-20 DIAGNOSIS — L81.9 DISORDER OF PIGMENTATION, UNSPECIFIED: ICD-10-CM

## 2025-03-20 LAB
ALBUMIN SERPL ELPH-MCNC: 4.3 G/DL
ALP BLD-CCNC: 65 U/L
ALT SERPL-CCNC: 9 U/L
ANION GAP SERPL CALC-SCNC: 12 MMOL/L
AST SERPL-CCNC: 14 U/L
BASOPHILS # BLD AUTO: 0.01 K/UL
BASOPHILS NFR BLD AUTO: 0.2 %
BILIRUB SERPL-MCNC: 0.4 MG/DL
BUN SERPL-MCNC: 37 MG/DL
CALCIUM SERPL-MCNC: 9.7 MG/DL
CHLORIDE SERPL-SCNC: 105 MMOL/L
CO2 SERPL-SCNC: 24 MMOL/L
CREAT SERPL-MCNC: 2.04 MG/DL
EGFRCR SERPLBLD CKD-EPI 2021: 32 ML/MIN/1.73M2
EOSINOPHIL # BLD AUTO: 0.02 K/UL
EOSINOPHIL NFR BLD AUTO: 0.4 %
FERRITIN SERPL-MCNC: 187 NG/ML
GLUCOSE SERPL-MCNC: 112 MG/DL
HCT VFR BLD CALC: 47.3 %
HGB BLD-MCNC: 14.8 G/DL
IMM GRANULOCYTES NFR BLD AUTO: 0.2 %
LYMPHOCYTES # BLD AUTO: 2.26 K/UL
LYMPHOCYTES NFR BLD AUTO: 47.6 %
MAN DIFF?: NORMAL
MCHC RBC-ENTMCNC: 31.3 G/DL
MCHC RBC-ENTMCNC: 32.5 PG
MCV RBC AUTO: 103.7 FL
MONOCYTES # BLD AUTO: 0.52 K/UL
MONOCYTES NFR BLD AUTO: 10.9 %
NEUTROPHILS # BLD AUTO: 1.93 K/UL
NEUTROPHILS NFR BLD AUTO: 40.7 %
PLATELET # BLD AUTO: 262 K/UL
POTASSIUM SERPL-SCNC: 4.8 MMOL/L
PROT SERPL-MCNC: 8.1 G/DL
RBC # BLD: 4.56 M/UL
RBC # FLD: 14 %
SODIUM SERPL-SCNC: 141 MMOL/L
TSH SERPL-ACNC: 2.36 UIU/ML
WBC # FLD AUTO: 4.75 K/UL

## 2025-03-20 PROCEDURE — 99214 OFFICE O/P EST MOD 30 MIN: CPT

## 2025-03-21 LAB — RPR SER-TITR: NORMAL

## 2025-03-24 PROBLEM — L82.1 SEBORRHEIC KERATOSES: Status: ACTIVE | Noted: 2025-03-24

## 2025-03-25 LAB
APPEARANCE: CLEAR
BILIRUBIN URINE: NEGATIVE
BLOOD URINE: NEGATIVE
COLOR: YELLOW
CREAT SPEC-SCNC: 114 MG/DL
GLUCOSE QUALITATIVE U: 500 MG/DL
KETONES URINE: NEGATIVE MG/DL
LEUKOCYTE ESTERASE URINE: NEGATIVE
MICROALBUMIN 24H UR DL<=1MG/L-MCNC: 2.8 MG/DL
MICROALBUMIN/CREAT 24H UR-RTO: 24 MG/G
NITRITE URINE: NEGATIVE
PH URINE: 5.5
PROTEIN URINE: NEGATIVE MG/DL
SPECIFIC GRAVITY URINE: 1.01
UROBILINOGEN URINE: 0.2 MG/DL

## 2025-03-28 ENCOUNTER — NON-APPOINTMENT (OUTPATIENT)
Age: 80
End: 2025-03-28

## 2025-04-14 DIAGNOSIS — I71.20 THORACIC AORTIC ANEURYSM, WITHOUT RUPTURE, UNSPECIFIED: ICD-10-CM

## 2025-04-18 ENCOUNTER — OUTPATIENT (OUTPATIENT)
Dept: OUTPATIENT SERVICES | Facility: HOSPITAL | Age: 80
LOS: 1 days | End: 2025-04-18

## 2025-04-18 ENCOUNTER — APPOINTMENT (OUTPATIENT)
Dept: CT IMAGING | Facility: CLINIC | Age: 80
End: 2025-04-18

## 2025-04-18 DIAGNOSIS — Z98.89 OTHER SPECIFIED POSTPROCEDURAL STATES: Chronic | ICD-10-CM

## 2025-04-18 PROCEDURE — 71250 CT THORAX DX C-: CPT | Mod: 26

## 2025-05-06 ENCOUNTER — APPOINTMENT (OUTPATIENT)
Dept: HEART AND VASCULAR | Facility: CLINIC | Age: 80
End: 2025-05-06

## 2025-05-06 PROCEDURE — 93296 REM INTERROG EVL PM/IDS: CPT

## 2025-05-06 PROCEDURE — 93295 DEV INTERROG REMOTE 1/2/MLT: CPT

## 2025-05-13 ENCOUNTER — APPOINTMENT (OUTPATIENT)
Dept: HEART AND VASCULAR | Facility: CLINIC | Age: 80
End: 2025-05-13
Payer: COMMERCIAL

## 2025-05-13 ENCOUNTER — NON-APPOINTMENT (OUTPATIENT)
Age: 80
End: 2025-05-13

## 2025-05-13 VITALS
HEIGHT: 70 IN | TEMPERATURE: 98.1 F | SYSTOLIC BLOOD PRESSURE: 114 MMHG | WEIGHT: 188 LBS | OXYGEN SATURATION: 97 % | DIASTOLIC BLOOD PRESSURE: 79 MMHG | HEART RATE: 90 BPM | BODY MASS INDEX: 26.92 KG/M2

## 2025-05-13 PROCEDURE — 99213 OFFICE O/P EST LOW 20 MIN: CPT

## 2025-05-13 PROCEDURE — 93282 PRGRMG EVAL IMPLANTABLE DFB: CPT

## 2025-06-10 ENCOUNTER — APPOINTMENT (OUTPATIENT)
Dept: HEART AND VASCULAR | Facility: CLINIC | Age: 80
End: 2025-06-10
Payer: COMMERCIAL

## 2025-06-10 VITALS
DIASTOLIC BLOOD PRESSURE: 89 MMHG | SYSTOLIC BLOOD PRESSURE: 131 MMHG | HEART RATE: 86 BPM | HEIGHT: 70 IN | WEIGHT: 188 LBS | BODY MASS INDEX: 26.92 KG/M2

## 2025-06-10 PROCEDURE — 99213 OFFICE O/P EST LOW 20 MIN: CPT

## 2025-06-10 PROCEDURE — G2211 COMPLEX E/M VISIT ADD ON: CPT | Mod: NC

## 2025-06-13 ENCOUNTER — APPOINTMENT (OUTPATIENT)
Dept: INTERNAL MEDICINE | Facility: CLINIC | Age: 80
End: 2025-06-13
Payer: MEDICARE

## 2025-06-13 VITALS
WEIGHT: 185 LBS | DIASTOLIC BLOOD PRESSURE: 86 MMHG | RESPIRATION RATE: 17 BRPM | HEIGHT: 70 IN | OXYGEN SATURATION: 97 % | TEMPERATURE: 97.5 F | BODY MASS INDEX: 26.48 KG/M2 | SYSTOLIC BLOOD PRESSURE: 122 MMHG | HEART RATE: 84 BPM

## 2025-06-13 PROCEDURE — 99213 OFFICE O/P EST LOW 20 MIN: CPT | Mod: 25

## 2025-06-13 PROCEDURE — 95004 PERQ TESTS W/ALRGNC XTRCS: CPT

## 2025-06-13 RX ORDER — LEVOCETIRIZINE DIHYDROCHLORIDE 5 MG/1
5 TABLET ORAL
Qty: 90 | Refills: 0 | Status: ACTIVE | COMMUNITY
Start: 2025-06-13 | End: 1900-01-01

## 2025-06-16 ENCOUNTER — APPOINTMENT (OUTPATIENT)
Dept: HEART FAILURE | Facility: CLINIC | Age: 80
End: 2025-06-16
Payer: MEDICARE

## 2025-06-16 ENCOUNTER — NON-APPOINTMENT (OUTPATIENT)
Age: 80
End: 2025-06-16

## 2025-06-16 VITALS
OXYGEN SATURATION: 99 % | SYSTOLIC BLOOD PRESSURE: 120 MMHG | HEIGHT: 70 IN | DIASTOLIC BLOOD PRESSURE: 82 MMHG | BODY MASS INDEX: 26.68 KG/M2 | TEMPERATURE: 97.6 F | HEART RATE: 85 BPM | WEIGHT: 186.38 LBS

## 2025-06-16 PROCEDURE — 99214 OFFICE O/P EST MOD 30 MIN: CPT

## 2025-06-24 ENCOUNTER — RX RENEWAL (OUTPATIENT)
Age: 80
End: 2025-06-24

## 2025-07-11 ENCOUNTER — APPOINTMENT (OUTPATIENT)
Dept: INTERNAL MEDICINE | Facility: CLINIC | Age: 80
End: 2025-07-11

## 2025-07-14 ENCOUNTER — APPOINTMENT (OUTPATIENT)
Dept: NEPHROLOGY | Facility: CLINIC | Age: 80
End: 2025-07-14
Payer: COMMERCIAL

## 2025-07-14 VITALS — SYSTOLIC BLOOD PRESSURE: 97 MMHG | DIASTOLIC BLOOD PRESSURE: 63 MMHG | HEART RATE: 69 BPM

## 2025-07-14 PROBLEM — Z79.899 OTHER LONG TERM (CURRENT) DRUG THERAPY: Status: ACTIVE | Noted: 2023-12-04

## 2025-07-14 PROBLEM — R68.89 OTHER GENERAL SYMPTOMS AND SIGNS: Status: ACTIVE | Noted: 2023-12-04

## 2025-07-14 PROCEDURE — 99214 OFFICE O/P EST MOD 30 MIN: CPT | Mod: 25

## 2025-07-14 PROCEDURE — 36415 COLL VENOUS BLD VENIPUNCTURE: CPT

## 2025-07-15 LAB
25(OH)D3 SERPL-MCNC: 42.3 NG/ML
ALBUMIN SERPL ELPH-MCNC: 3.9 G/DL
ALBUMIN, RANDOM URINE: <1.2 MG/DL
ALP BLD-CCNC: 55 U/L
ALT SERPL-CCNC: 11 U/L
ANION GAP SERPL CALC-SCNC: 15 MMOL/L
APPEARANCE: CLEAR
AST SERPL-CCNC: 21 U/L
BASOPHILS # BLD AUTO: 0.02 K/UL
BASOPHILS NFR BLD AUTO: 0.4 %
BILIRUB SERPL-MCNC: 0.5 MG/DL
BILIRUBIN URINE: NEGATIVE
BLOOD URINE: NEGATIVE
BUN SERPL-MCNC: 43 MG/DL
CALCIUM SERPL-MCNC: 9.4 MG/DL
CALCIUM SERPL-MCNC: 9.4 MG/DL
CHLORIDE SERPL-SCNC: 105 MMOL/L
CHOLEST SERPL-MCNC: 202 MG/DL
CO2 SERPL-SCNC: 21 MMOL/L
COLOR: YELLOW
CREAT SERPL-MCNC: 1.89 MG/DL
CREAT SPEC-SCNC: 147 MG/DL
CREAT SPEC-SCNC: 147 MG/DL
CREAT/PROT UR: 0.1 RATIO
CYSTATIN C SERPL-MCNC: 1.69 MG/L
EGFRCR SERPLBLD CKD-EPI 2021: 35 ML/MIN/1.73M2
EOSINOPHIL # BLD AUTO: 0.01 K/UL
EOSINOPHIL NFR BLD AUTO: 0.2 %
ESTIMATED AVERAGE GLUCOSE: 140 MG/DL
FERRITIN SERPL-MCNC: 136 NG/ML
GFR/BSA.PRED SERPLBLD CYS-BASED-ARV: 36 ML/MIN/1.73M2
GLUCOSE QUALITATIVE U: 500 MG/DL
GLUCOSE SERPL-MCNC: 92 MG/DL
HBA1C MFR BLD HPLC: 6.5 %
HCT VFR BLD CALC: 45.5 %
HDLC SERPL-MCNC: 66 MG/DL
HGB BLD-MCNC: 14 G/DL
IMM GRANULOCYTES NFR BLD AUTO: 0.2 %
KETONES URINE: NEGATIVE MG/DL
LDLC SERPL-MCNC: 123 MG/DL
LEUKOCYTE ESTERASE URINE: NEGATIVE
LYMPHOCYTES # BLD AUTO: 2.43 K/UL
LYMPHOCYTES NFR BLD AUTO: 48.9 %
MAGNESIUM SERPL-MCNC: 2.4 MG/DL
MAN DIFF?: NORMAL
MCHC RBC-ENTMCNC: 30.8 G/DL
MCHC RBC-ENTMCNC: 32 PG
MCV RBC AUTO: 103.9 FL
MICROALBUMIN/CREAT 24H UR-RTO: NORMAL MG/G
MONOCYTES # BLD AUTO: 0.55 K/UL
MONOCYTES NFR BLD AUTO: 11.1 %
NEUTROPHILS # BLD AUTO: 1.95 K/UL
NEUTROPHILS NFR BLD AUTO: 39.2 %
NITRITE URINE: NEGATIVE
NONHDLC SERPL-MCNC: 136 MG/DL
PARATHYROID HORMONE INTACT: 77 PG/ML
PH URINE: 5.5
PHOSPHATE SERPL-MCNC: 4.6 MG/DL
PLATELET # BLD AUTO: 195 K/UL
POTASSIUM SERPL-SCNC: 5 MMOL/L
PROT SERPL-MCNC: 7.8 G/DL
PROT UR-MCNC: 8 MG/DL
PROTEIN URINE: NEGATIVE MG/DL
RBC # BLD: 4.38 M/UL
RBC # FLD: 14.8 %
SODIUM SERPL-SCNC: 141 MMOL/L
SPECIFIC GRAVITY URINE: 1.02
TRIGL SERPL-MCNC: 72 MG/DL
TSH SERPL-ACNC: 1.71 UIU/ML
UROBILINOGEN URINE: 0.2 MG/DL
VIT B12 SERPL-MCNC: 1168 PG/ML
WBC # FLD AUTO: 4.97 K/UL

## 2025-07-23 ENCOUNTER — RX RENEWAL (OUTPATIENT)
Age: 80
End: 2025-07-23

## 2025-08-12 ENCOUNTER — APPOINTMENT (OUTPATIENT)
Dept: HEART AND VASCULAR | Facility: CLINIC | Age: 80
End: 2025-08-12
Payer: MEDICARE

## 2025-08-12 ENCOUNTER — NON-APPOINTMENT (OUTPATIENT)
Age: 80
End: 2025-08-12

## 2025-08-12 VITALS
WEIGHT: 190 LBS | TEMPERATURE: 98 F | OXYGEN SATURATION: 98 % | BODY MASS INDEX: 27.2 KG/M2 | SYSTOLIC BLOOD PRESSURE: 129 MMHG | HEIGHT: 70 IN | HEART RATE: 83 BPM | DIASTOLIC BLOOD PRESSURE: 90 MMHG

## 2025-08-12 PROCEDURE — 93283 PRGRMG EVAL IMPLANTABLE DFB: CPT

## 2025-08-12 PROCEDURE — 99203 OFFICE O/P NEW LOW 30 MIN: CPT | Mod: 25

## 2025-08-27 ENCOUNTER — APPOINTMENT (OUTPATIENT)
Dept: INTERNAL MEDICINE | Facility: CLINIC | Age: 80
End: 2025-08-27
Payer: COMMERCIAL

## 2025-08-27 ENCOUNTER — APPOINTMENT (OUTPATIENT)
Dept: RADIOLOGY | Facility: CLINIC | Age: 80
End: 2025-08-27
Payer: COMMERCIAL

## 2025-08-27 VITALS
OXYGEN SATURATION: 100 % | HEIGHT: 70 IN | DIASTOLIC BLOOD PRESSURE: 58 MMHG | HEART RATE: 74 BPM | WEIGHT: 190 LBS | SYSTOLIC BLOOD PRESSURE: 100 MMHG | BODY MASS INDEX: 27.2 KG/M2 | TEMPERATURE: 97.2 F

## 2025-08-27 DIAGNOSIS — M25.512 PAIN IN LEFT SHOULDER: ICD-10-CM

## 2025-08-27 DIAGNOSIS — L30.9 DERMATITIS, UNSPECIFIED: ICD-10-CM

## 2025-08-27 DIAGNOSIS — G89.29 PAIN IN LEFT SHOULDER: ICD-10-CM

## 2025-08-27 DIAGNOSIS — M51.369: ICD-10-CM

## 2025-08-27 PROCEDURE — 72110 X-RAY EXAM L-2 SPINE 4/>VWS: CPT

## 2025-08-27 PROCEDURE — 99214 OFFICE O/P EST MOD 30 MIN: CPT

## 2025-08-27 PROCEDURE — G2211 COMPLEX E/M VISIT ADD ON: CPT | Mod: NC

## 2025-08-27 RX ORDER — MENTHOL
40 GEL (GRAM) TOPICAL DAILY
Qty: 1 | Refills: 0 | Status: ACTIVE | COMMUNITY
Start: 2025-08-27 | End: 1900-01-01

## 2025-09-05 ENCOUNTER — APPOINTMENT (OUTPATIENT)
Dept: PHYSICAL MEDICINE AND REHAB | Facility: CLINIC | Age: 80
End: 2025-09-05

## (undated) DEVICE — PACK VASCULAR MINOR

## (undated) DEVICE — SYR BULB 2OZ (BLUE)

## (undated) DEVICE — KNIFE ALCON CRESCENT STRAIGHT BEVEL UP 2.3MM (PINK)

## (undated) DEVICE — GOWN ROYAL SILK XL

## (undated) DEVICE — STAPLER SKIN PROXIMATE

## (undated) DEVICE — FRAZIER SUCTION TIP 8FR

## (undated) DEVICE — GLV 7.5 PROTEXIS (WHITE)

## (undated) DEVICE — SUT VICRYL 2-0 27" CT-1 UNDYED

## (undated) DEVICE — CANISTER SPECIMEN CONVERTOR PLASTIC

## (undated) DEVICE — SUT SILK 4-0 18" TIES

## (undated) DEVICE — CATH IV OPTIVA 16G X 2"

## (undated) DEVICE — DRSG CURITY GAUZE SPONGE 4 X 4" 12-PLY NON-STERILE

## (undated) DEVICE — SUT VICRYL 3-0 27" SH

## (undated) DEVICE — GLV 6.5 PROTEXIS (WHITE)

## (undated) DEVICE — GLV 7 DURAPRENE

## (undated) DEVICE — POSITIONER FOAM HEAD DONUT 9" (PINK)

## (undated) DEVICE — SUT SILK 2-0 18" SH (POP-OFF)

## (undated) DEVICE — SYR ASEPTO

## (undated) DEVICE — DRAPE TOWEL WHITE 17" X 24"

## (undated) DEVICE — SUT PROLENE 6-0 30" RB-2

## (undated) DEVICE — TOURNIQUET ARGYLE  VASCULAR KIT

## (undated) DEVICE — DRAPE MAGNETIC INSTRUMENT MEDIUM

## (undated) DEVICE — DRSG TEGADERM 4X10"

## (undated) DEVICE — SUT PLEDGET SOFT MEDIUM 1/4" X 1/8" X 1/16" X6

## (undated) DEVICE — MARKING PEN W RULER

## (undated) DEVICE — SUT PROLENE 7-0 30" BV-1

## (undated) DEVICE — DRAPE IOBAN 13" X 13"

## (undated) DEVICE — WARMING BLANKET LOWER ADULT

## (undated) DEVICE — SYR LUER LOK 30CC

## (undated) DEVICE — SUT MONOCRYL 4-0 18" PS-2

## (undated) DEVICE — SUT SILK 2-0 12-18"

## (undated) DEVICE — URETERAL CATH RED RUBBER 14FR (GREEN)

## (undated) DEVICE — SUT PROLENE 5-0 36" RB-1